# Patient Record
Sex: MALE | Race: WHITE | NOT HISPANIC OR LATINO | Employment: OTHER | URBAN - METROPOLITAN AREA
[De-identification: names, ages, dates, MRNs, and addresses within clinical notes are randomized per-mention and may not be internally consistent; named-entity substitution may affect disease eponyms.]

---

## 2017-01-17 ENCOUNTER — HISTORICAL (OUTPATIENT)
Dept: ADMINISTRATIVE | Facility: HOSPITAL | Age: 67
End: 2017-01-17

## 2017-07-03 ENCOUNTER — HISTORICAL (OUTPATIENT)
Dept: ADMINISTRATIVE | Facility: HOSPITAL | Age: 67
End: 2017-07-03

## 2018-02-02 ENCOUNTER — HISTORICAL (OUTPATIENT)
Dept: ADMINISTRATIVE | Facility: HOSPITAL | Age: 68
End: 2018-02-02

## 2018-02-02 LAB — TESTOST SERPL-MCNC: 339.1 NG/DL (ref 241–827)

## 2018-04-02 ENCOUNTER — HISTORICAL (OUTPATIENT)
Dept: ADMINISTRATIVE | Facility: HOSPITAL | Age: 68
End: 2018-04-02

## 2018-07-24 ENCOUNTER — HISTORICAL (OUTPATIENT)
Dept: MEDSURG UNIT | Facility: HOSPITAL | Age: 68
End: 2018-07-24

## 2018-07-24 LAB
ABS NEUT (OLG): 3.7 X10(3)/MCL (ref 2.1–9.2)
BASOPHILS # BLD AUTO: 0 X10(3)/MCL (ref 0–0.2)
BASOPHILS NFR BLD AUTO: 0 %
EOSINOPHIL # BLD AUTO: 0.4 X10(3)/MCL (ref 0–0.9)
EOSINOPHIL NFR BLD AUTO: 6 %
ERYTHROCYTE [DISTWIDTH] IN BLOOD BY AUTOMATED COUNT: 16.7 % (ref 11.5–17)
HCT VFR BLD AUTO: 27.1 % (ref 42–52)
HGB BLD-MCNC: 8 GM/DL (ref 14–18)
LYMPHOCYTES # BLD AUTO: 1.9 X10(3)/MCL (ref 0.6–4.6)
LYMPHOCYTES NFR BLD AUTO: 29 %
MCH RBC QN AUTO: 25 PG (ref 27–31)
MCHC RBC AUTO-ENTMCNC: 29.5 GM/DL (ref 33–36)
MCV RBC AUTO: 84.7 FL (ref 80–94)
MONOCYTES # BLD AUTO: 0.6 X10(3)/MCL (ref 0.1–1.3)
MONOCYTES NFR BLD AUTO: 9 %
NEUTROPHILS # BLD AUTO: 3.7 X10(3)/MCL (ref 2.1–9.2)
NEUTROPHILS NFR BLD AUTO: 56 %
PLATELET # BLD AUTO: 329 X10(3)/MCL (ref 130–400)
PMV BLD AUTO: 8.8 FL (ref 9.4–12.4)
PSA SERPL-MCNC: 6.39 NG/ML (ref 0–4)
RBC # BLD AUTO: 3.2 X10(6)/MCL (ref 4.7–6.1)
WBC # SPEC AUTO: 6.7 X10(3)/MCL (ref 4.5–11.5)

## 2018-07-25 LAB
ABS NEUT (OLG): 3.62 X10(3)/MCL (ref 2.1–9.2)
ALBUMIN SERPL-MCNC: 3 GM/DL (ref 3.4–5)
ALBUMIN/GLOB SERPL: 0.8 RATIO (ref 1.1–2)
ALP SERPL-CCNC: 53 UNIT/L (ref 50–136)
ALT SERPL-CCNC: 27 UNIT/L (ref 12–78)
AST SERPL-CCNC: 15 UNIT/L (ref 15–37)
BASOPHILS # BLD AUTO: 0 X10(3)/MCL (ref 0–0.2)
BASOPHILS NFR BLD AUTO: 0 %
BILIRUB SERPL-MCNC: 0.5 MG/DL (ref 0.2–1)
BILIRUBIN DIRECT+TOT PNL SERPL-MCNC: 0.1 MG/DL (ref 0–0.5)
BILIRUBIN DIRECT+TOT PNL SERPL-MCNC: 0.4 MG/DL (ref 0–0.8)
BUN SERPL-MCNC: 19 MG/DL (ref 7–18)
CALCIUM SERPL-MCNC: 8 MG/DL (ref 8.5–10.1)
CHLORIDE SERPL-SCNC: 109 MMOL/L (ref 98–107)
CO2 SERPL-SCNC: 25 MMOL/L (ref 21–32)
CREAT SERPL-MCNC: 1.49 MG/DL (ref 0.7–1.3)
EOSINOPHIL # BLD AUTO: 0.4 X10(3)/MCL (ref 0–0.9)
EOSINOPHIL NFR BLD AUTO: 6 %
ERYTHROCYTE [DISTWIDTH] IN BLOOD BY AUTOMATED COUNT: 16.6 % (ref 11.5–17)
GLOBULIN SER-MCNC: 3.6 GM/DL (ref 2.4–3.5)
GLUCOSE SERPL-MCNC: 107 MG/DL (ref 74–106)
HCT VFR BLD AUTO: 25.3 % (ref 42–52)
HGB BLD-MCNC: 7.6 GM/DL (ref 14–18)
LYMPHOCYTES # BLD AUTO: 1.8 X10(3)/MCL (ref 0.6–4.6)
LYMPHOCYTES NFR BLD AUTO: 28 %
MCH RBC QN AUTO: 25.8 PG (ref 27–31)
MCHC RBC AUTO-ENTMCNC: 30 GM/DL (ref 33–36)
MCV RBC AUTO: 85.8 FL (ref 80–94)
MONOCYTES # BLD AUTO: 0.6 X10(3)/MCL (ref 0.1–1.3)
MONOCYTES NFR BLD AUTO: 9 %
NEUTROPHILS # BLD AUTO: 3.62 X10(3)/MCL (ref 1.4–7.9)
NEUTROPHILS NFR BLD AUTO: 56 %
PLATELET # BLD AUTO: 317 X10(3)/MCL (ref 130–400)
PMV BLD AUTO: 9.3 FL (ref 9.4–12.4)
POTASSIUM SERPL-SCNC: 4 MMOL/L (ref 3.5–5.1)
PROT SERPL-MCNC: 6.6 GM/DL (ref 6.4–8.2)
RBC # BLD AUTO: 2.95 X10(6)/MCL (ref 4.7–6.1)
SODIUM SERPL-SCNC: 141 MMOL/L (ref 136–145)
WBC # SPEC AUTO: 6.4 X10(3)/MCL (ref 4.5–11.5)

## 2018-08-01 ENCOUNTER — HISTORICAL (OUTPATIENT)
Dept: ADMINISTRATIVE | Facility: HOSPITAL | Age: 68
End: 2018-08-01

## 2018-08-01 LAB
ABS NEUT (OLG): 2.9 X10(3)/MCL (ref 2.1–9.2)
ANISOCYTOSIS BLD QL SMEAR: 1
BASOPHILS # BLD AUTO: 0 X10(3)/MCL (ref 0–0.2)
BASOPHILS NFR BLD AUTO: 1 %
EOSINOPHIL # BLD AUTO: 0.4 X10(3)/MCL (ref 0–0.9)
EOSINOPHIL NFR BLD AUTO: 8 %
ERYTHROCYTE [DISTWIDTH] IN BLOOD BY AUTOMATED COUNT: 16.2 % (ref 11.5–17)
FOLATE SERPL-MCNC: 18.5 NG/ML (ref 3.1–17.5)
HCT VFR BLD AUTO: 29.3 % (ref 42–52)
HGB BLD-MCNC: 8.6 GM/DL (ref 14–18)
HYPOCHROMIA BLD QL SMEAR: 1
LYMPHOCYTES # BLD AUTO: 1.4 X10(3)/MCL (ref 0.6–4.6)
LYMPHOCYTES NFR BLD AUTO: 28 % (ref 13–40)
MCH RBC QN AUTO: 25.1 PG (ref 27–31)
MCHC RBC AUTO-ENTMCNC: 29.4 GM/DL (ref 33–36)
MCV RBC AUTO: 85.4 FL (ref 80–94)
MICROCYTES BLD QL SMEAR: 1
MONOCYTES # BLD AUTO: 0.4 X10(3)/MCL (ref 0.1–1.3)
MONOCYTES NFR BLD AUTO: 7 %
NEUTROPHILS # BLD AUTO: 2.9 X10(3)/MCL (ref 2.1–9.2)
NEUTROPHILS NFR BLD AUTO: 57 %
PLATELET # BLD AUTO: 347 X10(3)/MCL (ref 130–400)
PLATELET # BLD EST: NORMAL 10*3/UL
PMV BLD AUTO: 9.1 FL (ref 7.4–10.4)
PSA SERPL-MCNC: 5.21 NG/ML (ref 0–4)
RBC # BLD AUTO: 3.43 X10(6)/MCL (ref 4.7–6.1)
RBC MORPH BLD: NORMAL
T4 FREE SERPL-MCNC: 0.84 NG/DL (ref 0.76–1.46)
VIT B12 SERPL-MCNC: 1459 PG/ML (ref 193–986)
WBC # SPEC AUTO: 5.1 X10(3)/MCL (ref 4.5–11.5)

## 2018-12-04 ENCOUNTER — HISTORICAL (OUTPATIENT)
Dept: ADMINISTRATIVE | Facility: HOSPITAL | Age: 68
End: 2018-12-04

## 2018-12-04 LAB
ABS NEUT (OLG): 3.27 X10(3)/MCL (ref 2.1–9.2)
BASOPHILS # BLD AUTO: 0 X10(3)/MCL (ref 0–0.2)
BASOPHILS NFR BLD AUTO: 0 %
EOSINOPHIL # BLD AUTO: 0.2 X10(3)/MCL (ref 0–0.9)
EOSINOPHIL NFR BLD AUTO: 4 %
ERYTHROCYTE [DISTWIDTH] IN BLOOD BY AUTOMATED COUNT: 14.9 % (ref 11.5–17)
HCT VFR BLD AUTO: 41.5 % (ref 42–52)
HGB BLD-MCNC: 13.4 GM/DL (ref 14–18)
IRON SATN MFR SERPL: 26.8 % (ref 20–50)
IRON SERPL-MCNC: 95 MCG/DL (ref 50–175)
LYMPHOCYTES # BLD AUTO: 1.4 X10(3)/MCL (ref 0.6–4.6)
LYMPHOCYTES NFR BLD AUTO: 26 %
MCH RBC QN AUTO: 30.2 PG (ref 27–31)
MCHC RBC AUTO-ENTMCNC: 32.3 GM/DL (ref 33–36)
MCV RBC AUTO: 93.7 FL (ref 80–94)
MONOCYTES # BLD AUTO: 0.6 X10(3)/MCL (ref 0.1–1.3)
MONOCYTES NFR BLD AUTO: 10 %
NEUTROPHILS # BLD AUTO: 3.27 X10(3)/MCL (ref 2.1–9.2)
NEUTROPHILS NFR BLD AUTO: 59 %
PLATELET # BLD AUTO: 232 X10(3)/MCL (ref 130–400)
PMV BLD AUTO: 9.6 FL (ref 9.4–12.4)
RBC # BLD AUTO: 4.43 X10(6)/MCL (ref 4.7–6.1)
TIBC SERPL-MCNC: 354 MCG/DL (ref 250–450)
TRANSFERRIN SERPL-MCNC: 272 MG/DL (ref 200–360)
WBC # SPEC AUTO: 5.6 X10(3)/MCL (ref 4.5–11.5)

## 2019-11-19 ENCOUNTER — HISTORICAL (OUTPATIENT)
Dept: ADMINISTRATIVE | Facility: HOSPITAL | Age: 69
End: 2019-11-19

## 2019-11-19 LAB
ABS NEUT (OLG): 2.8 X10(3)/MCL (ref 2.1–9.2)
ALBUMIN SERPL-MCNC: 3.4 GM/DL (ref 3.4–5)
ALBUMIN/GLOB SERPL: 0.8 RATIO (ref 1.1–2)
ALP SERPL-CCNC: 50 UNIT/L (ref 50–136)
ALT SERPL-CCNC: 38 UNIT/L (ref 12–78)
AST SERPL-CCNC: 18 UNIT/L (ref 15–37)
BASOPHILS # BLD AUTO: 0 X10(3)/MCL (ref 0–0.2)
BASOPHILS NFR BLD AUTO: 0 %
BILIRUB SERPL-MCNC: 0.8 MG/DL (ref 0.2–1)
BILIRUBIN DIRECT+TOT PNL SERPL-MCNC: 0.2 MG/DL (ref 0–0.5)
BILIRUBIN DIRECT+TOT PNL SERPL-MCNC: 0.6 MG/DL (ref 0–0.8)
BUN SERPL-MCNC: 24 MG/DL (ref 7–18)
CALCIUM SERPL-MCNC: 9.2 MG/DL (ref 8.5–10.1)
CHLORIDE SERPL-SCNC: 105 MMOL/L (ref 98–107)
CHOLEST SERPL-MCNC: 135 MG/DL (ref 0–200)
CHOLEST/HDLC SERPL: 5 {RATIO} (ref 0–5)
CO2 SERPL-SCNC: 28 MMOL/L (ref 21–32)
CREAT SERPL-MCNC: 1.52 MG/DL (ref 0.7–1.3)
EOSINOPHIL # BLD AUTO: 0.2 X10(3)/MCL (ref 0–0.9)
EOSINOPHIL NFR BLD AUTO: 5 %
ERYTHROCYTE [DISTWIDTH] IN BLOOD BY AUTOMATED COUNT: 13.6 % (ref 11.5–17)
GLOBULIN SER-MCNC: 4.1 GM/DL (ref 2.4–3.5)
GLUCOSE SERPL-MCNC: 106 MG/DL (ref 74–106)
HCT VFR BLD AUTO: 39.5 % (ref 42–52)
HDLC SERPL-MCNC: 27 MG/DL (ref 35–60)
HGB BLD-MCNC: 12.7 GM/DL (ref 14–18)
IRON SATN MFR SERPL: 25.6 % (ref 20–50)
IRON SERPL-MCNC: 78 MCG/DL (ref 50–175)
LDLC SERPL CALC-MCNC: 70 MG/DL (ref 0–129)
LYMPHOCYTES # BLD AUTO: 1.6 X10(3)/MCL (ref 0.6–4.6)
LYMPHOCYTES NFR BLD AUTO: 31 %
MCH RBC QN AUTO: 32.3 PG (ref 27–31)
MCHC RBC AUTO-ENTMCNC: 32.2 GM/DL (ref 33–36)
MCV RBC AUTO: 100.5 FL (ref 80–94)
MONOCYTES # BLD AUTO: 0.4 X10(3)/MCL (ref 0.1–1.3)
MONOCYTES NFR BLD AUTO: 8 %
NEUTROPHILS # BLD AUTO: 2.8 X10(3)/MCL (ref 2.1–9.2)
NEUTROPHILS NFR BLD AUTO: 55 %
PLATELET # BLD AUTO: 221 X10(3)/MCL (ref 130–400)
PMV BLD AUTO: 9.1 FL (ref 9.4–12.4)
POTASSIUM SERPL-SCNC: 4.3 MMOL/L (ref 3.5–5.1)
PROT SERPL-MCNC: 7.5 GM/DL (ref 6.4–8.2)
PSA SERPL-MCNC: 6.77 NG/ML (ref 0–4)
RBC # BLD AUTO: 3.93 X10(6)/MCL (ref 4.7–6.1)
SODIUM SERPL-SCNC: 138 MMOL/L (ref 136–145)
T4 FREE SERPL-MCNC: 0.73 NG/DL (ref 0.76–1.46)
TESTOST SERPL-MCNC: 1201 NG/DL (ref 241–827)
TIBC SERPL-MCNC: 305 MCG/DL (ref 250–450)
TRANSFERRIN SERPL-MCNC: 231 MG/DL (ref 200–360)
TRIGL SERPL-MCNC: 190 MG/DL (ref 30–150)
TSH SERPL-ACNC: 1.44 MIU/L (ref 0.36–3.74)
VLDLC SERPL CALC-MCNC: 38 MG/DL
WBC # SPEC AUTO: 5.1 X10(3)/MCL (ref 4.5–11.5)

## 2020-03-31 ENCOUNTER — HISTORICAL (OUTPATIENT)
Dept: CARDIOLOGY | Facility: HOSPITAL | Age: 70
End: 2020-03-31

## 2020-06-17 ENCOUNTER — HOSPITAL ENCOUNTER (OUTPATIENT)
Dept: MEDSURG UNIT | Facility: HOSPITAL | Age: 70
End: 2020-06-18
Attending: INTERNAL MEDICINE | Admitting: INTERNAL MEDICINE

## 2020-06-18 LAB
ABS NEUT (OLG): 2.66 X10(3)/MCL (ref 2.1–9.2)
ALBUMIN SERPL-MCNC: 2.9 GM/DL (ref 3.4–5)
ALBUMIN/GLOB SERPL: 0.7 RATIO (ref 1.1–2)
ALP SERPL-CCNC: 56 UNIT/L (ref 40–150)
ALT SERPL-CCNC: 18 UNIT/L (ref 0–55)
AST SERPL-CCNC: 13 UNIT/L (ref 5–34)
BASOPHILS # BLD AUTO: 0 X10(3)/MCL (ref 0–0.2)
BASOPHILS NFR BLD AUTO: 0 %
BILIRUB SERPL-MCNC: 0.4 MG/DL
BILIRUBIN DIRECT+TOT PNL SERPL-MCNC: 0.1 MG/DL (ref 0–0.5)
BILIRUBIN DIRECT+TOT PNL SERPL-MCNC: 0.3 MG/DL (ref 0–0.8)
BUN SERPL-MCNC: 19 MG/DL (ref 8.4–25.7)
CALCIUM SERPL-MCNC: 8.3 MG/DL (ref 8.8–10)
CHLORIDE SERPL-SCNC: 109 MMOL/L (ref 98–107)
CO2 SERPL-SCNC: 24 MMOL/L (ref 23–31)
CREAT SERPL-MCNC: 1.27 MG/DL (ref 0.73–1.18)
EOSINOPHIL # BLD AUTO: 0.2 X10(3)/MCL (ref 0–0.9)
EOSINOPHIL NFR BLD AUTO: 5 %
ERYTHROCYTE [DISTWIDTH] IN BLOOD BY AUTOMATED COUNT: 13.8 % (ref 11.5–17)
GLOBULIN SER-MCNC: 4.4 GM/DL (ref 2.4–3.5)
GLUCOSE SERPL-MCNC: 109 MG/DL (ref 82–115)
HCT VFR BLD AUTO: 31.7 % (ref 42–52)
HGB BLD-MCNC: 10.2 GM/DL (ref 14–18)
LYMPHOCYTES # BLD AUTO: 1.3 X10(3)/MCL (ref 0.6–4.6)
LYMPHOCYTES NFR BLD AUTO: 29 %
MCH RBC QN AUTO: 33.7 PG (ref 27–31)
MCHC RBC AUTO-ENTMCNC: 32.2 GM/DL (ref 33–36)
MCV RBC AUTO: 104.6 FL (ref 80–94)
MONOCYTES # BLD AUTO: 0.3 X10(3)/MCL (ref 0.1–1.3)
MONOCYTES NFR BLD AUTO: 7 %
NEUTROPHILS # BLD AUTO: 2.66 X10(3)/MCL (ref 2.1–9.2)
NEUTROPHILS NFR BLD AUTO: 58 %
PLATELET # BLD AUTO: 204 X10(3)/MCL (ref 130–400)
PMV BLD AUTO: 8.9 FL (ref 9.4–12.4)
POTASSIUM SERPL-SCNC: 4.2 MMOL/L (ref 3.5–5.1)
PROT SERPL-MCNC: 7.3 GM/DL (ref 5.8–7.6)
RBC # BLD AUTO: 3.03 X10(6)/MCL (ref 4.7–6.1)
SODIUM SERPL-SCNC: 137 MMOL/L (ref 136–145)
WBC # SPEC AUTO: 4.6 X10(3)/MCL (ref 4.5–11.5)

## 2021-01-26 ENCOUNTER — HISTORICAL (OUTPATIENT)
Dept: ADMINISTRATIVE | Facility: HOSPITAL | Age: 71
End: 2021-01-26

## 2021-01-26 LAB
ALBUMIN SERPL-MCNC: 3.6 GM/DL (ref 3.4–4.8)
ALBUMIN/GLOB SERPL: 0.7 RATIO (ref 1.1–2)
ALP SERPL-CCNC: 53 UNIT/L (ref 40–150)
ALT SERPL-CCNC: 37 UNIT/L (ref 0–55)
APPEARANCE, UA: CLEAR
AST SERPL-CCNC: 20 UNIT/L (ref 5–34)
BACTERIA #/AREA URNS AUTO: ABNORMAL /HPF
BILIRUB SERPL-MCNC: 0.6 MG/DL
BILIRUB UR QL STRIP: NEGATIVE
BILIRUBIN DIRECT+TOT PNL SERPL-MCNC: 0.2 MG/DL (ref 0–0.5)
BILIRUBIN DIRECT+TOT PNL SERPL-MCNC: 0.4 MG/DL (ref 0–0.8)
BUN SERPL-MCNC: 19 MG/DL (ref 8.4–25.7)
CALCIUM SERPL-MCNC: 8.8 MG/DL (ref 8.8–10)
CHLORIDE SERPL-SCNC: 106 MMOL/L (ref 98–107)
CHOLEST SERPL-MCNC: 130 MG/DL
CHOLEST/HDLC SERPL: 5 {RATIO} (ref 0–5)
CO2 SERPL-SCNC: 23 MMOL/L (ref 23–31)
COLOR UR: YELLOW
CREAT SERPL-MCNC: 1.42 MG/DL (ref 0.73–1.18)
ERYTHROCYTE [DISTWIDTH] IN BLOOD BY AUTOMATED COUNT: 14.6 % (ref 11.5–17)
GLOBULIN SER-MCNC: 4.9 GM/DL (ref 2.4–3.5)
GLUCOSE (UA): NEGATIVE
GLUCOSE SERPL-MCNC: 87 MG/DL (ref 82–115)
HCT VFR BLD AUTO: 36 % (ref 42–52)
HDLC SERPL-MCNC: 25 MG/DL (ref 35–60)
HGB BLD-MCNC: 11.7 GM/DL (ref 14–18)
HGB UR QL STRIP: NEGATIVE
IRON SATN MFR SERPL: 27 % (ref 20–50)
IRON SERPL-MCNC: 69 UG/DL (ref 65–175)
KETONES UR QL STRIP: ABNORMAL
LDLC SERPL CALC-MCNC: 49 MG/DL (ref 50–140)
LEUKOCYTE ESTERASE UR QL STRIP: NEGATIVE
MCH RBC QN AUTO: 32.8 PG (ref 27–31)
MCHC RBC AUTO-ENTMCNC: 32.5 GM/DL (ref 33–36)
MCV RBC AUTO: 100.8 FL (ref 80–94)
NITRITE UR QL STRIP.AUTO: NEGATIVE
PH UR STRIP: 5 [PH] (ref 5–9)
PLATELET # BLD AUTO: 252 X10(3)/MCL (ref 130–400)
PMV BLD AUTO: 9.3 FL (ref 9.4–12.4)
POTASSIUM SERPL-SCNC: 4.1 MMOL/L (ref 3.5–5.1)
PROT SERPL-MCNC: 8.5 GM/DL (ref 5.8–7.6)
PROT UR QL STRIP: ABNORMAL
PSA SERPL-MCNC: 11.86 NG/ML
RBC # BLD AUTO: 3.57 X10(6)/MCL (ref 4.7–6.1)
RBC #/AREA URNS HPF: ABNORMAL /[HPF]
SODIUM SERPL-SCNC: 137 MMOL/L (ref 136–145)
SP GR UR STRIP: 1.02 (ref 1–1.03)
SQUAMOUS EPITHELIAL, UA: ABNORMAL
T4 FREE SERPL-MCNC: 0.85 NG/DL (ref 0.7–1.48)
TESTOST SERPL-MCNC: >1500 NG/DL (ref 220.91–715.81)
TIBC SERPL-MCNC: 189 UG/DL (ref 69–240)
TIBC SERPL-MCNC: 258 UG/DL (ref 250–450)
TRANSFERRIN SERPL-MCNC: 227 MG/DL (ref 163–344)
TRIGL SERPL-MCNC: 280 MG/DL (ref 34–140)
TSH SERPL-ACNC: 1.6 UIU/ML (ref 0.35–4.94)
UROBILINOGEN UR STRIP-ACNC: 0.2
VIT B12 SERPL-MCNC: 1775 PG/ML (ref 213–816)
VLDLC SERPL CALC-MCNC: 56 MG/DL
WBC # SPEC AUTO: 4.6 X10(3)/MCL (ref 4.5–11.5)
WBC #/AREA URNS AUTO: ABNORMAL /HPF (ref 0–3)

## 2021-03-10 ENCOUNTER — HISTORICAL (OUTPATIENT)
Dept: ADMINISTRATIVE | Facility: HOSPITAL | Age: 71
End: 2021-03-10

## 2021-03-10 LAB
BUN SERPL-MCNC: 18.9 MG/DL (ref 8.4–25.7)
CALCIUM SERPL-MCNC: 9.1 MG/DL (ref 8.8–10)
CHLORIDE SERPL-SCNC: 104 MMOL/L (ref 98–107)
CO2 SERPL-SCNC: 26 MMOL/L (ref 23–31)
CREAT SERPL-MCNC: 1.21 MG/DL (ref 0.73–1.18)
CREAT/UREA NIT SERPL: 16
GLUCOSE SERPL-MCNC: 105 MG/DL (ref 82–115)
POTASSIUM SERPL-SCNC: 4.5 MMOL/L (ref 3.5–5.1)
SODIUM SERPL-SCNC: 137 MMOL/L (ref 136–145)

## 2021-11-10 ENCOUNTER — HISTORICAL (OUTPATIENT)
Dept: ADMINISTRATIVE | Facility: HOSPITAL | Age: 71
End: 2021-11-10

## 2021-11-10 LAB
ALBUMIN SERPL-MCNC: 3.3 GM/DL (ref 3.4–4.8)
ALBUMIN/GLOB SERPL: 0.6 RATIO (ref 1.1–2)
ALP SERPL-CCNC: 60 UNIT/L (ref 40–150)
ALT SERPL-CCNC: 36 UNIT/L (ref 0–55)
ANTINUCLEAR ANTIBODY SCREEN (OHS): NEGATIVE
APPEARANCE, UA: CLEAR
AST SERPL-CCNC: 23 UNIT/L (ref 5–34)
BACTERIA #/AREA URNS AUTO: ABNORMAL /HPF
BILIRUB SERPL-MCNC: 0.6 MG/DL
BILIRUB UR QL STRIP: NEGATIVE
BILIRUBIN DIRECT+TOT PNL SERPL-MCNC: 0.3 MG/DL (ref 0–0.5)
BILIRUBIN DIRECT+TOT PNL SERPL-MCNC: 0.3 MG/DL (ref 0–0.8)
BUN SERPL-MCNC: 22.7 MG/DL (ref 8.4–25.7)
CALCIUM SERPL-MCNC: 9.1 MG/DL (ref 8.7–10.5)
CHLORIDE SERPL-SCNC: 105 MMOL/L (ref 98–107)
CHOLEST SERPL-MCNC: 125 MG/DL
CHOLEST/HDLC SERPL: 5 {RATIO} (ref 0–5)
CO2 SERPL-SCNC: 24 MMOL/L (ref 23–31)
COLOR UR: YELLOW
CREAT SERPL-MCNC: 1.5 MG/DL (ref 0.73–1.18)
DEPRECATED CALCIDIOL+CALCIFEROL SERPL-MC: 22.4 NG/ML (ref 30–80)
DSDNA ANTIBODY (OHS): NEGATIVE
ERYTHROCYTE [DISTWIDTH] IN BLOOD BY AUTOMATED COUNT: 14.7 % (ref 11.5–17)
ERYTHROCYTE [SEDIMENTATION RATE] IN BLOOD: 98 MM/HR (ref 0–15)
EST. AVERAGE GLUCOSE BLD GHB EST-MCNC: 116.9 MG/DL
GLOBULIN SER-MCNC: 5.2 GM/DL (ref 2.4–3.5)
GLUCOSE (UA): NEGATIVE
GLUCOSE SERPL-MCNC: 107 MG/DL (ref 82–115)
HBA1C MFR BLD: 5.7 %
HCT VFR BLD AUTO: 25.2 % (ref 42–52)
HDLC SERPL-MCNC: 25 MG/DL (ref 35–60)
HGB BLD-MCNC: 8.5 GM/DL (ref 14–18)
HGB UR QL STRIP: NEGATIVE
KETONES UR QL STRIP: NEGATIVE
LDLC SERPL CALC-MCNC: 67 MG/DL (ref 50–140)
LEUKOCYTE ESTERASE UR QL STRIP: NEGATIVE
MCH RBC QN AUTO: 32.6 PG (ref 27–31)
MCHC RBC AUTO-ENTMCNC: 33.7 GM/DL (ref 33–36)
MCV RBC AUTO: 96.6 FL (ref 80–94)
NITRITE UR QL STRIP.AUTO: NEGATIVE
PH UR STRIP: 5 [PH] (ref 5–9)
PLATELET # BLD AUTO: 238 X10(3)/MCL (ref 130–400)
PMV BLD AUTO: 8.9 FL (ref 9.4–12.4)
POTASSIUM SERPL-SCNC: 4.7 MMOL/L (ref 3.5–5.1)
PROT SERPL-MCNC: 8.5 GM/DL (ref 5.8–7.6)
PROT UR QL STRIP: ABNORMAL
PSA SERPL-MCNC: 4.09 NG/ML
RBC # BLD AUTO: 2.61 X10(6)/MCL (ref 4.7–6.1)
RBC #/AREA URNS HPF: ABNORMAL /[HPF]
RHEUMATOID FACT SERPL-ACNC: <13 IU/ML
SODIUM SERPL-SCNC: 136 MMOL/L (ref 136–145)
SP GR UR STRIP: 1.02 (ref 1–1.03)
SQUAMOUS EPITHELIAL, UA: ABNORMAL /HPF (ref 0–4)
T4 FREE SERPL-MCNC: 0.8 NG/DL (ref 0.7–1.48)
TRIGL SERPL-MCNC: 166 MG/DL (ref 34–140)
TSH SERPL-ACNC: 1.3 UIU/ML (ref 0.35–4.94)
URATE SERPL-MCNC: 8.4 MG/DL (ref 3.5–7.2)
UROBILINOGEN UR STRIP-ACNC: 0.2
VLDLC SERPL CALC-MCNC: 33 MG/DL
WBC # SPEC AUTO: 3.1 X10(3)/MCL (ref 4.5–11.5)
WBC #/AREA URNS AUTO: ABNORMAL /HPF (ref 0–3)

## 2021-11-15 ENCOUNTER — HISTORICAL (OUTPATIENT)
Dept: ADMINISTRATIVE | Facility: HOSPITAL | Age: 71
End: 2021-11-15

## 2021-11-15 LAB
ABS NEUT (OLG): 1.73 X10(3)/MCL (ref 2.1–9.2)
BASOPHILS # BLD AUTO: 0 X10(3)/MCL (ref 0–0.2)
BASOPHILS NFR BLD AUTO: 0 %
EOSINOPHIL # BLD AUTO: 0.2 X10(3)/MCL (ref 0–0.9)
EOSINOPHIL NFR BLD AUTO: 4 %
ERYTHROCYTE [DISTWIDTH] IN BLOOD BY AUTOMATED COUNT: 14.9 % (ref 11.5–17)
HCT VFR BLD AUTO: 26.7 % (ref 42–52)
HGB BLD-MCNC: 8.6 GM/DL (ref 14–18)
LYMPHOCYTES # BLD AUTO: 1.4 X10(3)/MCL (ref 0.6–4.6)
LYMPHOCYTES NFR BLD AUTO: 38 %
MCH RBC QN AUTO: 33.2 PG (ref 27–31)
MCHC RBC AUTO-ENTMCNC: 32.2 GM/DL (ref 33–36)
MCV RBC AUTO: 103.1 FL (ref 80–94)
MONOCYTES # BLD AUTO: 0.3 X10(3)/MCL (ref 0.1–1.3)
MONOCYTES NFR BLD AUTO: 7 %
NEUTROPHILS # BLD AUTO: 1.73 X10(3)/MCL (ref 2.1–9.2)
NEUTROPHILS NFR BLD AUTO: 49 %
PLATELET # BLD AUTO: 222 X10(3)/MCL (ref 130–400)
PMV BLD AUTO: 9.2 FL (ref 9.4–12.4)
RBC # BLD AUTO: 2.59 X10(6)/MCL (ref 4.7–6.1)
WBC # SPEC AUTO: 3.5 X10(3)/MCL (ref 4.5–11.5)

## 2021-11-16 LAB
CROSSMATCH INTERPRETATION: NORMAL
GROUP & RH: NORMAL
PRODUCT READY: NORMAL

## 2021-11-17 ENCOUNTER — HISTORICAL (OUTPATIENT)
Dept: INFUSION THERAPY | Facility: HOSPITAL | Age: 71
End: 2021-11-17

## 2021-11-19 ENCOUNTER — HISTORICAL (OUTPATIENT)
Dept: ADMINISTRATIVE | Facility: HOSPITAL | Age: 71
End: 2021-11-19

## 2021-11-19 LAB
ERYTHROCYTE [DISTWIDTH] IN BLOOD BY AUTOMATED COUNT: 14.8 % (ref 11.5–17)
HCT VFR BLD AUTO: 29.4 % (ref 42–52)
HGB BLD-MCNC: 9.8 GM/DL (ref 14–18)
MCH RBC QN AUTO: 33.1 PG (ref 27–31)
MCHC RBC AUTO-ENTMCNC: 33.3 GM/DL (ref 33–36)
MCV RBC AUTO: 99.3 FL (ref 80–94)
PLATELET # BLD AUTO: 208 X10(3)/MCL (ref 130–400)
PMV BLD AUTO: 9.4 FL (ref 9.4–12.4)
RBC # BLD AUTO: 2.96 X10(6)/MCL (ref 4.7–6.1)
WBC # SPEC AUTO: 3.2 X10(3)/MCL (ref 4.5–11.5)

## 2021-12-14 ENCOUNTER — HISTORICAL (OUTPATIENT)
Dept: ADMINISTRATIVE | Facility: HOSPITAL | Age: 71
End: 2021-12-14

## 2021-12-14 LAB
ABS NEUT (OLG): 1.89 X10(3)/MCL (ref 2.1–9.2)
BASOPHILS # BLD AUTO: 0 X10(3)/MCL (ref 0–0.2)
BASOPHILS NFR BLD AUTO: 0 %
EOSINOPHIL # BLD AUTO: 0.1 X10(3)/MCL (ref 0–0.9)
EOSINOPHIL NFR BLD AUTO: 3 %
ERYTHROCYTE [DISTWIDTH] IN BLOOD BY AUTOMATED COUNT: 15.3 % (ref 11.5–17)
HCT VFR BLD AUTO: 27.7 % (ref 42–52)
HGB BLD-MCNC: 8.9 GM/DL (ref 14–18)
IMM GRANULOCYTES # BLD AUTO: 0.03 10*3/UL
IMM GRANULOCYTES NFR BLD AUTO: 1 %
LYMPHOCYTES # BLD AUTO: 1.5 X10(3)/MCL (ref 0.6–4.6)
LYMPHOCYTES NFR BLD AUTO: 39 %
MCH RBC QN AUTO: 32.7 PG (ref 27–31)
MCHC RBC AUTO-ENTMCNC: 32.1 GM/DL (ref 33–36)
MCV RBC AUTO: 101.8 FL (ref 80–94)
MONOCYTES # BLD AUTO: 0.3 X10(3)/MCL (ref 0.1–1.3)
MONOCYTES NFR BLD AUTO: 7 %
NEUTROPHILS # BLD AUTO: 1.89 X10(3)/MCL (ref 2.1–9.2)
NEUTROPHILS NFR BLD AUTO: 50 %
PLATELET # BLD AUTO: 213 X10(3)/MCL (ref 130–400)
PMV BLD AUTO: 8.9 FL (ref 9.4–12.4)
RBC # BLD AUTO: 2.72 X10(6)/MCL (ref 4.7–6.1)
WBC # SPEC AUTO: 3.8 X10(3)/MCL (ref 4.5–11.5)

## 2021-12-29 ENCOUNTER — HISTORICAL (OUTPATIENT)
Dept: ADMINISTRATIVE | Facility: HOSPITAL | Age: 71
End: 2021-12-29

## 2021-12-29 LAB
ABS NEUT (OLG): 2.43 X10(3)/MCL (ref 2.1–9.2)
BASOPHILS # BLD AUTO: 0 X10(3)/MCL (ref 0–0.2)
BASOPHILS NFR BLD AUTO: 0 %
EOSINOPHIL # BLD AUTO: 0.1 X10(3)/MCL (ref 0–0.9)
EOSINOPHIL NFR BLD AUTO: 3 %
ERYTHROCYTE [DISTWIDTH] IN BLOOD BY AUTOMATED COUNT: 15.9 % (ref 11.5–17)
EST. AVERAGE GLUCOSE BLD GHB EST-MCNC: 91.1 MG/DL
HBA1C MFR BLD: 4.8 %
HCT VFR BLD AUTO: 28.5 % (ref 42–52)
HGB BLD-MCNC: 9.4 GM/DL (ref 14–18)
IRON SATN MFR SERPL: 41 % (ref 20–50)
IRON SERPL-MCNC: 104 UG/DL (ref 65–175)
LYMPHOCYTES # BLD AUTO: 1.2 X10(3)/MCL (ref 0.6–4.6)
LYMPHOCYTES NFR BLD AUTO: 30 %
MCH RBC QN AUTO: 33.3 PG (ref 27–31)
MCHC RBC AUTO-ENTMCNC: 33 GM/DL (ref 33–36)
MCV RBC AUTO: 101.1 FL (ref 80–94)
MONOCYTES # BLD AUTO: 0.2 X10(3)/MCL (ref 0.1–1.3)
MONOCYTES NFR BLD AUTO: 6 %
NEUTROPHILS # BLD AUTO: 2.43 X10(3)/MCL (ref 2.1–9.2)
NEUTROPHILS NFR BLD AUTO: 60 %
PLATELET # BLD AUTO: 249 X10(3)/MCL (ref 130–400)
PMV BLD AUTO: 8.9 FL (ref 9.4–12.4)
RBC # BLD AUTO: 2.82 X10(6)/MCL (ref 4.7–6.1)
T4 FREE SERPL-MCNC: 0.85 NG/DL (ref 0.7–1.48)
TIBC SERPL-MCNC: 147 UG/DL (ref 69–240)
TIBC SERPL-MCNC: 251 UG/DL (ref 250–450)
TRANSFERRIN SERPL-MCNC: 223 MG/DL (ref 163–344)
TSH SERPL-ACNC: 1.08 UIU/ML (ref 0.35–4.94)
WBC # SPEC AUTO: 4 X10(3)/MCL (ref 4.5–11.5)

## 2022-01-06 LAB
CROSSMATCH INTERPRETATION: NORMAL
GROUP & RH: NORMAL
PRODUCT READY: NORMAL

## 2022-01-07 ENCOUNTER — HISTORICAL (OUTPATIENT)
Dept: INFUSION THERAPY | Facility: HOSPITAL | Age: 72
End: 2022-01-07

## 2022-02-10 ENCOUNTER — HISTORICAL (OUTPATIENT)
Dept: ADMINISTRATIVE | Facility: HOSPITAL | Age: 72
End: 2022-02-10

## 2022-02-10 LAB
ERYTHROCYTE [DISTWIDTH] IN BLOOD BY AUTOMATED COUNT: 16.3 % (ref 11.5–17)
HCT VFR BLD AUTO: 32.4 % (ref 42–52)
HGB BLD-MCNC: 10.5 G/DL (ref 14–18)
MCH RBC QN AUTO: 32.5 PG (ref 27–31)
MCHC RBC AUTO-ENTMCNC: 32.4 G/DL (ref 33–36)
MCV RBC AUTO: 100.3 FL (ref 80–94)
PLATELET # BLD AUTO: 164 10*3/UL (ref 130–400)
PMV BLD AUTO: 9.1 FL (ref 9.4–12.4)
PSA SERPL-MCNC: 4.42 NG/ML
RBC # BLD AUTO: 3.23 10*6/UL (ref 4.7–6.1)
WBC # SPEC AUTO: 5.5 10*3/UL (ref 4.5–11.5)

## 2022-06-22 ENCOUNTER — LAB VISIT (OUTPATIENT)
Dept: LAB | Facility: HOSPITAL | Age: 72
End: 2022-06-22
Attending: INTERNAL MEDICINE
Payer: MEDICARE

## 2022-06-22 DIAGNOSIS — R53.83 FATIGUE, UNSPECIFIED TYPE: ICD-10-CM

## 2022-06-22 DIAGNOSIS — E78.5 HYPERLIPIDEMIA, UNSPECIFIED HYPERLIPIDEMIA TYPE: ICD-10-CM

## 2022-06-22 DIAGNOSIS — R94.8 NONSPECIFIC ABNORMAL RESULTS OF BASAL METABOLISM FUNCTION STUDY: Primary | ICD-10-CM

## 2022-06-22 DIAGNOSIS — I10 ESSENTIAL HYPERTENSION, MALIGNANT: ICD-10-CM

## 2022-06-22 DIAGNOSIS — D64.9 ANEMIA, UNSPECIFIED TYPE: ICD-10-CM

## 2022-06-22 LAB
ALBUMIN SERPL-MCNC: 3.3 GM/DL (ref 3.4–4.8)
ALBUMIN/GLOB SERPL: 0.6 RATIO (ref 1.1–2)
ALP SERPL-CCNC: 48 UNIT/L (ref 40–150)
ALT SERPL-CCNC: 27 UNIT/L (ref 0–55)
AST SERPL-CCNC: 17 UNIT/L (ref 5–34)
BASOPHILS # BLD AUTO: 0.01 X10(3)/MCL (ref 0–0.2)
BASOPHILS NFR BLD AUTO: 0.2 %
BILIRUBIN DIRECT+TOT PNL SERPL-MCNC: 0.5 MG/DL
BUN SERPL-MCNC: 27.7 MG/DL (ref 8.4–25.7)
CALCIUM SERPL-MCNC: 8.8 MG/DL (ref 8.8–10)
CHLORIDE SERPL-SCNC: 109 MMOL/L (ref 98–107)
CO2 SERPL-SCNC: 21 MMOL/L (ref 23–31)
CREAT SERPL-MCNC: 1.41 MG/DL (ref 0.73–1.18)
EOSINOPHIL # BLD AUTO: 0.01 X10(3)/MCL (ref 0–0.9)
EOSINOPHIL NFR BLD AUTO: 0.2 %
ERYTHROCYTE [DISTWIDTH] IN BLOOD BY AUTOMATED COUNT: 14 % (ref 11.5–17)
GLOBULIN SER-MCNC: 5.3 GM/DL (ref 2.4–3.5)
GLUCOSE SERPL-MCNC: 144 MG/DL (ref 82–115)
HCT VFR BLD AUTO: 29.2 % (ref 42–52)
HGB BLD-MCNC: 9.4 GM/DL (ref 14–18)
IMM GRANULOCYTES # BLD AUTO: 0.04 X10(3)/MCL (ref 0–0.02)
IMM GRANULOCYTES NFR BLD AUTO: 0.8 % (ref 0–0.43)
LYMPHOCYTES # BLD AUTO: 1.07 X10(3)/MCL (ref 0.6–4.6)
LYMPHOCYTES NFR BLD AUTO: 21.6 %
MCH RBC QN AUTO: 33.5 PG (ref 27–31)
MCHC RBC AUTO-ENTMCNC: 32.2 MG/DL (ref 33–36)
MCV RBC AUTO: 103.9 FL (ref 80–94)
MONOCYTES # BLD AUTO: 0.31 X10(3)/MCL (ref 0.1–1.3)
MONOCYTES NFR BLD AUTO: 6.3 %
NEUTROPHILS # BLD AUTO: 3.5 X10(3)/MCL (ref 2.1–9.2)
NEUTROPHILS NFR BLD AUTO: 70.9 %
NRBC BLD AUTO-RTO: 0 %
PLATELET # BLD AUTO: 231 X10(3)/MCL (ref 130–400)
PMV BLD AUTO: 8.9 FL (ref 9.4–12.4)
POTASSIUM SERPL-SCNC: 4.2 MMOL/L (ref 3.5–5.1)
PROT SERPL-MCNC: 8.6 GM/DL (ref 5.8–7.6)
PSA SERPL-MCNC: 4.75 NG/ML
RBC # BLD AUTO: 2.81 X10(6)/MCL (ref 4.7–6.1)
SODIUM SERPL-SCNC: 135 MMOL/L (ref 136–145)
T4 FREE SERPL-MCNC: 0.78 NG/DL (ref 0.7–1.48)
TSH SERPL-ACNC: 0.55 UIU/ML (ref 0.35–4.94)
WBC # SPEC AUTO: 5 X10(3)/MCL (ref 4.5–11.5)

## 2022-06-22 PROCEDURE — 84443 ASSAY THYROID STIM HORMONE: CPT

## 2022-06-22 PROCEDURE — 85025 COMPLETE CBC W/AUTO DIFF WBC: CPT

## 2022-06-22 PROCEDURE — 36415 COLL VENOUS BLD VENIPUNCTURE: CPT

## 2022-06-22 PROCEDURE — 84439 ASSAY OF FREE THYROXINE: CPT

## 2022-06-22 PROCEDURE — 84153 ASSAY OF PSA TOTAL: CPT

## 2022-06-22 PROCEDURE — 80053 COMPREHEN METABOLIC PANEL: CPT

## 2022-07-07 ENCOUNTER — LAB VISIT (OUTPATIENT)
Dept: LAB | Facility: HOSPITAL | Age: 72
End: 2022-07-07
Attending: INTERNAL MEDICINE
Payer: MEDICARE

## 2022-07-07 DIAGNOSIS — D64.9 SYMPTOMATIC ANEMIA: Primary | ICD-10-CM

## 2022-07-07 DIAGNOSIS — D64.9 ANEMIA, UNSPECIFIED TYPE: Primary | ICD-10-CM

## 2022-07-07 LAB
GROUP & RH: NORMAL
INDIRECT COOMBS GEL: NORMAL

## 2022-07-07 PROCEDURE — 86901 BLOOD TYPING SEROLOGIC RH(D): CPT | Performed by: INTERNAL MEDICINE

## 2022-07-07 PROCEDURE — 36415 COLL VENOUS BLD VENIPUNCTURE: CPT

## 2022-07-07 PROCEDURE — 86920 COMPATIBILITY TEST SPIN: CPT | Performed by: INTERNAL MEDICINE

## 2022-07-07 RX ORDER — HYDROCODONE BITARTRATE AND ACETAMINOPHEN 500; 5 MG/1; MG/1
TABLET ORAL ONCE
Status: CANCELLED | OUTPATIENT
Start: 2022-07-07 | End: 2022-07-07

## 2022-07-08 ENCOUNTER — INFUSION (OUTPATIENT)
Dept: INFUSION THERAPY | Facility: HOSPITAL | Age: 72
End: 2022-07-08
Attending: INTERNAL MEDICINE
Payer: MEDICARE

## 2022-07-08 VITALS
SYSTOLIC BLOOD PRESSURE: 121 MMHG | HEART RATE: 50 BPM | OXYGEN SATURATION: 99 % | RESPIRATION RATE: 18 BRPM | WEIGHT: 207.88 LBS | DIASTOLIC BLOOD PRESSURE: 68 MMHG | TEMPERATURE: 98 F

## 2022-07-08 DIAGNOSIS — D64.9 SYMPTOMATIC ANEMIA: ICD-10-CM

## 2022-07-08 LAB
ABO + RH BLD: NORMAL
ABO + RH BLD: NORMAL
ABORH RETYPE: NORMAL
BLD PROD TYP BPU: NORMAL
BLD PROD TYP BPU: NORMAL
BLOOD UNIT EXPIRATION DATE: NORMAL
BLOOD UNIT EXPIRATION DATE: NORMAL
BLOOD UNIT TYPE CODE: 6200
BLOOD UNIT TYPE CODE: 6200
CROSSMATCH INTERPRETATION: NORMAL
CROSSMATCH INTERPRETATION: NORMAL
DISPENSE STATUS: NORMAL
DISPENSE STATUS: NORMAL
UNIT NUMBER: NORMAL
UNIT NUMBER: NORMAL

## 2022-07-08 PROCEDURE — P9016 RBC LEUKOCYTES REDUCED: HCPCS | Performed by: INTERNAL MEDICINE

## 2022-07-08 PROCEDURE — 36415 COLL VENOUS BLD VENIPUNCTURE: CPT

## 2022-07-08 PROCEDURE — 36430 TRANSFUSION BLD/BLD COMPNT: CPT

## 2022-07-08 RX ORDER — HYDROCODONE BITARTRATE AND ACETAMINOPHEN 500; 5 MG/1; MG/1
TABLET ORAL ONCE
Status: DISCONTINUED | OUTPATIENT
Start: 2022-07-08 | End: 2022-07-08 | Stop reason: HOSPADM

## 2022-08-10 ENCOUNTER — OFFICE VISIT (OUTPATIENT)
Dept: HEMATOLOGY/ONCOLOGY | Facility: CLINIC | Age: 72
End: 2022-08-10
Payer: MEDICARE

## 2022-08-10 VITALS
OXYGEN SATURATION: 97 % | WEIGHT: 208 LBS | DIASTOLIC BLOOD PRESSURE: 68 MMHG | TEMPERATURE: 98 F | BODY MASS INDEX: 28.17 KG/M2 | SYSTOLIC BLOOD PRESSURE: 119 MMHG | HEIGHT: 72 IN | HEART RATE: 64 BPM

## 2022-08-10 DIAGNOSIS — R77.1 ELEVATED SERUM GLOBULIN LEVEL: ICD-10-CM

## 2022-08-10 DIAGNOSIS — N28.9 KIDNEY DISEASE: ICD-10-CM

## 2022-08-10 DIAGNOSIS — D64.9 ANEMIA, UNSPECIFIED TYPE: ICD-10-CM

## 2022-08-10 DIAGNOSIS — D64.9 SYMPTOMATIC ANEMIA: ICD-10-CM

## 2022-08-10 LAB
ALBUMIN SERPL-MCNC: 3.4 GM/DL (ref 3.4–4.8)
ALBUMIN/GLOB SERPL: 0.6 RATIO (ref 1.1–2)
ALP SERPL-CCNC: 61 UNIT/L (ref 40–150)
ALT SERPL-CCNC: 31 UNIT/L (ref 0–55)
AST SERPL-CCNC: 19 UNIT/L (ref 5–34)
BASOPHILS # BLD AUTO: 0.01 X10(3)/MCL (ref 0–0.2)
BASOPHILS NFR BLD AUTO: 0.2 %
BILIRUBIN DIRECT+TOT PNL SERPL-MCNC: 0.8 MG/DL
BUN SERPL-MCNC: 24 MG/DL (ref 8.4–25.7)
CALCIUM SERPL-MCNC: 9.2 MG/DL (ref 8.8–10)
CHLORIDE SERPL-SCNC: 107 MMOL/L (ref 98–107)
CO2 SERPL-SCNC: 23 MMOL/L (ref 23–31)
CREAT SERPL-MCNC: 1.44 MG/DL (ref 0.73–1.18)
EOSINOPHIL # BLD AUTO: 0.11 X10(3)/MCL (ref 0–0.9)
EOSINOPHIL NFR BLD AUTO: 2.6 %
ERYTHROCYTE [DISTWIDTH] IN BLOOD BY AUTOMATED COUNT: 14.5 % (ref 11.5–17)
FERRITIN SERPL-MCNC: 674.8 NG/ML (ref 21.81–274.66)
GFR SERPLBLD CREATININE-BSD FMLA CKD-EPI: 53 MLS/MIN/1.73/M2
GLOBULIN SER-MCNC: 5.8 GM/DL (ref 2.4–3.5)
GLUCOSE SERPL-MCNC: 103 MG/DL (ref 82–115)
HCT VFR BLD AUTO: 29.5 % (ref 42–52)
HGB BLD-MCNC: 9.8 GM/DL (ref 14–18)
IMM GRANULOCYTES # BLD AUTO: 0.03 X10(3)/MCL (ref 0–0.04)
IMM GRANULOCYTES NFR BLD AUTO: 0.7 %
IRON SATN MFR SERPL: 54 % (ref 20–50)
IRON SERPL-MCNC: 120 UG/DL (ref 65–175)
LYMPHOCYTES # BLD AUTO: 1.64 X10(3)/MCL (ref 0.6–4.6)
LYMPHOCYTES NFR BLD AUTO: 38.7 %
MCH RBC QN AUTO: 33.2 PG (ref 27–31)
MCHC RBC AUTO-ENTMCNC: 33.2 MG/DL (ref 33–36)
MCV RBC AUTO: 100 FL (ref 80–94)
MONOCYTES # BLD AUTO: 0.33 X10(3)/MCL (ref 0.1–1.3)
MONOCYTES NFR BLD AUTO: 7.8 %
NEUTROPHILS # BLD AUTO: 2.1 X10(3)/MCL (ref 2.1–9.2)
NEUTROPHILS NFR BLD AUTO: 50 %
PLATELET # BLD AUTO: 192 X10(3)/MCL (ref 130–400)
PMV BLD AUTO: 8.1 FL (ref 7.4–10.4)
POTASSIUM SERPL-SCNC: 4.2 MMOL/L (ref 3.5–5.1)
PROT SERPL-MCNC: 9.2 GM/DL (ref 5.8–7.6)
RBC # BLD AUTO: 2.95 X10(6)/MCL (ref 4.7–6.1)
RET# (OHS): 0.06 (ref 0.03–0.1)
RETICULOCYTE COUNT AUTOMATED (OLG): 1.93 % (ref 1.1–2.1)
SODIUM SERPL-SCNC: 136 MMOL/L (ref 136–145)
TIBC SERPL-MCNC: 102 UG/DL (ref 69–240)
TIBC SERPL-MCNC: 222 UG/DL (ref 250–450)
TRANSFERRIN SERPL-MCNC: 191 MG/DL (ref 163–344)
TSH SERPL-ACNC: 1.16 UIU/ML (ref 0.35–4.94)
WBC # SPEC AUTO: 4.2 X10(3)/MCL (ref 4.5–11.5)

## 2022-08-10 PROCEDURE — 85045 AUTOMATED RETICULOCYTE COUNT: CPT | Performed by: INTERNAL MEDICINE

## 2022-08-10 PROCEDURE — 84443 ASSAY THYROID STIM HORMONE: CPT | Performed by: INTERNAL MEDICINE

## 2022-08-10 PROCEDURE — 85025 COMPLETE CBC W/AUTO DIFF WBC: CPT | Performed by: INTERNAL MEDICINE

## 2022-08-10 PROCEDURE — 83883 ASSAY NEPHELOMETRY NOT SPEC: CPT | Performed by: INTERNAL MEDICINE

## 2022-08-10 PROCEDURE — 99215 OFFICE O/P EST HI 40 MIN: CPT | Mod: PBBFAC | Performed by: INTERNAL MEDICINE

## 2022-08-10 PROCEDURE — 82728 ASSAY OF FERRITIN: CPT | Performed by: INTERNAL MEDICINE

## 2022-08-10 PROCEDURE — 82784 ASSAY IGA/IGD/IGG/IGM EACH: CPT | Performed by: INTERNAL MEDICINE

## 2022-08-10 PROCEDURE — 99204 PR OFFICE/OUTPT VISIT, NEW, LEVL IV, 45-59 MIN: ICD-10-PCS | Mod: S$PBB,,, | Performed by: INTERNAL MEDICINE

## 2022-08-10 PROCEDURE — 84165 PROTEIN E-PHORESIS SERUM: CPT | Performed by: INTERNAL MEDICINE

## 2022-08-10 PROCEDURE — 99204 OFFICE O/P NEW MOD 45 MIN: CPT | Mod: S$PBB,,, | Performed by: INTERNAL MEDICINE

## 2022-08-10 PROCEDURE — 85060 BLOOD SMEAR INTERPRETATION: CPT | Performed by: INTERNAL MEDICINE

## 2022-08-10 PROCEDURE — 80053 COMPREHEN METABOLIC PANEL: CPT | Performed by: INTERNAL MEDICINE

## 2022-08-10 PROCEDURE — 83540 ASSAY OF IRON: CPT | Performed by: INTERNAL MEDICINE

## 2022-08-10 PROCEDURE — 36415 COLL VENOUS BLD VENIPUNCTURE: CPT | Performed by: INTERNAL MEDICINE

## 2022-08-10 PROCEDURE — 99999 PR PBB SHADOW E&M-EST. PATIENT-LVL V: CPT | Mod: PBBFAC,,, | Performed by: INTERNAL MEDICINE

## 2022-08-10 PROCEDURE — 86235 NUCLEAR ANTIGEN ANTIBODY: CPT | Performed by: INTERNAL MEDICINE

## 2022-08-10 PROCEDURE — 99999 PR PBB SHADOW E&M-EST. PATIENT-LVL V: ICD-10-PCS | Mod: PBBFAC,,, | Performed by: INTERNAL MEDICINE

## 2022-08-10 RX ORDER — DOXAZOSIN 4 MG/1
4 TABLET ORAL DAILY
Status: ON HOLD | COMMUNITY
Start: 2022-05-28 | End: 2023-07-03 | Stop reason: HOSPADM

## 2022-08-10 RX ORDER — ZOLPIDEM TARTRATE 10 MG/1
10 TABLET ORAL NIGHTLY PRN
COMMUNITY
Start: 2022-06-15

## 2022-08-10 RX ORDER — FERROUS FUMARATE 324(106)MG
1 TABLET ORAL 2 TIMES DAILY
COMMUNITY
Start: 2022-06-16

## 2022-08-10 RX ORDER — RANOLAZINE 500 MG/1
500 TABLET, EXTENDED RELEASE ORAL 2 TIMES DAILY
COMMUNITY
Start: 2022-05-09 | End: 2022-12-07

## 2022-08-10 RX ORDER — DUTASTERIDE 0.5 MG/1
0.5 CAPSULE, LIQUID FILLED ORAL DAILY
COMMUNITY
Start: 2022-05-02

## 2022-08-10 RX ORDER — ATORVASTATIN CALCIUM 40 MG/1
TABLET, FILM COATED ORAL
COMMUNITY
Start: 2022-05-05

## 2022-08-10 RX ORDER — FUROSEMIDE 20 MG/1
20 TABLET ORAL DAILY
COMMUNITY
Start: 2022-03-27 | End: 2022-10-11

## 2022-08-10 RX ORDER — CLOPIDOGREL BISULFATE 75 MG/1
75 TABLET ORAL DAILY
COMMUNITY
Start: 2022-06-04

## 2022-08-10 RX ORDER — ISOSORBIDE MONONITRATE 30 MG/1
TABLET, EXTENDED RELEASE ORAL
COMMUNITY
Start: 2022-07-06 | End: 2022-12-07

## 2022-08-10 RX ORDER — AMLODIPINE BESYLATE 10 MG/1
TABLET ORAL DAILY
Status: ON HOLD | COMMUNITY
Start: 2022-07-06 | End: 2023-07-03 | Stop reason: HOSPADM

## 2022-08-10 RX ORDER — METOPROLOL TARTRATE 25 MG/1
25 TABLET, FILM COATED ORAL 2 TIMES DAILY
Status: ON HOLD | COMMUNITY
Start: 2022-06-09 | End: 2023-07-03 | Stop reason: HOSPADM

## 2022-08-10 NOTE — PROGRESS NOTES
HEMATOLOGY/ONCOLOGY OFFICE CLINIC VISIT    Visit Information:    Initial Evaluation:   Referring Provider:   Other providers:  Code status:    Diagnosis:  Macrocytic anemia    Present treatment:    Treatment/Oncology history:    Plan of care:  Anemia workup    Imaging:    Pathology:      CLINICAL HISTORY:       Patient: Valentino Manning is a 71 y.o. male with multiple medical problems kindly referred for persistent anemia.  Reviewing electronic medical record patient with anemia since 02/10/2016.  From 2016 to January of 2017 anemia was mild.  Anemia slowly worsening back in 2018 hemoglobin was 8.0.  At that same time kidney function was worsening as well.     Most recent labs with HGB of 9.4, .9, platelets 231 K, WBC 5.0.  Chemistries with globulin of 5.3.  BUN/creatinine 27.7/1.41.    He denies any fever, chills, sweats.  No chest pain or short of breath.  He denies any bleeding.  He does have history of blood transfusion in the past.  No family history of leukemia, lymphoma or multiple myeloma.      Chief Complaint: NP (Referral from Dr. Kaiser Metcalf for chronic anemia)      Interval History:      HPI  Past Medical History:   Diagnosis Date    Anemia     Heart problem     Hyperlipidemia     Hypertension       Past Surgical History:   Procedure Laterality Date    CARDIAC SURGERY  2021    ENDOSCOPIC RELEASE OF BOTH CARPAL TUNNELS       Family History   Problem Relation Age of Onset    Hypertension Mother     Diabetes Mother     Anemia Mother     Heart disease Father      Social Connections: Not on file       Review of patient's allergies indicates:   Allergen Reactions    Penicillins       Current Outpatient Medications on File Prior to Visit   Medication Sig Dispense Refill    amLODIPine (NORVASC) 10 MG tablet       atorvastatin (LIPITOR) 40 MG tablet TAKE 1 TABLET ORALLY ONCE DAILY ON MON/TUE/THUR/FRI AND 1/2 ON WED. NONE ON SAT/SUN      clopidogreL (PLAVIX) 75 mg tablet Take 75 mg by mouth  once daily.      doxazosin (CARDURA) 4 MG tablet Take 4 mg by mouth once daily.      dutasteride (AVODART) 0.5 mg capsule Take 0.5 mg by mouth once daily.      FERRETTS 325 mg (106 mg iron) Tab Take 1 tablet by mouth 2 (two) times daily.      furosemide (LASIX) 20 MG tablet Take 20 mg by mouth once daily.      isosorbide mononitrate (IMDUR) 30 MG 24 hr tablet       metoprolol tartrate (LOPRESSOR) 25 MG tablet Take 12.5 mg by mouth 2 (two) times daily.      ranolazine (RANEXA) 500 MG Tb12 Take 500 mg by mouth 2 (two) times daily.      zolpidem (AMBIEN) 10 mg Tab Take 10 mg by mouth once daily.       No current facility-administered medications on file prior to visit.      Review of Systems   Constitutional: Positive for fatigue. Negative for activity change, appetite change, chills, diaphoresis, fever and unexpected weight change.   HENT: Negative for nasal congestion, mouth sores, nosebleeds, postnasal drip, sinus pressure/congestion, sore throat and trouble swallowing.    Eyes: Negative for visual disturbance.   Cardiovascular: Negative for chest pain and palpitations.   Gastrointestinal: Negative for abdominal distention, abdominal pain, blood in stool, change in bowel habit, constipation, diarrhea, nausea, vomiting and change in bowel habit.   Endocrine: Negative.    Genitourinary: Negative for bladder incontinence, decreased urine volume, difficulty urinating, dysuria, frequency, hematuria, scrotal swelling, testicular pain and urgency.   Musculoskeletal: Negative for arthralgias, back pain, gait problem, joint swelling, leg pain, myalgias and neck pain.   Integumentary:  Negative for rash.   Neurological: Negative for dizziness, tremors, seizures, syncope, speech difficulty, weakness, light-headedness, numbness, headaches and memory loss.   Hematological: Does not bruise/bleed easily.   Psychiatric/Behavioral: Negative for agitation, confusion, hallucinations, sleep disturbance and suicidal ideas. The  patient is not nervous/anxious.               Vitals:    08/10/22 1311   BP: 119/68   BP Location: Right arm   Patient Position: Sitting   Pulse: 64   Temp: 98 °F (36.7 °C)   SpO2: 97%   Weight: 94.3 kg (208 lb)   Height: 6' (1.829 m)      Physical Exam  Constitutional:       Appearance: He is obese.   HENT:      Head: Normocephalic and atraumatic.   Eyes:      Extraocular Movements: Extraocular movements intact.   Pulmonary:      Effort: Pulmonary effort is normal.   Musculoskeletal:      Cervical back: Neck supple.   Neurological:      Mental Status: He is alert and oriented to person, place, and time.   Psychiatric:         Mood and Affect: Mood normal.         Behavior: Behavior normal.         Judgment: Judgment normal.       ECOG SCORE           Laboratory:  CBC with Differential:  Lab Results   Component Value Date    WBC 4.2 (L) 08/10/2022    RBC 2.95 (L) 08/10/2022    HGB 9.8 (L) 08/10/2022    HCT 29.5 (L) 08/10/2022    .0 (H) 08/10/2022    MCH 33.2 (H) 08/10/2022    MCHC 33.2 08/10/2022    RDW 14.5 08/10/2022     08/10/2022    MPV 8.1 08/10/2022        CMP:  Sodium Level   Date Value Ref Range Status   08/10/2022 136 136 - 145 mmol/L Final     Potassium Level   Date Value Ref Range Status   08/10/2022 4.2 3.5 - 5.1 mmol/L Final     Carbon Dioxide   Date Value Ref Range Status   08/10/2022 23 23 - 31 mmol/L Final     Blood Urea Nitrogen   Date Value Ref Range Status   08/10/2022 24.0 8.4 - 25.7 mg/dL Final     Creatinine   Date Value Ref Range Status   08/10/2022 1.44 (H) 0.73 - 1.18 mg/dL Final     Calcium Level Total   Date Value Ref Range Status   08/10/2022 9.2 8.8 - 10.0 mg/dL Final     Albumin Level   Date Value Ref Range Status   08/10/2022 3.4 3.4 - 4.8 gm/dL Final     Bilirubin Total   Date Value Ref Range Status   08/10/2022 0.8 <=1.5 mg/dL Final     Alkaline Phosphatase   Date Value Ref Range Status   08/10/2022 61 40 - 150 unit/L Final     Aspartate Aminotransferase   Date Value  Ref Range Status   08/10/2022 19 5 - 34 unit/L Final     Alanine Aminotransferase   Date Value Ref Range Status   08/10/2022 31 0 - 55 unit/L Final     Estimated GFR-Non    Date Value Ref Range Status   06/22/2022 53 mls/min/1.73/m2 Final             Assessment:       1. Symptomatic anemia    2. Anemia, unspecified type    3. Elevated serum globulin level    4. Kidney disease              Plan:         I have discussed with the patient what anemia is, workup, causes and treatment depending of the cause. Discussed that anemia is a condition that develops when the blood lacks enough healthy red blood cells or hemoglobin. Hemoglobin is a main part of red blood cells and binds oxygen. There are different causes of anemia but the most common includes: Anemia caused by blood loss, decreased or faulty red blood cell production or anemia caused by destruction of red blood cells.  Anemia can be caused by  Gastrointestinal conditions such as ulcers, hemorrhoids, gastritis (inflammation of the stomach), use of nonsteroidal anti-inflammatory drugs (NSAIDs) such as aspirin or ibuprofen, which can cause ulcers and gastritis and cancer, ie, gastric, colorectal. Anemia can be cause by Iron-deficiency or other Vitamin deficiency, Bone marrow and stem cell problems, ie MDS, Multiple Myeloma, Lymphoma,etc.  It can be associated with other conditions, ie, Advanced kidney disease, Hypothyroidism, chronic diseases, such as cancer, infection, lupus, diabetes, and rheumatoid arthritis.    Discussed with the patient that Multiple myeloma is a type of bone marrow cancer that forms in a type of white blood cell called a plasma cell. Plasma cells help you fight infections by making antibodies that recognize and attack germs but when they transformed into cancer cells then they are not efficient and don't work well. This cells can affect not only the bone marrow but the bones in general causing distinctive bone lesions called  Lytic lesions. It also can affect the kidney. Discussed prognosis and treatment recommendations and indications.  Discussed Monoclonal gammopathy of undetermined significance (MGUS) and that is the most common of a spectrum of diseases called plasma cell dyscrasias. The term MGUS denotes the presence of a monoclonal immunoglobulin (Ig), or M-protein, in the serum or urine in persons without evidence of multiple myeloma (MM), Waldenström macroglobulinemia (WM), amyloidosis (AL) or other lymphoproliferative disorders. The risk of progression to MM or other lymphoproliferative disorder is present at a constant rate throughout the remainder of a patient's life. This risk has been quantified at 1% per year.  I also discussed with the patient that elevated globulin can be associated with autoimmune disorders or recent infections/inflammation.  This is a disorder of the immune system so anything that affect immune system can increase the globulin level.   Discussed bone marrow biopsy.  He does want to wait until all the blood work is back prior to have the bone marrow biopsy.    Anemia w/u  MM w/u  RTC 2 weeks to discuss results and +/- bone marrow biopsy  Encourage to call or message us for any questions or problems  The patient was given ample opportunity to ask questions, and to the best of my abilities, all questions answered to satisfaction; patient demonstrated understanding of what we discussed and agreeable to the plan.     I'd like to thank for referring and allowing me the opportunity to participate in the care of this patient and if any questions, please do not hesitate to call the office at [phone].       ELSA PAREKH MD

## 2022-08-11 LAB — HEMATOLOGIST REVIEW: NORMAL

## 2022-08-12 LAB
ALBUMIN % SPEP (OHS): 40.7 (ref 48.1–59.5)
ALBUMIN SERPL-MCNC: 3.4 GM/DL (ref 3.4–4.8)
ALBUMIN/GLOB SERPL: 0.6 RATIO (ref 1.1–2)
ALPHA 1 GLOB (OHS): 0.28 GM/DL (ref 0–0.4)
ALPHA 1 GLOB% (OHS): 3.05 (ref 2.3–4.9)
ALPHA 2 GLOB % (OHS): 10.6 (ref 6.9–13)
ALPHA 2 GLOB (OHS): 0.98 GM/DL (ref 0.4–1)
BETA GLOB (OHS): 0.98 GM/DL (ref 0.7–1.3)
BETA GLOB% (OHS): 10.66 (ref 13.8–19.7)
GAMMA GLOBULIN % (OHS): 34.99 (ref 10.1–21.9)
GAMMA GLOBULIN (OHS): 3.22 GM/DL (ref 0.4–1.8)
GLOBULIN SER-MCNC: 5.8 GM/DL (ref 2.4–3.5)
M SPIKE % (OHS): 24.37
M SPIKE (OHS): 2.24 GM/DL
PATH REV: NORMAL
PROT SERPL-MCNC: 9.2 GM/DL (ref 5.8–7.6)

## 2022-08-15 LAB
ALBUMIN SERPL ELPH-MCNC: 4.19 G/DL
ALPHA1 GLOB SERPL ELPH-MCNC: 0.29 G/DL
ALPHA2 GLOB SERPL ELPH-MCNC: 0.79 G/DL
ANTINUCLEAR ANTIBODY SCREEN (OHS): NEGATIVE
AR EER MONOCLONAL PROTEIN AND FLC, SERUM: ABNORMAL
B-GLOBULIN SERPL ELPH-MCNC: 1.7 G/DL
CENTROMERE PROTEIN ANTIBODY (OHS): NEGATIVE
DSDNA ANTIBODY (OHS): NEGATIVE
GAMMA GLOB SERPL ELPH-MCNC: 2.33 G/DL
IGA SERPL-MCNC: 10 MG/DL
IGG SERPL-MCNC: 4882 MG/DL
IGM SERPL-MCNC: 12 MG/DL
INTERPRETATION SERPL IFE-IMP: ABNORMAL
INTERPRETATION SERPL IFE-IMP: ABNORMAL
JO-1 ANTIBODY (OHS): NEGATIVE
KAPPA LC FREE SER-MCNC: 491.97 MG/L
KAPPA LC FREE/LAMBDA FREE SER NEPH: 103.36 {RATIO}
LAMBDA LC FREE SERPL-MCNC: 4.76 MG/L
PROT SERPL-MCNC: 9.3 G/DL
RNP70 ANTIBODY (OHS): NEGATIVE
SCLERODERMA (SCL-70S) ANTIBODY (OHS): NEGATIVE
SMITH DP IGG (OHS): NEGATIVE
SSA(RO) ANTIBODY (OHS): NEGATIVE
SSB(LA) ANTIBODY (OHS): NEGATIVE
U1RNP ANTIBODY (OHS): NEGATIVE

## 2022-08-24 ENCOUNTER — HOSPITAL ENCOUNTER (OUTPATIENT)
Dept: RADIOLOGY | Facility: HOSPITAL | Age: 72
Discharge: HOME | End: 2022-08-24
Attending: INTERNAL MEDICINE
Payer: MEDICARE

## 2022-08-24 DIAGNOSIS — D64.9 SYMPTOMATIC ANEMIA: ICD-10-CM

## 2022-08-24 DIAGNOSIS — D64.9 ANEMIA, UNSPECIFIED TYPE: ICD-10-CM

## 2022-08-24 PROCEDURE — 76700 US EXAM ABDOM COMPLETE: CPT | Mod: TC

## 2022-08-29 ENCOUNTER — OFFICE VISIT (OUTPATIENT)
Dept: HEMATOLOGY/ONCOLOGY | Facility: CLINIC | Age: 72
End: 2022-08-29
Payer: MEDICARE

## 2022-08-29 ENCOUNTER — TELEPHONE (OUTPATIENT)
Dept: HEMATOLOGY/ONCOLOGY | Facility: CLINIC | Age: 72
End: 2022-08-29

## 2022-08-29 VITALS
HEIGHT: 72 IN | WEIGHT: 209 LBS | HEART RATE: 68 BPM | TEMPERATURE: 98 F | SYSTOLIC BLOOD PRESSURE: 116 MMHG | DIASTOLIC BLOOD PRESSURE: 66 MMHG | BODY MASS INDEX: 28.31 KG/M2 | OXYGEN SATURATION: 99 %

## 2022-08-29 DIAGNOSIS — R77.1 ELEVATED SERUM GLOBULIN LEVEL: ICD-10-CM

## 2022-08-29 DIAGNOSIS — D64.9 ANEMIA, UNSPECIFIED TYPE: ICD-10-CM

## 2022-08-29 DIAGNOSIS — C90.00 MULTIPLE MYELOMA NOT HAVING ACHIEVED REMISSION: Primary | ICD-10-CM

## 2022-08-29 DIAGNOSIS — N28.9 KIDNEY DISEASE: ICD-10-CM

## 2022-08-29 DIAGNOSIS — D72.0 GENETIC ANOMALIES OF LEUKOCYTES: ICD-10-CM

## 2022-08-29 PROCEDURE — 99215 PR OFFICE/OUTPT VISIT, EST, LEVL V, 40-54 MIN: ICD-10-PCS | Mod: S$PBB,,, | Performed by: INTERNAL MEDICINE

## 2022-08-29 PROCEDURE — 99999 PR PBB SHADOW E&M-EST. PATIENT-LVL V: ICD-10-PCS | Mod: PBBFAC,,, | Performed by: INTERNAL MEDICINE

## 2022-08-29 PROCEDURE — 99215 OFFICE O/P EST HI 40 MIN: CPT | Mod: PBBFAC | Performed by: INTERNAL MEDICINE

## 2022-08-29 PROCEDURE — 99999 PR PBB SHADOW E&M-EST. PATIENT-LVL V: CPT | Mod: PBBFAC,,, | Performed by: INTERNAL MEDICINE

## 2022-08-29 PROCEDURE — 99215 OFFICE O/P EST HI 40 MIN: CPT | Mod: S$PBB,,, | Performed by: INTERNAL MEDICINE

## 2022-08-29 NOTE — PROGRESS NOTES
HEMATOLOGY/ONCOLOGY OFFICE CLINIC VISIT    Visit Information:    Initial Evaluation:   Referring Provider:   Other providers:  Code status:    Diagnosis:  Multiple Myeloma--IgG, Kappa  Macrocytic anemia    Present treatment: TBD    Treatment/Oncology history:    Plan of care:  Complete myeloma workup    Imaging:   US abdomen 8/24/2022: Spleen: 10.8 cm. Mild hepatomegaly with suspected mild hepatic steatosis. Cholelithiasis.    Pathology:      CLINICAL HISTORY:       Patient: Valentino Manning is a 71 y.o. male with multiple medical problems kindly referred for persistent anemia.  Reviewing electronic medical record patient with anemia since 02/10/2016.  From 2016 to January of 2017 anemia was mild.  Anemia slowly worsening back in 2018 hemoglobin was 8.0.  At that same time kidney function was worsening as well.     Most recent labs with HGB of 9.4, .9, platelets 231 K, WBC 5.0.  Chemistries with globulin of 5.3.  BUN/creatinine 27.7/1.41.    No family history of leukemia, lymphoma or multiple myeloma.        Chief Complaint: No Concerns today      Interval History:  Patient is here today to discuss results.  He is with his wife.  Blood were discussed in detail with the patient and his wife.  It reveal multiple myeloma.  See below for details of the results.    He denies any fever, chills, sweats.  No chest pain or short of breath at rest but reports dyspnea on exertion.  He has had cardiac workup already and everything is all right. He denies any bleeding.  He does have history of blood transfusion in the past.       Past Medical History:   Diagnosis Date    Anemia     Heart problem     Hyperlipidemia     Hypertension       Past Surgical History:   Procedure Laterality Date    CARDIAC SURGERY  2021    ENDOSCOPIC RELEASE OF BOTH CARPAL TUNNELS       Family History   Problem Relation Age of Onset    Hypertension Mother     Diabetes Mother     Anemia Mother     Heart disease Father      Social  Connections: Not on file       Review of patient's allergies indicates:   Allergen Reactions    Penicillins       Current Outpatient Medications on File Prior to Visit   Medication Sig Dispense Refill    amLODIPine (NORVASC) 10 MG tablet       atorvastatin (LIPITOR) 40 MG tablet TAKE 1 TABLET ORALLY ONCE DAILY ON MON/TUE/THUR/FRI AND 1/2 ON WED. NONE ON SAT/SUN      clopidogreL (PLAVIX) 75 mg tablet Take 75 mg by mouth once daily.      doxazosin (CARDURA) 4 MG tablet Take 4 mg by mouth once daily.      dutasteride (AVODART) 0.5 mg capsule Take 0.5 mg by mouth once daily.      FERRETTS 325 mg (106 mg iron) Tab Take 1 tablet by mouth 2 (two) times daily.      furosemide (LASIX) 20 MG tablet Take 20 mg by mouth once daily.      isosorbide mononitrate (IMDUR) 30 MG 24 hr tablet       metoprolol tartrate (LOPRESSOR) 25 MG tablet Take 12.5 mg by mouth 2 (two) times daily.      ranolazine (RANEXA) 500 MG Tb12 Take 500 mg by mouth 2 (two) times daily.      zolpidem (AMBIEN) 10 mg Tab Take 10 mg by mouth once daily.       No current facility-administered medications on file prior to visit.      Review of Systems   Constitutional:  Positive for fatigue. Negative for activity change, appetite change, chills, diaphoresis, fever and unexpected weight change.   HENT:  Negative for nasal congestion, mouth sores, nosebleeds, postnasal drip, sinus pressure/congestion, sore throat and trouble swallowing.    Eyes:  Negative for visual disturbance.   Respiratory:  Positive for shortness of breath.    Cardiovascular:  Negative for chest pain and palpitations.   Gastrointestinal:  Negative for abdominal distention, abdominal pain, blood in stool, change in bowel habit, constipation, diarrhea, nausea, vomiting and change in bowel habit.   Endocrine: Negative.    Genitourinary:  Negative for bladder incontinence, decreased urine volume, difficulty urinating, dysuria, frequency, hematuria, scrotal swelling, testicular pain and  urgency.   Musculoskeletal:  Negative for arthralgias, back pain, gait problem, joint swelling, leg pain, myalgias and neck pain.   Integumentary:  Negative for rash.   Neurological:  Negative for dizziness, tremors, seizures, syncope, speech difficulty, weakness, light-headedness, numbness, headaches and memory loss.   Hematological:  Does not bruise/bleed easily.   Psychiatric/Behavioral:  Negative for agitation, confusion, hallucinations, sleep disturbance and suicidal ideas. The patient is not nervous/anxious.             Vitals:    08/29/22 1116   BP: 116/66   BP Location: Left arm   Patient Position: Sitting   Pulse: 68   Temp: 98 °F (36.7 °C)   TempSrc: Oral   SpO2: 99%   Weight: 94.8 kg (209 lb)   Height: 6' (1.829 m)      Physical Exam  Constitutional:       Appearance: He is obese.   HENT:      Head: Normocephalic and atraumatic.   Eyes:      Extraocular Movements: Extraocular movements intact.   Pulmonary:      Effort: Pulmonary effort is normal.   Musculoskeletal:      Cervical back: Neck supple.   Neurological:      Mental Status: He is alert and oriented to person, place, and time.   Psychiatric:         Mood and Affect: Mood normal.         Behavior: Behavior normal.         Judgment: Judgment normal.     ECOG SCORE    0 - Fully active-able to carry on all pre-disease performance without restriction         Laboratory:  CBC with Differential:  Lab Results   Component Value Date    WBC 4.2 (L) 08/10/2022    RBC 2.95 (L) 08/10/2022    HGB 9.8 (L) 08/10/2022    HCT 29.5 (L) 08/10/2022    .0 (H) 08/10/2022    MCH 33.2 (H) 08/10/2022    MCHC 33.2 08/10/2022    RDW 14.5 08/10/2022     08/10/2022    MPV 8.1 08/10/2022         Latest Reference Range & Units 08/10/22 13:49   Kappa Qnt Free Light Chains 3.30 - 19.40 mg/L 491.97 (H)   Lambda Qnt Free Light Chains 5.71 - 26.30 mg/L 4.76 (L)   [place]/Lambda Free Light Chain Ratio 0.26 - 1.65  103.36 (H)   M SPIKE gm/dl 2.24      Latest Reference Range  & Units 08/10/22 13:49   Immunoglobulin A 68 - 408 mg/dL 10 (L)   Immunoglobulin G 768 - 1632 mg/dL 4882 (H)   Immunoglobulin M 35 - 263 mg/dL 12 (L)   (L): Data is abnormally low  (H): Data is abnormally high    M-spike in the beta/gamma region.  The monoclonal protein peak accounts for 3.39 g/dL of the total protein in the beta and gamma regions.  This quantitation may include complement components.  BETTY gel pattern shows an IgG type kappa monoclonal protein.    SPEP:  Total protein and globulin are both increased.  A/G ratio is low.     Serum protein electrophoresis shows a decreased albumin fraction, decreased beta globulin fraction, and increased gamma globulin fraction.   A monoclonal protein (M-spike) is present in the gamma region (2.24 g/dL).     BETTY:  Immunofixation shows an IgG monoclonal protein with kappa light chain specificity.       CMP:  Sodium Level   Date Value Ref Range Status   08/10/2022 136 136 - 145 mmol/L Final     Potassium Level   Date Value Ref Range Status   08/10/2022 4.2 3.5 - 5.1 mmol/L Final     Carbon Dioxide   Date Value Ref Range Status   08/10/2022 23 23 - 31 mmol/L Final     Blood Urea Nitrogen   Date Value Ref Range Status   08/10/2022 24.0 8.4 - 25.7 mg/dL Final     Creatinine   Date Value Ref Range Status   08/10/2022 1.44 (H) 0.73 - 1.18 mg/dL Final     Calcium Level Total   Date Value Ref Range Status   08/10/2022 9.2 8.8 - 10.0 mg/dL Final     Albumin Level   Date Value Ref Range Status   08/10/2022 3.4 3.4 - 4.8 gm/dL Final   08/10/2022 3.4 3.4 - 4.8 gm/dL Final     Bilirubin Total   Date Value Ref Range Status   08/10/2022 0.8 <=1.5 mg/dL Final     Alkaline Phosphatase   Date Value Ref Range Status   08/10/2022 61 40 - 150 unit/L Final     Aspartate Aminotransferase   Date Value Ref Range Status   08/10/2022 19 5 - 34 unit/L Final     Alanine Aminotransferase   Date Value Ref Range Status   08/10/2022 31 0 - 55 unit/L Final     Estimated GFR-Non    Date  Value Ref Range Status   06/22/2022 53 mls/min/1.73/m2 Final       Latest Reference Range & Units 08/10/22 13:49   MACEY Negative  Negative   ds DNA Ab Negative  Negative   Anti-SSA Interpretation Negative  Negative   Anti-SSB Interpretation Negative  Negative      Latest Reference Range & Units 11/10/21 10:27 08/10/22 13:49   Centromere Protein Antibody Negative   Negative   HARRIS-1 Antibody Negative   Negative   Scleroderma (Scl-70S) Antibody Negative   Negative   Rheumatoid Factor <<=30 IU/mL <13    RNP70 Antibody Negative   Negative   Hand DP IgG   Negative   U1RNP Antibody Negative   Negative          Assessment:       1. Multiple myeloma not having achieved remission    2. Anemia, unspecified type    3. Elevated serum globulin level    4. Kidney disease                Plan:       Discussed to complete the workup including bone marrow biopsy, 24 hours urine collection and bone imaging.  Discussed in detail multiple myeloma, diagnosis, prognosis and treatment recommendations.  I gave handouts of multiple myeloma.  All his questions were answered.    RTC 4 weeks to discuss results and definite recommendations  Bone marrow biopsy to eval myeloma  Whole body PET to eval for bone lesions  24 Hrs urine collection   Encourage to call or message us for any questions or problems  The patient was given ample opportunity to ask questions, and to the best of my abilities, all questions answered to satisfaction; patient demonstrated understanding of what we discussed and agreeable to the plan.         ELSA PAREKH MD      Total time spent in counseling and discussion about further management options including relevant lab work, treatment,  prognosis, medications and intended side effects was more than 60 minutes. More than 50% of the time was spent on counseling and coordination of care.  I spent a total of 60 minutes on the day of the visit.This includes face to face time and non-face to face time preparing to see the  patient (eg, review of tests), Obtaining and/or reviewing separately obtained history, Documenting clinical information in the electronic or other health record, Independently interpreting resultsand communicating results to the patient/family/caregiver, or Care coordination.

## 2022-09-01 ENCOUNTER — HOSPITAL ENCOUNTER (OUTPATIENT)
Facility: HOSPITAL | Age: 72
Discharge: HOME | End: 2022-09-01
Attending: PATHOLOGY | Admitting: PATHOLOGY
Payer: MEDICARE

## 2022-09-01 VITALS
DIASTOLIC BLOOD PRESSURE: 66 MMHG | WEIGHT: 200.81 LBS | HEIGHT: 72 IN | BODY MASS INDEX: 27.2 KG/M2 | OXYGEN SATURATION: 99 % | RESPIRATION RATE: 19 BRPM | HEART RATE: 75 BPM | SYSTOLIC BLOOD PRESSURE: 144 MMHG | TEMPERATURE: 98 F

## 2022-09-01 DIAGNOSIS — D64.9 ANEMIA: ICD-10-CM

## 2022-09-01 DIAGNOSIS — D46.Z OTHER MYELODYSPLASTIC SYNDROMES: ICD-10-CM

## 2022-09-01 DIAGNOSIS — D64.9 ANEMIA, UNSPECIFIED TYPE: Primary | ICD-10-CM

## 2022-09-01 PROCEDURE — 63600175 PHARM REV CODE 636 W HCPCS: Performed by: PATHOLOGY

## 2022-09-01 PROCEDURE — 38221 DX BONE MARROW BIOPSIES: CPT | Performed by: PATHOLOGY

## 2022-09-01 PROCEDURE — 38222 DX BONE MARROW BX & ASPIR: CPT | Performed by: PATHOLOGY

## 2022-09-01 PROCEDURE — 38220 DX BONE MARROW ASPIRATIONS: CPT | Performed by: PATHOLOGY

## 2022-09-01 RX ORDER — MIDAZOLAM HYDROCHLORIDE 5 MG/ML
10 INJECTION INTRAMUSCULAR; INTRAVENOUS DAILY PRN
Status: DISCONTINUED | OUTPATIENT
Start: 2022-09-01 | End: 2022-09-01 | Stop reason: HOSPADM

## 2022-09-01 RX ORDER — LIDOCAINE HYDROCHLORIDE 10 MG/ML
INJECTION, SOLUTION EPIDURAL; INFILTRATION; INTRACAUDAL; PERINEURAL
Status: DISCONTINUED
Start: 2022-09-01 | End: 2022-09-01 | Stop reason: WASHOUT

## 2022-09-01 RX ORDER — FLUMAZENIL 0.1 MG/ML
0.2 INJECTION INTRAVENOUS ONCE
Status: DISCONTINUED | OUTPATIENT
Start: 2022-09-01 | End: 2022-09-01 | Stop reason: HOSPADM

## 2022-09-01 RX ORDER — SODIUM CHLORIDE 9 MG/ML
INJECTION, SOLUTION INTRAVENOUS CONTINUOUS
Status: DISCONTINUED | OUTPATIENT
Start: 2022-09-01 | End: 2022-09-01 | Stop reason: HOSPADM

## 2022-09-01 RX ADMIN — MIDAZOLAM HYDROCHLORIDE 1 MG: 5 INJECTION, SOLUTION INTRAMUSCULAR; INTRAVENOUS at 11:09

## 2022-09-01 RX ADMIN — MIDAZOLAM HYDROCHLORIDE 2 MG: 5 INJECTION, SOLUTION INTRAMUSCULAR; INTRAVENOUS at 11:09

## 2022-09-01 RX ADMIN — MIDAZOLAM HYDROCHLORIDE 3 MG: 5 INJECTION, SOLUTION INTRAMUSCULAR; INTRAVENOUS at 11:09

## 2022-09-01 NOTE — PROCEDURES
Valentino Manning is a 71 y.o. male patient.    Temp: 97.5 °F (36.4 °C) (09/01/22 0817)  Pulse: 74 (09/01/22 1121)  Resp: 19 (09/01/22 0817)  BP: (!) 116/55 (09/01/22 1121)  SpO2: 97 % (09/01/22 1121)  Weight: 91.1 kg (200 lb 13.4 oz) (09/01/22 0837)  Height: 6' (182.9 cm) (09/01/22 0837)  Mallampati Scale: Class II  ASA Classification: Class 2    Bone marrow    Date/Time: 9/1/2022 11:24 AM  Performed by: Robbie Gardner MD  Authorized by: Robbie Gardner MD     Consent Done?: Yes (Verbal) and Yes (Written)   Immediately prior to procedure a time out was called to verify the correct patient, procedure, equipment, support staff and site/side marked as required. .      Assistants?: Yes    I was present for the entire procedure.    Position: left lateral  Anesthesia: local infiltration  Local anesthetic: lidocaine 1% without epinephrine  Aspiration?: Yes   Biopsy?: Yes    Specimen source: posterior iliac crest  Patient tolerated the procedure well with no immediate complications.  Post-operative instructions were provided for the patient.  Patient was discharged and will follow up if any complications occur.     9/1/2022

## 2022-09-20 LAB
DHEA SERPL-MCNC: NORMAL
ESTROGEN SERPL-MCNC: NORMAL PG/ML
INSULIN SERPL-ACNC: NORMAL U[IU]/ML
LAB AP BM ANCILLARY TESTING: NORMAL
LAB AP BM CBC: NORMAL
LAB AP BM FLOWCYTOMETRY RESULT: NORMAL
LAB AP BM PERIPHERAL SMEAR: NORMAL
LAB AP CLINICAL INFORMATION: NORMAL
LAB AP GROSS DESCRIPTION: NORMAL
T3RU NFR SERPL: NORMAL %

## 2022-09-28 ENCOUNTER — HOSPITAL ENCOUNTER (OUTPATIENT)
Dept: RADIOLOGY | Facility: HOSPITAL | Age: 72
Discharge: HOME | End: 2022-09-28
Attending: INTERNAL MEDICINE
Payer: MEDICARE

## 2022-09-28 DIAGNOSIS — R77.1 ELEVATED SERUM GLOBULIN LEVEL: ICD-10-CM

## 2022-09-28 DIAGNOSIS — D72.0 GENETIC ANOMALIES OF LEUKOCYTES: ICD-10-CM

## 2022-09-28 DIAGNOSIS — C90.00 MULTIPLE MYELOMA NOT HAVING ACHIEVED REMISSION: ICD-10-CM

## 2022-09-28 DIAGNOSIS — D64.9 ANEMIA, UNSPECIFIED TYPE: ICD-10-CM

## 2022-09-28 DIAGNOSIS — N28.9 KIDNEY DISEASE: ICD-10-CM

## 2022-09-28 PROCEDURE — 78816 PET IMAGE W/CT FULL BODY: CPT | Mod: TC,PI

## 2022-09-28 PROCEDURE — A9552 F18 FDG: HCPCS

## 2022-10-03 ENCOUNTER — OFFICE VISIT (OUTPATIENT)
Dept: HEMATOLOGY/ONCOLOGY | Facility: CLINIC | Age: 72
End: 2022-10-03
Payer: MEDICARE

## 2022-10-03 VITALS
TEMPERATURE: 98 F | WEIGHT: 208 LBS | HEART RATE: 64 BPM | DIASTOLIC BLOOD PRESSURE: 65 MMHG | BODY MASS INDEX: 28.21 KG/M2 | OXYGEN SATURATION: 98 % | SYSTOLIC BLOOD PRESSURE: 117 MMHG

## 2022-10-03 DIAGNOSIS — R77.1 ELEVATED SERUM GLOBULIN LEVEL: ICD-10-CM

## 2022-10-03 DIAGNOSIS — N28.9 KIDNEY DISEASE: ICD-10-CM

## 2022-10-03 DIAGNOSIS — C90.00 MULTIPLE MYELOMA NOT HAVING ACHIEVED REMISSION: Primary | ICD-10-CM

## 2022-10-03 DIAGNOSIS — D64.9 ANEMIA, UNSPECIFIED TYPE: ICD-10-CM

## 2022-10-03 PROCEDURE — 99999 PR PBB SHADOW E&M-EST. PATIENT-LVL III: ICD-10-PCS | Mod: PBBFAC,,, | Performed by: INTERNAL MEDICINE

## 2022-10-03 PROCEDURE — 99215 OFFICE O/P EST HI 40 MIN: CPT | Mod: S$PBB,,, | Performed by: INTERNAL MEDICINE

## 2022-10-03 PROCEDURE — 99215 PR OFFICE/OUTPT VISIT, EST, LEVL V, 40-54 MIN: ICD-10-PCS | Mod: S$PBB,,, | Performed by: INTERNAL MEDICINE

## 2022-10-03 PROCEDURE — 99213 OFFICE O/P EST LOW 20 MIN: CPT | Mod: PBBFAC | Performed by: INTERNAL MEDICINE

## 2022-10-03 PROCEDURE — 99999 PR PBB SHADOW E&M-EST. PATIENT-LVL III: CPT | Mod: PBBFAC,,, | Performed by: INTERNAL MEDICINE

## 2022-10-03 RX ORDER — HEPARIN 100 UNIT/ML
500 SYRINGE INTRAVENOUS
Status: CANCELLED | OUTPATIENT
Start: 2022-12-15

## 2022-10-03 RX ORDER — SODIUM CHLORIDE 0.9 % (FLUSH) 0.9 %
10 SYRINGE (ML) INJECTION
Status: CANCELLED | OUTPATIENT
Start: 2022-12-08

## 2022-10-03 RX ORDER — HEPARIN 100 UNIT/ML
500 SYRINGE INTRAVENOUS
Status: CANCELLED | OUTPATIENT
Start: 2022-12-08

## 2022-10-03 RX ORDER — BORTEZOMIB 3.5 MG/1
1.3 INJECTION, POWDER, LYOPHILIZED, FOR SOLUTION INTRAVENOUS; SUBCUTANEOUS
Status: CANCELLED | OUTPATIENT
Start: 2022-12-15

## 2022-10-03 RX ORDER — BORTEZOMIB 3.5 MG/1
1.3 INJECTION, POWDER, LYOPHILIZED, FOR SOLUTION INTRAVENOUS; SUBCUTANEOUS
Status: CANCELLED | OUTPATIENT
Start: 2022-12-22

## 2022-10-03 RX ORDER — SODIUM CHLORIDE 0.9 % (FLUSH) 0.9 %
10 SYRINGE (ML) INJECTION
Status: CANCELLED | OUTPATIENT
Start: 2022-12-15

## 2022-10-03 RX ORDER — HEPARIN 100 UNIT/ML
500 SYRINGE INTRAVENOUS
Status: CANCELLED | OUTPATIENT
Start: 2022-12-22

## 2022-10-03 RX ORDER — BORTEZOMIB 3.5 MG/1
2.8 INJECTION, POWDER, LYOPHILIZED, FOR SOLUTION INTRAVENOUS; SUBCUTANEOUS
Status: CANCELLED | OUTPATIENT
Start: 2022-12-08

## 2022-10-03 RX ORDER — SODIUM CHLORIDE 0.9 % (FLUSH) 0.9 %
10 SYRINGE (ML) INJECTION
Status: CANCELLED | OUTPATIENT
Start: 2022-12-22

## 2022-10-03 NOTE — PROGRESS NOTES
HEMATOLOGY/ONCOLOGY OFFICE CLINIC VISIT    Visit Information:    Initial Evaluation: 8/10/2022  Referring Provider:   Other providers:  Code status: Not addressed    Diagnosis:  Multiple Myeloma--IgG, Kappa  Macrocytic anemia    Present treatment:   VRD    Treatment/Oncology history:    Plan of care:  Complete myeloma workup    Imaging:   US abdomen 8/24/2022: Spleen: 10.8 cm. Mild hepatomegaly with suspected mild hepatic steatosis. Cholelithiasis.  PET CT 9/28/2022: 1. Right scapular small focal mild hypermetabolism without lytic or sclerotic abnormality is not convinced to be related to multiple myeloma. 2. No definite skeletal lytic abnormality with hypermetabolism to reflect multiple myeloma on this exam. 3. Bilateral small pleural effusions.   4. Gallstones. 5.  Noninflamed diverticulosis coli. 6.  Prostatomegaly.    Pathology:  BONE MARROW BIOPSY 9/1/2022: PLASMA CELL MYELOMA, KAPPA RESTRICTED. NORMOCELLULAR MARROW (30%) WITH 70% INVOLVEMENT BY PLASMA CELL MYELOMA. BACKGROUND TRILINEAGE HEMATOPOIESIS IS DECREASED.      CLINICAL HISTORY:       Patient: Valentino Manning is a 71 y.o. male with multiple medical problems kindly referred for persistent anemia.  Reviewing electronic medical record patient with anemia since 02/10/2016.  From 2016 to January of 2017 anemia was mild.  Anemia slowly worsening back in 2018 hemoglobin was 8.0.  At that same time kidney function was worsening as well.     Most recent labs with HGB of 9.4, .9, platelets 231 K, WBC 5.0.  Chemistries with globulin of 5.3.  BUN/creatinine 27.7/1.41.    No family history of leukemia, lymphoma or multiple myeloma.        Chief Complaint: No Concerns today      Interval History:  Patient presents to clinic today for follow up to discuss PET CT and bone marrow biopsy results, along with definitive treatment recommendations. He is with his wife. He reports no energy and fatigue. Continues with shortness of breath with exertion, despite negative  cardiac workup.  Overall, he is doing well. He denies any fever, chills, sweats.  No chest pain or shortness of breath at rest.  He denies any bleeding. PET CT and bone marrow biopsy results discussed with them.    Past Medical History:   Diagnosis Date    Anemia     Heart problem     Hyperlipidemia     Hypertension       Past Surgical History:   Procedure Laterality Date    BONE MARROW ASPIRATION N/A 9/1/2022    Procedure: ASPIRATION, BONE MARROW;  Surgeon: Robbie Gardner MD;  Location: [facility];  Service: General;  Laterality: N/A;    CARDIAC SURGERY  2021    ENDOSCOPIC RELEASE OF BOTH CARPAL TUNNELS       Family History   Problem Relation Age of Onset    Hypertension Mother     Diabetes Mother     Anemia Mother     Heart disease Father      Social Connections: Not on file       Review of patient's allergies indicates:   Allergen Reactions    Penicillins       Current Outpatient Medications on File Prior to Visit   Medication Sig Dispense Refill    amLODIPine (NORVASC) 10 MG tablet       atorvastatin (LIPITOR) 40 MG tablet TAKE 1 TABLET ORALLY ONCE DAILY ON MON/TUE/THUR/FRI AND 1/2 ON WED. NONE ON SAT/SUN      clopidogreL (PLAVIX) 75 mg tablet Take 75 mg by mouth once daily.      doxazosin (CARDURA) 4 MG tablet Take 4 mg by mouth once daily.      dutasteride (AVODART) 0.5 mg capsule Take 0.5 mg by mouth once daily.      FERRETTS 325 mg (106 mg iron) Tab Take 1 tablet by mouth 2 (two) times daily.      furosemide (LASIX) 20 MG tablet Take 20 mg by mouth once daily.      isosorbide mononitrate (IMDUR) 30 MG 24 hr tablet       metoprolol tartrate (LOPRESSOR) 25 MG tablet Take 25 mg by mouth 2 (two) times daily. Takes 1 tab q am      ranolazine (RANEXA) 500 MG Tb12 Take 500 mg by mouth 2 (two) times daily.      zolpidem (AMBIEN) 10 mg Tab Take 10 mg by mouth once daily.       No current facility-administered medications on file prior to visit.      Review of Systems   Constitutional:  Positive for fatigue. Negative  for activity change, appetite change, chills, diaphoresis, fever and unexpected weight change.   HENT:  Negative for nasal congestion, mouth sores, nosebleeds, postnasal drip, sinus pressure/congestion, sore throat and trouble swallowing.    Eyes:  Negative for visual disturbance.   Respiratory:  Positive for shortness of breath.    Cardiovascular:  Negative for chest pain and palpitations.   Gastrointestinal:  Negative for abdominal distention, abdominal pain, blood in stool, change in bowel habit, constipation, diarrhea, nausea, vomiting and change in bowel habit.   Endocrine: Negative.    Genitourinary:  Negative for bladder incontinence, decreased urine volume, difficulty urinating, dysuria, frequency, hematuria, scrotal swelling, testicular pain and urgency.   Musculoskeletal:  Negative for arthralgias, back pain, gait problem, joint swelling, leg pain, myalgias and neck pain.   Integumentary:  Negative for rash.   Neurological:  Negative for dizziness, tremors, seizures, syncope, speech difficulty, weakness, light-headedness, numbness, headaches and memory loss.   Hematological:  Does not bruise/bleed easily.   Psychiatric/Behavioral:  Negative for agitation, confusion, hallucinations, sleep disturbance and suicidal ideas. The patient is not nervous/anxious.             Vitals:    10/03/22 1107   BP: 117/65   BP Location: Left arm   Patient Position: Sitting   Pulse: 64   Temp: 97.5 °F (36.4 °C)   TempSrc: Oral   SpO2: 98%   Weight: 94.3 kg (208 lb)        Physical Exam  Constitutional:       Appearance: He is obese.   HENT:      Head: Normocephalic and atraumatic.   Eyes:      Extraocular Movements: Extraocular movements intact.   Pulmonary:      Effort: Pulmonary effort is normal.   Musculoskeletal:      Cervical back: Neck supple.   Neurological:      Mental Status: He is alert and oriented to person, place, and time.   Psychiatric:         Mood and Affect: Mood normal.         Behavior: Behavior normal.          Judgment: Judgment normal.     ECOG SCORE    0 - Fully active-able to carry on all pre-disease performance without restriction         Laboratory:  CBC with Differential:  Lab Results   Component Value Date    WBC 4.2 (L) 08/10/2022    RBC 2.95 (L) 08/10/2022    HGB 9.8 (L) 08/10/2022    HCT 29.5 (L) 08/10/2022    .0 (H) 08/10/2022    MCH 33.2 (H) 08/10/2022    MCHC 33.2 08/10/2022    RDW 14.5 08/10/2022     08/10/2022    MPV 8.1 08/10/2022         Latest Reference Range & Units 08/10/22 13:49   Kappa Qnt Free Light Chains 3.30 - 19.40 mg/L 491.97 (H)   Lambda Qnt Free Light Chains 5.71 - 26.30 mg/L 4.76 (L)   Wilsey/Lambda Free Light Chain Ratio 0.26 - 1.65  103.36 (H)   M SPIKE gm/dl 2.24      Latest Reference Range & Units 08/10/22 13:49   Immunoglobulin A 68 - 408 mg/dL 10 (L)   Immunoglobulin G 768 - 1632 mg/dL 4882 (H)   Immunoglobulin M 35 - 263 mg/dL 12 (L)   (L): Data is abnormally low  (H): Data is abnormally high    M-spike in the beta/gamma region.  The monoclonal protein peak accounts for 3.39 g/dL of the total protein in the beta and gamma regions.  This quantitation may include complement components.  BETTY gel pattern shows an IgG type kappa monoclonal protein.    SPEP:  Total protein and globulin are both increased.  A/G ratio is low.     Serum protein electrophoresis shows a decreased albumin fraction, decreased beta globulin fraction, and increased gamma globulin fraction.   A monoclonal protein (M-spike) is present in the gamma region (2.24 g/dL).     BETTY:  Immunofixation shows an IgG monoclonal protein with kappa light chain specificity.       CMP:  Sodium Level   Date Value Ref Range Status   08/10/2022 136 136 - 145 mmol/L Final     Potassium Level   Date Value Ref Range Status   08/10/2022 4.2 3.5 - 5.1 mmol/L Final     Carbon Dioxide   Date Value Ref Range Status   08/10/2022 23 23 - 31 mmol/L Final     Blood Urea Nitrogen   Date Value Ref Range Status   08/10/2022 24.0 8.4 -  25.7 mg/dL Final     Creatinine   Date Value Ref Range Status   08/10/2022 1.44 (H) 0.73 - 1.18 mg/dL Final     Calcium Level Total   Date Value Ref Range Status   08/10/2022 9.2 8.8 - 10.0 mg/dL Final     Albumin Level   Date Value Ref Range Status   08/10/2022 3.4 3.4 - 4.8 gm/dL Final   08/10/2022 3.4 3.4 - 4.8 gm/dL Final     Bilirubin Total   Date Value Ref Range Status   08/10/2022 0.8 <=1.5 mg/dL Final     Alkaline Phosphatase   Date Value Ref Range Status   08/10/2022 61 40 - 150 unit/L Final     Aspartate Aminotransferase   Date Value Ref Range Status   08/10/2022 19 5 - 34 unit/L Final     Alanine Aminotransferase   Date Value Ref Range Status   08/10/2022 31 0 - 55 unit/L Final     Estimated GFR-Non    Date Value Ref Range Status   06/22/2022 53 mls/min/1.73/m2 Final       Latest Reference Range & Units 08/10/22 13:49   MACEY Negative  Negative   ds DNA Ab Negative  Negative   Anti-SSA Interpretation Negative  Negative   Anti-SSB Interpretation Negative  Negative      Latest Reference Range & Units 11/10/21 10:27 08/10/22 13:49   Centromere Protein Antibody Negative   Negative   HARRIS-1 Antibody Negative   Negative   Scleroderma (Scl-70S) Antibody Negative   Negative   Rheumatoid Factor <<=30 IU/mL <13    RNP70 Antibody Negative   Negative   Hand DP IgG   Negative   U1RNP Antibody Negative   Negative          Assessment:       1. Multiple myeloma not having achieved remission    2. Elevated serum globulin level    3. Kidney disease    4. Anemia, unspecified type      Again I have a long discussion with the patient and his wife about of multiple myeloma.  Discussed with the patient that Multiple myeloma is a type of bone marrow cancer that forms in a type of white blood cell called a plasma cell. Plasma cells help you fight infections by making antibodies that recognize and attack germs but when they transformed into cancer cells then they are not efficient and don't work well. This cells can  affect not only the bone marrow but the bones in general causing distinctive bone lesions called Lytic lesions. It also can affect the kidney. Discussed prognosis and treatment recommendations and indications.  Discussed in detail blood work, PET-CT and bone marrow biopsy.  He does have advance myeloma, stage III, therefore treatment is indicated at this time.  Discuss treatment option, risks versus benefit of was at toxicities associated with it.  He is waiting to start treatment.        Plan:         Recommend starting Velcade/Revlimid/Dexamethamasone  RTC in 6 weeks with MM labs prior  Will refer to Bone marrow transplant clinic for evaluation to see if he is a candidate--I will present at the vertebral bone marrow transplant tumor board.  If he is a candidate for stem cell transplant, then he will refer for further evaluation.  Plan to start VRD  Will order patient education  Will reorder 24 Hrs urine collection   Encourage to call or message us for any questions or problems  The patient was given ample opportunity to ask questions, and to the best of my abilities, all questions answered to satisfaction; patient demonstrated understanding of what we discussed and agreeable to the plan.       ELSA PAREKH MD      Professional Services   I, Maddie Rangel LPN, acted solely as a scribe for and in the presence of Dr. Elsa Parekh, who performed these services.

## 2022-10-10 NOTE — PATIENT INSTRUCTIONS
not a substitute for the FDA Approved Medication Guide  Patient Treatment Information  Lenalidomide + Bortezomib + Dexamethasone  (RVD)  Your chemotherapy treatment is called RVD. It is commonly used to treat multiple myeloma, and may also be  used to treat other diseases. This treatment is made up of three drugs:   lenalidomide (le-na-LID-oh-mide) or RevlimidÂ® (REV-li-mid)   bortezomib (qqo-ZAF-rm-mib) or VelcadeÂ® (ANGELA-smooth)   dexamethasone (dex-a-METH-a-sone)  Bortezomib is a new type of drug called a proteasome inhibitor, lenalidomide is a type of drug called an  immunomodulator and dexamethasone is a corticosteroid. Using these three drugs together slows or stops the  growth of cancer (myeloma) cells and eventually causes them to shrink and die.  What Do I Need to Know Before Starting Treatment?  Be sure to tell your healthcare provider about any prescription or over-the-counter products you are taking,  including dietary supplements, vitamins, herbal medicines and homeopathic remedies.  Use an effective birth control method while you are taking this treatment. Lenalidomide may cause significant  harm to a fetus (see What Do I Need to Know Before Starting RVD?). You must be registered in the  [employer] to take lenalidomide. This handout is not a replacement for the requirements of the  [employer].  Avoid breastfeeding during treatment. Dexamethasone passes into breast milk and can affect your childs  growth. It is not known if bortezomib or lenalidomide pass into breast milk.  Some chemotherapy drugs can cause sterility. Talk with your healthcare provider about your options if you want  to have children in the future.  Do not get any immunizations or vaccinations while you are being treated without the approval of your  healthcare provider.  Chemotherapy can sometimes cause changes in the amount of electrolytes in your body, such as sodium,  potassium and magnesium. Your healthcare  provider will check your blood for these changes and will treat any  problems that are found.  What Do I Need to Know Before Starting RVD?  Lenalidomide may cause severe harm to a fetus, including life-threatening birth defects. Even one dose during  pregnancy can cause severe birth defects.  If you are a woman, you should not have sexual intercourse OR you should use at least two forms of effective  birth control for at least one month before beginning lenalidomide, during treatment and for one month after  stopping treatment. In addition, if you could become pregnant (you have not had a hysterectomy and have had a  period in the last 24 months) you should have a pregnancy test 10 to 14 days before starting lenalidomide, 24  hours before starting lenalidomide and regularly during treatment to make sure you are not pregnant. If you miss  a period, have abnormal menstrual bleeding or become pregnant while taking lenalidomide, stop taking the  medicine and contact your healthcare provider right away.  If you are a man, use a latex condom for any sexual contact during treatment and for four weeks after treatment  ends (even if you have had a vasectomy). If your partner misses a period, has abnormal menstrual bleeding or  becomes pregnant while you are taking lenalidomide, contact your healthcare provider right away.  [facility]  Phone Number: [phone]  Lenalidomide + Bortezomib + Dexamethasone (RVD) 2  Lenalidomide can cause severe bleeding and can increase your risk of infection (see What Are the Possible  Side Effects?). These side effects are more common when lenalidomide is used with a drug called  dexamethasone. Tell your healthcare provider if you have abdominal pain or fullness, sudden loss of vision, red  bumps or bruises on your skin, or a fever or other symptoms of infection. Also tell your healthcare provider if you  have an illness that puts you at high risk for infections, such as  other cancers, HIV or diabetes that is not well  controlled. Your healthcare provider will check your platelets and white blood cells during treatment and decide if  your dose needs to be changed or if your treatment needs to be stopped.  Tell your healthcare provider if you have kidney disease or decreased kidney function. Your risk of side effects  from lenalidomide is greater if you have poor kidney function. Your healthcare provider will check your kidneys  during treatment and decide if your dose needs to be changed or if your treatment needs to be stopped.  Lenalidomide can cause blood clots in the veins and, in rare cases, in the lungs. Blood clots are more common  when lenalidomide is used with dexamethasone. Tell your healthcare provider right away if you have chest pain,  shortness of breath, or swelling in the arm or leg. Your healthcare provider may give you a medicine to help  reduce the risk of blood clots.  You should not donate blood while you are taking lenalidomide and for four weeks after your treatment ends.  If you are a male, you should not donate sperm while taking lenalidomide and for four weeks after your treatment  ends.  Lenalidomide capsules contain lactose. Tell your healthcare provider if you are lactose intolerant. Your  healthcare provider may prescribe medicine to help prevent the symptoms of lactose intolerance.  Bortezomib can cause some of the nerve cells in your hands and feet to stop working properly. This condition is  called neuropathy (see What Are the Possible Side Effects?). Your risk of developing severe neuropathy is  higher if you already had this condition before starting treatment. Neuropathy usually improves slowly a few  months after treatment is done. Tell your healthcare provider if you have numbness, tingling, pain or a burning  sensation in your hands or feet. This side effect is usually less severe if you get bortezomib injected just under  the skin instead of  into a vein.  Bortezomib and dexamethasone can cause blood pressure changes. Tell your healthcare provider if you have a  history of fainting or if you have ever had high or low blood pressure. Also tell your healthcare provider if you are  taking medications to lower your blood pressure. Your risk of low blood pressure is higher if you are dehydrated.  Drink plenty of non-caffeinated beverages during your treatment, and tell your healthcare provider if you feel  lightheaded, dizzy or faint.  Bortezomib can cause heart problems. This side effect is more common in patients with a history of heart  disease, but in rare cases it can occur in patients with no history of heart disease. Tell your healthcare provider if  you have a history of heart disease, or if you have shortness of breath, a cough, swelling of the hands or feet, or  an irregular heartbeat during treatment.  In rare cases, bortezomib can cause lung problems, including lung failure. Tell your healthcare provider if you  have shortness of breath, a persistent cough or difficulty breathing.  In rare cases, bortezomib can cause a side effect called reversible posterior leukoencephalopathy syndrome  (RPLS). RPLS is a disorder of the nervous system, and can cause seizures, high blood pressure, headaches,  tiredness, confusion or changes in eyesight. Tell your healthcare provider if you have any of these symptoms. If  you develop RPLS, you will need to stop treatment.  This treatment can cause a side effect called tumor lysis syndrome (TLS). TLS occurs when too many cancer  cells die at once. This can damage your kidneys and other organs in your body. Your healthcare provider may  give you a medicine to help prevent this side effect. Be sure to drink plenty of water or other non-caffeinated  beverages during the first few weeks of your treatment to help protect your kidneys. Tell your healthcare provider  right away if you have decreased urination or are not  able to urinate while you are being treated.  Tell your healthcare provider if you have a history of liver disease or decreased liver function. Your risk of side  effects from bortezomib is greater if you have poor liver function. In rare cases, bortezomib can cause liver  problems, including liver failure. Lenalidomide can also cause temporary changes in your liver tests. This side  effect most often occurs in patients who are using many medicines during treatment. Your healthcare provider  will check your liver during treatment and decide if your dose needs to be changed or stopped.  Lenalidomide + Bortezomib + Dexamethasone (RVD) 3  In very rare cases, the drugs in this treatment can cause an allergic reaction. Tell your healthcare provider  immediately if you have itching, trouble breathing, dizziness, fainting, or swelling of the lips, throat or tongue.  Bortezomib commonly causes a fever the first day of treatment. This fever often goes away in 24 hours but can  sometimes be a sign of infection. Call your healthcare provider if you have a fever of 100.5Âº F or higher.  Bortezomib and dexamethasone may cause your blood sugar levels to rise, especially if you have diabetes. You  may have regular blood or urine tests during treatment to check your blood sugar. Tell your healthcare provider if  you are very thirsty, if you are urinating more than usual or if you have diabetes. Your healthcare provider will tell  you if you need to check your blood sugar more closely during treatment.  Dexamethasone can reduce the stomach's protective layer, making it more prone to irritation by stomach acid. In  rare cases, this can lead to bleeding ulcers or perforation (a hole in the stomach). Tell your healthcare provider if  you have indigestion or other stomach problems. Your healthcare provider may give you medicine to help relieve  these symptoms.  Dexamethasone may increase your risk of infection and make the signs of  infection, such as fever, difficult to  detect. Tell your healthcare provider immediately if you have any signs of infection (see What Are the Possible  Side Effects?). If you have ocular herpes simplex, using dexamethasone may increase your risk of corneal  perforation (a hole in your cornea). Tell your healthcare provider if you have this infection.  Tell your healthcare provider if you are being treated for a mental health condition. Using dexamethasone or  bortezomib can make mental health problems worse. Dexamethasone can cause mood swings, personality  changes, insomnia, feelings of euphoria and, in rare cases, severe depression. Bortezomib can cause you to  feel anxious, agitated or confused. Tell your healthcare provider immediately if you feel depressed or if you are  having thoughts of suicide.  In rare cases, the heart and lung problems, allergic reactions, blood clots, TLS, skin conditions and secondary  cancers caused by this treatment can be severe and life threatening.  Your treatment may interact with other substances, including:   Phenytoin (DilantinÂ®), fosphenytoin (CerebyxÂ®), phenobarbital (LuminalÂ®) or carbamazepine (TegretolÂ®)   Rifabutin (MycobutinÂ®), rifampin (RifadinÂ®) or isoniazid   Clarithromycin (BiaxinÂ®), erythromycin (E-mycinÂ®) or telithromycin (KetekÂ®)   Amiodarone (CordaroneÂ® or NexteroneÂ®)   Verapamil (Covera-HSÂ® or VerelanÂ®) or diltiazem (CardizemÂ® or TiazacÂ®)   Ketoconazole (NizoralÂ®), fluconazole (DiflucanÂ®), itraconazole (SporanoxÂ®), voriconazole (VfendÂ®) or  posaconazole (NoxafilÂ®)   Drugs used to treat HIV, such as amprenavir (AgeneraseÂ®), atazanavir (ReyatazÂ®), boceprevir (VictrelisÂ®),  darunavir (PrezistaÂ®), etravirine (IntelenceÂ®), indinavir (CrixivanÂ®), nelfinavir (ViraceptÂ®), ritonavir (NorvirÂ®),  saquinavir (InviraseÂ®), delavirdine (RescriptorÂ®), fosamprenavir (LexivaÂ®), efavirenz (SustivaÂ®) or  nevirapine (ViramuneÂ®)   Boceprevir  (VictrelisÂ®) or telaprevir (Incivek)   Aprepitant or fosaprepitant (EmendÂ®)   Fluvoxamine (LuvoxÂ®)   Non-steroidal anti-inflammatory drugs, such as aspirin, ibuprofen or naproxen   Warfarin (CoumadinÂ®)   Digoxin (LanoxinÂ®)   Furosemide (LasixÂ®) or other diuretics   [place]s wort   Grapefruit juice or starfruit   Drugs used to treat a condition called myasthenia gravis, such as neostigmine   Cholestyramine (QuestranÂ®)  Some medicines interfere with the effectiveness of birth control pills (oral contraceptives). If you are taking birth  control pills and one of the following drugs, you will need to use two OTHER effective methods of birth control  while taking lenalidomide:   Drugs used to treat HIV, such as atazanavir (ReyatazÂ®), indinavir (CrixivanÂ®), nelfinavir (ViraceptÂ®),  ritonavir (NorvirÂ®) or saquinavir (InviraseÂ®)   Griseofulvin (Grifulvin VÂ® or Mabel-PEGÂ®)  Lenalidomide + Bortezomib + Dexamethasone (RVD) 4   Modafinil (ProvigilÂ®)   Rifabutin (MycobutinÂ®) or rifampin (RifadinÂ®)   Penicillins   Phenytoin (DilantinÂ®), fosphenytoin (CerebyxÂ®), phenobarbital (LuminalÂ®) or carbamazepine (TegretolÂ®)   Moes wort   Other drugs that can increase the risk of blood clots, including epoetin francesca (Epogen or Procrit) and  medicines that contain estrogen, such as oral contraceptives or Premarin  Please note this list is a summary and does not contain all possible drug interactions. Contact your healthcare  provider if you are taking any medications that can interact with your treatment.  You should not take RVD if:   You are pregnant, at risk of becoming pregnant or are nursing.   You are allergic to bortezomib, lenalidomide, dexamethasone or any components of these drugs.   Had a severe rash after taking thalidomide (Thalomid).   You have an allergy to boron or mannitol.   You have a serious fungal infection.  How Is the Treatment Given?  Your healthcare provider will determine how often you  take RVD and your total number of treatments. You will  get bortezomib by injection into a vein (IV) or injection just under the skin (SC). The dose you receive will be  based on your weight and height.  You will take lenalidomide by mouth once a day. Try to take lenalidomide at around the same time every day.  You can take lenalidomide with or without food. If you miss a dose and remember the same day, take it as soon  as you remember. If you miss your dose for a whole day, skip it and continue with the dose for the next day. Do  not take two doses at the same time. It is important to take lenalidomide exactly as prescribed. Do not stop the  medicine or change the dose without talking with your healthcare provider. Swallow lenalidomide capsules whole  with water. Do not break, chew or open your capsules.  You will take dexamethasone by mouth. Your healthcare provider will tell you how often to take it. It is important  to take dexamethasone exactly as prescribed. To lessen the chance of stomach irritation, take dexamethasone  with food and dont drink alcohol. Take this medicine on a regular schedule, and at the same time each day. If  you miss a dose, take it as soon as you remember, then contact your healthcare provider for advice on when to  take the next dose.  Your healthcare provider may give you medicines to help prevent and control nausea and vomiting before you  receive your treatment. You may take these medicines either by mouth or by injection into a vein.  You may take a medicine to prevent blood clots during your treatment. Your healthcare provider will decide if you  are at an increased risk for blood clots and what type of medicine to prescribe.  Store dexamethasone and lenalidomide at room temperature away from children and pets. If you take too much  of either medicine, contact your healthcare provider, local poison control center or emergency room right away.  Do not share your medication with  "others. Sharing medication with anyone else could be harmful.  When Should I Call My Healthcare Provider?  Call your healthcare provider right away if you have any of the following symptoms:   Shaking chills or a fever of 100.5Âº F or higher   Unusual bleeding, easy bruising or pinpoint red spots on your skin   Vomiting that is severe or bloody, or vomiting that lasts several hours   Painful or frequent urination, blood in your urine, decreased urination or not being able to urinate   Diarrhea that causes an additional four bowel movements a day, diarrhea that lasts more than one day,  diarrhea at night, or diarrhea with fever, cramps or bloody stools   Constipation that lasts more than two to three days or constipation with abdominal pain   Irregular or rapid heartbeat, chest pain, chest tightness, shortness of breath or persistent cough   Severe fluid retention that causes sudden weight gain or swelling in the abdomen, hands or feet   Dizziness, feeling lightheaded, seizures, headaches, confusion or blurred vision  Lenalidomide + Bortezomib + Dexamethasone (RVD) 5   Inability to eat or weight loss   Pain in the arms or legs, sudden shortness of breath, or paralysis or drooping on one side of the body or face   Pain or blisters in your mouth, or on your stomach or genitals   Swelling around the eyes   Rash with itching, trouble breathing, or swelling of the lips, throat, or tongue   Thoughts of suicide  What Are the Possible Side Effects?  All drugs can cause side effects, but every person reacts differently to each drug. The following chart lists the  possible side effects that can occur with your treatment, how to recognize and minimize symptoms, and possible  treatments. Side effects listed as Common usually occur in more than 25 percent of patients; those listed as  Less Common usually occur in about 5 to 25 percent of patients; and those that are "Rare" usually occur in  fewer than 5 percent of patients. " How often side effects occur, or how severe they are may depend on whether  the bortezomib is injected into a vein (IV) or just under the skin (SC). Any differences are noted in the chart.  Side Effect How to Minimize Side Effect Possible Treatments  Neuropathy (Common, but more so if  given IV than if given SC. Symptoms  are generally mild to moderate but can  be severe.)   Numbness or tingling feeling in the  hands or feet   Muscle cramps   Loss of balance   Difficulty buttoning buttons or picking  up objects   Decreased awareness of heat or cold  in fingertips and toes   Difficulty hearing   Try to avoid the cold or extreme heat.   Wear mittens or gloves, and socks and  scarves.   If your fingers are numb, be careful with sharp  objects.   Beware of hot coffee mugs, pots and pans and  dishwater--you may not feel the heat until you  are burned.   If you feel unsteady, be careful on stairs and in  the shower.   Your healthcare provider may  decrease your dose or delay further  treatment.  Constipation (Common. Symptoms  are generally mild to moderate.)   No bowel movement for one to two  days   Small, hard, dry stools   Bloating, gas, cramps and pain   Drink plenty of fluids to help loosen your  bowels.   Drink warm or hot liquids if you do not have  mouth sores.   Your healthcare provider may suggest eating  foods that are high in fiber, such as bran,  vegetables, whole wheat breads and fruit.   Have prunes or prune juice, which act like  laxatives.   Try exercising to help loosen bowels.   Your healthcare provider may  recommend a stool softener.  Nausea/Vomiting (Common.  Symptoms are generally mild to  moderate.)   Feeling queasy or sick to your  stomach   Eat small, frequent meals and bland foods,  such as bananas, rice, applesauce and toast.   Eat food cold or at room temperature so the  smell of food will not bother you.   Avoid fried, spicy or fatty foods.   Eat and drink slowly.   Drink plenty of liquids  during the day, but to  avoid bloating, drink small amounts of liquid  during meals.   Your healthcare provider will give  you medicine to help reduce nausea  and vomiting.  Diarrhea (Common. Symptoms are  generally mild to moderate.)   Loose or watery stools several times a  day   Abdominal cramping, gas and bloating   Eat small, frequent meals and bland foods,  such as bananas, rice, applesauce and toast.   Avoid caffeine; alcohol; raw fruits and  vegetables; raw eggs; undercooked meats;  spicy, fatty and greasy foods; milk and dairy  products; foods that cause gas, such as beans  and other legumes; high fiber and high-fat  foods; foods left un-refrigerated for more than  two hours (one hour for egg dishes and cream  or mayonnaise-based foods); bulk laxatives;  and stool softeners.   Drink eight to ten glasses of clear  liquids every day.   Your healthcare provider may  prescribe medicine to help treat  diarrhea.  Lenalidomide + Bortezomib + Dexamethasone (RVD) 6  Side Effect How to Minimize Side Effect Possible Treatments  Fluid Retention (Common. Symptoms  are generally mild to moderate.)   Swelling around the eyes, lower legs,  ankles, feet or abdominal area   Slight weight gain   Check your weight regularly.   Try to avoid eating salty foods, as this can  cause fluid retention.   Your healthcare provider may give  you a diuretic (water pill) to reduce  the amount of fluid in your body.   Your healthcare provider may  decrease your dose or delay further  treatment.  Rash (Common. Symptoms are  generally mild to moderate but can be  severe.)   Usually mild and short-lived   Generally appears on the arms and  trunk (occasionally on the face)   May be itchy   May appear as a flat, discolored area  on the skin or as a small raised bump   Avoid prolonged exposure to heat.   Use creams or moisturizers regularly. Try  wearing cotton gloves on your hands.   Avoid using perfume, cologne or aftershave  since these  products can be irritating to the  skin.   Your healthcare provider may  prescribe creams (mild steroids,  antihistamines or antibiotics) to help  treat the rash.   The rash may improve on its own  without any treatment  Risk of Infection (Less Common)   Fever and chills   Painful urination   Sore throat   Cough, shortness of breath or difficulty  breathing   Nasal congestion or runny nose   Swelling or redness of the skin at the  site of a wound   Wash your hands often.   Brush and floss your teeth daily.   Clean cuts right away with warm water, soap  and antiseptic.   When your white blood cell count is low, stay  away from crowds and people with colds or  other illnesses.   Your healthcare provider may give  you an antibiotic to treat or prevent  infection or a medicine to increase  your white blood cell count.   Your healthcare provider may  decrease your dose or delay further  treatment.  Risk of Bleeding (Less Common)   Unusual bleeding or easy bruising   Black or tar-like stools   Blood in your urine   Pinpoint red spots on your skin   Bleeding gums or nosebleeds   Avoid aspirin and aspirin-like drugs, such as  ibuprofen.   Use caution with sharp objects, such as razors  and nail cutters.   Avoid activities that can cause cuts, bumps and  bruises.   Your healthcare provider may give  you medicine to increase your  platelet count.   Your healthcare provider may  decrease your dose or delay further  treatment.  Anemia (Rare)   Fatigue or weakness   Dizziness   Pale skin   Feeling out of breath   Feeling cold   Plan rest periods throughout the day.   Organize daily activities so that you conserve  your energy.   Try to eat a well balanced diet and drink plenty  of fluids.   Stand up slowly to avoid getting dizzy.   Your healthcare provider may give  you a medicine to increase your red  blood cell count.   Your healthcare provider may  decrease your dose or delay further  treatment.  The following side effect is  known to occur with this treatment but was not reported in the clinical trials.  Anorexia or Appetite Loss   Not having an appetite   Feeling too nauseated to eat   Metallic or medicinal taste   Change in taste causing dislike for  certain foods   Try eating six to eight small meals or snacks  each day instead of three larger meals.   Vary your diet and try new foods and recipes.   Take a walk before meals, when possible. This  may make you feel hungrier.   Eat with friends or family. When eating alone,  listen to the radio or watch TV.   Cook dinners ahead of time and  freeze them in small portions to  minimize cooking smells.   Let others help with meals, but ask  that they prepare food in small  portions that you can freeze. Dont  hesitate to tell others which foods  you dont like.   Add mild spices to change flavor.   Have meals delivered to you through  a program such as Meals on Wheels.  What Are the Other Possible Side Effects?  The chart below lists additional side effects found with the individual drugs in this treatment. It does not list all  possible side effects. How often side effects occur, or how severe they are may depend on whether the  bortezomib is injected into a vein (IV) or just under the skin (SC). Any differences are noted in the chart. For  more information, talk with your healthcare provider.  Lenalidomide + Bortezomib + Dexamethasone (RVD) 7  Common Side Effects Less Common Side Effects Rare Side Effects   Fatigue or weakness   Headache or dizziness (IV)   Increased appetite   Muscle pain   Pain in the arms, hands, feet or  legs   Muscle cramps   Chest, joint, back or bone pain   Abdominal pain (IV)   Symptoms of a common cold   Shingles (symptoms include pain, burning and itching,  followed by a red rash or blisters on one part or one  side of the body)   Shortness of breath or pneumonia   Blurred vision, eye infections, glaucoma or cataracts   Changes in taste   Changes in skin color    Dry mouth, dry skin or dehydration   Decreased hearing   Uncontrolled shaking (usually occurs with fever)   Increased sweating   Pain or redness at the injection site (SC)   Pain or redness at the injection site  (IV)   Hiccups   Ruptured colon   Pancreatitis   Stroke   Abdominal pain (SC)   Headache (SC)   Hallucinations   Decreased libido or erectile  dysfunction   Hoarseness  Notes  The information presented herein is prepared by [employer] and/or its affiliates (collectively, [employer]) and is furnished for informational purposes  only and for your personal use only. All rights herein are reserved by [employer] and you may not publish, copy or otherwise use or disseminate the contents hereof  without permission.  Disclaimer. The drug information is presented as a guide for reference purposes only and is not intended as a substitute for specific advice from a physician or  other health care professional. Each patients situation is unique and the information presented herein may not be applicable to your situation. In addition,  [employer] makes no warranty as to the accuracy or completeness of information contained herein. This document was prepared as of the date indicated below.  You should be aware that information regarding medical conditions and their treatment, including the information in this document, changes frequently, and  [employer] undertakes no responsibility to update the information contained in this document. This document is not a substitute for the expertise, skill, knowledge  and judgment of healthcare practitioners. Additional, important information about this product, including black box warnings and precautions, is available from  the  and other sources. THIS DRUG INFORMATION DOES NOT OFFER OR PROVIDE, AND IS NOT A REPLACEMENT FOR, MEDICAL EVALUATION,  ADVICE, DIAGNOSIS, OR TREATMENT. ADDITIONALLY, NO PART OF THIS DOCUMENT SHALL BE DEEMED TO CREATE A PHYSICIAN-PATIENT  RELATIONSHIP OR  SIMILAR RELATIONSHIP BETWEEN [employer] (OR ANY [employer] AFFILIATED PARTY) AND YOU. YOU SHOULD CONSULT WITH AN APPROPRIATE  PROFESSIONAL FOR SPECIFIC EVALUATION, ADVICE, DIAGNOSIS, AND TREATMENT (AS APPLICABLE) TAILORED TO YOUR SITUATION.[employer] PROVIDES  THIS DRUG INFORMATION ON AN AS IS BASIS. ALL WARRANTIES OF ANY KIND, EXPRESS OR IMPLIED, INCLUDING BUT NOT LIMITED TO THE IMPLIED  WARRANTIES OF MERCHANTABILITY AND FITNESS FOR A PARTICULAR PURPOSE ARE DISCLAIMED.  Limitation of Liability. Neither [employer] nor any of its employees, agents, contractors, directors or affiliates shall be liable for any improper or incorrect use of the  information described and contained herein, nor do any of them assume any responsibility for anyones use of the information. IN NO EVENT SHALL [employer]  BE LIABLE FOR ANY DIRECT, INDIRECT, INCIDENTAL, SPECIAL, EXEMPLARY, OR CONSEQUENTIAL DAMAGES (INCLUDING, BUT NOT LIMITED TO,  PROCUREMENT OR SUBSTITUTE GOODS OR SERVICES; LOSS OF USE, DATA OR PROFITS; OR BUSINESS INTERRUPTION) HOWEVER CAUSED AND ON ANY  THEORY OF LIABILITY, WHETHER IN CONTRACT, STRICT LIABILITY, OR TORT (INCLUDING NEGLIGENCE OR OTHERWISE) ARISING IN ANY WAY OUT OF THE  USE OF THIS INFORMATION, EVEN IF ADVISED OF THE POSSIBILITY OF SUCH DAMAGE.  June 2013  Â© 2013 Immediately. All rights reserved.  References:  1. Velcade [package insert]. [place], [place]: [employer]; 2012.  2. Revlimid [package insert]. [place], [place]: [employer]; 2011.  3. Dexamethasone [package insert]. [place], [place]; [employer] 2007.  4. Kwabena PG, Erin E, Sandi S, et al. Lenalidomide, bortezomib, and dexamethasone combination therapy in patients  with newly diagnosed multiple myeloma. Blood. 2010;116:679-686.

## 2022-10-11 ENCOUNTER — CLINICAL SUPPORT (OUTPATIENT)
Dept: HEMATOLOGY/ONCOLOGY | Facility: CLINIC | Age: 72
End: 2022-10-11
Payer: MEDICARE

## 2022-10-11 ENCOUNTER — SPECIALTY PHARMACY (OUTPATIENT)
Dept: PHARMACY | Facility: CLINIC | Age: 72
End: 2022-10-11
Payer: MEDICARE

## 2022-10-11 ENCOUNTER — OFFICE VISIT (OUTPATIENT)
Dept: HEMATOLOGY/ONCOLOGY | Facility: CLINIC | Age: 72
End: 2022-10-11
Payer: MEDICARE

## 2022-10-11 VITALS
HEART RATE: 69 BPM | DIASTOLIC BLOOD PRESSURE: 65 MMHG | BODY MASS INDEX: 28.2 KG/M2 | TEMPERATURE: 97 F | OXYGEN SATURATION: 100 % | WEIGHT: 207.88 LBS | SYSTOLIC BLOOD PRESSURE: 129 MMHG | RESPIRATION RATE: 18 BRPM

## 2022-10-11 DIAGNOSIS — N28.9 KIDNEY DISEASE: ICD-10-CM

## 2022-10-11 DIAGNOSIS — D64.9 ANEMIA, UNSPECIFIED TYPE: ICD-10-CM

## 2022-10-11 DIAGNOSIS — C90.00 MULTIPLE MYELOMA NOT HAVING ACHIEVED REMISSION: Primary | ICD-10-CM

## 2022-10-11 DIAGNOSIS — I10 HYPERTENSION, UNSPECIFIED TYPE: ICD-10-CM

## 2022-10-11 PROCEDURE — 99999 PR PBB SHADOW E&M-EST. PATIENT-LVL IV: CPT | Mod: PBBFAC,,, | Performed by: CLINICAL NURSE SPECIALIST

## 2022-10-11 PROCEDURE — 99999 PR PBB SHADOW E&M-EST. PATIENT-LVL IV: ICD-10-PCS | Mod: PBBFAC,,, | Performed by: CLINICAL NURSE SPECIALIST

## 2022-10-11 PROCEDURE — 99417 PROLNG OP E/M EACH 15 MIN: CPT | Mod: S$PBB,,, | Performed by: CLINICAL NURSE SPECIALIST

## 2022-10-11 PROCEDURE — 99215 OFFICE O/P EST HI 40 MIN: CPT | Mod: S$PBB,,, | Performed by: CLINICAL NURSE SPECIALIST

## 2022-10-11 PROCEDURE — 99417 PR PROLONGED SVC, OUTPT, W/WO DIRECT PT CONTACT,  EA ADDTL 15 MIN: ICD-10-PCS | Mod: S$PBB,,, | Performed by: CLINICAL NURSE SPECIALIST

## 2022-10-11 PROCEDURE — 99215 PR OFFICE/OUTPT VISIT, EST, LEVL V, 40-54 MIN: ICD-10-PCS | Mod: S$PBB,,, | Performed by: CLINICAL NURSE SPECIALIST

## 2022-10-11 PROCEDURE — 99214 OFFICE O/P EST MOD 30 MIN: CPT | Mod: PBBFAC | Performed by: CLINICAL NURSE SPECIALIST

## 2022-10-11 RX ORDER — ONDANSETRON 4 MG/1
4 TABLET, FILM COATED ORAL EVERY 8 HOURS PRN
Status: ON HOLD | COMMUNITY
End: 2023-07-03 | Stop reason: HOSPADM

## 2022-10-11 RX ORDER — IBUPROFEN 100 MG/5ML
1000 SUSPENSION, ORAL (FINAL DOSE FORM) ORAL DAILY
Status: ON HOLD | COMMUNITY
End: 2023-07-03 | Stop reason: HOSPADM

## 2022-10-11 RX ORDER — ACYCLOVIR 400 MG/1
400 TABLET ORAL 2 TIMES DAILY
COMMUNITY
End: 2023-01-09

## 2022-10-11 RX ORDER — SULFAMETHOXAZOLE AND TRIMETHOPRIM 800; 160 MG/1; MG/1
1 TABLET ORAL SEE ADMIN INSTRUCTIONS
Status: ON HOLD | COMMUNITY
End: 2023-07-03 | Stop reason: HOSPADM

## 2022-10-11 RX ORDER — PYRIDOXINE HCL (VITAMIN B6) 250 MG
250 TABLET ORAL DAILY
Status: ON HOLD | COMMUNITY
End: 2023-07-03 | Stop reason: HOSPADM

## 2022-10-11 RX ORDER — DEXAMETHASONE 4 MG/1
40 TABLET ORAL SEE ADMIN INSTRUCTIONS
Status: ON HOLD | COMMUNITY
End: 2023-02-18 | Stop reason: HOSPADM

## 2022-10-11 RX ORDER — LENALIDOMIDE 25 MG/1
25 CAPSULE ORAL SEE ADMIN INSTRUCTIONS
Qty: 14 CAPSULE | Refills: 3 | Status: SHIPPED | OUTPATIENT
Start: 2022-10-11 | End: 2022-12-19

## 2022-10-11 RX ORDER — VITAMIN B COMPLEX
1 CAPSULE ORAL DAILY
Status: ON HOLD | COMMUNITY
End: 2023-07-03 | Stop reason: HOSPADM

## 2022-10-11 NOTE — PROGRESS NOTES
Subjective:       Patient ID: Valentino Manning is a 71 y.o. male.      Chief Complaint: Chemotherapy Education       Diagnosis:  Multiple Myeloma--IgG, Kappa  Macrocytic anemia     Present treatment:   VRD     Treatment/Oncology history:     Plan of care:  Complete myeloma workup     Imaging:   US abdomen 8/24/2022: Spleen: 10.8 cm. Mild hepatomegaly with suspected mild hepatic steatosis. Cholelithiasis.  PET CT 9/28/2022: 1. Right scapular small focal mild hypermetabolism without lytic or sclerotic abnormality is not convinced to be related to multiple myeloma. 2. No definite skeletal lytic abnormality with hypermetabolism to reflect multiple myeloma on this exam. 3. Bilateral small pleural effusions.   4. Gallstones. 5.  Noninflamed diverticulosis coli. 6.  Prostatomegaly.     Pathology:  BONE MARROW BIOPSY 9/1/2022: PLASMA CELL MYELOMA, KAPPA RESTRICTED. NORMOCELLULAR MARROW (30%) WITH 70% INVOLVEMENT BY PLASMA CELL MYELOMA. BACKGROUND TRILINEAGE HEMATOPOIESIS IS DECREASED.    Interval History:  Patient presents to clinic 10/3/2022 with Dr. Harris for follow up to discuss PET CT and bone marrow biopsy results, along with definitive treatment recommendations. He is with his wife. He reports no energy and fatigue. Continues with shortness of breath with exertion, despite negative cardiac workup.  Overall, he is doing well. He denies any fever, chills, sweats.  No chest pain or shortness of breath at rest.  He denies any bleeding. PET CT and bone marrow biopsy results discussed with them.      CLINICAL HISTORY:       Patient: Valentino Manning is a 71 y.o. male with multiple medical problems kindly referred for persistent anemia.  Reviewing electronic medical record patient with anemia since 02/10/2016.  From 2016 to January of 2017 anemia was mild.  Anemia slowly worsening back in 2018 hemoglobin was 8.0.  At that same time kidney function was worsening as well.      Most recent labs with HGB of 9.4, .9,  platelets 231 K, WBC 5.0.  Chemistries with globulin of 5.3.  BUN/creatinine 27.7/1.41.     No family history of leukemia, lymphoma or multiple myeloma.      Past Medical History:   Diagnosis Date    Anemia     Heart problem     Hyperlipidemia     Hypertension       Review of patient's allergies indicates:   Allergen Reactions    Penicillins         Current Outpatient Medications:     acyclovir (ZOVIRAX) 400 MG tablet, Take 400 mg by mouth 2 (two) times daily., Disp: , Rfl:     amLODIPine (NORVASC) 10 MG tablet, , Disp: , Rfl:     ascorbic acid, vitamin C, (VITAMIN C) 1000 MG tablet, Take 1,000 mg by mouth once daily., Disp: , Rfl:     atorvastatin (LIPITOR) 40 MG tablet, TAKE 1 TABLET ORALLY ONCE DAILY ON MON/TUE/THUR/FRI AND 1/2 ON WED. NONE ON SAT/SUN, Disp: , Rfl:     b complex vitamins capsule, Take 1 capsule by mouth once daily., Disp: , Rfl:     clopidogreL (PLAVIX) 75 mg tablet, Take 75 mg by mouth once daily., Disp: , Rfl:     dexAMETHasone (DECADRON) 4 MG Tab, Take 40 mg by mouth As instructed. 40mg (4mg tabs 10) PO Daily once weekly, Disp: , Rfl:     doxazosin (CARDURA) 4 MG tablet, Take 4 mg by mouth once daily., Disp: , Rfl:     dutasteride (AVODART) 0.5 mg capsule, Take 0.5 mg by mouth once daily., Disp: , Rfl:     FERRETTS 325 mg (106 mg iron) Tab, Take 1 tablet by mouth 2 (two) times daily., Disp: , Rfl:     glutamine 10 gram PwPk, Take 10 g by mouth 2 (two) times a day., Disp: , Rfl:     isosorbide mononitrate (IMDUR) 30 MG 24 hr tablet, , Disp: , Rfl:     lenalidomide 25 mg Cap, Take 1 capsule (25 mg total) by mouth As instructed 1 TAB BY MOUTH DAILY X 14 DAYS EVERY 21 DAYS (2 WEEKS ON, 1 WEEK OFF)., Disp: 14 capsule, Rfl: 3    metoprolol tartrate (LOPRESSOR) 25 MG tablet, Take 25 mg by mouth 2 (two) times daily. Takes 1 tab q am, Disp: , Rfl:     ondansetron (ZOFRAN) 4 MG tablet, Take 4 mg by mouth every 8 (eight) hours as needed for Nausea., Disp: , Rfl:     pyridoxine, vitamin B6, (B-6) 250 MG  Tab, Take 250 mg by mouth once daily., Disp: , Rfl:     ranolazine (RANEXA) 500 MG Tb12, Take 500 mg by mouth 2 (two) times daily., Disp: , Rfl:     sulfamethoxazole-trimethoprim 800-160mg (BACTRIM DS) 800-160 mg Tab, Take 1 tablet by mouth As instructed. Tab 1 PO Daily M-W-F, Disp: , Rfl:     zolpidem (AMBIEN) 10 mg Tab, Take 10 mg by mouth once daily., Disp: , Rfl:     furosemide (LASIX) 20 MG tablet, Take 20 mg by mouth once daily., Disp: , Rfl:   Review of Systems   Constitutional:  Positive for fatigue. Negative for appetite change and unexpected weight change.   HENT: Negative.  Negative for mouth sores.    Eyes: Negative.  Negative for visual disturbance.   Respiratory: Negative.  Negative for cough and shortness of breath.    Cardiovascular: Negative.  Negative for chest pain.   Gastrointestinal: Negative.  Negative for abdominal pain and diarrhea.   Endocrine: Negative.    Genitourinary: Negative.  Negative for frequency.   Musculoskeletal: Negative.  Negative for back pain.   Integumentary:  Negative for rash. Negative.   Allergic/Immunologic: Negative.    Neurological: Negative.  Negative for headaches.   Hematological: Negative.  Negative for adenopathy.   Psychiatric/Behavioral: Negative.  The patient is not nervous/anxious.    All other systems reviewed and are negative.           ECOG SCORE    0 - Fully active-able to carry on all pre-disease performance without restriction       No visits with results within 1 Week(s) from this visit.   Latest known visit with results is:   Orders Only on 09/01/2022   Component Date Value    Case Report 09/01/2022                      Value:Bone Marrow                                       Case: [id number]                                 Authorizing Provider:  Angélica Harris MD     Collected:           09/01/2022 11:28 AM          Ordering Location:     [facility]  Received:            09/01/2022 01:43 PM                                 - [facility]  Hematology                                                                                  Oncology                                                                     Pathologist:           Markell Farmer MD                                                         Specimens:   1) - Hip, Right, right posterior iliac crest clot                                                   2) - Hip, Right, right posterior iliac crest core                                          Final Diagnosis 09/01/2022                      Value:This result contains rich text formatting which cannot be displayed here.    Comments 09/01/2022                      Value:This result contains rich text formatting which cannot be displayed here.    Microscopic Description 09/01/2022                      Value:This result contains rich text formatting which cannot be displayed here.    Clinical Information 09/01/2022                      Value:This result contains rich text formatting which cannot be displayed here.    Gross Description 09/01/2022                      Value:This result contains rich text formatting which cannot be displayed here.    CBC Result 09/01/2022                      Value:This result contains rich text formatting which cannot be displayed here.    Peripheral Smear Result 09/01/2022                      Value:This result contains rich text formatting which cannot be displayed here.    Flow Cytometry Result 09/01/2022                      Value:This result contains rich text formatting which cannot be displayed here.    Additional Testing 09/01/2022                      Value:This result contains rich text formatting which cannot be displayed here.            Chemotherapy Patient Education  Education  Chemo Medications: RVD (Lenalidomide/Bortezomib/Dexamethasone)  Intoduction to Unit Hours, Services, Routines, After Hours Telephone Number: Verbal Instructions given to patient/family, Written Instructions given to  patient/family, Patient/Family verbalizes understanding  Consent Form Signed: Verbal Instructions given to patient/family, Written Instructions given to patient/family  Chemo Teaching Packet: Verbal Instructions given to patient/family, Written Instructions given to patient/family, Patient/Family verbalizes understanding  Community Resources: Verbal Instructions given to patient/family, Patient/Family verbalizes understanding  Birth Control:  (wife and only partner is 65 years old)  Dental Care During Chemo: Verbal Instructions given to patient/family, Written Instructions given to patient/family, Patient/Family verbalizes understanding  Bowel Prophylaxis/Protocol: Verbal Instructions given to patient/family, Written Instructions given to patient/family, Patient/Family verbalizes understanding    Instructions in expected side effects  Nausea/Vomiting : Verbal Instructions given to patient/family, Written Instructions given to patient/family, Patient/Family verbalizes understanding  Myelosuppression: Verbal Instructions given to patient/family, Written Instructions given to patient/family, Patient/Family verbalizes understanding  Fatigue: Verbal Instructions given to patient/family, Written Instructions given to patient/family, Patient/Family verbalizes understanding  Stomatitis: Verbal Instructions given to patient/family, Written Instructions given to patient/family, Patient/Family verbalizes understanding  Diarrhea: Verbal Instructions given to patient/family, Written Instructions given to patient/family, Patient/Family verbalizes understanding  Gastritis: Verbal Instructions given to patient/family, Written Instructions given to patient/family, Patient/Family verbalizes understanding  Instructed to report increase of 2-3 loose stools/day over pretreatment: Verbal Instructions given to patient/family, Written Instructions given to patient/family, Patient/Family verbalizes understanding    S/S Infection   Report  Temperature of 100.4 or >: Verbal Instructions given to patient/family, Written Instructions given to patient/family, Patient/Family verbalizes understanding    Skin Care  Photosensitivity: Verbal Instructions given to patient/family, Written Instructions given to patient/family, Patient/Family verbalizes understanding  Use of Sunscreen: Verbal Instructions given to patient/family, Patient/Family verbalizes understanding  Rash, Hyperkeratosis: Verbal Instructions given to patient/family, Written Instructions given to patient/family, Patient/Family verbalizes understanding    Nutritional Screening   Nutritional Screening for Dietary Consult:  (Seen by Dietician)    Basic Nutritional Instructions  Vitamin Support: Verbal Instructions given to patient/family, Written Instructions given to patient/family, Patient/Family verbalizes understanding    Hydration Instructions  Renal Toxicity: Verbal Instructions given to patient/family, Written Instructions given to patient/family, Patient/Family verbalizes understanding  Drink 2 Liters of fluids daily: Verbal Instructions given to patient/family, Written Instructions given to patient/family, Patient/Family verbalizes understanding    Thrombocytopenia Precautions  Bruising: Verbal Instructions given to patient/family, Written Instructions given to patient/family, Patient/Family verbalizes understanding  Bleeding: Verbal Instructions given to patient/family, Written Instructions given to patient/family, Patient/Family verbalizes understanding    Assessment:       Problem List Items Addressed This Visit          Cardiac/Vascular    Hypertension       Renal/    Kidney disease       Oncology    Multiple myeloma - Primary    Anemia          Plan:       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 Patient requesting to postpone start of treatment until 11/1/2022. Dr. Harris agreed and  notified to adjust schedule accordingly. He is very  apprehensive about starting treatment. Asked many questions and voiced concern about his wife. Discussion included diagnosis, treatment (including bone marrow transplant), potential side effects and ability to work.    Patient instructed to receive Shingrix, flu and pneumonia vaccine prior to starting tx.    Prescriptions per Dr. Harris called in to [facility] in [place]: Dexamethasone 40mg (4mg tabs 10) PO Daily once weekly. Dispense 40 refill 4; Bactrim DS tab 1 PO Daily M-W-F. Dispense 12 refill 4; Acyclovir 400mg tab 1 PO BID. Dispense 60 refill 4; Zofran 4mg tab 1 PO every 8 hours PRN nausea. Dispense 30 refill 4    OTC: L-Glutamine powder 10Grams in liquid PO BID; Vitamin B Complex tab 1 PO Daily; Vitamin B6 250mg tab 1 PO Daily.    RTC 10/13/2022 at 1100 Week #1 of Velcade. 10/20/2022 at 1000 lab and  1100 Week #2 of Velcade 10/27/2022 at 1000 lab and  1100 Week #3 of Velcade . 11/10/2022 at 1530 lab and 11/14/2022 at 1040 TD with Dr. Harris.      Treatment Plan Information   OP VRD - WEEKLY BORTEZOMIB LENALIDOMIDE DEXAMETHASONE Q3W   Angélica Harris MD   Upcoming Treatment Dates - OP VRD - WEEKLY BORTEZOMIB LENALIDOMIDE DEXAMETHASONE Q3W    10/12/2022       Antiemetics       Physician communication order       Take Home Chemotherapy       dexAMETHasone (DECADRON) 4 MG Tab       lenalidomide 25 mg Cap  10/13/2022       Chemotherapy       bortezomib (VELCADE) injection 1.3 mg/m2  10/20/2022       Chemotherapy       bortezomib (VELCADE) injection 1.3 mg/m2  10/27/2022       Chemotherapy       bortezomib (VELCADE) injection 1.3 mg/m2         I spent 30 minutes reviewing notes, lab, scan and biopsy result in preparation for session.    Patient had many questions and needed extensive emotional support to try to discern a treatment decision. I spent  60 minutes face-to-face with patient.

## 2022-10-11 NOTE — PROGRESS NOTES
Oncology Nutrition Evaluation      Valentino Manning   1950    Referring Provider:   Angélica Harris MD    Reason for Visit:  New Treatment Education    Nutrition Recommendations:  1. Regular diet as tolerated    Nutrition Assessment    This is a 71 y.o.male with a medical diagnosis of MM. No c/o n/v/c/d. Appetite and po intake are good. Wt has been stable.    Nutrition Factors Affecting Intake  none identified at this time    PMHx:   Past Medical History:   Diagnosis Date    Anemia     Heart problem     Hyperlipidemia     Hypertension        Allergies: Penicillins    Current Medications:    Current Outpatient Medications:     acyclovir (ZOVIRAX) 400 MG tablet, Take 400 mg by mouth 2 (two) times daily., Disp: , Rfl:     amLODIPine (NORVASC) 10 MG tablet, , Disp: , Rfl:     ascorbic acid, vitamin C, (VITAMIN C) 1000 MG tablet, Take 1,000 mg by mouth once daily., Disp: , Rfl:     atorvastatin (LIPITOR) 40 MG tablet, TAKE 1 TABLET ORALLY ONCE DAILY ON MON/TUE/THUR/FRI AND 1/2 ON WED. NONE ON SAT/SUN, Disp: , Rfl:     b complex vitamins capsule, Take 1 capsule by mouth once daily., Disp: , Rfl:     clopidogreL (PLAVIX) 75 mg tablet, Take 75 mg by mouth once daily., Disp: , Rfl:     dexAMETHasone (DECADRON) 4 MG Tab, Take 40 mg by mouth As instructed. 40mg (4mg tabs 10) PO Daily once weekly, Disp: , Rfl:     doxazosin (CARDURA) 4 MG tablet, Take 4 mg by mouth once daily., Disp: , Rfl:     dutasteride (AVODART) 0.5 mg capsule, Take 0.5 mg by mouth once daily., Disp: , Rfl:     FERRETTS 325 mg (106 mg iron) Tab, Take 1 tablet by mouth 2 (two) times daily., Disp: , Rfl:     glutamine 10 gram PwPk, Take 10 g by mouth 2 (two) times a day., Disp: , Rfl:     isosorbide mononitrate (IMDUR) 30 MG 24 hr tablet, , Disp: , Rfl:     lenalidomide 25 mg Cap, Take 1 capsule (25 mg total) by mouth As instructed 1 TAB BY MOUTH DAILY X 14 DAYS EVERY 21 DAYS (2 WEEKS ON, 1 WEEK OFF)., Disp: 14 capsule, Rfl: 3    metoprolol tartrate  (LOPRESSOR) 25 MG tablet, Take 25 mg by mouth 2 (two) times daily. Takes 1 tab q am, Disp: , Rfl:     ondansetron (ZOFRAN) 4 MG tablet, Take 4 mg by mouth every 8 (eight) hours as needed for Nausea., Disp: , Rfl:     pyridoxine, vitamin B6, (B-6) 250 MG Tab, Take 250 mg by mouth once daily., Disp: , Rfl:     ranolazine (RANEXA) 500 MG Tb12, Take 500 mg by mouth 2 (two) times daily., Disp: , Rfl:     sulfamethoxazole-trimethoprim 800-160mg (BACTRIM DS) 800-160 mg Tab, Take 1 tablet by mouth As instructed. Tab 1 PO Daily M-W-F, Disp: , Rfl:     zolpidem (AMBIEN) 10 mg Tab, Take 10 mg by mouth once daily., Disp: , Rfl:     Labs: no new    Anthropometrics    Height:   Ht Readings from Last 1 Encounters:   09/01/22 6' (1.829 m)      Weight:   Wt Readings from Last 5 Encounters:   10/11/22 94.3 kg (207 lb 14.4 oz)   10/03/22 94.3 kg (208 lb)   09/01/22 91.1 kg (200 lb 13.4 oz)   08/29/22 94.8 kg (209 lb)   08/10/22 94.3 kg (208 lb)      Usual Body Weight: 94.3 kg (208 lb)  % Weight Change: 0    BMI: 28.07 (underweight)    Ideal Weight: 80.9 kg (178 lb)  % Ideal Weight: 116%    Nutrition Diagnosis    PES: Food and nutrition related knowledge deficit related to MM as evidenced by lack of prior exposure to onc nutrition. (resolved)    Nutrition Risk  low    Nutrition Intervention    Interventions(treatment strategy):  Education Provided: antimicrobial/neutropenic  Teaching Method: explanation and printed materials  Comprehension: verbalizes understanding  Barriers to Learning: none evident  Expected Compliance: good  Comments: All questions were answered and dietitian's contact information was provided.        Nutrition Monitoring and Evaluation    Ongoing monitoring not warranted at this time. Please consult RD prn.        Kiah Kline, MS, RD, , LDN

## 2022-10-12 NOTE — TELEPHONE ENCOUNTER
Open I-Vent for medication:    Revlimid is dosed at 25 mg once daily on days 1-14 of 21 day cycle. Patient hasn't had his creatinine checked since 8/10/22, but it was 1.44 mg/dL at the time, therefore his CrCl = 58 mL/min.     Package insert for Revlimid states for CrCl of 30-60 mL/min: Initial dose is 10 mg once daily using same cycle schedule.     Messaged provider if they would like to adjust the dose for altered kidney function or get another creatinine before starting. Plan for start date of 11/1    Will keep I-Vent open until labs are obtained

## 2022-10-19 ENCOUNTER — APPOINTMENT (OUTPATIENT)
Dept: LAB | Facility: HOSPITAL | Age: 72
End: 2022-10-19
Attending: INTERNAL MEDICINE
Payer: MEDICARE

## 2022-10-19 PROCEDURE — 84166 PROTEIN E-PHORESIS/URINE/CSF: CPT

## 2022-10-19 PROCEDURE — 84156 ASSAY OF PROTEIN URINE: CPT

## 2022-10-21 NOTE — TELEPHONE ENCOUNTER
Brand Revlimid estimated copay: $1338.85  Generic lenalidomide estimated copay: $2519.67    Patient would like to proceed with exploring financial assistance options. Provided HH size and income

## 2022-10-21 NOTE — TELEPHONE ENCOUNTER
Patient approved for [employer] [facility]    For documentation purposes:    BIN: [id number]  PCN: [id number]  ID: [id number]  Group: [id number]    Approved from 10/21/2022 - 09/20/2023  Seth Amount : $12,000

## 2022-10-25 ENCOUNTER — LAB VISIT (OUTPATIENT)
Dept: LAB | Facility: HOSPITAL | Age: 72
End: 2022-10-25
Attending: INTERNAL MEDICINE
Payer: MEDICARE

## 2022-10-25 DIAGNOSIS — C90.00 MULTIPLE MYELOMA NOT HAVING ACHIEVED REMISSION: ICD-10-CM

## 2022-10-25 LAB
ALBUMIN SERPL-MCNC: 3.3 GM/DL (ref 3.4–4.8)
ALBUMIN/GLOB SERPL: 0.6 RATIO (ref 1.1–2)
ALP SERPL-CCNC: 56 UNIT/L (ref 40–150)
ALT SERPL-CCNC: 54 UNIT/L (ref 0–55)
AST SERPL-CCNC: 32 UNIT/L (ref 5–34)
BASOPHILS # BLD AUTO: 0.01 X10(3)/MCL (ref 0–0.2)
BASOPHILS NFR BLD AUTO: 0.3 %
BILIRUBIN DIRECT+TOT PNL SERPL-MCNC: 0.5 MG/DL
BUN SERPL-MCNC: 15.7 MG/DL (ref 8.4–25.7)
CALCIUM SERPL-MCNC: 9.1 MG/DL (ref 8.8–10)
CHLORIDE SERPL-SCNC: 109 MMOL/L (ref 98–107)
CO2 SERPL-SCNC: 24 MMOL/L (ref 23–31)
CREAT SERPL-MCNC: 1.23 MG/DL (ref 0.73–1.18)
EOSINOPHIL # BLD AUTO: 0.09 X10(3)/MCL (ref 0–0.9)
EOSINOPHIL NFR BLD AUTO: 2.5 %
ERYTHROCYTE [DISTWIDTH] IN BLOOD BY AUTOMATED COUNT: 14.6 % (ref 11.5–17)
GFR SERPLBLD CREATININE-BSD FMLA CKD-EPI: >60 MLS/MIN/1.73/M2
GLOBULIN SER-MCNC: 5.7 GM/DL (ref 2.4–3.5)
GLUCOSE SERPL-MCNC: 132 MG/DL (ref 82–115)
HCT VFR BLD AUTO: 31.2 % (ref 42–52)
HGB BLD-MCNC: 9.9 GM/DL (ref 14–18)
IMM GRANULOCYTES # BLD AUTO: 0.03 X10(3)/MCL (ref 0–0.04)
IMM GRANULOCYTES NFR BLD AUTO: 0.8 %
LYMPHOCYTES # BLD AUTO: 1.14 X10(3)/MCL (ref 0.6–4.6)
LYMPHOCYTES NFR BLD AUTO: 31.3 %
MAYO GENERIC ORDERABLE RESULT: ABNORMAL
MCH RBC QN AUTO: 34.7 PG (ref 27–31)
MCHC RBC AUTO-ENTMCNC: 31.7 MG/DL (ref 33–36)
MCV RBC AUTO: 109.5 FL (ref 80–94)
MONOCYTES # BLD AUTO: 0.22 X10(3)/MCL (ref 0.1–1.3)
MONOCYTES NFR BLD AUTO: 6 %
NEUTROPHILS # BLD AUTO: 2.2 X10(3)/MCL (ref 2.1–9.2)
NEUTROPHILS NFR BLD AUTO: 59.1 %
PLATELET # BLD AUTO: 214 X10(3)/MCL (ref 130–400)
PMV BLD AUTO: 8.3 FL (ref 7.4–10.4)
POTASSIUM SERPL-SCNC: 4 MMOL/L (ref 3.5–5.1)
PROT SERPL-MCNC: 9 GM/DL (ref 5.8–7.6)
RBC # BLD AUTO: 2.85 X10(6)/MCL (ref 4.7–6.1)
SODIUM SERPL-SCNC: 136 MMOL/L (ref 136–145)
WBC # SPEC AUTO: 3.6 X10(3)/MCL (ref 4.5–11.5)

## 2022-10-25 PROCEDURE — 80053 COMPREHEN METABOLIC PANEL: CPT

## 2022-10-25 PROCEDURE — 36415 COLL VENOUS BLD VENIPUNCTURE: CPT

## 2022-10-25 PROCEDURE — 85025 COMPLETE CBC W/AUTO DIFF WBC: CPT

## 2022-10-26 NOTE — TELEPHONE ENCOUNTER
From labs drawn on 10/25/22:  Est CrCl = 68 mL/min    Revlimid 25 mg daily x 14 days on, 7 days off appropriate

## 2022-10-26 NOTE — TELEPHONE ENCOUNTER
Dose appropriate based off recent labs. Will send to [facility] ([facility]) to be filled and will provide them with the [employer] info. Patient is aware, will also email him this information as requested     [facility] phone # [phone]    Closing out [facility] encounter

## 2022-10-31 ENCOUNTER — TELEPHONE (OUTPATIENT)
Dept: HEMATOLOGY/ONCOLOGY | Facility: CLINIC | Age: 72
End: 2022-10-31
Payer: MEDICARE

## 2022-10-31 NOTE — TELEPHONE ENCOUNTER
LIBIA Gray spoke with patient about SCT. Patient would prefer to go to [facility] on 11/07 prior to deciding to go to [facility]. LIBIA rGay will call patient the following week per patient's request      ______________________________    ----- Message from Stuart Richmond sent at 10/28/2022 11:11 AM CDT -----  Regarding: RE: SCT Benefits Check  Des stark,    I have verified medical and transplant benefit for patient at [facility].    Than gardenia Richmond  ----- Message -----  From: Rama Ovalles RN  Sent: 10/28/2022   9:31 AM CDT  To: Kamille Irvin, Stuart Richmond  Subject: SCT Benefits Check                               Hello,    Please complete benefit check for patient. Medical benefit check and transplant benefit check for [facility] only.    [employer]    Thank you,  Rama

## 2022-11-07 ENCOUNTER — TELEPHONE (OUTPATIENT)
Dept: INFUSION THERAPY | Facility: HOSPITAL | Age: 72
End: 2022-11-07
Payer: MEDICARE

## 2022-11-07 ENCOUNTER — TELEPHONE (OUTPATIENT)
Dept: HEMATOLOGY/ONCOLOGY | Facility: CLINIC | Age: 72
End: 2022-11-07
Payer: MEDICARE

## 2022-11-07 NOTE — TELEPHONE ENCOUNTER
[facility] have not been able to get in touch with Mr. Manning. I let them know that he was wanting to be seen by [facility] before starting therapy. Patient has Tulsawell's phone number if/when he is ready to begin therapy

## 2022-11-29 ENCOUNTER — LAB VISIT (OUTPATIENT)
Dept: LAB | Facility: HOSPITAL | Age: 72
End: 2022-11-29
Attending: INTERNAL MEDICINE
Payer: MEDICARE

## 2022-11-29 DIAGNOSIS — Z12.5 SCREENING FOR PROSTATE CANCER: ICD-10-CM

## 2022-11-29 DIAGNOSIS — M81.0 OSTEOPOROSIS, UNSPECIFIED OSTEOPOROSIS TYPE, UNSPECIFIED PATHOLOGICAL FRACTURE PRESENCE: ICD-10-CM

## 2022-11-29 DIAGNOSIS — R94.8 NONSPECIFIC ABNORMAL RESULTS OF BASAL METABOLISM FUNCTION STUDY: Primary | ICD-10-CM

## 2022-11-29 DIAGNOSIS — E11.9 DM (DIABETES MELLITUS): ICD-10-CM

## 2022-11-29 DIAGNOSIS — Z00.00 WELL ADULT EXAM: ICD-10-CM

## 2022-11-29 DIAGNOSIS — E55.9 VITAMIN D DEFICIENCY: ICD-10-CM

## 2022-11-29 DIAGNOSIS — N40.0 BENIGN PROSTATIC HYPERPLASIA, UNSPECIFIED WHETHER LOWER URINARY TRACT SYMPTOMS PRESENT: ICD-10-CM

## 2022-11-29 LAB
ALBUMIN SERPL-MCNC: 3.3 GM/DL (ref 3.4–4.8)
ALBUMIN/GLOB SERPL: 0.6 RATIO (ref 1.1–2)
ALP SERPL-CCNC: 51 UNIT/L (ref 40–150)
ALT SERPL-CCNC: 40 UNIT/L (ref 0–55)
APPEARANCE UR: CLEAR
AST SERPL-CCNC: 23 UNIT/L (ref 5–34)
BACTERIA #/AREA URNS AUTO: NORMAL /HPF
BILIRUB UR QL STRIP.AUTO: NEGATIVE MG/DL
BILIRUBIN DIRECT+TOT PNL SERPL-MCNC: 0.7 MG/DL
BUN SERPL-MCNC: 20.2 MG/DL (ref 8.4–25.7)
CALCIUM SERPL-MCNC: 9 MG/DL (ref 8.8–10)
CHLORIDE SERPL-SCNC: 106 MMOL/L (ref 98–107)
CHOLEST SERPL-MCNC: 112 MG/DL
CHOLEST/HDLC SERPL: 4 {RATIO} (ref 0–5)
CO2 SERPL-SCNC: 23 MMOL/L (ref 23–31)
COLOR UR AUTO: YELLOW
CREAT SERPL-MCNC: 1.22 MG/DL (ref 0.73–1.18)
DEPRECATED CALCIDIOL+CALCIFEROL SERPL-MC: 18 NG/ML (ref 30–80)
ERYTHROCYTE [DISTWIDTH] IN BLOOD BY AUTOMATED COUNT: 14 % (ref 11.5–17)
ERYTHROCYTE [SEDIMENTATION RATE] IN BLOOD: 31 MM/HR (ref 0–15)
EST. AVERAGE GLUCOSE BLD GHB EST-MCNC: 105.4 MG/DL
GFR SERPLBLD CREATININE-BSD FMLA CKD-EPI: >60 MLS/MIN/1.73/M2
GLOBULIN SER-MCNC: 5.5 GM/DL (ref 2.4–3.5)
GLUCOSE SERPL-MCNC: 98 MG/DL (ref 82–115)
GLUCOSE UR QL STRIP.AUTO: NEGATIVE MG/DL
HBA1C MFR BLD: 5.3 %
HCT VFR BLD AUTO: 33.8 % (ref 42–52)
HDLC SERPL-MCNC: 25 MG/DL (ref 35–60)
HGB BLD-MCNC: 10.8 GM/DL (ref 14–18)
KETONES UR QL STRIP.AUTO: NEGATIVE MG/DL
LDLC SERPL CALC-MCNC: 56 MG/DL (ref 50–140)
LEUKOCYTE ESTERASE UR QL STRIP.AUTO: NEGATIVE UNIT/L
MACROCYTES BLD QL SMEAR: ABNORMAL
MCH RBC QN AUTO: 34.4 PG (ref 27–31)
MCHC RBC AUTO-ENTMCNC: 32 MG/DL (ref 33–36)
MCV RBC AUTO: 107.6 FL (ref 80–94)
NITRITE UR QL STRIP.AUTO: NEGATIVE
NRBC BLD AUTO-RTO: 0 %
PH UR STRIP.AUTO: 5.5 [PH]
PLATELET # BLD AUTO: 240 X10(3)/MCL (ref 130–400)
PLATELET # BLD EST: NORMAL 10*3/UL
PMV BLD AUTO: 9.2 FL (ref 7.4–10.4)
POTASSIUM SERPL-SCNC: 4.6 MMOL/L (ref 3.5–5.1)
PROT SERPL-MCNC: 8.8 GM/DL (ref 5.8–7.6)
PROT UR QL STRIP.AUTO: ABNORMAL MG/DL
PSA SERPL-MCNC: 7.27 NG/ML
RBC # BLD AUTO: 3.14 X10(6)/MCL (ref 4.7–6.1)
RBC #/AREA URNS AUTO: <5 /HPF
RBC MORPH BLD: ABNORMAL
RBC UR QL AUTO: NEGATIVE UNIT/L
RHEUMATOID FACT SERPL-ACNC: <13 IU/ML
SODIUM SERPL-SCNC: 136 MMOL/L (ref 136–145)
SP GR UR STRIP.AUTO: 1.02 (ref 1–1.03)
SQUAMOUS #/AREA URNS AUTO: <5 /HPF
T4 FREE SERPL-MCNC: 0.84 NG/DL (ref 0.7–1.48)
TRIGL SERPL-MCNC: 155 MG/DL (ref 34–140)
TSH SERPL-ACNC: 1.11 UIU/ML (ref 0.35–4.94)
URATE SERPL-MCNC: 7.3 MG/DL (ref 3.5–7.2)
UROBILINOGEN UR STRIP-ACNC: 0.2 MG/DL
VLDLC SERPL CALC-MCNC: 31 MG/DL
WBC # SPEC AUTO: 3.5 X10(3)/MCL (ref 4.5–11.5)
WBC #/AREA URNS AUTO: <5 /HPF

## 2022-11-29 PROCEDURE — 84439 ASSAY OF FREE THYROXINE: CPT

## 2022-11-29 PROCEDURE — 84443 ASSAY THYROID STIM HORMONE: CPT

## 2022-11-29 PROCEDURE — 82306 VITAMIN D 25 HYDROXY: CPT

## 2022-11-29 PROCEDURE — 86431 RHEUMATOID FACTOR QUANT: CPT

## 2022-11-29 PROCEDURE — 86039 ANTINUCLEAR ANTIBODIES (ANA): CPT

## 2022-11-29 PROCEDURE — 80061 LIPID PANEL: CPT

## 2022-11-29 PROCEDURE — 80053 COMPREHEN METABOLIC PANEL: CPT

## 2022-11-29 PROCEDURE — 85651 RBC SED RATE NONAUTOMATED: CPT

## 2022-11-29 PROCEDURE — 81001 URINALYSIS AUTO W/SCOPE: CPT

## 2022-11-29 PROCEDURE — 84153 ASSAY OF PSA TOTAL: CPT

## 2022-11-29 PROCEDURE — 83036 HEMOGLOBIN GLYCOSYLATED A1C: CPT

## 2022-11-29 PROCEDURE — 36415 COLL VENOUS BLD VENIPUNCTURE: CPT

## 2022-11-29 PROCEDURE — 84550 ASSAY OF BLOOD/URIC ACID: CPT

## 2022-11-29 PROCEDURE — 85027 COMPLETE CBC AUTOMATED: CPT

## 2022-12-01 ENCOUNTER — OFFICE VISIT (OUTPATIENT)
Dept: HEMATOLOGY/ONCOLOGY | Facility: CLINIC | Age: 72
End: 2022-12-01
Payer: MEDICARE

## 2022-12-01 ENCOUNTER — SPECIALTY PHARMACY (OUTPATIENT)
Dept: PHARMACY | Facility: CLINIC | Age: 72
End: 2022-12-01
Payer: MEDICARE

## 2022-12-01 DIAGNOSIS — C90.00 MULTIPLE MYELOMA NOT HAVING ACHIEVED REMISSION: Primary | ICD-10-CM

## 2022-12-01 PROCEDURE — 99999 PR PBB SHADOW E&M-EST. PATIENT-LVL III: CPT | Mod: PBBFAC,,, | Performed by: NURSE PRACTITIONER

## 2022-12-01 PROCEDURE — 99213 OFFICE O/P EST LOW 20 MIN: CPT | Mod: PBBFAC | Performed by: NURSE PRACTITIONER

## 2022-12-01 PROCEDURE — 99215 OFFICE O/P EST HI 40 MIN: CPT | Mod: S$PBB,,, | Performed by: NURSE PRACTITIONER

## 2022-12-01 PROCEDURE — 99215 PR OFFICE/OUTPT VISIT, EST, LEVL V, 40-54 MIN: ICD-10-PCS | Mod: S$PBB,,, | Performed by: NURSE PRACTITIONER

## 2022-12-01 PROCEDURE — 99999 PR PBB SHADOW E&M-EST. PATIENT-LVL III: ICD-10-PCS | Mod: PBBFAC,,, | Performed by: NURSE PRACTITIONER

## 2022-12-01 RX ORDER — FUROSEMIDE 20 MG/1
20 TABLET ORAL
COMMUNITY
Start: 2022-10-16 | End: 2022-12-07

## 2022-12-01 RX ORDER — RIFAXIMIN 550 MG/1
TABLET ORAL
COMMUNITY
Start: 2022-11-21 | End: 2022-12-07

## 2022-12-01 RX ORDER — LISINOPRIL 40 MG/1
40 TABLET ORAL DAILY
Status: ON HOLD | COMMUNITY
Start: 2022-09-24 | End: 2023-07-03 | Stop reason: HOSPADM

## 2022-12-01 RX ORDER — PANTOPRAZOLE SODIUM 40 MG/1
TABLET, DELAYED RELEASE ORAL
COMMUNITY
Start: 2022-11-19 | End: 2023-01-19

## 2022-12-01 RX ORDER — LINACLOTIDE 145 UG/1
145 CAPSULE, GELATIN COATED ORAL
COMMUNITY
Start: 2022-10-31 | End: 2023-09-21

## 2022-12-01 NOTE — TELEPHONE ENCOUNTER
Patient ready to start therapy. Obtained new [employer] auth # and called into [facility]

## 2022-12-01 NOTE — PROGRESS NOTES
HEMATOLOGY/ONCOLOGY OFFICE CLINIC VISIT    Visit Information:    Initial Evaluation: 8/10/2022  Referring Provider:   Other providers:  Code status: Not addressed    Diagnosis:  Multiple Myeloma--IgG, Kappa  Macrocytic anemia    Present treatment:   VRD    Treatment/Oncology history:    Plan of care:  Complete myeloma workup    Imaging:   US abdomen 8/24/2022: Spleen: 10.8 cm. Mild hepatomegaly with suspected mild hepatic steatosis. Cholelithiasis.  PET CT 9/28/2022: 1. Right scapular small focal mild hypermetabolism without lytic or sclerotic abnormality is not convinced to be related to multiple myeloma. 2. No definite skeletal lytic abnormality with hypermetabolism to reflect multiple myeloma on this exam. 3. Bilateral small pleural effusions.   4. Gallstones. 5.  Noninflamed diverticulosis coli. 6.  Prostatomegaly.    Pathology:  BONE MARROW BIOPSY 9/1/2022: PLASMA CELL MYELOMA, KAPPA RESTRICTED. NORMOCELLULAR MARROW (30%) WITH 70% INVOLVEMENT BY PLASMA CELL MYELOMA. BACKGROUND TRILINEAGE HEMATOPOIESIS IS DECREASED.      CLINICAL HISTORY:       Patient: Valentino Manning is a 72 y.o. male with multiple medical problems kindly referred for persistent anemia.  Reviewing electronic medical record patient with anemia since 02/10/2016.  From 2016 to January of 2017 anemia was mild.  Anemia slowly worsening back in 2018 hemoglobin was 8.0.  At that same time kidney function was worsening as well.     Most recent labs with HGB of 9.4, .9, platelets 231 K, WBC 5.0.  Chemistries with globulin of 5.3.  BUN/creatinine 27.7/1.41.    No family history of leukemia, lymphoma or multiple myeloma.      Chief Complaint: No chief complaint on file.      Interval History:  12/1/22  Patient presents to clinic today for f/u. In the interim, he went to [facility] for second opinion.They recommended that he start RVD then repeat bone marrow after 4 cycles.  Continues with shortness of breath with exertion.  He denies any fever, chills,  sweats.  No chest pain or shortness of breath at rest.  He denies any bleeding.     Past Medical History:   Diagnosis Date    Anemia     Heart problem     Hyperlipidemia     Hypertension       Past Surgical History:   Procedure Laterality Date    BONE MARROW ASPIRATION N/A 9/1/2022    Procedure: ASPIRATION, BONE MARROW;  Surgeon: Robbie Gardner MD;  Location: [facility];  Service: General;  Laterality: N/A;    CARDIAC SURGERY  2021    ENDOSCOPIC RELEASE OF BOTH CARPAL TUNNELS       Family History   Problem Relation Age of Onset    Hypertension Mother     Diabetes Mother     Anemia Mother     Heart disease Father      Social Connections: Not on file       Review of patient's allergies indicates:   Allergen Reactions    Penicillins       Current Outpatient Medications on File Prior to Visit   Medication Sig Dispense Refill    amLODIPine (NORVASC) 10 MG tablet       ascorbic acid, vitamin C, (VITAMIN C) 1000 MG tablet Take 1,000 mg by mouth once daily.      atorvastatin (LIPITOR) 40 MG tablet TAKE 1 TABLET ORALLY ONCE DAILY ON MON/TUE/THUR/FRI AND 1/2 ON WED. NONE ON SAT/SUN      b complex vitamins capsule Take 1 capsule by mouth once daily.      clopidogreL (PLAVIX) 75 mg tablet Take 75 mg by mouth once daily.      doxazosin (CARDURA) 4 MG tablet Take 4 mg by mouth once daily.      dutasteride (AVODART) 0.5 mg capsule Take 0.5 mg by mouth once daily.      FERRETTS 325 mg (106 mg iron) Tab Take 1 tablet by mouth 2 (two) times daily.      furosemide (LASIX) 20 MG tablet Take 20 mg by mouth.      glutamine 10 gram PwPk Take 10 g by mouth 2 (two) times a day.      isosorbide mononitrate (IMDUR) 30 MG 24 hr tablet       LINZESS 145 mcg Cap capsule Take 145 mcg by mouth.      lisinopriL (PRINIVIL,ZESTRIL) 40 MG tablet Take 40 mg by mouth.      metoprolol tartrate (LOPRESSOR) 25 MG tablet Take 25 mg by mouth 2 (two) times daily. Takes 1 tab q am      ondansetron (ZOFRAN) 4 MG tablet Take 4 mg by mouth every 8 (eight) hours as  needed for Nausea.      pantoprazole (PROTONIX) 40 MG tablet TAKE 1 TABLET BY MOUTH EVERY MORNING 30 MIN BEFORE BREAKFAST      pyridoxine, vitamin B6, (B-6) 250 MG Tab Take 250 mg by mouth once daily.      ranolazine (RANEXA) 500 MG Tb12 Take 500 mg by mouth 2 (two) times daily.      XIFAXAN 550 mg Tab       zolpidem (AMBIEN) 10 mg Tab Take 10 mg by mouth once daily.      acyclovir (ZOVIRAX) 400 MG tablet Take 400 mg by mouth 2 (two) times daily.      dexAMETHasone (DECADRON) 4 MG Tab Take 40 mg by mouth As instructed. 40mg (4mg tabs 10) PO Daily once weekly      lenalidomide 25 mg Cap Take 1 capsule (25 mg total) by mouth As instructed 1 TAB BY MOUTH DAILY X 14 DAYS EVERY 21 DAYS (2 WEEKS ON, 1 WEEK OFF). (Patient not taking: Reported on 12/1/2022.) 14 capsule 3    sulfamethoxazole-trimethoprim 800-160mg (BACTRIM DS) 800-160 mg Tab Take 1 tablet by mouth As instructed. Tab 1 PO Daily M-W-F       No current facility-administered medications on file prior to visit.      Review of Systems   Constitutional:  Positive for fatigue. Negative for activity change, appetite change, chills, diaphoresis, fever and unexpected weight change.   HENT:  Negative for nasal congestion, mouth sores, nosebleeds, postnasal drip, sinus pressure/congestion, sore throat and trouble swallowing.    Eyes:  Negative for visual disturbance.   Respiratory:  Positive for shortness of breath.    Cardiovascular:  Negative for chest pain and palpitations.   Gastrointestinal:  Negative for abdominal distention, abdominal pain, blood in stool, change in bowel habit, constipation, diarrhea, nausea, vomiting and change in bowel habit.   Endocrine: Negative.    Genitourinary:  Negative for bladder incontinence, decreased urine volume, difficulty urinating, dysuria, frequency, hematuria, scrotal swelling, testicular pain and urgency.   Musculoskeletal:  Negative for arthralgias, back pain, gait problem, joint swelling, leg pain, myalgias and neck pain.    Integumentary:  Negative for rash.   Neurological:  Negative for dizziness, tremors, seizures, syncope, speech difficulty, weakness, light-headedness, numbness, headaches and memory loss.   Hematological:  Does not bruise/bleed easily.   Psychiatric/Behavioral:  Negative for agitation, confusion, hallucinations, sleep disturbance and suicidal ideas. The patient is not nervous/anxious.             There were no vitals filed for this visit.       Physical Exam  Constitutional:       Appearance: He is obese.   HENT:      Head: Normocephalic and atraumatic.   Eyes:      Extraocular Movements: Extraocular movements intact.   Pulmonary:      Effort: Pulmonary effort is normal.   Musculoskeletal:      Cervical back: Neck supple.   Neurological:      Mental Status: He is alert and oriented to person, place, and time.   Psychiatric:         Mood and Affect: Mood normal.         Behavior: Behavior normal.         Judgment: Judgment normal.     ECOG SCORE             Laboratory:  CBC with Differential:  Lab Results   Component Value Date    WBC 3.5 (L) 11/29/2022    RBC 3.14 (L) 11/29/2022    HGB 10.8 (L) 11/29/2022    HCT 33.8 (L) 11/29/2022    .6 (H) 11/29/2022    MCH 34.4 (H) 11/29/2022    MCHC 32.0 (L) 11/29/2022    RDW 14.0 11/29/2022     11/29/2022    MPV 9.2 11/29/2022         Latest Reference Range & Units 08/10/22 13:49   Kappa Qnt Free Light Chains 3.30 - 19.40 mg/L 491.97 (H)   Lambda Qnt Free Light Chains 5.71 - 26.30 mg/L 4.76 (L)   [place]/Lambda Free Light Chain Ratio 0.26 - 1.65  103.36 (H)   M SPIKE gm/dl 2.24      Latest Reference Range & Units 08/10/22 13:49   Immunoglobulin A 68 - 408 mg/dL 10 (L)   Immunoglobulin G 768 - 1632 mg/dL 4882 (H)   Immunoglobulin M 35 - 263 mg/dL 12 (L)   (L): Data is abnormally low  (H): Data is abnormally high    M-spike in the beta/gamma region.  The monoclonal protein peak accounts for 3.39 g/dL of the total protein in the beta and gamma regions.  This  quantitation may include complement components.  BETTY gel pattern shows an IgG type kappa monoclonal protein.    SPEP:  Total protein and globulin are both increased.  A/G ratio is low.     Serum protein electrophoresis shows a decreased albumin fraction, decreased beta globulin fraction, and increased gamma globulin fraction.   A monoclonal protein (M-spike) is present in the gamma region (2.24 g/dL).     BETTY:  Immunofixation shows an IgG monoclonal protein with kappa light chain specificity.       CMP:  Sodium Level   Date Value Ref Range Status   11/29/2022 136 136 - 145 mmol/L Final     Potassium Level   Date Value Ref Range Status   11/29/2022 4.6 3.5 - 5.1 mmol/L Final     Carbon Dioxide   Date Value Ref Range Status   11/29/2022 23 23 - 31 mmol/L Final     Blood Urea Nitrogen   Date Value Ref Range Status   11/29/2022 20.2 8.4 - 25.7 mg/dL Final     Creatinine   Date Value Ref Range Status   11/29/2022 1.22 (H) 0.73 - 1.18 mg/dL Final     Calcium Level Total   Date Value Ref Range Status   11/29/2022 9.0 8.8 - 10.0 mg/dL Final     Albumin Level   Date Value Ref Range Status   11/29/2022 3.3 (L) 3.4 - 4.8 gm/dL Final     Bilirubin Total   Date Value Ref Range Status   11/29/2022 0.7 <=1.5 mg/dL Final     Alkaline Phosphatase   Date Value Ref Range Status   11/29/2022 51 40 - 150 unit/L Final     Aspartate Aminotransferase   Date Value Ref Range Status   11/29/2022 23 5 - 34 unit/L Final     Alanine Aminotransferase   Date Value Ref Range Status   11/29/2022 40 0 - 55 unit/L Final     Estimated GFR-Non    Date Value Ref Range Status   06/22/2022 53 mls/min/1.73/m2 Final       Latest Reference Range & Units 08/10/22 13:49   MACEY Negative  Negative   ds DNA Ab Negative  Negative   Anti-SSA Interpretation Negative  Negative   Anti-SSB Interpretation Negative  Negative      Latest Reference Range & Units 11/10/21 10:27 08/10/22 13:49   Centromere Protein Antibody Negative   Negative   HARRIS-1 Antibody  Negative   Negative   Scleroderma (Scl-70S) Antibody Negative   Negative   Rheumatoid Factor <<=30 IU/mL <13    RNP70 Antibody Negative   Negative   Hand DP IgG   Negative   U1RNP Antibody Negative   Negative          Assessment:       1. Multiple myeloma not having achieved remission        Again I have a long discussion with the patient and his wife about of multiple myeloma.  Discussed with the patient that Multiple myeloma is a type of bone marrow cancer that forms in a type of white blood cell called a plasma cell. Plasma cells help you fight infections by making antibodies that recognize and attack germs but when they transformed into cancer cells then they are not efficient and don't work well. This cells can affect not only the bone marrow but the bones in general causing distinctive bone lesions called Lytic lesions. It also can affect the kidney. Discussed prognosis and treatment recommendations and indications.  Discussed in detail blood work, PET-CT and bone marrow biopsy.  He does have advance myeloma, stage III, therefore treatment is indicated at this time.  Discuss treatment option, risks versus benefit of was at toxicities associated with it.  He is waiting to start treatment.        Plan:       Patient was seen at [facility]. He will start treatment here with Velcade/Revlimid/Dexamethamasone. He said that he is a candidate for transplant and they will eval bone marrow again after 4 cycles.   He needs patient education.   RTC in 1 week with repeat labs with plans to start the following day.     Encourage to call or message us for any questions or problems  The patient was given ample opportunity to ask questions, and to the best of my abilities, all questions answered to satisfaction; patient demonstrated understanding of what we discussed and agreeable to the plan.       HERMINIO Alanis

## 2022-12-05 ENCOUNTER — TELEPHONE (OUTPATIENT)
Dept: HEMATOLOGY/ONCOLOGY | Facility: CLINIC | Age: 72
End: 2022-12-05
Payer: MEDICARE

## 2022-12-06 LAB
ANTINUCLEAR ANTIBODY SCREEN (OHS): NEGATIVE
DSDNA ANTIBODY (OHS): NEGATIVE

## 2022-12-06 NOTE — PATIENT INSTRUCTIONS
Instructions for Revlimid/Velcade/Dexamethasone Regimen  Take Revlimid with or without food around the same time every day. Do not take at the same time as any other medications. Space medicine one hour before or after Revlimid. Do not crush, cut or chew Revlimid. Swallow whole with water.  If at any time you stop taking Revlimid or you complete treatment and you have pills left over, return to the [facility] for proper disposal. You can not throw in the garbage, flush down the toilet or down the sink. It is considered a hazardous material and must be disposed of properly.  Day 1 of treatment: Dexamethasone (Decadron) 4mg 10 tablets in AM with breakfast  Take Revlimid 1 capsule with glass of water in AM with or without food. Not with other drugs.     Come for Velcade injection  Day 2 -7of treatment: Take Revlimid 1 capsule with glass of water in AM with or without food. Not with other drugs.  Day 8 of treatment:  Dexamethasone (Decadron) 10 tablets in AM with breakfast  Take Revlimid 1 capsule with glass of water in AM with or without food. Not with other drugs.  Come for Velcade injection  Day 9-14 of treatment: Take Revlimid 1 capsule with glass of water in AM with or without food. Not with other drugs.  Day 15 of treatment:  Dexamethasone (Decadron) 10 tablets in AM with breakfast  Come for Velcade injection  Day 16-21 of treatment: NO REVLIMID  Continue Every day throughout treatment:  Aspirin 81mg daily   Bactrim DS tab 1 Daily Xgzpry-Xqgjraorb-Vtcodw  Acyclovir 400mg tab 1 twice a day  Injection site pain: Tylenol tabs 2 one hour before injection and every 4 hours as needed for pain.  Redness, swelling at injection site: Do Not rub or apply heat or cold to the area. It can affect absorption of the Velcade.  Itching at injection site: May take Benadryl by mouth or gently apply Benadryl cream to injection site or hydrocortisone cream 1%.  Nausea: Ondansetron (Zofran) 4mg every 8 hours as needed for  nausea  Nerve Health (Numbness and tingling prevention):  L Glutamine powder 10 Grams twice a day  Vitamin B 6 (Pyridoxine) at least 250mg daily  Vitamin B Complex 1 daily  Diarrhea (loose bowels): Imodium AD as needed  Constipation: Stool softener (Dulcolax, Senokot S, Colace, Surfak, Miralax) may take generic of any.  Indigestion/Heartburn: Over the counter Pepcid, Prilosec, Rolaids, Tums, Mylanta   Mouth rinse 3 to 4 times a day. Swish and spit every day throughout treatment  1 cup warm water  ½ tsp salt & ½ tsp baking soda  And/or use Biotene toothpaste and/or mouthwash or any alcohol free mouthwash    Drink 4 to 6 pints of liquids a day (non caffeine, non alcohol counts as a liquid) a day throughout treatment.  You can have caffeine.  What safety precautions must I take while on chemotherapy?  Chemotherapy medicines are very strong. They will be in all of your bodily fluids, including your urine, stool, saliva, sweat, tears, vaginal secretions, and semen. You must carefully follow some safety precautions to prevent harm to others while you are using these medicines. Here are some recommended precautions:     Make sure that people who help care for you wear disposable gloves if they are going to come into contact with any of your bodily fluids for 48 hours. Women who are pregnant or breastfeeding should not handle any of your bodily fluids.     Wash any clothes, towels, and linens that may have your bodily fluids on them twice in a washing machine using very hot water.     Dispose of adult diapers, tampons, and sanitary napkins by first sealing them in a plastic bag.     Use a condom when having sex for at least 2 weeks after receiving your chemotherapy.     Do not share beverages or food.     If you are issued a hazardous waste container, make sure you understand the directions for using it.     Wash your hands thoroughly with warm water and soap after using the bathroom. Dry your hands with disposable paper  towels.    When using the toilet for 48 hours after chemotherapy:     Close the lid of the toilet prior to flushing. This helps to avoid splashing.     Both men and women should sit to use the toilet. This helps avoid splashing.      Flush it twice after each use, including after vomiting.          FDA Approved Medication Guide  Patient Treatment Information  Lenalidomide + Bortezomib + Dexamethasone  (RVD)  Your chemotherapy treatment is called RVD. It is commonly used to treat multiple myeloma, and may also be  used to treat other diseases. This treatment is made up of three drugs:   lenalidomide (le-na-LID-oh-mide) or RevlimidÂ® (REV-li-mid)   bortezomib (lex-ITQ-tu-mib) or VelcadeÂ® (ANGELA-smooth)   dexamethasone (dex-a-METH-a-sone)  Bortezomib is a new type of drug called a proteasome inhibitor, lenalidomide is a type of drug called an  immunomodulator and dexamethasone is a corticosteroid. Using these three drugs together slows or stops the  growth of cancer (myeloma) cells and eventually causes them to shrink and die.  What Do I Need to Know Before Starting Treatment?  Be sure to tell your healthcare provider about any prescription or over-the-counter products you are taking,  including dietary supplements, vitamins, herbal medicines and homeopathic remedies.  Use an effective birth control method while you are taking this treatment. Lenalidomide may cause significant  harm to a fetus (see What Do I Need to Know Before Starting RVD?). You must be registered in the  [employer] to take lenalidomide. This handout is not a replacement for the requirements of the  [employer] [facility].  Avoid breastfeeding during treatment. Dexamethasone passes into breast milk and can affect your childs  growth. It is not known if bortezomib or lenalidomide pass into breast milk.  Some chemotherapy drugs can cause sterility. Talk with your healthcare provider about your options if you want  to have children in the future.  Do  not get any immunizations or vaccinations while you are being treated without the approval of your  healthcare provider.  Chemotherapy can sometimes cause changes in the amount of electrolytes in your body, such as sodium,  potassium and magnesium. Your healthcare provider will check your blood for these changes and will treat any  problems that are found.  What Do I Need to Know Before Starting RVD?  Lenalidomide may cause severe harm to a fetus, including life-threatening birth defects. Even one dose during  pregnancy can cause severe birth defects.  If you are a woman, you should not have sexual intercourse OR you should use at least two forms of effective  birth control for at least one month before beginning lenalidomide, during treatment and for one month after  stopping treatment. In addition, if you could become pregnant (you have not had a hysterectomy and have had a  period in the last 24 months) you should have a pregnancy test 10 to 14 days before starting lenalidomide, 24  hours before starting lenalidomide and regularly during treatment to make sure you are not pregnant. If you miss  a period, have abnormal menstrual bleeding or become pregnant while taking lenalidomide, stop taking the  medicine and contact your healthcare provider right away.  If you are a man, use a latex condom for any sexual contact during treatment and for four weeks after treatment  ends (even if you have had a vasectomy). If your partner misses a period, has abnormal menstrual bleeding or  becomes pregnant while you are taking lenalidomide, contact your healthcare provider right away.  [facility]  Phone Number: [phone]  Lenalidomide + Bortezomib + Dexamethasone (RVD) 2  Lenalidomide can cause severe bleeding and can increase your risk of infection (see What Are the Possible  Side Effects?). These side effects are more common when lenalidomide is used with a drug called  dexamethasone. Tell your healthcare  provider if you have abdominal pain or fullness, sudden loss of vision, red  bumps or bruises on your skin, or a fever or other symptoms of infection. Also tell your healthcare provider if you  have an illness that puts you at high risk for infections, such as other cancers, HIV or diabetes that is not well  controlled. Your healthcare provider will check your platelets and white blood cells during treatment and decide if  your dose needs to be changed or if your treatment needs to be stopped.  Tell your healthcare provider if you have kidney disease or decreased kidney function. Your risk of side effects  from lenalidomide is greater if you have poor kidney function. Your healthcare provider will check your kidneys  during treatment and decide if your dose needs to be changed or if your treatment needs to be stopped.  Lenalidomide can cause blood clots in the veins and, in rare cases, in the lungs. Blood clots are more common  when lenalidomide is used with dexamethasone. Tell your healthcare provider right away if you have chest pain,  shortness of breath, or swelling in the arm or leg. Your healthcare provider may give you a medicine to help  reduce the risk of blood clots.  You should not donate blood while you are taking lenalidomide and for four weeks after your treatment ends.  If you are a male, you should not donate sperm while taking lenalidomide and for four weeks after your treatment  ends.  Lenalidomide capsules contain lactose. Tell your healthcare provider if you are lactose intolerant. Your  healthcare provider may prescribe medicine to help prevent the symptoms of lactose intolerance.  Bortezomib can cause some of the nerve cells in your hands and feet to stop working properly. This condition is  called neuropathy (see What Are the Possible Side Effects?). Your risk of developing severe neuropathy is  higher if you already had this condition before starting treatment. Neuropathy usually improves  slowly a few  months after treatment is done. Tell your healthcare provider if you have numbness, tingling, pain or a burning  sensation in your hands or feet. This side effect is usually less severe if you get bortezomib injected just under  the skin instead of into a vein.  Bortezomib and dexamethasone can cause blood pressure changes. Tell your healthcare provider if you have a  history of fainting or if you have ever had high or low blood pressure. Also tell your healthcare provider if you are  taking medications to lower your blood pressure. Your risk of low blood pressure is higher if you are dehydrated.  Drink plenty of non-caffeinated beverages during your treatment, and tell your healthcare provider if you feel  lightheaded, dizzy or faint.  Bortezomib can cause heart problems. This side effect is more common in patients with a history of heart  disease, but in rare cases it can occur in patients with no history of heart disease. Tell your healthcare provider if  you have a history of heart disease, or if you have shortness of breath, a cough, swelling of the hands or feet, or  an irregular heartbeat during treatment.  In rare cases, bortezomib can cause lung problems, including lung failure. Tell your healthcare provider if you  have shortness of breath, a persistent cough or difficulty breathing.  In rare cases, bortezomib can cause a side effect called reversible posterior leukoencephalopathy syndrome  (RPLS). RPLS is a disorder of the nervous system, and can cause seizures, high blood pressure, headaches,  tiredness, confusion or changes in eyesight. Tell your healthcare provider if you have any of these symptoms. If  you develop RPLS, you will need to stop treatment.  This treatment can cause a side effect called tumor lysis syndrome (TLS). TLS occurs when too many cancer  cells die at once. This can damage your kidneys and other organs in your body. Your healthcare provider may  give you a medicine to  help prevent this side effect. Be sure to drink plenty of water or other non-caffeinated  beverages during the first few weeks of your treatment to help protect your kidneys. Tell your healthcare provider  right away if you have decreased urination or are not able to urinate while you are being treated.  Tell your healthcare provider if you have a history of liver disease or decreased liver function. Your risk of side  effects from bortezomib is greater if you have poor liver function. In rare cases, bortezomib can cause liver  problems, including liver failure. Lenalidomide can also cause temporary changes in your liver tests. This side  effect most often occurs in patients who are using many medicines during treatment. Your healthcare provider  will check your liver during treatment and decide if your dose needs to be changed or stopped.  Lenalidomide + Bortezomib + Dexamethasone (RVD) 3  In very rare cases, the drugs in this treatment can cause an allergic reaction. Tell your healthcare provider  immediately if you have itching, trouble breathing, dizziness, fainting, or swelling of the lips, throat or tongue.  Bortezomib commonly causes a fever the first day of treatment. This fever often goes away in 24 hours but can  sometimes be a sign of infection. Call your healthcare provider if you have a fever of 100.5Âº F or higher.  Bortezomib and dexamethasone may cause your blood sugar levels to rise, especially if you have diabetes. You  may have regular blood or urine tests during treatment to check your blood sugar. Tell your healthcare provider if  you are very thirsty, if you are urinating more than usual or if you have diabetes. Your healthcare provider will tell  you if you need to check your blood sugar more closely during treatment.  Dexamethasone can reduce the stomach's protective layer, making it more prone to irritation by stomach acid. In  rare cases, this can lead to bleeding ulcers or perforation (a  hole in the stomach). Tell your healthcare provider if  you have indigestion or other stomach problems. Your healthcare provider may give you medicine to help relieve  these symptoms.  Dexamethasone may increase your risk of infection and make the signs of infection, such as fever, difficult to  detect. Tell your healthcare provider immediately if you have any signs of infection (see What Are the Possible  Side Effects?). If you have ocular herpes simplex, using dexamethasone may increase your risk of corneal  perforation (a hole in your cornea). Tell your healthcare provider if you have this infection.  Tell your healthcare provider if you are being treated for a mental health condition. Using dexamethasone or  bortezomib can make mental health problems worse. Dexamethasone can cause mood swings, personality  changes, insomnia, feelings of euphoria and, in rare cases, severe depression. Bortezomib can cause you to  feel anxious, agitated or confused. Tell your healthcare provider immediately if you feel depressed or if you are  having thoughts of suicide.  In rare cases, the heart and lung problems, allergic reactions, blood clots, TLS, skin conditions and secondary  cancers caused by this treatment can be severe and life threatening.  Your treatment may interact with other substances, including:   Phenytoin (DilantinÂ®), fosphenytoin (CerebyxÂ®), phenobarbital (LuminalÂ®) or carbamazepine (TegretolÂ®)   Rifabutin (MycobutinÂ®), rifampin (RifadinÂ®) or isoniazid   Clarithromycin (BiaxinÂ®), erythromycin (E-mycinÂ®) or telithromycin (KetekÂ®)   Amiodarone (CordaroneÂ® or NexteroneÂ®)   Verapamil (Covera-HSÂ® or VerelanÂ®) or diltiazem (CardizemÂ® or TiazacÂ®)   Ketoconazole (NizoralÂ®), fluconazole (DiflucanÂ®), itraconazole (SporanoxÂ®), voriconazole (VfendÂ®) or  posaconazole (NoxafilÂ®)   Drugs used to treat HIV, such as amprenavir (AgeneraseÂ®), atazanavir (ReyatazÂ®), boceprevir (VictrelisÂ®),  darunavir  (PrezistaÂ®), etravirine (IntelenceÂ®), indinavir (CrixivanÂ®), nelfinavir (ViraceptÂ®), ritonavir (NorvirÂ®),  saquinavir (InviraseÂ®), delavirdine (RescriptorÂ®), fosamprenavir (LexivaÂ®), efavirenz (SustivaÂ®) or  nevirapine (ViramuneÂ®)   Boceprevir (VictrelisÂ®) or telaprevir (Incivek)   Aprepitant or fosaprepitant (EmendÂ®)   Fluvoxamine (LuvoxÂ®)   Non-steroidal anti-inflammatory drugs, such as aspirin, ibuprofen or naproxen   Warfarin (CoumadinÂ®)   Digoxin (LanoxinÂ®)   Furosemide (LasixÂ®) or other diuretics   Moes wort   Grapefruit juice or starfruit   Drugs used to treat a condition called myasthenia gravis, such as neostigmine   Cholestyramine (QuestranÂ®)  Some medicines interfere with the effectiveness of birth control pills (oral contraceptives). If you are taking birth  control pills and one of the following drugs, you will need to use two OTHER effective methods of birth control  while taking lenalidomide:   Drugs used to treat HIV, such as atazanavir (ReyatazÂ®), indinavir (CrixivanÂ®), nelfinavir (ViraceptÂ®),  ritonavir (NorvirÂ®) or saquinavir (InviraseÂ®)   Griseofulvin (Grifulvin VÂ® or Mabel-PEGÂ®)  Lenalidomide + Bortezomib + Dexamethasone (RVD) 4   Modafinil (ProvigilÂ®)   Rifabutin (MycobutinÂ®) or rifampin (RifadinÂ®)   Penicillins   Phenytoin (DilantinÂ®), fosphenytoin (CerebyxÂ®), phenobarbital (LuminalÂ®) or carbamazepine (TegretolÂ®)   Moes wort   Other drugs that can increase the risk of blood clots, including epoetin francesca (Epogen or Procrit) and  medicines that contain estrogen, such as oral contraceptives or Premarin  Please note this list is a summary and does not contain all possible drug interactions. Contact your healthcare  provider if you are taking any medications that can interact with your treatment.  You should not take RVD if:   You are pregnant, at risk of becoming pregnant or are nursing.   You are allergic to bortezomib, lenalidomide, dexamethasone or  any components of these drugs.   Had a severe rash after taking thalidomide (Thalomid).   You have an allergy to boron or mannitol.   You have a serious fungal infection.  How Is the Treatment Given?  Your healthcare provider will determine how often you take RVD and your total number of treatments. You will  get bortezomib by injection into a vein (IV) or injection just under the skin (SC). The dose you receive will be  based on your weight and height.  You will take lenalidomide by mouth once a day. Try to take lenalidomide at around the same time every day.  You can take lenalidomide with or without food. If you miss a dose and remember the same day, take it as soon  as you remember. If you miss your dose for a whole day, skip it and continue with the dose for the next day. Do  not take two doses at the same time. It is important to take lenalidomide exactly as prescribed. Do not stop the  medicine or change the dose without talking with your healthcare provider. Swallow lenalidomide capsules whole  with water. Do not break, chew or open your capsules.  You will take dexamethasone by mouth. Your healthcare provider will tell you how often to take it. It is important  to take dexamethasone exactly as prescribed. To lessen the chance of stomach irritation, take dexamethasone  with food and dont drink alcohol. Take this medicine on a regular schedule, and at the same time each day. If  you miss a dose, take it as soon as you remember, then contact your healthcare provider for advice on when to  take the next dose.  Your healthcare provider may give you medicines to help prevent and control nausea and vomiting before you  receive your treatment. You may take these medicines either by mouth or by injection into a vein.  You may take a medicine to prevent blood clots during your treatment. Your healthcare provider will decide if you  are at an increased risk for blood clots and what type of medicine to  prescribe.  Store dexamethasone and lenalidomide at room temperature away from children and pets. If you take too much  of either medicine, contact your healthcare provider, local poison control center or emergency room right away.  Do not share your medication with others. Sharing medication with anyone else could be harmful.  When Should I Call My Healthcare Provider?  Call your healthcare provider right away if you have any of the following symptoms:   Shaking chills or a fever of 100.5Âº F or higher   Unusual bleeding, easy bruising or pinpoint red spots on your skin   Vomiting that is severe or bloody, or vomiting that lasts several hours   Painful or frequent urination, blood in your urine, decreased urination or not being able to urinate   Diarrhea that causes an additional four bowel movements a day, diarrhea that lasts more than one day,  diarrhea at night, or diarrhea with fever, cramps or bloody stools   Constipation that lasts more than two to three days or constipation with abdominal pain   Irregular or rapid heartbeat, chest pain, chest tightness, shortness of breath or persistent cough   Severe fluid retention that causes sudden weight gain or swelling in the abdomen, hands or feet   Dizziness, feeling lightheaded, seizures, headaches, confusion or blurred vision  Lenalidomide + Bortezomib + Dexamethasone (RVD) 5   Inability to eat or weight loss   Pain in the arms or legs, sudden shortness of breath, or paralysis or drooping on one side of the body or face   Pain or blisters in your mouth, or on your stomach or genitals   Swelling around the eyes   Rash with itching, trouble breathing, or swelling of the lips, throat, or tongue   Thoughts of suicide  What Are the Possible Side Effects?  All drugs can cause side effects, but every person reacts differently to each drug. The following chart lists the  possible side effects that can occur with your treatment, how to recognize and minimize symptoms, and  "possible  treatments. Side effects listed as Common usually occur in more than 25 percent of patients; those listed as  Less Common usually occur in about 5 to 25 percent of patients; and those that are "Rare" usually occur in  fewer than 5 percent of patients. How often side effects occur, or how severe they are may depend on whether  the bortezomib is injected into a vein (IV) or just under the skin (SC). Any differences are noted in the chart.  Side Effect How to Minimize Side Effect Possible Treatments  Neuropathy (Common, but more so if  given IV than if given SC. Symptoms  are generally mild to moderate but can  be severe.)   Numbness or tingling feeling in the  hands or feet   Muscle cramps   Loss of balance   Difficulty buttoning buttons or picking  up objects   Decreased awareness of heat or cold  in fingertips and toes   Difficulty hearing   Try to avoid the cold or extreme heat.   Wear mittens or gloves, and socks and  scarves.   If your fingers are numb, be careful with sharp  objects.   Beware of hot coffee mugs, pots and pans and  dishwater--you may not feel the heat until you  are burned.   If you feel unsteady, be careful on stairs and in  the shower.   Your healthcare provider may  decrease your dose or delay further  treatment.  Constipation (Common. Symptoms  are generally mild to moderate.)   No bowel movement for one to two  days   Small, hard, dry stools   Bloating, gas, cramps and pain   Drink plenty of fluids to help loosen your  bowels.   Drink warm or hot liquids if you do not have  mouth sores.   Your healthcare provider may suggest eating  foods that are high in fiber, such as bran,  vegetables, whole wheat breads and fruit.   Have prunes or prune juice, which act like  laxatives.   Try exercising to help loosen bowels.   Your healthcare provider may  recommend a stool softener.  Nausea/Vomiting (Common.  Symptoms are generally mild to  moderate.)   Feeling queasy or sick to " your  stomach   Eat small, frequent meals and bland foods,  such as bananas, rice, applesauce and toast.   Eat food cold or at room temperature so the  smell of food will not bother you.   Avoid fried, spicy or fatty foods.   Eat and drink slowly.   Drink plenty of liquids during the day, but to  avoid bloating, drink small amounts of liquid  during meals.   Your healthcare provider will give  you medicine to help reduce nausea  and vomiting.  Diarrhea (Common. Symptoms are  generally mild to moderate.)   Loose or watery stools several times a  day   Abdominal cramping, gas and bloating   Eat small, frequent meals and bland foods,  such as bananas, rice, applesauce and toast.   Avoid caffeine; alcohol; raw fruits and  vegetables; raw eggs; undercooked meats;  spicy, fatty and greasy foods; milk and dairy  products; foods that cause gas, such as beans  and other legumes; high fiber and high-fat  foods; foods left un-refrigerated for more than  two hours (one hour for egg dishes and cream  or mayonnaise-based foods); bulk laxatives;  and stool softeners.   Drink eight to ten glasses of clear  liquids every day.   Your healthcare provider may  prescribe medicine to help treat  diarrhea.  Lenalidomide + Bortezomib + Dexamethasone (RVD) 6  Side Effect How to Minimize Side Effect Possible Treatments  Fluid Retention (Common. Symptoms  are generally mild to moderate.)   Swelling around the eyes, lower legs,  ankles, feet or abdominal area   Slight weight gain   Check your weight regularly.   Try to avoid eating salty foods, as this can  cause fluid retention.   Your healthcare provider may give  you a diuretic (water pill) to reduce  the amount of fluid in your body.   Your healthcare provider may  decrease your dose or delay further  treatment.  Rash (Common. Symptoms are  generally mild to moderate but can be  severe.)   Usually mild and short-lived   Generally appears on the arms and  trunk (occasionally on the face)    May be itchy   May appear as a flat, discolored area  on the skin or as a small raised bump   Avoid prolonged exposure to heat.   Use creams or moisturizers regularly. Try  wearing cotton gloves on your hands.   Avoid using perfume, cologne or aftershave  since these products can be irritating to the  skin.   Your healthcare provider may  prescribe creams (mild steroids,  antihistamines or antibiotics) to help  treat the rash.   The rash may improve on its own  without any treatment  Risk of Infection (Less Common)   Fever and chills   Painful urination   Sore throat   Cough, shortness of breath or difficulty  breathing   Nasal congestion or runny nose   Swelling or redness of the skin at the  site of a wound   Wash your hands often.   Brush and floss your teeth daily.   Clean cuts right away with warm water, soap  and antiseptic.   When your white blood cell count is low, stay  away from crowds and people with colds or  other illnesses.   Your healthcare provider may give  you an antibiotic to treat or prevent  infection or a medicine to increase  your white blood cell count.   Your healthcare provider may  decrease your dose or delay further  treatment.  Risk of Bleeding (Less Common)   Unusual bleeding or easy bruising   Black or tar-like stools   Blood in your urine   Pinpoint red spots on your skin   Bleeding gums or nosebleeds   Avoid aspirin and aspirin-like drugs, such as  ibuprofen.   Use caution with sharp objects, such as razors  and nail cutters.   Avoid activities that can cause cuts, bumps and  bruises.   Your healthcare provider may give  you medicine to increase your  platelet count.   Your healthcare provider may  decrease your dose or delay further  treatment.  Anemia (Rare)   Fatigue or weakness   Dizziness   Pale skin   Feeling out of breath   Feeling cold   Plan rest periods throughout the day.   Organize daily activities so that you conserve  your energy.   Try to eat a well balanced diet and  drink plenty  of fluids.   Stand up slowly to avoid getting dizzy.   Your healthcare provider may give  you a medicine to increase your red  blood cell count.   Your healthcare provider may  decrease your dose or delay further  treatment.  The following side effect is known to occur with this treatment but was not reported in the clinical trials.  Anorexia or Appetite Loss   Not having an appetite   Feeling too nauseated to eat   Metallic or medicinal taste   Change in taste causing dislike for  certain foods   Try eating six to eight small meals or snacks  each day instead of three larger meals.   Vary your diet and try new foods and recipes.   Take a walk before meals, when possible. This  may make you feel hungrier.   Eat with friends or family. When eating alone,  listen to the radio or watch TV.   Cook dinners ahead of time and  freeze them in small portions to  minimize cooking smells.   Let others help with meals, but ask  that they prepare food in small  portions that you can freeze. Dont  hesitate to tell others which foods  you dont like.   Add mild spices to change flavor.   Have meals delivered to you through  a program such as Meals on Wheels.  What Are the Other Possible Side Effects?  The chart below lists additional side effects found with the individual drugs in this treatment. It does not list all  possible side effects. How often side effects occur, or how severe they are may depend on whether the  bortezomib is injected into a vein (IV) or just under the skin (SC). Any differences are noted in the chart. For  more information, talk with your healthcare provider.  Lenalidomide + Bortezomib + Dexamethasone (RVD) 7  Common Side Effects Less Common Side Effects Rare Side Effects   Fatigue or weakness   Headache or dizziness (IV)   Increased appetite   Muscle pain   Pain in the arms, hands, feet or  legs   Muscle cramps   Chest, joint, back or bone pain   Abdominal pain (IV)   Symptoms of a common  cold   Shingles (symptoms include pain, burning and itching,  followed by a red rash or blisters on one part or one  side of the body)   Shortness of breath or pneumonia   Blurred vision, eye infections, glaucoma or cataracts   Changes in taste   Changes in skin color   Dry mouth, dry skin or dehydration   Decreased hearing   Uncontrolled shaking (usually occurs with fever)   Increased sweating   Pain or redness at the injection site (SC)   Pain or redness at the injection site  (IV)   Hiccups   Ruptured colon   Pancreatitis   Stroke   Abdominal pain (SC)   Headache (SC)   Hallucinations   Decreased libido or erectile  dysfunction   Hoarseness  Notes  The information presented herein is prepared by [employer] and/or its affiliates (collectively, [employer]) and is furnished for informational purposes  only and for your personal use only. All rights herein are reserved by [employer] and you may not publish, copy or otherwise use or disseminate the contents hereof  without permission.  Disclaimer. The drug information is presented as a guide for reference purposes only and is not intended as a substitute for specific advice from a physician or  other health care professional. Each patients situation is unique and the information presented herein may not be applicable to your situation. In addition,  [employer] makes no warranty as to the accuracy or completeness of information contained herein. This document was prepared as of the date indicated below.  You should be aware that information regarding medical conditions and their treatment, including the information in this document, changes frequently, and  [employer] undertakes no responsibility to update the information contained in this document. This document is not a substitute for the expertise, skill, knowledge  and judgment of healthcare practitioners. Additional, important information about this product, including black box warnings and precautions, is  available from  the  and other sources. THIS DRUG INFORMATION DOES NOT OFFER OR PROVIDE, AND IS NOT A REPLACEMENT FOR, MEDICAL EVALUATION,  ADVICE, DIAGNOSIS, OR TREATMENT. ADDITIONALLY, NO PART OF THIS DOCUMENT SHALL BE DEEMED TO CREATE A PHYSICIAN-PATIENT RELATIONSHIP OR  SIMILAR RELATIONSHIP BETWEEN [employer] (OR ANY [employer] AFFILIATED PARTY) AND YOU. YOU SHOULD CONSULT WITH AN APPROPRIATE  PROFESSIONAL FOR SPECIFIC EVALUATION, ADVICE, DIAGNOSIS, AND TREATMENT (AS APPLICABLE) TAILORED TO YOUR SITUATION.[employer] PROVIDES  THIS DRUG INFORMATION ON AN AS IS BASIS. ALL WARRANTIES OF ANY KIND, EXPRESS OR IMPLIED, INCLUDING BUT NOT LIMITED TO THE IMPLIED  WARRANTIES OF MERCHANTABILITY AND FITNESS FOR A PARTICULAR PURPOSE ARE DISCLAIMED.  Limitation of Liability. Neither [employer] nor any of its employees, agents, contractors, directors or affiliates shall be liable for any improper or incorrect use of the  information described and contained herein, nor do any of them assume any responsibility for anyones use of the information. IN NO EVENT SHALL [employer]  BE LIABLE FOR ANY DIRECT, INDIRECT, INCIDENTAL, SPECIAL, EXEMPLARY, OR CONSEQUENTIAL DAMAGES (INCLUDING, BUT NOT LIMITED TO,  PROCUREMENT OR SUBSTITUTE GOODS OR SERVICES; LOSS OF USE, DATA OR PROFITS; OR BUSINESS INTERRUPTION) HOWEVER CAUSED AND ON ANY  THEORY OF LIABILITY, WHETHER IN CONTRACT, STRICT LIABILITY, OR TORT (INCLUDING NEGLIGENCE OR OTHERWISE) ARISING IN ANY WAY OUT OF THE  USE OF THIS INFORMATION, EVEN IF ADVISED OF THE POSSIBILITY OF SUCH DAMAGE.  June 2013  Â© 2013 [employer] All rights reserved.  References:  1. Velcade [package insert]. [place], [place]: [employer]; 2012.  2. Revlimid [package insert]. [place], [place]: [employer]; 2011.  3. Dexamethasone [package insert]. [place], [place]; [employer] 2007.  4. Kwabena HUANG, Erin E, Sandi S, et al. Lenalidomide, bortezomib, and dexamethasone  combination therapy in patients  with newly diagnosed multiple myeloma. Blood. 2010;[id number].

## 2022-12-06 NOTE — PROGRESS NOTES
Subjective:       Patient ID: Valentino Manning is a 72 y.o. male.      Chief Complaint: Chemotherapy Education      Diagnosis:  Multiple Myeloma--IgG, Kappa  Macrocytic anemia     Present treatment:   VRD     Treatment/Oncology history:     Plan of care:  Complete myeloma workup     Imaging:   US abdomen 8/24/2022: Spleen: 10.8 cm. Mild hepatomegaly with suspected mild hepatic steatosis. Cholelithiasis.  PET CT 9/28/2022: 1. Right scapular small focal mild hypermetabolism without lytic or sclerotic abnormality is not convinced to be related to multiple myeloma. 2. No definite skeletal lytic abnormality with hypermetabolism to reflect multiple myeloma on this exam. 3. Bilateral small pleural effusions.   4. Gallstones. 5.  Noninflamed diverticulosis coli. 6.  Prostatomegaly.     Pathology:  BONE MARROW BIOPSY 9/1/2022: PLASMA CELL MYELOMA, KAPPA RESTRICTED. NORMOCELLULAR MARROW (30%) WITH 70% INVOLVEMENT BY PLASMA CELL MYELOMA. BACKGROUND TRILINEAGE HEMATOPOIESIS IS DECREASED.        CLINICAL HISTORY:       Patient: Valentino Manning is a 72 y.o. male with multiple medical problems kindly referred for persistent anemia.  Reviewing electronic medical record patient with anemia since 02/10/2016.  From 2016 to January of 2017 anemia was mild.  Anemia slowly worsening back in 2018 hemoglobin was 8.0.  At that same time kidney function was worsening as well.      Most recent labs with HGB of 9.4, .9, platelets 231 K, WBC 5.0.  Chemistries with globulin of 5.3.  BUN/creatinine 27.7/1.41.     No family history of leukemia, lymphoma or multiple myeloma.         Interval History:  12/1/22  Patient presents to clinic today for f/u with HEATH Trujillo NP. In the interim, he went to [facility] for second opinion.They recommended that he start RVD then repeat bone marrow after 4 cycles.  Continues with shortness of breath with exertion.  He denies any fever, chills, sweats.  No chest pain or shortness of breath at rest.  He denies any  bleeding.     Past Medical History:   Diagnosis Date    Anemia     Heart problem     Hyperlipidemia     Hypertension       Review of patient's allergies indicates:   Allergen Reactions    Penicillins         Current Outpatient Medications:     acyclovir (ZOVIRAX) 400 MG tablet, Take 400 mg by mouth 2 (two) times daily., Disp: , Rfl:     amLODIPine (NORVASC) 10 MG tablet, , Disp: , Rfl:     ascorbic acid, vitamin C, (VITAMIN C) 1000 MG tablet, Take 1,000 mg by mouth once daily., Disp: , Rfl:     atorvastatin (LIPITOR) 40 MG tablet, TAKE 1 TABLET ORALLY ONCE DAILY ON MON/TUE/THUR/FRI AND 1/2 ON WED. NONE ON SAT/SUN, Disp: , Rfl:     b complex vitamins capsule, Take 1 capsule by mouth once daily., Disp: , Rfl:     clopidogreL (PLAVIX) 75 mg tablet, Take 75 mg by mouth once daily., Disp: , Rfl:     doxazosin (CARDURA) 4 MG tablet, Take 4 mg by mouth once daily., Disp: , Rfl:     dutasteride (AVODART) 0.5 mg capsule, Take 0.5 mg by mouth once daily., Disp: , Rfl:     FERRETTS 325 mg (106 mg iron) Tab, Take 1 tablet by mouth 2 (two) times daily., Disp: , Rfl:     glutamine 10 gram PwPk, Take 10 g by mouth 2 (two) times a day., Disp: , Rfl:     isosorbide mononitrate (IMDUR) 30 MG 24 hr tablet, , Disp: , Rfl:     lisinopriL (PRINIVIL,ZESTRIL) 40 MG tablet, Take 40 mg by mouth., Disp: , Rfl:     metoprolol tartrate (LOPRESSOR) 25 MG tablet, Take 25 mg by mouth 2 (two) times daily. Takes 1 tab q am, Disp: , Rfl:     ondansetron (ZOFRAN) 4 MG tablet, Take 4 mg by mouth every 8 (eight) hours as needed for Nausea., Disp: , Rfl:     pyridoxine, vitamin B6, (B-6) 250 MG Tab, Take 250 mg by mouth once daily., Disp: , Rfl:     ranolazine (RANEXA) 500 MG Tb12, Take 500 mg by mouth 2 (two) times daily., Disp: , Rfl:     sulfamethoxazole-trimethoprim 800-160mg (BACTRIM DS) 800-160 mg Tab, Take 1 tablet by mouth As instructed. Tab 1 PO Daily M-W-F, Disp: , Rfl:     zolpidem (AMBIEN) 10 mg Tab, Take 10 mg by mouth once daily., Disp: ,  Rfl:     dexAMETHasone (DECADRON) 4 MG Tab, Take 40 mg by mouth As instructed. 40mg (4mg tabs 10) PO Daily once weekly, Disp: , Rfl:     furosemide (LASIX) 20 MG tablet, Take 20 mg by mouth., Disp: , Rfl:     lenalidomide 25 mg Cap, Take 1 capsule (25 mg total) by mouth As instructed 1 TAB BY MOUTH DAILY X 14 DAYS EVERY 21 DAYS (2 WEEKS ON, 1 WEEK OFF). (Patient not taking: Reported on 12/1/2022.), Disp: 14 capsule, Rfl: 3    LINZESS 145 mcg Cap capsule, Take 145 mcg by mouth., Disp: , Rfl:     pantoprazole (PROTONIX) 40 MG tablet, TAKE 1 TABLET BY MOUTH EVERY MORNING 30 MIN BEFORE BREAKFAST, Disp: , Rfl:     XIFAXAN 550 mg Tab, , Disp: , Rfl:   Review of Systems   Constitutional: Negative.  Negative for appetite change and unexpected weight change.   HENT: Negative.  Negative for mouth sores.    Eyes: Negative.  Negative for visual disturbance.   Respiratory:  Positive for shortness of breath. Negative for cough.         SNIDER   Cardiovascular: Negative.  Negative for chest pain.   Gastrointestinal: Negative.  Negative for abdominal pain and diarrhea.   Endocrine: Negative.    Genitourinary: Negative.  Negative for frequency.   Musculoskeletal: Negative.  Negative for back pain.   Integumentary:  Negative for rash.   Allergic/Immunologic: Negative.    Neurological: Negative.  Negative for headaches.   Hematological: Negative.  Negative for adenopathy.   Psychiatric/Behavioral:  The patient is nervous/anxious.    All other systems reviewed and are negative.           ECOG SCORE    0 - Fully active-able to carry on all pre-disease performance without restriction       No visits with results within 1 Week(s) from this visit.   Latest known visit with results is:   Lab Visit on 11/29/2022   Component Date Value    Sodium Level 11/29/2022 136     Potassium Level 11/29/2022 4.6     Chloride 11/29/2022 106     Carbon Dioxide 11/29/2022 23     Glucose Level 11/29/2022 98     Blood Urea Nitrogen 11/29/2022 20.2     Creatinine  11/29/2022 1.22 (H)     Calcium Level Total 11/29/2022 9.0     Protein Total 11/29/2022 8.8 (H)     Albumin Level 11/29/2022 3.3 (L)     Globulin 11/29/2022 5.5 (H)     Albumin/Globulin Ratio 11/29/2022 0.6 (L)     Bilirubin Total 11/29/2022 0.7     Alkaline Phosphatase 11/29/2022 51     Alanine Aminotransferase 11/29/2022 40     Aspartate Aminotransfera* 11/29/2022 23     eGFR 11/29/2022 >60     WBC 11/29/2022 3.5 (L)     RBC 11/29/2022 3.14 (L)     Hgb 11/29/2022 10.8 (L)     Hct 11/29/2022 33.8 (L)     Platelet 11/29/2022 240     MCV 11/29/2022 107.6 (H)     MCH 11/29/2022 34.4 (H)     MCHC 11/29/2022 32.0 (L)     RDW 11/29/2022 14.0     MPV 11/29/2022 9.2     NRBC% 11/29/2022 0.0     Thyroxine Free 11/29/2022 0.84     Thyroid Stimulating Horm* 11/29/2022 1.1057     Cholesterol Total 11/29/2022 112     HDL Cholesterol 11/29/2022 25 (L)     Triglyceride 11/29/2022 155 (H)     Cholesterol/HDL Ratio 11/29/2022 4     Very Low Density Lipopro* 11/29/2022 31     LDL Cholesterol 11/29/2022 56.00     Prostate Specific Antigen 11/29/2022 7.27 (H)     Hemoglobin A1c 11/29/2022 5.3     Estimated Average Glucose 11/29/2022 105.4     Vit D 25 OH 11/29/2022 18.0 (L)     MACEY Screen 11/29/2022 Negative     DSDNA Antibody 11/29/2022 Negative     Sed Rate 11/29/2022 31 (H)     Uric Acid 11/29/2022 7.3 (H)     Color, UA 11/29/2022 Yellow     Appearance, UA 11/29/2022 Clear     Specific Gravity, UA 11/29/2022 1.020     pH, UA 11/29/2022 5.5     Protein, UA 11/29/2022 1+ (A)     Glucose, UA 11/29/2022 Negative     Ketones, UA 11/29/2022 Negative     Blood, UA 11/29/2022 Negative     Bilirubin, UA 11/29/2022 Negative     Urobilinogen, UA 11/29/2022 0.2     Nitrites, UA 11/29/2022 Negative     Leukocyte Esterase, UA 11/29/2022 Negative     RBC Morph 11/29/2022 Abnormal (A)     Macrocyte 11/29/2022 1+ (A)     Platelet Est 11/29/2022 Normal     RBC, UA 11/29/2022 <5     WBC, UA 11/29/2022 <5     Squamous Epithelial Cell* 11/29/2022 <5      Bacteria, UA 11/29/2022 None Seen     Rheumatoid Factor Quanti* 11/29/2022 <13             Chemotherapy Patient Education  Education  Chemo Medications: RVD (Lenalidomide/Bortezomib/Dexamethasone)  Intoduction to Unit Hours, Services, Routines, After Hours Telephone Number: Verbal Instructions given to patient/family, Written Instructions given to patient/family, Patient/Family verbalizes understanding  Consent Form Signed: Verbal Instructions given to patient/family, Written Instructions given to patient/family, Patient/Family verbalizes understanding (Consent signed on 10/11/2022)  Chemo Teaching Packet: Verbal Instructions given to patient/family, Written Instructions given to patient/family, Patient/Family verbalizes understanding  Dental Care During Chemo: Verbal Instructions given to patient/family, Written Instructions given to patient/family, Patient/Family verbalizes understanding  Bowel Prophylaxis/Protocol: Written Instructions given to patient/family, Patient/Family verbalizes understanding    Instructions in expected side effects  Nausea/Vomiting : Verbal Instructions given to patient/family, Written Instructions given to patient/family, Patient/Family verbalizes understanding  Myelosuppression: Verbal Instructions given to patient/family, Written Instructions given to patient/family, Patient/Family verbalizes understanding  Fatigue: Verbal Instructions given to patient/family, Written Instructions given to patient/family, Patient/Family verbalizes understanding  Stomatitis: Verbal Instructions given to patient/family, Written Instructions given to patient/family, Patient/Family verbalizes understanding  Diarrhea: Verbal Instructions given to patient/family, Written Instructions given to patient/family, Patient/Family verbalizes understanding  Gastritis: Verbal Instructions given to patient/family, Written Instructions given to patient/family, Patient/Family verbalizes understanding  Instructed to  report increase of 2-3 loose stools/day over pretreatment: Verbal Instructions given to patient/family, Written Instructions given to patient/family, Patient/Family verbalizes understanding    S/S Infection   Report Temperature of 100.4 or >: Verbal Instructions given to patient/family, Written Instructions given to patient/family, Patient/Family verbalizes understanding    Skin Care  Rash, Hyperkeratosis: Verbal Instructions given to patient/family, Written Instructions given to patient/family, Patient/Family verbalizes understanding    Nutritional Screening   Nutritional Screening for Dietary Consult:  (Previously seen by Dietician)    Basic Nutritional Instructions  Vitamin Support: Verbal Instructions given to patient/family, Written Instructions given to patient/family, Patient/Family verbalizes understanding    Hydration Instructions  Renal Toxicity: Verbal Instructions given to patient/family, Written Instructions given to patient/family, Patient/Family verbalizes understanding  Drink 2 Liters of fluids daily: Verbal Instructions given to patient/family, Written Instructions given to patient/family, Patient/Family verbalizes understanding         Assessment:       Problem List Items Addressed This Visit          Cardiac/Vascular    Hypertension       Oncology    Multiple myeloma - Primary    Anemia          Plan:         Wife, Lexis, present    I had previously met with patient alone on 10/11/2022. Since that time, he went for a second opinion at [facility]. [facility] agreed with Dr. Harris's treatment plan and will receive 4 cycles of RVD and then return to [facility] to determine if he is bone marrow transplant ready. Patient is ready to start treatment. But patient and his wife had many questions. We discuss disease, bone marrow transplant, treatment regimen and schedule (especially around his other medications), his anxiety about treatment and wishes to keep his diagnosis confidential from certain family and  friends.    RTC 12/8/2022 at 0900 TD with HEATH Trujillo NP. RTC 12/9/2022 at 0900 cycle #1 of RVD.      Treatment Plan Information   OP VRD - WEEKLY BORTEZOMIB LENALIDOMIDE DEXAMETHASONE Q3W   Agnélica Harris MD   Upcoming Treatment Dates - OP VRD - WEEKLY BORTEZOMIB LENALIDOMIDE DEXAMETHASONE Q3W    12/8/2022       Chemotherapy       bortezomib (VELCADE) injection 2.8 mg  12/15/2022       Chemotherapy       bortezomib (VELCADE) injection 1.3 mg/m2  12/22/2022       Chemotherapy       bortezomib (VELCADE) injection 1.3 mg/m2  12/29/2022       Chemotherapy       bortezomib (VELCADE) injection      I spent 20minutes in preparation for education session reviewing clinic note, [facility] records, labs, scans, pathology and treatment plan.             I spent 70 minutes face-to-face with patient

## 2022-12-07 ENCOUNTER — CLINICAL SUPPORT (OUTPATIENT)
Dept: HEMATOLOGY/ONCOLOGY | Facility: CLINIC | Age: 72
End: 2022-12-07
Payer: MEDICARE

## 2022-12-07 ENCOUNTER — OFFICE VISIT (OUTPATIENT)
Dept: HEMATOLOGY/ONCOLOGY | Facility: CLINIC | Age: 72
End: 2022-12-07
Payer: MEDICARE

## 2022-12-07 VITALS
WEIGHT: 206 LBS | RESPIRATION RATE: 18 BRPM | BODY MASS INDEX: 27.94 KG/M2 | HEART RATE: 54 BPM | TEMPERATURE: 98 F | SYSTOLIC BLOOD PRESSURE: 134 MMHG | DIASTOLIC BLOOD PRESSURE: 69 MMHG | OXYGEN SATURATION: 98 %

## 2022-12-07 DIAGNOSIS — D64.9 ANEMIA, UNSPECIFIED TYPE: ICD-10-CM

## 2022-12-07 DIAGNOSIS — I10 HYPERTENSION, UNSPECIFIED TYPE: ICD-10-CM

## 2022-12-07 DIAGNOSIS — C90.00 MULTIPLE MYELOMA NOT HAVING ACHIEVED REMISSION: Primary | ICD-10-CM

## 2022-12-07 PROCEDURE — 99999 PR PBB SHADOW E&M-EST. PATIENT-LVL IV: CPT | Mod: PBBFAC,,, | Performed by: CLINICAL NURSE SPECIALIST

## 2022-12-07 PROCEDURE — 99417 PR PROLONGED SVC, OUTPT, W/WO DIRECT PT CONTACT,  EA ADDTL 15 MIN: ICD-10-PCS | Mod: S$PBB,,, | Performed by: CLINICAL NURSE SPECIALIST

## 2022-12-07 PROCEDURE — 99215 PR OFFICE/OUTPT VISIT, EST, LEVL V, 40-54 MIN: ICD-10-PCS | Mod: S$PBB,,, | Performed by: CLINICAL NURSE SPECIALIST

## 2022-12-07 PROCEDURE — 99417 PROLNG OP E/M EACH 15 MIN: CPT | Mod: S$PBB,,, | Performed by: CLINICAL NURSE SPECIALIST

## 2022-12-07 PROCEDURE — 99999 PR PBB SHADOW E&M-EST. PATIENT-LVL IV: ICD-10-PCS | Mod: PBBFAC,,, | Performed by: CLINICAL NURSE SPECIALIST

## 2022-12-07 PROCEDURE — 99214 OFFICE O/P EST MOD 30 MIN: CPT | Mod: PBBFAC | Performed by: CLINICAL NURSE SPECIALIST

## 2022-12-07 PROCEDURE — 99215 OFFICE O/P EST HI 40 MIN: CPT | Mod: S$PBB,,, | Performed by: CLINICAL NURSE SPECIALIST

## 2022-12-07 NOTE — NURSING
Julia and I met with Valentino and his wife for patient education. We discussed his treatment and our role at [facility]. A lot of discussion took place regarding telling his sister that he is diagnosed. We offered support and encouraged them to reach out if any needs arise. He declined referrals to [place] [facility].

## 2022-12-08 ENCOUNTER — OFFICE VISIT (OUTPATIENT)
Dept: HEMATOLOGY/ONCOLOGY | Facility: CLINIC | Age: 72
End: 2022-12-08
Payer: MEDICARE

## 2022-12-08 VITALS
HEART RATE: 66 BPM | SYSTOLIC BLOOD PRESSURE: 137 MMHG | TEMPERATURE: 98 F | RESPIRATION RATE: 18 BRPM | OXYGEN SATURATION: 98 % | BODY MASS INDEX: 27.87 KG/M2 | WEIGHT: 205.81 LBS | DIASTOLIC BLOOD PRESSURE: 64 MMHG | HEIGHT: 72 IN

## 2022-12-08 DIAGNOSIS — C90.00 MULTIPLE MYELOMA NOT HAVING ACHIEVED REMISSION: Primary | ICD-10-CM

## 2022-12-08 PROCEDURE — 99214 PR OFFICE/OUTPT VISIT, EST, LEVL IV, 30-39 MIN: ICD-10-PCS | Mod: S$PBB,,, | Performed by: NURSE PRACTITIONER

## 2022-12-08 PROCEDURE — 99999 PR PBB SHADOW E&M-EST. PATIENT-LVL V: CPT | Mod: PBBFAC,,, | Performed by: NURSE PRACTITIONER

## 2022-12-08 PROCEDURE — 99214 OFFICE O/P EST MOD 30 MIN: CPT | Mod: S$PBB,,, | Performed by: NURSE PRACTITIONER

## 2022-12-08 PROCEDURE — 99215 OFFICE O/P EST HI 40 MIN: CPT | Mod: PBBFAC | Performed by: NURSE PRACTITIONER

## 2022-12-08 PROCEDURE — 99999 PR PBB SHADOW E&M-EST. PATIENT-LVL V: ICD-10-PCS | Mod: PBBFAC,,, | Performed by: NURSE PRACTITIONER

## 2022-12-08 NOTE — PROGRESS NOTES
HEMATOLOGY/ONCOLOGY OFFICE CLINIC VISIT    Visit Information:    Initial Evaluation: 8/10/2022  Referring Provider:   Other providers:  Code status: Not addressed    Diagnosis:  Multiple Myeloma--IgG, Kappa  Macrocytic anemia    Present treatment:   VRD. To start on 12/9/22    Treatment/Oncology history:    Plan of care:  Complete myeloma workup    Imaging:   US abdomen 8/24/2022: Spleen: 10.8 cm. Mild hepatomegaly with suspected mild hepatic steatosis. Cholelithiasis.  PET CT 9/28/2022: 1. Right scapular small focal mild hypermetabolism without lytic or sclerotic abnormality is not convinced to be related to multiple myeloma. 2. No definite skeletal lytic abnormality with hypermetabolism to reflect multiple myeloma on this exam. 3. Bilateral small pleural effusions.   4. Gallstones. 5.  Noninflamed diverticulosis coli. 6.  Prostatomegaly.    Pathology:  BONE MARROW BIOPSY 9/1/2022: PLASMA CELL MYELOMA, KAPPA RESTRICTED. NORMOCELLULAR MARROW (30%) WITH 70% INVOLVEMENT BY PLASMA CELL MYELOMA. BACKGROUND TRILINEAGE HEMATOPOIESIS IS DECREASED.      CLINICAL HISTORY:       Patient: Valentino Manning is a 72 y.o. male with multiple medical problems kindly referred for persistent anemia.  Reviewing electronic medical record patient with anemia since 02/10/2016.  From 2016 to January of 2017 anemia was mild.  Anemia slowly worsening back in 2018 hemoglobin was 8.0.  At that same time kidney function was worsening as well.     Most recent labs with HGB of 9.4, .9, platelets 231 K, WBC 5.0.  Chemistries with globulin of 5.3.  BUN/creatinine 27.7/1.41.    No family history of leukemia, lymphoma or multiple myeloma.      Chief Complaint: no compliants today      Interval History:  12/1/22  Patient presents to clinic today for f/u. In the interim, has education for RVD. Revlimid was delivered and he will start cycle 1 tomorrow. [facility] recommended that he start RVD then repeat bone marrow after 4 cycles.  Continues with  shortness of breath with exertion.  He denies any fever, chills, sweats.  No chest pain or shortness of breath at rest.  He denies any bleeding.     Past Medical History:   Diagnosis Date    Anemia     Heart problem     Hyperlipidemia     Hypertension       Past Surgical History:   Procedure Laterality Date    BONE MARROW ASPIRATION N/A 9/1/2022    Procedure: ASPIRATION, BONE MARROW;  Surgeon: Robbie Gardner MD;  Location: [facility];  Service: General;  Laterality: N/A;    CARDIAC SURGERY  2021    ENDOSCOPIC RELEASE OF BOTH CARPAL TUNNELS       Family History   Problem Relation Age of Onset    Hypertension Mother     Diabetes Mother     Anemia Mother     Heart disease Father      Social Connections: Not on file       Review of patient's allergies indicates:   Allergen Reactions    Penicillins       Current Outpatient Medications on File Prior to Visit   Medication Sig Dispense Refill    acyclovir (ZOVIRAX) 400 MG tablet Take 400 mg by mouth 2 (two) times daily.      amLODIPine (NORVASC) 10 MG tablet       ascorbic acid, vitamin C, (VITAMIN C) 1000 MG tablet Take 1,000 mg by mouth once daily.      atorvastatin (LIPITOR) 40 MG tablet TAKE 1 TABLET ORALLY ONCE DAILY ON MON/TUE/THUR/FRI AND 1/2 ON WED. NONE ON SAT/SUN      b complex vitamins capsule Take 1 capsule by mouth once daily.      clopidogreL (PLAVIX) 75 mg tablet Take 75 mg by mouth once daily.      doxazosin (CARDURA) 4 MG tablet Take 4 mg by mouth once daily.      dutasteride (AVODART) 0.5 mg capsule Take 0.5 mg by mouth once daily.      FERRETTS 325 mg (106 mg iron) Tab Take 1 tablet by mouth 2 (two) times daily.      glutamine 10 gram PwPk Take 10 g by mouth 2 (two) times a day.      LINZESS 145 mcg Cap capsule Take 145 mcg by mouth.      lisinopriL (PRINIVIL,ZESTRIL) 40 MG tablet Take 40 mg by mouth.      metoprolol tartrate (LOPRESSOR) 25 MG tablet Take 25 mg by mouth 2 (two) times daily. Takes 1 tab q am      ondansetron (ZOFRAN) 4 MG tablet Take 4 mg by  mouth every 8 (eight) hours as needed for Nausea.      pantoprazole (PROTONIX) 40 MG tablet TAKE 1 TABLET BY MOUTH EVERY MORNING 30 MIN BEFORE BREAKFAST      pyridoxine, vitamin B6, (B-6) 250 MG Tab Take 250 mg by mouth once daily.      sulfamethoxazole-trimethoprim 800-160mg (BACTRIM DS) 800-160 mg Tab Take 1 tablet by mouth As instructed. Tab 1 PO Daily M-W-F      zolpidem (AMBIEN) 10 mg Tab Take 10 mg by mouth once daily.      dexAMETHasone (DECADRON) 4 MG Tab Take 40 mg by mouth As instructed. 40mg (4mg tabs 10) PO Daily once weekly      lenalidomide 25 mg Cap Take 1 capsule (25 mg total) by mouth As instructed 1 TAB BY MOUTH DAILY X 14 DAYS EVERY 21 DAYS (2 WEEKS ON, 1 WEEK OFF). (Patient not taking: Reported on 12/8/2022.) 14 capsule 3     No current facility-administered medications on file prior to visit.      Review of Systems   Constitutional:  Positive for fatigue. Negative for activity change, appetite change, chills, diaphoresis, fever and unexpected weight change.   HENT:  Negative for nasal congestion, mouth sores, nosebleeds, postnasal drip, sinus pressure/congestion, sore throat and trouble swallowing.    Eyes:  Negative for visual disturbance.   Respiratory:  Positive for shortness of breath.    Cardiovascular:  Negative for chest pain and palpitations.   Gastrointestinal:  Negative for abdominal distention, abdominal pain, blood in stool, change in bowel habit, constipation, diarrhea, nausea, vomiting and change in bowel habit.   Endocrine: Negative.    Genitourinary:  Negative for bladder incontinence, decreased urine volume, difficulty urinating, dysuria, frequency, hematuria, scrotal swelling, testicular pain and urgency.   Musculoskeletal:  Negative for arthralgias, back pain, gait problem, joint swelling, leg pain, myalgias and neck pain.   Integumentary:  Negative for rash.   Neurological:  Negative for dizziness, tremors, seizures, syncope, speech difficulty, weakness, light-headedness,  numbness, headaches and memory loss.   Hematological:  Does not bruise/bleed easily.   Psychiatric/Behavioral:  Negative for agitation, confusion, hallucinations, sleep disturbance and suicidal ideas. The patient is not nervous/anxious.             Vitals:    12/08/22 0852   BP: 137/64   BP Location: Left arm   Patient Position: Sitting   BP Method: Large (Automatic)   Pulse: 66   Resp: 18   Temp: 97.9 °F (36.6 °C)   TempSrc: Oral   SpO2: 98%   Weight: 93.4 kg (205 lb 12.8 oz)   Height: 6' (1.829 m)          Physical Exam  Constitutional:       Appearance: He is obese.   HENT:      Head: Normocephalic and atraumatic.   Eyes:      Extraocular Movements: Extraocular movements intact.   Pulmonary:      Effort: Pulmonary effort is normal.   Musculoskeletal:      Cervical back: Neck supple.   Neurological:      Mental Status: He is alert and oriented to person, place, and time.   Psychiatric:         Mood and Affect: Mood normal.         Behavior: Behavior normal.         Judgment: Judgment normal.     ECOG SCORE             Laboratory:  CBC with Differential:  Lab Results   Component Value Date    WBC 3.6 (L) 12/08/2022    RBC 3.25 (L) 12/08/2022    HGB 11.1 (L) 12/08/2022    HCT 34.3 (L) 12/08/2022    .5 (H) 12/08/2022    MCH 34.2 (H) 12/08/2022    MCHC 32.4 (L) 12/08/2022    RDW 13.3 12/08/2022     12/08/2022    MPV 8.4 12/08/2022         Latest Reference Range & Units 08/10/22 13:49   Kappa Qnt Free Light Chains 3.30 - 19.40 mg/L 491.97 (H)   Lambda Qnt Free Light Chains 5.71 - 26.30 mg/L 4.76 (L)   [place]/Lambda Free Light Chain Ratio 0.26 - 1.65  103.36 (H)   M SPIKE gm/dl 2.24      Latest Reference Range & Units 08/10/22 13:49   Immunoglobulin A 68 - 408 mg/dL 10 (L)   Immunoglobulin G 768 - 1632 mg/dL 4882 (H)   Immunoglobulin M 35 - 263 mg/dL 12 (L)   (L): Data is abnormally low  (H): Data is abnormally high    M-spike in the beta/gamma region.  The monoclonal protein peak accounts for 3.39 g/dL of  the total protein in the beta and gamma regions.  This quantitation may include complement components.  BETTY gel pattern shows an IgG type kappa monoclonal protein.    SPEP:  Total protein and globulin are both increased.  A/G ratio is low.     Serum protein electrophoresis shows a decreased albumin fraction, decreased beta globulin fraction, and increased gamma globulin fraction.   A monoclonal protein (M-spike) is present in the gamma region (2.24 g/dL).     BETTY:  Immunofixation shows an IgG monoclonal protein with kappa light chain specificity.       CMP:  Sodium Level   Date Value Ref Range Status   12/08/2022 138 136 - 145 mmol/L Final     Potassium Level   Date Value Ref Range Status   12/08/2022 4.8 3.5 - 5.1 mmol/L Final     Carbon Dioxide   Date Value Ref Range Status   12/08/2022 23 23 - 31 mmol/L Final     Blood Urea Nitrogen   Date Value Ref Range Status   12/08/2022 23.1 8.4 - 25.7 mg/dL Final     Creatinine   Date Value Ref Range Status   12/08/2022 1.19 (H) 0.73 - 1.18 mg/dL Final     Calcium Level Total   Date Value Ref Range Status   12/08/2022 9.3 8.8 - 10.0 mg/dL Final     Albumin Level   Date Value Ref Range Status   12/08/2022 3.4 3.4 - 4.8 gm/dL Final     Bilirubin Total   Date Value Ref Range Status   12/08/2022 0.5 <=1.5 mg/dL Final     Alkaline Phosphatase   Date Value Ref Range Status   12/08/2022 52 40 - 150 unit/L Final     Aspartate Aminotransferase   Date Value Ref Range Status   12/08/2022 20 5 - 34 unit/L Final     Alanine Aminotransferase   Date Value Ref Range Status   12/08/2022 37 0 - 55 unit/L Final     Estimated GFR-Non    Date Value Ref Range Status   06/22/2022 53 mls/min/1.73/m2 Final       Latest Reference Range & Units 08/10/22 13:49   MACEY Negative  Negative   ds DNA Ab Negative  Negative   Anti-SSA Interpretation Negative  Negative   Anti-SSB Interpretation Negative  Negative      Latest Reference Range & Units 11/10/21 10:27 08/10/22 13:49   Centromere  Protein Antibody Negative   Negative   HARRIS-1 Antibody Negative   Negative   Scleroderma (Scl-70S) Antibody Negative   Negative   Rheumatoid Factor <<=30 IU/mL <13    RNP70 Antibody Negative   Negative   Hand DP IgG   Negative   U1RNP Antibody Negative   Negative          Assessment:       1. Multiple myeloma not having achieved remission      Again I have a long discussion with the patient and his wife about of multiple myeloma.  Discussed with the patient that Multiple myeloma is a type of bone marrow cancer that forms in a type of white blood cell called a plasma cell. Plasma cells help you fight infections by making antibodies that recognize and attack germs but when they transformed into cancer cells then they are not efficient and don't work well. This cells can affect not only the bone marrow but the bones in general causing distinctive bone lesions called Lytic lesions. It also can affect the kidney. Discussed prognosis and treatment recommendations and indications.  Discussed in detail blood work, PET-CT and bone marrow biopsy.  He does have advance myeloma, stage III, therefore treatment is indicated at this time.  Discuss treatment option, risks versus benefit of was at toxicities associated with it.  He is waiting to start treatment.        Plan:       Patient was seen at [facility]. He will start treatment here with Velcade/Revlimid/Dexamethamasone. He said that he is a candidate for transplant and they will eval bone marrow again after 4 cycles.   Day 1 of Velcade/Revlimid/Dexamethamasone to start tomorrow. Velcade is once weekly. Revilimd is 14/21 days and Dex is once weekly.   RTC in 3 weeks with labs the day before cycle 2 (due on 12/30/22)  Encourage to call or message us for any questions or problems    The patient was given ample opportunity to ask questions, and to the best of my abilities, all questions answered to satisfaction; patient demonstrated understanding of what we discussed and agreeable to  the plan.       HERMINIO Alanis

## 2022-12-09 ENCOUNTER — INFUSION (OUTPATIENT)
Dept: INFUSION THERAPY | Facility: HOSPITAL | Age: 72
End: 2022-12-09
Attending: INTERNAL MEDICINE
Payer: MEDICARE

## 2022-12-09 VITALS
SYSTOLIC BLOOD PRESSURE: 162 MMHG | OXYGEN SATURATION: 98 % | DIASTOLIC BLOOD PRESSURE: 82 MMHG | HEART RATE: 66 BPM | TEMPERATURE: 97 F | RESPIRATION RATE: 20 BRPM

## 2022-12-09 DIAGNOSIS — C90.00 MULTIPLE MYELOMA NOT HAVING ACHIEVED REMISSION: Primary | ICD-10-CM

## 2022-12-09 PROCEDURE — 63600175 PHARM REV CODE 636 W HCPCS: Mod: JG | Performed by: INTERNAL MEDICINE

## 2022-12-09 PROCEDURE — 96401 CHEMO ANTI-NEOPL SQ/IM: CPT

## 2022-12-09 RX ORDER — HEPARIN 100 UNIT/ML
500 SYRINGE INTRAVENOUS
Status: DISCONTINUED | OUTPATIENT
Start: 2022-12-09 | End: 2022-12-09 | Stop reason: HOSPADM

## 2022-12-09 RX ORDER — SODIUM CHLORIDE 0.9 % (FLUSH) 0.9 %
10 SYRINGE (ML) INJECTION
Status: DISCONTINUED | OUTPATIENT
Start: 2022-12-09 | End: 2022-12-09 | Stop reason: HOSPADM

## 2022-12-09 RX ORDER — BORTEZOMIB 3.5 MG/1
1.3 INJECTION, POWDER, LYOPHILIZED, FOR SOLUTION INTRAVENOUS; SUBCUTANEOUS
Status: COMPLETED | OUTPATIENT
Start: 2022-12-09 | End: 2022-12-09

## 2022-12-09 RX ADMIN — BORTEZOMIB 2.8 MG: 3.5 INJECTION, POWDER, LYOPHILIZED, FOR SOLUTION INTRAVENOUS; SUBCUTANEOUS at 09:12

## 2022-12-16 ENCOUNTER — LAB VISIT (OUTPATIENT)
Dept: LAB | Facility: HOSPITAL | Age: 72
End: 2022-12-16
Attending: NURSE PRACTITIONER
Payer: MEDICARE

## 2022-12-16 ENCOUNTER — INFUSION (OUTPATIENT)
Dept: INFUSION THERAPY | Facility: HOSPITAL | Age: 72
End: 2022-12-16
Attending: INTERNAL MEDICINE
Payer: MEDICARE

## 2022-12-16 VITALS
RESPIRATION RATE: 18 BRPM | TEMPERATURE: 98 F | SYSTOLIC BLOOD PRESSURE: 126 MMHG | HEART RATE: 55 BPM | DIASTOLIC BLOOD PRESSURE: 65 MMHG

## 2022-12-16 DIAGNOSIS — C90.00 MULTIPLE MYELOMA NOT HAVING ACHIEVED REMISSION: ICD-10-CM

## 2022-12-16 DIAGNOSIS — C90.00 MULTIPLE MYELOMA NOT HAVING ACHIEVED REMISSION: Primary | ICD-10-CM

## 2022-12-16 LAB
ALBUMIN SERPL-MCNC: 3.2 G/DL (ref 3.4–4.8)
ALBUMIN/GLOB SERPL: 0.6 RATIO (ref 1.1–2)
ALP SERPL-CCNC: 64 UNIT/L (ref 40–150)
ALT SERPL-CCNC: 36 UNIT/L (ref 0–55)
AST SERPL-CCNC: 21 UNIT/L (ref 5–34)
BASOPHILS # BLD AUTO: 0.01 X10(3)/MCL (ref 0–0.2)
BASOPHILS NFR BLD AUTO: 0.2 %
BILIRUBIN DIRECT+TOT PNL SERPL-MCNC: 0.6 MG/DL
BUN SERPL-MCNC: 27.7 MG/DL (ref 8.4–25.7)
CALCIUM SERPL-MCNC: 9.2 MG/DL (ref 8.8–10)
CHLORIDE SERPL-SCNC: 107 MMOL/L (ref 98–107)
CO2 SERPL-SCNC: 24 MMOL/L (ref 23–31)
CREAT SERPL-MCNC: 1.44 MG/DL (ref 0.73–1.18)
EOSINOPHIL # BLD AUTO: 0.14 X10(3)/MCL (ref 0–0.9)
EOSINOPHIL NFR BLD AUTO: 3.3 %
ERYTHROCYTE [DISTWIDTH] IN BLOOD BY AUTOMATED COUNT: 13.6 % (ref 11.6–14.4)
GFR SERPLBLD CREATININE-BSD FMLA CKD-EPI: 52 MLS/MIN/1.73/M2
GLOBULIN SER-MCNC: 5.2 GM/DL (ref 2.4–3.5)
GLUCOSE SERPL-MCNC: 135 MG/DL (ref 82–115)
HCT VFR BLD AUTO: 33.2 % (ref 42–52)
HGB BLD-MCNC: 10.6 GM/DL (ref 14–18)
IMM GRANULOCYTES # BLD AUTO: 0.03 X10(3)/MCL (ref 0–0.04)
IMM GRANULOCYTES NFR BLD AUTO: 0.7 %
LYMPHOCYTES # BLD AUTO: 1.6 X10(3)/MCL (ref 0.6–4.6)
LYMPHOCYTES NFR BLD AUTO: 37.5 %
MCH RBC QN AUTO: 33.8 PG
MCHC RBC AUTO-ENTMCNC: 31.9 MG/DL (ref 33–36)
MCV RBC AUTO: 105.7 FL (ref 80–94)
MONOCYTES # BLD AUTO: 0.08 X10(3)/MCL (ref 0.1–1.3)
MONOCYTES NFR BLD AUTO: 1.9 %
NEUTROPHILS # BLD AUTO: 2.41 X10(3)/MCL (ref 2.1–9.2)
NEUTROPHILS NFR BLD AUTO: 56.4 %
PLATELET # BLD AUTO: 173 X10(3)/MCL (ref 140–371)
PMV BLD AUTO: 9.7 FL (ref 9.4–12.4)
POTASSIUM SERPL-SCNC: 4.1 MMOL/L (ref 3.5–5.1)
PROT SERPL-MCNC: 8.4 GM/DL (ref 5.8–7.6)
RBC # BLD AUTO: 3.14 X10(6)/MCL (ref 4.7–6.1)
SODIUM SERPL-SCNC: 135 MMOL/L (ref 136–145)
WBC # SPEC AUTO: 4.3 X10(3)/MCL (ref 4.5–11.5)

## 2022-12-16 PROCEDURE — 36415 COLL VENOUS BLD VENIPUNCTURE: CPT

## 2022-12-16 PROCEDURE — 63600175 PHARM REV CODE 636 W HCPCS: Mod: JG | Performed by: INTERNAL MEDICINE

## 2022-12-16 PROCEDURE — 85025 COMPLETE CBC W/AUTO DIFF WBC: CPT

## 2022-12-16 PROCEDURE — 96401 CHEMO ANTI-NEOPL SQ/IM: CPT

## 2022-12-16 PROCEDURE — 80053 COMPREHEN METABOLIC PANEL: CPT

## 2022-12-16 RX ORDER — HEPARIN 100 UNIT/ML
500 SYRINGE INTRAVENOUS
Status: DISCONTINUED | OUTPATIENT
Start: 2022-12-16 | End: 2022-12-16 | Stop reason: HOSPADM

## 2022-12-16 RX ORDER — BORTEZOMIB 3.5 MG/1
1.3 INJECTION, POWDER, LYOPHILIZED, FOR SOLUTION INTRAVENOUS; SUBCUTANEOUS
Status: COMPLETED | OUTPATIENT
Start: 2022-12-16 | End: 2022-12-16

## 2022-12-16 RX ORDER — SODIUM CHLORIDE 0.9 % (FLUSH) 0.9 %
10 SYRINGE (ML) INJECTION
Status: DISCONTINUED | OUTPATIENT
Start: 2022-12-16 | End: 2022-12-16 | Stop reason: HOSPADM

## 2022-12-16 RX ADMIN — BORTEZOMIB 2.8 MG: 3.5 INJECTION, POWDER, LYOPHILIZED, FOR SOLUTION INTRAVENOUS; SUBCUTANEOUS at 09:12

## 2022-12-16 NOTE — NURSING
Red rash area noted to left abd from injection last week.  Denies itching and pain.  Dr Harris notified.  Instructed to monitor, if gets worse to call.

## 2022-12-22 ENCOUNTER — LAB VISIT (OUTPATIENT)
Dept: LAB | Facility: HOSPITAL | Age: 72
End: 2022-12-22
Attending: INTERNAL MEDICINE
Payer: MEDICARE

## 2022-12-22 DIAGNOSIS — C90.00 MULTIPLE MYELOMA NOT HAVING ACHIEVED REMISSION: ICD-10-CM

## 2022-12-22 LAB
ALBUMIN SERPL-MCNC: 3 G/DL (ref 3.4–4.8)
ALBUMIN/GLOB SERPL: 0.7 RATIO (ref 1.1–2)
ALP SERPL-CCNC: 66 UNIT/L (ref 40–150)
ALT SERPL-CCNC: 39 UNIT/L (ref 0–55)
AST SERPL-CCNC: 15 UNIT/L (ref 5–34)
BASOPHILS # BLD AUTO: 0.01 X10(3)/MCL (ref 0–0.2)
BASOPHILS NFR BLD AUTO: 0.2 %
BILIRUBIN DIRECT+TOT PNL SERPL-MCNC: 0.7 MG/DL
BUN SERPL-MCNC: 19.7 MG/DL (ref 8.4–25.7)
CALCIUM SERPL-MCNC: 8.6 MG/DL (ref 8.8–10)
CHLORIDE SERPL-SCNC: 110 MMOL/L (ref 98–107)
CO2 SERPL-SCNC: 22 MMOL/L (ref 23–31)
CREAT SERPL-MCNC: 1.12 MG/DL (ref 0.73–1.18)
EOSINOPHIL # BLD AUTO: 0.23 X10(3)/MCL (ref 0–0.9)
EOSINOPHIL NFR BLD AUTO: 4.5 %
ERYTHROCYTE [DISTWIDTH] IN BLOOD BY AUTOMATED COUNT: 13.5 % (ref 11.6–14.4)
GFR SERPLBLD CREATININE-BSD FMLA CKD-EPI: >60 MLS/MIN/1.73/M2
GLOBULIN SER-MCNC: 4.1 GM/DL (ref 2.4–3.5)
GLUCOSE SERPL-MCNC: 127 MG/DL (ref 82–115)
HCT VFR BLD AUTO: 29.8 % (ref 42–52)
HGB BLD-MCNC: 9.7 GM/DL (ref 14–18)
IMM GRANULOCYTES # BLD AUTO: 0.02 X10(3)/MCL (ref 0–0.04)
IMM GRANULOCYTES NFR BLD AUTO: 0.4 %
LYMPHOCYTES # BLD AUTO: 1.04 X10(3)/MCL (ref 0.6–4.6)
LYMPHOCYTES NFR BLD AUTO: 20.3 %
MCH RBC QN AUTO: 34.2 PG
MCHC RBC AUTO-ENTMCNC: 32.6 MG/DL (ref 33–36)
MCV RBC AUTO: 104.9 FL (ref 80–94)
MONOCYTES # BLD AUTO: 0.27 X10(3)/MCL (ref 0.1–1.3)
MONOCYTES NFR BLD AUTO: 5.3 %
NEUTROPHILS # BLD AUTO: 3.55 X10(3)/MCL (ref 2.1–9.2)
NEUTROPHILS NFR BLD AUTO: 69.3 %
PLATELET # BLD AUTO: 148 X10(3)/MCL (ref 140–371)
PMV BLD AUTO: 10.4 FL (ref 9.4–12.4)
POTASSIUM SERPL-SCNC: 3.8 MMOL/L (ref 3.5–5.1)
PROT SERPL-MCNC: 7.1 GM/DL (ref 5.8–7.6)
RBC # BLD AUTO: 2.84 X10(6)/MCL (ref 4.7–6.1)
RHEUMATOID FACTOR IGA (OLG): NEGATIVE
SODIUM SERPL-SCNC: 138 MMOL/L (ref 136–145)
WBC # SPEC AUTO: 5.1 X10(3)/MCL (ref 4.5–11.5)

## 2022-12-22 PROCEDURE — 80053 COMPREHEN METABOLIC PANEL: CPT

## 2022-12-22 PROCEDURE — 85025 COMPLETE CBC W/AUTO DIFF WBC: CPT

## 2022-12-22 PROCEDURE — 36415 COLL VENOUS BLD VENIPUNCTURE: CPT

## 2022-12-23 LAB — RHEUMATOID FACTOR IGM (OLG): NEGATIVE

## 2022-12-29 ENCOUNTER — OFFICE VISIT (OUTPATIENT)
Dept: HEMATOLOGY/ONCOLOGY | Facility: CLINIC | Age: 72
End: 2022-12-29
Payer: MEDICARE

## 2022-12-29 VITALS
WEIGHT: 205 LBS | HEART RATE: 98 BPM | SYSTOLIC BLOOD PRESSURE: 128 MMHG | DIASTOLIC BLOOD PRESSURE: 77 MMHG | HEIGHT: 72 IN | TEMPERATURE: 98 F | BODY MASS INDEX: 27.77 KG/M2 | OXYGEN SATURATION: 98 %

## 2022-12-29 DIAGNOSIS — C90.00 MULTIPLE MYELOMA NOT HAVING ACHIEVED REMISSION: Primary | ICD-10-CM

## 2022-12-29 PROCEDURE — 99215 OFFICE O/P EST HI 40 MIN: CPT | Mod: S$PBB,,, | Performed by: NURSE PRACTITIONER

## 2022-12-29 PROCEDURE — 99214 OFFICE O/P EST MOD 30 MIN: CPT | Mod: PBBFAC | Performed by: NURSE PRACTITIONER

## 2022-12-29 PROCEDURE — 99999 PR PBB SHADOW E&M-EST. PATIENT-LVL IV: CPT | Mod: PBBFAC,,, | Performed by: NURSE PRACTITIONER

## 2022-12-29 PROCEDURE — 99215 PR OFFICE/OUTPT VISIT, EST, LEVL V, 40-54 MIN: ICD-10-PCS | Mod: S$PBB,,, | Performed by: NURSE PRACTITIONER

## 2022-12-29 PROCEDURE — 99999 PR PBB SHADOW E&M-EST. PATIENT-LVL IV: ICD-10-PCS | Mod: PBBFAC,,, | Performed by: NURSE PRACTITIONER

## 2022-12-29 RX ORDER — HEPARIN 100 UNIT/ML
500 SYRINGE INTRAVENOUS
Status: CANCELLED | OUTPATIENT
Start: 2022-12-29

## 2022-12-29 RX ORDER — SODIUM CHLORIDE 0.9 % (FLUSH) 0.9 %
10 SYRINGE (ML) INJECTION
Status: CANCELLED | OUTPATIENT
Start: 2022-12-29

## 2022-12-29 RX ORDER — BORTEZOMIB 3.5 MG/1
1.3 INJECTION, POWDER, LYOPHILIZED, FOR SOLUTION INTRAVENOUS; SUBCUTANEOUS
Status: CANCELLED | OUTPATIENT
Start: 2022-12-29

## 2022-12-29 NOTE — PROGRESS NOTES
HEMATOLOGY/ONCOLOGY OFFICE CLINIC VISIT    Visit Information:    Initial Evaluation: 8/10/2022  Referring Provider:   Other providers:  Code status: Not addressed    Diagnosis:  Multiple Myeloma--IgG, Kappa  Macrocytic anemia    Present treatment:   VRD-Started on 12/9/22    Treatment/Oncology history:    Plan of care:  Complete myeloma workup    Imaging:   US abdomen 8/24/2022: Spleen: 10.8 cm. Mild hepatomegaly with suspected mild hepatic steatosis. Cholelithiasis.  PET CT 9/28/2022: 1. Right scapular small focal mild hypermetabolism without lytic or sclerotic abnormality is not convinced to be related to multiple myeloma. 2. No definite skeletal lytic abnormality with hypermetabolism to reflect multiple myeloma on this exam. 3. Bilateral small pleural effusions.   4. Gallstones. 5.  Noninflamed diverticulosis coli. 6.  Prostatomegaly.    Pathology:  BONE MARROW BIOPSY 9/1/2022: PLASMA CELL MYELOMA, KAPPA RESTRICTED. NORMOCELLULAR MARROW (30%) WITH 70% INVOLVEMENT BY PLASMA CELL MYELOMA. BACKGROUND TRILINEAGE HEMATOPOIESIS IS DECREASED.      CLINICAL HISTORY:       Patient: Valentino Manning is a 72 y.o. male with multiple medical problems kindly referred for persistent anemia.  Reviewing electronic medical record patient with anemia since 02/10/2016.  From 2016 to January of 2017 anemia was mild.  Anemia slowly worsening back in 2018 hemoglobin was 8.0.  At that same time kidney function was worsening as well.     Most recent labs with HGB of 9.4, .9, platelets 231 K, WBC 5.0.  Chemistries with globulin of 5.3.  BUN/creatinine 27.7/1.41.    No family history of leukemia, lymphoma or multiple myeloma.      Chief Complaint: Fatigue and sob after his 1st cycel        Interval History:  12/29/22  Patient presents to clinic today for f/u. He started Velacade/Revlimid/Dex on 12/9/22. [facility] recommended that he start RVD then repeat bone marrow after 4 cycles.  He did not get day 15 of Velcade bc he thought that was  "his "off" week. It was his off week from Revlimid but Velcade is continued weekly. He denies any fever, chills, sweats.  No chest pain or shortness of breath at rest.  He denies any bleeding. He is experiencing "muscle fatigue" and a sensation of shaking inside (his words)         Past Medical History:   Diagnosis Date    Anemia     Heart problem     Hyperlipidemia     Hypertension       Past Surgical History:   Procedure Laterality Date    BONE MARROW ASPIRATION N/A 9/1/2022    Procedure: ASPIRATION, BONE MARROW;  Surgeon: Robbie Gardner MD;  Location: [facility];  Service: General;  Laterality: N/A;    CARDIAC SURGERY  2021    ENDOSCOPIC RELEASE OF BOTH CARPAL TUNNELS       Family History   Problem Relation Age of Onset    Hypertension Mother     Diabetes Mother     Anemia Mother     Heart disease Father      Social Connections: Not on file       Review of patient's allergies indicates:   Allergen Reactions    Penicillins       Current Outpatient Medications on File Prior to Visit   Medication Sig Dispense Refill    acyclovir (ZOVIRAX) 400 MG tablet Take 400 mg by mouth 2 (two) times daily.      amLODIPine (NORVASC) 10 MG tablet       ascorbic acid, vitamin C, (VITAMIN C) 1000 MG tablet Take 1,000 mg by mouth once daily.      atorvastatin (LIPITOR) 40 MG tablet TAKE 1 TABLET ORALLY ONCE DAILY ON MON/TUE/THUR/FRI AND 1/2 ON WED. NONE ON SAT/SUN      b complex vitamins capsule Take 1 capsule by mouth once daily.      clopidogreL (PLAVIX) 75 mg tablet Take 75 mg by mouth once daily.      dexAMETHasone (DECADRON) 4 MG Tab Take 40 mg by mouth As instructed. 40mg (4mg tabs 10) PO Daily once weekly      doxazosin (CARDURA) 4 MG tablet Take 4 mg by mouth once daily.      dutasteride (AVODART) 0.5 mg capsule Take 0.5 mg by mouth once daily.      FERRETTS 325 mg (106 mg iron) Tab Take 1 tablet by mouth 2 (two) times daily.      glutamine 10 gram PwPk Take 10 g by mouth 2 (two) times a day.      lenalidomide 25 mg Cap TAKE 1 " CAPSULE BY MOUTH EVERY DAY AS DIRECTED FOR 14 DAYS EVERY 21 DAYS ( 2 WEEKS ON, 1 WEEK OFF) 14 capsule 0    LINZESS 145 mcg Cap capsule Take 145 mcg by mouth.      lisinopriL (PRINIVIL,ZESTRIL) 40 MG tablet Take 40 mg by mouth.      metoprolol tartrate (LOPRESSOR) 25 MG tablet Take 25 mg by mouth 2 (two) times daily. Takes 1 tab q am      ondansetron (ZOFRAN) 4 MG tablet Take 4 mg by mouth every 8 (eight) hours as needed for Nausea.      pantoprazole (PROTONIX) 40 MG tablet TAKE 1 TABLET BY MOUTH EVERY MORNING 30 MIN BEFORE BREAKFAST      pyridoxine, vitamin B6, (B-6) 250 MG Tab Take 250 mg by mouth once daily.      sulfamethoxazole-trimethoprim 800-160mg (BACTRIM DS) 800-160 mg Tab Take 1 tablet by mouth As instructed. Tab 1 PO Daily M-W-F      zolpidem (AMBIEN) 10 mg Tab Take 10 mg by mouth once daily.       No current facility-administered medications on file prior to visit.      Review of Systems   Constitutional:  Positive for fatigue. Negative for activity change, appetite change, chills, diaphoresis, fever and unexpected weight change.   HENT:  Negative for nasal congestion, mouth sores, nosebleeds, postnasal drip, sinus pressure/congestion, sore throat and trouble swallowing.    Eyes:  Negative for visual disturbance.   Respiratory:  Negative for shortness of breath.    Cardiovascular:  Negative for chest pain and palpitations.   Gastrointestinal:  Negative for abdominal distention, abdominal pain, blood in stool, change in bowel habit, constipation, diarrhea, nausea, vomiting and change in bowel habit.   Endocrine: Negative.    Genitourinary:  Negative for bladder incontinence, decreased urine volume, difficulty urinating, dysuria, frequency, hematuria, scrotal swelling, testicular pain and urgency.   Musculoskeletal:  Negative for arthralgias, back pain, gait problem, joint swelling, leg pain, myalgias and neck pain.   Integumentary:  Negative for rash.   Neurological:  Negative for dizziness, tremors,  seizures, syncope, speech difficulty, weakness, light-headedness, numbness, headaches and memory loss.   Hematological:  Does not bruise/bleed easily.   Psychiatric/Behavioral:  Negative for agitation, confusion, hallucinations, sleep disturbance and suicidal ideas. The patient is not nervous/anxious.             Vitals:    12/29/22 0846   BP: 128/77   BP Location: Left arm   Patient Position: Sitting   Pulse: 98   Temp: 98.1 °F (36.7 °C)   TempSrc: Oral   SpO2: 98%   Weight: 93 kg (205 lb)   Height: 6' (1.829 m)          Physical Exam  Constitutional:       Appearance: He is obese.   HENT:      Head: Normocephalic and atraumatic.   Eyes:      Extraocular Movements: Extraocular movements intact.   Pulmonary:      Effort: Pulmonary effort is normal.   Musculoskeletal:      Cervical back: Neck supple.   Neurological:      Mental Status: He is alert and oriented to person, place, and time.   Psychiatric:         Mood and Affect: Mood normal.         Behavior: Behavior normal.         Judgment: Judgment normal.     ECOG SCORE             Laboratory:  CBC with Differential:  Lab Results   Component Value Date    WBC 2.8 (L) 12/29/2022    RBC 2.78 (L) 12/29/2022    HGB 9.3 (L) 12/29/2022    HCT 28.5 (L) 12/29/2022    .5 (H) 12/29/2022    MCH 33.5 12/29/2022    MCHC 32.6 (L) 12/29/2022    RDW 13.6 12/29/2022     12/29/2022    MPV 8.1 (L) 12/29/2022         Latest Reference Range & Units 08/10/22 13:49   Kappa Qnt Free Light Chains 3.30 - 19.40 mg/L 491.97 (H)   Lambda Qnt Free Light Chains 5.71 - 26.30 mg/L 4.76 (L)   [place]/Lambda Free Light Chain Ratio 0.26 - 1.65  103.36 (H)   M SPIKE gm/dl 2.24      Latest Reference Range & Units 08/10/22 13:49   Immunoglobulin A 68 - 408 mg/dL 10 (L)   Immunoglobulin G 768 - 1632 mg/dL 4882 (H)   Immunoglobulin M 35 - 263 mg/dL 12 (L)   (L): Data is abnormally low  (H): Data is abnormally high    M-spike in the beta/gamma region.  The monoclonal protein peak accounts for  3.39 g/dL of the total protein in the beta and gamma regions.  This quantitation may include complement components.  BETTY gel pattern shows an IgG type kappa monoclonal protein.    SPEP:  Total protein and globulin are both increased.  A/G ratio is low.     Serum protein electrophoresis shows a decreased albumin fraction, decreased beta globulin fraction, and increased gamma globulin fraction.   A monoclonal protein (M-spike) is present in the gamma region (2.24 g/dL).     BETTY:  Immunofixation shows an IgG monoclonal protein with kappa light chain specificity.       CMP:  Sodium Level   Date Value Ref Range Status   12/29/2022 138 136 - 145 mmol/L Final     Potassium Level   Date Value Ref Range Status   12/29/2022 4.3 3.5 - 5.1 mmol/L Final     Carbon Dioxide   Date Value Ref Range Status   12/29/2022 24 23 - 31 mmol/L Final     Blood Urea Nitrogen   Date Value Ref Range Status   12/29/2022 20.5 8.4 - 25.7 mg/dL Final     Creatinine   Date Value Ref Range Status   12/29/2022 1.28 (H) 0.73 - 1.18 mg/dL Final     Calcium Level Total   Date Value Ref Range Status   12/29/2022 9.0 8.8 - 10.0 mg/dL Final     Albumin Level   Date Value Ref Range Status   12/29/2022 3.0 (L) 3.4 - 4.8 g/dL Final     Bilirubin Total   Date Value Ref Range Status   12/29/2022 0.6 <=1.5 mg/dL Final     Alkaline Phosphatase   Date Value Ref Range Status   12/29/2022 73 40 - 150 unit/L Final     Aspartate Aminotransferase   Date Value Ref Range Status   12/29/2022 25 5 - 34 unit/L Final     Alanine Aminotransferase   Date Value Ref Range Status   12/29/2022 49 0 - 55 unit/L Final     Estimated GFR-Non    Date Value Ref Range Status   06/22/2022 53 mls/min/1.73/m2 Final       Latest Reference Range & Units 08/10/22 13:49   MACEY Negative  Negative   ds DNA Ab Negative  Negative   Anti-SSA Interpretation Negative  Negative   Anti-SSB Interpretation Negative  Negative      Latest Reference Range & Units 11/10/21 10:27 08/10/22 13:49    Centromere Protein Antibody Negative   Negative   HARRIS-1 Antibody Negative   Negative   Scleroderma (Scl-70S) Antibody Negative   Negative   Rheumatoid Factor <<=30 IU/mL <13    RNP70 Antibody Negative   Negative   Hand DP IgG   Negative   U1RNP Antibody Negative   Negative          Assessment:       1. Multiple myeloma not having achieved remission      Again I have a long discussion with the patient and his wife about of multiple myeloma.  Discussed with the patient that Multiple myeloma is a type of bone marrow cancer that forms in a type of white blood cell called a plasma cell. Plasma cells help you fight infections by making antibodies that recognize and attack germs but when they transformed into cancer cells then they are not efficient and don't work well. This cells can affect not only the bone marrow but the bones in general causing distinctive bone lesions called Lytic lesions. It also can affect the kidney. Discussed prognosis and treatment recommendations and indications.  Discussed in detail blood work, PET-CT and bone marrow biopsy.  He does have advance myeloma, stage III, therefore treatment is indicated at this time.  Discuss treatment option, risks versus benefit of was at toxicities associated with it.  He is waiting to start treatment.        Plan:       Patient was seen at [facility]. He will start treatment here with Velcade/Revlimid/Dexamethamasone. He said that he is a candidate for transplant and they will eval bone marrow again after 4 cycles.   Cycle 2, Day 1 of Velcade/Revlimid/Dexamethamasone due tomorrow. Velcade is once weekly. Revlimid is 14/21 days and Dex is once weekly.   RTC in 3 weeks with labs the day before cycle 3 . Encourage to call or message us for any questions or problems    The patient was given ample opportunity to ask questions, and to the best of my abilities, all questions answered to satisfaction; patient demonstrated understanding of what we discussed and agreeable to  the plan.       HERMINIO Alanis

## 2022-12-30 ENCOUNTER — INFUSION (OUTPATIENT)
Dept: INFUSION THERAPY | Facility: HOSPITAL | Age: 72
End: 2022-12-30
Attending: INTERNAL MEDICINE
Payer: MEDICARE

## 2022-12-30 VITALS
OXYGEN SATURATION: 99 % | DIASTOLIC BLOOD PRESSURE: 67 MMHG | RESPIRATION RATE: 18 BRPM | SYSTOLIC BLOOD PRESSURE: 108 MMHG | TEMPERATURE: 98 F | HEART RATE: 70 BPM

## 2022-12-30 DIAGNOSIS — C90.00 MULTIPLE MYELOMA NOT HAVING ACHIEVED REMISSION: Primary | ICD-10-CM

## 2022-12-30 PROCEDURE — 63600175 PHARM REV CODE 636 W HCPCS: Mod: JG | Performed by: NURSE PRACTITIONER

## 2022-12-30 PROCEDURE — 96401 CHEMO ANTI-NEOPL SQ/IM: CPT

## 2022-12-30 RX ORDER — BORTEZOMIB 3.5 MG/1
1.3 INJECTION, POWDER, LYOPHILIZED, FOR SOLUTION INTRAVENOUS; SUBCUTANEOUS
Status: COMPLETED | OUTPATIENT
Start: 2022-12-30 | End: 2022-12-30

## 2022-12-30 RX ORDER — SODIUM CHLORIDE 0.9 % (FLUSH) 0.9 %
10 SYRINGE (ML) INJECTION
Status: DISCONTINUED | OUTPATIENT
Start: 2022-12-30 | End: 2022-12-30 | Stop reason: HOSPADM

## 2022-12-30 RX ORDER — HEPARIN 100 UNIT/ML
500 SYRINGE INTRAVENOUS
Status: DISCONTINUED | OUTPATIENT
Start: 2022-12-30 | End: 2022-12-30 | Stop reason: HOSPADM

## 2022-12-30 RX ADMIN — BORTEZOMIB 2.8 MG: 3.5 INJECTION, POWDER, LYOPHILIZED, FOR SOLUTION INTRAVENOUS; SUBCUTANEOUS at 09:12

## 2023-01-03 ENCOUNTER — OFFICE VISIT (OUTPATIENT)
Dept: HEMATOLOGY/ONCOLOGY | Facility: CLINIC | Age: 73
End: 2023-01-03
Payer: MEDICARE

## 2023-01-03 DIAGNOSIS — Z76.82 STEM CELL TRANSPLANT CANDIDATE: ICD-10-CM

## 2023-01-03 DIAGNOSIS — I25.10 CORONARY ARTERY DISEASE, UNSPECIFIED VESSEL OR LESION TYPE, UNSPECIFIED WHETHER ANGINA PRESENT, UNSPECIFIED WHETHER NATIVE OR TRANSPLANTED HEART: Primary | ICD-10-CM

## 2023-01-03 DIAGNOSIS — C90.00 MULTIPLE MYELOMA NOT HAVING ACHIEVED REMISSION: ICD-10-CM

## 2023-01-03 PROCEDURE — 99215 OFFICE O/P EST HI 40 MIN: CPT | Mod: 95,,, | Performed by: INTERNAL MEDICINE

## 2023-01-03 PROCEDURE — 99215 PR OFFICE/OUTPT VISIT, EST, LEVL V, 40-54 MIN: ICD-10-PCS | Mod: 95,,, | Performed by: INTERNAL MEDICINE

## 2023-01-03 NOTE — PROGRESS NOTES
The patient location is: home  The chief complaint leading to consultation is: MM/SCT eval     Visit type: audiovisual    Face to Face time with patient: 30   76 minutes of total time spent on the encounter, which includes face to face time and non-face to face time preparing to see the patient (eg, review of tests), Obtaining and/or reviewing separately obtained history, Documenting clinical information in the electronic or other health record, Independently interpreting results (not separately reported) and communicating results to the patient/family/caregiver, or Care coordination (not separately reported).         Each patient to whom he or she provides medical services by telemedicine is:  (1) informed of the relationship between the physician and patient and the respective role of any other health care provider with respect to management of the patient; and (2) notified that he or she may decline to receive medical services by telemedicine and may withdraw from such care at any time.    Notes:     SECTION OF HEMATOLOGY AND BONE MARROW TRANSPLANT  New Patient Visit   01/08/2023  Referred by:  Dr. Angélica Harris  Referred for: MM/SCT eval     CHIEF COMPLAINT: No chief complaint on file.      HISTORY OF PRESENT ILLNESS:   72 y.o.male; pmh as below; non oncologic history notable for CAD sp multiple stents followed regularly by cardiology locally; asymptomatic; oncologic history notable for MM diagnosed referral for anemia evaluation;  was initially evaluated by Dr. Harris at [facility]; he met diagnostic criteria for active myeloma; 3.4 grams of IgG kappa MM at diagnosis;  marrow at diagnosis with 70% clonal plasma cells; fish/CG normal cw with standard risk disease; whole body pet and 24 hr urine studies unremarkable. Other than fatigue was largely asymptomatic.  Runs haunted house/trail during halloween. No family history of malignancy.   Non smoker. Non drinker.  and wife present during our interview  today.  Regular PCP fu prior to diagnosis.  Active with ADL's.  PS1.  KPFS  80.    Of note sought 2nd opinion at [facility] who recommended induction with VRd and consideration for SCT pending adequate response and pre transplant evaluation.   He started VRd and has completed 1 cycle with excellent biochemical response thus far. Notes worsening of fatigue with therapy but is otherwise active.    Denies fever, chills, nightsweats, bleeding, brusing, lymphadenopathy, signs/symptoms of splenomegaly, focal pain, signs/symptoms of amyloidosis/poems.   PAST MEDICAL HISTORY:   Past Medical History:   Diagnosis Date    Anemia     Heart problem     Hyperlipidemia     Hypertension        PAST SURGICAL HISTORY:   Past Surgical History:   Procedure Laterality Date    BONE MARROW ASPIRATION N/A 9/1/2022    Procedure: ASPIRATION, BONE MARROW;  Surgeon: Robbie Gardner MD;  Location: [facility];  Service: General;  Laterality: N/A;    CARDIAC SURGERY  2021    ENDOSCOPIC RELEASE OF BOTH CARPAL TUNNELS         PAST SOCIAL HISTORY:   reports that he has quit smoking. His smoking use included cigarettes. He has quit using smokeless tobacco. He reports that he does not currently use alcohol. He reports that he does not use drugs.    FAMILY HISTORY:  Family History   Problem Relation Age of Onset    Hypertension Mother     Diabetes Mother     Anemia Mother     Heart disease Father        CURRENT MEDICATIONS:   Current Outpatient Medications   Medication Sig    acyclovir (ZOVIRAX) 400 MG tablet Take 400 mg by mouth 2 (two) times daily.    amLODIPine (NORVASC) 10 MG tablet     ascorbic acid, vitamin C, (VITAMIN C) 1000 MG tablet Take 1,000 mg by mouth once daily.    atorvastatin (LIPITOR) 40 MG tablet TAKE 1 TABLET ORALLY ONCE DAILY ON MON/TUE/THUR/FRI AND 1/2 ON WED. NONE ON SAT/SUN    b complex vitamins capsule Take 1 capsule by mouth once daily.    clopidogreL (PLAVIX) 75 mg tablet Take 75 mg by mouth once daily.    dexAMETHasone (DECADRON) 4 MG  Tab Take 40 mg by mouth As instructed. 40mg (4mg tabs 10) PO Daily once weekly    doxazosin (CARDURA) 4 MG tablet Take 4 mg by mouth once daily.    dutasteride (AVODART) 0.5 mg capsule Take 0.5 mg by mouth once daily.    FERRETTS 325 mg (106 mg iron) Tab Take 1 tablet by mouth 2 (two) times daily.    glutamine 10 gram PwPk Take 10 g by mouth 2 (two) times a day.    lenalidomide 25 mg Cap TAKE 1 CAPSULE BY MOUTH EVERY DAY AS DIRECTED FOR 14 DAYS EVERY 21 DAYS ( 2 WEEKS ON, 1 WEEK OFF)    LINZESS 145 mcg Cap capsule Take 145 mcg by mouth.    lisinopriL (PRINIVIL,ZESTRIL) 40 MG tablet Take 40 mg by mouth.    metoprolol tartrate (LOPRESSOR) 25 MG tablet Take 25 mg by mouth 2 (two) times daily. Takes 1 tab q am    ondansetron (ZOFRAN) 4 MG tablet Take 4 mg by mouth every 8 (eight) hours as needed for Nausea.    pantoprazole (PROTONIX) 40 MG tablet TAKE 1 TABLET BY MOUTH EVERY MORNING 30 MIN BEFORE BREAKFAST    pyridoxine, vitamin B6, (B-6) 250 MG Tab Take 250 mg by mouth once daily.    sulfamethoxazole-trimethoprim 800-160mg (BACTRIM DS) 800-160 mg Tab Take 1 tablet by mouth As instructed. Tab 1 PO Daily M-W-F    zolpidem (AMBIEN) 10 mg Tab Take 10 mg by mouth once daily.     No current facility-administered medications for this visit.     ALLERGIES:   Review of patient's allergies indicates:   Allergen Reactions    Penicillins              REVIEW OF SYSTEMS:   See HPI     PHYSICAL EXAM:   physical exam deferred due to telemed   Appears well and below stated age on camera     ECOG Performance Status: (foot note - ECOG PS provided by Eastern Cooperative Oncology Group) 1 - Symptomatic but completely ambulatory    Karnofsky Performance Score:  80%- Normal Activity with Effort: Some Symptoms of Disease  DATA:   Lab Results   Component Value Date    WBC 3.9 (L) 01/06/2023    HGB 10.1 (L) 01/06/2023    HCT 31.5 (L) 01/06/2023    .3 (H) 01/06/2023     01/06/2023       No results found for: GRAN  CMP  Sodium Level    Date Value Ref Range Status   01/06/2023 140 136 - 145 mmol/L Final     Potassium Level   Date Value Ref Range Status   01/06/2023 4.3 3.5 - 5.1 mmol/L Final     Carbon Dioxide   Date Value Ref Range Status   01/06/2023 25 23 - 31 mmol/L Final     Blood Urea Nitrogen   Date Value Ref Range Status   01/06/2023 18.0 8.4 - 25.7 mg/dL Final     Creatinine   Date Value Ref Range Status   01/06/2023 1.14 0.73 - 1.18 mg/dL Final     Calcium Level Total   Date Value Ref Range Status   01/06/2023 9.2 8.8 - 10.0 mg/dL Final     Albumin Level   Date Value Ref Range Status   01/06/2023 3.2 (L) 3.4 - 4.8 g/dL Final     Bilirubin Total   Date Value Ref Range Status   01/06/2023 0.7 <=1.5 mg/dL Final     Alkaline Phosphatase   Date Value Ref Range Status   01/06/2023 73 40 - 150 unit/L Final     Aspartate Aminotransferase   Date Value Ref Range Status   01/06/2023 18 5 - 34 unit/L Final     Alanine Aminotransferase   Date Value Ref Range Status   01/06/2023 40 0 - 55 unit/L Final     eGFR   Date Value Ref Range Status   01/06/2023 69 mls/min/1.73/m2 Final     IgM Level   Date Value Ref Range Status   12/29/2022 30.0 22.0 - 240.0 mg/dL Final      Latest Reference Range & Units 08/10/22 13:49   Kappa Qnt Free Light Chains 3.30 - 19.40 mg/L 491.97 (H)   Lambda Qnt Free Light Chains 5.71 - 26.30 mg/L 4.76 (L)   [place]/Lambda Free Light Chain Ratio 0.26 - 1.65  103.36 (H)   (H): Data is abnormally high  (L): Data is abnormally low    Pathology Review  Pathologist Interpretation  Collected: 12/29/22 0856   Result status: Final   Resulting lab: [facility]   Value: SPEP:  A monoclonal protein (M-spike) is present in the gamma region (1.08 g/dL).       BETTY:  Immunofixation shows an IgG monoclonal protein with kappa light chain specificity.     Markell Farmer M.D.           Final Diagnosis   1. BONE MARROW, RIGHT POSTERIOR ILIAC CREST, ASPIRATE, CLOT, DECALCIFIED CORE BIOPSY:     PLASMA CELL MYELOMA, KAPPA  RESTRICTED.     NORMOCELLULAR MARROW (30%) WITH 70% INVOLVEMENT BY PLASMA CELL MYELOMA.     BACKGROUND TRILINEAGE HEMATOPOIESIS IS DECREASED.     Whole body pet scan - sept 2022  Impression:     1. Right scapular small focal mild hypermetabolism without lytic or sclerotic abnormality is not convinced to be related to multiple myeloma.     2. No definite skeletal lytic abnormality with hypermetabolism to reflect multiple myeloma on this exam.     3. Bilateral small pleural effusions.     4.  Gallstones.     5.  Noninflamed diverticulosis coli.    ASSESSMENT AND PLAN:   Encounter Diagnoses   Name Primary?    Coronary artery disease, unspecified vessel or lesion type, unspecified whether angina present, unspecified whether native or transplanted heart Yes    Multiple myeloma not having achieved remission     Stem cell transplant candidate      1)multiple myeloma  -IgG kappa MM ; diagnosed sept 2022 after referral to heme onc  for anemia   -meets treatment criteria based on anemia and 70% clonal plasma cells in marrow; no hyperCa, renal dysfunction, or bone disease on WB PET  -has initiated Vrd therapy and completed 1 cycle with excellent biochemical response and good tolerance  -with regard to his induction therapy recommend addition of subcutaneous daratumumab (weekly x 8, EOW x 8 doses, then q 4 weeks) per Alejandro data to enhance response  -supportive meds:  asa, levaquin, acyc, ca+vit d; no bisphosphonate indicated due to lack of bone disease  -long discussion with pt re: diagnosis natural history of MM; discussed average OS of 8-10 years in modern therapy era particularly in case of his standard risk disease   -discussed rationale of consolidative autoSCT and maintenance therapy  following approximately 4 cycle of induction pending response and adequate pre transplant evaluation   -discussed approximately 5-6 year average PFS following SCT; discussed approximately 15-20% of pts receiving SCT can have PFS >10 years     -discussion rationale, alternatives, risks of central line placement,  Mobilization/colleection, melphalan autoSCT; discussed estimated 1% risk of transplant related mortality  -discussed need for appt and medication compliance following transplant  -discussed need for full time caretaker through day +30  -discussed need to stay within 60 miles of transplant center through day +30  -he appear at our meeting today to be a reasonable SCT candidate  -alteratively we discussed continuation of Vita-Vrd therapy until max response then transitioning to rev maintenance as alternative to consolidative transplant  -pt expressed some hesitation regarding side effects and would like to think further re: transplant; additionally he would need to transplant immediately following halloween as this is when he makes bulk of his annual income  -we also discussed the role of delayed transplant in standard risk disease as a reasonable approach but also discussed at 73 yo age we could be potentially lose our window for transplanting in CR2 if CR1 is prolonged  -we will send pt transplant literature to review; we will meet with pt virtually in approximately 8 weeks to readdress transplant and check in on progress; he will continue therapy locally; pt only needs labs and provider appt prior to day 1 of each cycle       2)CV  -will maintain all current CV medications  -if decide to proceed with SCT will need cardiooncology clearance in light of his CAD/stent history    Follow Up:   BMT Chart Routing      Follow up with physician . Sharmaine CABRAL appt with me in approximately 8 weeks; no labs needed prior   Follow up with MARISOL    Provider visit type    Infusion scheduling note    Injection scheduling note    Labs    Imaging    Pharmacy appointment    Other referrals          Juan Brito MD  Hematology/Oncology/Bone Marrow Transplant

## 2023-01-06 ENCOUNTER — INFUSION (OUTPATIENT)
Dept: INFUSION THERAPY | Facility: HOSPITAL | Age: 73
End: 2023-01-06
Attending: INTERNAL MEDICINE
Payer: MEDICARE

## 2023-01-06 ENCOUNTER — LAB VISIT (OUTPATIENT)
Dept: LAB | Facility: HOSPITAL | Age: 73
End: 2023-01-06
Attending: INTERNAL MEDICINE
Payer: MEDICARE

## 2023-01-06 VITALS
DIASTOLIC BLOOD PRESSURE: 73 MMHG | SYSTOLIC BLOOD PRESSURE: 127 MMHG | TEMPERATURE: 98 F | RESPIRATION RATE: 18 BRPM | OXYGEN SATURATION: 100 % | HEART RATE: 59 BPM

## 2023-01-06 DIAGNOSIS — C90.00 MULTIPLE MYELOMA NOT HAVING ACHIEVED REMISSION: Primary | ICD-10-CM

## 2023-01-06 DIAGNOSIS — C90.00 MULTIPLE MYELOMA NOT HAVING ACHIEVED REMISSION: ICD-10-CM

## 2023-01-06 LAB
ALBUMIN SERPL-MCNC: 3.2 G/DL (ref 3.4–4.8)
ALBUMIN/GLOB SERPL: 0.8 RATIO (ref 1.1–2)
ALP SERPL-CCNC: 73 UNIT/L (ref 40–150)
ALT SERPL-CCNC: 40 UNIT/L (ref 0–55)
AST SERPL-CCNC: 18 UNIT/L (ref 5–34)
BASOPHILS # BLD AUTO: 0.01 X10(3)/MCL (ref 0–0.2)
BASOPHILS NFR BLD AUTO: 0.3 %
BILIRUBIN DIRECT+TOT PNL SERPL-MCNC: 0.7 MG/DL
BUN SERPL-MCNC: 18 MG/DL (ref 8.4–25.7)
CALCIUM SERPL-MCNC: 9.2 MG/DL (ref 8.8–10)
CHLORIDE SERPL-SCNC: 111 MMOL/L (ref 98–107)
CO2 SERPL-SCNC: 25 MMOL/L (ref 23–31)
CREAT SERPL-MCNC: 1.14 MG/DL (ref 0.73–1.18)
EOSINOPHIL # BLD AUTO: 0.21 X10(3)/MCL (ref 0–0.9)
EOSINOPHIL NFR BLD AUTO: 5.4 %
ERYTHROCYTE [DISTWIDTH] IN BLOOD BY AUTOMATED COUNT: 14.2 % (ref 11.6–14.4)
GFR SERPLBLD CREATININE-BSD FMLA CKD-EPI: 69 MLS/MIN/1.73/M2
GLOBULIN SER-MCNC: 3.9 GM/DL (ref 2.4–3.5)
GLUCOSE SERPL-MCNC: 134 MG/DL (ref 82–115)
HCT VFR BLD AUTO: 31.5 % (ref 42–52)
HGB BLD-MCNC: 10.1 GM/DL (ref 14–18)
IMM GRANULOCYTES # BLD AUTO: 0.07 X10(3)/MCL (ref 0–0.04)
IMM GRANULOCYTES NFR BLD AUTO: 1.8 %
LYMPHOCYTES # BLD AUTO: 1.19 X10(3)/MCL (ref 0.6–4.6)
LYMPHOCYTES NFR BLD AUTO: 30.6 %
MCH RBC QN AUTO: 33.4 PG
MCHC RBC AUTO-ENTMCNC: 32.1 MG/DL (ref 33–36)
MCV RBC AUTO: 104.3 FL (ref 80–94)
MONOCYTES # BLD AUTO: 0.1 X10(3)/MCL (ref 0.1–1.3)
MONOCYTES NFR BLD AUTO: 2.6 %
NEUTROPHILS # BLD AUTO: 2.31 X10(3)/MCL (ref 2.1–9.2)
NEUTROPHILS NFR BLD AUTO: 59.3 %
PLATELET # BLD AUTO: 180 X10(3)/MCL (ref 140–371)
PMV BLD AUTO: 9.9 FL (ref 9.4–12.4)
POTASSIUM SERPL-SCNC: 4.3 MMOL/L (ref 3.5–5.1)
PROT SERPL-MCNC: 7.1 GM/DL (ref 5.8–7.6)
RBC # BLD AUTO: 3.02 X10(6)/MCL (ref 4.7–6.1)
SODIUM SERPL-SCNC: 140 MMOL/L (ref 136–145)
WBC # SPEC AUTO: 3.9 X10(3)/MCL (ref 4.5–11.5)

## 2023-01-06 PROCEDURE — 85025 COMPLETE CBC W/AUTO DIFF WBC: CPT

## 2023-01-06 PROCEDURE — 96401 CHEMO ANTI-NEOPL SQ/IM: CPT

## 2023-01-06 PROCEDURE — 63600175 PHARM REV CODE 636 W HCPCS: Mod: JG | Performed by: NURSE PRACTITIONER

## 2023-01-06 PROCEDURE — 36415 COLL VENOUS BLD VENIPUNCTURE: CPT

## 2023-01-06 PROCEDURE — 80053 COMPREHEN METABOLIC PANEL: CPT

## 2023-01-06 RX ORDER — BORTEZOMIB 3.5 MG/1
1.3 INJECTION, POWDER, LYOPHILIZED, FOR SOLUTION INTRAVENOUS; SUBCUTANEOUS
Status: CANCELLED | OUTPATIENT
Start: 2023-01-06

## 2023-01-06 RX ORDER — BORTEZOMIB 3.5 MG/1
1.3 INJECTION, POWDER, LYOPHILIZED, FOR SOLUTION INTRAVENOUS; SUBCUTANEOUS
Status: COMPLETED | OUTPATIENT
Start: 2023-01-06 | End: 2023-01-06

## 2023-01-06 RX ORDER — HEPARIN 100 UNIT/ML
500 SYRINGE INTRAVENOUS
Status: DISCONTINUED | OUTPATIENT
Start: 2023-01-06 | End: 2023-01-06 | Stop reason: HOSPADM

## 2023-01-06 RX ORDER — HEPARIN 100 UNIT/ML
500 SYRINGE INTRAVENOUS
Status: CANCELLED | OUTPATIENT
Start: 2023-01-06

## 2023-01-06 RX ORDER — SODIUM CHLORIDE 0.9 % (FLUSH) 0.9 %
10 SYRINGE (ML) INJECTION
Status: CANCELLED | OUTPATIENT
Start: 2023-01-06

## 2023-01-06 RX ORDER — SODIUM CHLORIDE 0.9 % (FLUSH) 0.9 %
10 SYRINGE (ML) INJECTION
Status: DISCONTINUED | OUTPATIENT
Start: 2023-01-06 | End: 2023-01-06 | Stop reason: HOSPADM

## 2023-01-06 RX ADMIN — BORTEZOMIB 2.8 MG: 3.5 INJECTION, POWDER, LYOPHILIZED, FOR SOLUTION INTRAVENOUS; SUBCUTANEOUS at 10:01

## 2023-01-11 RX ORDER — SODIUM CHLORIDE 0.9 % (FLUSH) 0.9 %
10 SYRINGE (ML) INJECTION
Status: CANCELLED | OUTPATIENT
Start: 2023-01-13

## 2023-01-11 RX ORDER — BORTEZOMIB 3.5 MG/1
1.3 INJECTION, POWDER, LYOPHILIZED, FOR SOLUTION INTRAVENOUS; SUBCUTANEOUS
Status: CANCELLED | OUTPATIENT
Start: 2023-01-13

## 2023-01-11 RX ORDER — HEPARIN 100 UNIT/ML
500 SYRINGE INTRAVENOUS
Status: CANCELLED | OUTPATIENT
Start: 2023-01-13

## 2023-01-13 ENCOUNTER — LAB VISIT (OUTPATIENT)
Dept: LAB | Facility: HOSPITAL | Age: 73
End: 2023-01-13
Attending: NURSE PRACTITIONER
Payer: MEDICARE

## 2023-01-13 ENCOUNTER — INFUSION (OUTPATIENT)
Dept: INFUSION THERAPY | Facility: HOSPITAL | Age: 73
End: 2023-01-13
Attending: INTERNAL MEDICINE
Payer: MEDICARE

## 2023-01-13 VITALS
DIASTOLIC BLOOD PRESSURE: 63 MMHG | SYSTOLIC BLOOD PRESSURE: 110 MMHG | RESPIRATION RATE: 16 BRPM | HEART RATE: 99 BPM | TEMPERATURE: 98 F | OXYGEN SATURATION: 99 %

## 2023-01-13 DIAGNOSIS — C90.00 MULTIPLE MYELOMA NOT HAVING ACHIEVED REMISSION: Primary | ICD-10-CM

## 2023-01-13 DIAGNOSIS — C90.00 MULTIPLE MYELOMA NOT HAVING ACHIEVED REMISSION: ICD-10-CM

## 2023-01-13 PROCEDURE — 96401 CHEMO ANTI-NEOPL SQ/IM: CPT

## 2023-01-13 PROCEDURE — 63600175 PHARM REV CODE 636 W HCPCS: Mod: JG | Performed by: NURSE PRACTITIONER

## 2023-01-13 RX ORDER — SODIUM CHLORIDE 0.9 % (FLUSH) 0.9 %
10 SYRINGE (ML) INJECTION
Status: DISCONTINUED | OUTPATIENT
Start: 2023-01-13 | End: 2023-01-13 | Stop reason: HOSPADM

## 2023-01-13 RX ORDER — BORTEZOMIB 3.5 MG/1
1.3 INJECTION, POWDER, LYOPHILIZED, FOR SOLUTION INTRAVENOUS; SUBCUTANEOUS
Status: COMPLETED | OUTPATIENT
Start: 2023-01-13 | End: 2023-01-13

## 2023-01-13 RX ORDER — HEPARIN 100 UNIT/ML
500 SYRINGE INTRAVENOUS
Status: DISCONTINUED | OUTPATIENT
Start: 2023-01-13 | End: 2023-01-13 | Stop reason: HOSPADM

## 2023-01-13 RX ADMIN — BORTEZOMIB 2.8 MG: 3.5 INJECTION, POWDER, LYOPHILIZED, FOR SOLUTION INTRAVENOUS; SUBCUTANEOUS at 09:01

## 2023-01-19 ENCOUNTER — OFFICE VISIT (OUTPATIENT)
Dept: HEMATOLOGY/ONCOLOGY | Facility: CLINIC | Age: 73
End: 2023-01-19
Payer: MEDICARE

## 2023-01-19 VITALS
SYSTOLIC BLOOD PRESSURE: 155 MMHG | HEART RATE: 66 BPM | BODY MASS INDEX: 27.26 KG/M2 | OXYGEN SATURATION: 97 % | WEIGHT: 201 LBS | DIASTOLIC BLOOD PRESSURE: 83 MMHG | TEMPERATURE: 100 F

## 2023-01-19 DIAGNOSIS — C90.00 MULTIPLE MYELOMA NOT HAVING ACHIEVED REMISSION: Primary | ICD-10-CM

## 2023-01-19 PROCEDURE — 99214 OFFICE O/P EST MOD 30 MIN: CPT | Mod: PBBFAC | Performed by: NURSE PRACTITIONER

## 2023-01-19 PROCEDURE — 99215 PR OFFICE/OUTPT VISIT, EST, LEVL V, 40-54 MIN: ICD-10-PCS | Mod: S$PBB,,, | Performed by: NURSE PRACTITIONER

## 2023-01-19 PROCEDURE — 99999 PR PBB SHADOW E&M-EST. PATIENT-LVL IV: ICD-10-PCS | Mod: PBBFAC,,, | Performed by: NURSE PRACTITIONER

## 2023-01-19 PROCEDURE — 99215 OFFICE O/P EST HI 40 MIN: CPT | Mod: S$PBB,,, | Performed by: NURSE PRACTITIONER

## 2023-01-19 PROCEDURE — 99999 PR PBB SHADOW E&M-EST. PATIENT-LVL IV: CPT | Mod: PBBFAC,,, | Performed by: NURSE PRACTITIONER

## 2023-01-19 RX ORDER — BORTEZOMIB 3.5 MG/1
1.3 INJECTION, POWDER, LYOPHILIZED, FOR SOLUTION INTRAVENOUS; SUBCUTANEOUS
Status: CANCELLED | OUTPATIENT
Start: 2023-01-20

## 2023-01-19 RX ORDER — SODIUM CHLORIDE 0.9 % (FLUSH) 0.9 %
10 SYRINGE (ML) INJECTION
Status: CANCELLED | OUTPATIENT
Start: 2023-01-20

## 2023-01-19 RX ORDER — SODIUM CHLORIDE 0.9 % (FLUSH) 0.9 %
10 SYRINGE (ML) INJECTION
Status: CANCELLED | OUTPATIENT
Start: 2023-01-27

## 2023-01-19 RX ORDER — HEPARIN 100 UNIT/ML
500 SYRINGE INTRAVENOUS
Status: CANCELLED | OUTPATIENT
Start: 2023-01-20

## 2023-01-19 RX ORDER — SODIUM CHLORIDE 0.9 % (FLUSH) 0.9 %
10 SYRINGE (ML) INJECTION
Status: CANCELLED | OUTPATIENT
Start: 2023-02-03

## 2023-01-19 RX ORDER — BORTEZOMIB 3.5 MG/1
1.3 INJECTION, POWDER, LYOPHILIZED, FOR SOLUTION INTRAVENOUS; SUBCUTANEOUS
Status: CANCELLED | OUTPATIENT
Start: 2023-01-27

## 2023-01-19 RX ORDER — PANTOPRAZOLE SODIUM 40 MG/1
40 TABLET, DELAYED RELEASE ORAL DAILY
Qty: 30 TABLET | Refills: 1 | Status: ON HOLD | OUTPATIENT
Start: 2023-01-19 | End: 2023-07-03 | Stop reason: HOSPADM

## 2023-01-19 RX ORDER — BORTEZOMIB 3.5 MG/1
1.3 INJECTION, POWDER, LYOPHILIZED, FOR SOLUTION INTRAVENOUS; SUBCUTANEOUS
Status: CANCELLED | OUTPATIENT
Start: 2023-02-03

## 2023-01-19 RX ORDER — HEPARIN 100 UNIT/ML
500 SYRINGE INTRAVENOUS
Status: CANCELLED | OUTPATIENT
Start: 2023-01-27

## 2023-01-19 RX ORDER — HEPARIN 100 UNIT/ML
500 SYRINGE INTRAVENOUS
Status: CANCELLED | OUTPATIENT
Start: 2023-02-03

## 2023-01-19 NOTE — PROGRESS NOTES
HEMATOLOGY/ONCOLOGY OFFICE CLINIC VISIT    Visit Information:    Initial Evaluation: 8/10/2022  Referring Provider:   Other providers:  Code status: Not addressed    Diagnosis:  Multiple Myeloma--IgG, Kappa  Macrocytic anemia    Present treatment:   VRD-Started on 12/9/22    Treatment/Oncology history:    Plan of care:  Complete myeloma workup    Imaging:   US abdomen 8/24/2022: Spleen: 10.8 cm. Mild hepatomegaly with suspected mild hepatic steatosis. Cholelithiasis.  PET CT 9/28/2022: 1. Right scapular small focal mild hypermetabolism without lytic or sclerotic abnormality is not convinced to be related to multiple myeloma. 2. No definite skeletal lytic abnormality with hypermetabolism to reflect multiple myeloma on this exam. 3. Bilateral small pleural effusions.   4. Gallstones. 5.  Noninflamed diverticulosis coli. 6.  Prostatomegaly.    Pathology:  BONE MARROW BIOPSY 9/1/2022: PLASMA CELL MYELOMA, KAPPA RESTRICTED. NORMOCELLULAR MARROW (30%) WITH 70% INVOLVEMENT BY PLASMA CELL MYELOMA. BACKGROUND TRILINEAGE HEMATOPOIESIS IS DECREASED.      CLINICAL HISTORY:       Patient: Valentino Manning is a 72 y.o. male with multiple medical problems kindly referred for persistent anemia.  Reviewing electronic medical record patient with anemia since 02/10/2016.  From 2016 to January of 2017 anemia was mild.  Anemia slowly worsening back in 2018 hemoglobin was 8.0.  At that same time kidney function was worsening as well.     Most recent labs with HGB of 9.4, .9, platelets 231 K, WBC 5.0.  Chemistries with globulin of 5.3.  BUN/creatinine 27.7/1.41.    No family history of leukemia, lymphoma or multiple myeloma.      Chief Complaint: Severe abdominal pain, weakness,  and tremors        Interval History:  1/19/23  Patient presents to clinic today for f/u prior to cycle 3.  He started Velacade/Revlimid/Dex on 12/9/22. MDA recommended that he start RVD then repeat bone marrow after 4 cycles. Per Dr. Brito at [facility]  "consultation on 1/3/23, "I was going to recommend addition of subq daratumumab to his VRD (weekly x 8 dose, then q 2 week x 8 doses, then q 4 weeks) per kodak study"  Patient is complaining of GI upset that mostly occurs after he eats and on the days that he takes Revlimid. He described his abdominal pain as a burning sensation.   He denies any fever, chills, sweats.  No chest pain or shortness of breath at rest.  He denies any bleeding. He is experiencing "muscle fatigue" and a sensation of shaking inside (his words). He is also experiencing decreased energy levels.            Past Medical History:   Diagnosis Date    Anemia     Heart problem     Hyperlipidemia     Hypertension       Past Surgical History:   Procedure Laterality Date    BONE MARROW ASPIRATION N/A 9/1/2022    Procedure: ASPIRATION, BONE MARROW;  Surgeon: Robbie Gardner MD;  Location: [facility];  Service: General;  Laterality: N/A;    CARDIAC SURGERY  2021    ENDOSCOPIC RELEASE OF BOTH CARPAL TUNNELS       Family History   Problem Relation Age of Onset    Hypertension Mother     Diabetes Mother     Anemia Mother     Heart disease Father      Social Connections: Not on file       Review of patient's allergies indicates:   Allergen Reactions    Penicillins       Current Outpatient Medications on File Prior to Visit   Medication Sig Dispense Refill    acyclovir (ZOVIRAX) 400 MG tablet TAKE 1 TABLET BY MOUTH TWICE DAILY 180 tablet 1    amLODIPine (NORVASC) 10 MG tablet       ascorbic acid, vitamin C, (VITAMIN C) 1000 MG tablet Take 1,000 mg by mouth once daily.      atorvastatin (LIPITOR) 40 MG tablet TAKE 1 TABLET ORALLY ONCE DAILY ON MON/TUE/THUR/FRI AND 1/2 ON WED. NONE ON SAT/SUN      b complex vitamins capsule Take 1 capsule by mouth once daily.      clopidogreL (PLAVIX) 75 mg tablet Take 75 mg by mouth once daily.      dexAMETHasone (DECADRON) 4 MG Tab Take 40 mg by mouth As instructed. 40mg (4mg tabs 10) PO Daily once weekly      doxazosin " (CARDURA) 4 MG tablet Take 4 mg by mouth once daily.      dutasteride (AVODART) 0.5 mg capsule Take 0.5 mg by mouth once daily.      FERRETTS 325 mg (106 mg iron) Tab Take 1 tablet by mouth 2 (two) times daily.      glutamine 10 gram PwPk Take 10 g by mouth 2 (two) times a day.      lenalidomide 25 mg Cap TAKE 1 CAPSULE BY MOUTH EVERY DAY AS DIRECTED FOR 14 DAYS EVERY 21 DAYS ( 2 WEEKS ON, 1 WEEK OFF) 14 capsule 0    LINZESS 145 mcg Cap capsule Take 145 mcg by mouth.      lisinopriL (PRINIVIL,ZESTRIL) 40 MG tablet Take 40 mg by mouth.      metoprolol tartrate (LOPRESSOR) 25 MG tablet Take 25 mg by mouth 2 (two) times daily. Takes 1 tab q am      ondansetron (ZOFRAN) 4 MG tablet Take 4 mg by mouth every 8 (eight) hours as needed for Nausea.      pantoprazole (PROTONIX) 40 MG tablet TAKE 1 TABLET BY MOUTH EVERY MORNING 30 MIN BEFORE BREAKFAST      pyridoxine, vitamin B6, (B-6) 250 MG Tab Take 250 mg by mouth once daily.      sulfamethoxazole-trimethoprim 800-160mg (BACTRIM DS) 800-160 mg Tab Take 1 tablet by mouth As instructed. Tab 1 PO Daily M-W-F      zolpidem (AMBIEN) 10 mg Tab Take 10 mg by mouth once daily.       No current facility-administered medications on file prior to visit.      Review of Systems   Constitutional:  Positive for fatigue. Negative for activity change, appetite change, chills, diaphoresis, fever and unexpected weight change.   HENT:  Negative for nasal congestion, mouth sores, nosebleeds, postnasal drip, sinus pressure/congestion, sore throat and trouble swallowing.    Eyes:  Negative for visual disturbance.   Respiratory:  Negative for shortness of breath.    Cardiovascular:  Negative for chest pain and palpitations.   Gastrointestinal:  Negative for abdominal distention, abdominal pain, blood in stool, change in bowel habit, constipation, diarrhea, nausea, vomiting and change in bowel habit.   Endocrine: Negative.    Genitourinary:  Negative for bladder incontinence, decreased urine  volume, difficulty urinating, dysuria, frequency, hematuria, scrotal swelling, testicular pain and urgency.   Musculoskeletal:  Negative for arthralgias, back pain, gait problem, joint swelling, leg pain, myalgias and neck pain.   Integumentary:  Negative for rash.   Neurological:  Negative for dizziness, tremors, seizures, syncope, speech difficulty, weakness, light-headedness, numbness, headaches and memory loss.   Hematological:  Does not bruise/bleed easily.   Psychiatric/Behavioral:  Negative for agitation, confusion, hallucinations, sleep disturbance and suicidal ideas. The patient is not nervous/anxious.             Vitals:    01/19/23 1337   BP: (!) 155/83   BP Location: Left arm   Patient Position: Sitting   Pulse: 66   Temp: 99.5 °F (37.5 °C)   TempSrc: Oral   SpO2: 97%   Weight: 91.2 kg (201 lb)            Physical Exam  Constitutional:       Appearance: He is obese.   HENT:      Head: Normocephalic and atraumatic.   Eyes:      Extraocular Movements: Extraocular movements intact.   Pulmonary:      Effort: Pulmonary effort is normal.   Musculoskeletal:      Cervical back: Neck supple.   Neurological:      Mental Status: He is alert and oriented to person, place, and time.   Psychiatric:         Mood and Affect: Mood normal.         Behavior: Behavior normal.         Judgment: Judgment normal.     ECOG SCORE             Laboratory:  CBC with Differential:  Lab Results   Component Value Date    WBC 3.2 (L) 01/19/2023    RBC 3.12 (L) 01/19/2023    HGB 10.4 (L) 01/19/2023    HCT 32.2 (L) 01/19/2023    .2 (H) 01/19/2023    MCH 33.3 01/19/2023    MCHC 32.3 (L) 01/19/2023    RDW 14.6 01/19/2023     (L) 01/19/2023    MPV 8.6 01/19/2023         Latest Reference Range & Units 08/10/22 13:49   Kappa Qnt Free Light Chains 3.30 - 19.40 mg/L 491.97 (H)   Lambda Qnt Free Light Chains 5.71 - 26.30 mg/L 4.76 (L)   [place]inte/Lambda Free Light Chain Ratio 0.26 - 1.65  103.36 (H)   M SPIKE gm/dl 2.24      Latest  Reference Range & Units 08/10/22 13:49   Immunoglobulin A 68 - 408 mg/dL 10 (L)   Immunoglobulin G 768 - 1632 mg/dL 4882 (H)   Immunoglobulin M 35 - 263 mg/dL 12 (L)   (L): Data is abnormally low  (H): Data is abnormally high    M-spike in the beta/gamma region.  The monoclonal protein peak accounts for 3.39 g/dL of the total protein in the beta and gamma regions.  This quantitation may include complement components.  BETTY gel pattern shows an IgG type kappa monoclonal protein.    SPEP:  Total protein and globulin are both increased.  A/G ratio is low.     Serum protein electrophoresis shows a decreased albumin fraction, decreased beta globulin fraction, and increased gamma globulin fraction.   A monoclonal protein (M-spike) is present in the gamma region (2.24 g/dL).     BETTY:  Immunofixation shows an IgG monoclonal protein with kappa light chain specificity.       CMP:  Sodium Level   Date Value Ref Range Status   01/13/2023 139 136 - 145 mmol/L Final     Potassium Level   Date Value Ref Range Status   01/13/2023 3.7 3.5 - 5.1 mmol/L Final     Carbon Dioxide   Date Value Ref Range Status   01/13/2023 22 (L) 23 - 31 mmol/L Final     Blood Urea Nitrogen   Date Value Ref Range Status   01/13/2023 17.9 8.4 - 25.7 mg/dL Final     Creatinine   Date Value Ref Range Status   01/13/2023 1.06 0.73 - 1.18 mg/dL Final     Calcium Level Total   Date Value Ref Range Status   01/13/2023 9.0 8.8 - 10.0 mg/dL Final     Albumin Level   Date Value Ref Range Status   01/13/2023 3.3 (L) 3.4 - 4.8 g/dL Final     Bilirubin Total   Date Value Ref Range Status   01/13/2023 0.9 <=1.5 mg/dL Final     Alkaline Phosphatase   Date Value Ref Range Status   01/13/2023 75 40 - 150 unit/L Final     Aspartate Aminotransferase   Date Value Ref Range Status   01/13/2023 21 5 - 34 unit/L Final     Alanine Aminotransferase   Date Value Ref Range Status   01/13/2023 48 0 - 55 unit/L Final     Estimated GFR-Non    Date Value Ref Range  Status   06/22/2022 53 mls/min/1.73/m2 Final       Latest Reference Range & Units 08/10/22 13:49   MACEY Negative  Negative   ds DNA Ab Negative  Negative   Anti-SSA Interpretation Negative  Negative   Anti-SSB Interpretation Negative  Negative      Latest Reference Range & Units 11/10/21 10:27 08/10/22 13:49   Centromere Protein Antibody Negative   Negative   HARRIS-1 Antibody Negative   Negative   Scleroderma (Scl-70S) Antibody Negative   Negative   Rheumatoid Factor <<=30 IU/mL <13    RNP70 Antibody Negative   Negative   Hand DP IgG   Negative   U1RNP Antibody Negative   Negative          Assessment:       1. Multiple myeloma not having achieved remission        Again I have a long discussion with the patient and his wife about of multiple myeloma.  Discussed with the patient that Multiple myeloma is a type of bone marrow cancer that forms in a type of white blood cell called a plasma cell. Plasma cells help you fight infections by making antibodies that recognize and attack germs but when they transformed into cancer cells then they are not efficient and don't work well. This cells can affect not only the bone marrow but the bones in general causing distinctive bone lesions called Lytic lesions. It also can affect the kidney. Discussed prognosis and treatment recommendations and indications.  Discussed in detail blood work, PET-CT and bone marrow biopsy.  He does have advance myeloma, stage III, therefore treatment is indicated at this time.  Discuss treatment option, risks versus benefit of was at toxicities associated with it.  He is waiting to start treatment.        Plan:       Patient was seen at [facility]. He will start treatment here with Velcade/Revlimid/Dexamethamasone. He said that he is a candidate for transplant and they will eval bone marrow again after 4 cycles.   He then saw Dr. Brito who recommended adding Darzalex (weekly x 8 dose, then q 2 week x 8 doses, then q 4 weeks per kodak study). Will add  to start next week- need PA.   Cycle 3, Day 1 of Velcade/Revlimid/Dexamethamasone due tomorrow. Velcade is once weekly. Revlimid is 14/21 days and Dex is once weekly.   Recommended that he see GI for his GI upset and start Protonix as previously recommended by his GI doctor in 11/2022.   RTC in 3 weeks with labs the day before cycle 4. Encourage to call or message us for any questions or problems    The patient was given ample opportunity to ask questions, and to the best of my abilities, all questions answered to satisfaction; patient demonstrated understanding of what we discussed and agreeable to the plan.       HERMINIO Alanis

## 2023-01-20 ENCOUNTER — INFUSION (OUTPATIENT)
Dept: INFUSION THERAPY | Facility: HOSPITAL | Age: 73
End: 2023-01-20
Attending: INTERNAL MEDICINE
Payer: MEDICARE

## 2023-01-20 VITALS
RESPIRATION RATE: 18 BRPM | DIASTOLIC BLOOD PRESSURE: 83 MMHG | SYSTOLIC BLOOD PRESSURE: 135 MMHG | OXYGEN SATURATION: 99 % | TEMPERATURE: 99 F | HEART RATE: 77 BPM

## 2023-01-20 DIAGNOSIS — C90.00 MULTIPLE MYELOMA NOT HAVING ACHIEVED REMISSION: Primary | ICD-10-CM

## 2023-01-20 PROCEDURE — 63600175 PHARM REV CODE 636 W HCPCS: Mod: TB,JG | Performed by: NURSE PRACTITIONER

## 2023-01-20 PROCEDURE — 96401 CHEMO ANTI-NEOPL SQ/IM: CPT

## 2023-01-20 RX ORDER — SODIUM CHLORIDE 0.9 % (FLUSH) 0.9 %
10 SYRINGE (ML) INJECTION
Status: DISCONTINUED | OUTPATIENT
Start: 2023-01-20 | End: 2023-01-20 | Stop reason: HOSPADM

## 2023-01-20 RX ORDER — HEPARIN 100 UNIT/ML
500 SYRINGE INTRAVENOUS
Status: DISCONTINUED | OUTPATIENT
Start: 2023-01-20 | End: 2023-01-20 | Stop reason: HOSPADM

## 2023-01-20 RX ORDER — BORTEZOMIB 3.5 MG/1
1.3 INJECTION, POWDER, LYOPHILIZED, FOR SOLUTION INTRAVENOUS; SUBCUTANEOUS
Status: COMPLETED | OUTPATIENT
Start: 2023-01-20 | End: 2023-01-20

## 2023-01-20 RX ADMIN — BORTEZOMIB 2.8 MG: 3.5 INJECTION, POWDER, LYOPHILIZED, FOR SOLUTION INTRAVENOUS; SUBCUTANEOUS at 10:01

## 2023-01-24 RX ORDER — FAMOTIDINE 20 MG/1
20 TABLET, FILM COATED ORAL
Status: CANCELLED
Start: 2023-01-27

## 2023-01-24 RX ORDER — DEXAMETHASONE 4 MG/1
20 TABLET ORAL
Status: CANCELLED
Start: 2023-01-27

## 2023-01-24 RX ORDER — MONTELUKAST SODIUM 10 MG/1
10 TABLET ORAL DAILY
Status: CANCELLED
Start: 2023-01-27

## 2023-01-27 ENCOUNTER — INFUSION (OUTPATIENT)
Dept: INFUSION THERAPY | Facility: HOSPITAL | Age: 73
End: 2023-01-27
Attending: INTERNAL MEDICINE
Payer: MEDICARE

## 2023-01-27 ENCOUNTER — LAB VISIT (OUTPATIENT)
Dept: LAB | Facility: HOSPITAL | Age: 73
End: 2023-01-27
Attending: INTERNAL MEDICINE
Payer: MEDICARE

## 2023-01-27 VITALS
OXYGEN SATURATION: 97 % | DIASTOLIC BLOOD PRESSURE: 75 MMHG | TEMPERATURE: 98 F | RESPIRATION RATE: 18 BRPM | SYSTOLIC BLOOD PRESSURE: 154 MMHG | HEART RATE: 74 BPM

## 2023-01-27 DIAGNOSIS — C90.00 MULTIPLE MYELOMA NOT HAVING ACHIEVED REMISSION: ICD-10-CM

## 2023-01-27 DIAGNOSIS — C90.00 MULTIPLE MYELOMA NOT HAVING ACHIEVED REMISSION: Primary | ICD-10-CM

## 2023-01-27 LAB
ALBUMIN SERPL-MCNC: 3.2 G/DL (ref 3.4–4.8)
ALBUMIN/GLOB SERPL: 1 RATIO (ref 1.1–2)
ALP SERPL-CCNC: 78 UNIT/L (ref 40–150)
ALT SERPL-CCNC: 35 UNIT/L (ref 0–55)
AST SERPL-CCNC: 16 UNIT/L (ref 5–34)
BASOPHILS # BLD AUTO: 0.01 X10(3)/MCL (ref 0–0.2)
BASOPHILS NFR BLD AUTO: 0.2 %
BILIRUBIN DIRECT+TOT PNL SERPL-MCNC: 1.3 MG/DL
BUN SERPL-MCNC: 14.9 MG/DL (ref 8.4–25.7)
CALCIUM SERPL-MCNC: 9.2 MG/DL (ref 8.8–10)
CHLORIDE SERPL-SCNC: 107 MMOL/L (ref 98–107)
CO2 SERPL-SCNC: 24 MMOL/L (ref 23–31)
CREAT SERPL-MCNC: 1.21 MG/DL (ref 0.73–1.18)
EOSINOPHIL # BLD AUTO: 0.38 X10(3)/MCL (ref 0–0.9)
EOSINOPHIL NFR BLD AUTO: 6.4 %
ERYTHROCYTE [DISTWIDTH] IN BLOOD BY AUTOMATED COUNT: 14.8 % (ref 11.5–17)
GFR SERPLBLD CREATININE-BSD FMLA CKD-EPI: >60 MLS/MIN/1.73/M2
GLOBULIN SER-MCNC: 3.1 GM/DL (ref 2.4–3.5)
GLUCOSE SERPL-MCNC: 99 MG/DL (ref 82–115)
HCT VFR BLD AUTO: 30.3 % (ref 42–52)
HGB BLD-MCNC: 9.8 GM/DL (ref 14–18)
IGA SERPL-MCNC: 28 MG/DL (ref 101–645)
IGG SERPL-MCNC: 1384 MG/DL (ref 540–1822)
IGM SERPL-MCNC: 33 MG/DL (ref 22–240)
IMM GRANULOCYTES # BLD AUTO: 0.05 X10(3)/MCL (ref 0–0.04)
IMM GRANULOCYTES NFR BLD AUTO: 0.8 %
LYMPHOCYTES # BLD AUTO: 0.79 X10(3)/MCL (ref 0.6–4.6)
LYMPHOCYTES NFR BLD AUTO: 13.2 %
MCH RBC QN AUTO: 32.9 PG
MCHC RBC AUTO-ENTMCNC: 32.3 MG/DL (ref 33–36)
MCV RBC AUTO: 101.7 FL (ref 80–94)
MONOCYTES # BLD AUTO: 0.28 X10(3)/MCL (ref 0.1–1.3)
MONOCYTES NFR BLD AUTO: 4.7 %
NEUTROPHILS # BLD AUTO: 4.47 X10(3)/MCL (ref 2.1–9.2)
NEUTROPHILS NFR BLD AUTO: 74.7 %
PLATELET # BLD AUTO: 195 X10(3)/MCL (ref 130–400)
PMV BLD AUTO: 9.7 FL (ref 7.4–10.4)
POTASSIUM SERPL-SCNC: 4.2 MMOL/L (ref 3.5–5.1)
PROT SERPL-MCNC: 6.3 GM/DL (ref 5.8–7.6)
RBC # BLD AUTO: 2.98 X10(6)/MCL (ref 4.7–6.1)
SODIUM SERPL-SCNC: 138 MMOL/L (ref 136–145)
WBC # SPEC AUTO: 6 X10(3)/MCL (ref 4.5–11.5)

## 2023-01-27 PROCEDURE — 86334 IMMUNOFIX E-PHORESIS SERUM: CPT

## 2023-01-27 PROCEDURE — 25000003 PHARM REV CODE 250: Performed by: NURSE PRACTITIONER

## 2023-01-27 PROCEDURE — 80053 COMPREHEN METABOLIC PANEL: CPT

## 2023-01-27 PROCEDURE — 85025 COMPLETE CBC W/AUTO DIFF WBC: CPT

## 2023-01-27 PROCEDURE — 84165 PROTEIN E-PHORESIS SERUM: CPT

## 2023-01-27 PROCEDURE — 63600175 PHARM REV CODE 636 W HCPCS: Mod: TB | Performed by: NURSE PRACTITIONER

## 2023-01-27 PROCEDURE — 83521 IG LIGHT CHAINS FREE EACH: CPT | Mod: 59

## 2023-01-27 PROCEDURE — 96401 CHEMO ANTI-NEOPL SQ/IM: CPT

## 2023-01-27 PROCEDURE — 36415 COLL VENOUS BLD VENIPUNCTURE: CPT

## 2023-01-27 PROCEDURE — 82784 ASSAY IGA/IGD/IGG/IGM EACH: CPT

## 2023-01-27 RX ORDER — MONTELUKAST SODIUM 10 MG/1
10 TABLET ORAL DAILY
Status: DISCONTINUED | OUTPATIENT
Start: 2023-01-27 | End: 2023-01-27 | Stop reason: HOSPADM

## 2023-01-27 RX ORDER — FAMOTIDINE 20 MG/1
20 TABLET, FILM COATED ORAL
Status: COMPLETED | OUTPATIENT
Start: 2023-01-27 | End: 2023-01-27

## 2023-01-27 RX ORDER — SODIUM CHLORIDE 0.9 % (FLUSH) 0.9 %
10 SYRINGE (ML) INJECTION
Status: DISCONTINUED | OUTPATIENT
Start: 2023-01-27 | End: 2023-01-27 | Stop reason: HOSPADM

## 2023-01-27 RX ORDER — DEXAMETHASONE 4 MG/1
20 TABLET ORAL
Status: DISCONTINUED | OUTPATIENT
Start: 2023-01-27 | End: 2023-01-27 | Stop reason: HOSPADM

## 2023-01-27 RX ORDER — BORTEZOMIB 3.5 MG/1
1.3 INJECTION, POWDER, LYOPHILIZED, FOR SOLUTION INTRAVENOUS; SUBCUTANEOUS
Status: COMPLETED | OUTPATIENT
Start: 2023-01-27 | End: 2023-01-27

## 2023-01-27 RX ORDER — HEPARIN 100 UNIT/ML
500 SYRINGE INTRAVENOUS
Status: DISCONTINUED | OUTPATIENT
Start: 2023-01-27 | End: 2023-01-27 | Stop reason: HOSPADM

## 2023-01-27 RX ADMIN — FAMOTIDINE 20 MG: 20 TABLET, FILM COATED ORAL at 10:01

## 2023-01-27 RX ADMIN — MONTELUKAST SODIUM 10 MG: 10 TABLET, COATED ORAL at 10:01

## 2023-01-27 RX ADMIN — DARATUMUMAB AND HYALURONIDASE-FIHJ (HUMAN RECOMBINANT) 1800 MG: 1800; 30000 INJECTION SUBCUTANEOUS at 10:01

## 2023-01-27 RX ADMIN — BORTEZOMIB 2.8 MG: 3.5 INJECTION, POWDER, LYOPHILIZED, FOR SOLUTION INTRAVENOUS; SUBCUTANEOUS at 10:01

## 2023-01-27 NOTE — PLAN OF CARE
Patient received Velcade/ Darzalex. Tolerated well. Monitor two hours post Darzalex injection. No adverse reaction noted. Discharge in stable condition.

## 2023-01-30 LAB
ALBUMIN % SPEP (OHS): 49.16
ALBUMIN SERPL-MCNC: 3.1 G/DL (ref 3.4–4.8)
ALBUMIN/GLOB SERPL: 1 RATIO (ref 1.1–2)
ALPHA 1 GLOB (OHS): 0.31 GM/DL
ALPHA 1 GLOB% (OHS): 4.97
ALPHA 2 GLOB % (OHS): 13.97
ALPHA 2 GLOB (OHS): 0.88 GM/DL
BETA GLOB (OHS): 0.86 GM/DL
BETA GLOB% (OHS): 13.69
GAMMA GLOBULIN % (OHS): 18.21
GAMMA GLOBULIN (OHS): 1.15 GM/DL
GLOBULIN SER-MCNC: 3.2 GM/DL (ref 2.4–3.5)
KAPPA LC FREE SER-MCNC: 6.07 MG/DL (ref 0.33–1.94)
KAPPA LC FREE/LAMBDA FREE SER: 5.32 {RATIO} (ref 0.26–1.65)
LAMBDA LC FREE SERPL-MCNC: 1.14 MG/DL (ref 0.57–2.63)
M SPIKE % (OHS): 7.7
M SPIKE (OHS): 0.48 GM/DL
PATH REV: NORMAL
PROT SERPL-MCNC: 6.3 GM/DL (ref 5.8–7.6)

## 2023-02-02 ENCOUNTER — OFFICE VISIT (OUTPATIENT)
Dept: URGENT CARE | Facility: CLINIC | Age: 73
End: 2023-02-02
Payer: MEDICARE

## 2023-02-02 VITALS
DIASTOLIC BLOOD PRESSURE: 72 MMHG | SYSTOLIC BLOOD PRESSURE: 134 MMHG | TEMPERATURE: 98 F | WEIGHT: 195 LBS | RESPIRATION RATE: 20 BRPM | BODY MASS INDEX: 26.41 KG/M2 | OXYGEN SATURATION: 98 % | HEART RATE: 90 BPM | HEIGHT: 72 IN

## 2023-02-02 DIAGNOSIS — R05.9 COUGH, UNSPECIFIED TYPE: ICD-10-CM

## 2023-02-02 DIAGNOSIS — R09.81 CONGESTION OF NASAL SINUS: ICD-10-CM

## 2023-02-02 DIAGNOSIS — J20.9 ACUTE BRONCHITIS, UNSPECIFIED ORGANISM: Primary | ICD-10-CM

## 2023-02-02 LAB
CTP QC/QA: YES
CTP QC/QA: YES
POC MOLECULAR INFLUENZA A AGN: NEGATIVE
POC MOLECULAR INFLUENZA B AGN: NEGATIVE
SARS-COV-2 RDRP RESP QL NAA+PROBE: NEGATIVE

## 2023-02-02 PROCEDURE — 87502 INFLUENZA DNA AMP PROBE: CPT | Mod: QW,,, | Performed by: FAMILY MEDICINE

## 2023-02-02 PROCEDURE — 87502 POCT INFLUENZA A/B MOLECULAR: ICD-10-PCS | Mod: QW,,, | Performed by: FAMILY MEDICINE

## 2023-02-02 PROCEDURE — 87635 SARS-COV-2 COVID-19 AMP PRB: CPT | Mod: QW,CR,, | Performed by: FAMILY MEDICINE

## 2023-02-02 PROCEDURE — 87635: ICD-10-PCS | Mod: QW,CR,, | Performed by: FAMILY MEDICINE

## 2023-02-02 PROCEDURE — 99203 OFFICE O/P NEW LOW 30 MIN: CPT | Mod: ,,, | Performed by: FAMILY MEDICINE

## 2023-02-02 PROCEDURE — 99203 PR OFFICE/OUTPT VISIT, NEW, LEVL III, 30-44 MIN: ICD-10-PCS | Mod: ,,, | Performed by: FAMILY MEDICINE

## 2023-02-02 RX ORDER — UREA 10 %
1 LOTION (ML) TOPICAL 2 TIMES DAILY
Status: ON HOLD | COMMUNITY
Start: 2022-12-05 | End: 2023-07-03 | Stop reason: HOSPADM

## 2023-02-02 RX ORDER — PROMETHAZINE HYDROCHLORIDE 25 MG/1
TABLET ORAL
Status: ON HOLD | COMMUNITY
Start: 2022-12-05 | End: 2023-07-03 | Stop reason: HOSPADM

## 2023-02-02 RX ORDER — HYDROCODONE BITARTRATE AND ACETAMINOPHEN 10; 325 MG/1; MG/1
1 TABLET ORAL
COMMUNITY
Start: 2022-12-05 | End: 2023-05-19 | Stop reason: SDUPTHER

## 2023-02-02 RX ORDER — ALBUTEROL SULFATE 90 UG/1
2 AEROSOL, METERED RESPIRATORY (INHALATION) EVERY 6 HOURS PRN
Qty: 8 G | Refills: 0 | Status: SHIPPED | OUTPATIENT
Start: 2023-02-02 | End: 2023-08-24

## 2023-02-02 RX ORDER — LISINOPRIL 40 MG/1
1 TABLET ORAL DAILY
Status: ON HOLD | COMMUNITY
Start: 2022-09-24 | End: 2023-02-18 | Stop reason: HOSPADM

## 2023-02-02 RX ORDER — FUROSEMIDE 20 MG/1
TABLET ORAL
Status: ON HOLD | COMMUNITY
Start: 2023-01-13 | End: 2023-07-03 | Stop reason: HOSPADM

## 2023-02-02 RX ORDER — TESTOSTERONE CYPIONATE 200 MG/ML
INJECTION, SOLUTION INTRAMUSCULAR
Status: ON HOLD | COMMUNITY
Start: 2022-10-04 | End: 2023-07-03 | Stop reason: HOSPADM

## 2023-02-02 RX ORDER — LACTULOSE 10 G/15ML
SOLUTION ORAL; RECTAL
Status: ON HOLD | COMMUNITY
Start: 2022-12-05 | End: 2023-07-03 | Stop reason: HOSPADM

## 2023-02-02 RX ORDER — AMLODIPINE BESYLATE 10 MG/1
1 TABLET ORAL DAILY
Status: ON HOLD | COMMUNITY
Start: 2022-10-08 | End: 2023-02-18 | Stop reason: HOSPADM

## 2023-02-02 NOTE — PATIENT INSTRUCTIONS
Use albuterol inhaler or nebulizer two puffs every 4-6 hours as needed for wheeze.   Flonase and saline nasal spray twice a day, antihistamine at bedtime.  Force fluids.  Continue Levaquin.  Cough may linger a few weeks but should not have fever, chest pain, or shortness of breath.

## 2023-02-02 NOTE — PROGRESS NOTES
Subjective:       Patient ID: Valentino Manning is a 72 y.o. male.    Vitals:  height is 6' (1.829 m) and weight is 88.5 kg (195 lb). His temperature is 98 °F (36.7 °C). His blood pressure is 134/72 and his pulse is 90. His respiration is 20 and oxygen saturation is 98%.     Chief Complaint: Cough (Coughing up phlegm started  01/26 chest tightness, denies pain  went to primary 01/31 has not getting better. )    7 days of cough and chest congestion.  No fever.  Currently on chemo for MM.  Started Levaquin, on dose 3 today.       Constitution: Negative for fever.   HENT:  Positive for congestion, postnasal drip, sinus pressure and sore throat.    Cardiovascular:  Negative for chest pain and palpitations.   Respiratory:  Positive for chest tightness and cough. Negative for shortness of breath.    Gastrointestinal:  Negative for nausea and vomiting.   Neurological:  Negative for dizziness.     Objective:      Physical Exam   Constitutional: He is oriented to person, place, and time. He appears well-developed. No distress.   HENT:   Head: Normocephalic.   Ears:   Right Ear: Tympanic membrane and external ear normal.   Left Ear: Tympanic membrane and external ear normal.   Nose: Rhinorrhea present.   Mouth/Throat: Uvula is midline and mucous membranes are normal. No uvula swelling. Cobblestoning present. No oropharyngeal exudate or posterior oropharyngeal edema. Tonsils are 0 on the right. Tonsils are 0 on the left. No tonsillar exudate.   Eyes: Pupils are equal, round, and reactive to light. Right eye exhibits no discharge. Left eye exhibits no discharge.   Neck: Neck supple. No tracheal deviation present.   Cardiovascular: Normal rate, regular rhythm and normal heart sounds.   No murmur heard.  Pulmonary/Chest: Effort normal. No stridor. No respiratory distress. He has wheezes (throughout, more prominent LLL).   Lymphadenopathy:     He has no cervical adenopathy.   Neurological: no focal deficit. He is alert and oriented  to person, place, and time.   Skin: Skin is warm and dry.   Psychiatric: Thought content normal.   Nursing note and vitals reviewed.      Assessment:       1. Acute bronchitis, unspecified organism    2. Congestion of nasal sinus    3. Cough, unspecified type            Plan:         Acute bronchitis, unspecified organism  -     albuterol (PROAIR HFA) 90 mcg/actuation inhaler; Inhale 2 puffs into the lungs every 6 (six) hours as needed for Wheezing. Rescue  Dispense: 8 g; Refill: 0    Congestion of nasal sinus  -     POCT COVID-19 Rapid Screening  -     POCT Influenza A/B MOLECULAR    Cough, unspecified type  -     XR CHEST PA AND LATERAL; Future; Expected date: 02/02/2023            COVID and Flu negative.    Xray of the chest done today, per my review no focal pneumonia.  Final also reported as non concerning.

## 2023-02-03 ENCOUNTER — INFUSION (OUTPATIENT)
Dept: INFUSION THERAPY | Facility: HOSPITAL | Age: 73
End: 2023-02-03
Attending: INTERNAL MEDICINE
Payer: MEDICARE

## 2023-02-03 DIAGNOSIS — C90.00 MULTIPLE MYELOMA NOT HAVING ACHIEVED REMISSION: ICD-10-CM

## 2023-02-03 DIAGNOSIS — C90.00 MULTIPLE MYELOMA, REMISSION STATUS UNSPECIFIED: Primary | ICD-10-CM

## 2023-02-03 LAB
ALBUMIN SERPL-MCNC: 3 G/DL (ref 3.4–4.8)
ALBUMIN/GLOB SERPL: 1 RATIO (ref 1.1–2)
ALP SERPL-CCNC: 83 UNIT/L (ref 40–150)
ALT SERPL-CCNC: 75 UNIT/L (ref 0–55)
AST SERPL-CCNC: 31 UNIT/L (ref 5–34)
BASOPHILS # BLD AUTO: 0.01 X10(3)/MCL (ref 0–0.2)
BASOPHILS NFR BLD AUTO: 0.3 %
BILIRUBIN DIRECT+TOT PNL SERPL-MCNC: 0.7 MG/DL
BUN SERPL-MCNC: 14.6 MG/DL (ref 8.4–25.7)
CALCIUM SERPL-MCNC: 8.8 MG/DL (ref 8.8–10)
CHLORIDE SERPL-SCNC: 110 MMOL/L (ref 98–107)
CO2 SERPL-SCNC: 23 MMOL/L (ref 23–31)
CREAT SERPL-MCNC: 1.21 MG/DL (ref 0.73–1.18)
EOSINOPHIL # BLD AUTO: 0.28 X10(3)/MCL (ref 0–0.9)
EOSINOPHIL NFR BLD AUTO: 8.4 %
ERYTHROCYTE [DISTWIDTH] IN BLOOD BY AUTOMATED COUNT: 14.7 % (ref 11.5–17)
GFR SERPLBLD CREATININE-BSD FMLA CKD-EPI: >60 MLS/MIN/1.73/M2
GLOBULIN SER-MCNC: 3 GM/DL (ref 2.4–3.5)
GLUCOSE SERPL-MCNC: 103 MG/DL (ref 82–115)
HCT VFR BLD AUTO: 30.8 % (ref 42–52)
HGB BLD-MCNC: 9.7 GM/DL (ref 14–18)
IMM GRANULOCYTES # BLD AUTO: 0.01 X10(3)/MCL (ref 0–0.04)
IMM GRANULOCYTES NFR BLD AUTO: 0.3 %
LYMPHOCYTES # BLD AUTO: 0.69 X10(3)/MCL (ref 0.6–4.6)
LYMPHOCYTES NFR BLD AUTO: 20.8 %
MCH RBC QN AUTO: 31.8 PG
MCHC RBC AUTO-ENTMCNC: 31.5 MG/DL (ref 33–36)
MCV RBC AUTO: 101 FL (ref 80–94)
MONOCYTES # BLD AUTO: 0.24 X10(3)/MCL (ref 0.1–1.3)
MONOCYTES NFR BLD AUTO: 7.2 %
NEUTROPHILS # BLD AUTO: 2.09 X10(3)/MCL (ref 2.1–9.2)
NEUTROPHILS NFR BLD AUTO: 63 %
PLATELET # BLD AUTO: 211 X10(3)/MCL (ref 130–400)
PMV BLD AUTO: 9.7 FL (ref 7.4–10.4)
POTASSIUM SERPL-SCNC: 4.1 MMOL/L (ref 3.5–5.1)
PROT SERPL-MCNC: 6 GM/DL (ref 5.8–7.6)
RBC # BLD AUTO: 3.05 X10(6)/MCL (ref 4.7–6.1)
SODIUM SERPL-SCNC: 142 MMOL/L (ref 136–145)
WBC # SPEC AUTO: 3.3 X10(3)/MCL (ref 4.5–11.5)

## 2023-02-03 PROCEDURE — 80053 COMPREHEN METABOLIC PANEL: CPT

## 2023-02-03 PROCEDURE — 63600175 PHARM REV CODE 636 W HCPCS: Performed by: INTERNAL MEDICINE

## 2023-02-03 PROCEDURE — 25000003 PHARM REV CODE 250: Performed by: INTERNAL MEDICINE

## 2023-02-03 PROCEDURE — 96401 CHEMO ANTI-NEOPL SQ/IM: CPT

## 2023-02-03 PROCEDURE — 85025 COMPLETE CBC W/AUTO DIFF WBC: CPT

## 2023-02-03 PROCEDURE — 36415 COLL VENOUS BLD VENIPUNCTURE: CPT

## 2023-02-03 PROCEDURE — 63600175 PHARM REV CODE 636 W HCPCS: Mod: TB | Performed by: NURSE PRACTITIONER

## 2023-02-03 RX ORDER — HEPARIN 100 UNIT/ML
500 SYRINGE INTRAVENOUS
Status: DISCONTINUED | OUTPATIENT
Start: 2023-02-03 | End: 2023-02-03 | Stop reason: HOSPADM

## 2023-02-03 RX ORDER — FAMOTIDINE 20 MG/1
20 TABLET, FILM COATED ORAL
Status: CANCELLED
Start: 2023-02-03

## 2023-02-03 RX ORDER — FAMOTIDINE 20 MG/1
20 TABLET, FILM COATED ORAL
Status: COMPLETED | OUTPATIENT
Start: 2023-02-03 | End: 2023-02-03

## 2023-02-03 RX ORDER — SODIUM CHLORIDE 0.9 % (FLUSH) 0.9 %
10 SYRINGE (ML) INJECTION
Status: DISCONTINUED | OUTPATIENT
Start: 2023-02-03 | End: 2023-02-03 | Stop reason: HOSPADM

## 2023-02-03 RX ORDER — MONTELUKAST SODIUM 10 MG/1
10 TABLET ORAL DAILY
Status: CANCELLED
Start: 2023-02-03

## 2023-02-03 RX ORDER — DEXAMETHASONE 4 MG/1
20 TABLET ORAL
Status: CANCELLED
Start: 2023-02-03

## 2023-02-03 RX ORDER — DEXAMETHASONE 4 MG/1
20 TABLET ORAL
Status: COMPLETED | OUTPATIENT
Start: 2023-02-03 | End: 2023-02-03

## 2023-02-03 RX ORDER — BORTEZOMIB 3.5 MG/1
2.8 INJECTION, POWDER, LYOPHILIZED, FOR SOLUTION INTRAVENOUS; SUBCUTANEOUS
Status: COMPLETED | OUTPATIENT
Start: 2023-02-03 | End: 2023-02-03

## 2023-02-03 RX ORDER — MONTELUKAST SODIUM 10 MG/1
10 TABLET ORAL ONCE
Status: COMPLETED | OUTPATIENT
Start: 2023-02-03 | End: 2023-02-03

## 2023-02-03 RX ADMIN — DEXAMETHASONE 20 MG: 4 TABLET ORAL at 10:02

## 2023-02-03 RX ADMIN — MONTELUKAST SODIUM 10 MG: 10 TABLET, COATED ORAL at 10:02

## 2023-02-03 RX ADMIN — DARATUMUMAB AND HYALURONIDASE-FIHJ (HUMAN RECOMBINANT) 1800 MG: 1800; 30000 INJECTION SUBCUTANEOUS at 10:02

## 2023-02-03 RX ADMIN — FAMOTIDINE 20 MG: 20 TABLET, FILM COATED ORAL at 10:02

## 2023-02-03 RX ADMIN — BORTEZOMIB 2.8 MG: 3.5 INJECTION, POWDER, LYOPHILIZED, FOR SOLUTION INTRAVENOUS; SUBCUTANEOUS at 10:02

## 2023-02-08 DIAGNOSIS — C90.00 MULTIPLE MYELOMA NOT HAVING ACHIEVED REMISSION: Primary | ICD-10-CM

## 2023-02-09 ENCOUNTER — OFFICE VISIT (OUTPATIENT)
Dept: HEMATOLOGY/ONCOLOGY | Facility: CLINIC | Age: 73
End: 2023-02-09
Payer: MEDICARE

## 2023-02-09 VITALS
HEIGHT: 72 IN | SYSTOLIC BLOOD PRESSURE: 124 MMHG | OXYGEN SATURATION: 97 % | DIASTOLIC BLOOD PRESSURE: 77 MMHG | TEMPERATURE: 98 F | HEART RATE: 74 BPM | BODY MASS INDEX: 25.6 KG/M2 | WEIGHT: 189 LBS

## 2023-02-09 DIAGNOSIS — D64.9 ANEMIA, UNSPECIFIED TYPE: ICD-10-CM

## 2023-02-09 DIAGNOSIS — C90.00 MULTIPLE MYELOMA NOT HAVING ACHIEVED REMISSION: Primary | ICD-10-CM

## 2023-02-09 DIAGNOSIS — C90.00 MULTIPLE MYELOMA NOT HAVING ACHIEVED REMISSION: ICD-10-CM

## 2023-02-09 PROCEDURE — 99215 PR OFFICE/OUTPT VISIT, EST, LEVL V, 40-54 MIN: ICD-10-PCS | Mod: S$PBB,,, | Performed by: NURSE PRACTITIONER

## 2023-02-09 PROCEDURE — 99999 PR PBB SHADOW E&M-EST. PATIENT-LVL V: ICD-10-PCS | Mod: PBBFAC,,, | Performed by: NURSE PRACTITIONER

## 2023-02-09 PROCEDURE — 99215 OFFICE O/P EST HI 40 MIN: CPT | Mod: S$PBB,,, | Performed by: NURSE PRACTITIONER

## 2023-02-09 PROCEDURE — 99215 OFFICE O/P EST HI 40 MIN: CPT | Mod: PBBFAC | Performed by: NURSE PRACTITIONER

## 2023-02-09 PROCEDURE — 99999 PR PBB SHADOW E&M-EST. PATIENT-LVL V: CPT | Mod: PBBFAC,,, | Performed by: NURSE PRACTITIONER

## 2023-02-09 RX ORDER — SODIUM CHLORIDE 0.9 % (FLUSH) 0.9 %
10 SYRINGE (ML) INJECTION
Status: CANCELLED | OUTPATIENT
Start: 2023-02-10

## 2023-02-09 RX ORDER — LENALIDOMIDE 25 MG/1
CAPSULE ORAL
Qty: 14 CAPSULE | Refills: 0 | Status: SHIPPED | OUTPATIENT
Start: 2023-02-09 | End: 2023-02-22 | Stop reason: SDUPTHER

## 2023-02-09 RX ORDER — BORTEZOMIB 3.5 MG/1
1.3 INJECTION, POWDER, LYOPHILIZED, FOR SOLUTION INTRAVENOUS; SUBCUTANEOUS
Status: CANCELLED | OUTPATIENT
Start: 2023-02-10

## 2023-02-09 RX ORDER — DEXAMETHASONE 4 MG/1
20 TABLET ORAL
Status: CANCELLED
Start: 2023-02-10

## 2023-02-09 RX ORDER — HEPARIN 100 UNIT/ML
500 SYRINGE INTRAVENOUS
Status: CANCELLED | OUTPATIENT
Start: 2023-02-10

## 2023-02-09 RX ORDER — MONTELUKAST SODIUM 10 MG/1
10 TABLET ORAL
Status: CANCELLED
Start: 2023-02-10 | End: 2023-02-10

## 2023-02-09 RX ORDER — FAMOTIDINE 20 MG/1
20 TABLET, FILM COATED ORAL
Status: CANCELLED
Start: 2023-02-10

## 2023-02-09 NOTE — TELEPHONE ENCOUNTER
Patient requesting refill of revlimid; needs by tomorrow. He has a 2:00 appointment this afternoon.

## 2023-02-09 NOTE — PROGRESS NOTES
HEMATOLOGY/ONCOLOGY OFFICE CLINIC VISIT    Visit Information:    Initial Evaluation: 8/10/2022  Referring Provider:   Other providers:  Code status: Not addressed    Diagnosis:  Multiple Myeloma--IgG, Kappa  Macrocytic anemia    Present treatment:   VRD-Started on 12/9/22  Daratumumab added on 1/27/23    Treatment/Oncology history:    Plan of care:  Complete myeloma workup    Imaging:   US abdomen 8/24/2022: Spleen: 10.8 cm. Mild hepatomegaly with suspected mild hepatic steatosis. Cholelithiasis.  PET CT 9/28/2022: 1. Right scapular small focal mild hypermetabolism without lytic or sclerotic abnormality is not convinced to be related to multiple myeloma. 2. No definite skeletal lytic abnormality with hypermetabolism to reflect multiple myeloma on this exam. 3. Bilateral small pleural effusions.   4. Gallstones. 5.  Noninflamed diverticulosis coli. 6.  Prostatomegaly.    Pathology:  BONE MARROW BIOPSY 9/1/2022: PLASMA CELL MYELOMA, KAPPA RESTRICTED. NORMOCELLULAR MARROW (30%) WITH 70% INVOLVEMENT BY PLASMA CELL MYELOMA. BACKGROUND TRILINEAGE HEMATOPOIESIS IS DECREASED.      CLINICAL HISTORY:       Patient: Valentino Manning is a 72 y.o. male with multiple medical problems kindly referred for persistent anemia.  Reviewing electronic medical record patient with anemia since 02/10/2016.  From 2016 to January of 2017 anemia was mild.  Anemia slowly worsening back in 2018 hemoglobin was 8.0.  At that same time kidney function was worsening as well.     Most recent labs with HGB of 9.4, .9, platelets 231 K, WBC 5.0.  Chemistries with globulin of 5.3.  BUN/creatinine 27.7/1.41.    No family history of leukemia, lymphoma or multiple myeloma.      Chief Complaint: No Concerns today        Interval History:  1/19/23  Patient presents to clinic today for f/u prior to cycle 4.  He started Velacade/Revlimid/Dex on 12/9/22. MDA recommended that he start RVD then repeat bone marrow after 4 cycles. Per Dr. Brito at [facility]  "consultation on 1/3/23, "I was going to recommend addition of subq daratumumab to his VRD (weekly x 8 dose, then q 2 week x 8 doses, then q 4 weeks) per kodak study"    Patient has had more side effects since the addition of darzalex. More fatigue, shortness of breath, respiratory symptoms. He was treated by PCP for bronchitis.   He denies any bleeding. MM labs drawn on 1/27/23 reduction in kappa free light chains , IgG and  Mspike.        Past Medical History:   Diagnosis Date    Anemia     GERD (gastroesophageal reflux disease)     Heart problem     Hyperlipidemia     Hypertension     Multiple myeloma       Past Surgical History:   Procedure Laterality Date    BONE MARROW ASPIRATION N/A 9/1/2022    Procedure: ASPIRATION, BONE MARROW;  Surgeon: Robbie Gardner MD;  Location: [facility];  Service: General;  Laterality: N/A;    CARDIAC SURGERY  2021    ENDOSCOPIC RELEASE OF BOTH CARPAL TUNNELS       Family History   Problem Relation Age of Onset    Hypertension Mother     Diabetes Mother     Anemia Mother     Heart disease Mother     Heart disease Father      Social Connections: Not on file       Review of patient's allergies indicates:   Allergen Reactions    Penicillins       Current Outpatient Medications on File Prior to Visit   Medication Sig Dispense Refill    acyclovir (ZOVIRAX) 400 MG tablet TAKE 1 TABLET BY MOUTH TWICE DAILY 180 tablet 1    albuterol (PROAIR HFA) 90 mcg/actuation inhaler Inhale 2 puffs into the lungs every 6 (six) hours as needed for Wheezing. Rescue 8 g 0    amLODIPine (NORVASC) 10 MG tablet       amLODIPine (NORVASC) 10 MG tablet Take 1 tablet by mouth once daily.      ascorbic acid, vitamin C, (VITAMIN C) 1000 MG tablet Take 1,000 mg by mouth once daily.      atorvastatin (LIPITOR) 40 MG tablet TAKE 1 TABLET ORALLY ONCE DAILY ON MON/TUE/THUR/FRI AND 1/2 ON WED. NONE ON SAT/SUN      b complex vitamins capsule Take 1 capsule by mouth once daily.      clopidogreL (PLAVIX) 75 mg tablet Take 75 " mg by mouth once daily.      dexAMETHasone (DECADRON) 4 MG Tab Take 40 mg by mouth As instructed. 40mg (4mg tabs 10) PO Daily once weekly      doxazosin (CARDURA) 4 MG tablet Take 4 mg by mouth once daily.      dutasteride (AVODART) 0.5 mg capsule Take 0.5 mg by mouth once daily.      FERRETTS 325 mg (106 mg iron) Tab Take 1 tablet by mouth 2 (two) times daily.      furosemide (LASIX) 20 MG tablet       glutamine 10 gram PwPk Take 10 g by mouth 2 (two) times a day.      HYDROcodone-acetaminophen (NORCO)  mg per tablet Take 1 tablet by mouth as needed.      lactulose (CHRONULAC) 10 gram/15 mL solution GIVE 30ML BY MOUTH TWICE A DAY X7 DAY(S)      LINZESS 145 mcg Cap capsule Take 145 mcg by mouth.      lisinopriL (PRINIVIL,ZESTRIL) 40 MG tablet Take 40 mg by mouth.      lisinopriL (PRINIVIL,ZESTRIL) 40 MG tablet Take 1 tablet by mouth once daily.      metoprolol tartrate (LOPRESSOR) 25 MG tablet Take 25 mg by mouth 2 (two) times daily. Takes 1 tab q am      ondansetron (ZOFRAN) 4 MG tablet Take 4 mg by mouth every 8 (eight) hours as needed for Nausea.      pantoprazole (PROTONIX) 40 MG tablet Take 1 tablet (40 mg total) by mouth once daily. for 30 doses 30 tablet 1    promethazine (PHENERGAN) 25 MG tablet TAKE 1 TABLET BY MOUTH EVERY 4 HOURS AS NEEDED FOR MOTION SICKNESS      pyridoxine, vitamin B6, (B-6) 250 MG Tab Take 250 mg by mouth once daily.      SLOW RELEASE IRON 140 mg (45 mg iron) TbSR Take 1 tablet by mouth 2 (two) times daily.      sulfamethoxazole-trimethoprim 800-160mg (BACTRIM DS) 800-160 mg Tab Take 1 tablet by mouth As instructed. Tab 1 PO Daily M-W-F      testosterone cypionate (DEPOTESTOTERONE CYPIONATE) 200 mg/mL injection INJECT 1ML INTO MUSCLE ONCE A WEEK      zolpidem (AMBIEN) 10 mg Tab Take 10 mg by mouth once daily.      [DISCONTINUED] lenalidomide 25 mg Cap TAKE 1 CAPSULE BY MOUTH EVERY DAY AS DIRECTED FOR 14 DAYS EVERY 21 DAYS ( 2 WEEKS ON, 1 WEEK OFF) 14 capsule 0     No current  facility-administered medications on file prior to visit.      Review of Systems   Constitutional:  Positive for fatigue. Negative for activity change, appetite change, chills, diaphoresis, fever and unexpected weight change.   HENT:  Negative for nasal congestion, mouth sores, nosebleeds, postnasal drip, sinus pressure/congestion, sore throat and trouble swallowing.    Eyes:  Negative for visual disturbance.   Respiratory:  Positive for cough, shortness of breath and wheezing.    Cardiovascular:  Negative for chest pain and palpitations.   Gastrointestinal:  Negative for abdominal distention, abdominal pain, blood in stool, change in bowel habit, constipation, diarrhea, nausea, vomiting and change in bowel habit.   Endocrine: Negative.    Genitourinary:  Negative for bladder incontinence, decreased urine volume, difficulty urinating, dysuria, frequency, hematuria, scrotal swelling, testicular pain and urgency.   Musculoskeletal:  Negative for arthralgias, back pain, gait problem, joint swelling, leg pain, myalgias and neck pain.   Integumentary:  Negative for rash.   Neurological:  Negative for dizziness, tremors, seizures, syncope, speech difficulty, weakness, light-headedness, numbness, headaches and memory loss.   Hematological:  Does not bruise/bleed easily.   Psychiatric/Behavioral:  Negative for agitation, confusion, hallucinations, sleep disturbance and suicidal ideas. The patient is not nervous/anxious.             Vitals:    02/09/23 1336   BP: 124/77   BP Location: Left arm   Patient Position: Sitting   Pulse: 74   Temp: 97.7 °F (36.5 °C)   TempSrc: Oral   SpO2: 97%   Weight: 85.7 kg (189 lb)   Height: 6' (1.829 m)            Physical Exam  Constitutional:       Appearance: He is ill-appearing.   HENT:      Head: Normocephalic and atraumatic.   Eyes:      Extraocular Movements: Extraocular movements intact.   Pulmonary:      Effort: Pulmonary effort is normal.      Breath sounds: Wheezing present.    Abdominal:      General: Bowel sounds are normal.      Palpations: Abdomen is soft.   Musculoskeletal:      Cervical back: Neck supple.   Neurological:      Mental Status: He is alert and oriented to person, place, and time.      Sensory: Sensory deficit present.      Motor: Weakness present.   Psychiatric:         Mood and Affect: Mood normal.         Behavior: Behavior normal.         Judgment: Judgment normal.     ECOG SCORE             Laboratory:  CBC with Differential:  Lab Results   Component Value Date    WBC 4.6 02/09/2023    RBC 3.22 (L) 02/09/2023    HGB 10.3 (L) 02/09/2023    HCT 32.6 (L) 02/09/2023    .2 (H) 02/09/2023    MCH 32.0 02/09/2023    MCHC 31.6 (L) 02/09/2023    RDW 15.2 02/09/2023     02/09/2023    MPV 8.9 02/09/2023         Latest Reference Range & Units 08/10/22 13:49   Kappa Qnt Free Light Chains 3.30 - 19.40 mg/L 491.97 (H)   Lambda Qnt Free Light Chains 5.71 - 26.30 mg/L 4.76 (L)   [place]/Lambda Free Light Chain Ratio 0.26 - 1.65  103.36 (H)   M SPIKE gm/dl 2.24      Latest Reference Range & Units 08/10/22 13:49   Immunoglobulin A 68 - 408 mg/dL 10 (L)   Immunoglobulin G 768 - 1632 mg/dL 4882 (H)   Immunoglobulin M 35 - 263 mg/dL 12 (L)   (L): Data is abnormally low  (H): Data is abnormally high    M-spike in the beta/gamma region.  The monoclonal protein peak accounts for 3.39 g/dL of the total protein in the beta and gamma regions.  This quantitation may include complement components.  BETTY gel pattern shows an IgG type kappa monoclonal protein.    SPEP:  Total protein and globulin are both increased.  A/G ratio is low.     Serum protein electrophoresis shows a decreased albumin fraction, decreased beta globulin fraction, and increased gamma globulin fraction.   A monoclonal protein (M-spike) is present in the gamma region (2.24 g/dL).     BETTY:  Immunofixation shows an IgG monoclonal protein with kappa light chain specificity.       CMP:  Sodium Level   Date Value Ref  Range Status   02/03/2023 142 136 - 145 mmol/L Final     Potassium Level   Date Value Ref Range Status   02/03/2023 4.1 3.5 - 5.1 mmol/L Final     Carbon Dioxide   Date Value Ref Range Status   02/03/2023 23 23 - 31 mmol/L Final     Blood Urea Nitrogen   Date Value Ref Range Status   02/03/2023 14.6 8.4 - 25.7 mg/dL Final     Creatinine   Date Value Ref Range Status   02/03/2023 1.21 (H) 0.73 - 1.18 mg/dL Final     Calcium Level Total   Date Value Ref Range Status   02/03/2023 8.8 8.8 - 10.0 mg/dL Final     Albumin Level   Date Value Ref Range Status   02/03/2023 3.0 (L) 3.4 - 4.8 g/dL Final     Bilirubin Total   Date Value Ref Range Status   02/03/2023 0.7 <=1.5 mg/dL Final     Alkaline Phosphatase   Date Value Ref Range Status   02/03/2023 83 40 - 150 unit/L Final     Aspartate Aminotransferase   Date Value Ref Range Status   02/03/2023 31 5 - 34 unit/L Final     Alanine Aminotransferase   Date Value Ref Range Status   02/03/2023 75 (H) 0 - 55 unit/L Final     Estimated GFR-Non    Date Value Ref Range Status   06/22/2022 53 mls/min/1.73/m2 Final       Latest Reference Range & Units 08/10/22 13:49   MACEY Negative  Negative   ds DNA Ab Negative  Negative   Anti-SSA Interpretation Negative  Negative   Anti-SSB Interpretation Negative  Negative      Latest Reference Range & Units 11/10/21 10:27 08/10/22 13:49   Centromere Protein Antibody Negative   Negative   HARRIS-1 Antibody Negative   Negative   Scleroderma (Scl-70S) Antibody Negative   Negative   Rheumatoid Factor <<=30 IU/mL <13    RNP70 Antibody Negative   Negative   Hand DP IgG   Negative   U1RNP Antibody Negative   Negative          Assessment:       1. Multiple myeloma not having achieved remission    2. Anemia, unspecified type          Again I have a long discussion with the patient and his wife about of multiple myeloma.  Discussed with the patient that Multiple myeloma is a type of bone marrow cancer that forms in a type of white blood cell  called a plasma cell. Plasma cells help you fight infections by making antibodies that recognize and attack germs but when they transformed into cancer cells then they are not efficient and don't work well. This cells can affect not only the bone marrow but the bones in general causing distinctive bone lesions called Lytic lesions. It also can affect the kidney. Discussed prognosis and treatment recommendations and indications.  Discussed in detail blood work, PET-CT and bone marrow biopsy.  He does have advance myeloma, stage III, therefore treatment is indicated at this time.  Discuss treatment option, risks versus benefit of was at toxicities associated with it.  He is waiting to start treatment.        Plan:       Patient was seen at [facility]. He will start treatment here with Velcade/Revlimid/Dexamethamasone. He said that he is a candidate for transplant and they will eval bone marrow again after 4 cycles.   He then saw Dr. Brito who recommended adding Darzalex (weekly x 8 dose, then q 2 week x 8 doses, then q 4 weeks per kodak study). This was added on 1/27/23.   Cycle 4, Day 1 of Velcade/Revlimid/Dexamethamasone/Darzalex due tomorrow. Velcade is once weekly. Revlimid is 14/21 days and Dex is once weekly.   He has f/u via telemedicine with Dr. Brito on 2/27/23  RTC in 3 weeks with labs the day before cycle 5. Encourage to call or message us for any questions or problems. MM labs to be drawn on 2/24/23.     The patient was given ample opportunity to ask questions, and to the best of my abilities, all questions answered to satisfaction; patient demonstrated understanding of what we discussed and agreeable to the plan.       HERMINIO Alanis

## 2023-02-10 ENCOUNTER — INFUSION (OUTPATIENT)
Dept: INFUSION THERAPY | Facility: HOSPITAL | Age: 73
End: 2023-02-10
Attending: INTERNAL MEDICINE
Payer: MEDICARE

## 2023-02-10 DIAGNOSIS — C90.00 MULTIPLE MYELOMA NOT HAVING ACHIEVED REMISSION: Primary | ICD-10-CM

## 2023-02-10 PROCEDURE — 96401 CHEMO ANTI-NEOPL SQ/IM: CPT

## 2023-02-10 PROCEDURE — 25000003 PHARM REV CODE 250: Performed by: NURSE PRACTITIONER

## 2023-02-10 PROCEDURE — 63600175 PHARM REV CODE 636 W HCPCS: Mod: TB | Performed by: NURSE PRACTITIONER

## 2023-02-10 RX ORDER — DEXAMETHASONE 4 MG/1
20 TABLET ORAL
Status: COMPLETED | OUTPATIENT
Start: 2023-02-10 | End: 2023-02-10

## 2023-02-10 RX ORDER — BORTEZOMIB 3.5 MG/1
2.8 INJECTION, POWDER, LYOPHILIZED, FOR SOLUTION INTRAVENOUS; SUBCUTANEOUS
Status: COMPLETED | OUTPATIENT
Start: 2023-02-10 | End: 2023-02-10

## 2023-02-10 RX ORDER — HEPARIN 100 UNIT/ML
500 SYRINGE INTRAVENOUS
Status: DISCONTINUED | OUTPATIENT
Start: 2023-02-10 | End: 2023-02-10 | Stop reason: HOSPADM

## 2023-02-10 RX ORDER — FAMOTIDINE 20 MG/1
20 TABLET, FILM COATED ORAL
Status: COMPLETED | OUTPATIENT
Start: 2023-02-10 | End: 2023-02-10

## 2023-02-10 RX ORDER — SODIUM CHLORIDE 0.9 % (FLUSH) 0.9 %
10 SYRINGE (ML) INJECTION
Status: DISCONTINUED | OUTPATIENT
Start: 2023-02-10 | End: 2023-02-10 | Stop reason: HOSPADM

## 2023-02-10 RX ORDER — MONTELUKAST SODIUM 10 MG/1
10 TABLET ORAL
Status: COMPLETED | OUTPATIENT
Start: 2023-02-10 | End: 2023-02-10

## 2023-02-10 RX ADMIN — BORTEZOMIB 2.8 MG: 3.5 INJECTION, POWDER, LYOPHILIZED, FOR SOLUTION INTRAVENOUS; SUBCUTANEOUS at 10:02

## 2023-02-10 RX ADMIN — MONTELUKAST SODIUM 10 MG: 10 TABLET, COATED ORAL at 10:02

## 2023-02-10 RX ADMIN — DARATUMUMAB AND HYALURONIDASE-FIHJ (HUMAN RECOMBINANT) 1800 MG: 1800; 30000 INJECTION SUBCUTANEOUS at 10:02

## 2023-02-10 RX ADMIN — DEXAMETHASONE 20 MG: 4 TABLET ORAL at 10:02

## 2023-02-10 RX ADMIN — FAMOTIDINE 20 MG: 20 TABLET, FILM COATED ORAL at 10:02

## 2023-02-14 ENCOUNTER — HOSPITAL ENCOUNTER (INPATIENT)
Facility: HOSPITAL | Age: 73
LOS: 3 days | Discharge: HOME HEALTH | DRG: 177 | End: 2023-02-18
Attending: EMERGENCY MEDICINE | Admitting: INTERNAL MEDICINE
Payer: MEDICARE

## 2023-02-14 DIAGNOSIS — R53.1 GENERALIZED WEAKNESS: ICD-10-CM

## 2023-02-14 DIAGNOSIS — U07.1 COVID-19: Primary | ICD-10-CM

## 2023-02-14 DIAGNOSIS — E86.0 DEHYDRATION: ICD-10-CM

## 2023-02-14 DIAGNOSIS — R06.02 SOB (SHORTNESS OF BREATH): ICD-10-CM

## 2023-02-14 DIAGNOSIS — I26.99 PULMONARY EMBOLUS: ICD-10-CM

## 2023-02-14 DIAGNOSIS — I82.409 DVT (DEEP VENOUS THROMBOSIS): ICD-10-CM

## 2023-02-14 DIAGNOSIS — I95.9 HYPOTENSION, UNSPECIFIED HYPOTENSION TYPE: ICD-10-CM

## 2023-02-14 DIAGNOSIS — R07.9 CHEST PAIN: ICD-10-CM

## 2023-02-14 LAB
ALBUMIN SERPL-MCNC: 2.7 G/DL (ref 3.4–4.8)
ALBUMIN/GLOB SERPL: 0.7 RATIO (ref 1.1–2)
ALP SERPL-CCNC: 73 UNIT/L (ref 40–150)
ALT SERPL-CCNC: 49 UNIT/L (ref 0–55)
APTT PPP: 22.8 SECONDS (ref 23.2–33.7)
AST SERPL-CCNC: 22 UNIT/L (ref 5–34)
BASOPHILS # BLD AUTO: 0.01 X10(3)/MCL (ref 0–0.2)
BASOPHILS NFR BLD AUTO: 0.2 %
BILIRUBIN DIRECT+TOT PNL SERPL-MCNC: 0.7 MG/DL
BNP BLD-MCNC: 41.4 PG/ML
BUN SERPL-MCNC: 15.5 MG/DL (ref 8.4–25.7)
CALCIUM SERPL-MCNC: 9.3 MG/DL (ref 8.8–10)
CHLORIDE SERPL-SCNC: 107 MMOL/L (ref 98–107)
CK SERPL-CCNC: 81 U/L (ref 30–200)
CO2 SERPL-SCNC: 18 MMOL/L (ref 23–31)
CREAT SERPL-MCNC: 1.13 MG/DL (ref 0.73–1.18)
D DIMER PPP IA.FEU-MCNC: 11.24 UG/ML FEU (ref 0–0.5)
EOSINOPHIL # BLD AUTO: 0.1 X10(3)/MCL (ref 0–0.9)
EOSINOPHIL NFR BLD AUTO: 2.5 %
ERYTHROCYTE [DISTWIDTH] IN BLOOD BY AUTOMATED COUNT: 15.7 % (ref 11.5–17)
ERYTHROCYTE [SEDIMENTATION RATE] IN BLOOD: 62 MM/HR (ref 0–15)
FERRITIN SERPL-MCNC: 1954.98 NG/ML (ref 21.81–274.66)
FLUAV AG UPPER RESP QL IA.RAPID: NOT DETECTED
FLUBV AG UPPER RESP QL IA.RAPID: NOT DETECTED
GFR SERPLBLD CREATININE-BSD FMLA CKD-EPI: >60 MLS/MIN/1.73/M2
GLOBULIN SER-MCNC: 3.7 GM/DL (ref 2.4–3.5)
GLUCOSE SERPL-MCNC: 115 MG/DL (ref 82–115)
HCT VFR BLD AUTO: 33.4 % (ref 42–52)
HGB BLD-MCNC: 10.9 GM/DL (ref 14–18)
IMM GRANULOCYTES # BLD AUTO: 0.05 X10(3)/MCL (ref 0–0.04)
IMM GRANULOCYTES NFR BLD AUTO: 1.2 %
INR BLD: 1.2 (ref 0–1.3)
LACTATE SERPL-SCNC: 1.9 MMOL/L (ref 0.5–2.2)
LDH SERPL-CCNC: 252 U/L (ref 125–220)
LYMPHOCYTES # BLD AUTO: 0.45 X10(3)/MCL (ref 0.6–4.6)
LYMPHOCYTES NFR BLD AUTO: 11.1 %
MCH RBC QN AUTO: 31.8 PG
MCHC RBC AUTO-ENTMCNC: 32.6 MG/DL (ref 33–36)
MCV RBC AUTO: 97.4 FL (ref 80–94)
MONOCYTES # BLD AUTO: 0.1 X10(3)/MCL (ref 0.1–1.3)
MONOCYTES NFR BLD AUTO: 2.5 %
NEUTROPHILS # BLD AUTO: 3.36 X10(3)/MCL (ref 2.1–9.2)
NEUTROPHILS NFR BLD AUTO: 82.5 %
NRBC BLD AUTO-RTO: 0 %
PLATELET # BLD AUTO: 247 X10(3)/MCL (ref 130–400)
PMV BLD AUTO: 9.5 FL (ref 7.4–10.4)
POTASSIUM SERPL-SCNC: 3.7 MMOL/L (ref 3.5–5.1)
PROT SERPL-MCNC: 6.4 GM/DL (ref 5.8–7.6)
PROTHROMBIN TIME: 15 SECONDS (ref 12.5–14.5)
RBC # BLD AUTO: 3.43 X10(6)/MCL (ref 4.7–6.1)
SARS-COV-2 RNA RESP QL NAA+PROBE: DETECTED
SODIUM SERPL-SCNC: 137 MMOL/L (ref 136–145)
TROPONIN I SERPL-MCNC: <0.01 NG/ML (ref 0–0.04)
WBC # SPEC AUTO: 4.1 X10(3)/MCL (ref 4.5–11.5)

## 2023-02-14 PROCEDURE — 25000003 PHARM REV CODE 250: Performed by: PHYSICIAN ASSISTANT

## 2023-02-14 PROCEDURE — 82550 ASSAY OF CK (CPK): CPT | Performed by: PHYSICIAN ASSISTANT

## 2023-02-14 PROCEDURE — 80053 COMPREHEN METABOLIC PANEL: CPT | Performed by: NURSE PRACTITIONER

## 2023-02-14 PROCEDURE — 25000003 PHARM REV CODE 250: Performed by: INTERNAL MEDICINE

## 2023-02-14 PROCEDURE — 99285 EMERGENCY DEPT VISIT HI MDM: CPT | Mod: 25

## 2023-02-14 PROCEDURE — 93010 EKG 12-LEAD: ICD-10-PCS | Mod: ,,, | Performed by: INTERNAL MEDICINE

## 2023-02-14 PROCEDURE — 83880 ASSAY OF NATRIURETIC PEPTIDE: CPT | Performed by: NURSE PRACTITIONER

## 2023-02-14 PROCEDURE — 83605 ASSAY OF LACTIC ACID: CPT | Performed by: NURSE PRACTITIONER

## 2023-02-14 PROCEDURE — 93010 ELECTROCARDIOGRAM REPORT: CPT | Mod: ,,, | Performed by: INTERNAL MEDICINE

## 2023-02-14 PROCEDURE — 85025 COMPLETE CBC W/AUTO DIFF WBC: CPT | Performed by: NURSE PRACTITIONER

## 2023-02-14 PROCEDURE — 63600175 PHARM REV CODE 636 W HCPCS: Mod: TB | Performed by: PHYSICIAN ASSISTANT

## 2023-02-14 PROCEDURE — 93005 ELECTROCARDIOGRAM TRACING: CPT

## 2023-02-14 PROCEDURE — 25000242 PHARM REV CODE 250 ALT 637 W/ HCPCS: Performed by: PHYSICIAN ASSISTANT

## 2023-02-14 PROCEDURE — 87040 BLOOD CULTURE FOR BACTERIA: CPT | Performed by: NURSE PRACTITIONER

## 2023-02-14 PROCEDURE — 83615 LACTATE (LD) (LDH) ENZYME: CPT | Performed by: PHYSICIAN ASSISTANT

## 2023-02-14 PROCEDURE — G0378 HOSPITAL OBSERVATION PER HR: HCPCS

## 2023-02-14 PROCEDURE — 85730 THROMBOPLASTIN TIME PARTIAL: CPT | Performed by: PHYSICIAN ASSISTANT

## 2023-02-14 PROCEDURE — 63600175 PHARM REV CODE 636 W HCPCS: Performed by: EMERGENCY MEDICINE

## 2023-02-14 PROCEDURE — 0240U COVID/FLU A&B PCR: CPT | Performed by: NURSE PRACTITIONER

## 2023-02-14 PROCEDURE — 84484 ASSAY OF TROPONIN QUANT: CPT | Performed by: NURSE PRACTITIONER

## 2023-02-14 PROCEDURE — 96372 THER/PROPH/DIAG INJ SC/IM: CPT | Performed by: PHYSICIAN ASSISTANT

## 2023-02-14 PROCEDURE — 82728 ASSAY OF FERRITIN: CPT | Performed by: PHYSICIAN ASSISTANT

## 2023-02-14 PROCEDURE — 96360 HYDRATION IV INFUSION INIT: CPT

## 2023-02-14 PROCEDURE — 85651 RBC SED RATE NONAUTOMATED: CPT | Performed by: PHYSICIAN ASSISTANT

## 2023-02-14 PROCEDURE — 85610 PROTHROMBIN TIME: CPT | Performed by: PHYSICIAN ASSISTANT

## 2023-02-14 PROCEDURE — 85379 FIBRIN DEGRADATION QUANT: CPT | Performed by: PHYSICIAN ASSISTANT

## 2023-02-14 PROCEDURE — 25500020 PHARM REV CODE 255: Performed by: INTERNAL MEDICINE

## 2023-02-14 RX ORDER — ZOLPIDEM TARTRATE 5 MG/1
10 TABLET ORAL DAILY
Status: DISCONTINUED | OUTPATIENT
Start: 2023-02-15 | End: 2023-02-14

## 2023-02-14 RX ORDER — SODIUM CHLORIDE 9 MG/ML
INJECTION, SOLUTION INTRAVENOUS CONTINUOUS
Status: DISCONTINUED | OUTPATIENT
Start: 2023-02-14 | End: 2023-02-14

## 2023-02-14 RX ORDER — LEVOFLOXACIN 250 MG/1
750 TABLET ORAL
Status: DISCONTINUED | OUTPATIENT
Start: 2023-02-14 | End: 2023-02-14

## 2023-02-14 RX ORDER — IBUPROFEN 200 MG
16 TABLET ORAL
Status: DISCONTINUED | OUTPATIENT
Start: 2023-02-14 | End: 2023-02-18 | Stop reason: HOSPADM

## 2023-02-14 RX ORDER — ZINC SULFATE 50(220)MG
220 CAPSULE ORAL DAILY
Status: DISCONTINUED | OUTPATIENT
Start: 2023-02-15 | End: 2023-02-18 | Stop reason: HOSPADM

## 2023-02-14 RX ORDER — ASCORBIC ACID 500 MG
500 TABLET ORAL 2 TIMES DAILY
Status: DISCONTINUED | OUTPATIENT
Start: 2023-02-14 | End: 2023-02-18 | Stop reason: HOSPADM

## 2023-02-14 RX ORDER — LEVOFLOXACIN 5 MG/ML
750 INJECTION, SOLUTION INTRAVENOUS
Status: DISCONTINUED | OUTPATIENT
Start: 2023-02-14 | End: 2023-02-15

## 2023-02-14 RX ORDER — ZOLPIDEM TARTRATE 5 MG/1
10 TABLET ORAL NIGHTLY PRN
Status: DISCONTINUED | OUTPATIENT
Start: 2023-02-14 | End: 2023-02-18 | Stop reason: HOSPADM

## 2023-02-14 RX ORDER — IBUPROFEN 200 MG
24 TABLET ORAL
Status: DISCONTINUED | OUTPATIENT
Start: 2023-02-14 | End: 2023-02-18 | Stop reason: HOSPADM

## 2023-02-14 RX ORDER — DEXAMETHASONE SODIUM PHOSPHATE 4 MG/ML
6 INJECTION, SOLUTION INTRA-ARTICULAR; INTRALESIONAL; INTRAMUSCULAR; INTRAVENOUS; SOFT TISSUE
Status: COMPLETED | OUTPATIENT
Start: 2023-02-14 | End: 2023-02-14

## 2023-02-14 RX ORDER — ACETAMINOPHEN 325 MG/1
650 TABLET ORAL NIGHTLY PRN
Status: DISCONTINUED | OUTPATIENT
Start: 2023-02-14 | End: 2023-02-18 | Stop reason: HOSPADM

## 2023-02-14 RX ORDER — SODIUM CHLORIDE, SODIUM LACTATE, POTASSIUM CHLORIDE, CALCIUM CHLORIDE 600; 310; 30; 20 MG/100ML; MG/100ML; MG/100ML; MG/100ML
INJECTION, SOLUTION INTRAVENOUS CONTINUOUS
Status: DISCONTINUED | OUTPATIENT
Start: 2023-02-14 | End: 2023-02-17

## 2023-02-14 RX ORDER — SODIUM CHLORIDE 0.9 % (FLUSH) 0.9 %
10 SYRINGE (ML) INJECTION
Status: DISCONTINUED | OUTPATIENT
Start: 2023-02-14 | End: 2023-02-18 | Stop reason: HOSPADM

## 2023-02-14 RX ORDER — BENZONATATE 100 MG/1
100 CAPSULE ORAL 3 TIMES DAILY PRN
Status: DISCONTINUED | OUTPATIENT
Start: 2023-02-14 | End: 2023-02-18 | Stop reason: HOSPADM

## 2023-02-14 RX ORDER — PANTOPRAZOLE SODIUM 40 MG/1
40 TABLET, DELAYED RELEASE ORAL DAILY
Status: DISCONTINUED | OUTPATIENT
Start: 2023-02-15 | End: 2023-02-18 | Stop reason: HOSPADM

## 2023-02-14 RX ORDER — ZOLPIDEM TARTRATE 5 MG/1
10 TABLET ORAL NIGHTLY
Status: DISCONTINUED | OUTPATIENT
Start: 2023-02-14 | End: 2023-02-18 | Stop reason: HOSPADM

## 2023-02-14 RX ORDER — ATORVASTATIN CALCIUM 10 MG/1
10 TABLET, FILM COATED ORAL NIGHTLY
Status: DISCONTINUED | OUTPATIENT
Start: 2023-02-14 | End: 2023-02-18 | Stop reason: HOSPADM

## 2023-02-14 RX ORDER — GLUCAGON 1 MG
1 KIT INJECTION
Status: DISCONTINUED | OUTPATIENT
Start: 2023-02-14 | End: 2023-02-18 | Stop reason: HOSPADM

## 2023-02-14 RX ORDER — ENOXAPARIN SODIUM 100 MG/ML
1 INJECTION SUBCUTANEOUS
Status: DISCONTINUED | OUTPATIENT
Start: 2023-02-15 | End: 2023-02-18 | Stop reason: HOSPADM

## 2023-02-14 RX ORDER — DEXAMETHASONE SODIUM PHOSPHATE 4 MG/ML
6 INJECTION, SOLUTION INTRA-ARTICULAR; INTRALESIONAL; INTRAMUSCULAR; INTRAVENOUS; SOFT TISSUE EVERY 24 HOURS
Status: DISCONTINUED | OUTPATIENT
Start: 2023-02-15 | End: 2023-02-17

## 2023-02-14 RX ORDER — ENOXAPARIN SODIUM 100 MG/ML
40 INJECTION SUBCUTANEOUS EVERY 24 HOURS
Status: DISCONTINUED | OUTPATIENT
Start: 2023-02-14 | End: 2023-02-14

## 2023-02-14 RX ORDER — ENOXAPARIN SODIUM 100 MG/ML
1 INJECTION SUBCUTANEOUS 2 TIMES DAILY
Status: DISCONTINUED | OUTPATIENT
Start: 2023-02-14 | End: 2023-02-14

## 2023-02-14 RX ORDER — CLOPIDOGREL BISULFATE 75 MG/1
75 TABLET ORAL DAILY
Status: DISCONTINUED | OUTPATIENT
Start: 2023-02-15 | End: 2023-02-18 | Stop reason: HOSPADM

## 2023-02-14 RX ORDER — ALBUTEROL SULFATE 90 UG/1
2 AEROSOL, METERED RESPIRATORY (INHALATION) EVERY 6 HOURS
Status: DISCONTINUED | OUTPATIENT
Start: 2023-02-14 | End: 2023-02-18 | Stop reason: HOSPADM

## 2023-02-14 RX ORDER — ERGOCALCIFEROL 1.25 MG/1
50000 CAPSULE ORAL
Status: DISCONTINUED | OUTPATIENT
Start: 2023-02-15 | End: 2023-02-18 | Stop reason: HOSPADM

## 2023-02-14 RX ORDER — ENOXAPARIN SODIUM 100 MG/ML
40 INJECTION SUBCUTANEOUS ONCE
Status: DISCONTINUED | OUTPATIENT
Start: 2023-02-15 | End: 2023-02-15

## 2023-02-14 RX ADMIN — ATORVASTATIN CALCIUM 10 MG: 10 TABLET, FILM COATED ORAL at 10:02

## 2023-02-14 RX ADMIN — SODIUM CHLORIDE, POTASSIUM CHLORIDE, SODIUM LACTATE AND CALCIUM CHLORIDE: 600; 310; 30; 20 INJECTION, SOLUTION INTRAVENOUS at 05:02

## 2023-02-14 RX ADMIN — ENOXAPARIN SODIUM 40 MG: 40 INJECTION SUBCUTANEOUS at 06:02

## 2023-02-14 RX ADMIN — LEVOFLOXACIN 750 MG: 750 INJECTION, SOLUTION INTRAVENOUS at 06:02

## 2023-02-14 RX ADMIN — IOPAMIDOL 100 ML: 755 INJECTION, SOLUTION INTRAVENOUS at 10:02

## 2023-02-14 RX ADMIN — ACETAMINOPHEN 650 MG: 325 TABLET ORAL at 09:02

## 2023-02-14 RX ADMIN — ZOLPIDEM TARTRATE 10 MG: 5 TABLET ORAL at 09:02

## 2023-02-14 RX ADMIN — Medication 500 MG: at 09:02

## 2023-02-14 RX ADMIN — SODIUM CHLORIDE, POTASSIUM CHLORIDE, SODIUM LACTATE AND CALCIUM CHLORIDE 1000 ML: 600; 310; 30; 20 INJECTION, SOLUTION INTRAVENOUS at 03:02

## 2023-02-14 RX ADMIN — ALBUTEROL SULFATE 2 PUFF: 90 AEROSOL, METERED RESPIRATORY (INHALATION) at 07:02

## 2023-02-14 RX ADMIN — BENZONATATE 100 MG: 100 CAPSULE ORAL at 09:02

## 2023-02-14 RX ADMIN — REMDESIVIR 200 MG: 100 INJECTION, POWDER, LYOPHILIZED, FOR SOLUTION INTRAVENOUS at 07:02

## 2023-02-14 RX ADMIN — DEXAMETHASONE SODIUM PHOSPHATE 6 MG: 4 INJECTION, SOLUTION INTRA-ARTICULAR; INTRALESIONAL; INTRAMUSCULAR; INTRAVENOUS; SOFT TISSUE at 06:02

## 2023-02-14 RX ADMIN — SODIUM CHLORIDE, POTASSIUM CHLORIDE, SODIUM LACTATE AND CALCIUM CHLORIDE 1000 ML: 600; 310; 30; 20 INJECTION, SOLUTION INTRAVENOUS at 06:02

## 2023-02-14 NOTE — Clinical Note
Diagnosis: SOB (shortness of breath) [872428]   Future Attending Provider: SANA PATEL [[id number]]   Admitting Provider:: SANA PATEL [[id number]]

## 2023-02-14 NOTE — H&P
[facility] Medicine History & Physical Examination       Patient Name: Valentino Manning  MRN: [id number]  Patient Class: OP- Observation   Admission Date: 2/14/2023   Admitting Physician: Veronica Jennings MD   Length of Stay: 0  Attending Physician: Veronica Jennings MD   Primary Care Provider: Kaiser Metcalf MD  Face-to-Face encounter date: 02/14/2023  Code Status: Full Code  Chief Complaint: Cough (Reports saw Dr Metcalf today for prolonged cough with dyspnea. On chemo for multiple myeloma. Denies fever. Reports 90/52 BP at MD office.) and direct admit (After triage, handwritten direct admit orders presented from Dr Metcalf. Spoke with Noe at [facility], patient cannot be direct admitted with handwritten orders and 28+ boarders in ED. Also spoke to Dr Metcalf who states he does not have Epic or access to it and agrees we will perform ER work-up for patient as he cannot admit pt. Dr Metcalf also states he will call transfer center for further clarification. )        Patient information was obtained from patient, patient's family, past medical records and ER records.     HISTORY OF PRESENT ILLNESS:   Valentino Manning is a 72 y.o. White male with a past medical history of hypertension, hyperlipidemia, coronary artery disease status post stents on Plavix and multiple myeloma undergoing treatment. The patient presented to [facility] on 2/14/2023 with a primary complaint of a cough with white sputum, shortness a breath and generalized weakness.  Patient reports he was diagnosed with bronchitis approximately 4 weeks ago.  He was seen at a walk-in clinic 2 weeks ago and tested negative for COVID/flu. He was seen by his PCP this morning for symptoms and was found to be hypotensive and sent to the ED for further evaluation. He denies complaints of fever and chest pain.  His oncologist is Dr. Harris, cardiologist is Dr. Staples.    Upon presentation to the ED, heart rate 96, blood  pressure 104/62 which decreased to 89/54, respiratory rate 22 and SpO2 97% on room air.  Labs with WBC of 4.1, H&H 10.9/33.4, MCV 97, CO2 18, BNP 41, troponin less than 0.01.  Influenza A and B negative.  SARS-CoV-2 PCR positive.  EKG normal sinus rhythm and nonspecific T-wave abnormalities.  Chest x-ray without acute cardiopulmonary process.  Patient is admitted to hospital medicine services and further medical management.    PAST MEDICAL HISTORY:   Hypertension  Hyperlipidemia  Coronary artery disease status post stents on Plavix  Anemia  GERD   Multiple myeloma    PAST SURGICAL HISTORY:   Bone marrow aspiration   Cardiac stents   Bilateral carpal tunnel   Colonoscopy    ALLERGIES:   Penicillins    FAMILY HISTORY:   Father:  CKD, cardiovascular disease  Mother:  Cardiovascular disease    SOCIAL HISTORY:   Former smoker.    Denies illicit drugs and alcohol use.    HOME MEDICATIONS:     Prior to Admission medications    Medication Sig Start Date End Date Taking? Authorizing Provider   acyclovir (ZOVIRAX) 400 MG tablet TAKE 1 TABLET BY MOUTH TWICE DAILY 1/9/23   CINDY Early   albuterol (PROAIR HFA) 90 mcg/actuation inhaler Inhale 2 puffs into the lungs every 6 (six) hours as needed for Wheezing. Rescue 2/2/23 2/16/23  Rayne Crane MD   amLODIPine (NORVASC) 10 MG tablet  7/6/22   Historical Provider   amLODIPine (NORVASC) 10 MG tablet Take 1 tablet by mouth once daily. 10/8/22   Historical Provider   ascorbic acid, vitamin C, (VITAMIN C) 1000 MG tablet Take 1,000 mg by mouth once daily.    Historical Provider   atorvastatin (LIPITOR) 40 MG tablet TAKE 1 TABLET ORALLY ONCE DAILY ON MON/TUE/THUR/FRI AND 1/2 ON WED. NONE ON SAT/SUN 5/5/22   Historical Provider   b complex vitamins capsule Take 1 capsule by mouth once daily.    Historical Provider   clopidogreL (PLAVIX) 75 mg tablet Take 75 mg by mouth once daily. 6/4/22   Historical Provider   dexAMETHasone (DECADRON) 4 MG Tab Take 40 mg by mouth As instructed.  40mg (4mg tabs 10) PO Daily once weekly    Historical Provider   doxazosin (CARDURA) 4 MG tablet Take 4 mg by mouth once daily. 5/28/22   Historical Provider   dutasteride (AVODART) 0.5 mg capsule Take 0.5 mg by mouth once daily. 5/2/22   Historical Provider   FERRETTS 325 mg (106 mg iron) Tab Take 1 tablet by mouth 2 (two) times daily. 6/16/22   Historical Provider   furosemide (LASIX) 20 MG tablet  1/13/23   Historical Provider   glutamine 10 gram PwPk Take 10 g by mouth 2 (two) times a day.    Historical Provider   HYDROcodone-acetaminophen (NORCO)  mg per tablet Take 1 tablet by mouth as needed. 12/5/22   Historical Provider   lactulose (CHRONULAC) 10 gram/15 mL solution GIVE 30ML BY MOUTH TWICE A DAY X7 DAY(S) 12/5/22   Historical Provider   lenalidomide 25 mg Cap TAKE 1 CAPSULE BY MOUTH EVERY DAY  FOR 14 DAYS FOLLOWED BY 7 DAY REST PERIOD ( 2 WEEKS ON, 1 WEEK OFF). 2/9/23   CINDY Early   LINZESS 145 mcg Cap capsule Take 145 mcg by mouth. 10/31/22   Historical Provider   lisinopriL (PRINIVIL,ZESTRIL) 40 MG tablet Take 40 mg by mouth. 9/24/22   Historical Provider   lisinopriL (PRINIVIL,ZESTRIL) 40 MG tablet Take 1 tablet by mouth once daily. 9/24/22   Historical Provider   metoprolol tartrate (LOPRESSOR) 25 MG tablet Take 25 mg by mouth 2 (two) times daily. Takes 1 tab q am 6/9/22   Historical Provider   ondansetron (ZOFRAN) 4 MG tablet Take 4 mg by mouth every 8 (eight) hours as needed for Nausea.    Historical Provider   pantoprazole (PROTONIX) 40 MG tablet Take 1 tablet (40 mg total) by mouth once daily. for 30 doses 1/19/23 2/18/23  CINDY Early   promethazine (PHENERGAN) 25 MG tablet TAKE 1 TABLET BY MOUTH EVERY 4 HOURS AS NEEDED FOR MOTION SICKNESS 12/5/22   Historical Provider   pyridoxine, vitamin B6, (B-6) 250 MG Tab Take 250 mg by mouth once daily.    Historical Provider   SLOW RELEASE IRON 140 mg (45 mg iron) TbSR Take 1 tablet by mouth 2 (two) times daily. 12/5/22   Historical  Provider   sulfamethoxazole-trimethoprim 800-160mg (BACTRIM DS) 800-160 mg Tab Take 1 tablet by mouth As instructed. Tab 1 PO Daily M-W-F    Historical Provider   testosterone cypionate (DEPOTESTOTERONE CYPIONATE) 200 mg/mL injection INJECT 1ML INTO MUSCLE ONCE A WEEK 10/4/22   Historical Provider   zolpidem (AMBIEN) 10 mg Tab Take 10 mg by mouth once daily. 6/15/22   Historical Provider       REVIEW OF SYSTEMS:   Except as documented, all other systems reviewed and negative     PHYSICAL EXAM:     VITAL SIGNS: 24 HRS MIN & MAX LAST   No data recorded     BP  Min: 89/54  Max: 104/62 (!) 89/54     Pulse  Min: 89  Max: 96  89   Resp  Min: 22  Max: 22 (!) 22     SpO2  Min: 96 %  Max: 97 % 96 %       General appearance: Well-developed, well-nourished male in no apparent distress.  Family at bedside.  HEENT: Atraumatic head. Moist mucous membranes of oral cavity.  Lungs: Clear to auscultation bilaterally.  On room air.  Heart: Regular rate and rhythm.   Abdomen: Soft, non-distended.   Extremities: No cyanosis, clubbing. No deformities.  Skin: No Rash. Warm and dry.  Neuro: Awake, alert and oriented. Motor and sensory exams grossly intact.  Psych/mental status: Appropriate mood and affect. Cooperative. Responds appropriately to questions.       LABS AND IMAGING:     Recent Labs   Lab 02/09/23  1335 02/14/23  1222   WBC 4.6 4.1*   RBC 3.22* 3.43*   HGB 10.3* 10.9*   HCT 32.6* 33.4*   .2* 97.4*   MCH 32.0 31.8   MCHC 31.6* 32.6*   RDW 15.2 15.7    247   MPV 8.9 9.5       Recent Labs   Lab 02/09/23  1335 02/14/23  1222    137   K 4.5 3.7   CO2 20* 18*   BUN 25.7 15.5   CREATININE 1.21* 1.13   CALCIUM 9.3 9.3   ALBUMIN 3.0* 2.7*   ALKPHOS 87 73   ALT 49 49   AST 24 22   BILITOT 0.6 0.7       Microbiology Results (last 7 days)       Procedure Component Value Units Date/Time    Blood Culture [[id number]] Collected: 02/14/23 1222    Order Status: Resulted Specimen: Blood Updated: 02/14/23 1309    Blood Culture  [[id number]] Collected: 02/14/23 1222    Order Status: Resulted Specimen: Blood Updated: 02/14/23 1247    Blood Culture [[id number]]     Order Status: Canceled Specimen: Blood     Blood Culture [[id number]]     Order Status: Canceled Specimen: Blood              X-Ray Chest PA And Lateral  Narrative: EXAMINATION:  XR CHEST PA AND LATERAL    CLINICAL HISTORY:  chest pain;    COMPARISON:  2 February 2023    FINDINGS:  PA and lateral views of the chest were obtained. Heart and mediastinum within normal limits. The lungs are clear. No pneumothorax or significant effusion.  Impression: No acute cardiopulmonary abnormality.    Electronically signed by: Cody Duong  Date:    02/14/2023  Time:    12:43        ASSESSMENT & PLAN:   Assessment:  COVID-19 infection  Hypotension   Macrocytic anemia  Essential hypertension  Hyperlipidemia   Coronary artery disease status post stents on Plavix   Multiple myeloma undergoing treatment    Plan:  - No indication for supplemental oxygen at this time but if becomes hypoxic supplemental oxygen as needed for to keep oxygen saturation >90%  - IV Remdesivir 100 mg daily  - Decadron 6mg daily  - Zinc sulfate 220mg PO daily x 10 days  - Ergocalciferol 50,000 unit PO weekly  - Tylenol, Tessalon pearls and albuterol as needed  - Incentive spirometery  - COVID inflammatory markers ordered including LDH, Ferritin, D-dimer, CRP, ESR  - Levaquin 750 mg daily  - Blood cultures ordered.  Follow results  - Resume appropriate home medications  - Labs in AM      VTE Prophylaxis: will be placed on Lovenox for DVT prophylaxis and will be advised to be as mobile as possible and sit in a chair as tolerated      __________________________________________________________________________  INPATIENT LIST OF MEDICATIONS     Current Facility-Administered Medications:     0.9%  NaCl infusion, , Intravenous, Continuous, Toshia Calzada PA-C    albuterol inhaler 2 puff, 2 puff, Inhalation, Q6H **AND** MDI Q6H, , ,  Q6H, Toshia Calzada PA-C    ascorbic acid (vitamin C) tablet 500 mg, 500 mg, Oral, BID, Toshia Calzada PA-C    [START ON 2/15/2023] dexAMETHasone (DECADRON) 6 mg in sodium chloride 0.9% 50 mL IVPB, 6 mg, Intravenous, Daily, Toshia Calzada PA-C    dexAMETHasone injection 6 mg, 6 mg, Intravenous, ED 1 Time, Betty Syed MD    dextrose 50% injection 12.5 g, 12.5 g, Intravenous, PRN, Toshia Calzada PA-C    dextrose 50% injection 25 g, 25 g, Intravenous, PRN, Toshia Calzada PA-C    enoxaparin injection 40 mg, 40 mg, Subcutaneous, Daily, Toshia Calzada PA-C    glucagon (human recombinant) injection 1 mg, 1 mg, Intramuscular, PRN, Toshia Calzada PA-C    glucose chewable tablet 16 g, 16 g, Oral, PRN, Toshia Calzada PA-C    glucose chewable tablet 24 g, 24 g, Oral, PRN, Toshia Calzada PA-C    lactated ringers infusion, , Intravenous, Continuous, Betty Syed MD    [START ON 2/15/2023] levoFLOXacin tablet 500 mg, 500 mg, Oral, Daily, Toshia Calzada PA-C    [START ON 2/15/2023] multivitamin tablet, 1 tablet, Oral, Daily, Toshia Calzada PA-C    [START ON 2/15/2023] remdesivir 100 mg in sodium chloride 0.9% 250 mL infusion, 100 mg, Intravenous, Daily, Toshia Calzada PA-C    remdesivir 200 mg in sodium chloride 0.9% 250 mL infusion, 200 mg, Intravenous, Q24H, Toshia Calzada PA-C    sodium chloride 0.9% flush 10 mL, 10 mL, Intravenous, PRN, Toshia Calzada PA-C    zolpidem tablet 10 mg, 10 mg, Oral, Nightly PRN, Toshia Calzada PA-C    Current Outpatient Medications:     acyclovir (ZOVIRAX) 400 MG tablet, TAKE 1 TABLET BY MOUTH TWICE DAILY, Disp: 180 tablet, Rfl: 1    albuterol (PROAIR HFA) 90 mcg/actuation inhaler, Inhale 2 puffs into the lungs every 6 (six) hours as needed for Wheezing. Rescue, Disp: 8 g, Rfl: 0    amLODIPine (NORVASC) 10 MG tablet, , Disp: , Rfl:     amLODIPine (NORVASC) 10 MG tablet, Take 1 tablet by mouth once daily., Disp: , Rfl:     ascorbic acid,  vitamin C, (VITAMIN C) 1000 MG tablet, Take 1,000 mg by mouth once daily., Disp: , Rfl:     atorvastatin (LIPITOR) 40 MG tablet, TAKE 1 TABLET ORALLY ONCE DAILY ON MON/TUE/THUR/FRI AND 1/2 ON WED. NONE ON SAT/SUN, Disp: , Rfl:     b complex vitamins capsule, Take 1 capsule by mouth once daily., Disp: , Rfl:     clopidogreL (PLAVIX) 75 mg tablet, Take 75 mg by mouth once daily., Disp: , Rfl:     dexAMETHasone (DECADRON) 4 MG Tab, Take 40 mg by mouth As instructed. 40mg (4mg tabs 10) PO Daily once weekly, Disp: , Rfl:     doxazosin (CARDURA) 4 MG tablet, Take 4 mg by mouth once daily., Disp: , Rfl:     dutasteride (AVODART) 0.5 mg capsule, Take 0.5 mg by mouth once daily., Disp: , Rfl:     FERRETTS 325 mg (106 mg iron) Tab, Take 1 tablet by mouth 2 (two) times daily., Disp: , Rfl:     furosemide (LASIX) 20 MG tablet, , Disp: , Rfl:     glutamine 10 gram PwPk, Take 10 g by mouth 2 (two) times a day., Disp: , Rfl:     HYDROcodone-acetaminophen (NORCO)  mg per tablet, Take 1 tablet by mouth as needed., Disp: , Rfl:     lactulose (CHRONULAC) 10 gram/15 mL solution, GIVE 30ML BY MOUTH TWICE A DAY X7 DAY(S), Disp: , Rfl:     lenalidomide 25 mg Cap, TAKE 1 CAPSULE BY MOUTH EVERY DAY  FOR 14 DAYS FOLLOWED BY 7 DAY REST PERIOD ( 2 WEEKS ON, 1 WEEK OFF)., Disp: 14 capsule, Rfl: 0    LINZESS 145 mcg Cap capsule, Take 145 mcg by mouth., Disp: , Rfl:     lisinopriL (PRINIVIL,ZESTRIL) 40 MG tablet, Take 40 mg by mouth., Disp: , Rfl:     lisinopriL (PRINIVIL,ZESTRIL) 40 MG tablet, Take 1 tablet by mouth once daily., Disp: , Rfl:     metoprolol tartrate (LOPRESSOR) 25 MG tablet, Take 25 mg by mouth 2 (two) times daily. Takes 1 tab q am, Disp: , Rfl:     ondansetron (ZOFRAN) 4 MG tablet, Take 4 mg by mouth every 8 (eight) hours as needed for Nausea., Disp: , Rfl:     pantoprazole (PROTONIX) 40 MG tablet, Take 1 tablet (40 mg total) by mouth once daily. for 30 doses, Disp: 30 tablet, Rfl: 1    promethazine (PHENERGAN) 25 MG  tablet, TAKE 1 TABLET BY MOUTH EVERY 4 HOURS AS NEEDED FOR MOTION SICKNESS, Disp: , Rfl:     pyridoxine, vitamin B6, (B-6) 250 MG Tab, Take 250 mg by mouth once daily., Disp: , Rfl:     SLOW RELEASE IRON 140 mg (45 mg iron) TbSR, Take 1 tablet by mouth 2 (two) times daily., Disp: , Rfl:     sulfamethoxazole-trimethoprim 800-160mg (BACTRIM DS) 800-160 mg Tab, Take 1 tablet by mouth As instructed. Tab 1 PO Daily M-W-F, Disp: , Rfl:     testosterone cypionate (DEPOTESTOTERONE CYPIONATE) 200 mg/mL injection, INJECT 1ML INTO MUSCLE ONCE A WEEK, Disp: , Rfl:     zolpidem (AMBIEN) 10 mg Tab, Take 10 mg by mouth once daily., Disp: , Rfl:       Scheduled Meds:   albuterol  2 puff Inhalation Q6H    ascorbic acid (vitamin C)  500 mg Oral BID    [START ON 2/15/2023] dexamethasone (DECADRON) IVPB  6 mg Intravenous Daily    dexAMETHasone  6 mg Intravenous ED 1 Time    enoxaparin  40 mg Subcutaneous Daily    [START ON 2/15/2023] levoFLOXacin  500 mg Oral Daily    [START ON 2/15/2023] multivitamin  1 tablet Oral Daily    [START ON 2/15/2023] remdesivir infusion  100 mg Intravenous Daily    remdesivir loading dose infusion  200 mg Intravenous Q24H     Continuous Infusions:   sodium chloride 0.9%      lactated ringers       PRN Meds:.dextrose 50%, dextrose 50%, glucagon (human recombinant), glucose, glucose, sodium chloride 0.9%, zolpidem      Discharge Planning and Disposition: Anticipated discharge to be determined.    Toshia GORDON PA, have reviewed and discussed the case with Dr. Veronica Jennings MD    Please see the following addendum for further assessment and plan from there attending MD.    Toshia Calzada PA-C  02/14/2023    ________________________________________________________________________________    MD Addendum:  I, Dr. Veronica Jennings MD assumed care of this patient today at ---am/pm  For the patient encounter, I performed the substantive portion of the visit, I reviewed the NP/PA documentation, treatment  plan, and medical decision making.  I had face to face time with this patient       72-year-old white female past medical history of hypertension, hyperlipidemia, coronary artery disease status post stents, multiple myeloma undergoing chemo presented to ER with complaints of cough with expectoration and shortness of breath that has been going on the past 4 weeks.  Patient reports he has been having the symptoms of bronchitis for the past 4 weeks he was tested for COVID and flu at urgent care that was negative almost couple of weeks back.  His symptoms did not improve so he decided to go to his PCP.  At his PCP's clinic he was found to be hypotensive so was instructed to come into the ER.  Denies any fever or chills but does complain of shortness of breath denies any chest pain.  Lab work done here was negative except that patient was positive for COVID-19.  Patient was found to be hypotensive was given bolus of IV fluids and was started on LR and has been admitted to hospitalist service for further management and care     General alert awake oriented  Chest decreased breath sounds   Abdomen soft nontender     Patient is positive for COVID and considering immunocompromised state I will start the patient on remdesivir  Will also start the patient on dexamethasone IV, will order COVID inflammatory markers   Patient was found to be hypotensive will get blood cultures x2, get UA for now will start on Levaquin but if cultures remain negative then we can discontinue  Will resume home medications  Hold off on blood pressure medications at this time as patient is hypotensive   Continue with IV fluids and if pressure is better then we can discontinue      Prophylaxis: Lovenox    Reviewed D-dimer and it is more than 11 I will order stat CT of the chest with contrast to rule out PE        All diagnosis and differential diagnosis have been reviewed; assessment and plan has been documented; I have personally reviewed the labs and  test results that are presently available; I have reviewed the patients medication list; I have reviewed the consulting providers response and recommendations. I have reviewed or attempted to review medical records based upon their availability.    All of the patient and family questions have been addressed and answered. Patient's is agreeable to the above stated plan. I will continue to monitor closely and make adjustments to medical management as needed.      Veronica Jennings MD  02/14/2023

## 2023-02-14 NOTE — ED PROVIDER NOTES
Encounter Date: 2/14/2023    SCRIBE #1 NOTE: I, Emma Donahue, am scribing for, and in the presence of,  Betty Syed MD. I have scribed the following portions of the note - Other sections scribed: HPI, ROS, PE.     History     Chief Complaint   Patient presents with    Cough     Reports saw Dr Metcalf today for prolonged cough with dyspnea. On chemo for multiple myeloma. Denies fever. Reports 90/52 BP at MD office.    direct admit     After triage, handwritten direct admit orders presented from Dr Metcalf. Spoke with [facility], patient cannot be direct admitted with handwritten orders and 28+ boarders in ED. Also spoke to Dr Metcalf who states he does not have Epic or access to it and agrees we will perform ER work-up for patient as he cannot admit pt. Dr Metcalf also states he will call transfer center for further clarification.      72 year old male w/ hx of multiple myeloma (on chemo), HTN, and HLD presents to ED for a cough. Pt states he's had this cough for a few weeks and it briefly improved 4 days ago before worsening again with associated decreased urine output, decreased food intact, fatigue, and shortness of breath. Pt states he went to a [facility]IC 2 weeks ago, flu/covid negative then, and he was given antibiotics and decongestants. Today he saw her PCP where his BP there was 90/50 and they attempted to direct admit.      The history is provided by the patient. No  was used.   Cough  This is a recurrent problem. The current episode started several days ago. The problem occurs constantly. The problem has been unchanged. The cough is Productive of sputum. There has been no fever. Associated symptoms include shortness of breath. Pertinent negatives include no chest pain, no rhinorrhea, no sore throat and no wheezing. He has tried cough syrup for the symptoms. The treatment provided no relief. He is not a smoker. His past medical history is significant for bronchitis.    Review of patient's allergies indicates:   Allergen Reactions    Penicillins      Past Medical History:   Diagnosis Date    Anemia     GERD (gastroesophageal reflux disease)     Heart problem     Hyperlipidemia     Hypertension     Multiple myeloma      Past Surgical History:   Procedure Laterality Date    BONE MARROW ASPIRATION N/A 9/1/2022    Procedure: ASPIRATION, BONE MARROW;  Surgeon: Robbie Gardner MD;  Location: [facility];  Service: General;  Laterality: N/A;    CARDIAC SURGERY  2021    ENDOSCOPIC RELEASE OF BOTH CARPAL TUNNELS       Family History   Problem Relation Age of Onset    Hypertension Mother     Diabetes Mother     Anemia Mother     Heart disease Mother     Heart disease Father      Social History     Tobacco Use    Smoking status: Former     Types: Cigarettes    Smokeless tobacco: Former   Substance Use Topics    Alcohol use: Not Currently    Drug use: Never     Review of Systems   Constitutional:  Positive for appetite change and fatigue. Negative for activity change, diaphoresis and fever.   HENT:  Negative for congestion, postnasal drip, rhinorrhea, sinus pain, sneezing and sore throat.    Respiratory:  Positive for cough and shortness of breath. Negative for chest tightness and wheezing.    Cardiovascular:  Negative for chest pain, palpitations and leg swelling.   Gastrointestinal:  Negative for abdominal distention, abdominal pain, blood in stool, diarrhea, nausea and vomiting.   Genitourinary:  Positive for decreased urine volume. Negative for difficulty urinating and dysuria.   Musculoskeletal: Negative.    Skin:  Positive for wound. Negative for color change and pallor.   Neurological:  Negative for dizziness, speech difficulty, weakness, light-headedness and numbness.   All other systems reviewed and are negative.    Physical Exam     Initial Vitals [02/14/23 1149]   BP Pulse Resp Temp SpO2   104/62 96 (!) 22 -- 97 %      MAP       --         Physical Exam    Nursing note and vitals  reviewed.  Constitutional: He appears well-developed and well-nourished. He is not diaphoretic. He appears ill. No distress.   Pt is ill-appearing, fatigued, pale.   HENT:   Head: Normocephalic and atraumatic.   Nose: Nose normal.   Mouth/Throat: Oropharynx is clear and moist.   Eyes: Conjunctivae and EOM are normal. Pupils are equal, round, and reactive to light.   Neck: Trachea normal. Neck supple.   Normal range of motion.  Cardiovascular:  Normal rate, regular rhythm, normal heart sounds and intact distal pulses.     Exam reveals no gallop and no friction rub.       No murmur heard.  Pulmonary/Chest: No respiratory distress. He has wheezes. He has no rhonchi. He has no rales. He exhibits no tenderness.   Constant dry cough.  Mild basilar expiratory wheezing.    Abdominal: Abdomen is soft. Bowel sounds are normal. He exhibits no distension and no mass. There is no abdominal tenderness. There is no rebound.   Musculoskeletal:         General: No tenderness or edema. Normal range of motion.      Cervical back: Normal range of motion and neck supple.      Lumbar back: Normal. No tenderness. Normal range of motion.     Neurological: He is alert and oriented to person, place, and time. He has normal strength. No cranial nerve deficit or sensory deficit.   Skin: Skin is warm and dry. Capillary refill takes less than 2 seconds. No rash and no abscess noted. No erythema. There is pallor.   Psychiatric: He has a normal mood and affect. His behavior is normal. Judgment and thought content normal.       ED Course   Procedures  Labs Reviewed   COMPREHENSIVE METABOLIC PANEL - Abnormal; Notable for the following components:       Result Value    Carbon Dioxide 18 (*)     Albumin Level 2.7 (*)     Globulin 3.7 (*)     Albumin/Globulin Ratio 0.7 (*)     All other components within normal limits   CBC WITH DIFFERENTIAL - Abnormal; Notable for the following components:    WBC 4.1 (*)     RBC 3.43 (*)     Hgb 10.9 (*)     Hct 33.4  (*)     MCV 97.4 (*)     MCHC 32.6 (*)     Lymph # 0.45 (*)     IG# 0.05 (*)     All other components within normal limits   COVID/FLU A&B PCR - Abnormal; Notable for the following components:    SARS-CoV-2 PCR Detected (*)     All other components within normal limits    Narrative:     The Xpert Xpress SARS-CoV-2/FLU/RSV plus is a rapid, multiplexed real-time PCR test intended for the simultaneous qualitative detection and differentiation of SARS-CoV-2, Influenza A, Influenza B, and respiratory syncytial virus (RSV) viral RNA in either nasopharyngeal swab or nasal swab specimens.         LACTATE DEHYDROGENASE - Abnormal; Notable for the following components:    Lactate Dehydrogenase 252 (*)     All other components within normal limits   B-TYPE NATRIURETIC PEPTIDE - Normal   TROPONIN I - Normal   LACTIC ACID, PLASMA - Normal   CK - Normal   BLOOD CULTURE OLG   BLOOD CULTURE OLG   CBC W/ AUTO DIFFERENTIAL    Narrative:     The following orders were created for panel order CBC Auto Differential.  Procedure                               Abnormality         Status                     ---------                               -----------         ------                     CBC with Differential[[id number]]        Abnormal            Final result                 Please view results for these tests on the individual orders.   PROTIME-INR   APTT   SEDIMENTATION RATE   FERRITIN   D DIMER, QUANTITATIVE          Imaging Results              X-Ray Chest PA And Lateral (Final result)  Result time 02/14/23 12:43:26      Final result by Cody Duong MD (02/14/23 12:43:26)                   Impression:      No acute cardiopulmonary abnormality.      Electronically signed by: Cody Duong  Date:    02/14/2023  Time:    12:43               Narrative:    EXAMINATION:  XR CHEST PA AND LATERAL    CLINICAL HISTORY:  chest pain;    COMPARISON:  2 February 2023    FINDINGS:  PA and lateral views of the chest were obtained. Heart and  mediastinum within normal limits. The lungs are clear. No pneumothorax or significant effusion.                                       Medications   dexAMETHasone injection 6 mg (has no administration in time range)   lactated ringers infusion (has no administration in time range)   sodium chloride 0.9% flush 10 mL (has no administration in time range)   remdesivir 200 mg in sodium chloride 0.9% 250 mL infusion (has no administration in time range)   remdesivir 100 mg in sodium chloride 0.9% 100 mL infusion (has no administration in time range)   glucose chewable tablet 16 g (has no administration in time range)   glucose chewable tablet 24 g (has no administration in time range)   dextrose 10% bolus 125 mL 125 mL (has no administration in time range)   dextrose 10% bolus 250 mL 250 mL (has no administration in time range)   glucagon (human recombinant) injection 1 mg (has no administration in time range)   albuterol inhaler 2 puff (has no administration in time range)   ascorbic acid (vitamin C) tablet 500 mg (has no administration in time range)   multivitamin tablet (has no administration in time range)   0.9%  NaCl infusion (has no administration in time range)   enoxaparin injection 40 mg (has no administration in time range)   zolpidem tablet 10 mg (has no administration in time range)   ergocalciferol capsule 50,000 Units (has no administration in time range)   zinc sulfate capsule 220 mg (has no administration in time range)   benzonatate capsule 100 mg (has no administration in time range)   dexAMETHasone injection 6 mg (has no administration in time range)   acetaminophen tablet 650 mg (has no administration in time range)   levoFLOXacin 750 mg/150 mL IVPB 750 mg (has no administration in time range)   lactated ringers bolus 1,000 mL (has no administration in time range)   lactated ringers bolus 1,000 mL (1,000 mLs Intravenous New Bag 2/14/23 1530)     Medical Decision Making:   History:   Old Medical  "Records: I decided to obtain old medical records.  Old Records Summarized: records from clinic visits.       <> Summary of Records: Sent for admit, on treatment for mm  Initial Assessment:   See hpi  Differential Diagnosis:   See mdm  Independently Interpreted Test(s):   I have ordered and independently interpreted X-rays - see prior notes.  I have ordered and independently interpreted EKG Reading(s) - see prior notes  Clinical Tests:   Lab Tests: Ordered and Reviewed  The following lab test(s) were unremarkable: CBC, CMP, Urinalysis, Lactate and Troponin       <> Summary of Lab: Leukopenia, +covid, mild anemia, slightly low bicarb consistent with covid infection and acidosis 2/2 dehydration  Radiological Study: Ordered and Reviewed  Medical Tests: Ordered and Reviewed  Sepsis Perfusion Assessment: "I attest a sepsis perfusion exam was performed within 6 hours of sepsis, severe sepsis, or septic shock presentation, following fluid resuscitation."    Sepsis Perfusion Assessment Complete: 2/14/2023 6:01 PM    ED Management:  He is not febrile or tachycardic but had some low blood pressures.  CBC, CMP, lactic acid ordered and reviewed however workup is most consistent with viral infection.  Given an IV fluids that will total 30cc/kg. Lactic acid is wnl.  Mild leukopenia, anemia both of which have been present previously, slight metabolic acidosis also noted.  Given decadron for covid, deferred remdesivir to admitting team, opted to defer any antibiotics to the admitting team as well given the fact that he has a viral infection. Hemodynamically stable at time of admission  Other:   I have discussed this case with another health care provider.  Medical Decision Making  Differential diagnosis include, but are not limited to: anemia, sepsis, pneumonia, covid, flu, acs, hyper or hypoantremia, hyper or hypokalemia    Problems Addressed:  Chest pain: acute illness or injury  COVID-19: acute illness or injury that poses a " threat to life or bodily functions  Dehydration: acute illness or injury that poses a threat to life or bodily functions  Generalized weakness: acute illness or injury that poses a threat to life or bodily functions  Hypotension, unspecified hypotension type: acute illness or injury that poses a threat to life or bodily functions  SOB (shortness of breath): acute illness or injury that poses a threat to life or bodily functions    Amount and/or Complexity of Data Reviewed  Independent Historian: spouse  External Data Reviewed: notes.  Labs: ordered. Decision-making details documented in ED Course.  Radiology: ordered. Decision-making details documented in ED Course.  ECG/medicine tests: ordered and independent interpretation performed. Decision-making details documented in ED Course.  Discussion of management or test interpretation with external provider(s): admit    Risk  Prescription drug management.  Decision regarding hospitalization.             Scribe Attestation:   Scribe #1: I performed the above scribed service and the documentation accurately describes the services I performed. I attest to the accuracy of the note.  Comments: Attending:   Physician Attestation Statement for Scribe #1: IBetty MD, personally performed the services described in this documentation. All medical record entries made by the scribe were at my direction and in my presence.  I have reviewed the chart and agree that the record reflects my personal performance and is accurate and complete.        Attending Attestation:           Physician Attestation for Scribe:  Physician Attestation Statement for Scribe #1: Betty GORDON MD, reviewed documentation, as scribed by Emma Donahue in my presence, and it is both accurate and complete.           ED Course as of 02/14/23 1806   Tue Feb 14, 2023   1639 BP has improved  EKG performed at 11:54 a.m. rate of 94 normal sinus rhythm LVH and nonspecific ST abnormality [BS]    1753 At this time, given his + covid with previous negative, I will hold off on any antibiotics and defer to admitting team, I have ordered decadron and will also defer remdesivir to admitting team since he is not hypoxic [BS]      ED Course User Index  [BS] Betty Syed MD                 Clinical Impression:   Final diagnoses:  [U07.1] COVID-19 (Primary)  [R53.1] Generalized weakness  [I95.9] Hypotension, unspecified hypotension type  [E86.0] Dehydration        ED Disposition Condition    Observation Stable                Betty Syed MD  02/14/23 4079

## 2023-02-15 ENCOUNTER — TELEPHONE (OUTPATIENT)
Dept: HEMATOLOGY/ONCOLOGY | Facility: CLINIC | Age: 73
End: 2023-02-15
Payer: MEDICARE

## 2023-02-15 LAB
BASOPHILS # BLD AUTO: 0 X10(3)/MCL (ref 0–0.2)
BASOPHILS NFR BLD AUTO: 0 %
EOSINOPHIL # BLD AUTO: 0.01 X10(3)/MCL (ref 0–0.9)
EOSINOPHIL NFR BLD AUTO: 0.3 %
ERYTHROCYTE [DISTWIDTH] IN BLOOD BY AUTOMATED COUNT: 15.7 % (ref 11.5–17)
HCT VFR BLD AUTO: 26.8 % (ref 42–52)
HGB BLD-MCNC: 9.1 GM/DL (ref 14–18)
IMM GRANULOCYTES # BLD AUTO: 0.05 X10(3)/MCL (ref 0–0.04)
IMM GRANULOCYTES NFR BLD AUTO: 1.4 %
LYMPHOCYTES # BLD AUTO: 0.3 X10(3)/MCL (ref 0.6–4.6)
LYMPHOCYTES NFR BLD AUTO: 8.6 %
MCH RBC QN AUTO: 32.5 PG
MCHC RBC AUTO-ENTMCNC: 34 MG/DL (ref 33–36)
MCV RBC AUTO: 95.7 FL (ref 80–94)
MONOCYTES # BLD AUTO: 0.07 X10(3)/MCL (ref 0.1–1.3)
MONOCYTES NFR BLD AUTO: 2 %
NEUTROPHILS # BLD AUTO: 3.05 X10(3)/MCL (ref 2.1–9.2)
NEUTROPHILS NFR BLD AUTO: 87.7 %
NRBC BLD AUTO-RTO: 0 %
PLATELET # BLD AUTO: 225 X10(3)/MCL (ref 130–400)
PMV BLD AUTO: 9.7 FL (ref 7.4–10.4)
RBC # BLD AUTO: 2.8 X10(6)/MCL (ref 4.7–6.1)
WBC # SPEC AUTO: 3.5 X10(3)/MCL (ref 4.5–11.5)

## 2023-02-15 PROCEDURE — 25000003 PHARM REV CODE 250: Performed by: INTERNAL MEDICINE

## 2023-02-15 PROCEDURE — 25000003 PHARM REV CODE 250: Performed by: PHYSICIAN ASSISTANT

## 2023-02-15 PROCEDURE — 21400001 HC TELEMETRY ROOM

## 2023-02-15 PROCEDURE — 25000003 PHARM REV CODE 250: Performed by: NURSE PRACTITIONER

## 2023-02-15 PROCEDURE — 27000207 HC ISOLATION

## 2023-02-15 PROCEDURE — 63600175 PHARM REV CODE 636 W HCPCS: Performed by: NURSE PRACTITIONER

## 2023-02-15 PROCEDURE — 11000001 HC ACUTE MED/SURG PRIVATE ROOM

## 2023-02-15 PROCEDURE — 63600175 PHARM REV CODE 636 W HCPCS: Mod: TB | Performed by: PHYSICIAN ASSISTANT

## 2023-02-15 PROCEDURE — 25000242 PHARM REV CODE 250 ALT 637 W/ HCPCS: Performed by: PHYSICIAN ASSISTANT

## 2023-02-15 PROCEDURE — 63600175 PHARM REV CODE 636 W HCPCS: Performed by: INTERNAL MEDICINE

## 2023-02-15 PROCEDURE — 85025 COMPLETE CBC W/AUTO DIFF WBC: CPT | Performed by: PHYSICIAN ASSISTANT

## 2023-02-15 RX ORDER — TALC
6 POWDER (GRAM) TOPICAL NIGHTLY PRN
Status: DISCONTINUED | OUTPATIENT
Start: 2023-02-15 | End: 2023-02-18 | Stop reason: HOSPADM

## 2023-02-15 RX ADMIN — ALBUTEROL SULFATE 2 PUFF: 90 AEROSOL, METERED RESPIRATORY (INHALATION) at 07:02

## 2023-02-15 RX ADMIN — ERGOCALCIFEROL 50000 UNITS: 1.25 CAPSULE ORAL at 08:02

## 2023-02-15 RX ADMIN — Medication 500 MG: at 08:02

## 2023-02-15 RX ADMIN — Medication 6 MG: at 10:02

## 2023-02-15 RX ADMIN — ATORVASTATIN CALCIUM 10 MG: 10 TABLET, FILM COATED ORAL at 10:02

## 2023-02-15 RX ADMIN — ALBUTEROL SULFATE 2 PUFF: 90 AEROSOL, METERED RESPIRATORY (INHALATION) at 01:02

## 2023-02-15 RX ADMIN — ENOXAPARIN SODIUM 80 MG: 100 INJECTION SUBCUTANEOUS at 10:02

## 2023-02-15 RX ADMIN — ENOXAPARIN SODIUM 80 MG: 100 INJECTION SUBCUTANEOUS at 12:02

## 2023-02-15 RX ADMIN — Medication 6 MG: at 02:02

## 2023-02-15 RX ADMIN — BENZONATATE 100 MG: 100 CAPSULE ORAL at 10:02

## 2023-02-15 RX ADMIN — Medication 500 MG: at 10:02

## 2023-02-15 RX ADMIN — DEXAMETHASONE SODIUM PHOSPHATE 6 MG: 4 INJECTION, SOLUTION INTRA-ARTICULAR; INTRALESIONAL; INTRAMUSCULAR; INTRAVENOUS; SOFT TISSUE at 04:02

## 2023-02-15 RX ADMIN — CLOPIDOGREL BISULFATE 75 MG: 75 TABLET ORAL at 08:02

## 2023-02-15 RX ADMIN — ZOLPIDEM TARTRATE 10 MG: 5 TABLET ORAL at 10:02

## 2023-02-15 RX ADMIN — REMDESIVIR 100 MG: 100 INJECTION, POWDER, LYOPHILIZED, FOR SOLUTION INTRAVENOUS at 10:02

## 2023-02-15 RX ADMIN — THERA TABS 1 TABLET: TAB at 08:02

## 2023-02-15 RX ADMIN — PANTOPRAZOLE SODIUM 40 MG: 40 TABLET, DELAYED RELEASE ORAL at 08:02

## 2023-02-15 RX ADMIN — ZINC SULFATE 220 MG (50 MG) CAPSULE 220 MG: CAPSULE at 08:02

## 2023-02-15 NOTE — CARE UPDATE
Received radiology report from CTA performed that showed Bilateral PE.  Patient started on Lovenox 1 mg/kg BID.

## 2023-02-15 NOTE — TELEPHONE ENCOUNTER
Currently inpatient @ [facility] with COVID and bronchitis. I have cancelled his injections this Friday. Patient would like someone to contact him to let him know when he will be able to reschedule and does he continue revlimid while in the hospital?

## 2023-02-15 NOTE — PROGRESS NOTES
[facility] Medicine Progress Note        Chief Complaint: Inpatient Follow-up for     HPI: 72-year-old white female past medical history of hypertension, hyperlipidemia, coronary artery disease status post stents, multiple myeloma undergoing chemo presented to ER with complaints of cough with expectoration and shortness of breath that has been going on the past 4 weeks.  Patient reports he has been having the symptoms of bronchitis for the past 4 weeks he was tested for COVID and flu at urgent care that was negative almost couple of weeks back.  His symptoms did not improve so he decided to go to his PCP.  At his PCP's clinic he was found to be hypotensive so was instructed to come into the ER.  Denies any fever or chills but does complain of shortness of breath denies any chest pain.  Lab work done here was negative except that patient was positive for COVID-19.  Patient was found to be hypotensive was given bolus of IV fluids and was started on LR and has been admitted to hospitalist service for further management and care     Interval Hx:   Patient seen and examined this morning had CT angio done last night that was positive for bilateral PE    Objective/physical exam:  General: In no acute distress, afebrile  Chest: Clear to auscultation bilaterally  Heart: RRR, +S1, S2, no appreciable murmur  Abdomen: Soft, nontender, BS +  MSK: Warm, no lower extremity edema, no clubbing or cyanosis  Neurologic: Alert and oriented x4,   VITAL SIGNS: 24 HRS MIN & MAX LAST   Temp  Min: 98.4 °F (36.9 °C)  Max: 98.4 °F (36.9 °C) 98.4 °F (36.9 °C)   BP  Min: 89/54  Max: 136/79 120/68   Pulse  Min: 61  Max: 104  64   Resp  Min: 12  Max: 27 18   SpO2  Min: 93 %  Max: 96 % 96 %       Recent Labs   Lab 02/09/23  1335 02/14/23  1222 02/15/23  0405   WBC 4.6 4.1* 3.5*   RBC 3.22* 3.43* 2.80*   HGB 10.3* 10.9* 9.1*   HCT 32.6* 33.4* 26.8*   .2* 97.4* 95.7*   MCH 32.0 31.8 32.5   MCHC 31.6*  32.6* 34.0   RDW 15.2 15.7 15.7    247 225   MPV 8.9 9.5 9.7       Recent Labs   Lab 02/09/23  1335 02/14/23  1222    137   K 4.5 3.7   CO2 20* 18*   BUN 25.7 15.5   CREATININE 1.21* 1.13   CALCIUM 9.3 9.3   ALBUMIN 3.0* 2.7*   ALKPHOS 87 73   ALT 49 49   AST 24 22   BILITOT 0.6 0.7          Microbiology Results (last 7 days)       Procedure Component Value Units Date/Time    Blood Culture [[id number]] Collected: 02/14/23 1222    Order Status: Resulted Specimen: Blood Updated: 02/14/23 1309    Blood Culture [[id number]] Collected: 02/14/23 1222    Order Status: Resulted Specimen: Blood Updated: 02/14/23 1247    Blood Culture [[id number]]     Order Status: Canceled Specimen: Blood     Blood Culture [[id number]]     Order Status: Canceled Specimen: Blood              See below for Radiology    Scheduled Med:   albuterol  2 puff Inhalation Q6H    ascorbic acid (vitamin C)  500 mg Oral BID    atorvastatin  10 mg Oral QHS    clopidogreL  75 mg Oral Daily    dexAMETHasone  6 mg Intravenous Daily    enoxaparin  1 mg/kg Subcutaneous Q12H    ergocalciferol  50,000 Units Oral Q7 Days    levoFLOXacin  750 mg Intravenous Q24H    multivitamin  1 tablet Oral Daily    pantoprazole  40 mg Oral Daily    remdesivir infusion  100 mg Intravenous Daily    zinc sulfate  220 mg Oral Daily    zolpidem  10 mg Oral QHS        Continuous Infusions:   lactated ringers 75 mL/hr at 02/14/23 2222        PRN Meds:  acetaminophen, benzonatate, dextrose 10%, dextrose 10%, glucagon (human recombinant), glucose, glucose, melatonin, sodium chloride 0.9%, zolpidem       Assessment/Plan:  COVID-19 pneumonitis   Hypotension   B/l PE  Macrocytic anemia  H/o Essential hypertension but now with hypotension  Hyperlipidemia   Coronary artery disease status post stents on Plavix   Multiple myeloma undergoing treatment    Patient has been started on remdesivir, dexamethasone albuterol, zinc oxide   Since D-dimer was significantly elevated so we had  ordered CT angio of the chest that was positive for bilateral PE patient has been started on full dose of Lovenox  Will order venous ultrasound too  Blood pressure now better will await blood cultures if negative for 24 hours I will discontinue IV Levaquin  Still holding off on home blood pressure medications since blood pressure is borderline  Please resume if blood pressure starts to go up  Repeat blood work in a.m.   Isolation precautions as per COVID-19 protocol    Prophylaxis:  Full dose of Lovenox    VTE prophylaxis:     Patient condition:  Stable/Fair/Guarded/ Serious/ Critical    Anticipated discharge and Disposition:         All diagnosis and differential diagnosis have been reviewed; assessment and plan has been documented; I have personally reviewed the labs and test results that are presently available; I have reviewed the patients medication list; I have reviewed the consulting providers response and recommendations. I have reviewed or attempted to review medical records based upon their availability    All of the patient's questions have been  addressed and answered. Patient's is agreeable to the above stated plan. I will continue to monitor closely and make adjustments to medical management as needed.  _____________________________________________________________________    Nutrition Status:    Radiology:  CTA Chest Non-Coronary (PE Studies)  Narrative: EXAMINATION:  CTA CHEST NON CORONARY (PE STUDIES)    CLINICAL HISTORY:  Pulmonary embolism (PE) suspected, positive D-dimer;    TECHNIQUE:  Sequential axial images performed of the chest using pulmonary embolism protocol following intravenous contrast bolus. Sagittal and contrast reformations performed.    Dose product length of 281 mGycm. Automated exposure control was utilized to minimize radiation dose.    COMPARISON:  Chest radiograph February 14, 2023    FINDINGS:  Optimal contrast bolus timing with adequately opacified pulmonary artery system  shows filling defect within the right lower lung lobe pulmonary arterial branches from image 212 to image 234 series 5 and there are also left upper lung pulmonary arterial branch intraluminal hypodense thromboembolism on image 69 series 5.  There is no right ventricular strain with RV LV ratio of 0.85.  Thoracic aorta is without aneurysmal dilatation or dissection.  There are coronary arteries calcified plaques.  Cardiac chambers are mildly enlarged in size.    Bilateral lungs are remarkable for scattered ground-glass patchy infiltrates with peripheral predominance.  COVID-19 infection is in the differential.  Bilateral small amount of pleural effusion and lower lung zones atelectasis.  There is no pericardial effusion.  There is a right hilar 1.4 cm enlarged lymph node.    Bilateral glands thickening may represent hyperplasia.  Hepatic 1.4 cm hypodensity probably is a cyst.  No acute or aggressive skeletal abnormality.  Impression: 1.  Bilateral pulmonary embolism.    2.  Bilateral lungs patchy ground-glass infiltrates.  COVID-19 infectious is in the differential.    3.  Bilateral small pleural effusions.    Findings were notified to patient's nurse Rebekah February 14, 2023 at 2312 hours.    Electronically signed by: Miguel A Castellanos  Date:    02/14/2023  Time:    23:13  X-Ray Chest PA And Lateral  Narrative: EXAMINATION:  XR CHEST PA AND LATERAL    CLINICAL HISTORY:  chest pain;    COMPARISON:  2 February 2023    FINDINGS:  PA and lateral views of the chest were obtained. Heart and mediastinum within normal limits. The lungs are clear. No pneumothorax or significant effusion.  Impression: No acute cardiopulmonary abnormality.    Electronically signed by: Cody Duong  Date:    02/14/2023  Time:    12:43      Veronica Jennings MD   02/15/2023

## 2023-02-16 ENCOUNTER — TELEPHONE (OUTPATIENT)
Dept: HEMATOLOGY/ONCOLOGY | Facility: CLINIC | Age: 73
End: 2023-02-16
Payer: MEDICARE

## 2023-02-16 LAB
ANION GAP SERPL CALC-SCNC: 10 MEQ/L
AV INDEX (PROSTH): 0.72
AV MEAN GRADIENT: 9 MMHG
AV PEAK GRADIENT: 17 MMHG
AV VALVE AREA: 2.25 CM2
AV VELOCITY RATIO: 0.73
BASOPHILS # BLD AUTO: 0 X10(3)/MCL (ref 0–0.2)
BASOPHILS NFR BLD AUTO: 0 %
BSA FOR ECHO PROCEDURE: 2.06 M2
BUN SERPL-MCNC: 16.1 MG/DL (ref 8.4–25.7)
CALCIUM SERPL-MCNC: 8.9 MG/DL (ref 8.8–10)
CHLORIDE SERPL-SCNC: 108 MMOL/L (ref 98–107)
CO2 SERPL-SCNC: 21 MMOL/L (ref 23–31)
CREAT SERPL-MCNC: 0.9 MG/DL (ref 0.73–1.18)
CREAT/UREA NIT SERPL: 18
CV ECHO LV RWT: 0.91 CM
D DIMER PPP IA.FEU-MCNC: 3.36 UG/ML FEU (ref 0–0.5)
DOP CALC AO PEAK VEL: 2.04 M/S
DOP CALC AO VTI: 37.6 CM
DOP CALC LVOT AREA: 3.1 CM2
DOP CALC LVOT DIAMETER: 2 CM
DOP CALC LVOT PEAK VEL: 1.48 M/S
DOP CALC LVOT STROKE VOLUME: 84.78 CM3
DOP CALC MV VTI: 36.9 CM
DOP CALCLVOT PEAK VEL VTI: 27 CM
E WAVE DECELERATION TIME: 183 MSEC
E/A RATIO: 0.91
E/E' RATIO: 9.38 M/S
ECHO LV POSTERIOR WALL: 1.93 CM (ref 0.6–1.1)
EJECTION FRACTION: 76 %
EOSINOPHIL # BLD AUTO: 0.04 X10(3)/MCL (ref 0–0.9)
EOSINOPHIL NFR BLD AUTO: 0.8 %
ERYTHROCYTE [DISTWIDTH] IN BLOOD BY AUTOMATED COUNT: 15.8 % (ref 11.5–17)
FERRITIN SERPL-MCNC: 2224.3 NG/ML (ref 21.81–274.66)
FRACTIONAL SHORTENING: 46 % (ref 28–44)
GFR SERPLBLD CREATININE-BSD FMLA CKD-EPI: >60 MLS/MIN/1.73/M2
GLUCOSE SERPL-MCNC: 83 MG/DL (ref 82–115)
HCT VFR BLD AUTO: 29.3 % (ref 42–52)
HGB BLD-MCNC: 9.8 GM/DL (ref 14–18)
IMM GRANULOCYTES # BLD AUTO: 0.08 X10(3)/MCL (ref 0–0.04)
IMM GRANULOCYTES NFR BLD AUTO: 1.6 %
INTERVENTRICULAR SEPTUM: 2.1 CM (ref 0.6–1.1)
LDH SERPL-CCNC: 203 U/L (ref 125–220)
LEFT ATRIUM SIZE: 4.1 CM
LEFT ATRIUM VOLUME INDEX MOD: 26.2 ML/M2
LEFT ATRIUM VOLUME MOD: 54 CM3
LEFT INTERNAL DIMENSION IN SYSTOLE: 2.26 CM (ref 2.1–4)
LEFT VENTRICLE DIASTOLIC VOLUME INDEX: 38.59 ML/M2
LEFT VENTRICLE DIASTOLIC VOLUME: 79.5 ML
LEFT VENTRICLE MASS INDEX: 197 G/M2
LEFT VENTRICLE SYSTOLIC VOLUME INDEX: 8.4 ML/M2
LEFT VENTRICLE SYSTOLIC VOLUME: 17.3 ML
LEFT VENTRICULAR INTERNAL DIMENSION IN DIASTOLE: 4.22 CM (ref 3.5–6)
LEFT VENTRICULAR MASS: 405.25 G
LV LATERAL E/E' RATIO: 9.38 M/S
LV SEPTAL E/E' RATIO: 9.38 M/S
LVOT MG: 4 MMHG
LVOT MV: 0.9 CM/S
LYMPHOCYTES # BLD AUTO: 0.54 X10(3)/MCL (ref 0.6–4.6)
LYMPHOCYTES NFR BLD AUTO: 11.1 %
MCH RBC QN AUTO: 32.1 PG
MCHC RBC AUTO-ENTMCNC: 33.4 MG/DL (ref 33–36)
MCV RBC AUTO: 96.1 FL (ref 80–94)
MONOCYTES # BLD AUTO: 0.32 X10(3)/MCL (ref 0.1–1.3)
MONOCYTES NFR BLD AUTO: 6.6 %
MV MEAN GRADIENT: 2 MMHG
MV PEAK A VEL: 0.82 M/S
MV PEAK E VEL: 0.75 M/S
MV PEAK GRADIENT: 5 MMHG
MV STENOSIS PRESSURE HALF TIME: 81 MS
MV VALVE AREA BY CONTINUITY EQUATION: 2.3 CM2
MV VALVE AREA P 1/2 METHOD: 2.72 CM2
NEUTROPHILS # BLD AUTO: 3.9 X10(3)/MCL (ref 2.1–9.2)
NEUTROPHILS NFR BLD AUTO: 79.9 %
NRBC BLD AUTO-RTO: 0 %
PISA TR MAX VEL: 2.08 M/S
PLATELET # BLD AUTO: 264 X10(3)/MCL (ref 130–400)
PMV BLD AUTO: 10.5 FL (ref 7.4–10.4)
POTASSIUM SERPL-SCNC: 3.7 MMOL/L (ref 3.5–5.1)
PV PEAK VELOCITY: 1.37 CM/S
RA PRESSURE: 3 MMHG
RA WIDTH: 3.63 CM
RBC # BLD AUTO: 3.05 X10(6)/MCL (ref 4.7–6.1)
RIGHT VENTRICULAR END-DIASTOLIC DIMENSION: 2.87 CM
SODIUM SERPL-SCNC: 139 MMOL/L (ref 136–145)
TDI LATERAL: 0.08 M/S
TDI SEPTAL: 0.08 M/S
TDI: 0.08 M/S
TR MAX PG: 17 MMHG
TRICUSPID ANNULAR PLANE SYSTOLIC EXCURSION: 2.79 CM
TV REST PULMONARY ARTERY PRESSURE: 20 MMHG
WBC # SPEC AUTO: 4.9 X10(3)/MCL (ref 4.5–11.5)

## 2023-02-16 PROCEDURE — 94799 UNLISTED PULMONARY SVC/PX: CPT

## 2023-02-16 PROCEDURE — 63600175 PHARM REV CODE 636 W HCPCS: Performed by: INTERNAL MEDICINE

## 2023-02-16 PROCEDURE — 63600175 PHARM REV CODE 636 W HCPCS: Performed by: NURSE PRACTITIONER

## 2023-02-16 PROCEDURE — 82728 ASSAY OF FERRITIN: CPT | Performed by: PHYSICIAN ASSISTANT

## 2023-02-16 PROCEDURE — 85379 FIBRIN DEGRADATION QUANT: CPT | Performed by: PHYSICIAN ASSISTANT

## 2023-02-16 PROCEDURE — 85025 COMPLETE CBC W/AUTO DIFF WBC: CPT | Performed by: STUDENT IN AN ORGANIZED HEALTH CARE EDUCATION/TRAINING PROGRAM

## 2023-02-16 PROCEDURE — 80048 BASIC METABOLIC PNL TOTAL CA: CPT | Performed by: STUDENT IN AN ORGANIZED HEALTH CARE EDUCATION/TRAINING PROGRAM

## 2023-02-16 PROCEDURE — 83615 LACTATE (LD) (LDH) ENZYME: CPT | Performed by: PHYSICIAN ASSISTANT

## 2023-02-16 PROCEDURE — 63600175 PHARM REV CODE 636 W HCPCS: Mod: TB | Performed by: PHYSICIAN ASSISTANT

## 2023-02-16 PROCEDURE — 25000003 PHARM REV CODE 250: Performed by: NURSE PRACTITIONER

## 2023-02-16 PROCEDURE — 21400001 HC TELEMETRY ROOM

## 2023-02-16 PROCEDURE — 27000207 HC ISOLATION

## 2023-02-16 PROCEDURE — 99900035 HC TECH TIME PER 15 MIN (STAT)

## 2023-02-16 PROCEDURE — 25000242 PHARM REV CODE 250 ALT 637 W/ HCPCS: Performed by: PHYSICIAN ASSISTANT

## 2023-02-16 PROCEDURE — 25000003 PHARM REV CODE 250: Performed by: PHYSICIAN ASSISTANT

## 2023-02-16 PROCEDURE — 25000003 PHARM REV CODE 250: Performed by: INTERNAL MEDICINE

## 2023-02-16 RX ADMIN — DEXAMETHASONE SODIUM PHOSPHATE 6 MG: 4 INJECTION, SOLUTION INTRA-ARTICULAR; INTRALESIONAL; INTRAMUSCULAR; INTRAVENOUS; SOFT TISSUE at 02:02

## 2023-02-16 RX ADMIN — ZOLPIDEM TARTRATE 10 MG: 5 TABLET ORAL at 08:02

## 2023-02-16 RX ADMIN — ENOXAPARIN SODIUM 80 MG: 100 INJECTION SUBCUTANEOUS at 11:02

## 2023-02-16 RX ADMIN — ZINC SULFATE 220 MG (50 MG) CAPSULE 220 MG: CAPSULE at 08:02

## 2023-02-16 RX ADMIN — Medication 500 MG: at 08:02

## 2023-02-16 RX ADMIN — ATORVASTATIN CALCIUM 10 MG: 10 TABLET, FILM COATED ORAL at 08:02

## 2023-02-16 RX ADMIN — ALBUTEROL SULFATE 2 PUFF: 90 AEROSOL, METERED RESPIRATORY (INHALATION) at 08:02

## 2023-02-16 RX ADMIN — REMDESIVIR 100 MG: 100 INJECTION, POWDER, LYOPHILIZED, FOR SOLUTION INTRAVENOUS at 05:02

## 2023-02-16 RX ADMIN — THERA TABS 1 TABLET: TAB at 08:02

## 2023-02-16 RX ADMIN — ALBUTEROL SULFATE 2 PUFF: 90 AEROSOL, METERED RESPIRATORY (INHALATION) at 06:02

## 2023-02-16 RX ADMIN — CLOPIDOGREL BISULFATE 75 MG: 75 TABLET ORAL at 08:02

## 2023-02-16 RX ADMIN — PANTOPRAZOLE SODIUM 40 MG: 40 TABLET, DELAYED RELEASE ORAL at 08:02

## 2023-02-16 RX ADMIN — Medication 6 MG: at 10:02

## 2023-02-16 NOTE — PLAN OF CARE
02/16/23 1602   Discharge Assessment   Assessment Type Discharge Planning Assessment   Confirmed/corrected address, phone number and insurance Yes   Confirmed Demographics Correct on Facesheet   Source of Information family  (pt's wife, Lexis)   Communicated CALVIN with patient/caregiver Date not available/Unable to determine   Reason For Admission Covid pos., terry PE   People in Home spouse  (Pt lives with his wifeLexis in a single story home with 3-4 steps to enter and rails on both sides)   Do you expect to return to your current living situation? Yes   Do you have help at home or someone to help you manage your care at home? Yes   Who are your caregiver(s) and their phone number(s)? pt's wife, Lexis---[phone]   Prior to hospitilization cognitive status: Alert/Oriented   Current cognitive status: Unable to Assess  (pt is covid pos)   Walking or Climbing Stairs ambulation difficulty, requires equipment   Mobility Management cane   Home Layout Able to live on 1st floor   Equipment Currently Used at Home cane, straight   Patient currently being followed by outpatient case management? No   Do you currently have service(s) that help you manage your care at home? No   Who is going to help you get home at discharge? wife, Lexis   How do you get to doctors appointments? car, drives self   Are you on dialysis? No   Discharge Plan A Home with family   Discharge Plan B Home with family   DME Needed Upon Discharge  other (see comments)  (TBD)   Discharge Plan discussed with: Spouse/sig other   Name(s) and Number(s) Lexis--[phone]   Discharge Barriers Identified None   Housing Stability   In the last 12 months, was there a time when you were not able to pay the mortgage or rent on time? N   Transportation Needs   In the past 12 months, has lack of transportation kept you from medical appointments or from getting medications? no   Food Insecurity   Within the past 12 months, you worried that your food would run  out before you got the money to buy more. Never true   OTHER   Name(s) of People in Home wife, Lexis     Pt's PCP is Dr Kaiser Metcalf. Pt's  is his wife, Lexis ([phone]). He has never had HH services. He uses [facility] pharmacy in [place]. Follow for CA needs

## 2023-02-16 NOTE — TELEPHONE ENCOUNTER
Patient advised to hold revlimid. All treatment appointments cancelled. Patient aware that he needs to see provider in office before resuming any tx. He voiced understanding.

## 2023-02-16 NOTE — PROGRESS NOTES
[facility] Hospital Medicine Progress Note        Chief Complaint: Inpatient Follow-up for B/L PE    HPI:   72-year-old lady with PMH of hypertension, hyperlipidemia, coronary artery disease status post stents, multiple myeloma undergoing chemo presented to ER with complaints of cough with expectoration and shortness of breath that has been going on the past 4 weeks.  Patient reports he has been having the symptoms of bronchitis for the past 4 weeks he was tested for COVID and flu at urgent care that was negative almost couple of weeks back.  His symptoms did not improve so he decided to go to his PCP.  At his PCP's clinic he was found to be hypotensive so was instructed to come into the ER.  Denies any fever or chills but does complain of shortness of breath denies any chest pain.  Lab work done here was negative except that patient was positive for COVID-19.  Patient was found to be hypotensive was given bolus of IV fluids and was started on LR and has been admitted to hospitalist service for further management and care. Found to have B/L PE on CTA Chest. Started on FD Lovenox.    Interval Hx:   Today, patient was seen at bedside.  He reported persistent shortness of breath particularly on exertion. Ordering Echocardiogram, US DVT B/L. Will consult [facility] in case of RV strain. Consulting PT.    Objective/physical exam:  General: alert male lying comfortably in bed, in no acute distress  HENT: oral and oropharyngeal mucosa moist, pink, with no erythema or exudates, no ear pain or discharge  Neck: normal neck movement, no lymph nodes or swellings, no JVD or Carotid bruit  Respiratory: clear breathing sounds bilaterally, no crackles, rales, ronchi or wheezes  Cardiovascular: clear S1 and S2, no murmurs, rubs or gallops  Peripheral Vascular: no lesions, ulcers or erosions, normal peripheral pulses and no pedal edema  Gastrointestinal: soft, non-tender, non-distended abdomen, no guarding,  rigidity or rebound tenderness, normal bowel sounds  Integumentary: normal skin color, no rashes or lesions  Neuro: AAO x 3; motor strength 5/5 in B/L UEs & LEs; sensation intact to gross and fine touch B/L; CN II-XII grossly intact     VITAL SIGNS: 24 HRS MIN & MAX LAST   Temp  Min: 97.3 °F (36.3 °C)  Max: 98.2 °F (36.8 °C) 97.7 °F (36.5 °C)   BP  Min: 125/72  Max: 165/70 (!) 165/70   Pulse  Min: 66  Max: 86  67   Resp  Min: 18  Max: 18 18   SpO2  Min: 92 %  Max: 97 % (!) 94 %       Recent Labs   Lab 02/14/23  1222 02/15/23  0405 02/16/23  1300   WBC 4.1* 3.5* 4.9   RBC 3.43* 2.80* 3.05*   HGB 10.9* 9.1* 9.8*   HCT 33.4* 26.8* 29.3*   MCV 97.4* 95.7* 96.1*   MCH 31.8 32.5 32.1   MCHC 32.6* 34.0 33.4   RDW 15.7 15.7 15.8    225 264   MPV 9.5 9.7 10.5*       Recent Labs   Lab 02/14/23  1222 02/16/23  1300    139   K 3.7 3.7   CO2 18* 21*   BUN 15.5 16.1   CREATININE 1.13 0.90   CALCIUM 9.3 8.9   ALBUMIN 2.7*  --    ALKPHOS 73  --    ALT 49  --    AST 22  --    BILITOT 0.7  --        Scheduled Med:   albuterol  2 puff Inhalation Q6H    ascorbic acid (vitamin C)  500 mg Oral BID    atorvastatin  10 mg Oral QHS    clopidogreL  75 mg Oral Daily    dexAMETHasone  6 mg Intravenous Daily    enoxaparin  1 mg/kg Subcutaneous Q12H    ergocalciferol  50,000 Units Oral Q7 Days    multivitamin  1 tablet Oral Daily    pantoprazole  40 mg Oral Daily    remdesivir infusion  100 mg Intravenous Daily    zinc sulfate  220 mg Oral Daily    zolpidem  10 mg Oral QHS     Continuous Infusions:   lactated ringers 75 mL/hr at 02/14/23 2222     PRN Meds:  acetaminophen, benzonatate, dextrose 10%, dextrose 10%, glucagon (human recombinant), glucose, glucose, melatonin, sodium chloride 0.9%, zolpidem     Assessment/Plan:  COVID-19 Pneumonitis   SOB, SNIDER 2/2 B/L PE  Macrocytic Anemia  Hypertension  Hyperlipidemia   CAD s/p Stents on Plavix   Multiple Myeloma undergoing Treatment    - Patient continued on Remdesivir, Dexamethasone,  Albuterol  - Continued FD Lovenox for B/L PE; will order Echocardiogram & B/L US DVT  - Patient continued on IV Levaquin; once BCX negative x 48 hrs, will DC  - Still holding off on home blood pressure medications due to hypotension on admission   - Isolation precautions as per COVID-19 protocol  - Patient continued on IV LR 75/hr  - Continuing patient on Atorvastatin 10 mg, Plavix 75 mg, Protonix 40 mg, Zolpidem 10 mg  - Continue monitoring symptoms    Prophylaxis:  Full Dose Lovenox    Patient condition:  Stable    Anticipated discharge and Disposition:     Pending symptomatic improvement    All diagnosis and differential diagnosis have been reviewed; assessment and plan has been documented; I have personally reviewed the labs and test results that are presently available; I have reviewed the patients medication list; I have reviewed the consulting providers response and recommendations. I have reviewed or attempted to review medical records based upon their availability    All of the patient's questions have been  addressed and answered. Patient's is agreeable to the above stated plan. I will continue to monitor closely and make adjustments to medical management as needed.  _____________________________________________________________________    Radiology:  Echo  · The left ventricle is normal in size with moderate concentric   hypertrophy and hyperdynamic systolic function.  · The estimated ejection fraction is 65-70%.  · Grade I left ventricular diastolic dysfunction.  · Normal right ventricular size with normal right ventricular systolic   function. No evidence of RV strain pattern.  · Increased velocity across aortic valve and LVOT which is probably   related to hyperdynamic LV function. Cine images suggests no   hemodynamically significant aortic stenosis.  · Normal central venous pressure (3 mmHg).  · The estimated PA systolic pressure is 20 mmHg.         Clayton Jaime MD   02/16/2023

## 2023-02-17 PROCEDURE — 63600175 PHARM REV CODE 636 W HCPCS: Mod: TB | Performed by: PHYSICIAN ASSISTANT

## 2023-02-17 PROCEDURE — 97162 PT EVAL MOD COMPLEX 30 MIN: CPT

## 2023-02-17 PROCEDURE — 25000003 PHARM REV CODE 250: Performed by: PHYSICIAN ASSISTANT

## 2023-02-17 PROCEDURE — 27000207 HC ISOLATION

## 2023-02-17 PROCEDURE — 25000003 PHARM REV CODE 250: Performed by: NURSE PRACTITIONER

## 2023-02-17 PROCEDURE — 25000242 PHARM REV CODE 250 ALT 637 W/ HCPCS: Performed by: STUDENT IN AN ORGANIZED HEALTH CARE EDUCATION/TRAINING PROGRAM

## 2023-02-17 PROCEDURE — 63600175 PHARM REV CODE 636 W HCPCS: Performed by: NURSE PRACTITIONER

## 2023-02-17 PROCEDURE — 25000242 PHARM REV CODE 250 ALT 637 W/ HCPCS: Performed by: PHYSICIAN ASSISTANT

## 2023-02-17 PROCEDURE — 21400001 HC TELEMETRY ROOM

## 2023-02-17 PROCEDURE — 63600175 PHARM REV CODE 636 W HCPCS: Performed by: STUDENT IN AN ORGANIZED HEALTH CARE EDUCATION/TRAINING PROGRAM

## 2023-02-17 PROCEDURE — 25000003 PHARM REV CODE 250: Performed by: INTERNAL MEDICINE

## 2023-02-17 RX ADMIN — CLOPIDOGREL BISULFATE 75 MG: 75 TABLET ORAL at 08:02

## 2023-02-17 RX ADMIN — ALBUTEROL SULFATE 2 PUFF: 90 AEROSOL, METERED RESPIRATORY (INHALATION) at 06:02

## 2023-02-17 RX ADMIN — THERA TABS 1 TABLET: TAB at 08:02

## 2023-02-17 RX ADMIN — ALBUTEROL SULFATE 2 PUFF: 90 AEROSOL, METERED RESPIRATORY (INHALATION) at 04:02

## 2023-02-17 RX ADMIN — Medication 6 MG: at 09:02

## 2023-02-17 RX ADMIN — ATORVASTATIN CALCIUM 10 MG: 10 TABLET, FILM COATED ORAL at 09:02

## 2023-02-17 RX ADMIN — DEXAMETHASONE 6 MG: 2 TABLET ORAL at 04:02

## 2023-02-17 RX ADMIN — ALBUTEROL SULFATE 2 PUFF: 90 AEROSOL, METERED RESPIRATORY (INHALATION) at 11:02

## 2023-02-17 RX ADMIN — REMDESIVIR 100 MG: 100 INJECTION, POWDER, LYOPHILIZED, FOR SOLUTION INTRAVENOUS at 04:02

## 2023-02-17 RX ADMIN — Medication 500 MG: at 08:02

## 2023-02-17 RX ADMIN — ZINC SULFATE 220 MG (50 MG) CAPSULE 220 MG: CAPSULE at 08:02

## 2023-02-17 RX ADMIN — PANTOPRAZOLE SODIUM 40 MG: 40 TABLET, DELAYED RELEASE ORAL at 08:02

## 2023-02-17 RX ADMIN — ZOLPIDEM TARTRATE 10 MG: 5 TABLET ORAL at 09:02

## 2023-02-17 RX ADMIN — Medication 500 MG: at 09:02

## 2023-02-17 RX ADMIN — ENOXAPARIN SODIUM 80 MG: 100 INJECTION SUBCUTANEOUS at 11:02

## 2023-02-17 NOTE — PLAN OF CARE
Problem: Physical Therapy  Goal: Physical Therapy Goal  Description: Goals to be met by: 3/17/2023     Patient will increase functional independence with mobility by performin. Sit to stand transfer with [place]  2. Gait  x 150 feet with [place] using No Assistive Device.   3. Ascend/descend 5 stair with bilateral Handrails [place] using No Assistive Device.     Outcome: Ongoing, Progressing

## 2023-02-17 NOTE — PT/OT/SLP EVAL
Physical Therapy Evaluation    Patient Name:  Valentino Manning   MRN:  [id number]    Recommendations:     Discharge Recommendations: home, home with home health   Discharge Equipment Recommendations: other (see comments) (pending progress)   Barriers to discharge:  impaired endurance and functional mobiltiy    Assessment:     Valentino Manning is a 72 y.o. male admitted with a medical diagnosis of COVID-19, B pulmonary embolisms on full dose Lovenox, multiple myeloma.  He presents with the following impairments/functional limitations: weakness, impaired endurance, impaired functional mobility, gait instability. Patient tolerated PT evaluation well. Patient able to perform independent bed mobility. Patient required CGA and single point cane for sit<>stand transfer. Patient ambulated in room x35ft with single point quad cane and CGA, shortened step length and limited foot clearance noted. Patient's spO2 was 97% prior to mobilization and remained stable at 95% during ambulation. Patient with good functional BLE strength but decreased endurance. Patient is appropriate for skilled acute PT to increase patient's independence and endurance with functional mobility. PT recommending home vs home with home health pending progress.     Rehab Prognosis: Good; patient would benefit from acute skilled PT services to address these deficits and reach maximum level of function.    Recent Surgery: * No surgery found *      Plan:     During this hospitalization, patient to be seen 5 x/week (5-6x/week) to address the identified rehab impairments via gait training, therapeutic activities, therapeutic exercises, neuromuscular re-education and progress toward the following goals:    Plan of Care Expires:  03/17/23    Subjective     Chief Complaint: shortness of breath with mobility  Patient/Family Comments/goals: return to PLOF  Pain/Comfort:  Pain Rating 1: 0/10    Patients cultural, spiritual, Mormonism conflicts given the current situation:  no    Living Environment:  Patient lives in single story home with wife with 4-5 steps to enter home.  Prior to admission, patients level of function was independent.  Equipment used at home: cane, straight.  DME owned (not currently used): single point cane.  Upon discharge, patient will have assistance from wife.    Objective:     Communicated with RN prior to session.  Patient found HOB elevated with peripheral IV, pulse ox (continuous), telemetry  upon PT entry to room.    General Precautions: Standard,contact/droplet (COVID+)  Orthopedic Precautions:N/A   Braces: N/A  Respiratory Status: Room air    Exams:  Sensation: -       Intact  RLE ROM: WNL  RLE Strength: WNL  LLE ROM: WNL  LLE Strength: WNL    Functional Mobility:  Bed Mobility:  Supine to Sit: independence  Sit to Supine: independence  Transfers:  Sit to Stand:  contact guard assistance with straight cane  Gait: x35ft with single point cane with CGA, spO2 >/= 95% during ambulation      AM-PAC 6 CLICK MOBILITY  Total Score:20       Treatment & Education:  Patient educated to ambulate to and from restroom and to and from door within room several times a day if feeling well to increase endurance in between therapy sessions prior to returning home.     Patient left HOB elevated with all lines intact and call button in reach.    GOALS:   Multidisciplinary Problems       Physical Therapy Goals          Problem: Physical Therapy    Goal Priority Disciplines Outcome Goal Variances Interventions   Physical Therapy Goal    High PT, PT/OT Ongoing, Progressing     Description: Goals to be met by: 3/17/2023     Patient will increase functional independence with mobility by performin. Sit to stand transfer with [place]  2. Gait  x 150 feet with [place] using No Assistive Device.   3. Ascend/descend 5 stair with bilateral Handrails [place] using No Assistive Device.                          History:     Past Medical History:   Diagnosis Date     Anemia     GERD (gastroesophageal reflux disease)     Heart problem     Hyperlipidemia     Hypertension     Multiple myeloma        Past Surgical History:   Procedure Laterality Date    BONE MARROW ASPIRATION N/A 9/1/2022    Procedure: ASPIRATION, BONE MARROW;  Surgeon: Robbie Gardner MD;  Location: [facility];  Service: General;  Laterality: N/A;    CARDIAC SURGERY  2021    ENDOSCOPIC RELEASE OF BOTH CARPAL TUNNELS         Time Tracking:     PT Received On: 02/17/23  PT Start Time: 0944     PT Stop Time: 1004  PT Total Time (min): 20 min     Billable Minutes: Evaluation moderate complexity    Note signed by supervising PT: Yakelin Montejo, PT.     02/17/2023

## 2023-02-17 NOTE — NURSING
Nurses Note -- 4 Eyes      2/17/2023   10:58 AM      Skin assessed during: Daily Assessment      [x] No Pressure Injuries Present    []Prevention Measures Documented      [] Yes- Altered Skin Integrity Present or Discovered   [] LDA Added if Not in Epic (Describe Wound)   [] New Altered Skin Integrity was Present on Admit and Documented in LDA   [] Wound Image Taken    Wound Care Consulted? No    Attending Nurse:  Valentino Liu LPN     Second RN/Staff Member:  Marlene Sequeira RN

## 2023-02-17 NOTE — PROGRESS NOTES
Inpatient Nutrition Evaluation    Admit Date: 2023   Total duration of encounter: 3 days    Nutrition Recommendation/Prescription     Continue oral diet as tolerated; encouraged intake.    Nutrition Assessment     Chart Review    Reason Seen: malnutrition screening tool (MST)    Malnutrition Screening Tool Results   Have you recently lost weight without trying?: Unsure  Have you been eating poorly because of a decreased appetite?: Yes   MST Score: 3     Diagnosis:  COVID-19 pneumonitis   SOB, SNIDER 2/2 B/L PE  Macrocytic anemia  Hypertension  Hyperlipidemia   CAD s/p stents on Plavix   Multiple myeloma undergoing treatment    Relevant Medical History: hypertension, hyperlipidemia, coronary artery disease status post stents, multiple myeloma undergoing chemo    Nutrition-Related Medications: Lr, vitamin C, dexamethasone, ergocalciferol, multivitamin, pantoprazole, zinc sulfate    Nutrition-Related Labs:  23 Cl 108    Diet Order: Diet heart healthy  Oral Supplement Order: none  Appetite/Oral Intake: good/% of meals  Factors Affecting Nutritional Intake: none identified  Food/Sikh/Cultural Preferences: none reported  Food Allergies: none reported       Wound(s):  none noted    Comments    23 Patient reports a fair to good appetite that is normal for him, reports 5% weight loss in 1 month, offered Boost but not interested at this time.    Anthropometrics    Height: 6' (182.9 cm) Height Method: Stated  Last Weight: 83.9 kg (184 lb 15.5 oz) (23 1101) Weight Method: Bed Scale  BMI (Calculated): 25.1  BMI Classification: overweight (BMI 25-29.9)        Ideal Body Weight (IBW), Male: 178 lb     % Ideal Body Weight, Male (lb): 103.92 %                 Usual Body Weight (UBW), k.5 kg  % Usual Body Weight: 95     Usual Weight Provided By: patient    Wt Readings from Last 3 Encounters:   23 83.9 kg (184 lb 15.5 oz)   23 85.7 kg (189 lb)   23 88.5 kg (195 lb)     Weight  Change(s) Since Admission: none noted  Wt Readings from Last 1 Encounters:   02/16/23 1101 83.9 kg (184 lb 15.5 oz)   02/16/23 0806 83.9 kg (185 lb)   02/14/23 1149 83.9 kg (185 lb)   Admit Weight: 83.9 kg (185 lb) (02/14/23 1149)    Patient Education Not applicable.    Monitoring & Evaluation     Dietitian will monitor food and beverage intake and weight.  Nutrition Risk/Follow-Up: low (follow-up in 5-7 days)  Patients assigned 'low nutrition risk' status do not qualify for a full nutritional assessment but will be monitored and re-evaluated in a 5-7 day time period. Please consult if re-evaluation needed sooner.

## 2023-02-18 VITALS
OXYGEN SATURATION: 97 % | WEIGHT: 184.94 LBS | RESPIRATION RATE: 20 BRPM | BODY MASS INDEX: 25.05 KG/M2 | HEART RATE: 70 BPM | TEMPERATURE: 98 F | DIASTOLIC BLOOD PRESSURE: 80 MMHG | HEIGHT: 72 IN | SYSTOLIC BLOOD PRESSURE: 146 MMHG

## 2023-02-18 DIAGNOSIS — U07.1 COVID-19 VIRUS DETECTED: ICD-10-CM

## 2023-02-18 PROBLEM — R06.02 SOB (SHORTNESS OF BREATH): Status: ACTIVE | Noted: 2023-02-18

## 2023-02-18 LAB
ANION GAP SERPL CALC-SCNC: 10 MEQ/L
BUN SERPL-MCNC: 13.8 MG/DL (ref 8.4–25.7)
CALCIUM SERPL-MCNC: 8.8 MG/DL (ref 8.8–10)
CHLORIDE SERPL-SCNC: 107 MMOL/L (ref 98–107)
CO2 SERPL-SCNC: 21 MMOL/L (ref 23–31)
CREAT SERPL-MCNC: 0.94 MG/DL (ref 0.73–1.18)
CREAT/UREA NIT SERPL: 15
GFR SERPLBLD CREATININE-BSD FMLA CKD-EPI: >60 MLS/MIN/1.73/M2
GLUCOSE SERPL-MCNC: 103 MG/DL (ref 82–115)
POTASSIUM SERPL-SCNC: 3.7 MMOL/L (ref 3.5–5.1)
SODIUM SERPL-SCNC: 138 MMOL/L (ref 136–145)

## 2023-02-18 PROCEDURE — 25000242 PHARM REV CODE 250 ALT 637 W/ HCPCS: Performed by: STUDENT IN AN ORGANIZED HEALTH CARE EDUCATION/TRAINING PROGRAM

## 2023-02-18 PROCEDURE — 80048 BASIC METABOLIC PNL TOTAL CA: CPT | Performed by: STUDENT IN AN ORGANIZED HEALTH CARE EDUCATION/TRAINING PROGRAM

## 2023-02-18 PROCEDURE — 25000003 PHARM REV CODE 250: Performed by: INTERNAL MEDICINE

## 2023-02-18 PROCEDURE — 25000003 PHARM REV CODE 250: Performed by: PHYSICIAN ASSISTANT

## 2023-02-18 PROCEDURE — 63600175 PHARM REV CODE 636 W HCPCS: Performed by: NURSE PRACTITIONER

## 2023-02-18 PROCEDURE — 63600175 PHARM REV CODE 636 W HCPCS: Performed by: STUDENT IN AN ORGANIZED HEALTH CARE EDUCATION/TRAINING PROGRAM

## 2023-02-18 RX ORDER — ERGOCALCIFEROL 1.25 MG/1
50000 CAPSULE ORAL
Qty: 7 CAPSULE | Refills: 0 | Status: SHIPPED | OUTPATIENT
Start: 2023-02-22 | End: 2023-08-24

## 2023-02-18 RX ORDER — DEXAMETHASONE 6 MG/1
6 TABLET ORAL DAILY
Qty: 6 TABLET | Refills: 0 | Status: SHIPPED | OUTPATIENT
Start: 2023-02-18 | End: 2023-02-24

## 2023-02-18 RX ORDER — BENZONATATE 100 MG/1
100 CAPSULE ORAL 3 TIMES DAILY PRN
Qty: 30 CAPSULE | Refills: 1 | Status: SHIPPED | OUTPATIENT
Start: 2023-02-18 | End: 2023-02-28

## 2023-02-18 RX ADMIN — ENOXAPARIN SODIUM 80 MG: 100 INJECTION SUBCUTANEOUS at 12:02

## 2023-02-18 RX ADMIN — ALBUTEROL SULFATE 2 PUFF: 90 AEROSOL, METERED RESPIRATORY (INHALATION) at 08:02

## 2023-02-18 RX ADMIN — CLOPIDOGREL BISULFATE 75 MG: 75 TABLET ORAL at 08:02

## 2023-02-18 RX ADMIN — DEXAMETHASONE 6 MG: 2 TABLET ORAL at 08:02

## 2023-02-18 RX ADMIN — ALBUTEROL SULFATE 2 PUFF: 90 AEROSOL, METERED RESPIRATORY (INHALATION) at 12:02

## 2023-02-18 RX ADMIN — THERA TABS 1 TABLET: TAB at 08:02

## 2023-02-18 RX ADMIN — PANTOPRAZOLE SODIUM 40 MG: 40 TABLET, DELAYED RELEASE ORAL at 08:02

## 2023-02-18 RX ADMIN — Medication 500 MG: at 08:02

## 2023-02-18 RX ADMIN — ZINC SULFATE 220 MG (50 MG) CAPSULE 220 MG: CAPSULE at 08:02

## 2023-02-18 NOTE — PROGRESS NOTES
[facility] Hospital Medicine Progress Note        Chief Complaint: Inpatient Follow-up for B/L PE    HPI:   72-year-old lady with PMH of hypertension, hyperlipidemia, coronary artery disease status post stents, multiple myeloma undergoing chemo presented to ER with complaints of cough with expectoration and shortness of breath that has been going on the past 4 weeks.  Patient reports he has been having the symptoms of bronchitis for the past 4 weeks he was tested for COVID and flu at urgent care that was negative almost couple of weeks back.  His symptoms did not improve so he decided to go to his PCP.  At his PCP's clinic he was found to be hypotensive so was instructed to come into the ER.  Denies any fever or chills but does complain of shortness of breath denies any chest pain.  Lab work done here was negative except that patient was positive for COVID-19.  Patient was found to be hypotensive was given bolus of IV fluids and was started on LR and has been admitted to hospitalist service for further management and care. Found to have B/L PE on CTA Chest. Started on FD Lovenox.    Interval Hx:   Today, patient stated he was feeling better and had no new complaints. He stated that he was getting short of breath on exertion whenever he would walk to the bathroom but also reported that this was not new and that he had been bedridden for the last few weeks at home. Reports being dyspneic on exertion even at home when he walks to the bathroom likely secondary to deconditioning. Echocardiogram showed EF 65-70%, moderate LV concentric hypertrophy, hyperdynamic systolic function, grade 1 LV DD, no evidence of RV strain, PASP 20 mmHg.  CV U/S DVT B/L was negative.  Will plan for DC tomorrow.    Objective/physical exam:  General: alert male lying comfortably in bed, in no acute distress  HENT: oral and oropharyngeal mucosa moist, pink, with no erythema or exudates, no ear pain or  discharge  Neck: normal neck movement, no lymph nodes or swellings, no JVD or Carotid bruit  Respiratory: clear breathing sounds bilaterally, no crackles, rales, ronchi or wheezes  Cardiovascular: clear S1 and S2, no murmurs, rubs or gallops  Peripheral Vascular: no lesions, ulcers or erosions, normal peripheral pulses and no pedal edema  Gastrointestinal: soft, non-tender, non-distended abdomen, no guarding, rigidity or rebound tenderness, normal bowel sounds  Integumentary: normal skin color, no rashes or lesions  Neuro: AAO x 3; motor strength 5/5 in B/L UEs & LEs; sensation intact to gross and fine touch B/L; CN II-XII grossly intact     VITAL SIGNS: 24 HRS MIN & MAX LAST   Temp  Min: 97.6 °F (36.4 °C)  Max: 98.1 °F (36.7 °C) 98.1 °F (36.7 °C)   BP  Min: 131/75  Max: 145/76 138/74   Pulse  Min: 64  Max: 84  66   Resp  Min: 16  Max: 20 18   SpO2  Min: 94 %  Max: 99 % 99 %       Recent Labs   Lab 02/14/23  1222 02/15/23  0405 02/16/23  1300   WBC 4.1* 3.5* 4.9   RBC 3.43* 2.80* 3.05*   HGB 10.9* 9.1* 9.8*   HCT 33.4* 26.8* 29.3*   MCV 97.4* 95.7* 96.1*   MCH 31.8 32.5 32.1   MCHC 32.6* 34.0 33.4   RDW 15.7 15.7 15.8    225 264   MPV 9.5 9.7 10.5*       Recent Labs   Lab 02/14/23  1222 02/16/23  1300    139   K 3.7 3.7   CO2 18* 21*   BUN 15.5 16.1   CREATININE 1.13 0.90   CALCIUM 9.3 8.9   ALBUMIN 2.7*  --    ALKPHOS 73  --    ALT 49  --    AST 22  --    BILITOT 0.7  --        Scheduled Med:   albuterol  2 puff Inhalation Q6H    ascorbic acid (vitamin C)  500 mg Oral BID    atorvastatin  10 mg Oral QHS    clopidogreL  75 mg Oral Daily    dexAMETHasone  6 mg Oral Daily    enoxaparin  1 mg/kg Subcutaneous Q12H    ergocalciferol  50,000 Units Oral Q7 Days    multivitamin  1 tablet Oral Daily    pantoprazole  40 mg Oral Daily    remdesivir infusion  100 mg Intravenous Daily    zinc sulfate  220 mg Oral Daily    zolpidem  10 mg Oral QHS     Continuous Infusions:   lactated ringers 75 mL/hr at 02/14/23 6599      PRN Meds:  acetaminophen, benzonatate, dextrose 10%, dextrose 10%, glucagon (human recombinant), glucose, glucose, melatonin, sodium chloride 0.9%, zolpidem     Assessment/Plan:  COVID-19 Pneumonitis   SOB, SNIDER 2/2 B/L PE  Macrocytic Anemia  Hypertension  Hyperlipidemia   CAD s/p Stents on Plavix   Multiple Myeloma undergoing Treatment    - Patient continued on Remdesivir 100 mg daily, Dexamethasone 6 mg daily, Albuterol q.6 hours  - Continued FD Lovenox for B/L PE; will transition to Eliquis on DC  - Echocardiogram showed EF 65-70%, moderate concentric LV hypertrophy and hyperdynamic systolic function, grade 1 LV DD, PASP 20 mmHg with no RV strain  - B/L US DVT negative  - Patient continued on IV Levaquin; once BCX negative x 48 hrs, will DC  - Still holding off on home blood pressure medications due to hypotension on admission   - Isolation precautions as per COVID-19 protocol  - Patient on IV LR 75/hr; will DC today  - Continuing patient on Atorvastatin 10 mg, Plavix 75 mg, Protonix 40 mg, Zolpidem 10 mg  - Continue monitoring symptoms  - Plan for DC tomorrow    Prophylaxis:  Full Dose Lovenox    Patient condition:  Stable    Anticipated discharge and Disposition:     Pending symptomatic improvement    All diagnosis and differential diagnosis have been reviewed; assessment and plan has been documented; I have personally reviewed the labs and test results that are presently available; I have reviewed the patients medication list; I have reviewed the consulting providers response and recommendations. I have reviewed or attempted to review medical records based upon their availability    All of the patient's questions have been  addressed and answered. Patient's is agreeable to the above stated plan. I will continue to monitor closely and make adjustments to medical management as needed.  _____________________________________________________________________    Radiology:  CV Ultrasound doppler venous legs  bilat  There was no evidence of deep or superficial vein thrombosis in the   bilateral lower extremities.      Clayton Jaime MD   02/17/2023

## 2023-02-19 DIAGNOSIS — C90.00 MULTIPLE MYELOMA NOT HAVING ACHIEVED REMISSION: ICD-10-CM

## 2023-02-19 LAB
BACTERIA BLD CULT: NORMAL
BACTERIA BLD CULT: NORMAL

## 2023-02-19 NOTE — DISCHARGE SUMMARY
[facility] Medicine Discharge Summary    Admit Date: 2/14/2023  Discharge Date and Time: 2/18/20237:14 PM  Admitting Physician: BELIA Team  Discharging Physician: Clayton Jaime MD.  Primary Care Physician: Kaiser Metcalf MD  Consults: None    Discharge Diagnoses:  COVID-19 Pneumonitis   SOB, SNIDER 2/2 B/L PE  Macrocytic Anemia  Hypertension  Hyperlipidemia   CAD s/p Stents on Plavix   Multiple Myeloma undergoing Treatment    Hospital Course:   72-year-old male with PMH of hypertension, hyperlipidemia, coronary artery disease status post stents, multiple myeloma undergoing chemo presented to ER with complaints of cough with expectoration and shortness of breath that has been going on the past 4 weeks.  Patient reports he has been having the symptoms of bronchitis for the past 4 weeks he was tested for COVID and flu at urgent care that was negative almost couple of weeks back.  His symptoms did not improve so he decided to go to his PCP.  At his PCP's clinic he was found to be hypotensive so was instructed to come into the ER.  Denies any fever or chills but does complain of shortness of breath denies any chest pain.  Lab work done here was negative except that patient was positive for COVID-19.  Patient was found to be hypotensive was given bolus of IV fluids and was started on LR and has been admitted to hospitalist service for further management and care. Found to have B/L PE on CTA Chest. Started on FD Lovenox. Echocardiogram showed EF 65-70%, moderate LV concentric hypertrophy, hyperdynamic systolic function, grade 1 LV DD, no evidence of RV strain, PASP 20 mmHg.  CV U/S DVT B/L was negative. He stated that he was getting short of breath on exertion whenever he would walk to the bathroom but also reported that this was not new and that he had been bedridden for the last few weeks at home. Reports being dyspneic on exertion even at home when he walks to the bathroom likely  secondary to deconditioning. Transitioned to Eliquis 10 mg b.i.d. for 7 days followed by Eliquis 5 mg b.i.d..    Pt was seen and examined on the day of discharge.  He stated that he was doing well and had no new complaints.  Stated that he was ready to go home.  Will set up home health for PT and will plan for discharge today on Eliquis 10 mg b.i.d. for 7 days followed by Eliquis 5 mg b.i.d..  Patient to also complete 6 more days of p.o. Decadron 6 mg daily    Vitals:  VITAL SIGNS: 24 HRS MIN & MAX LAST   Temp  Min: 97.6 °F (36.4 °C)  Max: 97.7 °F (36.5 °C) 97.6 °F (36.4 °C)   BP  Min: 128/69  Max: 146/80 (!) 146/80     Pulse  Min: 64  Max: 87  70   Resp  Min: 16  Max: 20 20   SpO2  Min: 94 %  Max: 97 % 97 %       Physical Exam:  General: alert male lying comfortably in bed, in no acute distress  HENT: oral and oropharyngeal mucosa moist, pink, with no erythema or exudates, no ear pain or discharge  Neck: normal neck movement, no lymph nodes or swellings, no JVD or Carotid bruit  Respiratory: clear breathing sounds bilaterally, no crackles, rales, ronchi or wheezes  Cardiovascular: clear S1 and S2, no murmurs, rubs or gallops  Peripheral Vascular: no lesions, ulcers or erosions, normal peripheral pulses and no pedal edema  Gastrointestinal: soft, non-tender, non-distended abdomen, no guarding, rigidity or rebound tenderness, normal bowel sounds  Integumentary: normal skin color, no rashes or lesions  Neuro: AAO x 3; motor strength 5/5 in B/L UEs & LEs; sensation intact to gross and fine touch B/L; CN II-XII grossly intact    Procedures Performed: No admission procedures for hospital encounter.     Significant Diagnostic Studies: See Full reports for all details    Recent Labs   Lab 02/14/23  1222 02/15/23  0405 02/16/23  1300   WBC 4.1* 3.5* 4.9   RBC 3.43* 2.80* 3.05*   HGB 10.9* 9.1* 9.8*   HCT 33.4* 26.8* 29.3*   MCV 97.4* 95.7* 96.1*   MCH 31.8 32.5 32.1   MCHC 32.6* 34.0 33.4   RDW 15.7 15.7 15.8    225  264   MPV 9.5 9.7 10.5*       Recent Labs   Lab 02/14/23  1222 02/16/23  1300 02/18/23  0950    139 138   K 3.7 3.7 3.7   CO2 18* 21* 21*   BUN 15.5 16.1 13.8   CREATININE 1.13 0.90 0.94   CALCIUM 9.3 8.9 8.8   ALBUMIN 2.7*  --   --    ALKPHOS 73  --   --    ALT 49  --   --    AST 22  --   --    BILITOT 0.7  --   --         Microbiology Results (last 7 days)       Procedure Component Value Units Date/Time    Blood Culture [[id number]]     Order Status: Canceled Specimen: Blood     Blood Culture [[id number]]     Order Status: Canceled Specimen: Blood              CV Ultrasound doppler venous legs bilat  There was no evidence of deep or superficial vein thrombosis in the   bilateral lower extremities.         Medication List        START taking these medications      * apixaban 5 mg Tab  Commonly known as: ELIQUIS  Take 2 tablets (10 mg total) by mouth 2 (two) times daily. for 7 days     * apixaban 5 mg Tab  Commonly known as: ELIQUIS  Take 1 tablet (5 mg total) by mouth 2 (two) times daily.     benzonatate 100 MG capsule  Commonly known as: TESSALON  Take 1 capsule (100 mg total) by mouth 3 (three) times daily as needed for Cough.     ergocalciferol 50,000 unit Cap  Commonly known as: ERGOCALCIFEROL  Take 1 capsule (50,000 Units total) by mouth every 7 days.  Start taking on: February 22, 2023           * This list has 2 medication(s) that are the same as other medications prescribed for you. Read the directions carefully, and ask your doctor or other care provider to review them with you.                CHANGE how you take these medications      amLODIPine 10 MG tablet  Commonly known as: NORVASC  What changed: Another medication with the same name was removed. Continue taking this medication, and follow the directions you see here.     dexAMETHasone 6 MG tablet  Commonly known as: DECADRON  Take 1 tablet (6 mg total) by mouth once daily. for 6 days  What changed:   medication strength  how much to take  when to  take this  reasons to take this  additional instructions     lisinopriL 40 MG tablet  Commonly known as: PRINIVIL,ZESTRIL  What changed: Another medication with the same name was removed. Continue taking this medication, and follow the directions you see here.            CONTINUE taking these medications      acyclovir 400 MG tablet  Commonly known as: ZOVIRAX  TAKE 1 TABLET BY MOUTH TWICE DAILY     albuterol 90 mcg/actuation inhaler  Commonly known as: PROAIR HFA  Inhale 2 puffs into the lungs every 6 (six) hours as needed for Wheezing. Rescue     ascorbic acid (vitamin C) 1000 MG tablet  Commonly known as: VITAMIN C     atorvastatin 40 MG tablet  Commonly known as: LIPITOR     b complex vitamins capsule     clopidogreL 75 mg tablet  Commonly known as: PLAVIX     doxazosin 4 MG tablet  Commonly known as: CARDURA     dutasteride 0.5 mg capsule  Commonly known as: AVODART     FERRETTS 325 mg (106 mg iron) Tab  Generic drug: ferrous fumarate     furosemide 20 MG tablet  Commonly known as: LASIX     glutamine 10 gram Pwpk     HYDROcodone-acetaminophen  mg per tablet  Commonly known as: NORCO     lactulose 10 gram/15 mL solution  Commonly known as: CHRONULAC     lenalidomide 25 mg Cap  TAKE 1 CAPSULE BY MOUTH EVERY DAY  FOR 14 DAYS FOLLOWED BY 7 DAY REST PERIOD ( 2 WEEKS ON, 1 WEEK OFF).     LINZESS 145 mcg Cap capsule  Generic drug: linaCLOtide     metoprolol tartrate 25 MG tablet  Commonly known as: LOPRESSOR     ondansetron 4 MG tablet  Commonly known as: ZOFRAN     pantoprazole 40 MG tablet  Commonly known as: PROTONIX  Take 1 tablet (40 mg total) by mouth once daily. for 30 doses     promethazine 25 MG tablet  Commonly known as: PHENERGAN     pyridoxine (vitamin B6) 250 MG Tab  Commonly known as: B-6     SLOW RELEASE IRON 140 mg (45 mg iron) Tbsr  Generic drug: ferrous sulfate     sulfamethoxazole-trimethoprim 800-160mg 800-160 mg Tab  Commonly known as: BACTRIM DS     testosterone cypionate 200 mg/mL  injection  Commonly known as: DEPOTESTOTERONE CYPIONATE     zolpidem 10 mg Tab  Commonly known as: AMBIEN               Where to Get Your Medications        You can get these medications from any pharmacy    Bring a paper prescription for each of these medications  apixaban 5 mg Tab  apixaban 5 mg Tab  benzonatate 100 MG capsule  dexAMETHasone 6 MG tablet  ergocalciferol 50,000 unit Cap          Explained in detail to the patient about the discharge plan, medications, and follow-up visits. Pt understands and agrees with the treatment plan  Discharge Disposition: Home-Health Care [facility]   Discharged Condition: stable  Diet-    Medications Per DC med rec  Activities as tolerated    For further questions contact hospitalist office    Discharge time 33 minutes    For worsening symptoms, chest pain, shortness of breath, increased abdominal pain, high grade fever, stroke or stroke like symptoms, immediately go to the nearest Emergency Room or call 911 as soon as possible.      Clayton Berrios M.D on 2/18/2023. at 7:14 PM.

## 2023-02-20 ENCOUNTER — PATIENT MESSAGE (OUTPATIENT)
Dept: ADMINISTRATIVE | Facility: OTHER | Age: 73
End: 2023-02-20
Payer: MEDICARE

## 2023-02-20 ENCOUNTER — PATIENT OUTREACH (OUTPATIENT)
Dept: ADMINISTRATIVE | Facility: CLINIC | Age: 73
End: 2023-02-20
Payer: MEDICARE

## 2023-02-20 ENCOUNTER — TELEPHONE (OUTPATIENT)
Dept: ADMINISTRATIVE | Facility: CLINIC | Age: 73
End: 2023-02-20
Payer: MEDICARE

## 2023-02-20 RX ORDER — LENALIDOMIDE 25 MG/1
CAPSULE ORAL
Qty: 14 CAPSULE | Refills: 0 | OUTPATIENT
Start: 2023-02-20

## 2023-02-20 NOTE — TELEPHONE ENCOUNTER
Discussed with NP, she states resuming treatment this week is too soon, he was diagnosed with Covid and pneumonia. Schedule him next Monday.  MOON Sanchez-NP/Jena-JACOBY    Called pt and informed him of instructions per NP, he stated understanding. Appts r/s to 2/28/23 per pt request.

## 2023-02-20 NOTE — TELEPHONE ENCOUNTER
Patient requesting to move up TD to this week sometime so that he can resume tx. Treatment was on hold due to recent hospitalization.

## 2023-02-22 RX ORDER — LENALIDOMIDE 25 MG/1
CAPSULE ORAL
Qty: 14 CAPSULE | Refills: 0 | Status: SHIPPED | OUTPATIENT
Start: 2023-02-22 | End: 2023-03-10

## 2023-02-27 ENCOUNTER — OFFICE VISIT (OUTPATIENT)
Dept: HEMATOLOGY/ONCOLOGY | Facility: CLINIC | Age: 73
End: 2023-02-27
Payer: MEDICARE

## 2023-02-27 DIAGNOSIS — Z76.82 STEM CELL TRANSPLANT CANDIDATE: ICD-10-CM

## 2023-02-27 DIAGNOSIS — C90.00 MULTIPLE MYELOMA NOT HAVING ACHIEVED REMISSION: ICD-10-CM

## 2023-02-27 DIAGNOSIS — I26.99 PULMONARY EMBOLISM, UNSPECIFIED CHRONICITY, UNSPECIFIED PULMONARY EMBOLISM TYPE, UNSPECIFIED WHETHER ACUTE COR PULMONALE PRESENT: Primary | ICD-10-CM

## 2023-02-27 PROCEDURE — 99215 OFFICE O/P EST HI 40 MIN: CPT | Mod: 95,,, | Performed by: INTERNAL MEDICINE

## 2023-02-27 PROCEDURE — 99215 PR OFFICE/OUTPT VISIT, EST, LEVL V, 40-54 MIN: ICD-10-PCS | Mod: 95,,, | Performed by: INTERNAL MEDICINE

## 2023-02-27 NOTE — PROGRESS NOTES
The patient location is: home  The chief complaint leading to consultation is: MM/SCT eval     Visit type: audiovisual    Face to Face time with patient: 20   45 minutes of total time spent on the encounter, which includes face to face time and non-face to face time preparing to see the patient (eg, review of tests), Obtaining and/or reviewing separately obtained history, Documenting clinical information in the electronic or other health record, Independently interpreting results (not separately reported) and communicating results to the patient/family/caregiver, or Care coordination (not separately reported).         Each patient to whom he or she provides medical services by telemedicine is:  (1) informed of the relationship between the physician and patient and the respective role of any other health care provider with respect to management of the patient; and (2) notified that he or she may decline to receive medical services by telemedicine and may withdraw from such care at any time.    Notes:     SECTION OF HEMATOLOGY AND BONE MARROW TRANSPLANT  Return  Patient Visit   03/06/2023  Referred by:  No ref. provider found  Referred for: MM/SCT eval     CHIEF COMPLAINT: No chief complaint on file.      HISTORY OF PRESENT ILLNESS:   72 y.o.male; pmh as below; non oncologic history notable for CAD sp multiple stents followed regularly by cardiology locally; asymptomatic; oncologic history notable for MM diagnosed referral for anemia evaluation;  was initially evaluated by Dr. Harris at [facility]; he met diagnostic criteria for active myeloma; 3.4 grams of IgG kappa MM at diagnosis;  marrow at diagnosis with 70% clonal plasma cells; fish/CG normal cw with standard risk disease; whole body pet and 24 hr urine studies unremarkable. Other than fatigue was largely asymptomatic.  Runs haunted house/trail during halloween. No family history of malignancy.   Non smoker. Non drinker.  and wife present during our interview  today.  Regular PCP fu prior to diagnosis.  Active with ADL's.  PS1.  KPFS  80.    Of note sought 2nd opinion at [facility] who recommended induction with VRd and consideration for SCT pending adequate response and pre transplant evaluation.      He initiated VRd therapy locally for a cycle then gabriella was addded for gabriella VRD following our last appt. He remains on therapy generally tolerating well.  Of note since our last appt contracted covid and had subsequent mulitfocal PE diagnosed for which he is on eliquis with no bleedig; respiratory symptoms resolved.  PAST MEDICAL HISTORY:   Past Medical History:   Diagnosis Date    Anemia     GERD (gastroesophageal reflux disease)     Heart problem     Hyperlipidemia     Hypertension     Multiple myeloma        PAST SURGICAL HISTORY:   Past Surgical History:   Procedure Laterality Date    BONE MARROW ASPIRATION N/A 9/1/2022    Procedure: ASPIRATION, BONE MARROW;  Surgeon: Robbie Gardner MD;  Location: [facility];  Service: General;  Laterality: N/A;    CARDIAC SURGERY  2021    ENDOSCOPIC RELEASE OF BOTH CARPAL TUNNELS         PAST SOCIAL HISTORY:   reports that he has quit smoking. His smoking use included cigarettes. He has quit using smokeless tobacco. He reports that he does not currently use alcohol. He reports that he does not use drugs.    FAMILY HISTORY:  Family History   Problem Relation Age of Onset    Hypertension Mother     Diabetes Mother     Anemia Mother     Heart disease Mother     Heart disease Father        CURRENT MEDICATIONS:   Current Outpatient Medications   Medication Sig    acyclovir (ZOVIRAX) 400 MG tablet TAKE 1 TABLET BY MOUTH TWICE DAILY    albuterol (PROAIR HFA) 90 mcg/actuation inhaler Inhale 2 puffs into the lungs every 6 (six) hours as needed for Wheezing. Rescue    amLODIPine (NORVASC) 10 MG tablet     apixaban (ELIQUIS) 5 mg Tab Take 1 tablet (5 mg total) by mouth 2 (two) times daily.    ascorbic acid, vitamin C, (VITAMIN C) 1000 MG tablet Take 1,000  mg by mouth once daily.    atorvastatin (LIPITOR) 40 MG tablet TAKE 1 TABLET ORALLY ONCE DAILY ON MON/TUE/THUR/FRI AND 1/2 ON WED. NONE ON SAT/SUN    b complex vitamins capsule Take 1 capsule by mouth once daily.    clopidogreL (PLAVIX) 75 mg tablet Take 75 mg by mouth once daily.    doxazosin (CARDURA) 4 MG tablet Take 4 mg by mouth once daily.    dutasteride (AVODART) 0.5 mg capsule Take 0.5 mg by mouth once daily.    ergocalciferol (ERGOCALCIFEROL) 50,000 unit Cap Take 1 capsule (50,000 Units total) by mouth every 7 days.    FERRETTS 325 mg (106 mg iron) Tab Take 1 tablet by mouth 2 (two) times daily.    furosemide (LASIX) 20 MG tablet     glutamine 10 gram PwPk Take 10 g by mouth 2 (two) times a day.    HYDROcodone-acetaminophen (NORCO)  mg per tablet Take 1 tablet by mouth as needed.    lactulose (CHRONULAC) 10 gram/15 mL solution GIVE 30ML BY MOUTH TWICE A DAY X7 DAY(S)    lenalidomide 25 mg Cap TAKE 1 CAPSULE BY MOUTH EVERY DAY  FOR 14 DAYS FOLLOWED BY 7 DAY REST PERIOD ( 2 WEEKS ON, 1 WEEK OFF).    LINZESS 145 mcg Cap capsule Take 145 mcg by mouth.    lisinopriL (PRINIVIL,ZESTRIL) 40 MG tablet Take 40 mg by mouth.    metoprolol tartrate (LOPRESSOR) 25 MG tablet Take 25 mg by mouth 2 (two) times daily. Takes 1 tab q am    ondansetron (ZOFRAN) 4 MG tablet Take 4 mg by mouth every 8 (eight) hours as needed for Nausea.    pantoprazole (PROTONIX) 40 MG tablet Take 1 tablet (40 mg total) by mouth once daily. for 30 doses    promethazine (PHENERGAN) 25 MG tablet TAKE 1 TABLET BY MOUTH EVERY 4 HOURS AS NEEDED FOR MOTION SICKNESS    pyridoxine, vitamin B6, (B-6) 250 MG Tab Take 250 mg by mouth once daily.    SLOW RELEASE IRON 140 mg (45 mg iron) TbSR Take 1 tablet by mouth 2 (two) times daily.    sulfamethoxazole-trimethoprim 800-160mg (BACTRIM DS) 800-160 mg Tab Take 1 tablet by mouth As instructed. Tab 1 PO Daily M-W-F    testosterone cypionate (DEPOTESTOTERONE CYPIONATE) 200 mg/mL injection INJECT 1ML INTO  MUSCLE ONCE A WEEK    zolpidem (AMBIEN) 10 mg Tab Take 10 mg by mouth once daily.     No current facility-administered medications for this visit.     ALLERGIES:   Review of patient's allergies indicates:   Allergen Reactions    Penicillins              REVIEW OF SYSTEMS:   See HPI     PHYSICAL EXAM:   physical exam deferred due to telemed   Appears well and below stated age on camera     ECOG Performance Status: (foot note - ECOG PS provided by Eastern Cooperative Oncology Group) 1 - Symptomatic but completely ambulatory    Karnofsky Performance Score:  80%- Normal Activity with Effort: Some Symptoms of Disease  DATA:   Lab Results   Component Value Date    WBC 4.6 02/28/2023    HGB 11.4 (L) 02/28/2023    HCT 36.1 (L) 02/28/2023    .6 (H) 02/28/2023     02/28/2023       No results found for: GRAN  CMP  Sodium Level   Date Value Ref Range Status   02/28/2023 139 136 - 145 mmol/L Final     Potassium Level   Date Value Ref Range Status   02/28/2023 3.7 3.5 - 5.1 mmol/L Final     Carbon Dioxide   Date Value Ref Range Status   02/28/2023 24 23 - 31 mmol/L Final     Blood Urea Nitrogen   Date Value Ref Range Status   02/28/2023 19.9 8.4 - 25.7 mg/dL Final     Creatinine   Date Value Ref Range Status   02/28/2023 1.06 0.73 - 1.18 mg/dL Final     Calcium Level Total   Date Value Ref Range Status   02/28/2023 8.6 (L) 8.8 - 10.0 mg/dL Final     Albumin Level   Date Value Ref Range Status   02/28/2023 2.9 (L) 3.4 - 4.8 g/dL Final   02/28/2023 2.8 (L) 3.4 - 4.8 g/dL Final     Bilirubin Total   Date Value Ref Range Status   02/28/2023 1.4 <=1.5 mg/dL Final     Alkaline Phosphatase   Date Value Ref Range Status   02/28/2023 63 40 - 150 unit/L Final     Aspartate Aminotransferase   Date Value Ref Range Status   02/28/2023 17 5 - 34 unit/L Final     Alanine Aminotransferase   Date Value Ref Range Status   02/28/2023 42 0 - 55 unit/L Final     eGFR   Date Value Ref Range Status   02/28/2023 >60 mls/min/1.73/m2 Final      IgM Level   Date Value Ref Range Status   01/27/2023 33.0 22.0 - 240.0 mg/dL Final      Latest Reference Range & Units 08/10/22 13:49 12/29/22 08:56 01/27/23 10:00 02/28/23 08:57   [place] Qnt Free Light Chains 3.30 - 19.40 mg/L 491.97 (H)      [place] Free Light Chain 0.3300 - 1.94 mg/dL  14.7 (H) 6.07 (H)    Lambda Qnt Free Light Chains 5.71 - 26.30 mg/L 4.76 (L)      Lambda Free Light Chain 0.5700 - 2.63 mg/dL  0.9200 1.14    [place]/Lambda Free Light Chain Ratio 0.26 - 1.65  103.36 (H)      M SPIKE gm/dl 2.24 1.08 0.48 0.37   M Michael %  24.37 15.27 7.70 6.94   (H): Data is abnormally high  (L): Data is abnormally low        Collected: 02/28/23 0857   Result status: Final   Resulting lab: [facility]   Value: SPEP: Serum protein electrophoresis shows a distinct monoclonal peak (0.37 g/dL) in the gamma zone, consistent with a monoclonal gammopathy.    (2.24 g/dL) at diagnosis.      Final Diagnosis   1. BONE MARROW, RIGHT POSTERIOR ILIAC CREST, ASPIRATE, CLOT, DECALCIFIED CORE BIOPSY:     PLASMA CELL MYELOMA, KAPPA RESTRICTED.     NORMOCELLULAR MARROW (30%) WITH 70% INVOLVEMENT BY PLASMA CELL MYELOMA.     BACKGROUND TRILINEAGE HEMATOPOIESIS IS DECREASED.     Whole body pet scan - sept 2022  Impression:     1. Right scapular small focal mild hypermetabolism without lytic or sclerotic abnormality is not convinced to be related to multiple myeloma.     2. No definite skeletal lytic abnormality with hypermetabolism to reflect multiple myeloma on this exam.     3. Bilateral small pleural effusions.     4.  Gallstones.     5.  Noninflamed diverticulosis coli.    ASSESSMENT AND PLAN:   Encounter Diagnoses   Name Primary?    Pulmonary embolism, unspecified chronicity, unspecified pulmonary embolism type, unspecified whether acute cor pulmonale present Yes    Multiple myeloma not having achieved remission     Stem cell transplant candidate        1)multiple myeloma  -IgG kappa MM ; diagnosed sept  2022 after referral to heme onc  for anemia; fish/CG normal cw with standard risk disease  -meets treatment criteria based on anemia and 70% clonal plasma cells in marrow; no hyperCa, renal dysfunction, or bone disease on WB PET  -has initiated Vrd therapy and completed 1 cycle with excellent biochemical response and good tolerance; at our jan 2023 appt I recommended addition of gabriella to vRD which was done  -he is tolerating and responding well having achieved progressively deepening  robust RI/near VGPR thus far on feb 2023 biochemical studies  ; currently early cycle 4  -he continues to want to consider transplant but not til late nov/dec 2023  -I would recommend we continue quadruplet therapy until optimal response (likely at least 6 cycles) and then transition to maintenance therapy  -pt should continue q cycle labs, appts locally  -I will continue fu with pt closely re: timing of transition to maintenance and timing/desire to proceed  of transplant    -continue supportive meds:  asa, levaquin, acyc, ca+vit d; no bisphosphonate indicated due to lack of bone disease      2)CV  -will maintain all current CV medications  -if decide to proceed with SCT will need cardiooncology clearance in light of his CAD/stent history    3)recent PE  -recommend stays on eliquis indefinitely while on imid or with active MM  -he is on asa, plavix, (per cardiologist for CAD); I recommended he call his cardiologist asap to notify him of eliquis and possible stop plavix and or aspirin    Follow Up:  virtual MD appt with me in 8 weeks       Juan Brito MD  Hematology/Oncology/Bone Marrow Transplant

## 2023-02-28 ENCOUNTER — TELEPHONE (OUTPATIENT)
Dept: HEMATOLOGY/ONCOLOGY | Facility: CLINIC | Age: 73
End: 2023-02-28
Payer: MEDICARE

## 2023-02-28 ENCOUNTER — OFFICE VISIT (OUTPATIENT)
Dept: HEMATOLOGY/ONCOLOGY | Facility: CLINIC | Age: 73
End: 2023-02-28
Payer: MEDICARE

## 2023-02-28 VITALS
TEMPERATURE: 98 F | WEIGHT: 192 LBS | BODY MASS INDEX: 26.01 KG/M2 | SYSTOLIC BLOOD PRESSURE: 133 MMHG | DIASTOLIC BLOOD PRESSURE: 72 MMHG | HEIGHT: 72 IN | OXYGEN SATURATION: 98 % | HEART RATE: 82 BPM

## 2023-02-28 DIAGNOSIS — C90.00 MULTIPLE MYELOMA NOT HAVING ACHIEVED REMISSION: Primary | ICD-10-CM

## 2023-02-28 PROCEDURE — 99214 OFFICE O/P EST MOD 30 MIN: CPT | Mod: S$PBB,,, | Performed by: NURSE PRACTITIONER

## 2023-02-28 PROCEDURE — 99213 OFFICE O/P EST LOW 20 MIN: CPT | Mod: PBBFAC | Performed by: NURSE PRACTITIONER

## 2023-02-28 PROCEDURE — 99999 PR PBB SHADOW E&M-EST. PATIENT-LVL III: CPT | Mod: PBBFAC,,, | Performed by: NURSE PRACTITIONER

## 2023-02-28 PROCEDURE — 99999 PR PBB SHADOW E&M-EST. PATIENT-LVL III: ICD-10-PCS | Mod: PBBFAC,,, | Performed by: NURSE PRACTITIONER

## 2023-02-28 PROCEDURE — 99214 PR OFFICE/OUTPT VISIT, EST, LEVL IV, 30-39 MIN: ICD-10-PCS | Mod: S$PBB,,, | Performed by: NURSE PRACTITIONER

## 2023-02-28 NOTE — PROGRESS NOTES
HEMATOLOGY/ONCOLOGY OFFICE CLINIC VISIT    Visit Information:    Initial Evaluation: 8/10/2022  Referring Provider:   Other providers:  Code status: Not addressed    Diagnosis:  Multiple Myeloma--IgG, Kappa  Macrocytic anemia    Present treatment:   VRD-Started on 12/9/22  Daratumumab added on 1/27/23    Treatment/Oncology history:    Plan of care:  Complete myeloma workup    Imaging:   US abdomen 8/24/2022: Spleen: 10.8 cm. Mild hepatomegaly with suspected mild hepatic steatosis. Cholelithiasis.  PET CT 9/28/2022: 1. Right scapular small focal mild hypermetabolism without lytic or sclerotic abnormality is not convinced to be related to multiple myeloma. 2. No definite skeletal lytic abnormality with hypermetabolism to reflect multiple myeloma on this exam. 3. Bilateral small pleural effusions.   4. Gallstones. 5.  Noninflamed diverticulosis coli. 6.  Prostatomegaly.    Pathology:  BONE MARROW BIOPSY 9/1/2022: PLASMA CELL MYELOMA, KAPPA RESTRICTED. NORMOCELLULAR MARROW (30%) WITH 70% INVOLVEMENT BY PLASMA CELL MYELOMA. BACKGROUND TRILINEAGE HEMATOPOIESIS IS DECREASED.      CLINICAL HISTORY:       Patient: Valentino Manning is a 72 y.o. male with multiple medical problems kindly referred for persistent anemia.  Reviewing electronic medical record patient with anemia since 02/10/2016.  From 2016 to January of 2017 anemia was mild.  Anemia slowly worsening back in 2018 hemoglobin was 8.0.  At that same time kidney function was worsening as well.     Most recent labs with HGB of 9.4, .9, platelets 231 K, WBC 5.0.  Chemistries with globulin of 5.3.  BUN/creatinine 27.7/1.41.    No family history of leukemia, lymphoma or multiple myeloma.      Chief Complaint: No Concerns today        Interval History:  2/28/23  Patient presents to clinic today for f/u. He was diagnosed with COVID on 2/14/23 when he was sent to the ED from his PCPs office for hypotension. CT of his chest showed the development of bilateral pulmonary  "embolisms. He was started on Eliquis. He was already on Plavix ands ASA prescribed by cardiology.     He complains of fatigue. State "no energy" . He lost weight but has been gaining some back. He denies any bleeding.      Past Medical History:   Diagnosis Date    Anemia     GERD (gastroesophageal reflux disease)     Heart problem     Hyperlipidemia     Hypertension     Multiple myeloma       Past Surgical History:   Procedure Laterality Date    BONE MARROW ASPIRATION N/A 9/1/2022    Procedure: ASPIRATION, BONE MARROW;  Surgeon: Robbie Gardner MD;  Location: [facility];  Service: General;  Laterality: N/A;    CARDIAC SURGERY  2021    ENDOSCOPIC RELEASE OF BOTH CARPAL TUNNELS       Family History   Problem Relation Age of Onset    Hypertension Mother     Diabetes Mother     Anemia Mother     Heart disease Mother     Heart disease Father      Social Connections: Not on file       Review of patient's allergies indicates:   Allergen Reactions    Penicillins       Current Outpatient Medications on File Prior to Visit   Medication Sig Dispense Refill    acyclovir (ZOVIRAX) 400 MG tablet TAKE 1 TABLET BY MOUTH TWICE DAILY 180 tablet 1    amLODIPine (NORVASC) 10 MG tablet       apixaban (ELIQUIS) 5 mg Tab Take 1 tablet (5 mg total) by mouth 2 (two) times daily. 60 tablet 6    ascorbic acid, vitamin C, (VITAMIN C) 1000 MG tablet Take 1,000 mg by mouth once daily.      atorvastatin (LIPITOR) 40 MG tablet TAKE 1 TABLET ORALLY ONCE DAILY ON MON/TUE/THUR/FRI AND 1/2 ON WED. NONE ON SAT/SUN      b complex vitamins capsule Take 1 capsule by mouth once daily.      benzonatate (TESSALON) 100 MG capsule Take 1 capsule (100 mg total) by mouth 3 (three) times daily as needed for Cough. 30 capsule 1    clopidogreL (PLAVIX) 75 mg tablet Take 75 mg by mouth once daily.      doxazosin (CARDURA) 4 MG tablet Take 4 mg by mouth once daily.      dutasteride (AVODART) 0.5 mg capsule Take 0.5 mg by mouth once daily.      ergocalciferol " (ERGOCALCIFEROL) 50,000 unit Cap Take 1 capsule (50,000 Units total) by mouth every 7 days. 7 capsule 0    FERRETTS 325 mg (106 mg iron) Tab Take 1 tablet by mouth 2 (two) times daily.      furosemide (LASIX) 20 MG tablet       glutamine 10 gram PwPk Take 10 g by mouth 2 (two) times a day.      HYDROcodone-acetaminophen (NORCO)  mg per tablet Take 1 tablet by mouth as needed.      lactulose (CHRONULAC) 10 gram/15 mL solution GIVE 30ML BY MOUTH TWICE A DAY X7 DAY(S)      lenalidomide 25 mg Cap TAKE 1 CAPSULE BY MOUTH EVERY DAY  FOR 14 DAYS FOLLOWED BY 7 DAY REST PERIOD ( 2 WEEKS ON, 1 WEEK OFF). 14 capsule 0    LINZESS 145 mcg Cap capsule Take 145 mcg by mouth.      lisinopriL (PRINIVIL,ZESTRIL) 40 MG tablet Take 40 mg by mouth.      metoprolol tartrate (LOPRESSOR) 25 MG tablet Take 25 mg by mouth 2 (two) times daily. Takes 1 tab q am      ondansetron (ZOFRAN) 4 MG tablet Take 4 mg by mouth every 8 (eight) hours as needed for Nausea.      promethazine (PHENERGAN) 25 MG tablet TAKE 1 TABLET BY MOUTH EVERY 4 HOURS AS NEEDED FOR MOTION SICKNESS      pyridoxine, vitamin B6, (B-6) 250 MG Tab Take 250 mg by mouth once daily.      SLOW RELEASE IRON 140 mg (45 mg iron) TbSR Take 1 tablet by mouth 2 (two) times daily.      sulfamethoxazole-trimethoprim 800-160mg (BACTRIM DS) 800-160 mg Tab Take 1 tablet by mouth As instructed. Tab 1 PO Daily M-W-F      testosterone cypionate (DEPOTESTOTERONE CYPIONATE) 200 mg/mL injection INJECT 1ML INTO MUSCLE ONCE A WEEK      zolpidem (AMBIEN) 10 mg Tab Take 10 mg by mouth once daily.      albuterol (PROAIR HFA) 90 mcg/actuation inhaler Inhale 2 puffs into the lungs every 6 (six) hours as needed for Wheezing. Rescue 8 g 0    pantoprazole (PROTONIX) 40 MG tablet Take 1 tablet (40 mg total) by mouth once daily. for 30 doses 30 tablet 1     No current facility-administered medications on file prior to visit.      Review of Systems   Constitutional:  Positive for fatigue. Negative for  activity change, appetite change, chills, diaphoresis, fever and unexpected weight change.   HENT:  Negative for nasal congestion, mouth sores, nosebleeds, postnasal drip, sinus pressure/congestion, sore throat and trouble swallowing.    Eyes:  Negative for visual disturbance.   Respiratory:  Positive for shortness of breath. Negative for cough and wheezing.    Cardiovascular:  Negative for chest pain and palpitations.   Gastrointestinal:  Negative for abdominal distention, abdominal pain, blood in stool, change in bowel habit, constipation, diarrhea, nausea, vomiting and change in bowel habit.   Endocrine: Negative.    Genitourinary:  Negative for bladder incontinence, decreased urine volume, difficulty urinating, dysuria, frequency, hematuria, scrotal swelling, testicular pain and urgency.   Musculoskeletal:  Negative for arthralgias, back pain, gait problem, joint swelling, leg pain, myalgias and neck pain.   Integumentary:  Negative for rash.   Neurological:  Negative for dizziness, tremors, seizures, syncope, speech difficulty, weakness, light-headedness, numbness, headaches and memory loss.   Hematological:  Does not bruise/bleed easily.   Psychiatric/Behavioral:  Negative for agitation, confusion, hallucinations, sleep disturbance and suicidal ideas. The patient is not nervous/anxious.             Vitals:    02/28/23 0858   BP: 133/72   BP Location: Left arm   Patient Position: Sitting   Pulse: 82   Temp: 97.9 °F (36.6 °C)   TempSrc: Oral   SpO2: 98%   Weight: 87.1 kg (192 lb)   Height: 6' (1.829 m)          Physical Exam  Constitutional:       Appearance: He is ill-appearing.   HENT:      Head: Normocephalic and atraumatic.   Eyes:      Extraocular Movements: Extraocular movements intact.   Pulmonary:      Effort: Pulmonary effort is normal.      Breath sounds: Normal breath sounds. No wheezing.   Abdominal:      General: Bowel sounds are normal.      Palpations: Abdomen is soft.   Musculoskeletal:       Cervical back: Neck supple.   Neurological:      Mental Status: He is alert and oriented to person, place, and time.      Sensory: Sensory deficit present.      Motor: Weakness present.   Psychiatric:         Mood and Affect: Mood normal.         Behavior: Behavior normal.         Judgment: Judgment normal.     ECOG SCORE    1 - Restricted in strenuous activity-ambulatory and able to carry out work of a light nature         Laboratory:  CBC with Differential:  Lab Results   Component Value Date    WBC 4.6 02/28/2023    RBC 3.45 (L) 02/28/2023    HGB 11.4 (L) 02/28/2023    HCT 36.1 (L) 02/28/2023    .6 (H) 02/28/2023    MCH 33.0 02/28/2023    MCHC 31.6 (L) 02/28/2023    RDW 18.8 (H) 02/28/2023     02/28/2023    MPV 8.8 02/28/2023         Latest Reference Range & Units 08/10/22 13:49   Kappa Qnt Free Light Chains 3.30 - 19.40 mg/L 491.97 (H)   Lambda Qnt Free Light Chains 5.71 - 26.30 mg/L 4.76 (L)   [place]/Lambda Free Light Chain Ratio 0.26 - 1.65  103.36 (H)   M SPIKE gm/dl 2.24      Latest Reference Range & Units 08/10/22 13:49   Immunoglobulin A 68 - 408 mg/dL 10 (L)   Immunoglobulin G 768 - 1632 mg/dL 4882 (H)   Immunoglobulin M 35 - 263 mg/dL 12 (L)   (L): Data is abnormally low  (H): Data is abnormally high    M-spike in the beta/gamma region.  The monoclonal protein peak accounts for 3.39 g/dL of the total protein in the beta and gamma regions.  This quantitation may include complement components.  BETTY gel pattern shows an IgG type kappa monoclonal protein.    SPEP:  Total protein and globulin are both increased.  A/G ratio is low.     Serum protein electrophoresis shows a decreased albumin fraction, decreased beta globulin fraction, and increased gamma globulin fraction.   A monoclonal protein (M-spike) is present in the gamma region (2.24 g/dL).     BETTY:  Immunofixation shows an IgG monoclonal protein with kappa light chain specificity.       CMP:  Sodium Level   Date Value Ref Range Status    02/28/2023 139 136 - 145 mmol/L Final     Potassium Level   Date Value Ref Range Status   02/28/2023 3.7 3.5 - 5.1 mmol/L Final     Carbon Dioxide   Date Value Ref Range Status   02/28/2023 24 23 - 31 mmol/L Final     Blood Urea Nitrogen   Date Value Ref Range Status   02/28/2023 19.9 8.4 - 25.7 mg/dL Final     Creatinine   Date Value Ref Range Status   02/28/2023 1.06 0.73 - 1.18 mg/dL Final     Calcium Level Total   Date Value Ref Range Status   02/28/2023 8.6 (L) 8.8 - 10.0 mg/dL Final     Albumin Level   Date Value Ref Range Status   02/28/2023 2.9 (L) 3.4 - 4.8 g/dL Final     Bilirubin Total   Date Value Ref Range Status   02/28/2023 1.4 <=1.5 mg/dL Final     Alkaline Phosphatase   Date Value Ref Range Status   02/28/2023 63 40 - 150 unit/L Final     Aspartate Aminotransferase   Date Value Ref Range Status   02/28/2023 17 5 - 34 unit/L Final     Alanine Aminotransferase   Date Value Ref Range Status   02/28/2023 42 0 - 55 unit/L Final     Estimated GFR-Non    Date Value Ref Range Status   06/22/2022 53 mls/min/1.73/m2 Final       Latest Reference Range & Units 08/10/22 13:49   MACEY Negative  Negative   ds DNA Ab Negative  Negative   Anti-SSA Interpretation Negative  Negative   Anti-SSB Interpretation Negative  Negative      Latest Reference Range & Units 11/10/21 10:27 08/10/22 13:49   Centromere Protein Antibody Negative   Negative   HARRIS-1 Antibody Negative   Negative   Scleroderma (Scl-70S) Antibody Negative   Negative   Rheumatoid Factor <<=30 IU/mL <13    RNP70 Antibody Negative   Negative   Hand DP IgG   Negative   U1RNP Antibody Negative   Negative          Assessment:       1. Multiple myeloma not having achieved remission            Again I have a long discussion with the patient and his wife about of multiple myeloma.  Discussed with the patient that Multiple myeloma is a type of bone marrow cancer that forms in a type of white blood cell called a plasma cell. Plasma cells help you  fight infections by making antibodies that recognize and attack germs but when they transformed into cancer cells then they are not efficient and don't work well. This cells can affect not only the bone marrow but the bones in general causing distinctive bone lesions called Lytic lesions. It also can affect the kidney. Discussed prognosis and treatment recommendations and indications.  Discussed in detail blood work, PET-CT and bone marrow biopsy.  He does have advance myeloma, stage III, therefore treatment was indicated.       Plan:       Patient was seen at [facility]. He started treatment here with Velcade/Revlimid/Dexamethamasone. He said that he is a candidate for transplant and they will eval bone marrow again after 6 cycles.   Darzalex (weekly x 8 dose, then q 2 week x 8 doses, then q 4 weeks per kodak study) was added on 1/27/23.   Cycle 4, Day 1 of Velcade/Revlimid/Dexamethamasone/Darzalex was given on 2/10/23. He was diagnosed with Covid and PEs on 2/14/23.   He had f/u via telemedicine with Dr. Brito on 2/27/23- recommended to repeat BMB x after 6 cycles.   Will continue to delay treatment until next week to allow more time to recover from COVID infection.   RTC in 3/9/23  for lab/TD with plans to resume on 3/10/23.     The patient was given ample opportunity to ask questions, and to the best of my abilities, all questions answered to satisfaction; patient demonstrated understanding of what we discussed and agreeable to the plan.       HERMINIO Alanis

## 2023-03-09 ENCOUNTER — OFFICE VISIT (OUTPATIENT)
Dept: HEMATOLOGY/ONCOLOGY | Facility: CLINIC | Age: 73
End: 2023-03-09
Payer: MEDICARE

## 2023-03-09 VITALS
HEART RATE: 102 BPM | DIASTOLIC BLOOD PRESSURE: 70 MMHG | SYSTOLIC BLOOD PRESSURE: 128 MMHG | WEIGHT: 196 LBS | TEMPERATURE: 98 F | OXYGEN SATURATION: 97 % | HEIGHT: 72 IN | BODY MASS INDEX: 26.55 KG/M2

## 2023-03-09 DIAGNOSIS — D64.9 ANEMIA, UNSPECIFIED TYPE: ICD-10-CM

## 2023-03-09 DIAGNOSIS — C90.00 MULTIPLE MYELOMA NOT HAVING ACHIEVED REMISSION: Primary | ICD-10-CM

## 2023-03-09 PROCEDURE — 99999 PR PBB SHADOW E&M-EST. PATIENT-LVL III: CPT | Mod: PBBFAC,,, | Performed by: NURSE PRACTITIONER

## 2023-03-09 PROCEDURE — 99999 PR PBB SHADOW E&M-EST. PATIENT-LVL III: ICD-10-PCS | Mod: PBBFAC,,, | Performed by: NURSE PRACTITIONER

## 2023-03-09 PROCEDURE — 99213 OFFICE O/P EST LOW 20 MIN: CPT | Mod: PBBFAC | Performed by: NURSE PRACTITIONER

## 2023-03-09 PROCEDURE — 99215 PR OFFICE/OUTPT VISIT, EST, LEVL V, 40-54 MIN: ICD-10-PCS | Mod: S$PBB,,, | Performed by: NURSE PRACTITIONER

## 2023-03-09 PROCEDURE — 99215 OFFICE O/P EST HI 40 MIN: CPT | Mod: S$PBB,,, | Performed by: NURSE PRACTITIONER

## 2023-03-09 RX ORDER — HEPARIN 100 UNIT/ML
500 SYRINGE INTRAVENOUS
Status: CANCELLED | OUTPATIENT
Start: 2023-03-10

## 2023-03-09 RX ORDER — FAMOTIDINE 20 MG/1
20 TABLET, FILM COATED ORAL
Status: CANCELLED
Start: 2023-03-10

## 2023-03-09 RX ORDER — BORTEZOMIB 3.5 MG/1
1.3 INJECTION, POWDER, LYOPHILIZED, FOR SOLUTION INTRAVENOUS; SUBCUTANEOUS
Status: CANCELLED | OUTPATIENT
Start: 2023-03-10

## 2023-03-09 RX ORDER — MONTELUKAST SODIUM 10 MG/1
10 TABLET ORAL
Status: CANCELLED
Start: 2023-03-10 | End: 2023-03-10

## 2023-03-09 RX ORDER — DEXAMETHASONE 4 MG/1
20 TABLET ORAL
Status: CANCELLED
Start: 2023-03-10

## 2023-03-09 RX ORDER — SODIUM CHLORIDE 0.9 % (FLUSH) 0.9 %
10 SYRINGE (ML) INJECTION
Status: CANCELLED | OUTPATIENT
Start: 2023-03-10

## 2023-03-09 NOTE — PROGRESS NOTES
HEMATOLOGY/ONCOLOGY OFFICE CLINIC VISIT    Visit Information:    Initial Evaluation: 8/10/2022  Referring Provider:   Other providers:  Code status: Not addressed    Diagnosis:  Multiple Myeloma--IgG, Kappa  Macrocytic anemia    Present treatment:   VRD-Started on 12/9/22  Daratumumab added on 1/27/23    Treatment/Oncology history:    Plan of care:  Complete myeloma workup    Imaging:   US abdomen 8/24/2022: Spleen: 10.8 cm. Mild hepatomegaly with suspected mild hepatic steatosis. Cholelithiasis.  PET CT 9/28/2022: 1. Right scapular small focal mild hypermetabolism without lytic or sclerotic abnormality is not convinced to be related to multiple myeloma. 2. No definite skeletal lytic abnormality with hypermetabolism to reflect multiple myeloma on this exam. 3. Bilateral small pleural effusions.   4. Gallstones. 5.  Noninflamed diverticulosis coli. 6.  Prostatomegaly.    Pathology:  BONE MARROW BIOPSY 9/1/2022: PLASMA CELL MYELOMA, KAPPA RESTRICTED. NORMOCELLULAR MARROW (30%) WITH 70% INVOLVEMENT BY PLASMA CELL MYELOMA. BACKGROUND TRILINEAGE HEMATOPOIESIS IS DECREASED.      CLINICAL HISTORY:       Patient: Valentino Manning is a 72 y.o. male with multiple medical problems kindly referred for persistent anemia.  Reviewing electronic medical record patient with anemia since 02/10/2016.  From 2016 to January of 2017 anemia was mild.  Anemia slowly worsening back in 2018 hemoglobin was 8.0.  At that same time kidney function was worsening as well.     Most recent labs with HGB of 9.4, .9, platelets 231 K, WBC 5.0.  Chemistries with globulin of 5.3.  BUN/creatinine 27.7/1.41.    No family history of leukemia, lymphoma or multiple myeloma.      Chief Complaint: Shortness of Breath        Interval History:  2/28/23  Patient presents to clinic today for f/u. He was diagnosed with COVID on 2/14/23 when he was sent to the ED from his PCPs office for hypotension. CT of his chest showed the development of bilateral pulmonary  embolisms. He was started on Eliquis. He was already on Plavix ands ASA prescribed by cardiology.       3/9/23: Patient presents today for lab/TD to resume treatment after being held for COVID infection. He reports fatigue and shortness of breath on exertion. His Hgb is 11. His recent MM labs show decreasing Mspike. Denies bleeding.     Past Medical History:   Diagnosis Date    Anemia     GERD (gastroesophageal reflux disease)     Heart problem     Hyperlipidemia     Hypertension     Multiple myeloma       Past Surgical History:   Procedure Laterality Date    BONE MARROW ASPIRATION N/A 9/1/2022    Procedure: ASPIRATION, BONE MARROW;  Surgeon: Robbie Gardner MD;  Location: [facility];  Service: General;  Laterality: N/A;    CARDIAC SURGERY  2021    ENDOSCOPIC RELEASE OF BOTH CARPAL TUNNELS       Family History   Problem Relation Age of Onset    Hypertension Mother     Diabetes Mother     Anemia Mother     Heart disease Mother     Heart disease Father      Social Connections: Not on file       Review of patient's allergies indicates:   Allergen Reactions    Penicillins       Current Outpatient Medications on File Prior to Visit   Medication Sig Dispense Refill    acyclovir (ZOVIRAX) 400 MG tablet TAKE 1 TABLET BY MOUTH TWICE DAILY 180 tablet 1    amLODIPine (NORVASC) 10 MG tablet       apixaban (ELIQUIS) 5 mg Tab Take 1 tablet (5 mg total) by mouth 2 (two) times daily. 60 tablet 6    ascorbic acid, vitamin C, (VITAMIN C) 1000 MG tablet Take 1,000 mg by mouth once daily.      atorvastatin (LIPITOR) 40 MG tablet TAKE 1 TABLET ORALLY ONCE DAILY ON MON/TUE/THUR/FRI AND 1/2 ON WED. NONE ON SAT/SUN      b complex vitamins capsule Take 1 capsule by mouth once daily.      clopidogreL (PLAVIX) 75 mg tablet Take 75 mg by mouth once daily.      doxazosin (CARDURA) 4 MG tablet Take 4 mg by mouth once daily.      dutasteride (AVODART) 0.5 mg capsule Take 0.5 mg by mouth once daily.      ergocalciferol (ERGOCALCIFEROL) 50,000 unit  Cap Take 1 capsule (50,000 Units total) by mouth every 7 days. 7 capsule 0    FERRETTS 325 mg (106 mg iron) Tab Take 1 tablet by mouth 2 (two) times daily.      furosemide (LASIX) 20 MG tablet       glutamine 10 gram PwPk Take 10 g by mouth 2 (two) times a day.      HYDROcodone-acetaminophen (NORCO)  mg per tablet Take 1 tablet by mouth as needed.      lactulose (CHRONULAC) 10 gram/15 mL solution GIVE 30ML BY MOUTH TWICE A DAY X7 DAY(S)      lenalidomide 25 mg Cap TAKE 1 CAPSULE BY MOUTH EVERY DAY  FOR 14 DAYS FOLLOWED BY 7 DAY REST PERIOD ( 2 WEEKS ON, 1 WEEK OFF). 14 capsule 0    LINZESS 145 mcg Cap capsule Take 145 mcg by mouth.      lisinopriL (PRINIVIL,ZESTRIL) 40 MG tablet Take 40 mg by mouth.      metoprolol tartrate (LOPRESSOR) 25 MG tablet Take 25 mg by mouth 2 (two) times daily. Takes 1 tab q am      ondansetron (ZOFRAN) 4 MG tablet Take 4 mg by mouth every 8 (eight) hours as needed for Nausea.      promethazine (PHENERGAN) 25 MG tablet TAKE 1 TABLET BY MOUTH EVERY 4 HOURS AS NEEDED FOR MOTION SICKNESS      pyridoxine, vitamin B6, (B-6) 250 MG Tab Take 250 mg by mouth once daily.      SLOW RELEASE IRON 140 mg (45 mg iron) TbSR Take 1 tablet by mouth 2 (two) times daily.      sulfamethoxazole-trimethoprim 800-160mg (BACTRIM DS) 800-160 mg Tab Take 1 tablet by mouth As instructed. Tab 1 PO Daily M-W-F      testosterone cypionate (DEPOTESTOTERONE CYPIONATE) 200 mg/mL injection INJECT 1ML INTO MUSCLE ONCE A WEEK      zolpidem (AMBIEN) 10 mg Tab Take 10 mg by mouth once daily.      albuterol (PROAIR HFA) 90 mcg/actuation inhaler Inhale 2 puffs into the lungs every 6 (six) hours as needed for Wheezing. Rescue 8 g 0    pantoprazole (PROTONIX) 40 MG tablet Take 1 tablet (40 mg total) by mouth once daily. for 30 doses 30 tablet 1     No current facility-administered medications on file prior to visit.      Review of Systems   Constitutional:  Positive for fatigue. Negative for activity change, appetite  change, chills, diaphoresis, fever and unexpected weight change.   HENT:  Negative for nasal congestion, mouth sores, nosebleeds, postnasal drip, sinus pressure/congestion, sore throat and trouble swallowing.    Eyes:  Negative for visual disturbance.   Respiratory:  Positive for shortness of breath. Negative for cough and wheezing.    Cardiovascular:  Negative for chest pain and palpitations.   Gastrointestinal:  Negative for abdominal distention, abdominal pain, blood in stool, change in bowel habit, constipation, diarrhea, nausea, vomiting and change in bowel habit.   Endocrine: Negative.    Genitourinary:  Negative for bladder incontinence, decreased urine volume, difficulty urinating, dysuria, frequency, hematuria, scrotal swelling, testicular pain and urgency.   Musculoskeletal:  Negative for arthralgias, back pain, gait problem, joint swelling, leg pain, myalgias and neck pain.   Integumentary:  Negative for rash.   Neurological:  Negative for dizziness, tremors, seizures, syncope, speech difficulty, weakness, light-headedness, numbness, headaches and memory loss.   Hematological:  Does not bruise/bleed easily.   Psychiatric/Behavioral:  Negative for agitation, confusion, hallucinations, sleep disturbance and suicidal ideas. The patient is not nervous/anxious.             Vitals:    03/09/23 1425   BP: 128/70   BP Location: Left arm   Patient Position: Sitting   Pulse: 102   Temp: 98 °F (36.7 °C)   TempSrc: Oral   SpO2: 97%   Weight: 88.9 kg (196 lb)   Height: 6' (1.829 m)          Physical Exam  Constitutional:       Appearance: He is ill-appearing.   HENT:      Head: Normocephalic and atraumatic.   Eyes:      Extraocular Movements: Extraocular movements intact.   Pulmonary:      Effort: Pulmonary effort is normal.      Breath sounds: Normal breath sounds. No wheezing.   Abdominal:      General: Bowel sounds are normal.      Palpations: Abdomen is soft.   Musculoskeletal:      Cervical back: Neck supple.    Neurological:      Mental Status: He is alert and oriented to person, place, and time.      Sensory: Sensory deficit present.      Motor: Weakness present.   Psychiatric:         Mood and Affect: Mood normal.         Behavior: Behavior normal.         Judgment: Judgment normal.     ECOG SCORE    1 - Restricted in strenuous activity-ambulatory and able to carry out work of a light nature         Laboratory:  CBC with Differential:  Lab Results   Component Value Date    WBC 5.4 03/09/2023    RBC 3.31 (L) 03/09/2023    HGB 11.0 (L) 03/09/2023    HCT 34.6 (L) 03/09/2023    .5 (H) 03/09/2023    MCH 33.2 03/09/2023    MCHC 31.8 (L) 03/09/2023    RDW 18.9 (H) 03/09/2023     (L) 03/09/2023    MPV 9.4 03/09/2023         Latest Reference Range & Units 08/10/22 13:49   Kappa Qnt Free Light Chains 3.30 - 19.40 mg/L 491.97 (H)   Lambda Qnt Free Light Chains 5.71 - 26.30 mg/L 4.76 (L)   Carrier/Lambda Free Light Chain Ratio 0.26 - 1.65  103.36 (H)   M SPIKE gm/dl 2.24      Latest Reference Range & Units 08/10/22 13:49   Immunoglobulin A 68 - 408 mg/dL 10 (L)   Immunoglobulin G 768 - 1632 mg/dL 4882 (H)   Immunoglobulin M 35 - 263 mg/dL 12 (L)   (L): Data is abnormally low  (H): Data is abnormally high    M-spike in the beta/gamma region.  The monoclonal protein peak accounts for 3.39 g/dL of the total protein in the beta and gamma regions.  This quantitation may include complement components.  BETTY gel pattern shows an IgG type kappa monoclonal protein.    SPEP:  Total protein and globulin are both increased.  A/G ratio is low.     Serum protein electrophoresis shows a decreased albumin fraction, decreased beta globulin fraction, and increased gamma globulin fraction.   A monoclonal protein (M-spike) is present in the gamma region (2.24 g/dL).     BETTY:  Immunofixation shows an IgG monoclonal protein with kappa light chain specificity.       CMP:  Sodium Level   Date Value Ref Range Status   02/28/2023 139 136 - 145  mmol/L Final     Potassium Level   Date Value Ref Range Status   02/28/2023 3.7 3.5 - 5.1 mmol/L Final     Carbon Dioxide   Date Value Ref Range Status   02/28/2023 24 23 - 31 mmol/L Final     Blood Urea Nitrogen   Date Value Ref Range Status   02/28/2023 19.9 8.4 - 25.7 mg/dL Final     Creatinine   Date Value Ref Range Status   02/28/2023 1.06 0.73 - 1.18 mg/dL Final     Calcium Level Total   Date Value Ref Range Status   02/28/2023 8.6 (L) 8.8 - 10.0 mg/dL Final     Albumin Level   Date Value Ref Range Status   02/28/2023 2.9 (L) 3.4 - 4.8 g/dL Final   02/28/2023 2.8 (L) 3.4 - 4.8 g/dL Final     Bilirubin Total   Date Value Ref Range Status   02/28/2023 1.4 <=1.5 mg/dL Final     Alkaline Phosphatase   Date Value Ref Range Status   02/28/2023 63 40 - 150 unit/L Final     Aspartate Aminotransferase   Date Value Ref Range Status   02/28/2023 17 5 - 34 unit/L Final     Alanine Aminotransferase   Date Value Ref Range Status   02/28/2023 42 0 - 55 unit/L Final     Estimated GFR-Non    Date Value Ref Range Status   06/22/2022 53 mls/min/1.73/m2 Final       Latest Reference Range & Units 08/10/22 13:49   MACEY Negative  Negative   ds DNA Ab Negative  Negative   Anti-SSA Interpretation Negative  Negative   Anti-SSB Interpretation Negative  Negative      Latest Reference Range & Units 11/10/21 10:27 08/10/22 13:49   Centromere Protein Antibody Negative   Negative   HARRIS-1 Antibody Negative   Negative   Scleroderma (Scl-70S) Antibody Negative   Negative   Rheumatoid Factor <<=30 IU/mL <13    RNP70 Antibody Negative   Negative   Hand DP IgG   Negative   U1RNP Antibody Negative   Negative          Assessment:       1. Multiple myeloma not having achieved remission    2. Anemia, unspecified type            Again I have a long discussion with the patient and his wife about of multiple myeloma.  Discussed with the patient that Multiple myeloma is a type of bone marrow cancer that forms in a type of white blood cell  called a plasma cell. Plasma cells help you fight infections by making antibodies that recognize and attack germs but when they transformed into cancer cells then they are not efficient and don't work well. This cells can affect not only the bone marrow but the bones in general causing distinctive bone lesions called Lytic lesions. It also can affect the kidney. Discussed prognosis and treatment recommendations and indications.  Discussed in detail blood work, PET-CT and bone marrow biopsy.  He does have advance myeloma, stage III, therefore treatment was indicated.       Plan:       Patient was seen at [facility]. He started treatment here with Velcade/Revlimid/Dexamethamasone. He said that he is a candidate for transplant and they will eval bone marrow again after 6 cycles.   Darzalex (weekly x 8 dose, then q 2 week x 8 doses, then q 4 weeks per kodak study) was added on 1/27/23.   Cycle 4, Day 1 of Velcade/Revlimid/Dexamethamasone/Darzalex was given on 2/10/23. He was diagnosed with Covid and PEs on 2/14/23.   He had f/u via telemedicine with Dr. Brito on 2/27/23- recommended to repeat BMB x after 6 cycles.   Will proceed with day 8 of cycle 4 tomorrow. He will start Revlimid cycle tomorrow as it has been on hold since 2/14/23.   RTC in 3 weeks for  TD. MM labs to be drawn a few days before.      The patient was given ample opportunity to ask questions, and to the best of my abilities, all questions answered to satisfaction; patient demonstrated understanding of what we discussed and agreeable to the plan.       HERMINIO Alanis

## 2023-03-10 ENCOUNTER — INFUSION (OUTPATIENT)
Dept: INFUSION THERAPY | Facility: HOSPITAL | Age: 73
End: 2023-03-10
Attending: INTERNAL MEDICINE
Payer: MEDICARE

## 2023-03-10 DIAGNOSIS — C90.00 MULTIPLE MYELOMA NOT HAVING ACHIEVED REMISSION: Primary | ICD-10-CM

## 2023-03-10 PROCEDURE — 96402 CHEMO HORMON ANTINEOPL SQ/IM: CPT

## 2023-03-10 PROCEDURE — 25000003 PHARM REV CODE 250: Performed by: NURSE PRACTITIONER

## 2023-03-10 PROCEDURE — 63600175 PHARM REV CODE 636 W HCPCS: Mod: JZ,TB,JG | Performed by: NURSE PRACTITIONER

## 2023-03-10 RX ORDER — MONTELUKAST SODIUM 10 MG/1
10 TABLET ORAL
Status: COMPLETED | OUTPATIENT
Start: 2023-03-10 | End: 2023-03-10

## 2023-03-10 RX ORDER — FAMOTIDINE 20 MG/1
20 TABLET, FILM COATED ORAL
Status: COMPLETED | OUTPATIENT
Start: 2023-03-10 | End: 2023-03-10

## 2023-03-10 RX ORDER — SODIUM CHLORIDE 0.9 % (FLUSH) 0.9 %
10 SYRINGE (ML) INJECTION
Status: DISCONTINUED | OUTPATIENT
Start: 2023-03-10 | End: 2023-03-10 | Stop reason: HOSPADM

## 2023-03-10 RX ORDER — HEPARIN 100 UNIT/ML
500 SYRINGE INTRAVENOUS
Status: DISCONTINUED | OUTPATIENT
Start: 2023-03-10 | End: 2023-03-10 | Stop reason: HOSPADM

## 2023-03-10 RX ORDER — DEXAMETHASONE 4 MG/1
20 TABLET ORAL
Status: COMPLETED | OUTPATIENT
Start: 2023-03-10 | End: 2023-03-10

## 2023-03-10 RX ORDER — BORTEZOMIB 3.5 MG/1
1.3 INJECTION, POWDER, LYOPHILIZED, FOR SOLUTION INTRAVENOUS; SUBCUTANEOUS
Status: COMPLETED | OUTPATIENT
Start: 2023-03-10 | End: 2023-03-10

## 2023-03-10 RX ADMIN — DEXAMETHASONE 20 MG: 4 TABLET ORAL at 11:03

## 2023-03-10 RX ADMIN — MONTELUKAST SODIUM 10 MG: 10 TABLET, COATED ORAL at 11:03

## 2023-03-10 RX ADMIN — DARATUMUMAB AND HYALURONIDASE-FIHJ (HUMAN RECOMBINANT) 1800 MG: 1800; 30000 INJECTION SUBCUTANEOUS at 11:03

## 2023-03-10 RX ADMIN — FAMOTIDINE 20 MG: 20 TABLET, FILM COATED ORAL at 11:03

## 2023-03-10 RX ADMIN — BORTEZOMIB 2.8 MG: 3.5 INJECTION, POWDER, LYOPHILIZED, FOR SOLUTION INTRAVENOUS; SUBCUTANEOUS at 12:03

## 2023-03-13 RX ORDER — MONTELUKAST SODIUM 10 MG/1
10 TABLET ORAL
Status: CANCELLED
Start: 2023-03-17 | End: 2023-03-17

## 2023-03-13 RX ORDER — HEPARIN 100 UNIT/ML
500 SYRINGE INTRAVENOUS
Status: CANCELLED | OUTPATIENT
Start: 2023-03-17

## 2023-03-13 RX ORDER — FAMOTIDINE 20 MG/1
20 TABLET, FILM COATED ORAL
Status: CANCELLED
Start: 2023-03-17

## 2023-03-13 RX ORDER — SODIUM CHLORIDE 0.9 % (FLUSH) 0.9 %
10 SYRINGE (ML) INJECTION
Status: CANCELLED | OUTPATIENT
Start: 2023-03-17

## 2023-03-13 RX ORDER — BORTEZOMIB 3.5 MG/1
1.3 INJECTION, POWDER, LYOPHILIZED, FOR SOLUTION INTRAVENOUS; SUBCUTANEOUS
Status: CANCELLED | OUTPATIENT
Start: 2023-03-17

## 2023-03-13 RX ORDER — DEXAMETHASONE 4 MG/1
20 TABLET ORAL
Status: CANCELLED
Start: 2023-03-17

## 2023-03-17 ENCOUNTER — INFUSION (OUTPATIENT)
Dept: INFUSION THERAPY | Facility: HOSPITAL | Age: 73
End: 2023-03-17
Attending: INTERNAL MEDICINE
Payer: MEDICARE

## 2023-03-17 VITALS
DIASTOLIC BLOOD PRESSURE: 73 MMHG | TEMPERATURE: 99 F | BODY MASS INDEX: 26.51 KG/M2 | WEIGHT: 195.5 LBS | RESPIRATION RATE: 16 BRPM | OXYGEN SATURATION: 98 % | SYSTOLIC BLOOD PRESSURE: 111 MMHG | HEART RATE: 94 BPM

## 2023-03-17 DIAGNOSIS — C90.00 MULTIPLE MYELOMA NOT HAVING ACHIEVED REMISSION: Primary | ICD-10-CM

## 2023-03-17 PROCEDURE — 63600175 PHARM REV CODE 636 W HCPCS: Performed by: INTERNAL MEDICINE

## 2023-03-17 PROCEDURE — 25000003 PHARM REV CODE 250: Performed by: INTERNAL MEDICINE

## 2023-03-17 PROCEDURE — 96401 CHEMO ANTI-NEOPL SQ/IM: CPT

## 2023-03-17 RX ORDER — MONTELUKAST SODIUM 10 MG/1
10 TABLET ORAL
Status: COMPLETED | OUTPATIENT
Start: 2023-03-17 | End: 2023-03-17

## 2023-03-17 RX ORDER — SODIUM CHLORIDE 0.9 % (FLUSH) 0.9 %
10 SYRINGE (ML) INJECTION
Status: DISCONTINUED | OUTPATIENT
Start: 2023-03-17 | End: 2023-03-17 | Stop reason: HOSPADM

## 2023-03-17 RX ORDER — FAMOTIDINE 20 MG/1
20 TABLET, FILM COATED ORAL
Status: COMPLETED | OUTPATIENT
Start: 2023-03-17 | End: 2023-03-17

## 2023-03-17 RX ORDER — BORTEZOMIB 3.5 MG/1
1.3 INJECTION, POWDER, LYOPHILIZED, FOR SOLUTION INTRAVENOUS; SUBCUTANEOUS
Status: COMPLETED | OUTPATIENT
Start: 2023-03-17 | End: 2023-03-17

## 2023-03-17 RX ORDER — HEPARIN 100 UNIT/ML
500 SYRINGE INTRAVENOUS
Status: DISCONTINUED | OUTPATIENT
Start: 2023-03-17 | End: 2023-03-17 | Stop reason: HOSPADM

## 2023-03-17 RX ORDER — DEXAMETHASONE 4 MG/1
20 TABLET ORAL
Status: COMPLETED | OUTPATIENT
Start: 2023-03-17 | End: 2023-03-17

## 2023-03-17 RX ADMIN — MONTELUKAST SODIUM 10 MG: 10 TABLET, FILM COATED ORAL at 10:03

## 2023-03-17 RX ADMIN — DARATUMUMAB AND HYALURONIDASE-FIHJ (HUMAN RECOMBINANT) 1800 MG: 1800; 30000 INJECTION SUBCUTANEOUS at 10:03

## 2023-03-17 RX ADMIN — DEXAMETHASONE 20 MG: 4 TABLET ORAL at 10:03

## 2023-03-17 RX ADMIN — FAMOTIDINE 20 MG: 20 TABLET, FILM COATED ORAL at 10:03

## 2023-03-17 RX ADMIN — BORTEZOMIB 2.75 MG: 3.5 INJECTION, POWDER, LYOPHILIZED, FOR SOLUTION INTRAVENOUS; SUBCUTANEOUS at 10:03

## 2023-03-20 DIAGNOSIS — C90.00 MULTIPLE MYELOMA NOT HAVING ACHIEVED REMISSION: Primary | ICD-10-CM

## 2023-03-20 RX ORDER — BORTEZOMIB 3.5 MG/1
1.3 INJECTION, POWDER, LYOPHILIZED, FOR SOLUTION INTRAVENOUS; SUBCUTANEOUS
Status: CANCELLED | OUTPATIENT
Start: 2023-03-31

## 2023-03-20 RX ORDER — MONTELUKAST SODIUM 10 MG/1
10 TABLET ORAL
Status: CANCELLED
Start: 2023-03-31 | End: 2023-03-24

## 2023-03-20 RX ORDER — BORTEZOMIB 3.5 MG/1
1.3 INJECTION, POWDER, LYOPHILIZED, FOR SOLUTION INTRAVENOUS; SUBCUTANEOUS
Status: CANCELLED | OUTPATIENT
Start: 2023-04-14

## 2023-03-20 RX ORDER — DEXAMETHASONE 4 MG/1
20 TABLET ORAL
Status: CANCELLED
Start: 2023-03-31

## 2023-03-20 RX ORDER — SODIUM CHLORIDE 0.9 % (FLUSH) 0.9 %
10 SYRINGE (ML) INJECTION
Status: CANCELLED | OUTPATIENT
Start: 2023-03-31

## 2023-03-20 RX ORDER — HEPARIN 100 UNIT/ML
500 SYRINGE INTRAVENOUS
Status: CANCELLED | OUTPATIENT
Start: 2023-04-07

## 2023-03-20 RX ORDER — HEPARIN 100 UNIT/ML
500 SYRINGE INTRAVENOUS
Status: CANCELLED | OUTPATIENT
Start: 2023-03-31

## 2023-03-20 RX ORDER — FAMOTIDINE 20 MG/1
20 TABLET, FILM COATED ORAL
Status: CANCELLED
Start: 2023-03-31

## 2023-03-20 RX ORDER — BORTEZOMIB 3.5 MG/1
1.3 INJECTION, POWDER, LYOPHILIZED, FOR SOLUTION INTRAVENOUS; SUBCUTANEOUS
Status: CANCELLED | OUTPATIENT
Start: 2023-04-07

## 2023-03-20 RX ORDER — FAMOTIDINE 20 MG/1
20 TABLET, FILM COATED ORAL
Status: CANCELLED
Start: 2023-04-07

## 2023-03-20 RX ORDER — DEXAMETHASONE 4 MG/1
20 TABLET ORAL
Status: CANCELLED
Start: 2023-04-07

## 2023-03-20 RX ORDER — MONTELUKAST SODIUM 10 MG/1
10 TABLET ORAL
Status: CANCELLED
Start: 2023-04-14 | End: 2023-04-07

## 2023-03-20 RX ORDER — MONTELUKAST SODIUM 10 MG/1
10 TABLET ORAL
Status: CANCELLED
Start: 2023-04-07 | End: 2023-03-31

## 2023-03-20 RX ORDER — SODIUM CHLORIDE 0.9 % (FLUSH) 0.9 %
10 SYRINGE (ML) INJECTION
Status: CANCELLED | OUTPATIENT
Start: 2023-04-07

## 2023-03-20 RX ORDER — FAMOTIDINE 20 MG/1
20 TABLET, FILM COATED ORAL
Status: CANCELLED
Start: 2023-04-14

## 2023-03-20 RX ORDER — SODIUM CHLORIDE 0.9 % (FLUSH) 0.9 %
10 SYRINGE (ML) INJECTION
Status: CANCELLED | OUTPATIENT
Start: 2023-04-14

## 2023-03-20 RX ORDER — HEPARIN 100 UNIT/ML
500 SYRINGE INTRAVENOUS
Status: CANCELLED | OUTPATIENT
Start: 2023-04-14

## 2023-03-20 RX ORDER — DEXAMETHASONE 4 MG/1
20 TABLET ORAL
Status: CANCELLED
Start: 2023-04-14

## 2023-03-23 ENCOUNTER — TELEPHONE (OUTPATIENT)
Dept: INFUSION THERAPY | Facility: HOSPITAL | Age: 73
End: 2023-03-23
Payer: MEDICARE

## 2023-03-24 ENCOUNTER — TELEPHONE (OUTPATIENT)
Dept: INFUSION THERAPY | Facility: HOSPITAL | Age: 73
End: 2023-03-24
Payer: MEDICARE

## 2023-03-24 ENCOUNTER — INFUSION (OUTPATIENT)
Dept: INFUSION THERAPY | Facility: HOSPITAL | Age: 73
End: 2023-03-24
Attending: INTERNAL MEDICINE
Payer: MEDICARE

## 2023-03-24 ENCOUNTER — LAB VISIT (OUTPATIENT)
Dept: LAB | Facility: HOSPITAL | Age: 73
End: 2023-03-24
Attending: INTERNAL MEDICINE
Payer: MEDICARE

## 2023-03-24 VITALS
TEMPERATURE: 98 F | DIASTOLIC BLOOD PRESSURE: 54 MMHG | HEART RATE: 102 BPM | BODY MASS INDEX: 26.48 KG/M2 | WEIGHT: 195.5 LBS | RESPIRATION RATE: 18 BRPM | HEIGHT: 72 IN | SYSTOLIC BLOOD PRESSURE: 110 MMHG

## 2023-03-24 DIAGNOSIS — C90.00 MULTIPLE MYELOMA NOT HAVING ACHIEVED REMISSION: ICD-10-CM

## 2023-03-24 LAB
ALBUMIN SERPL-MCNC: 2.8 G/DL (ref 3.4–4.8)
ALBUMIN/GLOB SERPL: 0.9 RATIO (ref 1.1–2)
ALP SERPL-CCNC: 64 UNIT/L (ref 40–150)
ALT SERPL-CCNC: 56 UNIT/L (ref 0–55)
AST SERPL-CCNC: 21 UNIT/L (ref 5–34)
BASOPHILS # BLD AUTO: 0 X10(3)/MCL (ref 0–0.2)
BASOPHILS NFR BLD AUTO: 0 %
BILIRUBIN DIRECT+TOT PNL SERPL-MCNC: 1 MG/DL
BUN SERPL-MCNC: 16.8 MG/DL (ref 8.4–25.7)
CALCIUM SERPL-MCNC: 8.9 MG/DL (ref 8.8–10)
CHLORIDE SERPL-SCNC: 107 MMOL/L (ref 98–107)
CO2 SERPL-SCNC: 25 MMOL/L (ref 23–31)
CREAT SERPL-MCNC: 1.17 MG/DL (ref 0.73–1.18)
EOSINOPHIL # BLD AUTO: 0.04 X10(3)/MCL (ref 0–0.9)
EOSINOPHIL NFR BLD AUTO: 2.3 %
ERYTHROCYTE [DISTWIDTH] IN BLOOD BY AUTOMATED COUNT: 18 % (ref 11.5–17)
GFR SERPLBLD CREATININE-BSD FMLA CKD-EPI: >60 MLS/MIN/1.73/M2
GLOBULIN SER-MCNC: 3.1 GM/DL (ref 2.4–3.5)
GLUCOSE SERPL-MCNC: 144 MG/DL (ref 82–115)
HCT VFR BLD AUTO: 30.7 % (ref 42–52)
HGB BLD-MCNC: 9.8 G/DL (ref 14–18)
IMM GRANULOCYTES # BLD AUTO: 0.02 X10(3)/MCL (ref 0–0.04)
IMM GRANULOCYTES NFR BLD AUTO: 1.1 %
LYMPHOCYTES # BLD AUTO: 0.62 X10(3)/MCL (ref 0.6–4.6)
LYMPHOCYTES NFR BLD AUTO: 35.2 %
MCH RBC QN AUTO: 32.6 PG (ref 27–31)
MCHC RBC AUTO-ENTMCNC: 31.9 G/DL (ref 33–36)
MCV RBC AUTO: 102 FL (ref 80–94)
MONOCYTES # BLD AUTO: 0.11 X10(3)/MCL (ref 0.1–1.3)
MONOCYTES NFR BLD AUTO: 6.3 %
NEUTROPHILS # BLD AUTO: 0.97 X10(3)/MCL (ref 2.1–9.2)
NEUTROPHILS NFR BLD AUTO: 55.1 %
PLATELET # BLD AUTO: 152 X10(3)/MCL (ref 130–400)
PMV BLD AUTO: 10.5 FL (ref 7.4–10.4)
POTASSIUM SERPL-SCNC: 4.3 MMOL/L (ref 3.5–5.1)
PROT SERPL-MCNC: 5.9 GM/DL (ref 5.8–7.6)
RBC # BLD AUTO: 3.01 X10(6)/MCL (ref 4.7–6.1)
SODIUM SERPL-SCNC: 139 MMOL/L (ref 136–145)
WBC # SPEC AUTO: 1.8 X10(3)/MCL (ref 4.5–11.5)

## 2023-03-24 PROCEDURE — 36415 COLL VENOUS BLD VENIPUNCTURE: CPT

## 2023-03-24 PROCEDURE — 80053 COMPREHEN METABOLIC PANEL: CPT

## 2023-03-24 PROCEDURE — 85025 COMPLETE CBC W/AUTO DIFF WBC: CPT

## 2023-03-24 PROCEDURE — 99211 OFF/OP EST MAY X REQ PHY/QHP: CPT

## 2023-03-24 NOTE — PLAN OF CARE
Weekly darzalex/velcade held per GIORGI Trujillo NP for neutropenia. Will RTC in 1 week to start C5D1. Neutropenic precautions given. Verbalized understanding. Discharged home.

## 2023-03-29 ENCOUNTER — LAB VISIT (OUTPATIENT)
Dept: LAB | Facility: HOSPITAL | Age: 73
End: 2023-03-29
Attending: INTERNAL MEDICINE
Payer: MEDICARE

## 2023-03-29 DIAGNOSIS — C90.00 MULTIPLE MYELOMA NOT HAVING ACHIEVED REMISSION: ICD-10-CM

## 2023-03-29 DIAGNOSIS — D64.9 ANEMIA, UNSPECIFIED TYPE: ICD-10-CM

## 2023-03-29 LAB
ALBUMIN SERPL-MCNC: 2.8 G/DL (ref 3.4–4.8)
ALBUMIN/GLOB SERPL: 0.9 RATIO (ref 1.1–2)
ALP SERPL-CCNC: 73 UNIT/L (ref 40–150)
ALT SERPL-CCNC: 63 UNIT/L (ref 0–55)
AST SERPL-CCNC: 16 UNIT/L (ref 5–34)
BASOPHILS # BLD AUTO: 0.02 X10(3)/MCL (ref 0–0.2)
BASOPHILS NFR BLD AUTO: 0.7 %
BILIRUBIN DIRECT+TOT PNL SERPL-MCNC: 0.6 MG/DL
BUN SERPL-MCNC: 13.9 MG/DL (ref 8.4–25.7)
CALCIUM SERPL-MCNC: 9.1 MG/DL (ref 8.8–10)
CHLORIDE SERPL-SCNC: 106 MMOL/L (ref 98–107)
CO2 SERPL-SCNC: 25 MMOL/L (ref 23–31)
CREAT SERPL-MCNC: 1.04 MG/DL (ref 0.73–1.18)
EOSINOPHIL # BLD AUTO: 0.03 X10(3)/MCL (ref 0–0.9)
EOSINOPHIL NFR BLD AUTO: 1 %
ERYTHROCYTE [DISTWIDTH] IN BLOOD BY AUTOMATED COUNT: 18 % (ref 11.5–17)
GFR SERPLBLD CREATININE-BSD FMLA CKD-EPI: >60 MLS/MIN/1.73/M2
GLOBULIN SER-MCNC: 3 GM/DL (ref 2.4–3.5)
GLUCOSE SERPL-MCNC: 116 MG/DL (ref 82–115)
HCT VFR BLD AUTO: 28.8 % (ref 42–52)
HGB BLD-MCNC: 9.4 G/DL (ref 14–18)
IGA SERPL-MCNC: 62 MG/DL (ref 101–645)
IGG SERPL-MCNC: 975 MG/DL (ref 540–1822)
IGM SERPL-MCNC: 28 MG/DL (ref 22–240)
IMM GRANULOCYTES # BLD AUTO: 0.01 X10(3)/MCL (ref 0–0.04)
IMM GRANULOCYTES NFR BLD AUTO: 0.3 %
LYMPHOCYTES # BLD AUTO: 1.02 X10(3)/MCL (ref 0.6–4.6)
LYMPHOCYTES NFR BLD AUTO: 33.8 %
MCH RBC QN AUTO: 33.2 PG (ref 27–31)
MCHC RBC AUTO-ENTMCNC: 32.6 G/DL (ref 33–36)
MCV RBC AUTO: 101.8 FL (ref 80–94)
MONOCYTES # BLD AUTO: 0.29 X10(3)/MCL (ref 0.1–1.3)
MONOCYTES NFR BLD AUTO: 9.6 %
NEUTROPHILS # BLD AUTO: 1.65 X10(3)/MCL (ref 2.1–9.2)
NEUTROPHILS NFR BLD AUTO: 54.6 %
PLATELET # BLD AUTO: 352 X10(3)/MCL (ref 130–400)
PMV BLD AUTO: 8.5 FL (ref 7.4–10.4)
POTASSIUM SERPL-SCNC: 4.2 MMOL/L (ref 3.5–5.1)
PROT SERPL-MCNC: 5.8 GM/DL (ref 5.8–7.6)
RBC # BLD AUTO: 2.83 X10(6)/MCL (ref 4.7–6.1)
SODIUM SERPL-SCNC: 138 MMOL/L (ref 136–145)
WBC # SPEC AUTO: 3 X10(3)/MCL (ref 4.5–11.5)

## 2023-03-29 PROCEDURE — 36415 COLL VENOUS BLD VENIPUNCTURE: CPT

## 2023-03-29 PROCEDURE — 80053 COMPREHEN METABOLIC PANEL: CPT

## 2023-03-29 PROCEDURE — 82784 ASSAY IGA/IGD/IGG/IGM EACH: CPT

## 2023-03-29 PROCEDURE — 85025 COMPLETE CBC W/AUTO DIFF WBC: CPT

## 2023-03-29 PROCEDURE — 86334 IMMUNOFIX E-PHORESIS SERUM: CPT

## 2023-03-29 PROCEDURE — 83521 IG LIGHT CHAINS FREE EACH: CPT | Mod: 90

## 2023-03-29 PROCEDURE — 84165 PROTEIN E-PHORESIS SERUM: CPT

## 2023-03-30 ENCOUNTER — OFFICE VISIT (OUTPATIENT)
Dept: HEMATOLOGY/ONCOLOGY | Facility: CLINIC | Age: 73
End: 2023-03-30
Payer: MEDICARE

## 2023-03-30 VITALS
HEIGHT: 72 IN | TEMPERATURE: 97 F | DIASTOLIC BLOOD PRESSURE: 74 MMHG | RESPIRATION RATE: 18 BRPM | BODY MASS INDEX: 26.57 KG/M2 | WEIGHT: 196.19 LBS | OXYGEN SATURATION: 98 % | HEART RATE: 97 BPM | SYSTOLIC BLOOD PRESSURE: 118 MMHG

## 2023-03-30 DIAGNOSIS — R77.1 ELEVATED SERUM GLOBULIN LEVEL: ICD-10-CM

## 2023-03-30 DIAGNOSIS — Z79.899 PATIENT ON ANTINEOPLASTIC CHEMOTHERAPY REGIMEN: ICD-10-CM

## 2023-03-30 DIAGNOSIS — C90.00 MULTIPLE MYELOMA NOT HAVING ACHIEVED REMISSION: Primary | ICD-10-CM

## 2023-03-30 DIAGNOSIS — G62.9 NEUROPATHY: ICD-10-CM

## 2023-03-30 PROBLEM — Z79.69 PATIENT ON ANTINEOPLASTIC CHEMOTHERAPY REGIMEN: Status: ACTIVE | Noted: 2023-03-30

## 2023-03-30 LAB
ALBUMIN % SPEP (OHS): 42.78
ALBUMIN SERPL-MCNC: 2.5 G/DL (ref 3.4–4.8)
ALBUMIN/GLOB SERPL: 0.8 RATIO (ref 1.1–2)
ALPHA 1 GLOB (OHS): 0.36 GM/DL
ALPHA 1 GLOB% (OHS): 6.2
ALPHA 2 GLOB % (OHS): 19.4
ALPHA 2 GLOB (OHS): 1.13 GM/DL
BETA GLOB (OHS): 0.93 GM/DL
BETA GLOB% (OHS): 15.99
GAMMA GLOBULIN % (OHS): 15.63
GAMMA GLOBULIN (OHS): 0.91 GM/DL
GLOBULIN SER-MCNC: 3.3 GM/DL (ref 2.4–3.5)
KAPPA LC FREE SER-MCNC: 4.7 MG/DL (ref 0.33–1.94)
KAPPA LC FREE/LAMBDA FREE SER: 3.31 {RATIO} (ref 0.26–1.65)
LAMBDA LC FREE SERPL-MCNC: 1.42 MG/DL (ref 0.57–2.63)
M SPIKE % (OHS): ABNORMAL
M SPIKE (OHS): ABNORMAL
PATH REV: NORMAL
PROT SERPL-MCNC: 5.8 GM/DL (ref 5.8–7.6)

## 2023-03-30 PROCEDURE — 99999 PR PBB SHADOW E&M-EST. PATIENT-LVL III: ICD-10-PCS | Mod: PBBFAC,,, | Performed by: INTERNAL MEDICINE

## 2023-03-30 PROCEDURE — 99213 OFFICE O/P EST LOW 20 MIN: CPT | Mod: PBBFAC | Performed by: INTERNAL MEDICINE

## 2023-03-30 PROCEDURE — 99999 PR PBB SHADOW E&M-EST. PATIENT-LVL III: CPT | Mod: PBBFAC,,, | Performed by: INTERNAL MEDICINE

## 2023-03-30 PROCEDURE — 99215 PR OFFICE/OUTPT VISIT, EST, LEVL V, 40-54 MIN: ICD-10-PCS | Mod: S$PBB,,, | Performed by: INTERNAL MEDICINE

## 2023-03-30 PROCEDURE — 99215 OFFICE O/P EST HI 40 MIN: CPT | Mod: S$PBB,,, | Performed by: INTERNAL MEDICINE

## 2023-03-30 RX ORDER — GABAPENTIN 300 MG/1
300 CAPSULE ORAL 3 TIMES DAILY
Qty: 90 CAPSULE | Refills: 3 | Status: SHIPPED | OUTPATIENT
Start: 2023-03-30 | End: 2023-05-12 | Stop reason: SDUPTHER

## 2023-03-30 NOTE — PROGRESS NOTES
HEMATOLOGY/ONCOLOGY OFFICE CLINIC VISIT    Visit Information:    Initial Evaluation: 8/10/2022  Referring Provider:   Other providers:  Code status: Not addressed    Diagnosis:  Multiple Myeloma--IgG, Kappa  Macrocytic anemia    Present treatment:   VRD-Started on 12/9/22  Daratumumab added on 1/27/23    Treatment/Oncology history:    Plan of care:  Complete myeloma workup    Imaging:   US abdomen 8/24/2022: Spleen: 10.8 cm. Mild hepatomegaly with suspected mild hepatic steatosis. Cholelithiasis.  PET CT 9/28/2022: 1. Right scapular small focal mild hypermetabolism without lytic or sclerotic abnormality is not convinced to be related to multiple myeloma. 2. No definite skeletal lytic abnormality with hypermetabolism to reflect multiple myeloma on this exam. 3. Bilateral small pleural effusions.   4. Gallstones. 5.  Noninflamed diverticulosis coli. 6.  Prostatomegaly.    Pathology:  BONE MARROW BIOPSY 9/1/2022: PLASMA CELL MYELOMA, KAPPA RESTRICTED. NORMOCELLULAR MARROW (30%) WITH 70% INVOLVEMENT BY PLASMA CELL MYELOMA. BACKGROUND TRILINEAGE HEMATOPOIESIS IS DECREASED.      CLINICAL HISTORY:       Patient: Valentino Manning is a 72 y.o. male with multiple medical problems kindly referred for persistent anemia.  Reviewing electronic medical record patient with anemia since 02/10/2016.  From 2016 to January of 2017 anemia was mild.  Anemia slowly worsening back in 2018 hemoglobin was 8.0.  At that same time kidney function was worsening as well.     Most recent labs with HGB of 9.4, .9, platelets 231 K, WBC 5.0.  Chemistries with globulin of 5.3.  BUN/creatinine 27.7/1.41.    No family history of leukemia, lymphoma or multiple myeloma.      Chief Complaint: MM        Interval History:  2/28/23: Patient presents to clinic today for f/u. He was diagnosed with COVID on 2/14/23 when he was sent to the ED from his PCPs office for hypotension. CT of his chest showed the development of bilateral pulmonary embolisms. He  was started on Eliquis. He was already on Plavix ands ASA prescribed by cardiology.     3/30/23: Patient presents today for follow up of his MM, he is with his wife. He is due to begin cycle #5 tomorrow. He continues with fatigue and shortness of breath on exertion, no worsening. He has incentive spirometry and uses sometimes. He c/o numbness to legs for about 3-4 months. Uses a cane for stability.  Overall, he is doing well. Denies fever, chills or sweats. No chest pain. No bleeding.   He continues to follow with Dr. Brito in [facility], next visit on 4/26/23.   MM work up drawn today, still pending. Hgb today, 9.4.      Past Medical History:   Diagnosis Date    Anemia     GERD (gastroesophageal reflux disease)     Heart problem     Hyperlipidemia     Hypertension     Multiple myeloma       Past Surgical History:   Procedure Laterality Date    BONE MARROW ASPIRATION N/A 9/1/2022    Procedure: ASPIRATION, BONE MARROW;  Surgeon: Robbie Gardner MD;  Location: [facility];  Service: General;  Laterality: N/A;    CARDIAC SURGERY  2021    ENDOSCOPIC RELEASE OF BOTH CARPAL TUNNELS       Family History   Problem Relation Age of Onset    Hypertension Mother     Diabetes Mother     Anemia Mother     Heart disease Mother     Heart disease Father      Social Connections: Not on file       Review of patient's allergies indicates:   Allergen Reactions    Penicillins       Current Outpatient Medications on File Prior to Visit   Medication Sig Dispense Refill    acyclovir (ZOVIRAX) 400 MG tablet TAKE 1 TABLET BY MOUTH TWICE DAILY 180 tablet 1    albuterol (PROAIR HFA) 90 mcg/actuation inhaler Inhale 2 puffs into the lungs every 6 (six) hours as needed for Wheezing. Rescue 8 g 0    amLODIPine (NORVASC) 10 MG tablet       apixaban (ELIQUIS) 5 mg Tab Take 1 tablet (5 mg total) by mouth 2 (two) times daily. 60 tablet 6    ascorbic acid, vitamin C, (VITAMIN C) 1000 MG tablet Take 1,000 mg by mouth once daily.      atorvastatin (LIPITOR) 40  MG tablet TAKE 1 TABLET ORALLY ONCE DAILY ON MON/TUE/THUR/FRI AND 1/2 ON WED. NONE ON SAT/SUN      b complex vitamins capsule Take 1 capsule by mouth once daily.      clopidogreL (PLAVIX) 75 mg tablet Take 75 mg by mouth once daily.      doxazosin (CARDURA) 4 MG tablet Take 4 mg by mouth once daily.      dutasteride (AVODART) 0.5 mg capsule Take 0.5 mg by mouth once daily.      ergocalciferol (ERGOCALCIFEROL) 50,000 unit Cap Take 1 capsule (50,000 Units total) by mouth every 7 days. 7 capsule 0    FERRETTS 325 mg (106 mg iron) Tab Take 1 tablet by mouth 2 (two) times daily.      furosemide (LASIX) 20 MG tablet       glutamine 10 gram PwPk Take 10 g by mouth 2 (two) times a day.      HYDROcodone-acetaminophen (NORCO)  mg per tablet Take 1 tablet by mouth as needed.      lactulose (CHRONULAC) 10 gram/15 mL solution GIVE 30ML BY MOUTH TWICE A DAY X7 DAY(S)      LINZESS 145 mcg Cap capsule Take 145 mcg by mouth.      lisinopriL (PRINIVIL,ZESTRIL) 40 MG tablet Take 40 mg by mouth.      metoprolol tartrate (LOPRESSOR) 25 MG tablet Take 25 mg by mouth 2 (two) times daily. Takes 1 tab q am      ondansetron (ZOFRAN) 4 MG tablet Take 4 mg by mouth every 8 (eight) hours as needed for Nausea.      pantoprazole (PROTONIX) 40 MG tablet Take 1 tablet (40 mg total) by mouth once daily. for 30 doses 30 tablet 1    promethazine (PHENERGAN) 25 MG tablet TAKE 1 TABLET BY MOUTH EVERY 4 HOURS AS NEEDED FOR MOTION SICKNESS      pyridoxine, vitamin B6, (B-6) 250 MG Tab Take 250 mg by mouth once daily.      SLOW RELEASE IRON 140 mg (45 mg iron) TbSR Take 1 tablet by mouth 2 (two) times daily.      sulfamethoxazole-trimethoprim 800-160mg (BACTRIM DS) 800-160 mg Tab Take 1 tablet by mouth As instructed. Tab 1 PO Daily M-W-F      testosterone cypionate (DEPOTESTOTERONE CYPIONATE) 200 mg/mL injection INJECT 1ML INTO MUSCLE ONCE A WEEK      zolpidem (AMBIEN) 10 mg Tab Take 10 mg by mouth once daily.      [DISCONTINUED] lenalidomide 25 mg  Cap TAKE 1 CAPSULE BY MOUTH EVERY DAY FOR 14 DAYS FOLLOWED BY 7 DAY REST PERIOD (2 WEEKS ON, 1 WEEK OFF). 14 capsule 0     No current facility-administered medications on file prior to visit.      Review of Systems   Constitutional:  Positive for fatigue. Negative for activity change, appetite change, chills, diaphoresis, fever and unexpected weight change.   HENT:  Negative for nasal congestion, mouth sores, nosebleeds, postnasal drip, sinus pressure/congestion, sore throat and trouble swallowing.    Eyes:  Negative for visual disturbance.   Respiratory:  Positive for shortness of breath. Negative for cough and wheezing.    Cardiovascular:  Negative for chest pain and palpitations.   Gastrointestinal:  Negative for abdominal distention, abdominal pain, blood in stool, change in bowel habit, constipation, diarrhea, nausea, vomiting and change in bowel habit.   Endocrine: Negative.    Genitourinary:  Negative for bladder incontinence, decreased urine volume, difficulty urinating, dysuria, frequency, hematuria, scrotal swelling, testicular pain and urgency.   Musculoskeletal:  Negative for arthralgias, back pain, gait problem, joint swelling, leg pain, myalgias and neck pain.   Integumentary:  Negative for rash.   Neurological:  Negative for dizziness, tremors, seizures, syncope, speech difficulty, weakness, light-headedness, numbness, headaches and memory loss.   Hematological:  Does not bruise/bleed easily.   Psychiatric/Behavioral:  Negative for agitation, confusion, hallucinations, sleep disturbance and suicidal ideas. The patient is not nervous/anxious.             Vitals:    03/30/23 1123   BP: 118/74   Pulse: 97   Resp: 18   Temp: 97.3 °F (36.3 °C)   SpO2: 98%   Weight: 89 kg (196 lb 3.2 oz)   Height: 6' (1.829 m)     Wt Readings from Last 6 Encounters:   03/30/23 89 kg (196 lb 3.2 oz)   03/24/23 88.7 kg (195 lb 8 oz)   03/17/23 88.7 kg (195 lb 8 oz)   03/09/23 88.9 kg (196 lb)   02/28/23 87.1 kg (192 lb)    02/16/23 83.9 kg (184 lb 15.5 oz)     Body mass index is 26.61 kg/m².  Body surface area is 2.13 meters squared.       Physical Exam  Constitutional:       Appearance: He is not ill-appearing.   HENT:      Head: Normocephalic and atraumatic.   Eyes:      Extraocular Movements: Extraocular movements intact.   Pulmonary:      Effort: Pulmonary effort is normal.      Breath sounds: Normal breath sounds. No wheezing.   Abdominal:      General: Bowel sounds are normal.      Palpations: Abdomen is soft.   Musculoskeletal:      Cervical back: Neck supple.   Neurological:      Mental Status: He is alert and oriented to person, place, and time.      Sensory: No sensory deficit.      Comments: Peripheral neuropathy-mild   Psychiatric:         Mood and Affect: Mood normal.         Behavior: Behavior normal.         Judgment: Judgment normal.     ECOG SCORE    0 - Fully active-able to carry on all pre-disease performance without restriction         Laboratory:  CBC with Differential:  Lab Results   Component Value Date    WBC 3.0 (L) 03/29/2023    RBC 2.83 (L) 03/29/2023    HGB 9.4 (L) 03/29/2023    HCT 28.8 (L) 03/29/2023    .8 (H) 03/29/2023    MCH 33.2 (H) 03/29/2023    MCHC 32.6 (L) 03/29/2023    RDW 18.0 (H) 03/29/2023     03/29/2023    MPV 8.5 03/29/2023         Latest Reference Range & Units 08/10/22 13:49   Kappa Qnt Free Light Chains 3.30 - 19.40 mg/L 491.97 (H)   Lambda Qnt Free Light Chains 5.71 - 26.30 mg/L 4.76 (L)   [place]/Lambda Free Light Chain Ratio 0.26 - 1.65  103.36 (H)   M SPIKE gm/dl 2.24      Latest Reference Range & Units 08/10/22 13:49   Immunoglobulin A 68 - 408 mg/dL 10 (L)   Immunoglobulin G 768 - 1632 mg/dL 4882 (H)   Immunoglobulin M 35 - 263 mg/dL 12 (L)   (L): Data is abnormally low  (H): Data is abnormally high    M-spike in the beta/gamma region.  The monoclonal protein peak accounts for 3.39 g/dL of the total protein in the beta and gamma regions.  This quantitation may include  complement components.  BETTY gel pattern shows an IgG type kappa monoclonal protein.    SPEP:  Total protein and globulin are both increased.  A/G ratio is low.     Serum protein electrophoresis shows a decreased albumin fraction, decreased beta globulin fraction, and increased gamma globulin fraction.   A monoclonal protein (M-spike) is present in the gamma region (2.24 g/dL).     BETTY:  Immunofixation shows an IgG monoclonal protein with kappa light chain specificity.       CMP:  Sodium Level   Date Value Ref Range Status   03/29/2023 138 136 - 145 mmol/L Final     Potassium Level   Date Value Ref Range Status   03/29/2023 4.2 3.5 - 5.1 mmol/L Final     Carbon Dioxide   Date Value Ref Range Status   03/29/2023 25 23 - 31 mmol/L Final     Blood Urea Nitrogen   Date Value Ref Range Status   03/29/2023 13.9 8.4 - 25.7 mg/dL Final     Creatinine   Date Value Ref Range Status   03/29/2023 1.04 0.73 - 1.18 mg/dL Final     Calcium Level Total   Date Value Ref Range Status   03/29/2023 9.1 8.8 - 10.0 mg/dL Final     Albumin Level   Date Value Ref Range Status   03/29/2023 2.8 (L) 3.4 - 4.8 g/dL Final   03/29/2023 2.5 (L) 3.4 - 4.8 g/dL Final     Bilirubin Total   Date Value Ref Range Status   03/29/2023 0.6 <=1.5 mg/dL Final     Alkaline Phosphatase   Date Value Ref Range Status   03/29/2023 73 40 - 150 unit/L Final     Aspartate Aminotransferase   Date Value Ref Range Status   03/29/2023 16 5 - 34 unit/L Final     Alanine Aminotransferase   Date Value Ref Range Status   03/29/2023 63 (H) 0 - 55 unit/L Final     Estimated GFR-Non    Date Value Ref Range Status   06/22/2022 53 mls/min/1.73/m2 Final       Latest Reference Range & Units 08/10/22 13:49   MACEY Negative  Negative   ds DNA Ab Negative  Negative   Anti-SSA Interpretation Negative  Negative   Anti-SSB Interpretation Negative  Negative      Latest Reference Range & Units 11/10/21 10:27 08/10/22 13:49   Centromere Protein Antibody Negative   Negative    HARRIS-1 Antibody Negative   Negative   Scleroderma (Scl-70S) Antibody Negative   Negative   Rheumatoid Factor <<=30 IU/mL <13    RNP70 Antibody Negative   Negative   Hand DP IgG   Negative   U1RNP Antibody Negative   Negative          Assessment:       Again I have a long discussion with the patient and his wife about of multiple myeloma.  Discussed with the patient that Multiple myeloma is a type of bone marrow cancer that forms in a type of white blood cell called a plasma cell. Plasma cells help you fight infections by making antibodies that recognize and attack germs but when they transformed into cancer cells then they are not efficient and don't work well. This cells can affect not only the bone marrow but the bones in general causing distinctive bone lesions called Lytic lesions. It also can affect the kidney. Discussed prognosis and treatment recommendations and indications.  Discussed in detail blood work, PET-CT and bone marrow biopsy.  He does have advance myeloma, stage III, therefore treatment was indicated.       Plan:      Patient was seen at [facility]. He started treatment here with Velcade/Revlimid/Dexamethamasone. He said that he is a candidate for transplant and they will eval bone marrow again after 6 cycles. He had f/u via telemedicine with Dr. Brito on 2/27/23- recommended to repeat BMB x after 6 cycles.   Darzalex (weekly x 8 dose, then q 2 week x 8 doses, then q 4 weeks per kodak study) was added on 1/27/23. Cycle 4, Day 1 of Velcade/Revlimid/Dexamethamasone/Darzalex was given on 2/10/23. He was diagnosed with Covid and PEs on 2/14/23.     Continue weekly Darzalex/Velcade, due tomorrow  Continue Revlimid/Dex  Encouraged to use incentive spirometry often to exercise lungs and help with shortness of breath secondary to previous Covid infection  Will send prescription for Gabapentin 100 mg to pharmacy via e-scribe - patient instructed to start with 1 at bedtime for 2-3 nights, if no relief may  increase to 1 in the morning and 1 at bedtime for 2-3 days, if no relief, may increase to TID  Keep follow up with Dr. Brito on 4/26/23  RTC in 3 weeks, MM labs + urine prior    Encouraged to call with questions or problems  The patient was given ample opportunity to ask questions and they were all answered to satisfaction; patient demonstrated understanding of what we discussed and is agreeable to the plan.    ELSA PAREKH MD      Professional Services   I, Maddie Rangel LPN, acted solely as a scribe for and in the presence of Dr. Elsa Parekh, who performed these services.

## 2023-03-31 ENCOUNTER — INFUSION (OUTPATIENT)
Dept: INFUSION THERAPY | Facility: HOSPITAL | Age: 73
End: 2023-03-31
Attending: INTERNAL MEDICINE
Payer: MEDICARE

## 2023-03-31 VITALS
DIASTOLIC BLOOD PRESSURE: 69 MMHG | SYSTOLIC BLOOD PRESSURE: 112 MMHG | OXYGEN SATURATION: 98 % | TEMPERATURE: 98 F | HEART RATE: 100 BPM | RESPIRATION RATE: 20 BRPM

## 2023-03-31 DIAGNOSIS — C90.00 MULTIPLE MYELOMA NOT HAVING ACHIEVED REMISSION: Primary | ICD-10-CM

## 2023-03-31 PROCEDURE — 63600175 PHARM REV CODE 636 W HCPCS: Performed by: INTERNAL MEDICINE

## 2023-03-31 PROCEDURE — 25000003 PHARM REV CODE 250: Performed by: INTERNAL MEDICINE

## 2023-03-31 PROCEDURE — 96401 CHEMO ANTI-NEOPL SQ/IM: CPT

## 2023-03-31 RX ORDER — HEPARIN 100 UNIT/ML
500 SYRINGE INTRAVENOUS
Status: DISCONTINUED | OUTPATIENT
Start: 2023-03-31 | End: 2023-03-31 | Stop reason: HOSPADM

## 2023-03-31 RX ORDER — MONTELUKAST SODIUM 10 MG/1
10 TABLET ORAL
Status: COMPLETED | OUTPATIENT
Start: 2023-03-31 | End: 2023-03-31

## 2023-03-31 RX ORDER — FAMOTIDINE 20 MG/1
20 TABLET, FILM COATED ORAL
Status: COMPLETED | OUTPATIENT
Start: 2023-03-31 | End: 2023-03-31

## 2023-03-31 RX ORDER — BORTEZOMIB 3.5 MG/1
1.3 INJECTION, POWDER, LYOPHILIZED, FOR SOLUTION INTRAVENOUS; SUBCUTANEOUS
Status: COMPLETED | OUTPATIENT
Start: 2023-03-31 | End: 2023-03-31

## 2023-03-31 RX ORDER — SODIUM CHLORIDE 0.9 % (FLUSH) 0.9 %
10 SYRINGE (ML) INJECTION
Status: DISCONTINUED | OUTPATIENT
Start: 2023-03-31 | End: 2023-03-31 | Stop reason: HOSPADM

## 2023-03-31 RX ORDER — DEXAMETHASONE 4 MG/1
20 TABLET ORAL
Status: COMPLETED | OUTPATIENT
Start: 2023-03-31 | End: 2023-03-31

## 2023-03-31 RX ADMIN — DEXAMETHASONE 20 MG: 4 TABLET ORAL at 11:03

## 2023-03-31 RX ADMIN — MONTELUKAST SODIUM 10 MG: 10 TABLET, COATED ORAL at 11:03

## 2023-03-31 RX ADMIN — DARATUMUMAB AND HYALURONIDASE-FIHJ (HUMAN RECOMBINANT) 1800 MG: 1800; 30000 INJECTION SUBCUTANEOUS at 11:03

## 2023-03-31 RX ADMIN — BORTEZOMIB 2.75 MG: 3.5 INJECTION, POWDER, LYOPHILIZED, FOR SOLUTION INTRAVENOUS; SUBCUTANEOUS at 11:03

## 2023-03-31 RX ADMIN — FAMOTIDINE 20 MG: 20 TABLET, FILM COATED ORAL at 11:03

## 2023-03-31 NOTE — PLAN OF CARE
C5D1 Velcade/Darzalex given. Tolerated well. Next appts confirmed with pt. Dc'd home in stable condition.

## 2023-04-04 ENCOUNTER — APPOINTMENT (OUTPATIENT)
Dept: LAB | Facility: HOSPITAL | Age: 73
End: 2023-04-04
Attending: INTERNAL MEDICINE
Payer: MEDICARE

## 2023-04-06 ENCOUNTER — LAB VISIT (OUTPATIENT)
Dept: LAB | Facility: HOSPITAL | Age: 73
End: 2023-04-06
Attending: INTERNAL MEDICINE
Payer: MEDICARE

## 2023-04-06 ENCOUNTER — INFUSION (OUTPATIENT)
Dept: INFUSION THERAPY | Facility: HOSPITAL | Age: 73
End: 2023-04-06
Attending: INTERNAL MEDICINE
Payer: MEDICARE

## 2023-04-06 VITALS
HEIGHT: 72 IN | HEART RATE: 96 BPM | WEIGHT: 193.81 LBS | RESPIRATION RATE: 18 BRPM | SYSTOLIC BLOOD PRESSURE: 108 MMHG | BODY MASS INDEX: 26.25 KG/M2 | OXYGEN SATURATION: 98 % | TEMPERATURE: 98 F | DIASTOLIC BLOOD PRESSURE: 69 MMHG

## 2023-04-06 DIAGNOSIS — R77.1 ELEVATED SERUM GLOBULIN LEVEL: ICD-10-CM

## 2023-04-06 DIAGNOSIS — C90.00 MULTIPLE MYELOMA NOT HAVING ACHIEVED REMISSION: Primary | ICD-10-CM

## 2023-04-06 DIAGNOSIS — C90.00 MULTIPLE MYELOMA NOT HAVING ACHIEVED REMISSION: ICD-10-CM

## 2023-04-06 LAB
ALBUMIN SERPL-MCNC: 3.1 G/DL (ref 3.4–4.8)
ALBUMIN/GLOB SERPL: 1.1 RATIO (ref 1.1–2)
ALP SERPL-CCNC: 72 UNIT/L (ref 40–150)
ALT SERPL-CCNC: 52 UNIT/L (ref 0–55)
AST SERPL-CCNC: 18 UNIT/L (ref 5–34)
BASOPHILS # BLD AUTO: 0.01 X10(3)/MCL (ref 0–0.2)
BASOPHILS NFR BLD AUTO: 0.2 %
BILIRUBIN DIRECT+TOT PNL SERPL-MCNC: 0.7 MG/DL
BUN SERPL-MCNC: 14.2 MG/DL (ref 8.4–25.7)
CALCIUM SERPL-MCNC: 9.6 MG/DL (ref 8.8–10)
CHLORIDE SERPL-SCNC: 107 MMOL/L (ref 98–107)
CO2 SERPL-SCNC: 25 MMOL/L (ref 23–31)
CREAT SERPL-MCNC: 1.12 MG/DL (ref 0.73–1.18)
EOSINOPHIL # BLD AUTO: 0.09 X10(3)/MCL (ref 0–0.9)
EOSINOPHIL NFR BLD AUTO: 1.9 %
ERYTHROCYTE [DISTWIDTH] IN BLOOD BY AUTOMATED COUNT: 18.1 % (ref 11.5–17)
GFR SERPLBLD CREATININE-BSD FMLA CKD-EPI: >60 MLS/MIN/1.73/M2
GLOBULIN SER-MCNC: 2.9 GM/DL (ref 2.4–3.5)
GLUCOSE SERPL-MCNC: 145 MG/DL (ref 82–115)
HCT VFR BLD AUTO: 32 % (ref 42–52)
HGB BLD-MCNC: 10 G/DL (ref 14–18)
IMM GRANULOCYTES # BLD AUTO: 0.08 X10(3)/MCL (ref 0–0.04)
IMM GRANULOCYTES NFR BLD AUTO: 1.6 %
LYMPHOCYTES # BLD AUTO: 1.53 X10(3)/MCL (ref 0.6–4.6)
LYMPHOCYTES NFR BLD AUTO: 31.5 %
MCH RBC QN AUTO: 32.6 PG (ref 27–31)
MCHC RBC AUTO-ENTMCNC: 31.3 G/DL (ref 33–36)
MCV RBC AUTO: 104.2 FL (ref 80–94)
MONOCYTES # BLD AUTO: 0.12 X10(3)/MCL (ref 0.1–1.3)
MONOCYTES NFR BLD AUTO: 2.5 %
NEUTROPHILS # BLD AUTO: 3.02 X10(3)/MCL (ref 2.1–9.2)
NEUTROPHILS NFR BLD AUTO: 62.3 %
PLATELET # BLD AUTO: 364 X10(3)/MCL (ref 130–400)
PMV BLD AUTO: 9.1 FL (ref 7.4–10.4)
POTASSIUM SERPL-SCNC: 4.7 MMOL/L (ref 3.5–5.1)
PROT SERPL-MCNC: 6 GM/DL (ref 5.8–7.6)
RBC # BLD AUTO: 3.07 X10(6)/MCL (ref 4.7–6.1)
SODIUM SERPL-SCNC: 141 MMOL/L (ref 136–145)
WBC # SPEC AUTO: 4.9 X10(3)/MCL (ref 4.5–11.5)

## 2023-04-06 PROCEDURE — 36415 COLL VENOUS BLD VENIPUNCTURE: CPT

## 2023-04-06 PROCEDURE — 85025 COMPLETE CBC W/AUTO DIFF WBC: CPT

## 2023-04-06 PROCEDURE — 80053 COMPREHEN METABOLIC PANEL: CPT

## 2023-04-06 PROCEDURE — 96401 CHEMO ANTI-NEOPL SQ/IM: CPT

## 2023-04-06 PROCEDURE — 63600175 PHARM REV CODE 636 W HCPCS: Performed by: INTERNAL MEDICINE

## 2023-04-06 PROCEDURE — 25000003 PHARM REV CODE 250: Performed by: INTERNAL MEDICINE

## 2023-04-06 RX ORDER — HEPARIN 100 UNIT/ML
500 SYRINGE INTRAVENOUS
Status: DISCONTINUED | OUTPATIENT
Start: 2023-04-06 | End: 2023-04-06 | Stop reason: HOSPADM

## 2023-04-06 RX ORDER — MONTELUKAST SODIUM 10 MG/1
10 TABLET ORAL
Status: COMPLETED | OUTPATIENT
Start: 2023-04-06 | End: 2023-04-06

## 2023-04-06 RX ORDER — SODIUM CHLORIDE 0.9 % (FLUSH) 0.9 %
10 SYRINGE (ML) INJECTION
Status: DISCONTINUED | OUTPATIENT
Start: 2023-04-06 | End: 2023-04-06 | Stop reason: HOSPADM

## 2023-04-06 RX ORDER — DEXAMETHASONE 4 MG/1
20 TABLET ORAL
Status: COMPLETED | OUTPATIENT
Start: 2023-04-06 | End: 2023-04-06

## 2023-04-06 RX ORDER — BORTEZOMIB 3.5 MG/1
1.3 INJECTION, POWDER, LYOPHILIZED, FOR SOLUTION INTRAVENOUS; SUBCUTANEOUS
Status: COMPLETED | OUTPATIENT
Start: 2023-04-06 | End: 2023-04-06

## 2023-04-06 RX ORDER — FAMOTIDINE 20 MG/1
20 TABLET, FILM COATED ORAL
Status: COMPLETED | OUTPATIENT
Start: 2023-04-06 | End: 2023-04-06

## 2023-04-06 RX ADMIN — FAMOTIDINE 20 MG: 20 TABLET, FILM COATED ORAL at 11:04

## 2023-04-06 RX ADMIN — BORTEZOMIB 2.75 MG: 3.5 INJECTION, POWDER, LYOPHILIZED, FOR SOLUTION INTRAVENOUS; SUBCUTANEOUS at 11:04

## 2023-04-06 RX ADMIN — DARATUMUMAB AND HYALURONIDASE-FIHJ (HUMAN RECOMBINANT) 1800 MG: 1800; 30000 INJECTION SUBCUTANEOUS at 11:04

## 2023-04-06 RX ADMIN — MONTELUKAST SODIUM 10 MG: 10 TABLET, COATED ORAL at 11:04

## 2023-04-06 RX ADMIN — DEXAMETHASONE 20 MG: 4 TABLET ORAL at 11:04

## 2023-04-06 NOTE — PLAN OF CARE
C5D8 Velcade/Darzalex given. Tolerated well. Next appt confirmed with pt. Dc'd home in stable condition.

## 2023-04-07 LAB
ALBUMIN 24H UR ELPH-MRATE: 25.5 MG/24 H
ALBUMIN/GLOB 24H UR ELPH: 0.33 {RATIO}
ALPHA1 GLOB 24H UR ELPH-MRATE: 6.1 MG/24 H
ALPHA2 GLOB 24H UR ELPH-MRATE: 12.2 MG/24 H
B-GLOBULIN 24H UR ELPH-MRATE: 10.2 MG/24 H
COLLECT DURATION TIME UR: 24 H
GAMMA GLOB 24H UR ELPH-MRATE: 49 MG/24 H
M PROTEIN 24H UR ELPH-MRATE: 32 MG/24 H
PROT 24H UR-MRATE: 102 MG/24 H
PROT PATTERN 24H UR ELPH-IMP: ABNORMAL
SPECIMEN VOL ?TM UR: 1700 ML

## 2023-04-14 ENCOUNTER — INFUSION (OUTPATIENT)
Dept: INFUSION THERAPY | Facility: HOSPITAL | Age: 73
End: 2023-04-14
Attending: INTERNAL MEDICINE
Payer: MEDICARE

## 2023-04-14 ENCOUNTER — LAB VISIT (OUTPATIENT)
Dept: LAB | Facility: HOSPITAL | Age: 73
End: 2023-04-14
Attending: INTERNAL MEDICINE
Payer: MEDICARE

## 2023-04-14 VITALS
TEMPERATURE: 98 F | RESPIRATION RATE: 18 BRPM | SYSTOLIC BLOOD PRESSURE: 146 MMHG | DIASTOLIC BLOOD PRESSURE: 69 MMHG | HEART RATE: 90 BPM

## 2023-04-14 DIAGNOSIS — C90.00 MULTIPLE MYELOMA NOT HAVING ACHIEVED REMISSION: Primary | ICD-10-CM

## 2023-04-14 DIAGNOSIS — R77.1 ELEVATED SERUM GLOBULIN LEVEL: ICD-10-CM

## 2023-04-14 DIAGNOSIS — C90.00 MULTIPLE MYELOMA NOT HAVING ACHIEVED REMISSION: ICD-10-CM

## 2023-04-14 LAB
ALBUMIN SERPL-MCNC: 3.2 G/DL (ref 3.4–4.8)
ALBUMIN/GLOB SERPL: 1.2 RATIO (ref 1.1–2)
ALP SERPL-CCNC: 71 UNIT/L (ref 40–150)
ALT SERPL-CCNC: 43 UNIT/L (ref 0–55)
AST SERPL-CCNC: 21 UNIT/L (ref 5–34)
BASOPHILS # BLD AUTO: 0.02 X10(3)/MCL (ref 0–0.2)
BASOPHILS NFR BLD AUTO: 0.4 %
BILIRUBIN DIRECT+TOT PNL SERPL-MCNC: 0.6 MG/DL
BUN SERPL-MCNC: 12.7 MG/DL (ref 8.4–25.7)
CALCIUM SERPL-MCNC: 9.2 MG/DL (ref 8.8–10)
CHLORIDE SERPL-SCNC: 108 MMOL/L (ref 98–107)
CO2 SERPL-SCNC: 21 MMOL/L (ref 23–31)
CREAT SERPL-MCNC: 1.08 MG/DL (ref 0.73–1.18)
EOSINOPHIL # BLD AUTO: 0.25 X10(3)/MCL (ref 0–0.9)
EOSINOPHIL NFR BLD AUTO: 4.5 %
ERYTHROCYTE [DISTWIDTH] IN BLOOD BY AUTOMATED COUNT: 18 % (ref 11.5–17)
GFR SERPLBLD CREATININE-BSD FMLA CKD-EPI: >60 MLS/MIN/1.73/M2
GLOBULIN SER-MCNC: 2.7 GM/DL (ref 2.4–3.5)
GLUCOSE SERPL-MCNC: 92 MG/DL (ref 82–115)
HCT VFR BLD AUTO: 31.9 % (ref 42–52)
HGB BLD-MCNC: 10.1 G/DL (ref 14–18)
IGA SERPL-MCNC: 55 MG/DL (ref 101–645)
IGG SERPL-MCNC: 857 MG/DL (ref 540–1822)
IGM SERPL-MCNC: 42 MG/DL (ref 22–240)
IMM GRANULOCYTES # BLD AUTO: 0.04 X10(3)/MCL (ref 0–0.04)
IMM GRANULOCYTES NFR BLD AUTO: 0.7 %
LYMPHOCYTES # BLD AUTO: 1.33 X10(3)/MCL (ref 0.6–4.6)
LYMPHOCYTES NFR BLD AUTO: 23.8 %
MCH RBC QN AUTO: 32.5 PG (ref 27–31)
MCHC RBC AUTO-ENTMCNC: 31.7 G/DL (ref 33–36)
MCV RBC AUTO: 102.6 FL (ref 80–94)
MONOCYTES # BLD AUTO: 0.68 X10(3)/MCL (ref 0.1–1.3)
MONOCYTES NFR BLD AUTO: 12.2 %
NEUTROPHILS # BLD AUTO: 3.27 X10(3)/MCL (ref 2.1–9.2)
NEUTROPHILS NFR BLD AUTO: 58.4 %
PLATELET # BLD AUTO: 248 X10(3)/MCL (ref 130–400)
PMV BLD AUTO: 10.5 FL (ref 7.4–10.4)
POTASSIUM SERPL-SCNC: 3.6 MMOL/L (ref 3.5–5.1)
PROT SERPL-MCNC: 5.9 GM/DL (ref 5.8–7.6)
RBC # BLD AUTO: 3.11 X10(6)/MCL (ref 4.7–6.1)
SODIUM SERPL-SCNC: 140 MMOL/L (ref 136–145)
WBC # SPEC AUTO: 5.6 X10(3)/MCL (ref 4.5–11.5)

## 2023-04-14 PROCEDURE — 36415 COLL VENOUS BLD VENIPUNCTURE: CPT

## 2023-04-14 PROCEDURE — 83521 IG LIGHT CHAINS FREE EACH: CPT | Mod: 90

## 2023-04-14 PROCEDURE — 84165 PROTEIN E-PHORESIS SERUM: CPT

## 2023-04-14 PROCEDURE — 96401 CHEMO ANTI-NEOPL SQ/IM: CPT

## 2023-04-14 PROCEDURE — 80053 COMPREHEN METABOLIC PANEL: CPT

## 2023-04-14 PROCEDURE — 82784 ASSAY IGA/IGD/IGG/IGM EACH: CPT

## 2023-04-14 PROCEDURE — 25000003 PHARM REV CODE 250: Performed by: INTERNAL MEDICINE

## 2023-04-14 PROCEDURE — 63600175 PHARM REV CODE 636 W HCPCS: Mod: JZ,JG | Performed by: INTERNAL MEDICINE

## 2023-04-14 PROCEDURE — 86334 IMMUNOFIX E-PHORESIS SERUM: CPT

## 2023-04-14 PROCEDURE — 85025 COMPLETE CBC W/AUTO DIFF WBC: CPT

## 2023-04-14 RX ORDER — MONTELUKAST SODIUM 10 MG/1
10 TABLET ORAL
Status: COMPLETED | OUTPATIENT
Start: 2023-04-14 | End: 2023-04-14

## 2023-04-14 RX ORDER — BORTEZOMIB 3.5 MG/1
1.3 INJECTION, POWDER, LYOPHILIZED, FOR SOLUTION INTRAVENOUS; SUBCUTANEOUS
Status: COMPLETED | OUTPATIENT
Start: 2023-04-14 | End: 2023-04-14

## 2023-04-14 RX ORDER — FAMOTIDINE 20 MG/1
20 TABLET, FILM COATED ORAL
Status: COMPLETED | OUTPATIENT
Start: 2023-04-14 | End: 2023-04-14

## 2023-04-14 RX ORDER — HEPARIN 100 UNIT/ML
500 SYRINGE INTRAVENOUS
Status: DISCONTINUED | OUTPATIENT
Start: 2023-04-14 | End: 2023-04-14 | Stop reason: HOSPADM

## 2023-04-14 RX ORDER — DEXAMETHASONE 4 MG/1
20 TABLET ORAL
Status: COMPLETED | OUTPATIENT
Start: 2023-04-14 | End: 2023-04-14

## 2023-04-14 RX ORDER — SODIUM CHLORIDE 0.9 % (FLUSH) 0.9 %
10 SYRINGE (ML) INJECTION
Status: DISCONTINUED | OUTPATIENT
Start: 2023-04-14 | End: 2023-04-14 | Stop reason: HOSPADM

## 2023-04-14 RX ADMIN — FAMOTIDINE 20 MG: 20 TABLET, FILM COATED ORAL at 11:04

## 2023-04-14 RX ADMIN — BORTEZOMIB 2.75 MG: 3.5 INJECTION, POWDER, LYOPHILIZED, FOR SOLUTION INTRAVENOUS; SUBCUTANEOUS at 12:04

## 2023-04-14 RX ADMIN — MONTELUKAST SODIUM 10 MG: 10 TABLET, FILM COATED ORAL at 11:04

## 2023-04-14 RX ADMIN — DEXAMETHASONE 20 MG: 4 TABLET ORAL at 11:04

## 2023-04-14 RX ADMIN — DARATUMUMAB AND HYALURONIDASE-FIHJ (HUMAN RECOMBINANT) 1800 MG: 1800; 30000 INJECTION SUBCUTANEOUS at 11:04

## 2023-04-17 LAB
ALBUMIN % SPEP (OHS): 50.05
ALBUMIN SERPL-MCNC: 2.9 G/DL (ref 3.4–4.8)
ALBUMIN/GLOB SERPL: 1 RATIO (ref 1.1–2)
ALPHA 1 GLOB (OHS): 0.26 GM/DL
ALPHA 1 GLOB% (OHS): 4.48
ALPHA 2 GLOB % (OHS): 16.18
ALPHA 2 GLOB (OHS): 0.94 GM/DL
BETA GLOB (OHS): 0.85 GM/DL
BETA GLOB% (OHS): 14.72
GAMMA GLOBULIN % (OHS): 14.57
GAMMA GLOBULIN (OHS): 0.85 GM/DL
GLOBULIN SER-MCNC: 2.9 GM/DL (ref 2.4–3.5)
KAPPA LC FREE SER-MCNC: 2.86 MG/DL (ref 0.33–1.94)
KAPPA LC FREE/LAMBDA FREE SER: 3.81 {RATIO} (ref 0.26–1.65)
LAMBDA LC FREE SERPL-MCNC: 0.75 MG/DL (ref 0.57–2.63)
M SPIKE % (OHS): ABNORMAL
M SPIKE (OHS): ABNORMAL
PATH REV: NORMAL
PROT SERPL-MCNC: 5.8 GM/DL (ref 5.8–7.6)

## 2023-04-20 ENCOUNTER — OFFICE VISIT (OUTPATIENT)
Dept: HEMATOLOGY/ONCOLOGY | Facility: CLINIC | Age: 73
End: 2023-04-20
Payer: MEDICARE

## 2023-04-20 VITALS
OXYGEN SATURATION: 99 % | HEART RATE: 88 BPM | DIASTOLIC BLOOD PRESSURE: 70 MMHG | BODY MASS INDEX: 27.11 KG/M2 | TEMPERATURE: 98 F | SYSTOLIC BLOOD PRESSURE: 110 MMHG | WEIGHT: 200.13 LBS | HEIGHT: 72 IN

## 2023-04-20 DIAGNOSIS — R53.83 FATIGUE, UNSPECIFIED TYPE: ICD-10-CM

## 2023-04-20 DIAGNOSIS — G62.9 PERIPHERAL POLYNEUROPATHY: ICD-10-CM

## 2023-04-20 DIAGNOSIS — Z51.81 THERAPEUTIC DRUG MONITORING: ICD-10-CM

## 2023-04-20 DIAGNOSIS — C90.00 MULTIPLE MYELOMA NOT HAVING ACHIEVED REMISSION: Primary | ICD-10-CM

## 2023-04-20 DIAGNOSIS — Z79.899 PATIENT ON ANTINEOPLASTIC CHEMOTHERAPY REGIMEN: ICD-10-CM

## 2023-04-20 DIAGNOSIS — D63.0 ANEMIA IN NEOPLASTIC DISEASE: ICD-10-CM

## 2023-04-20 PROCEDURE — 99999 PR PBB SHADOW E&M-EST. PATIENT-LVL V: ICD-10-PCS | Mod: PBBFAC,,, | Performed by: INTERNAL MEDICINE

## 2023-04-20 PROCEDURE — 99215 OFFICE O/P EST HI 40 MIN: CPT | Mod: S$PBB,,, | Performed by: INTERNAL MEDICINE

## 2023-04-20 PROCEDURE — 99215 OFFICE O/P EST HI 40 MIN: CPT | Mod: PBBFAC | Performed by: INTERNAL MEDICINE

## 2023-04-20 PROCEDURE — 99215 PR OFFICE/OUTPT VISIT, EST, LEVL V, 40-54 MIN: ICD-10-PCS | Mod: S$PBB,,, | Performed by: INTERNAL MEDICINE

## 2023-04-20 PROCEDURE — 99999 PR PBB SHADOW E&M-EST. PATIENT-LVL V: CPT | Mod: PBBFAC,,, | Performed by: INTERNAL MEDICINE

## 2023-04-20 RX ORDER — HYDROCORTISONE 20 MG/1
TABLET ORAL
Status: ON HOLD | COMMUNITY
Start: 2023-04-10 | End: 2023-07-03 | Stop reason: HOSPADM

## 2023-04-20 NOTE — PROGRESS NOTES
HEMATOLOGY/ONCOLOGY OFFICE CLINIC VISIT    Visit Information:    Initial Evaluation: 8/10/2022  Referring Provider:   Other providers:  Code status: Not addressed    Diagnosis:  Multiple Myeloma--IgG, Kappa  Macrocytic anemia    Present treatment:   VRD-Started on 12/9/22  Daratumumab added on 1/27/23    Treatment/Oncology history:    Plan of care:  systemic treatment    Imaging:   US abdomen 8/24/2022: Spleen: 10.8 cm. Mild hepatomegaly with suspected mild hepatic steatosis. Cholelithiasis.  PET CT 9/28/2022: 1. Right scapular small focal mild hypermetabolism without lytic or sclerotic abnormality is not convinced to be related to multiple myeloma. 2. No definite skeletal lytic abnormality with hypermetabolism to reflect multiple myeloma on this exam. 3. Bilateral small pleural effusions.   4. Gallstones. 5.  Noninflamed diverticulosis coli. 6.  Prostatomegaly.    Pathology:  BONE MARROW BIOPSY 9/1/2022: PLASMA CELL MYELOMA, KAPPA RESTRICTED. NORMOCELLULAR MARROW (30%) WITH 70% INVOLVEMENT BY PLASMA CELL MYELOMA. BACKGROUND TRILINEAGE HEMATOPOIESIS IS DECREASED.      CLINICAL HISTORY:       Patient: Valentino Manning is a 72 y.o. male with multiple medical problems kindly referred for persistent anemia.  Reviewing electronic medical record patient with anemia since 02/10/2016.  From 2016 to January of 2017 anemia was mild.  Anemia slowly worsening back in 2018 hemoglobin was 8.0.  At that same time kidney function was worsening as well.     Most recent labs with HGB of 9.4, .9, platelets 231 K, WBC 5.0.  Chemistries with globulin of 5.3.  BUN/creatinine 27.7/1.41.    No family history of leukemia, lymphoma or multiple myeloma.      Chief Complaint: weakness and fatigue      Interval History:    Patient presents today for follow up of his MM, he is with his wife. He is due to begin cycle #6 tomorrow. He is taking Revlimid 25 mg 14 days on and 1 week off. He continues with fatigue and  shortness of breath on  exertion, no worsening. He has incentive spirometry and uses sometimes. He also continues with numbness to legs. He was prescribed Gabapentin but has not taken at all. He reports shaking that comes and goes since starting treatment, specifically steroids. Uses a cane for stability.  Markers continue to improve.  Denies fever, chills or sweats. No chest pain. No bleeding.    He continues to follow with Dr. Brito in [facility], next visit on 4/26/23.   MM work up drawn on 4/14/23, discussed with them in detail.  Hgb today, 10.0.      Past Medical History:   Diagnosis Date    Anemia     GERD (gastroesophageal reflux disease)     Heart problem     Hyperlipidemia     Hypertension     Multiple myeloma       Past Surgical History:   Procedure Laterality Date    BONE MARROW ASPIRATION N/A 9/1/2022    Procedure: ASPIRATION, BONE MARROW;  Surgeon: Robbie Gardner MD;  Location: [facility];  Service: General;  Laterality: N/A;    CARDIAC SURGERY  2021    ENDOSCOPIC RELEASE OF BOTH CARPAL TUNNELS       Family History   Problem Relation Age of Onset    Hypertension Mother     Diabetes Mother     Anemia Mother     Heart disease Mother     Heart disease Father      Social Connections: Not on file       Review of patient's allergies indicates:   Allergen Reactions    Penicillins       Current Outpatient Medications on File Prior to Visit   Medication Sig Dispense Refill    acyclovir (ZOVIRAX) 400 MG tablet TAKE 1 TABLET BY MOUTH TWICE DAILY 180 tablet 1    amLODIPine (NORVASC) 10 MG tablet       apixaban (ELIQUIS) 5 mg Tab Take 1 tablet (5 mg total) by mouth 2 (two) times daily. 60 tablet 6    ascorbic acid, vitamin C, (VITAMIN C) 1000 MG tablet Take 1,000 mg by mouth once daily.      atorvastatin (LIPITOR) 40 MG tablet TAKE 1 TABLET ORALLY ONCE DAILY ON MON/TUE/THUR/FRI AND 1/2 ON WED. NONE ON SAT/SUN      b complex vitamins capsule Take 1 capsule by mouth once daily.      clopidogreL (PLAVIX) 75 mg tablet Take 75 mg by mouth once  daily.      doxazosin (CARDURA) 4 MG tablet Take 4 mg by mouth once daily.      dutasteride (AVODART) 0.5 mg capsule Take 0.5 mg by mouth once daily.      ergocalciferol (ERGOCALCIFEROL) 50,000 unit Cap Take 1 capsule (50,000 Units total) by mouth every 7 days. 7 capsule 0    FERRETTS 325 mg (106 mg iron) Tab Take 1 tablet by mouth 2 (two) times daily.      furosemide (LASIX) 20 MG tablet       gabapentin (NEURONTIN) 300 MG capsule Take 1 capsule (300 mg total) by mouth 3 (three) times daily. 90 capsule 3    glutamine 10 gram PwPk Take 10 g by mouth 2 (two) times a day.      HYDROcodone-acetaminophen (NORCO)  mg per tablet Take 1 tablet by mouth as needed.      hydrocortisone (CORTEF) 20 MG Tab TAKE ONE TABLET BY MOUTH TWICE DAILY X 14 DAYS      lactulose (CHRONULAC) 10 gram/15 mL solution GIVE 30ML BY MOUTH TWICE A DAY X7 DAY(S)      lenalidomide 25 mg Cap TAKE 1 CAPSULE BY MOUTH EVERY DAY FOR 14 DAYS FOLLOWED BY 7 DAY REST PERIOD (2 WEEKS ON,1 WEEK OFF). 14 each 0    LINZESS 145 mcg Cap capsule Take 145 mcg by mouth.      lisinopriL (PRINIVIL,ZESTRIL) 40 MG tablet Take 40 mg by mouth.      metoprolol tartrate (LOPRESSOR) 25 MG tablet Take 25 mg by mouth 2 (two) times daily. Takes 1 tab q am      ondansetron (ZOFRAN) 4 MG tablet Take 4 mg by mouth every 8 (eight) hours as needed for Nausea.      promethazine (PHENERGAN) 25 MG tablet TAKE 1 TABLET BY MOUTH EVERY 4 HOURS AS NEEDED FOR MOTION SICKNESS      pyridoxine, vitamin B6, (B-6) 250 MG Tab Take 250 mg by mouth once daily.      SLOW RELEASE IRON 140 mg (45 mg iron) TbSR Take 1 tablet by mouth 2 (two) times daily.      sulfamethoxazole-trimethoprim 800-160mg (BACTRIM DS) 800-160 mg Tab Take 1 tablet by mouth As instructed. Tab 1 PO Daily M-W-F      testosterone cypionate (DEPOTESTOTERONE CYPIONATE) 200 mg/mL injection INJECT 1ML INTO MUSCLE ONCE A WEEK      zolpidem (AMBIEN) 10 mg Tab Take 10 mg by mouth once daily.      albuterol (PROAIR HFA) 90  mcg/actuation inhaler Inhale 2 puffs into the lungs every 6 (six) hours as needed for Wheezing. Rescue 8 g 0    pantoprazole (PROTONIX) 40 MG tablet Take 1 tablet (40 mg total) by mouth once daily. for 30 doses 30 tablet 1     No current facility-administered medications on file prior to visit.      Review of Systems   Constitutional:  Positive for fatigue. Negative for activity change, appetite change, chills, diaphoresis, fever and unexpected weight change.   HENT:  Negative for nasal congestion, mouth sores, nosebleeds, postnasal drip, sinus pressure/congestion, sore throat and trouble swallowing.    Eyes:  Negative for visual disturbance.   Respiratory:  Positive for shortness of breath (on excertion). Negative for cough and wheezing.    Cardiovascular:  Negative for chest pain and palpitations.   Gastrointestinal:  Negative for abdominal distention, abdominal pain, blood in stool, change in bowel habit, constipation, diarrhea, nausea, vomiting and change in bowel habit.   Endocrine: Negative.    Genitourinary:  Negative for bladder incontinence, decreased urine volume, difficulty urinating, dysuria, frequency, hematuria, scrotal swelling, testicular pain and urgency.   Musculoskeletal:  Negative for arthralgias, back pain, gait problem, joint swelling, leg pain, myalgias and neck pain.   Integumentary:  Negative for rash.   Neurological:  Positive for tremors and weakness. Negative for dizziness, seizures, syncope, speech difficulty, light-headedness, numbness, headaches and memory loss.        Neuropathy   Hematological:  Does not bruise/bleed easily.   Psychiatric/Behavioral:  Negative for agitation, confusion, hallucinations, sleep disturbance and suicidal ideas. The patient is not nervous/anxious.             Vitals:    04/20/23 1053   BP: 110/70   BP Location: Left arm   Patient Position: Sitting   Pulse: 88   Temp: 98 °F (36.7 °C)   TempSrc: Oral   SpO2: 99%   Weight: 90.8 kg (200 lb 1.6 oz)   Height: 6'  (1.829 m)     Wt Readings from Last 6 Encounters:   04/20/23 90.8 kg (200 lb 1.6 oz)   04/06/23 87.9 kg (193 lb 12.8 oz)   03/30/23 89 kg (196 lb 3.2 oz)   03/24/23 88.7 kg (195 lb 8 oz)   03/17/23 88.7 kg (195 lb 8 oz)   03/09/23 88.9 kg (196 lb)     Body mass index is 27.14 kg/m².  Body surface area is 2.15 meters squared.       Physical Exam  Constitutional:       Appearance: He is not ill-appearing.   HENT:      Head: Normocephalic and atraumatic.   Eyes:      Extraocular Movements: Extraocular movements intact.   Pulmonary:      Effort: Pulmonary effort is normal.      Breath sounds: Normal breath sounds. No wheezing.   Abdominal:      General: Bowel sounds are normal.      Palpations: Abdomen is soft.   Musculoskeletal:      Cervical back: Neck supple.   Neurological:      Mental Status: He is alert and oriented to person, place, and time.      Sensory: No sensory deficit.      Comments: Peripheral neuropathy-mild   Psychiatric:         Mood and Affect: Mood normal.         Behavior: Behavior normal.         Judgment: Judgment normal.     ECOG SCORE    1 - Restricted in strenuous activity-ambulatory and able to carry out work of a light nature         Laboratory:  CBC with Differential:  Lab Results   Component Value Date    WBC 6.1 04/20/2023    RBC 3.03 (L) 04/20/2023    HGB 10.0 (L) 04/20/2023    HCT 31.4 (L) 04/20/2023    .6 (H) 04/20/2023    MCH 33.0 (H) 04/20/2023    MCHC 31.8 (L) 04/20/2023    RDW 18.5 (H) 04/20/2023     04/20/2023    MPV 9.2 04/20/2023         Latest Reference Range & Units 08/10/22 13:49   Kappa Qnt Free Light Chains 3.30 - 19.40 mg/L 491.97 (H)   Lambda Qnt Free Light Chains 5.71 - 26.30 mg/L 4.76 (L)   [place]/Lambda Free Light Chain Ratio 0.26 - 1.65  103.36 (H)   M SPIKE gm/dl 2.24      Latest Reference Range & Units 08/10/22 13:49   Immunoglobulin A 68 - 408 mg/dL 10 (L)   Immunoglobulin G 768 - 1632 mg/dL 4882 (H)   Immunoglobulin M 35 - 263 mg/dL 12 (L)   (L): Data  is abnormally low  (H): Data is abnormally high    M-spike in the beta/gamma region.  The monoclonal protein peak accounts for 3.39 g/dL of the total protein in the beta and gamma regions.  This quantitation may include complement components.  BETTY gel pattern shows an IgG type kappa monoclonal protein.    SPEP:  Total protein and globulin are both increased.  A/G ratio is low.     Serum protein electrophoresis shows a decreased albumin fraction, decreased beta globulin fraction, and increased gamma globulin fraction.   A monoclonal protein (M-spike) is present in the gamma region (2.24 g/dL).     BETTY:  Immunofixation shows an IgG monoclonal protein with kappa light chain specificity.       CMP:  Sodium Level   Date Value Ref Range Status   04/20/2023 138 136 - 145 mmol/L Final     Potassium Level   Date Value Ref Range Status   04/20/2023 4.1 3.5 - 5.1 mmol/L Final     Carbon Dioxide   Date Value Ref Range Status   04/20/2023 21 (L) 23 - 31 mmol/L Final     Blood Urea Nitrogen   Date Value Ref Range Status   04/20/2023 14.4 8.4 - 25.7 mg/dL Final     Creatinine   Date Value Ref Range Status   04/20/2023 1.06 0.73 - 1.18 mg/dL Final     Calcium Level Total   Date Value Ref Range Status   04/20/2023 9.1 8.8 - 10.0 mg/dL Final     Albumin Level   Date Value Ref Range Status   04/20/2023 3.4 3.4 - 4.8 g/dL Final     Bilirubin Total   Date Value Ref Range Status   04/20/2023 1.3 <=1.5 mg/dL Final     Alkaline Phosphatase   Date Value Ref Range Status   04/20/2023 80 40 - 150 unit/L Final     Aspartate Aminotransferase   Date Value Ref Range Status   04/20/2023 13 5 - 34 unit/L Final     Alanine Aminotransferase   Date Value Ref Range Status   04/20/2023 38 0 - 55 unit/L Final     Estimated GFR-Non    Date Value Ref Range Status   06/22/2022 53 mls/min/1.73/m2 Final       Latest Reference Range & Units 08/10/22 13:49   MACEY Negative  Negative   ds DNA Ab Negative  Negative   Anti-SSA Interpretation Negative   Negative   Anti-SSB Interpretation Negative  Negative      Latest Reference Range & Units 11/10/21 10:27 08/10/22 13:49   Centromere Protein Antibody Negative   Negative   HARRIS-1 Antibody Negative   Negative   Scleroderma (Scl-70S) Antibody Negative   Negative   Rheumatoid Factor <<=30 IU/mL <13    RNP70 Antibody Negative   Negative   Hand DP IgG   Negative   U1RNP Antibody Negative   Negative          Assessment:       1. Multiple myeloma not having achieved remission    2. Fatigue, unspecified type    3. Patient on antineoplastic chemotherapy regimen    4. Peripheral polyneuropathy    5. Anemia in neoplastic disease    6. Therapeutic drug monitoring        Again, today, 4/20/2023, long discussion with the patient and his wife about of multiple myeloma.  Discussed with the patient that Multiple myeloma is a type of bone marrow cancer that forms in a type of white blood cell called a plasma cell. Plasma cells help you fight infections by making antibodies that recognize and attack germs but when they transformed into cancer cells then they are not efficient and don't work well. This cells can affect not only the bone marrow but the bones in general causing distinctive bone lesions called Lytic lesions. It also can affect the kidney. Discussed prognosis and treatment recommendations and indications.  Discussed in detail blood work, PET-CT and bone marrow biopsy.  He does have advance myeloma, stage III, therefore treatment was indicated.   Educated the patient on the risks versus benefits as well as toxicities associated with treatment.  Verbally consented the patient to the treatment plan and the patient was educated on the planned duration of the treatment and schedule of the treatment administration.  All questions were answered.  All his questions were answered.      Plan:      Patient was seen at [facility] in [place]. He started treatment here with Velcade/Revlimid/Dexamethamasone. He said that he is a  "candidate for transplant and they will eval bone marrow again after 6 cycles. He had f/u via telemedicine with Dr. Brito on 2/27/23- recommended to repeat BMB x after 6 cycles.   Darzalex (weekly x 8 dose, then q 2 week x 8 doses, then q 4 weeks per kodak study) was added on 1/27/23. Cycle 4, Day 1 of Velcade/Revlimid/Dexamethamasone/Darzalex was given on 2/10/23. He was diagnosed with Covid and PEs on 2/14/23.     Continue weekly Darzalex/Velcade, due tomorrow  Will change Darzalex/Velcade to every 2 weeks after tomorrow - next due on 5/5/2023  Continue Revlimid/Dex  Encouraged to use incentive spirometry often to exercise lungs and help with shortness of breath secondary to previous Covid infection  Will send prescription for Gabapentin 100 mg to pharmacy via e-scribe - patient instructed to start with 1 at bedtime for 2-3 nights, if no relief may increase to 1 in the morning and 1 at bedtime for 2-3 days, if no relief, may increase to TID - patient instructed to take medication as prescribed, discussed directions again. He picked up prescription, but did not start taking.  Keep follow up with Dr. Brito on 4/26/23 - I will discuss case personally with Dr. Brito for recommendations and will discuss with him next visit  RTC in 4 weeks with MD for TD, MM labs + urine prior    Encouraged to call with questions or problems  The patient was given ample opportunity to ask questions and they were all answered to satisfaction; patient demonstrated understanding of what we discussed and is agreeable to the plan.    ELSA PAREKH MD      Professional Services   I, Maddie Rangel LPN, acted solely as a scribe for and in the presence of Dr. Elsa Parekh, who performed these services.       Addendum:   Dr Brito Rec:  "this pt was admitted to local hospital this week with NSTEMI and known CAD; seeing cardiologist at [facility] [place] this week; I was going to recommend no futher ImIDs given active symptomatic " "CAD and thrombosis risk.  hes had great response to treatment and does not want SCT despite understanding benefits. I was going to recommend he complete 6 total cycles of induction, now Vita/Peng/Dex recommend velcade   be dosed now every 2 weeks at decreased dose of 1mg/m2 given PN; once completed the 6 cycles ; would transition at least 1 year velcade monotherapy  maintenance q 2 weeks at 1mg/m2; ill fu up with in a few months.  full details to follow in my note."                   "

## 2023-04-21 ENCOUNTER — INFUSION (OUTPATIENT)
Dept: INFUSION THERAPY | Facility: HOSPITAL | Age: 73
End: 2023-04-21
Attending: INTERNAL MEDICINE
Payer: MEDICARE

## 2023-04-21 VITALS
SYSTOLIC BLOOD PRESSURE: 123 MMHG | DIASTOLIC BLOOD PRESSURE: 74 MMHG | RESPIRATION RATE: 18 BRPM | BODY MASS INDEX: 27.08 KG/M2 | OXYGEN SATURATION: 98 % | HEART RATE: 79 BPM | WEIGHT: 199.94 LBS | HEIGHT: 72 IN | TEMPERATURE: 98 F

## 2023-04-21 DIAGNOSIS — C90.00 MULTIPLE MYELOMA NOT HAVING ACHIEVED REMISSION: Primary | ICD-10-CM

## 2023-04-21 PROCEDURE — 25000003 PHARM REV CODE 250: Performed by: INTERNAL MEDICINE

## 2023-04-21 PROCEDURE — 63600175 PHARM REV CODE 636 W HCPCS: Performed by: INTERNAL MEDICINE

## 2023-04-21 PROCEDURE — 96401 CHEMO ANTI-NEOPL SQ/IM: CPT

## 2023-04-21 RX ORDER — BORTEZOMIB 3.5 MG/1
1.3 INJECTION, POWDER, LYOPHILIZED, FOR SOLUTION INTRAVENOUS; SUBCUTANEOUS
Status: CANCELLED | OUTPATIENT
Start: 2023-05-05

## 2023-04-21 RX ORDER — SODIUM CHLORIDE 0.9 % (FLUSH) 0.9 %
10 SYRINGE (ML) INJECTION
Status: CANCELLED | OUTPATIENT
Start: 2023-04-28

## 2023-04-21 RX ORDER — HEPARIN 100 UNIT/ML
500 SYRINGE INTRAVENOUS
Status: CANCELLED | OUTPATIENT
Start: 2023-04-21

## 2023-04-21 RX ORDER — MONTELUKAST SODIUM 10 MG/1
10 TABLET ORAL
Status: COMPLETED | OUTPATIENT
Start: 2023-04-21 | End: 2023-04-21

## 2023-04-21 RX ORDER — SODIUM CHLORIDE 0.9 % (FLUSH) 0.9 %
10 SYRINGE (ML) INJECTION
Status: CANCELLED | OUTPATIENT
Start: 2023-04-21

## 2023-04-21 RX ORDER — HEPARIN 100 UNIT/ML
500 SYRINGE INTRAVENOUS
Status: CANCELLED | OUTPATIENT
Start: 2023-05-19

## 2023-04-21 RX ORDER — SODIUM CHLORIDE 0.9 % (FLUSH) 0.9 %
10 SYRINGE (ML) INJECTION
Status: CANCELLED | OUTPATIENT
Start: 2023-05-19

## 2023-04-21 RX ORDER — BORTEZOMIB 3.5 MG/1
1.3 INJECTION, POWDER, LYOPHILIZED, FOR SOLUTION INTRAVENOUS; SUBCUTANEOUS
Status: CANCELLED | OUTPATIENT
Start: 2023-04-21

## 2023-04-21 RX ORDER — FAMOTIDINE 20 MG/1
20 TABLET, FILM COATED ORAL
Status: CANCELLED
Start: 2023-04-21

## 2023-04-21 RX ORDER — HEPARIN 100 UNIT/ML
500 SYRINGE INTRAVENOUS
Status: DISCONTINUED | OUTPATIENT
Start: 2023-04-21 | End: 2023-04-21 | Stop reason: HOSPADM

## 2023-04-21 RX ORDER — DEXAMETHASONE 4 MG/1
20 TABLET ORAL
Status: CANCELLED
Start: 2023-05-19

## 2023-04-21 RX ORDER — BORTEZOMIB 3.5 MG/1
1.3 INJECTION, POWDER, LYOPHILIZED, FOR SOLUTION INTRAVENOUS; SUBCUTANEOUS
Status: CANCELLED | OUTPATIENT
Start: 2023-04-28

## 2023-04-21 RX ORDER — FAMOTIDINE 20 MG/1
20 TABLET, FILM COATED ORAL
Status: CANCELLED
Start: 2023-05-19

## 2023-04-21 RX ORDER — MONTELUKAST SODIUM 10 MG/1
10 TABLET ORAL
Status: CANCELLED
Start: 2023-04-21 | End: 2023-04-21

## 2023-04-21 RX ORDER — MONTELUKAST SODIUM 10 MG/1
10 TABLET ORAL
Status: CANCELLED
Start: 2023-05-19 | End: 2023-05-05

## 2023-04-21 RX ORDER — BORTEZOMIB 3.5 MG/1
1.3 INJECTION, POWDER, LYOPHILIZED, FOR SOLUTION INTRAVENOUS; SUBCUTANEOUS
Status: COMPLETED | OUTPATIENT
Start: 2023-04-21 | End: 2023-04-21

## 2023-04-21 RX ORDER — SODIUM CHLORIDE 0.9 % (FLUSH) 0.9 %
10 SYRINGE (ML) INJECTION
Status: DISCONTINUED | OUTPATIENT
Start: 2023-04-21 | End: 2023-04-21 | Stop reason: HOSPADM

## 2023-04-21 RX ORDER — DEXAMETHASONE 4 MG/1
20 TABLET ORAL
Status: CANCELLED
Start: 2023-04-21

## 2023-04-21 RX ORDER — HEPARIN 100 UNIT/ML
500 SYRINGE INTRAVENOUS
Status: CANCELLED | OUTPATIENT
Start: 2023-04-28

## 2023-04-21 RX ORDER — DEXAMETHASONE 4 MG/1
20 TABLET ORAL
Status: COMPLETED | OUTPATIENT
Start: 2023-04-21 | End: 2023-04-21

## 2023-04-21 RX ORDER — FAMOTIDINE 20 MG/1
20 TABLET, FILM COATED ORAL
Status: COMPLETED | OUTPATIENT
Start: 2023-04-21 | End: 2023-04-21

## 2023-04-21 RX ADMIN — BORTEZOMIB 2.75 MG: 3.5 INJECTION, POWDER, LYOPHILIZED, FOR SOLUTION INTRAVENOUS; SUBCUTANEOUS at 11:04

## 2023-04-21 RX ADMIN — DEXAMETHASONE 20 MG: 4 TABLET ORAL at 11:04

## 2023-04-21 RX ADMIN — FAMOTIDINE 20 MG: 20 TABLET, FILM COATED ORAL at 11:04

## 2023-04-21 RX ADMIN — DARATUMUMAB AND HYALURONIDASE-FIHJ (HUMAN RECOMBINANT) 1800 MG: 1800; 30000 INJECTION SUBCUTANEOUS at 12:04

## 2023-04-21 RX ADMIN — MONTELUKAST SODIUM 10 MG: 10 TABLET, FILM COATED ORAL at 11:04

## 2023-04-24 ENCOUNTER — TELEPHONE (OUTPATIENT)
Dept: HEMATOLOGY/ONCOLOGY | Facility: CLINIC | Age: 73
End: 2023-04-24
Payer: MEDICARE

## 2023-04-24 NOTE — TELEPHONE ENCOUNTER
Currently inpatient at the [facility]; suffered MI over the weekend. He will call us with updates.

## 2023-04-26 ENCOUNTER — OFFICE VISIT (OUTPATIENT)
Dept: HEMATOLOGY/ONCOLOGY | Facility: CLINIC | Age: 73
End: 2023-04-26
Payer: MEDICARE

## 2023-04-26 DIAGNOSIS — I21.4 NSTEMI (NON-ST ELEVATED MYOCARDIAL INFARCTION): ICD-10-CM

## 2023-04-26 DIAGNOSIS — G62.9 PERIPHERAL POLYNEUROPATHY: ICD-10-CM

## 2023-04-26 DIAGNOSIS — Z76.82 STEM CELL TRANSPLANT CANDIDATE: ICD-10-CM

## 2023-04-26 DIAGNOSIS — Z79.01 CURRENT USE OF LONG TERM ANTICOAGULATION: ICD-10-CM

## 2023-04-26 DIAGNOSIS — Z86.711 HISTORY OF PULMONARY EMBOLUS (PE): ICD-10-CM

## 2023-04-26 DIAGNOSIS — C90.00 MULTIPLE MYELOMA, REMISSION STATUS UNSPECIFIED: Primary | ICD-10-CM

## 2023-04-26 PROCEDURE — 99215 OFFICE O/P EST HI 40 MIN: CPT | Mod: 95,,, | Performed by: INTERNAL MEDICINE

## 2023-04-26 PROCEDURE — 99215 PR OFFICE/OUTPT VISIT, EST, LEVL V, 40-54 MIN: ICD-10-PCS | Mod: 95,,, | Performed by: INTERNAL MEDICINE

## 2023-04-26 NOTE — PROGRESS NOTES
The patient location is: home  The chief complaint leading to consultation is: MM/SCT eval     Visit type: audiovisual    Face to Face time with patient: 20   45 minutes of total time spent on the encounter, which includes face to face time and non-face to face time preparing to see the patient (eg, review of tests), Obtaining and/or reviewing separately obtained history, Documenting clinical information in the electronic or other health record, Independently interpreting results (not separately reported) and communicating results to the patient/family/caregiver, or Care coordination (not separately reported).         Each patient to whom he or she provides medical services by telemedicine is:  (1) informed of the relationship between the physician and patient and the respective role of any other health care provider with respect to management of the patient; and (2) notified that he or she may decline to receive medical services by telemedicine and may withdraw from such care at any time.    Notes:     SECTION OF HEMATOLOGY AND BONE MARROW TRANSPLANT  Return  Patient Visit   05/02/2023  Referred by:  No ref. provider found  Referred for: MM/SCT eval     CHIEF COMPLAINT: No chief complaint on file.      HISTORY OF PRESENT ILLNESS:   72 y.o.male; pmh as below; non oncologic history notable for CAD sp multiple stents followed regularly by cardiology locally; asymptomatic; oncologic history notable for MM diagnosed referral for anemia evaluation;  was initially evaluated by Dr. Harris at [facility]; he met diagnostic criteria for active myeloma; 3.4 grams of IgG kappa MM at diagnosis;  marrow at diagnosis with 70% clonal plasma cells; fish/CG normal cw with standard risk disease; whole body pet and 24 hr urine studies unremarkable. Other than fatigue was largely asymptomatic.  Runs haunted house/trail during halloween. No family history of malignancy.   Non smoker. Non drinker.  and wife present during our interview  today.  Regular PCP fu prior to diagnosis.  Active with ADL's.  PS1.  KPFS  80.    Of note sought 2nd opinion at [facility] who recommended induction with VRd and consideration for SCT pending adequate response and pre transplant evaluation.      He initiated VRd therapy locally for a cycle then gabriella was addded for gabriella VRD .  Currently cycle 6 of therapy with at least VGRP thus far.   Of note since last appt admitted to local hospital with NSTEMI, no intervention done and recommended he fu with his established cardiologist at [facility]. Has appt this week with him.    He is also developing G1-2 PN in feet.   Chest pain resolved.  Pt stating he does not want to proceed with SCT.     PAST MEDICAL HISTORY:   Past Medical History:   Diagnosis Date    Anemia     GERD (gastroesophageal reflux disease)     Heart problem     Hyperlipidemia     Hypertension     Multiple myeloma        PAST SURGICAL HISTORY:   Past Surgical History:   Procedure Laterality Date    BONE MARROW ASPIRATION N/A 9/1/2022    Procedure: ASPIRATION, BONE MARROW;  Surgeon: Robbie Gardner MD;  Location: [facility];  Service: General;  Laterality: N/A;    CARDIAC SURGERY  2021    ENDOSCOPIC RELEASE OF BOTH CARPAL TUNNELS         PAST SOCIAL HISTORY:   reports that he has quit smoking. His smoking use included cigarettes. He has quit using smokeless tobacco. He reports that he does not currently use alcohol. He reports that he does not use drugs.    FAMILY HISTORY:  Family History   Problem Relation Age of Onset    Hypertension Mother     Diabetes Mother     Anemia Mother     Heart disease Mother     Heart disease Father        CURRENT MEDICATIONS:   Current Outpatient Medications   Medication Sig    acyclovir (ZOVIRAX) 400 MG tablet TAKE 1 TABLET BY MOUTH TWICE DAILY    albuterol (PROAIR HFA) 90 mcg/actuation inhaler Inhale 2 puffs into the lungs every 6 (six) hours as needed for Wheezing. Rescue    amLODIPine (NORVASC) 10 MG tablet     apixaban (ELIQUIS) 5 mg Tab  Take 1 tablet (5 mg total) by mouth 2 (two) times daily.    ascorbic acid, vitamin C, (VITAMIN C) 1000 MG tablet Take 1,000 mg by mouth once daily.    atorvastatin (LIPITOR) 40 MG tablet TAKE 1 TABLET ORALLY ONCE DAILY ON MON/TUE/THUR/FRI AND 1/2 ON WED. NONE ON SAT/SUN    b complex vitamins capsule Take 1 capsule by mouth once daily.    clopidogreL (PLAVIX) 75 mg tablet Take 75 mg by mouth once daily.    doxazosin (CARDURA) 4 MG tablet Take 4 mg by mouth once daily.    dutasteride (AVODART) 0.5 mg capsule Take 0.5 mg by mouth once daily.    ergocalciferol (ERGOCALCIFEROL) 50,000 unit Cap Take 1 capsule (50,000 Units total) by mouth every 7 days.    FERRETTS 325 mg (106 mg iron) Tab Take 1 tablet by mouth 2 (two) times daily.    furosemide (LASIX) 20 MG tablet     gabapentin (NEURONTIN) 300 MG capsule Take 1 capsule (300 mg total) by mouth 3 (three) times daily.    glutamine 10 gram PwPk Take 10 g by mouth 2 (two) times a day.    HYDROcodone-acetaminophen (NORCO)  mg per tablet Take 1 tablet by mouth as needed.    hydrocortisone (CORTEF) 20 MG Tab TAKE ONE TABLET BY MOUTH TWICE DAILY X 14 DAYS    lactulose (CHRONULAC) 10 gram/15 mL solution GIVE 30ML BY MOUTH TWICE A DAY X7 DAY(S)    lenalidomide 25 mg Cap TAKE 1 CAPSULE BY MOUTH EVERY DAY FOR 14 DAYS FOLLOWED BY 7 DAY REST PERIOD (2 WEEKS ON,1 WEEK OFF).    LINZESS 145 mcg Cap capsule Take 145 mcg by mouth.    lisinopriL (PRINIVIL,ZESTRIL) 40 MG tablet Take 40 mg by mouth.    metoprolol tartrate (LOPRESSOR) 25 MG tablet Take 25 mg by mouth 2 (two) times daily. Takes 1 tab q am    ondansetron (ZOFRAN) 4 MG tablet Take 4 mg by mouth every 8 (eight) hours as needed for Nausea.    pantoprazole (PROTONIX) 40 MG tablet Take 1 tablet (40 mg total) by mouth once daily. for 30 doses    promethazine (PHENERGAN) 25 MG tablet TAKE 1 TABLET BY MOUTH EVERY 4 HOURS AS NEEDED FOR MOTION SICKNESS    pyridoxine, vitamin B6, (B-6) 250 MG Tab Take 250 mg by mouth once daily.     SLOW RELEASE IRON 140 mg (45 mg iron) TbSR Take 1 tablet by mouth 2 (two) times daily.    sulfamethoxazole-trimethoprim 800-160mg (BACTRIM DS) 800-160 mg Tab Take 1 tablet by mouth As instructed. Tab 1 PO Daily M-W-F    testosterone cypionate (DEPOTESTOTERONE CYPIONATE) 200 mg/mL injection INJECT 1ML INTO MUSCLE ONCE A WEEK    zolpidem (AMBIEN) 10 mg Tab Take 10 mg by mouth once daily.     No current facility-administered medications for this visit.     ALLERGIES:   Review of patient's allergies indicates:   Allergen Reactions    Penicillins              REVIEW OF SYSTEMS:   See HPI     PHYSICAL EXAM:   physical exam deferred due to telemed   Appears well and below stated age on camera     ECOG Performance Status: (foot note - ECOG PS provided by Eastern Co[employer]cology Group) 1 - Symptomatic but completely ambulatory    Karnofsky Performance Score:  80%- Normal Activity with Effort: Some Symptoms of Disease  DATA:   Lab Results   Component Value Date    WBC 6.1 04/20/2023    HGB 10.0 (L) 04/20/2023    HCT 31.4 (L) 04/20/2023    .6 (H) 04/20/2023     04/20/2023       No results found for: GRAN  CMP  Sodium Level   Date Value Ref Range Status   04/20/2023 138 136 - 145 mmol/L Final     Potassium Level   Date Value Ref Range Status   04/20/2023 4.1 3.5 - 5.1 mmol/L Final     Carbon Dioxide   Date Value Ref Range Status   04/20/2023 21 (L) 23 - 31 mmol/L Final     Blood Urea Nitrogen   Date Value Ref Range Status   04/20/2023 14.4 8.4 - 25.7 mg/dL Final     Creatinine   Date Value Ref Range Status   04/20/2023 1.06 0.73 - 1.18 mg/dL Final     Calcium Level Total   Date Value Ref Range Status   04/20/2023 9.1 8.8 - 10.0 mg/dL Final     Albumin Level   Date Value Ref Range Status   04/20/2023 3.4 3.4 - 4.8 g/dL Final     Bilirubin Total   Date Value Ref Range Status   04/20/2023 1.3 <=1.5 mg/dL Final     Alkaline Phosphatase   Date Value Ref Range Status   04/20/2023 80 40 - 150 unit/L Final      Aspartate Aminotransferase   Date Value Ref Range Status   04/20/2023 13 5 - 34 unit/L Final     Alanine Aminotransferase   Date Value Ref Range Status   04/20/2023 38 0 - 55 unit/L Final     eGFR   Date Value Ref Range Status   04/20/2023 >60 mls/min/1.73/m2 Final     IgM Level   Date Value Ref Range Status   04/14/2023 42.0 22.0 - 240.0 mg/dL Final         Latest Reference Range & Units 08/10/22 13:49 12/29/22 08:56 01/27/23 10:00 03/29/23 10:47 04/14/23 11:18   [place] Qnt Free Light Chains 3.30 - 19.40 mg/L 491.97 (H)       Kappa Free Light Chain 0.3300 - 1.94 mg/dL  14.7 (H) 6.07 (H) 4.70 (H) 2.86 (H)   Lambda Qnt Free Light Chains 5.71 - 26.30 mg/L 4.76 (L)       Lambda Free Light Chain 0.5700 - 2.63 mg/dL  0.9200 1.14 1.42 0.7500     ASSESSMENT AND PLAN:   Encounter Diagnoses   Name Primary?    Multiple myeloma, remission status unspecified Yes    NSTEMI (non-ST elevated myocardial infarction)     Stem cell transplant candidate     Peripheral polyneuropathy     History of pulmonary embolus (PE)     Current use of long term anticoagulation          1)Multiple myeloma  -IgG kappa MM ; diagnosed sept 2022 after referral to heme onc  for anemia; fish/CG normal cw with standard risk disease  -meets treatment criteria based on anemia and 70% clonal plasma cells in marrow; no hyperCa, renal dysfunction, or bone disease on WB PET  -has initiated Vrd therapy and completed 1 cycle with excellent biochemical response and good tolerance; at our jan 2023 appt I recommended addition of vita to vRD which was done  -he has completed 5 cycles of Vita-VRd therapy achieving VGRP thus far  -pt today explicitly declined SCT today given logistical requirements and concern over TRM, despite understanding relative safety of procedure and PFS benefits discussed at prior appts ; did discuss I still expect pt to have PFS at least > 2-3 years even with out transplant   -I have recommended no further revlimid in light of known CAD and  recent NSTEMI   -recommend transition therapy to daratumumab (Complete 8 total doses of EOW dosing) then transition gabriella subq to q 4 weeks dosing and velcade 1mg/m2 q 2 weeks maintenance for minimum of 2 years  -close attention to PN if worsens recommend decrease velcade to 0.7 mg/m2  -ppx acyc, antiplt agents per cardiologist at [facility]       2)CV  -will maintain all current CV medications  -per cardiology      3)recent PE  -recommend stays on eliquis indefinitely while on imid or with active MM  -he is on asa, plavix, (per cardiologist for CAD); I recommended he call his cardiologist asap to notify him of eliquis and possible stop plavix and or aspirin    Follow Up:  -with Dr. Longo as above  -virtual MD appt with me in 3 months      Juan Brito MD  Hematology/Oncology/Bone Marrow Transplant

## 2023-05-04 DIAGNOSIS — C90.00 MULTIPLE MYELOMA NOT HAVING ACHIEVED REMISSION: ICD-10-CM

## 2023-05-04 RX ORDER — LENALIDOMIDE 25 MG/1
CAPSULE ORAL
Qty: 14 EACH | Refills: 0 | Status: SHIPPED | OUTPATIENT
Start: 2023-05-04 | End: 2023-05-18

## 2023-05-05 ENCOUNTER — LAB VISIT (OUTPATIENT)
Dept: LAB | Facility: HOSPITAL | Age: 73
End: 2023-05-05
Attending: INTERNAL MEDICINE
Payer: MEDICARE

## 2023-05-05 DIAGNOSIS — C90.00 MULTIPLE MYELOMA NOT HAVING ACHIEVED REMISSION: ICD-10-CM

## 2023-05-05 DIAGNOSIS — Z79.899 PATIENT ON ANTINEOPLASTIC CHEMOTHERAPY REGIMEN: ICD-10-CM

## 2023-05-05 DIAGNOSIS — R53.83 FATIGUE, UNSPECIFIED TYPE: ICD-10-CM

## 2023-05-05 LAB
ALBUMIN SERPL-MCNC: 3.5 G/DL (ref 3.4–4.8)
ALBUMIN/GLOB SERPL: 1.5 RATIO (ref 1.1–2)
ALP SERPL-CCNC: 82 UNIT/L (ref 40–150)
ALT SERPL-CCNC: 19 UNIT/L (ref 0–55)
AST SERPL-CCNC: 11 UNIT/L (ref 5–34)
BASOPHILS # BLD AUTO: 0.02 X10(3)/MCL
BASOPHILS NFR BLD AUTO: 0.5 %
BILIRUBIN DIRECT+TOT PNL SERPL-MCNC: 0.7 MG/DL
BUN SERPL-MCNC: 13.7 MG/DL (ref 8.4–25.7)
CALCIUM SERPL-MCNC: 9.4 MG/DL (ref 8.8–10)
CHLORIDE SERPL-SCNC: 110 MMOL/L (ref 98–107)
CO2 SERPL-SCNC: 24 MMOL/L (ref 23–31)
CREAT SERPL-MCNC: 0.98 MG/DL (ref 0.73–1.18)
EOSINOPHIL # BLD AUTO: 0.2 X10(3)/MCL (ref 0–0.9)
EOSINOPHIL NFR BLD AUTO: 4.5 %
ERYTHROCYTE [DISTWIDTH] IN BLOOD BY AUTOMATED COUNT: 17.7 % (ref 11.5–17)
GFR SERPLBLD CREATININE-BSD FMLA CKD-EPI: >60 MLS/MIN/1.73/M2
GLOBULIN SER-MCNC: 2.4 GM/DL (ref 2.4–3.5)
GLUCOSE SERPL-MCNC: 115 MG/DL (ref 82–115)
HCT VFR BLD AUTO: 30.7 % (ref 42–52)
HGB BLD-MCNC: 9.7 G/DL (ref 14–18)
IMM GRANULOCYTES # BLD AUTO: 0.02 X10(3)/MCL (ref 0–0.04)
IMM GRANULOCYTES NFR BLD AUTO: 0.5 %
LYMPHOCYTES # BLD AUTO: 1.47 X10(3)/MCL (ref 0.6–4.6)
LYMPHOCYTES NFR BLD AUTO: 33.1 %
MCH RBC QN AUTO: 33.1 PG (ref 27–31)
MCHC RBC AUTO-ENTMCNC: 31.6 G/DL (ref 33–36)
MCV RBC AUTO: 104.8 FL (ref 80–94)
MONOCYTES # BLD AUTO: 0.43 X10(3)/MCL (ref 0.1–1.3)
MONOCYTES NFR BLD AUTO: 9.7 %
NEUTROPHILS # BLD AUTO: 2.3 X10(3)/MCL (ref 2.1–9.2)
NEUTROPHILS NFR BLD AUTO: 51.7 %
PLATELET # BLD AUTO: 177 X10(3)/MCL (ref 130–400)
PMV BLD AUTO: 8.5 FL (ref 7.4–10.4)
POTASSIUM SERPL-SCNC: 3.8 MMOL/L (ref 3.5–5.1)
PROT SERPL-MCNC: 5.9 GM/DL (ref 5.8–7.6)
RBC # BLD AUTO: 2.93 X10(6)/MCL (ref 4.7–6.1)
SODIUM SERPL-SCNC: 140 MMOL/L (ref 136–145)
WBC # SPEC AUTO: 4.44 X10(3)/MCL (ref 4.5–11.5)

## 2023-05-05 PROCEDURE — 85025 COMPLETE CBC W/AUTO DIFF WBC: CPT

## 2023-05-05 PROCEDURE — 36415 COLL VENOUS BLD VENIPUNCTURE: CPT

## 2023-05-05 PROCEDURE — 80053 COMPREHEN METABOLIC PANEL: CPT

## 2023-05-09 ENCOUNTER — LAB VISIT (OUTPATIENT)
Dept: LAB | Facility: HOSPITAL | Age: 73
End: 2023-05-09
Attending: INTERNAL MEDICINE
Payer: MEDICARE

## 2023-05-09 DIAGNOSIS — Z79.899 PATIENT ON ANTINEOPLASTIC CHEMOTHERAPY REGIMEN: ICD-10-CM

## 2023-05-09 DIAGNOSIS — R53.83 FATIGUE, UNSPECIFIED TYPE: ICD-10-CM

## 2023-05-09 DIAGNOSIS — C90.00 MULTIPLE MYELOMA NOT HAVING ACHIEVED REMISSION: ICD-10-CM

## 2023-05-09 LAB
ALBUMIN SERPL-MCNC: 3.4 G/DL (ref 3.4–4.8)
ALBUMIN/GLOB SERPL: 1.4 RATIO (ref 1.1–2)
ALP SERPL-CCNC: 85 UNIT/L (ref 40–150)
ALT SERPL-CCNC: 17 UNIT/L (ref 0–55)
AST SERPL-CCNC: 12 UNIT/L (ref 5–34)
BASOPHILS # BLD AUTO: 0.02 X10(3)/MCL
BASOPHILS NFR BLD AUTO: 0.7 %
BILIRUBIN DIRECT+TOT PNL SERPL-MCNC: 0.7 MG/DL
BUN SERPL-MCNC: 13.5 MG/DL (ref 8.4–25.7)
CALCIUM SERPL-MCNC: 9.3 MG/DL (ref 8.8–10)
CHLORIDE SERPL-SCNC: 110 MMOL/L (ref 98–107)
CO2 SERPL-SCNC: 22 MMOL/L (ref 23–31)
CREAT SERPL-MCNC: 0.93 MG/DL (ref 0.73–1.18)
EOSINOPHIL # BLD AUTO: 0.17 X10(3)/MCL (ref 0–0.9)
EOSINOPHIL NFR BLD AUTO: 6.1 %
ERYTHROCYTE [DISTWIDTH] IN BLOOD BY AUTOMATED COUNT: 17.3 % (ref 11.5–17)
GFR SERPLBLD CREATININE-BSD FMLA CKD-EPI: >60 MLS/MIN/1.73/M2
GLOBULIN SER-MCNC: 2.4 GM/DL (ref 2.4–3.5)
GLUCOSE SERPL-MCNC: 121 MG/DL (ref 82–115)
HCT VFR BLD AUTO: 29.9 % (ref 42–52)
HGB BLD-MCNC: 9.5 G/DL (ref 14–18)
IGA SERPL-MCNC: 36 MG/DL (ref 101–645)
IGG SERPL-MCNC: 700 MG/DL (ref 540–1822)
IGM SERPL-MCNC: 37 MG/DL (ref 22–240)
IMM GRANULOCYTES # BLD AUTO: 0 X10(3)/MCL (ref 0–0.04)
IMM GRANULOCYTES NFR BLD AUTO: 0 %
LYMPHOCYTES # BLD AUTO: 0.87 X10(3)/MCL (ref 0.6–4.6)
LYMPHOCYTES NFR BLD AUTO: 31.1 %
MCH RBC QN AUTO: 32.9 PG (ref 27–31)
MCHC RBC AUTO-ENTMCNC: 31.8 G/DL (ref 33–36)
MCV RBC AUTO: 103.5 FL (ref 80–94)
MONOCYTES # BLD AUTO: 0.22 X10(3)/MCL (ref 0.1–1.3)
MONOCYTES NFR BLD AUTO: 7.9 %
NEUTROPHILS # BLD AUTO: 1.52 X10(3)/MCL (ref 2.1–9.2)
NEUTROPHILS NFR BLD AUTO: 54.2 %
PLATELET # BLD AUTO: 145 X10(3)/MCL (ref 130–400)
PMV BLD AUTO: 8.5 FL (ref 7.4–10.4)
POTASSIUM SERPL-SCNC: 4.1 MMOL/L (ref 3.5–5.1)
PROT SERPL-MCNC: 5.8 GM/DL (ref 5.8–7.6)
RBC # BLD AUTO: 2.89 X10(6)/MCL (ref 4.7–6.1)
SODIUM SERPL-SCNC: 140 MMOL/L (ref 136–145)
WBC # SPEC AUTO: 2.8 X10(3)/MCL (ref 4.5–11.5)

## 2023-05-09 PROCEDURE — 36415 COLL VENOUS BLD VENIPUNCTURE: CPT

## 2023-05-09 PROCEDURE — 82784 ASSAY IGA/IGD/IGG/IGM EACH: CPT

## 2023-05-09 PROCEDURE — 83521 IG LIGHT CHAINS FREE EACH: CPT | Mod: 90

## 2023-05-09 PROCEDURE — 80053 COMPREHEN METABOLIC PANEL: CPT

## 2023-05-09 PROCEDURE — 86334 IMMUNOFIX E-PHORESIS SERUM: CPT

## 2023-05-09 PROCEDURE — 84165 PROTEIN E-PHORESIS SERUM: CPT

## 2023-05-09 PROCEDURE — 85025 COMPLETE CBC W/AUTO DIFF WBC: CPT

## 2023-05-10 LAB
ALBUMIN % SPEP (OHS): 51.12
ALBUMIN SERPL-MCNC: 3 G/DL (ref 3.4–4.8)
ALBUMIN/GLOB SERPL: 1.1 RATIO (ref 1.1–2)
ALPHA 1 GLOB (OHS): 0.29 GM/DL
ALPHA 1 GLOB% (OHS): 4.95
ALPHA 2 GLOB % (OHS): 14.95
ALPHA 2 GLOB (OHS): 0.87 GM/DL
BETA GLOB (OHS): 0.95 GM/DL
BETA GLOB% (OHS): 16.37
GAMMA GLOBULIN % (OHS): 12.63
GAMMA GLOBULIN (OHS): 0.73 GM/DL
GLOBULIN SER-MCNC: 2.8 GM/DL (ref 2.4–3.5)
KAPPA LC FREE SER-MCNC: 2.13 MG/DL (ref 0.33–1.94)
KAPPA LC FREE/LAMBDA FREE SER: 3.94 {RATIO} (ref 0.26–1.65)
LAMBDA LC FREE SERPL-MCNC: 0.54 MG/DL (ref 0.57–2.63)
M SPIKE % (OHS): ABNORMAL
M SPIKE (OHS): ABNORMAL
PATH REV: NORMAL
PROT SERPL-MCNC: 5.8 GM/DL (ref 5.8–7.6)

## 2023-05-12 ENCOUNTER — OFFICE VISIT (OUTPATIENT)
Dept: HEMATOLOGY/ONCOLOGY | Facility: CLINIC | Age: 73
End: 2023-05-12
Payer: MEDICARE

## 2023-05-12 ENCOUNTER — LAB VISIT (OUTPATIENT)
Dept: LAB | Facility: HOSPITAL | Age: 73
End: 2023-05-12
Attending: INTERNAL MEDICINE
Payer: MEDICARE

## 2023-05-12 VITALS
HEART RATE: 76 BPM | OXYGEN SATURATION: 98 % | RESPIRATION RATE: 18 BRPM | BODY MASS INDEX: 26.79 KG/M2 | SYSTOLIC BLOOD PRESSURE: 126 MMHG | HEIGHT: 72 IN | TEMPERATURE: 98 F | DIASTOLIC BLOOD PRESSURE: 83 MMHG | WEIGHT: 197.81 LBS

## 2023-05-12 DIAGNOSIS — G62.9 NEUROPATHY: ICD-10-CM

## 2023-05-12 DIAGNOSIS — R53.83 FATIGUE, UNSPECIFIED TYPE: ICD-10-CM

## 2023-05-12 DIAGNOSIS — C90.00 MULTIPLE MYELOMA NOT HAVING ACHIEVED REMISSION: ICD-10-CM

## 2023-05-12 DIAGNOSIS — Z79.899 PATIENT ON ANTINEOPLASTIC CHEMOTHERAPY REGIMEN: ICD-10-CM

## 2023-05-12 LAB
ALBUMIN SERPL-MCNC: 3.7 G/DL (ref 3.4–4.8)
ALBUMIN/GLOB SERPL: 1.4 RATIO (ref 1.1–2)
ALP SERPL-CCNC: 95 UNIT/L (ref 40–150)
ALT SERPL-CCNC: 17 UNIT/L (ref 0–55)
AST SERPL-CCNC: 14 UNIT/L (ref 5–34)
BASOPHILS # BLD AUTO: 0.01 X10(3)/MCL
BASOPHILS NFR BLD AUTO: 0.3 %
BILIRUBIN DIRECT+TOT PNL SERPL-MCNC: 1 MG/DL
BUN SERPL-MCNC: 12.8 MG/DL (ref 8.4–25.7)
CALCIUM SERPL-MCNC: 9.8 MG/DL (ref 8.8–10)
CHLORIDE SERPL-SCNC: 107 MMOL/L (ref 98–107)
CO2 SERPL-SCNC: 22 MMOL/L (ref 23–31)
CREAT SERPL-MCNC: 1.02 MG/DL (ref 0.73–1.18)
EOSINOPHIL # BLD AUTO: 0.13 X10(3)/MCL (ref 0–0.9)
EOSINOPHIL NFR BLD AUTO: 3.4 %
ERYTHROCYTE [DISTWIDTH] IN BLOOD BY AUTOMATED COUNT: 16.6 % (ref 11.5–17)
GFR SERPLBLD CREATININE-BSD FMLA CKD-EPI: >60 MLS/MIN/1.73/M2
GLOBULIN SER-MCNC: 2.6 GM/DL (ref 2.4–3.5)
GLUCOSE SERPL-MCNC: 130 MG/DL (ref 82–115)
HCT VFR BLD AUTO: 32.2 % (ref 42–52)
HGB BLD-MCNC: 10.4 G/DL (ref 14–18)
IMM GRANULOCYTES # BLD AUTO: 0.01 X10(3)/MCL (ref 0–0.04)
IMM GRANULOCYTES NFR BLD AUTO: 0.3 %
LYMPHOCYTES # BLD AUTO: 1.17 X10(3)/MCL (ref 0.6–4.6)
LYMPHOCYTES NFR BLD AUTO: 31 %
MCH RBC QN AUTO: 33.2 PG (ref 27–31)
MCHC RBC AUTO-ENTMCNC: 32.3 G/DL (ref 33–36)
MCV RBC AUTO: 102.9 FL (ref 80–94)
MONOCYTES # BLD AUTO: 0.19 X10(3)/MCL (ref 0.1–1.3)
MONOCYTES NFR BLD AUTO: 5 %
NEUTROPHILS # BLD AUTO: 2.27 X10(3)/MCL (ref 2.1–9.2)
NEUTROPHILS NFR BLD AUTO: 60 %
PLATELET # BLD AUTO: 145 X10(3)/MCL (ref 130–400)
PMV BLD AUTO: 8.6 FL (ref 7.4–10.4)
POTASSIUM SERPL-SCNC: 4.1 MMOL/L (ref 3.5–5.1)
PROT SERPL-MCNC: 6.3 GM/DL (ref 5.8–7.6)
RBC # BLD AUTO: 3.13 X10(6)/MCL (ref 4.7–6.1)
SODIUM SERPL-SCNC: 141 MMOL/L (ref 136–145)
WBC # SPEC AUTO: 3.78 X10(3)/MCL (ref 4.5–11.5)

## 2023-05-12 PROCEDURE — 99999 PR PBB SHADOW E&M-EST. PATIENT-LVL III: CPT | Mod: PBBFAC,,, | Performed by: INTERNAL MEDICINE

## 2023-05-12 PROCEDURE — 36415 COLL VENOUS BLD VENIPUNCTURE: CPT

## 2023-05-12 PROCEDURE — 80053 COMPREHEN METABOLIC PANEL: CPT

## 2023-05-12 PROCEDURE — 85025 COMPLETE CBC W/AUTO DIFF WBC: CPT

## 2023-05-12 PROCEDURE — 99213 OFFICE O/P EST LOW 20 MIN: CPT | Mod: PBBFAC | Performed by: INTERNAL MEDICINE

## 2023-05-12 PROCEDURE — 99999 PR PBB SHADOW E&M-EST. PATIENT-LVL III: ICD-10-PCS | Mod: PBBFAC,,, | Performed by: INTERNAL MEDICINE

## 2023-05-12 PROCEDURE — 84166 PROTEIN E-PHORESIS/URINE/CSF: CPT | Mod: 90

## 2023-05-12 PROCEDURE — 99215 PR OFFICE/OUTPT VISIT, EST, LEVL V, 40-54 MIN: ICD-10-PCS | Mod: S$PBB,,, | Performed by: INTERNAL MEDICINE

## 2023-05-12 PROCEDURE — 99215 OFFICE O/P EST HI 40 MIN: CPT | Mod: S$PBB,,, | Performed by: INTERNAL MEDICINE

## 2023-05-12 RX ORDER — BORTEZOMIB 3.5 MG/1
0.7 INJECTION, POWDER, LYOPHILIZED, FOR SOLUTION INTRAVENOUS; SUBCUTANEOUS
Status: CANCELLED | OUTPATIENT
Start: 2023-05-19

## 2023-05-12 RX ORDER — GABAPENTIN 300 MG/1
300 CAPSULE ORAL 3 TIMES DAILY
Qty: 90 CAPSULE | Refills: 3 | Status: ON HOLD | OUTPATIENT
Start: 2023-05-12 | End: 2023-07-03 | Stop reason: HOSPADM

## 2023-05-12 NOTE — PROGRESS NOTES
HEMATOLOGY/ONCOLOGY OFFICE CLINIC VISIT    Visit Information:    Initial Evaluation: 8/10/2022  Referring Provider:   Other providers:  Code status: Not addressed    Diagnosis:  Multiple Myeloma--IgG, Kappa  Macrocytic anemia    Present treatment:   VRD-Started on 12/9/22  Daratumumab added on 1/27/23    Treatment/Oncology history:    Plan of care:  systemic treatment    Imaging:   US abdomen 8/24/2022: Spleen: 10.8 cm. Mild hepatomegaly with suspected mild hepatic steatosis. Cholelithiasis.  PET CT 9/28/2022: 1. Right scapular small focal mild hypermetabolism without lytic or sclerotic abnormality is not convinced to be related to multiple myeloma. 2. No definite skeletal lytic abnormality with hypermetabolism to reflect multiple myeloma on this exam. 3. Bilateral small pleural effusions.   4. Gallstones. 5.  Noninflamed diverticulosis coli. 6.  Prostatomegaly.    Pathology:  BONE MARROW BIOPSY 9/1/2022: PLASMA CELL MYELOMA, KAPPA RESTRICTED. NORMOCELLULAR MARROW (30%) WITH 70% INVOLVEMENT BY PLASMA CELL MYELOMA. BACKGROUND TRILINEAGE HEMATOPOIESIS IS DECREASED.      CLINICAL HISTORY:       Patient: Valentino Manning is a 72 y.o. male with multiple medical problems kindly referred for persistent anemia.  Reviewing electronic medical record patient with anemia since 02/10/2016.  From 2016 to January of 2017 anemia was mild.  Anemia slowly worsening back in 2018 hemoglobin was 8.0.  At that same time kidney function was worsening as well.     Most recent labs with HGB of 9.4, .9, platelets 231 K, WBC 5.0.  Chemistries with globulin of 5.3.  BUN/creatinine 27.7/1.41.    No family history of leukemia, lymphoma or multiple myeloma.      Chief Complaint: weakness and fatigue      Interval History:    Patient presents today for follow up of his MM, he is with his wife. He is due to begin cycle #6 tomorrow. He was taking Revlimid 25 mg 14 days on and 1 week off. Recently admitted to the [facility] for NSTEMI. He  "continues with fatigue and  shortness of breath on exertion, no worsening. He has incentive spirometry and uses sometimes. He also continues with numbness to legs. He was prescribed Gabapentin but has not taken at all. He reports shaking that comes and goes since starting treatment, specifically steroids. Uses a cane for stability.  Markers continue to improve.  Denies fever, chills or sweats. No chest pain. No bleeding.    Addendum:   Dr Brito Rec:  "this pt was admitted to local hospital this week with NSTEMI and known CAD; seeing cardiologist at [facility] this week; I was going to recommend no futher ImIDs given active symptomatic CAD and thrombosis risk.  hes had great response to treatment and does not want SCT despite understanding benefits. I was going to recommend he complete 6 total cycles of induction, now Vita/Peng/Dex recommend velcade be dosed now every 2 weeks at decreased dose of 1mg/m2 given PN; once completed the 6 cycles ; would transition at least 1 year velcade monotherapy  maintenance q 2 weeks at 1mg/m2; ill fu up with in a few months.  full details to follow in my note."    -I have recommended no further revlimid in light of known CAD and recent NSTEMI   -recommend transition therapy to daratumumab (Complete 8 total doses of EOW dosing) then transition vita subq to q 4 weeks dosing and velcade 1mg/m2 q 2 weeks maintenance for minimum of 2 years  -close attention to PN if worsens recommend decrease velcade to 0.7 mg/m2  -ppx acyc, antiplt agents per cardiologist at [facility]       Past Medical History:   Diagnosis Date    Anemia     GERD (gastroesophageal reflux disease)     Heart problem     Hyperlipidemia     Hypertension     Multiple myeloma       Past Surgical History:   Procedure Laterality Date    BONE MARROW ASPIRATION N/A 9/1/2022    Procedure: ASPIRATION, BONE MARROW;  Surgeon: Robbie Gardner MD;  Location: [facility];  Service: General;  Laterality: N/A;    CARDIAC SURGERY  2021    ENDOSCOPIC " RELEASE OF BOTH CARPAL TUNNELS       Family History   Problem Relation Age of Onset    Hypertension Mother     Diabetes Mother     Anemia Mother     Heart disease Mother     Heart disease Father      Social Connections: Not on file       Review of patient's allergies indicates:   Allergen Reactions    Penicillins       Current Outpatient Medications on File Prior to Visit   Medication Sig Dispense Refill    acyclovir (ZOVIRAX) 400 MG tablet TAKE 1 TABLET BY MOUTH TWICE DAILY 180 tablet 1    amLODIPine (NORVASC) 10 MG tablet       apixaban (ELIQUIS) 5 mg Tab Take 1 tablet (5 mg total) by mouth 2 (two) times daily. 60 tablet 6    ascorbic acid, vitamin C, (VITAMIN C) 1000 MG tablet Take 1,000 mg by mouth once daily.      atorvastatin (LIPITOR) 40 MG tablet TAKE 1 TABLET ORALLY ONCE DAILY ON MON/TUE/THUR/FRI AND 1/2 ON WED. NONE ON SAT/SUN      b complex vitamins capsule Take 1 capsule by mouth once daily.      clopidogreL (PLAVIX) 75 mg tablet Take 75 mg by mouth once daily.      doxazosin (CARDURA) 4 MG tablet Take 4 mg by mouth once daily.      dutasteride (AVODART) 0.5 mg capsule Take 0.5 mg by mouth once daily.      ergocalciferol (ERGOCALCIFEROL) 50,000 unit Cap Take 1 capsule (50,000 Units total) by mouth every 7 days. 7 capsule 0    FERRETTS 325 mg (106 mg iron) Tab Take 1 tablet by mouth 2 (two) times daily.      furosemide (LASIX) 20 MG tablet       glutamine 10 gram PwPk Take 10 g by mouth 2 (two) times a day.      hydrocortisone (CORTEF) 20 MG Tab TAKE ONE TABLET BY MOUTH TWICE DAILY X 14 DAYS      lactulose (CHRONULAC) 10 gram/15 mL solution GIVE 30ML BY MOUTH TWICE A DAY X7 DAY(S)      LINZESS 145 mcg Cap capsule Take 145 mcg by mouth.      lisinopriL (PRINIVIL,ZESTRIL) 40 MG tablet Take 40 mg by mouth.      metoprolol tartrate (LOPRESSOR) 25 MG tablet Take 25 mg by mouth 2 (two) times daily. Takes 1 tab q am      ondansetron (ZOFRAN) 4 MG tablet Take 4 mg by mouth every 8 (eight) hours as needed for  Nausea.      promethazine (PHENERGAN) 25 MG tablet TAKE 1 TABLET BY MOUTH EVERY 4 HOURS AS NEEDED FOR MOTION SICKNESS      pyridoxine, vitamin B6, (B-6) 250 MG Tab Take 250 mg by mouth once daily.      SLOW RELEASE IRON 140 mg (45 mg iron) TbSR Take 1 tablet by mouth 2 (two) times daily.      sulfamethoxazole-trimethoprim 800-160mg (BACTRIM DS) 800-160 mg Tab Take 1 tablet by mouth As instructed. Tab 1 PO Daily M-W-F      testosterone cypionate (DEPOTESTOTERONE CYPIONATE) 200 mg/mL injection INJECT 1ML INTO MUSCLE ONCE A WEEK      zolpidem (AMBIEN) 10 mg Tab Take 10 mg by mouth once daily.      albuterol (PROAIR HFA) 90 mcg/actuation inhaler Inhale 2 puffs into the lungs every 6 (six) hours as needed for Wheezing. Rescue 8 g 0    pantoprazole (PROTONIX) 40 MG tablet Take 1 tablet (40 mg total) by mouth once daily. for 30 doses 30 tablet 1     No current facility-administered medications on file prior to visit.      Review of Systems   Constitutional:  Positive for fatigue. Negative for activity change, appetite change, chills, diaphoresis, fever and unexpected weight change.   HENT:  Negative for nasal congestion, mouth sores, nosebleeds, postnasal drip, sinus pressure/congestion, sore throat and trouble swallowing.    Eyes:  Negative for visual disturbance.   Respiratory:  Positive for shortness of breath (on excertion). Negative for cough and wheezing.    Cardiovascular:  Negative for chest pain and palpitations.   Gastrointestinal:  Negative for abdominal distention, abdominal pain, blood in stool, change in bowel habit, constipation, diarrhea, nausea, vomiting and change in bowel habit.   Endocrine: Negative.    Genitourinary:  Negative for bladder incontinence, decreased urine volume, difficulty urinating, dysuria, frequency, hematuria, scrotal swelling, testicular pain and urgency.   Musculoskeletal:  Negative for arthralgias, back pain, gait problem, joint swelling, leg pain, myalgias and neck pain.    Integumentary:  Negative for rash.   Neurological:  Positive for tremors and weakness. Negative for dizziness, seizures, syncope, speech difficulty, light-headedness, numbness, headaches and memory loss.        Neuropathy   Hematological:  Does not bruise/bleed easily.   Psychiatric/Behavioral:  Negative for agitation, confusion, hallucinations, sleep disturbance and suicidal ideas. The patient is not nervous/anxious.             Vitals:    05/12/23 1132   BP: 126/83   Pulse: 76   Resp: 18   Temp: 97.6 °F (36.4 °C)   SpO2: 98%   Weight: 89.7 kg (197 lb 12.8 oz)   Height: 6' (1.829 m)       Wt Readings from Last 6 Encounters:   05/19/23 90.4 kg (199 lb 3.2 oz)   05/12/23 89.7 kg (197 lb 12.8 oz)   04/21/23 90.7 kg (199 lb 15.3 oz)   04/20/23 90.8 kg (200 lb 1.6 oz)   04/06/23 87.9 kg (193 lb 12.8 oz)   03/30/23 89 kg (196 lb 3.2 oz)     Body mass index is 26.83 kg/m².  Body surface area is 2.13 meters squared.       Physical Exam  Constitutional:       Appearance: He is not ill-appearing.   HENT:      Head: Normocephalic and atraumatic.   Eyes:      Extraocular Movements: Extraocular movements intact.   Pulmonary:      Effort: Pulmonary effort is normal.      Breath sounds: Normal breath sounds. No wheezing.   Abdominal:      General: Bowel sounds are normal.      Palpations: Abdomen is soft.   Musculoskeletal:      Cervical back: Neck supple.   Neurological:      Mental Status: He is alert and oriented to person, place, and time.      Sensory: Sensory deficit present.      Comments: Peripheral neuropathy-mild to moderate   Psychiatric:         Mood and Affect: Mood normal.         Behavior: Behavior normal.         Judgment: Judgment normal.     ECOG SCORE             Laboratory:  CBC with Differential:  Lab Results   Component Value Date    WBC 3.71 (L) 05/19/2023    RBC 3.14 (L) 05/19/2023    HGB 10.3 (L) 05/19/2023    HCT 32.8 (L) 05/19/2023    .5 (H) 05/19/2023    MCH 32.8 (H) 05/19/2023    MCHC 31.4  (L) 05/19/2023    RDW 16.1 05/19/2023     05/19/2023    MPV 8.4 05/19/2023         Latest Reference Range & Units 08/10/22 13:49   Kappa Qnt Free Light Chains 3.30 - 19.40 mg/L 491.97 (H)   Lambda Qnt Free Light Chains 5.71 - 26.30 mg/L 4.76 (L)   [place]/Lambda Free Light Chain Ratio 0.26 - 1.65  103.36 (H)   M SPIKE gm/dl 2.24      Latest Reference Range & Units 08/10/22 13:49   Immunoglobulin A 68 - 408 mg/dL 10 (L)   Immunoglobulin G 768 - 1632 mg/dL 4882 (H)   Immunoglobulin M 35 - 263 mg/dL 12 (L)   (L): Data is abnormally low  (H): Data is abnormally high    M-spike in the beta/gamma region.  The monoclonal protein peak accounts for 3.39 g/dL of the total protein in the beta and gamma regions.  This quantitation may include complement components.  BETTY gel pattern shows an IgG type kappa monoclonal protein.    SPEP:  Total protein and globulin are both increased.  A/G ratio is low.     Serum protein electrophoresis shows a decreased albumin fraction, decreased beta globulin fraction, and increased gamma globulin fraction.   A monoclonal protein (M-spike) is present in the gamma region (2.24 g/dL).     BETTY:  Immunofixation shows an IgG monoclonal protein with kappa light chain specificity.       CMP:  Sodium Level   Date Value Ref Range Status   05/19/2023 141 136 - 145 mmol/L Final     Potassium Level   Date Value Ref Range Status   05/19/2023 4.4 3.5 - 5.1 mmol/L Final     Carbon Dioxide   Date Value Ref Range Status   05/19/2023 22 (L) 23 - 31 mmol/L Final     Blood Urea Nitrogen   Date Value Ref Range Status   05/19/2023 15.9 8.4 - 25.7 mg/dL Final     Creatinine   Date Value Ref Range Status   05/19/2023 1.11 0.73 - 1.18 mg/dL Final     Calcium Level Total   Date Value Ref Range Status   05/19/2023 9.8 8.8 - 10.0 mg/dL Final     Albumin Level   Date Value Ref Range Status   05/19/2023 3.8 3.4 - 4.8 g/dL Final     Bilirubin Total   Date Value Ref Range Status   05/19/2023 0.7 <=1.5 mg/dL Final      Alkaline Phosphatase   Date Value Ref Range Status   05/19/2023 93 40 - 150 unit/L Final     Aspartate Aminotransferase   Date Value Ref Range Status   05/19/2023 13 5 - 34 unit/L Final     Alanine Aminotransferase   Date Value Ref Range Status   05/19/2023 15 0 - 55 unit/L Final     Estimated GFR-Non    Date Value Ref Range Status   06/22/2022 53 mls/min/1.73/m2 Final       Latest Reference Range & Units 08/10/22 13:49   MACEY Negative  Negative   ds DNA Ab Negative  Negative   Anti-SSA Interpretation Negative  Negative   Anti-SSB Interpretation Negative  Negative      Latest Reference Range & Units 11/10/21 10:27 08/10/22 13:49   Centromere Protein Antibody Negative   Negative   HARIRS-1 Antibody Negative   Negative   Scleroderma (Scl-70S) Antibody Negative   Negative   Rheumatoid Factor <<=30 IU/mL <13    RNP70 Antibody Negative   Negative   Hand DP IgG   Negative   U1RNP Antibody Negative   Negative          Assessment:       1. Multiple myeloma not having achieved remission    2. Neuropathy          Again, today, 4/20/2023, long discussion with the patient and his wife about of multiple myeloma.  Discussed with the patient that Multiple myeloma is a type of bone marrow cancer that forms in a type of white blood cell called a plasma cell. Plasma cells help you fight infections by making antibodies that recognize and attack germs but when they transformed into cancer cells then they are not efficient and don't work well. This cells can affect not only the bone marrow but the bones in general causing distinctive bone lesions called Lytic lesions. It also can affect the kidney. Discussed prognosis and treatment recommendations and indications.  Discussed in detail blood work, PET-CT and bone marrow biopsy.  He does have advance myeloma, stage III, therefore treatment was indicated.   Educated the patient on the risks versus benefits as well as toxicities associated with treatment.  Verbally consented  the patient to the treatment plan and the patient was educated on the planned duration of the treatment and schedule of the treatment administration.  All questions were answered.  All his questions were answered.      Plan:      Patient was seen at [facility] in [place]. He started treatment here with Velcade/Revlimid/Dexamethamasone. He said that he is a candidate for transplant and they will eval bone marrow again after 6 cycles. He had f/u via telemedicine with Dr. Brito on 2/27/23- recommended to repeat BMB x after 6 cycles.   Darzalex (weekly x 8 dose, then q 2 week x 8 doses, then q 4 weeks per kodak study) was added on 1/27/23. Cycle 4, Day 1 of Velcade/Revlimid/Dexamethamasone/Darzalex was given on 2/10/23. He was diagnosed with Covid and PEs on 2/14/23.     Continue Darzalex due tomorrow--patient wants to hold  Will change Darzalex/Velcade to every 2 weeks   D/C Revlimid  Will cont Velcade q 2 weeks 1 mg/m2 and if neuropathy worsen the 0.7 mg/m2  Cont Gabapentin for neuropathy  RTC in 1 week with MD for TD, MM labs + urine prior and possible TD--he wants to wait    Encouraged to call with questions or problems  The patient was given ample opportunity to ask questions and they were all answered to satisfaction; patient demonstrated understanding of what we discussed and is agreeable to the plan.    ELSA PAREKH MD

## 2023-05-15 LAB
KAPPA LC FREE SER-MCNC: 2.36 MG/DL (ref 0.33–1.94)
KAPPA LC FREE/LAMBDA FREE SER: 5.9 {RATIO} (ref 0.26–1.65)
LAMBDA LC FREE SERPL-MCNC: 0.4 MG/DL (ref 0.57–2.63)

## 2023-05-18 LAB
ALBUMIN 24H UR ELPH-MRATE: 33 MG/24 H
ALBUMIN/GLOB 24H UR ELPH: 0.33 {RATIO}
ALPHA1 GLOB 24H UR ELPH-MRATE: 10.6 MG/24 H
ALPHA2 GLOB 24H UR ELPH-MRATE: 17.2 MG/24 H
B-GLOBULIN 24H UR ELPH-MRATE: 13.2 MG/24 H
COLLECT DURATION TIME UR: 24 H
GAMMA GLOB 24H UR ELPH-MRATE: 58.1 MG/24 H
M PROTEIN 24H UR ELPH-MRATE: 33 MG/24 H
PROT 24H UR-MRATE: 132 MG/24 H
PROT PATTERN 24H UR ELPH-IMP: ABNORMAL
SPECIMEN VOL ?TM UR: 2200 ML

## 2023-05-19 ENCOUNTER — INFUSION (OUTPATIENT)
Dept: INFUSION THERAPY | Facility: HOSPITAL | Age: 73
End: 2023-05-19
Attending: INTERNAL MEDICINE
Payer: MEDICARE

## 2023-05-19 ENCOUNTER — OFFICE VISIT (OUTPATIENT)
Dept: HEMATOLOGY/ONCOLOGY | Facility: CLINIC | Age: 73
End: 2023-05-19
Payer: MEDICARE

## 2023-05-19 VITALS
SYSTOLIC BLOOD PRESSURE: 108 MMHG | OXYGEN SATURATION: 98 % | BODY MASS INDEX: 26.98 KG/M2 | TEMPERATURE: 98 F | WEIGHT: 199.19 LBS | DIASTOLIC BLOOD PRESSURE: 66 MMHG | HEIGHT: 72 IN | HEART RATE: 61 BPM

## 2023-05-19 DIAGNOSIS — C90.00 MULTIPLE MYELOMA NOT HAVING ACHIEVED REMISSION: Primary | ICD-10-CM

## 2023-05-19 DIAGNOSIS — Z51.81 THERAPEUTIC DRUG MONITORING: ICD-10-CM

## 2023-05-19 DIAGNOSIS — D63.0 ANEMIA IN NEOPLASTIC DISEASE: ICD-10-CM

## 2023-05-19 PROCEDURE — 99999 PR PBB SHADOW E&M-EST. PATIENT-LVL III: CPT | Mod: PBBFAC,,, | Performed by: INTERNAL MEDICINE

## 2023-05-19 PROCEDURE — 99213 OFFICE O/P EST LOW 20 MIN: CPT | Mod: PBBFAC | Performed by: INTERNAL MEDICINE

## 2023-05-19 PROCEDURE — 96401 CHEMO ANTI-NEOPL SQ/IM: CPT

## 2023-05-19 PROCEDURE — 63600175 PHARM REV CODE 636 W HCPCS: Mod: JZ,TB | Performed by: INTERNAL MEDICINE

## 2023-05-19 PROCEDURE — 99215 PR OFFICE/OUTPT VISIT, EST, LEVL V, 40-54 MIN: ICD-10-PCS | Mod: S$PBB,,, | Performed by: INTERNAL MEDICINE

## 2023-05-19 PROCEDURE — 99215 OFFICE O/P EST HI 40 MIN: CPT | Mod: S$PBB,,, | Performed by: INTERNAL MEDICINE

## 2023-05-19 PROCEDURE — 25000003 PHARM REV CODE 250: Performed by: INTERNAL MEDICINE

## 2023-05-19 PROCEDURE — 99999 PR PBB SHADOW E&M-EST. PATIENT-LVL III: ICD-10-PCS | Mod: PBBFAC,,, | Performed by: INTERNAL MEDICINE

## 2023-05-19 RX ORDER — SODIUM CHLORIDE 0.9 % (FLUSH) 0.9 %
10 SYRINGE (ML) INJECTION
Status: DISCONTINUED | OUTPATIENT
Start: 2023-05-19 | End: 2023-05-19 | Stop reason: HOSPADM

## 2023-05-19 RX ORDER — DEXAMETHASONE 4 MG/1
20 TABLET ORAL
Status: COMPLETED | OUTPATIENT
Start: 2023-05-19 | End: 2023-05-19

## 2023-05-19 RX ORDER — FAMOTIDINE 20 MG/1
20 TABLET, FILM COATED ORAL
Status: COMPLETED | OUTPATIENT
Start: 2023-05-19 | End: 2023-05-19

## 2023-05-19 RX ORDER — HEPARIN 100 UNIT/ML
500 SYRINGE INTRAVENOUS
Status: DISCONTINUED | OUTPATIENT
Start: 2023-05-19 | End: 2023-05-19 | Stop reason: HOSPADM

## 2023-05-19 RX ORDER — MONTELUKAST SODIUM 10 MG/1
10 TABLET ORAL
Status: COMPLETED | OUTPATIENT
Start: 2023-05-19 | End: 2023-05-19

## 2023-05-19 RX ORDER — HYDROCODONE BITARTRATE AND ACETAMINOPHEN 10; 325 MG/1; MG/1
1 TABLET ORAL EVERY 4 HOURS PRN
Qty: 75 TABLET | Refills: 0 | Status: ON HOLD | OUTPATIENT
Start: 2023-05-19 | End: 2023-07-03 | Stop reason: HOSPADM

## 2023-05-19 RX ADMIN — DEXAMETHASONE 20 MG: 4 TABLET ORAL at 12:05

## 2023-05-19 RX ADMIN — DARATUMUMAB AND HYALURONIDASE-FIHJ (HUMAN RECOMBINANT) 1800 MG: 1800; 30000 INJECTION SUBCUTANEOUS at 12:05

## 2023-05-19 RX ADMIN — FAMOTIDINE 20 MG: 20 TABLET, FILM COATED ORAL at 12:05

## 2023-05-19 RX ADMIN — MONTELUKAST SODIUM 10 MG: 10 TABLET, COATED ORAL at 12:05

## 2023-05-19 NOTE — PROGRESS NOTES
HEMATOLOGY/ONCOLOGY OFFICE CLINIC VISIT    Visit Information:    Initial Evaluation: 8/10/2022  Referring Provider:   Other providers:  Code status: Not addressed    Diagnosis:  Multiple Myeloma--IgG, Kappa  Macrocytic anemia    Present treatment:   VRD-Started on 12/9/22  Daratumumab added on 1/27/23    Treatment/Oncology history:    Plan of care:  systemic treatment    Imaging:   US abdomen 8/24/2022: Spleen: 10.8 cm. Mild hepatomegaly with suspected mild hepatic steatosis. Cholelithiasis.  PET CT 9/28/2022: 1. Right scapular small focal mild hypermetabolism without lytic or sclerotic abnormality is not convinced to be related to multiple myeloma. 2. No definite skeletal lytic abnormality with hypermetabolism to reflect multiple myeloma on this exam. 3. Bilateral small pleural effusions.   4. Gallstones. 5.  Noninflamed diverticulosis coli. 6.  Prostatomegaly.    Pathology:  BONE MARROW BIOPSY 9/1/2022: PLASMA CELL MYELOMA, KAPPA RESTRICTED. NORMOCELLULAR MARROW (30%) WITH 70% INVOLVEMENT BY PLASMA CELL MYELOMA. BACKGROUND TRILINEAGE HEMATOPOIESIS IS DECREASED.      CLINICAL HISTORY:       Patient: Valentino Manning is a 72 y.o. male with multiple medical problems kindly referred for persistent anemia.  Reviewing electronic medical record patient with anemia since 02/10/2016.  From 2016 to January of 2017 anemia was mild.  Anemia slowly worsening back in 2018 hemoglobin was 8.0.  At that same time kidney function was worsening as well.     Most recent labs with HGB of 9.4, .9, platelets 231 K, WBC 5.0.  Chemistries with globulin of 5.3.  BUN/creatinine 27.7/1.41.    No family history of leukemia, lymphoma or multiple myeloma.      Chief Complaint: weakness and fatigue      Interval History:    Patient presents today for follow up of his MM and resumption of treatment (Velcade/Darzalex),  he is with his wife. Last visit Revlimid was discontinued due to CAD and recent NSTEMI.  He continues with fatigue and   shortness of breath on exertion, no worsening, he feels this is getting better.  He reports worsening in neuropathy especially in legs, feet, hands and fingers. He has trouble buttoning shirt. Also reports pain to right shoulder. He is taking Gabapentin 300 mg TID, but does not seem to be helping much. Today he is using a walker for assistance with ambulation.  Otherwise, he is doing fair.  Denies fever, chills or sweats. No chest pain. No bleeding.  He continues to follow with Dr. Brito in [facility]. He is scheduled for 5/30/23 at a neuropathy clinic he happen to find while on the Internet.          Past Medical History:   Diagnosis Date    Anemia     GERD (gastroesophageal reflux disease)     Heart problem     Hyperlipidemia     Hypertension     Multiple myeloma       Past Surgical History:   Procedure Laterality Date    BONE MARROW ASPIRATION N/A 9/1/2022    Procedure: ASPIRATION, BONE MARROW;  Surgeon: Robbie Gardner MD;  Location: [facility];  Service: General;  Laterality: N/A;    CARDIAC SURGERY  2021    ENDOSCOPIC RELEASE OF BOTH CARPAL TUNNELS       Family History   Problem Relation Age of Onset    Hypertension Mother     Diabetes Mother     Anemia Mother     Heart disease Mother     Heart disease Father      Social Connections: Not on file       Review of patient's allergies indicates:   Allergen Reactions    Penicillins       Current Outpatient Medications on File Prior to Visit   Medication Sig Dispense Refill    acyclovir (ZOVIRAX) 400 MG tablet TAKE 1 TABLET BY MOUTH TWICE DAILY 180 tablet 1    amLODIPine (NORVASC) 10 MG tablet       apixaban (ELIQUIS) 5 mg Tab Take 1 tablet (5 mg total) by mouth 2 (two) times daily. 60 tablet 6    ascorbic acid, vitamin C, (VITAMIN C) 1000 MG tablet Take 1,000 mg by mouth once daily.      atorvastatin (LIPITOR) 40 MG tablet TAKE 1 TABLET ORALLY ONCE DAILY ON MON/TUE/THUR/FRI AND 1/2 ON WED. NONE ON SAT/SUN      b complex vitamins capsule Take 1 capsule by mouth once  daily.      clopidogreL (PLAVIX) 75 mg tablet Take 75 mg by mouth once daily.      doxazosin (CARDURA) 4 MG tablet Take 4 mg by mouth once daily.      dutasteride (AVODART) 0.5 mg capsule Take 0.5 mg by mouth once daily.      ergocalciferol (ERGOCALCIFEROL) 50,000 unit Cap Take 1 capsule (50,000 Units total) by mouth every 7 days. 7 capsule 0    FERRETTS 325 mg (106 mg iron) Tab Take 1 tablet by mouth 2 (two) times daily.      furosemide (LASIX) 20 MG tablet       gabapentin (NEURONTIN) 300 MG capsule Take 1 capsule (300 mg total) by mouth 3 (three) times daily. 90 capsule 3    glutamine 10 gram PwPk Take 10 g by mouth 2 (two) times a day.      HYDROcodone-acetaminophen (NORCO)  mg per tablet Take 1 tablet by mouth as needed.      hydrocortisone (CORTEF) 20 MG Tab TAKE ONE TABLET BY MOUTH TWICE DAILY X 14 DAYS      lactulose (CHRONULAC) 10 gram/15 mL solution GIVE 30ML BY MOUTH TWICE A DAY X7 DAY(S)      lenalidomide 25 mg Cap TAKE 1 CAPSULE BY MOUTH EVERY DAY FOR 14 DAYS FOLLOWED BY 7 DAY REST PERIOD (2 WEEKS ON, 1 WEEK OFF). 14 each 0    LINZESS 145 mcg Cap capsule Take 145 mcg by mouth.      lisinopriL (PRINIVIL,ZESTRIL) 40 MG tablet Take 40 mg by mouth.      metoprolol tartrate (LOPRESSOR) 25 MG tablet Take 25 mg by mouth 2 (two) times daily. Takes 1 tab q am      ondansetron (ZOFRAN) 4 MG tablet Take 4 mg by mouth every 8 (eight) hours as needed for Nausea.      promethazine (PHENERGAN) 25 MG tablet TAKE 1 TABLET BY MOUTH EVERY 4 HOURS AS NEEDED FOR MOTION SICKNESS      pyridoxine, vitamin B6, (B-6) 250 MG Tab Take 250 mg by mouth once daily.      SLOW RELEASE IRON 140 mg (45 mg iron) TbSR Take 1 tablet by mouth 2 (two) times daily.      sulfamethoxazole-trimethoprim 800-160mg (BACTRIM DS) 800-160 mg Tab Take 1 tablet by mouth As instructed. Tab 1 PO Daily M-W-F      testosterone cypionate (DEPOTESTOTERONE CYPIONATE) 200 mg/mL injection INJECT 1ML INTO MUSCLE ONCE A WEEK      zolpidem (AMBIEN) 10 mg Tab  Take 10 mg by mouth once daily.      albuterol (PROAIR HFA) 90 mcg/actuation inhaler Inhale 2 puffs into the lungs every 6 (six) hours as needed for Wheezing. Rescue 8 g 0    pantoprazole (PROTONIX) 40 MG tablet Take 1 tablet (40 mg total) by mouth once daily. for 30 doses 30 tablet 1     Current Facility-Administered Medications on File Prior to Visit   Medication Dose Route Frequency Provider Last Rate Last Admin    [DISCONTINUED] GENERIC EXTERNAL MEDICATION     Generic External Data Provider        [DISCONTINUED] GENERIC EXTERNAL MEDICATION     Generic External Data Provider        [DISCONTINUED] GENERIC EXTERNAL MEDICATION     Generic External Data Provider        [DISCONTINUED] GENERIC EXTERNAL MEDICATION     Generic External Data Provider          Review of Systems   Constitutional:  Positive for fatigue. Negative for activity change, appetite change, chills, diaphoresis, fever and unexpected weight change.   HENT:  Negative for nasal congestion, mouth sores, nosebleeds, postnasal drip, sinus pressure/congestion, sore throat and trouble swallowing.    Eyes:  Negative for visual disturbance.   Respiratory:  Positive for shortness of breath (on excertion). Negative for cough and wheezing.    Cardiovascular:  Negative for chest pain and palpitations.   Gastrointestinal:  Negative for abdominal distention, abdominal pain, blood in stool, change in bowel habit, constipation, diarrhea, nausea, vomiting and change in bowel habit.   Endocrine: Negative.    Genitourinary:  Negative for bladder incontinence, decreased urine volume, difficulty urinating, dysuria, frequency, hematuria, scrotal swelling, testicular pain and urgency.   Musculoskeletal:  Negative for arthralgias, back pain, gait problem, joint swelling, leg pain, myalgias and neck pain.   Integumentary:  Negative for rash.   Neurological:  Positive for tremors and weakness. Negative for dizziness, seizures, syncope, speech difficulty, light-headedness,  numbness, headaches and memory loss.        Neuropathy   Hematological:  Does not bruise/bleed easily.   Psychiatric/Behavioral:  Negative for agitation, confusion, hallucinations, sleep disturbance and suicidal ideas. The patient is not nervous/anxious.             Vitals:    05/19/23 1127   BP: 108/66   BP Location: Left arm   Patient Position: Sitting   Pulse: 61   Temp: 97.6 °F (36.4 °C)   TempSrc: Oral   SpO2: 98%   Weight: 90.4 kg (199 lb 3.2 oz)   Height: 6' (1.829 m)       Wt Readings from Last 6 Encounters:   05/19/23 90.4 kg (199 lb 3.2 oz)   05/12/23 89.7 kg (197 lb 12.8 oz)   04/21/23 90.7 kg (199 lb 15.3 oz)   04/20/23 90.8 kg (200 lb 1.6 oz)   04/06/23 87.9 kg (193 lb 12.8 oz)   03/30/23 89 kg (196 lb 3.2 oz)     Body mass index is 27.02 kg/m².  Body surface area is 2.14 meters squared.       Physical Exam  Constitutional:       Appearance: He is not ill-appearing.   HENT:      Head: Normocephalic and atraumatic.   Eyes:      Extraocular Movements: Extraocular movements intact.   Pulmonary:      Effort: Pulmonary effort is normal.      Breath sounds: Normal breath sounds. No wheezing.   Abdominal:      General: Bowel sounds are normal.      Palpations: Abdomen is soft.   Musculoskeletal:      Cervical back: Neck supple.   Neurological:      Mental Status: He is alert and oriented to person, place, and time.      Sensory: No sensory deficit.      Comments: Peripheral neuropathy-mild   Psychiatric:         Mood and Affect: Mood normal.         Behavior: Behavior normal.         Judgment: Judgment normal.     ECOG SCORE             Laboratory:  CBC with Differential:  Lab Results   Component Value Date    WBC 3.71 (L) 05/19/2023    RBC 3.14 (L) 05/19/2023    HGB 10.3 (L) 05/19/2023    HCT 32.8 (L) 05/19/2023    .5 (H) 05/19/2023    MCH 32.8 (H) 05/19/2023    MCHC 31.4 (L) 05/19/2023    RDW 16.1 05/19/2023     05/19/2023    MPV 8.4 05/19/2023         Latest Reference Range & Units 08/10/22  13:49   Kappa Qnt Free Light Chains 3.30 - 19.40 mg/L 491.97 (H)   Lambda Qnt Free Light Chains 5.71 - 26.30 mg/L 4.76 (L)   Eastman/Lambda Free Light Chain Ratio 0.26 - 1.65  103.36 (H)   M SPIKE gm/dl 2.24      Latest Reference Range & Units 08/10/22 13:49   Immunoglobulin A 68 - 408 mg/dL 10 (L)   Immunoglobulin G 768 - 1632 mg/dL 4882 (H)   Immunoglobulin M 35 - 263 mg/dL 12 (L)   (L): Data is abnormally low  (H): Data is abnormally high    M-spike in the beta/gamma region.  The monoclonal protein peak accounts for 3.39 g/dL of the total protein in the beta and gamma regions.  This quantitation may include complement components.  BETTY gel pattern shows an IgG type kappa monoclonal protein.    SPEP:  Total protein and globulin are both increased.  A/G ratio is low.     Serum protein electrophoresis shows a decreased albumin fraction, decreased beta globulin fraction, and increased gamma globulin fraction.   A monoclonal protein (M-spike) is present in the gamma region (2.24 g/dL).     BETTY:  Immunofixation shows an IgG monoclonal protein with kappa light chain specificity.       CMP:  Sodium Level   Date Value Ref Range Status   05/12/2023 141 136 - 145 mmol/L Final     Potassium Level   Date Value Ref Range Status   05/12/2023 4.1 3.5 - 5.1 mmol/L Final     Carbon Dioxide   Date Value Ref Range Status   05/12/2023 22 (L) 23 - 31 mmol/L Final     Blood Urea Nitrogen   Date Value Ref Range Status   05/12/2023 12.8 8.4 - 25.7 mg/dL Final     Creatinine   Date Value Ref Range Status   05/12/2023 1.02 0.73 - 1.18 mg/dL Final     Calcium Level Total   Date Value Ref Range Status   05/12/2023 9.8 8.8 - 10.0 mg/dL Final     Albumin Level   Date Value Ref Range Status   05/12/2023 3.7 3.4 - 4.8 g/dL Final     Bilirubin Total   Date Value Ref Range Status   05/12/2023 1.0 <=1.5 mg/dL Final     Alkaline Phosphatase   Date Value Ref Range Status   05/12/2023 95 40 - 150 unit/L Final     Aspartate Aminotransferase   Date Value  Ref Range Status   05/12/2023 14 5 - 34 unit/L Final     Alanine Aminotransferase   Date Value Ref Range Status   05/12/2023 17 0 - 55 unit/L Final     Estimated GFR-Non    Date Value Ref Range Status   06/22/2022 53 mls/min/1.73/m2 Final       Latest Reference Range & Units 08/10/22 13:49   MACEY Negative  Negative   ds DNA Ab Negative  Negative   Anti-SSA Interpretation Negative  Negative   Anti-SSB Interpretation Negative  Negative      Latest Reference Range & Units 11/10/21 10:27 08/10/22 13:49   Centromere Protein Antibody Negative   Negative   HARRIS-1 Antibody Negative   Negative   Scleroderma (Scl-70S) Antibody Negative   Negative   Rheumatoid Factor <<=30 IU/mL <13    RNP70 Antibody Negative   Negative   Hand DP IgG   Negative   U1RNP Antibody Negative   Negative          Assessment:       Again, today, 4/20/2023, long discussion with the patient and his wife about of multiple myeloma.  Discussed with the patient that Multiple myeloma is a type of bone marrow cancer that forms in a type of white blood cell called a plasma cell. Plasma cells help you fight infections by making antibodies that recognize and attack germs but when they transformed into cancer cells then they are not efficient and don't work well. This cells can affect not only the bone marrow but the bones in general causing distinctive bone lesions called Lytic lesions. It also can affect the kidney. Discussed prognosis and treatment recommendations and indications.  Discussed in detail blood work, PET-CT and bone marrow biopsy.  He does have advance myeloma, stage III, therefore treatment was indicated.   Educated the patient on the risks versus benefits as well as toxicities associated with treatment.  Verbally consented the patient to the treatment plan and the patient was educated on the planned duration of the treatment and schedule of the treatment administration.  All questions were answered.  All his questions were  "answered.      Plan:      Patient was seen at [facility] in [place]. He started treatment here with Velcade/Revlimid/Dexamethamasone. He said that he is a candidate for transplant and they will eval bone marrow again after 6 cycles. He had f/u via telemedicine with Dr. Brito on 2/27/23- recommended to repeat BMB x after 6 cycles.   Darzalex (weekly x 8 dose, then q 2 week x 8 doses, then q 4 weeks per kodak study) was added on 1/27/23. Cycle 4, Day 1 of Velcade/Revlimid/Dexamethamasone/Darzalex was given on 2/10/23. He was diagnosed with Covid and PEs on 2/14/23.     Recommended HOLDING Velcade for now until neuropathy symptoms improve  Okay to proceed with Darzalex only today and continue every 2 weeks x 8 doses, then every 4 weeks-- If neuropathy worsen the q 4 weeks  Revlimid DISCONTINUED  Continue Gabapentin  Keep scheduled appointment with neuropathy clinic on 5/30/23  Refer to neurologist  Will prescribe Norco 10/325 mg 4 q hours as need for pain, to help with neuropathy - sent to pharmacy via [employer]  Keep follow ups with Dr. Brito - I discuss case personally with Dr. Brito for recommendations   Will order WB PET CT to evaluate right shoulder pain/discomfort  RTC in 2 weeks with MD for TD, MM labs, NO URINE    Encouraged to call with questions or problems  The patient was given ample opportunity to ask questions and they were all answered to satisfaction; patient demonstrated understanding of what we discussed and is agreeable to the plan.    ELSA PAREKH MD      Professional Services   I, Maddie Rangel LPN, acted solely as a scribe for and in the presence of Dr. Elsa Parekh, who performed these services.              Addendum:   5/12/23:  Dr Brito Rec:  "this pt was admitted to local hospital this week with NSTEMI and known CAD; seeing cardiologist at [facility] this week; I was going to recommend no futher ImIDs given active symptomatic CAD and thrombosis risk.  hes had great " "response to treatment and does not want SCT despite understanding benefits. I was going to recommend he complete 6 total cycles of induction, now Vita/Peng/Dex recommend velcade be dosed now every 2 weeks at decreased dose of 1mg/m2 given PN; once completed the 6 cycles ; would transition at least 1 year velcade monotherapy  maintenance q 2 weeks at 1mg/m2; ill fu up with in a few months.  full details to follow in my note."    -I have recommended no further revlimid in light of known CAD and recent NSTEMI   -recommend transition therapy to daratumumab (Complete 8 total doses of EOW dosing) then transition vita subq to q 4 weeks dosing and velcade 1mg/m2 q 2 weeks maintenance for minimum of 2 years  -close attention to PN if worsens recommend decrease velcade to 0.7 mg/m2  -ppx acyc, antiplt agents per cardiologist at [facility]       5/19/2023: he agreed with the plan            "

## 2023-05-19 NOTE — PLAN OF CARE
C6D15 Darzalex Faspro given. Tolerated well. Velcade held by MD d/t worsening neuropathy. Next appts not made at this time. Pt stated he uses portal and will call if next appts are not made in 2-3 days. Dc'd home in stable condition.

## 2023-05-24 ENCOUNTER — PATIENT MESSAGE (OUTPATIENT)
Dept: HEMATOLOGY/ONCOLOGY | Facility: CLINIC | Age: 73
End: 2023-05-24
Payer: MEDICARE

## 2023-05-24 ENCOUNTER — NURSE TRIAGE (OUTPATIENT)
Dept: ADMINISTRATIVE | Facility: CLINIC | Age: 73
End: 2023-05-24
Payer: MEDICARE

## 2023-05-24 DIAGNOSIS — G62.9 NEUROPATHY: Primary | ICD-10-CM

## 2023-05-24 RX ORDER — DULOXETIN HYDROCHLORIDE 30 MG/1
30 CAPSULE, DELAYED RELEASE ORAL DAILY
Qty: 30 CAPSULE | Refills: 1 | Status: ON HOLD | OUTPATIENT
Start: 2023-05-24 | End: 2023-06-15

## 2023-05-24 NOTE — TELEPHONE ENCOUNTER
The referral order was put in for neurology (by Dr. Harris). Will you check on that? Eprescibed duloxetine 30 mg po once daily.

## 2023-05-24 NOTE — TELEPHONE ENCOUNTER
SPOKE WITH PT AND EXPLAINED BELOW TO HIM, HE STATED UNDERSTANDING. OFFICE NUMBER OF DR. DING SENT VIA PT PORTAL PER HIS REQUEST.

## 2023-05-24 NOTE — TELEPHONE ENCOUNTER
Pts wife calling with pt, states that pt needs someone to call them to schedule for neuro because they were told someone would call, advised I can provide scheduling desk number but states not in a position to write anything down and would just like call back to schedule. Also is asking for referral to PT as pt has fallen 5 times in the last 3 days r/t neuropathy, offered triage and declined multiple times. States that the gabapentin isnt working and would like duloxetine called in to pharmacy on file as they believe it will help more. Offered triage again and declines. Advised ill send urgent message. verbalized understanding. Denies any further questions or concerns at this time, advised to call back if they have any that come up. Advised if he decides he would like triage to please call our line back. verbalized understanding. Advised pt to call back with any other concerns or worsening symptoms. Verbalized understanding and will route message to provider.     Reason for Disposition   Prescription request for new medicine (not a refill)    Protocols used: Medication Refill and Renewal Call-A-[place]

## 2023-06-01 ENCOUNTER — TELEPHONE (OUTPATIENT)
Dept: HEMATOLOGY/ONCOLOGY | Facility: CLINIC | Age: 73
End: 2023-06-01
Payer: MEDICARE

## 2023-06-01 NOTE — TELEPHONE ENCOUNTER
Pt called to inform MD he will not be able to come to office for appts tomorrow. He stated he is having difficulty walking due to the neuropathy and is pretty much staying in bed or lying down most of the day. He also canceled PET CT.    He cannot be seen by neurology until Aug. 2023 and has been put on a cancellation list.

## 2023-06-05 ENCOUNTER — TELEPHONE (OUTPATIENT)
Dept: HEMATOLOGY/ONCOLOGY | Facility: CLINIC | Age: 73
End: 2023-06-05
Payer: MEDICARE

## 2023-06-05 NOTE — TELEPHONE ENCOUNTER
Camila with [facility] called to report that patient is now bed bound. Working with both P.T. and O.T., but having a lot of difficulty with neuropathy. Neuro cannot see him until August. He has missed visits including scans and injections due to immobility.

## 2023-06-07 DIAGNOSIS — R19.7 DIARRHEA, UNSPECIFIED TYPE: Primary | ICD-10-CM

## 2023-06-07 RX ORDER — DIPHENOXYLATE HYDROCHLORIDE AND ATROPINE SULFATE 2.5; .025 MG/1; MG/1
1 TABLET ORAL 4 TIMES DAILY PRN
Qty: 30 TABLET | Refills: 0 | Status: ON HOLD | OUTPATIENT
Start: 2023-06-07 | End: 2023-07-03 | Stop reason: HOSPADM

## 2023-06-07 NOTE — TELEPHONE ENCOUNTER
Camila with [facility] states that patient's wife mentioned that he seemed confused recently. She found that he had crawled to the bathroom. Has had bouts with diarrhea. I advised Camila to have patient go into ER for evaluation. She voiced understanding.

## 2023-06-11 ENCOUNTER — HOSPITAL ENCOUNTER (INPATIENT)
Facility: HOSPITAL | Age: 73
LOS: 24 days | Discharge: NURSING, REHAB OR LONG-TERM CARE | DRG: 472 | End: 2023-07-05
Attending: INTERNAL MEDICINE | Admitting: INTERNAL MEDICINE
Payer: MEDICARE

## 2023-06-11 DIAGNOSIS — R07.9 CHEST PAIN: ICD-10-CM

## 2023-06-11 DIAGNOSIS — R53.1 WEAKNESS: ICD-10-CM

## 2023-06-11 DIAGNOSIS — G62.9 NEUROPATHY: ICD-10-CM

## 2023-06-11 DIAGNOSIS — N30.01 ACUTE CYSTITIS WITH HEMATURIA: ICD-10-CM

## 2023-06-11 DIAGNOSIS — M48.02 STENOSIS OF CERVICAL SPINE WITH MYELOPATHY: Primary | ICD-10-CM

## 2023-06-11 DIAGNOSIS — R29.898 UPPER EXTREMITY WEAKNESS: ICD-10-CM

## 2023-06-11 DIAGNOSIS — G99.2 STENOSIS OF CERVICAL SPINE WITH MYELOPATHY: Primary | ICD-10-CM

## 2023-06-11 LAB
ALBUMIN SERPL-MCNC: 3.6 G/DL (ref 3.4–4.8)
ALBUMIN/GLOB SERPL: 1.2 RATIO (ref 1.1–2)
ALP SERPL-CCNC: 122 UNIT/L (ref 40–150)
ALT SERPL-CCNC: 33 UNIT/L (ref 0–55)
APPEARANCE UR: ABNORMAL
AST SERPL-CCNC: 22 UNIT/L (ref 5–34)
BASOPHILS # BLD AUTO: 0.01 X10(3)/MCL
BASOPHILS NFR BLD AUTO: 0.2 %
BILIRUB UR QL STRIP.AUTO: NEGATIVE MG/DL
BILIRUBIN DIRECT+TOT PNL SERPL-MCNC: 0.6 MG/DL
BUN SERPL-MCNC: 11.6 MG/DL (ref 8.4–25.7)
CALCIUM SERPL-MCNC: 9.5 MG/DL (ref 8.8–10)
CHLORIDE SERPL-SCNC: 106 MMOL/L (ref 98–107)
CO2 SERPL-SCNC: 24 MMOL/L (ref 23–31)
COLOR UR: ABNORMAL
CREAT SERPL-MCNC: 0.88 MG/DL (ref 0.73–1.18)
EOSINOPHIL # BLD AUTO: 0.04 X10(3)/MCL (ref 0–0.9)
EOSINOPHIL NFR BLD AUTO: 0.7 %
ERYTHROCYTE [DISTWIDTH] IN BLOOD BY AUTOMATED COUNT: 14.8 % (ref 11.5–17)
GFR SERPLBLD CREATININE-BSD FMLA CKD-EPI: >60 MLS/MIN/1.73/M2
GLOBULIN SER-MCNC: 2.9 GM/DL (ref 2.4–3.5)
GLUCOSE SERPL-MCNC: 102 MG/DL (ref 82–115)
GLUCOSE UR QL STRIP.AUTO: NEGATIVE MG/DL
HCT VFR BLD AUTO: 38 % (ref 42–52)
HGB BLD-MCNC: 12.6 G/DL (ref 14–18)
IMM GRANULOCYTES # BLD AUTO: 0.02 X10(3)/MCL (ref 0–0.04)
IMM GRANULOCYTES NFR BLD AUTO: 0.4 %
KETONES UR QL STRIP.AUTO: NEGATIVE MG/DL
LEUKOCYTE ESTERASE UR QL STRIP.AUTO: ABNORMAL UNIT/L
LYMPHOCYTES # BLD AUTO: 1.34 X10(3)/MCL (ref 0.6–4.6)
LYMPHOCYTES NFR BLD AUTO: 24.2 %
MAGNESIUM SERPL-MCNC: 1.6 MG/DL (ref 1.6–2.6)
MCH RBC QN AUTO: 33.2 PG (ref 27–31)
MCHC RBC AUTO-ENTMCNC: 33.2 G/DL (ref 33–36)
MCV RBC AUTO: 100.3 FL (ref 80–94)
MONOCYTES # BLD AUTO: 0.57 X10(3)/MCL (ref 0.1–1.3)
MONOCYTES NFR BLD AUTO: 10.3 %
NEUTROPHILS # BLD AUTO: 3.56 X10(3)/MCL (ref 2.1–9.2)
NEUTROPHILS NFR BLD AUTO: 64.2 %
NITRITE UR QL STRIP.AUTO: POSITIVE
NRBC BLD AUTO-RTO: 0 %
PH UR STRIP.AUTO: 6 [PH]
PLATELET # BLD AUTO: 355 X10(3)/MCL (ref 130–400)
PMV BLD AUTO: 8.8 FL (ref 7.4–10.4)
POTASSIUM SERPL-SCNC: 3.7 MMOL/L (ref 3.5–5.1)
PROT SERPL-MCNC: 6.5 GM/DL (ref 5.8–7.6)
PROT UR QL STRIP.AUTO: ABNORMAL MG/DL
RBC # BLD AUTO: 3.79 X10(6)/MCL (ref 4.7–6.1)
RBC UR QL AUTO: ABNORMAL UNIT/L
SODIUM SERPL-SCNC: 140 MMOL/L (ref 136–145)
SP GR UR STRIP.AUTO: 1.02 (ref 1–1.03)
TROPONIN I SERPL-MCNC: <0.01 NG/ML (ref 0–0.04)
UROBILINOGEN UR STRIP-ACNC: 1 MG/DL
WBC # SPEC AUTO: 5.54 X10(3)/MCL (ref 4.5–11.5)

## 2023-06-11 PROCEDURE — 99285 EMERGENCY DEPT VISIT HI MDM: CPT

## 2023-06-11 PROCEDURE — 85025 COMPLETE CBC W/AUTO DIFF WBC: CPT | Performed by: PHYSICIAN ASSISTANT

## 2023-06-11 PROCEDURE — 81001 URINALYSIS AUTO W/SCOPE: CPT | Performed by: PHYSICIAN ASSISTANT

## 2023-06-11 PROCEDURE — 80053 COMPREHEN METABOLIC PANEL: CPT | Performed by: PHYSICIAN ASSISTANT

## 2023-06-11 PROCEDURE — 93010 EKG 12-LEAD: ICD-10-PCS | Mod: ,,, | Performed by: STUDENT IN AN ORGANIZED HEALTH CARE EDUCATION/TRAINING PROGRAM

## 2023-06-11 PROCEDURE — 21400001 HC TELEMETRY ROOM

## 2023-06-11 PROCEDURE — 93010 ELECTROCARDIOGRAM REPORT: CPT | Mod: ,,, | Performed by: STUDENT IN AN ORGANIZED HEALTH CARE EDUCATION/TRAINING PROGRAM

## 2023-06-11 PROCEDURE — 83735 ASSAY OF MAGNESIUM: CPT | Performed by: PHYSICIAN ASSISTANT

## 2023-06-11 PROCEDURE — 84484 ASSAY OF TROPONIN QUANT: CPT

## 2023-06-11 PROCEDURE — 25000003 PHARM REV CODE 250

## 2023-06-11 PROCEDURE — 87186 SC STD MICRODIL/AGAR DIL: CPT | Performed by: PHYSICIAN ASSISTANT

## 2023-06-11 PROCEDURE — 87088 URINE BACTERIA CULTURE: CPT | Performed by: PHYSICIAN ASSISTANT

## 2023-06-11 PROCEDURE — 11000001 HC ACUTE MED/SURG PRIVATE ROOM

## 2023-06-11 PROCEDURE — 93005 ELECTROCARDIOGRAM TRACING: CPT

## 2023-06-11 RX ORDER — SODIUM CHLORIDE 9 MG/ML
1000 INJECTION, SOLUTION INTRAVENOUS
Status: COMPLETED | OUTPATIENT
Start: 2023-06-11 | End: 2023-06-12

## 2023-06-11 RX ORDER — HYDROCODONE BITARTRATE AND ACETAMINOPHEN 7.5; 325 MG/1; MG/1
1 TABLET ORAL
Status: COMPLETED | OUTPATIENT
Start: 2023-06-11 | End: 2023-06-11

## 2023-06-11 RX ORDER — LEVOFLOXACIN 250 MG/1
500 TABLET ORAL
Status: COMPLETED | OUTPATIENT
Start: 2023-06-11 | End: 2023-06-11

## 2023-06-11 RX ADMIN — HYDROCODONE BITARTRATE AND ACETAMINOPHEN 1 TABLET: 7.5; 325 TABLET ORAL at 11:06

## 2023-06-11 RX ADMIN — LEVOFLOXACIN 500 MG: 250 TABLET, FILM COATED ORAL at 11:06

## 2023-06-11 NOTE — FIRST PROVIDER EVALUATION
Medical screening examination initiated.  I have conducted a focused provider triage encounter, findings are as follows:    Chief Complaint   Patient presents with    Numbness     Pt presents to er for numbness to bilateral hands and fingers, and bilateral numbness from knees down.  States has been going on for weeks.  Hx of multiple myeloma.     Brief history of present illness:  72 y.o. male presents to the ED via EMS with worsening numbness to BUE and BLE. Hx of multiple myeloma, sees Dr Harris. States he feels very weak and not like himself. Recently seen at [facility] for urinary retention    Vitals:    06/11/23 1713   BP: 123/76   Pulse: 72   Resp: 18   Temp: 98.1 °F (36.7 °C)   SpO2: 99%   Weight: 88.5 kg (195 lb)   Height: 6' (1.829 m)       Pertinent physical exam:  Awake, alert, non-labored respirations    Brief workup plan:  labs, UA, imaging     Preliminary workup initiated; this workup will be continued and followed by the physician or advanced practice provider that is assigned to the patient when roomed.

## 2023-06-11 NOTE — Clinical Note
Diagnosis: Weakness [364356]   Admitting Provider:: JULY NICHOLS [[id number]]   Future Attending Provider: JULY NICHOLS [[id number]]   Reason for IP Medical Treatment  (Clinical interventions that can only be accomplished in the IP setting? ) :: Treatment of UTI and evaluation of weakness.   I certify that Inpatient services for greater than or equal to 2 midnights are medically necessary:: Yes   Plans for Post-Acute care--if anticipated (pick the single best option):: A. No post acute care anticipated at this time   Special Needs:: No Special Needs [1]

## 2023-06-12 ENCOUNTER — TELEPHONE (OUTPATIENT)
Dept: HEMATOLOGY/ONCOLOGY | Facility: CLINIC | Age: 73
End: 2023-06-12
Payer: MEDICARE

## 2023-06-12 LAB
ALBUMIN SERPL-MCNC: 3.1 G/DL (ref 3.4–4.8)
ALBUMIN/GLOB SERPL: 1 RATIO (ref 1.1–2)
ALP SERPL-CCNC: 107 UNIT/L (ref 40–150)
ALT SERPL-CCNC: 26 UNIT/L (ref 0–55)
AST SERPL-CCNC: 16 UNIT/L (ref 5–34)
BACTERIA #/AREA URNS AUTO: ABNORMAL /HPF
BASOPHILS # BLD AUTO: 0.01 X10(3)/MCL
BASOPHILS NFR BLD AUTO: 0.2 %
BILIRUBIN DIRECT+TOT PNL SERPL-MCNC: 0.5 MG/DL
BUN SERPL-MCNC: 12 MG/DL (ref 8.4–25.7)
CALCIUM SERPL-MCNC: 9.4 MG/DL (ref 8.8–10)
CHLORIDE SERPL-SCNC: 104 MMOL/L (ref 98–107)
CO2 SERPL-SCNC: 23 MMOL/L (ref 23–31)
CREAT SERPL-MCNC: 0.89 MG/DL (ref 0.73–1.18)
EOSINOPHIL # BLD AUTO: 0.03 X10(3)/MCL (ref 0–0.9)
EOSINOPHIL NFR BLD AUTO: 0.6 %
ERYTHROCYTE [DISTWIDTH] IN BLOOD BY AUTOMATED COUNT: 14.9 % (ref 11.5–17)
GFR SERPLBLD CREATININE-BSD FMLA CKD-EPI: >60 MLS/MIN/1.73/M2
GLOBULIN SER-MCNC: 3.2 GM/DL (ref 2.4–3.5)
GLUCOSE SERPL-MCNC: 97 MG/DL (ref 82–115)
HCT VFR BLD AUTO: 35 % (ref 42–52)
HGB BLD-MCNC: 11.5 G/DL (ref 14–18)
IMM GRANULOCYTES # BLD AUTO: 0.02 X10(3)/MCL (ref 0–0.04)
IMM GRANULOCYTES NFR BLD AUTO: 0.4 %
LYMPHOCYTES # BLD AUTO: 1.25 X10(3)/MCL (ref 0.6–4.6)
LYMPHOCYTES NFR BLD AUTO: 24.9 %
MAGNESIUM SERPL-MCNC: 1.5 MG/DL (ref 1.6–2.6)
MCH RBC QN AUTO: 32.9 PG (ref 27–31)
MCHC RBC AUTO-ENTMCNC: 32.9 G/DL (ref 33–36)
MCV RBC AUTO: 100 FL (ref 80–94)
MONOCYTES # BLD AUTO: 0.66 X10(3)/MCL (ref 0.1–1.3)
MONOCYTES NFR BLD AUTO: 13.1 %
NEUTROPHILS # BLD AUTO: 3.05 X10(3)/MCL (ref 2.1–9.2)
NEUTROPHILS NFR BLD AUTO: 60.8 %
NRBC BLD AUTO-RTO: 0 %
PHOSPHATE SERPL-MCNC: 3.1 MG/DL (ref 2.3–4.7)
PLATELET # BLD AUTO: 296 X10(3)/MCL (ref 130–400)
PMV BLD AUTO: 8.8 FL (ref 7.4–10.4)
POTASSIUM SERPL-SCNC: 3.2 MMOL/L (ref 3.5–5.1)
PROT SERPL-MCNC: 6.3 GM/DL (ref 5.8–7.6)
RBC # BLD AUTO: 3.5 X10(6)/MCL (ref 4.7–6.1)
RBC #/AREA URNS AUTO: 11 /HPF
SODIUM SERPL-SCNC: 135 MMOL/L (ref 136–145)
SQUAMOUS #/AREA URNS AUTO: <5 /HPF
WBC # SPEC AUTO: 5.02 X10(3)/MCL (ref 4.5–11.5)
WBC #/AREA URNS AUTO: 67 /HPF
YEAST URNS QL MICRO: ABNORMAL /HPF

## 2023-06-12 PROCEDURE — 25000003 PHARM REV CODE 250: Performed by: NURSE PRACTITIONER

## 2023-06-12 PROCEDURE — 99223 1ST HOSP IP/OBS HIGH 75: CPT | Mod: ,,, | Performed by: INTERNAL MEDICINE

## 2023-06-12 PROCEDURE — 25500020 PHARM REV CODE 255: Performed by: STUDENT IN AN ORGANIZED HEALTH CARE EDUCATION/TRAINING PROGRAM

## 2023-06-12 PROCEDURE — 11000001 HC ACUTE MED/SURG PRIVATE ROOM

## 2023-06-12 PROCEDURE — 25000003 PHARM REV CODE 250

## 2023-06-12 PROCEDURE — 25000003 PHARM REV CODE 250: Performed by: INTERNAL MEDICINE

## 2023-06-12 PROCEDURE — A9577 INJ MULTIHANCE: HCPCS | Performed by: STUDENT IN AN ORGANIZED HEALTH CARE EDUCATION/TRAINING PROGRAM

## 2023-06-12 PROCEDURE — 63600175 PHARM REV CODE 636 W HCPCS: Performed by: NURSE PRACTITIONER

## 2023-06-12 PROCEDURE — 25000003 PHARM REV CODE 250: Performed by: HOSPITALIST

## 2023-06-12 PROCEDURE — 21400001 HC TELEMETRY ROOM

## 2023-06-12 PROCEDURE — 85025 COMPLETE CBC W/AUTO DIFF WBC: CPT | Performed by: NURSE PRACTITIONER

## 2023-06-12 PROCEDURE — 84100 ASSAY OF PHOSPHORUS: CPT | Performed by: NURSE PRACTITIONER

## 2023-06-12 PROCEDURE — 80053 COMPREHEN METABOLIC PANEL: CPT | Performed by: NURSE PRACTITIONER

## 2023-06-12 PROCEDURE — 83735 ASSAY OF MAGNESIUM: CPT | Performed by: NURSE PRACTITIONER

## 2023-06-12 PROCEDURE — 99223 PR INITIAL HOSPITAL CARE,LEVL III: ICD-10-PCS | Mod: ,,, | Performed by: INTERNAL MEDICINE

## 2023-06-12 RX ORDER — ZOLPIDEM TARTRATE 5 MG/1
10 TABLET ORAL NIGHTLY
Status: DISCONTINUED | OUTPATIENT
Start: 2023-06-12 | End: 2023-07-05 | Stop reason: HOSPADM

## 2023-06-12 RX ORDER — LORAZEPAM 2 MG/ML
0.5 INJECTION INTRAMUSCULAR ONCE
Status: COMPLETED | OUTPATIENT
Start: 2023-06-12 | End: 2023-06-12

## 2023-06-12 RX ORDER — TALC
6 POWDER (GRAM) TOPICAL NIGHTLY PRN
Status: DISCONTINUED | OUTPATIENT
Start: 2023-06-12 | End: 2023-07-05 | Stop reason: HOSPADM

## 2023-06-12 RX ORDER — NALOXONE HCL 0.4 MG/ML
0.02 VIAL (ML) INJECTION
Status: DISCONTINUED | OUTPATIENT
Start: 2023-06-12 | End: 2023-07-05 | Stop reason: HOSPADM

## 2023-06-12 RX ORDER — POLYETHYLENE GLYCOL 3350 17 G/17G
17 POWDER, FOR SOLUTION ORAL 2 TIMES DAILY PRN
Status: DISCONTINUED | OUTPATIENT
Start: 2023-06-12 | End: 2023-07-05 | Stop reason: HOSPADM

## 2023-06-12 RX ORDER — LEVOFLOXACIN 500 MG/1
500 TABLET, FILM COATED ORAL DAILY
Status: COMPLETED | OUTPATIENT
Start: 2023-06-12 | End: 2023-06-14

## 2023-06-12 RX ORDER — SIMETHICONE 80 MG
1 TABLET,CHEWABLE ORAL 4 TIMES DAILY PRN
Status: DISCONTINUED | OUTPATIENT
Start: 2023-06-12 | End: 2023-07-05 | Stop reason: HOSPADM

## 2023-06-12 RX ORDER — MAG HYDROX/ALUMINUM HYD/SIMETH 200-200-20
30 SUSPENSION, ORAL (FINAL DOSE FORM) ORAL 4 TIMES DAILY PRN
Status: DISCONTINUED | OUTPATIENT
Start: 2023-06-12 | End: 2023-07-05 | Stop reason: HOSPADM

## 2023-06-12 RX ORDER — ACETAMINOPHEN 325 MG/1
650 TABLET ORAL EVERY 6 HOURS PRN
Status: DISCONTINUED | OUTPATIENT
Start: 2023-06-12 | End: 2023-07-05 | Stop reason: HOSPADM

## 2023-06-12 RX ORDER — ONDANSETRON 2 MG/ML
4 INJECTION INTRAMUSCULAR; INTRAVENOUS EVERY 4 HOURS PRN
Status: DISCONTINUED | OUTPATIENT
Start: 2023-06-12 | End: 2023-07-05 | Stop reason: HOSPADM

## 2023-06-12 RX ORDER — PROCHLORPERAZINE EDISYLATE 5 MG/ML
5 INJECTION INTRAMUSCULAR; INTRAVENOUS EVERY 6 HOURS PRN
Status: DISCONTINUED | OUTPATIENT
Start: 2023-06-12 | End: 2023-07-05 | Stop reason: HOSPADM

## 2023-06-12 RX ORDER — HYDROCODONE BITARTRATE AND ACETAMINOPHEN 10; 325 MG/1; MG/1
1 TABLET ORAL
Status: COMPLETED | OUTPATIENT
Start: 2023-06-12 | End: 2023-06-12

## 2023-06-12 RX ORDER — HYDROCODONE BITARTRATE AND ACETAMINOPHEN 5; 325 MG/1; MG/1
1 TABLET ORAL EVERY 6 HOURS PRN
Status: DISCONTINUED | OUTPATIENT
Start: 2023-06-12 | End: 2023-06-25

## 2023-06-12 RX ORDER — TAMSULOSIN HYDROCHLORIDE 0.4 MG/1
0.4 CAPSULE ORAL NIGHTLY
Status: DISCONTINUED | OUTPATIENT
Start: 2023-06-12 | End: 2023-07-05 | Stop reason: HOSPADM

## 2023-06-12 RX ADMIN — ZOLPIDEM TARTRATE 10 MG: 5 TABLET ORAL at 01:06

## 2023-06-12 RX ADMIN — GADOBENATE DIMEGLUMINE 20 ML: 529 INJECTION, SOLUTION INTRAVENOUS at 12:06

## 2023-06-12 RX ADMIN — HYDROCODONE BITARTRATE AND ACETAMINOPHEN 1 TABLET: 5; 325 TABLET ORAL at 11:06

## 2023-06-12 RX ADMIN — SODIUM CHLORIDE 1000 ML: 9 INJECTION, SOLUTION INTRAVENOUS at 12:06

## 2023-06-12 RX ADMIN — LEVOFLOXACIN 500 MG: 500 TABLET, FILM COATED ORAL at 11:06

## 2023-06-12 RX ADMIN — TAMSULOSIN HYDROCHLORIDE 0.4 MG: 0.4 CAPSULE ORAL at 09:06

## 2023-06-12 RX ADMIN — HYDROCODONE BITARTRATE AND ACETAMINOPHEN 1 TABLET: 10; 325 TABLET ORAL at 07:06

## 2023-06-12 RX ADMIN — LORAZEPAM 0.5 MG: 2 INJECTION INTRAMUSCULAR; INTRAVENOUS at 10:06

## 2023-06-12 RX ADMIN — ZOLPIDEM TARTRATE 10 MG: 5 TABLET ORAL at 09:06

## 2023-06-12 NOTE — CONSULTS
[facility] - Oncology Acute  Hematology/Oncology  Progress Note      Consult Requested By: Gabby Carver DO    Reason for Consult: Multiple Myeloma    SUBJECTIVE:   Diagnosis:  Multiple Myeloma--IgG, Kappa  Macrocytic anemia     Present treatment:   VRD-Started on 12/9/22  Daratumumab added on 1/27/23    Imaging:   US abdomen 8/24/2022: Spleen: 10.8 cm. Mild hepatomegaly with suspected mild hepatic steatosis. Cholelithiasis.  PET CT 9/28/2022: 1. Right scapular small focal mild hypermetabolism without lytic or sclerotic abnormality is not convinced to be related to multiple myeloma. 2. No definite skeletal lytic abnormality with hypermetabolism to reflect multiple myeloma on this exam. 3. Bilateral small pleural effusions.   4. Gallstones. 5.  Noninflamed diverticulosis coli. 6.  Prostatomegaly.     Pathology:  BONE MARROW BIOPSY 9/1/2022: PLASMA CELL MYELOMA, KAPPA RESTRICTED. NORMOCELLULAR MARROW (30%) WITH 70% INVOLVEMENT BY PLASMA CELL MYELOMA. BACKGROUND TRILINEAGE HEMATOPOIESIS IS DECREASED.    History of Present Illness:  Patient is a 72 y.o. male that I saw for the 1st time on 08/10/2022 for persistent anemia.  Reviewing electronic medical record patient with anemia since 02/10/2016.  From 2016 to January of 2017 anemia was mild.  Anemia slowly worsening back in 2018 hemoglobin was 8.0. At that same time kidney function was worsening as well.      Further workup revealed the diagnosis of multiple myeloma.  Patient was started on treatment with Velcade, Revlimid and dexamethasone on 12/09/2022 and daratumumab was added on 01/27/2023 with significant improvement of his disease.  He was seen in [place] for possible bone marrow transplant but he was not interested in bone marrow transplant at the time.  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  Gabapentin was not helping.    Treatment was delayed 1st due to COVID-19 in February of 2023, he has few treatments in  "between but it was delayed again due to  NSTEMI .  Revlimid was stopped due to his heart disease and plan was to continue daratumumab and Velcade lower dose for neuropathy.  I saw him in hospital follow-up after his MI but he was not ready to start treatment.  Patient continued to decline with worsening of his weakness.  Home health called to let us know that patient was weak and unable to get out of bed. I called the patient Thursday 6/8/2023: "I personally spoke with the patient and  instructed to go to the ER as his weakness and neuropathy is worsening and he reports that he went to the ER in [place] because he could not urinate and a gongora cath was placed and send home.   He reports that is very difficult for him to get out of bed and he can not seeing himself going to his room to the car. I told him he will need to call an ambulance to transport him to the hospital and to come to [facility].     Patient with debilitating neuropathy of unclear etiology. I told him that may need MRI's of spine and/or brain and neurologist evaluation.     Patient verbalized understanding and he will planned to go to the ER tomorrow not today."     Patient went to the ER on 06/07/2023 and a Gongora catheter was placed he was sent home with follow-up with urologist.      Patient presents with weakness.    Came to see the patient in the ER wife was in the room but patient was in the radiology department undergoing MRIs.    I did not examine the patient.  I will be back to see him    Came back to see the patient this afternoon.  Review MRIs of the spine.  No evidence of plasmacytoma, mass or cord compression due to Malignancy.      Continuous Infusions:  Scheduled Meds:   levoFLOXacin  500 mg Oral Daily    zolpidem  10 mg Oral QHS     PRN Meds:acetaminophen, aluminum-magnesium hydroxide-simethicone, melatonin, naloxone, ondansetron, polyethylene glycol, prochlorperazine, simethicone    Past Medical History:   Diagnosis Date    " Anemia     GERD (gastroesophageal reflux disease)     Heart problem     Hyperlipidemia     Hypertension     Multiple myeloma     NSTEMI (non-ST elevated myocardial infarction)      Past Surgical History:   Procedure Laterality Date    BONE MARROW ASPIRATION N/A 9/1/2022    Procedure: ASPIRATION, BONE MARROW;  Surgeon: Robbie Gardner MD;  Location: [facility];  Service: General;  Laterality: N/A;    CARDIAC SURGERY  2021    ENDOSCOPIC RELEASE OF BOTH CARPAL TUNNELS       Family History   Problem Relation Age of Onset    Hypertension Mother     Diabetes Mother     Anemia Mother     Heart disease Mother     Heart disease Father      Social History     Tobacco Use    Smoking status: Former     Types: Cigarettes    Smokeless tobacco: Former   Substance Use Topics    Alcohol use: Not Currently    Drug use: Never       Review of patient's allergies indicates:   Allergen Reactions    Penicillins       No current facility-administered medications on file prior to encounter.     Current Outpatient Medications on File Prior to Encounter   Medication Sig Dispense Refill    amLODIPine (NORVASC) 10 MG tablet once daily.      apixaban (ELIQUIS) 5 mg Tab Take 1 tablet (5 mg total) by mouth 2 (two) times daily. 60 tablet 6    ascorbic acid, vitamin C, (VITAMIN C) 1000 MG tablet Take 1,000 mg by mouth once daily.      atorvastatin (LIPITOR) 40 MG tablet TAKE 1 TABLET ORALLY ONCE DAILY ON MON/TUE/THUR/FRI AND 1/2 ON WED. NONE ON SAT/SUN      b complex vitamins capsule Take 1 capsule by mouth once daily.      clopidogreL (PLAVIX) 75 mg tablet Take 75 mg by mouth once daily.      doxazosin (CARDURA) 4 MG tablet Take 4 mg by mouth once daily.      DULoxetine (CYMBALTA) 30 MG capsule Take 1 capsule (30 mg total) by mouth once daily. 30 capsule 1    dutasteride (AVODART) 0.5 mg capsule Take 0.5 mg by mouth once daily.      FERRETTS 325 mg (106 mg iron) Tab Take 1 tablet by mouth 2 (two) times daily.      HYDROcodone-acetaminophen (NORCO)   mg per tablet Take 1 tablet by mouth every 4 (four) hours as needed for Pain. 75 tablet 0    LINZESS 145 mcg Cap capsule Take 145 mcg by mouth.      lisinopriL (PRINIVIL,ZESTRIL) 40 MG tablet Take 40 mg by mouth once daily.      metoprolol tartrate (LOPRESSOR) 25 MG tablet Take 25 mg by mouth 2 (two) times daily. Takes 1 tab q am      testosterone cypionate (DEPOTESTOTERONE CYPIONATE) 200 mg/mL injection INJECT 1ML INTO MUSCLE ONCE A WEEK      zolpidem (AMBIEN) 10 mg Tab Take 10 mg by mouth nightly as needed.      albuterol (PROAIR HFA) 90 mcg/actuation inhaler Inhale 2 puffs into the lungs every 6 (six) hours as needed for Wheezing. Rescue 8 g 0    diphenoxylate-atropine 2.5-0.025 mg (LOMOTIL) 2.5-0.025 mg per tablet Take 1 tablet by mouth 4 (four) times daily as needed for Diarrhea. 30 tablet 0    ergocalciferol (ERGOCALCIFEROL) 50,000 unit Cap Take 1 capsule (50,000 Units total) by mouth every 7 days. 7 capsule 0    furosemide (LASIX) 20 MG tablet       gabapentin (NEURONTIN) 300 MG capsule Take 1 capsule (300 mg total) by mouth 3 (three) times daily. 90 capsule 3    glutamine 10 gram PwPk Take 10 g by mouth 2 (two) times a day.      hydrocortisone (CORTEF) 20 MG Tab TAKE ONE TABLET BY MOUTH TWICE DAILY X 14 DAYS      lactulose (CHRONULAC) 10 gram/15 mL solution GIVE 30ML BY MOUTH TWICE A DAY X7 DAY(S)      ondansetron (ZOFRAN) 4 MG tablet Take 4 mg by mouth every 8 (eight) hours as needed for Nausea.      pantoprazole (PROTONIX) 40 MG tablet Take 1 tablet (40 mg total) by mouth once daily. for 30 doses 30 tablet 1    promethazine (PHENERGAN) 25 MG tablet TAKE 1 TABLET BY MOUTH EVERY 4 HOURS AS NEEDED FOR MOTION SICKNESS      pyridoxine, vitamin B6, (B-6) 250 MG Tab Take 250 mg by mouth once daily.      SLOW RELEASE IRON 140 mg (45 mg iron) TbSR Take 1 tablet by mouth 2 (two) times daily.      sulfamethoxazole-trimethoprim 800-160mg (BACTRIM DS) 800-160 mg Tab Take 1 tablet by mouth As instructed. Tab 1 PO  Daily M-W-F      [DISCONTINUED] acyclovir (ZOVIRAX) 400 MG tablet TAKE 1 TABLET BY MOUTH TWICE DAILY 180 tablet 1       Review of Systems   Constitutional:  Positive for activity change and fatigue. Negative for appetite change, chills, diaphoresis, fever and unexpected weight change.   HENT:  Negative for nasal congestion, mouth sores, nosebleeds, sinus pressure/congestion, sore throat and trouble swallowing.    Eyes: Negative.    Respiratory:  Negative for cough and shortness of breath.    Cardiovascular:  Negative for chest pain and palpitations.   Gastrointestinal:  Negative for abdominal distention, abdominal pain, blood in stool, change in bowel habit, constipation, diarrhea, nausea, vomiting and change in bowel habit.   Endocrine: Negative.    Genitourinary:  Positive for difficulty urinating. Negative for bladder incontinence, decreased urine volume, dysuria, frequency, hematuria and urgency.   Musculoskeletal:  Negative for arthralgias, back pain, gait problem, joint swelling, leg pain and myalgias.   Integumentary:  Negative for rash.   Allergic/Immunologic: Negative.    Neurological:  Positive for weakness. Negative for dizziness, tremors, syncope, light-headedness, numbness, headaches and memory loss.   Hematological:  Negative for adenopathy. Does not bruise/bleed easily.   Psychiatric/Behavioral:  Negative for agitation, confusion, hallucinations, sleep disturbance and suicidal ideas. The patient is not nervous/anxious.        OBJECTIVE:     Vital Signs (Most Recent)  Temp: 98.1 °F (36.7 °C) (06/11/23 1713)  Pulse: (!) 59 (06/12/23 0643)  Resp: 18 (06/12/23 0753)  BP: 139/78 (06/12/23 0658)  SpO2: 100 % (06/12/23 0628)    Pain Assessment: No pain reported at this time    Vital Signs Range (Last 24H):  Temp:  [98.1 °F (36.7 °C)]   Pulse:  [55-73]   Resp:  [15-19]   BP: (123-152)/(71-86)   SpO2:  [97 %-100 %]     Physical Exam:  Physical Exam  Vitals and nursing note reviewed.   Constitutional:        General: He is not in acute distress.     Appearance: He is ill-appearing.   HENT:      Head: Normocephalic and atraumatic.      Mouth/Throat:      Mouth: Mucous membranes are moist.   Eyes:      General: No scleral icterus.     Extraocular Movements: Extraocular movements intact.      Conjunctiva/sclera: Conjunctivae normal.   Neck:      Vascular: No JVD.   Cardiovascular:      Rate and Rhythm: Normal rate and regular rhythm.      Heart sounds: No murmur heard.  Pulmonary:      Effort: Pulmonary effort is normal.      Breath sounds: Normal breath sounds. No wheezing or rhonchi.   Abdominal:      General: Bowel sounds are normal. There is no distension.      Palpations: Abdomen is soft.      Tenderness: There is no abdominal tenderness.   Musculoskeletal:         General: No swelling or deformity.      Cervical back: Neck supple.   Lymphadenopathy:      Head:      Right side of head: No submandibular adenopathy.      Left side of head: No submandibular adenopathy.      Cervical: No cervical adenopathy.      Upper Body:      Right upper body: No supraclavicular or axillary adenopathy.      Left upper body: No supraclavicular or axillary adenopathy.      Lower Body: No right inguinal adenopathy. No left inguinal adenopathy.   Skin:     General: Skin is warm.      Coloration: Skin is not jaundiced.      Findings: No rash.   Neurological:      Mental Status: He is alert and oriented to person, place, and time.      Cranial Nerves: Cranial nerves 2-12 are intact.      Motor: Weakness present.      Gait: Gait abnormal.      Comments: Moderate peripheral neuropathy   Psychiatric:         Attention and Perception: Attention normal.         Behavior: Behavior is cooperative.       Laboratory:  CBC with Differential:  Recent Labs   Lab 06/12/23  0352   WBC 5.02   RBC 3.50*   HCT 35.0*   HGB 11.5*   .0*   MCH 32.9*   RDW 14.9      MPV 8.8     CMP:  Recent Labs   Lab 06/12/23  0352   CALCIUM 9.4   ALBUMIN 3.1*    *   K 3.2*   CO2 23   BUN 12.0   CREATININE 0.89   ALKPHOS 107   ALT 26   AST 16   BILITOT 0.5     BMP:   Recent Labs   Lab 06/12/23  0352   CALCIUM 9.4   *   K 3.2*   CO2 23   BUN 12.0   CREATININE 0.89     LFTs:   Recent Labs   Lab 06/12/23  0352   ALT 26   AST 16   ALKPHOS 107   BILITOT 0.5   ALBUMIN 3.1*     Haptoglobin: No results for input(s): HAPTOGLOBIN in the last 24 hours.  Tumor Markers: No results for input(s): PSA, CEA, , AFPTM, ZH1425,  in the last 24 hours.    Invalid input(s): ALGTM  Immunology: No results for input(s): SPEP, BETTY, MACEY, FREELAMBDALI in the last 24 hours.  Coagulation: No results for input(s): PT, INR, APTT in the last 24 hours.  Specimen (24h ago, onward)      None          Microbiology Results (last 7 days)       Procedure Component Value Units Date/Time    Urine culture [[id number]] Collected: 06/11/23 2224    Order Status: Sent Specimen: Urine Updated: 06/12/23 0107            Diagnostic Results:  Imaging Results    None             ASSESSMENT/PLAN:     Patient Active Problem List   Diagnosis    Anemia    Elevated serum globulin level    Kidney disease    Multiple myeloma    Hypertension    SOB (shortness of breath)    Patient on antineoplastic chemotherapy regimen    Peripheral neuropathy   Multiple myeloma  Neuropathy  Weakness  Urinary retention  History of NSTEMI coronary artery disease      Plan  MRI CTL spine-reviewed-no plasmacytoma or mass  Cont supportive care  Cont antibiotics  May need MRI brain   May benefit of neurologist evaluation  Cont gongora as per Urology     Thank you for the consult.  Will cont to follow    Angélica Harris MD  Hematology/Oncology  [facility]

## 2023-06-12 NOTE — ED PROVIDER NOTES
Encounter Date: 6/11/2023       History     Chief Complaint   Patient presents with    Numbness     Pt presents to er for numbness to bilateral hands and fingers, and bilateral numbness from knees down.  States has been going on for weeks.  Hx of multiple myeloma.     72 y.o. White male with a history of hypertension, multiple myeloma, and GERD presents to Emergency Department with a chief complaint of numbness. Symptoms began several weeks ago and have been constant since onset. Patient has been seen by oncology for symptoms and symptoms are likely related Velcade, which has been discontinued. Patient currently on Cymbalta for neuropathy. Associated symptoms include weakness. The patient denies CP, SOB, fever, chills, cough, or dizziness. No other reported symptoms at this time. PCP: [facility] / Oncologist: Panelli       The history is provided by the patient. No  was used.   Illness   The current episode started several weeks ago. The problem occurs continuously. The problem has been unchanged. Pertinent negatives include no fever, no photophobia, no abdominal pain, no nausea, no vomiting, no stridor, no neck pain, no cough, no shortness of breath, no URI, no wheezing and no rash. He has received no recent medical care.   Review of patient's allergies indicates:   Allergen Reactions    Penicillins      Past Medical History:   Diagnosis Date    Anemia     GERD (gastroesophageal reflux disease)     Heart problem     Hyperlipidemia     Hypertension     Multiple myeloma     NSTEMI (non-ST elevated myocardial infarction)      Past Surgical History:   Procedure Laterality Date    BONE MARROW ASPIRATION N/A 9/1/2022    Procedure: ASPIRATION, BONE MARROW;  Surgeon: Robbie Gardner MD;  Location: [facility];  Service: General;  Laterality: N/A;    CARDIAC SURGERY  2021    ENDOSCOPIC RELEASE OF BOTH CARPAL TUNNELS       Family History   Problem Relation Age of Onset    Hypertension Mother     Diabetes  Mother     Anemia Mother     Heart disease Mother     Heart disease Father      Social History     Tobacco Use    Smoking status: Former     Types: Cigarettes    Smokeless tobacco: Former   Substance Use Topics    Alcohol use: Not Currently    Drug use: Never     Review of Systems   Constitutional:  Negative for chills, fatigue and fever.   Eyes:  Negative for photophobia and visual disturbance.   Respiratory:  Negative for cough, shortness of breath, wheezing and stridor.    Cardiovascular:  Negative for chest pain, palpitations and leg swelling.   Gastrointestinal:  Negative for abdominal pain, nausea and vomiting.   Musculoskeletal:  Positive for arthralgias. Negative for neck pain.   Skin:  Negative for color change and rash.   Neurological:  Positive for weakness and numbness. Negative for dizziness, syncope, speech difficulty and light-headedness.   All other systems reviewed and are negative.    Physical Exam     Initial Vitals [06/11/23 1713]   BP Pulse Resp Temp SpO2   123/76 72 18 98.1 °F (36.7 °C) 99 %      MAP       --         Physical Exam    Nursing note and vitals reviewed.  Constitutional: He appears well-developed and well-nourished. He is not diaphoretic. He is cooperative.  Non-toxic appearance. No distress.   HENT:   Head: Normocephalic and atraumatic.   Right Ear: External ear normal.   Left Ear: External ear normal.   Nose: Nose normal.   Mouth/Throat: Oropharynx is clear and moist. No oropharyngeal exudate.   Eyes: Conjunctivae and EOM are normal. Pupils are equal, round, and reactive to light. Right eye exhibits no discharge. Left eye exhibits no discharge.   Neck: Neck supple. No thyromegaly present. No JVD present.   Normal range of motion.  Cardiovascular:  Normal rate, regular rhythm, S1 normal, S2 normal, normal heart sounds, intact distal pulses and normal pulses.           Pulmonary/Chest: Effort normal and breath sounds normal. No stridor. No tachypnea and no bradypnea. No respiratory  distress. He has no decreased breath sounds. He has no wheezes. He has no rhonchi. He has no rales. He exhibits no tenderness.   Abdominal: Abdomen is soft. Bowel sounds are normal. He exhibits no distension. There is no abdominal tenderness. There is no guarding.   Genitourinary:    Genitourinary Comments: Duenas catheter in place draining cloudy urine.      Musculoskeletal:         General: No edema. Normal range of motion.      Cervical back: Normal range of motion and neck supple.      Comments: C/o numbness/tingling to BUE and BLE. Full 5/5 ROM noted. CMS intact. All other adjacent joints otherwise normal.        Neurological: He is alert and oriented to person, place, and time. He has normal strength. No sensory deficit. GCS score is 15. GCS eye subscore is 4. GCS verbal subscore is 5. GCS motor subscore is 6.   Skin: Skin is warm and dry. Capillary refill takes less than 2 seconds. No rash noted. No erythema.   Psychiatric: He has a normal mood and affect. Thought content normal.       ED Course   Procedures  Labs Reviewed   URINALYSIS, REFLEX TO URINE CULTURE - Abnormal; Notable for the following components:       Result Value    Appearance, UA Turbid (*)     Protein, UA 2+ (*)     Blood, UA 3+ (*)     Nitrites, UA Positive (*)     Leukocyte Esterase, UA 2+ (*)     All other components within normal limits   CBC WITH DIFFERENTIAL - Abnormal; Notable for the following components:    RBC 3.79 (*)     Hgb 12.6 (*)     Hct 38.0 (*)     .3 (*)     MCH 33.2 (*)     All other components within normal limits   MAGNESIUM - Normal   TROPONIN I - Normal   CBC W/ AUTO DIFFERENTIAL    Narrative:     The following orders were created for panel order CBC auto differential.  Procedure                               Abnormality         Status                     ---------                               -----------         ------                     CBC with Differential[[id number]]        Abnormal            Final result                  Please view results for these tests on the individual orders.   COMPREHENSIVE METABOLIC PANEL   URINALYSIS, MICROSCOPIC     EKG Readings: (Independently Interpreted)   Initial Reading: No STEMI. Rhythm: Normal Sinus Rhythm. Heart Rate: 65. Ectopy: No Ectopy. Conduction: Normal. ST Segments: Normal ST Segments. T Waves: Normal. Clinical Impression: Normal Sinus Rhythm     Imaging Results    None          Medications   0.9%  NaCl infusion (has no administration in time range)   levoFLOXacin tablet 500 mg (has no administration in time range)   HYDROcodone-acetaminophen 7.5-325 mg per tablet 1 tablet (1 tablet Oral Given 6/11/23 4796)     Medical Decision Making:   History:   Old Records Summarized: other records.       <> Summary of Records: Patient has been seen by oncology regarding neuropathy caused by chemo drug. Placed on Gabapentin that did not work and now on Cymbalta. Also, patient has been seen regarding weakness and frequent falls related to neuropathy. Instructed to see neurology and offered PT for falls.     Recently seen at [facility] due to urinary retention. Duenas catheter placed.   Initial Assessment:   Patient awake, alert, has non-labored breathing, and follows commands appropriately. Arrived to ed due to weakness and neuropathy. Patient has been experiencing symptoms for several weeks. Hx of multiple myeloma. Last chemo infusion 3 weeks ago. Afebrile. NAD noted.   Differential Diagnosis:   Weakness, Neuropathy, UTI   Clinical Tests:   Lab Tests: Ordered and Reviewed  Medical Tests: Reviewed and Ordered  ED Management:  Co-morbidities and/or factors adding to the complexity or risk for the patient?: hx of multiple myeloma and weakness.   Differential diagnoses: Weakness, Neuropathy, UTI   Decision to obtain previous or outside records?: I reviewed records.   Chart Review (nursing home, outside records, CareEverywhere): yes  My EKG Interpretation: see above  Labs/imaging/other tests  "obtained/considered (risk/benefits of testing discussed): cbc, cmp, trop, ua, ekg, mag  Labs/tests intepretation: Labs unremarkable. UA- WBCs, nitrates, and leukocytes noted. Informed patient of results.   My independent imaging interpretation: none  Treatment/interventions, IV fluids, IV medications: IV fluids, Norco, and  Levaquin given in ER.   Complex management (IV controlled substances, went to OR, DNR, meds requiring monitoring, transfer, etc)?: none  Workup/treatment affected by social determinants of health?:none   Point of care US done/interpretation: none  Consults/radiologist/EMS/social work/family discussion/alternate history: none  Advanced care planning/end of life discussion: none  Shared decision making: Discussed plan of care and interventions with patient. Agreed to and aware of plan of care. Comfortable being admitted.   ETOH/smoking/drug cessation discussion: none  Dispo: Patient admitted to hospital.      Other:   I discussed test(s) with the performing physician.  I have discussed this case with another health care provider.           ED Course as of 06/11/23 4387   [place] Jun 11, 2023   6327 Patient reports his allergy to PCN is that "he will die". States he has not had PCN medication since 7 years old.  [JA]   2325 Discussed patient with Dr. Hayes. Recommended admission to hospital. No further instruction or recommendations given.  [JA]   2325 Discussed patient with Dr. Calzada, [facility] medicine. Instructed to order Levaquin 500 mg PO for UTI. Will admit to services for treatment of UTI and PT/OT. No further instruction or recommendations given.  [JA]      ED Course User Index  [JA] Jenny Jauregui NP                   Clinical Impression:   Final diagnoses:  [R53.1] Weakness  [G62.9] Neuropathy (Primary)  [N30.01] Acute cystitis with hematuria        ED Disposition Condition    Admit Stable                Jenny Jauregui NP  06/11/23 9070    "

## 2023-06-12 NOTE — PLAN OF CARE
"Pt lives at [address] [place] [address] with his wife Lexis [phone]. There are 5 steps to enter. Pt is  no children. When asked about POA or living will he stated "I have something." PCP [facility], oncology Dr. Harris, Dr. Vo Urology. Pt reported having HH but couldn't recall name. Pt voiced being bedridden at this time and expressed an interest in rehab when order discussed. Pt has wheelchair, rollator, cane, CPAP, shower chair, bedside commode. Takes bed baths. Unable to attend appointments due to mobility difficulties. Uses [facility].    06/12/23 0916   Discharge Assessment   Assessment Type Discharge Planning Assessment   Confirmed/corrected address, phone number and insurance Yes   Confirmed Demographics Correct on Facesheet   Source of Information patient   When was your last doctors appointment?   (pcp [facility], Dr. Harris oncology, Dr. Vo Urology)   Communicated CALVIN with patient/caregiver Date not available/Unable to determine   People in Home spouse   Do you expect to return to your current living situation? Other (see comments)  (rehab order pending PT/ OT eval)   Do you have help at home or someone to help you manage your care at home? Yes  (Lives with wife Lexis [phone])   Prior to hospitilization cognitive status: Unable to Assess   Current cognitive status: Alert/Oriented   Walking or Climbing Stairs ambulation difficulty, requires equipment   Mobility Management wheelchair, rollator, cane   Dressing/Bathing bathing difficulty, assistance 1 person   Dressing/Bathing Management bed baths. has shower chair and bedside commode but unable to use at this time   Home Accessibility stairs to enter home   Number of Stairs, Main Entrance five   Home Layout Able to live on 1st floor   Equipment Currently Used at Home wheelchair;walker, rolling;cane, straight;CPAP;shower chair;bedside commode   Readmission within 30 days? No   Patient currently being followed by outpatient case " management? No   Do you currently have service(s) that help you manage your care at home? Yes   Name and Contact number of agency reported having HH but could not recall agency name   Is the pt/caregiver preference to resume services with current agency   (interested in rehab)   Do you take prescription medications? Yes   Do you have prescription coverage? Yes   Coverage Medicare   Do you have any problems affording any of your prescribed medications? No   Is the patient taking medications as prescribed? yes   Who is going to help you get home at discharge? unsure due to mobility and unknown dc disposition   How do you get to doctors appointments? other (see comments)  (unable to get to appointments at present due to mobility problems)   Are you on dialysis? No   Do you take coumadin? No   Discharge Plan A Rehab   DME Needed Upon Discharge  other (see comments)  (TBD)   Discharge Plan discussed with: Patient   Name(s) and Number(s) Lexis [phone]   Transition of Care Barriers None   OTHER   Name(s) of People in Home wife Lexis

## 2023-06-12 NOTE — CONSULTS
Valentino Manning 1950  [id number]  6/12/2023    CONSULTING PHYSICIAN: Kyung Dias NP    REASON FOR CONSULTATION: Urinary retention    HPI:  The patient is a 72-year-old male with a past medical history of hypertension, hyperlipidemia, CAD status post stent on Plavix, PE on Eliquis, anemia, GERD, multiple myeloma on chemotherapy (initiated in December 2022) who presented to the emergency room yesterday with complaints of numbness and weakness to bilateral hands and lower extremities which has been progressively worsening over the last 6 months.  He has been unable to ambulate due to worsening paresthesias in generalized weakness for 3 weeks and is now bedbound.  He reports a decline since initiating chemotherapy and subsequent COVID-19 in February of 2023.  He also recently began with urinary retention. Urology has been consulted for urinary retention.    Lab work this morning reveals WBC 5.02, H&H 11.5/35.0, sodium 135, potassium 3.2, BUN and creatinine 12.0/0.89; UA from yesterday with turbid dark yellow urine, positive nitrites, 2+ leukocytes, 11 RBC, 67 WBC, 1+ bacteria, few yeast. UC is pending. He is on levaquin. MRI C/T/L pending.  He has been afebrile and hemodynamically stable since arrival. No urine output documented overnight.     He was evaluated at an outlying emergency room on 06/07/2023 and Duenas catheter was placed, No evidence of UTI on UA from that time. He was supposed to follow-up with Dr. Vo in the office today for voiding trial. He was on Flomax a few years ago but has not been taking it recently.  He reports an increase in his PSA to 12, however it has since decreased back to about 4.  His urinary difficulty started about 3 weeks ago.  He feels it is about the same time as his mobility significantly worsened.  He denies any discomfort from the Duenas.    Past Medical History:   Diagnosis Date    Anemia     GERD (gastroesophageal reflux disease)     Heart problem     Hyperlipidemia      Hypertension     Multiple myeloma     NSTEMI (non-ST elevated myocardial infarction)      Past Surgical History:   Procedure Laterality Date    BONE MARROW ASPIRATION N/A 9/1/2022    Procedure: ASPIRATION, BONE MARROW;  Surgeon: Robbie Gardner MD;  Location: [facility];  Service: General;  Laterality: N/A;    CARDIAC SURGERY  2021    ENDOSCOPIC RELEASE OF BOTH CARPAL TUNNELS       Family History   Problem Relation Age of Onset    Hypertension Mother     Diabetes Mother     Anemia Mother     Heart disease Mother     Heart disease Father      Social History     Tobacco Use    Smoking status: Former     Types: Cigarettes    Smokeless tobacco: Former   Substance Use Topics    Alcohol use: Not Currently    Drug use: Never     Current Facility-Administered Medications   Medication Dose Route Frequency Provider Last Rate Last Admin    acetaminophen tablet 650 mg  650 mg Oral Q6H PRN Rebekah Foss AGACNP-BC        aluminum-magnesium hydroxide-simethicone 200-200-20 mg/5 mL suspension 30 mL  30 mL Oral QID PRN Rebekah Foss, AGACNP-BC        levoFLOXacin tablet 500 mg  500 mg Oral Daily Rebekah Foss AGACNP-BC        LORazepam injection 0.5 mg  0.5 mg Intravenous Once Kyung Dias AGACNP-BC        melatonin tablet 6 mg  6 mg Oral Nightly PRN Rebekah Foss AGACNP-BC        naloxone 0.4 mg/mL injection 0.02 mg  0.02 mg Intravenous PRN Rebekah Foss, AGACNP-BC        ondansetron injection 4 mg  4 mg Intravenous Q4H PRN Rebekah Foss AGACNP-BC        polyethylene glycol packet 17 g  17 g Oral BID PRN Rebekah Foss AGACNP-BC        prochlorperazine injection Soln 5 mg  5 mg Intravenous Q6H PRN Rebekah Foss AGACNP-BC        simethicone chewable tablet 80 mg  1 tablet Oral QID PRN Rebekah Foss AGACNP-BC        zolpidem tablet 10 mg  10 mg Oral QHS Jonathan Calzada MD   10 mg at 06/12/23 0125     Current Outpatient Medications   Medication Sig Dispense Refill    amLODIPine (NORVASC) 10 MG tablet  once daily.      apixaban (ELIQUIS) 5 mg Tab Take 1 tablet (5 mg total) by mouth 2 (two) times daily. 60 tablet 6    ascorbic acid, vitamin C, (VITAMIN C) 1000 MG tablet Take 1,000 mg by mouth once daily.      atorvastatin (LIPITOR) 40 MG tablet TAKE 1 TABLET ORALLY ONCE DAILY ON MON/TUE/THUR/FRI AND 1/2 ON WED. NONE ON SAT/SUN      b complex vitamins capsule Take 1 capsule by mouth once daily.      clopidogreL (PLAVIX) 75 mg tablet Take 75 mg by mouth once daily.      doxazosin (CARDURA) 4 MG tablet Take 4 mg by mouth once daily.      DULoxetine (CYMBALTA) 30 MG capsule Take 1 capsule (30 mg total) by mouth once daily. 30 capsule 1    dutasteride (AVODART) 0.5 mg capsule Take 0.5 mg by mouth once daily.      FERRETTS 325 mg (106 mg iron) Tab Take 1 tablet by mouth 2 (two) times daily.      HYDROcodone-acetaminophen (NORCO)  mg per tablet Take 1 tablet by mouth every 4 (four) hours as needed for Pain. 75 tablet 0    LINZESS 145 mcg Cap capsule Take 145 mcg by mouth.      lisinopriL (PRINIVIL,ZESTRIL) 40 MG tablet Take 40 mg by mouth once daily.      metoprolol tartrate (LOPRESSOR) 25 MG tablet Take 25 mg by mouth 2 (two) times daily. Takes 1 tab q am      testosterone cypionate (DEPOTESTOTERONE CYPIONATE) 200 mg/mL injection INJECT 1ML INTO MUSCLE ONCE A WEEK      zolpidem (AMBIEN) 10 mg Tab Take 10 mg by mouth nightly as needed.      albuterol (PROAIR HFA) 90 mcg/actuation inhaler Inhale 2 puffs into the lungs every 6 (six) hours as needed for Wheezing. Rescue 8 g 0    diphenoxylate-atropine 2.5-0.025 mg (LOMOTIL) 2.5-0.025 mg per tablet Take 1 tablet by mouth 4 (four) times daily as needed for Diarrhea. 30 tablet 0    ergocalciferol (ERGOCALCIFEROL) 50,000 unit Cap Take 1 capsule (50,000 Units total) by mouth every 7 days. 7 capsule 0    furosemide (LASIX) 20 MG tablet       gabapentin (NEURONTIN) 300 MG capsule Take 1 capsule (300 mg total) by mouth 3 (three) times daily. 90 capsule 3    glutamine 10 gram  PwPk Take 10 g by mouth 2 (two) times a day.      hydrocortisone (CORTEF) 20 MG Tab TAKE ONE TABLET BY MOUTH TWICE DAILY X 14 DAYS      lactulose (CHRONULAC) 10 gram/15 mL solution GIVE 30ML BY MOUTH TWICE A DAY X7 DAY(S)      ondansetron (ZOFRAN) 4 MG tablet Take 4 mg by mouth every 8 (eight) hours as needed for Nausea.      pantoprazole (PROTONIX) 40 MG tablet Take 1 tablet (40 mg total) by mouth once daily. for 30 doses 30 tablet 1    promethazine (PHENERGAN) 25 MG tablet TAKE 1 TABLET BY MOUTH EVERY 4 HOURS AS NEEDED FOR MOTION SICKNESS      pyridoxine, vitamin B6, (B-6) 250 MG Tab Take 250 mg by mouth once daily.      SLOW RELEASE IRON 140 mg (45 mg iron) TbSR Take 1 tablet by mouth 2 (two) times daily.      sulfamethoxazole-trimethoprim 800-160mg (BACTRIM DS) 800-160 mg Tab Take 1 tablet by mouth As instructed. Tab 1 PO Daily M-W-F       Review of patient's allergies indicates:   Allergen Reactions    Penicillins        ROS: 12 point review of systems negative other than the HPI    PHYSICAL EXAM:  Vitals:    06/12/23 0628 06/12/23 0643 06/12/23 0658 06/12/23 0753   BP:   139/78    BP Location:       Patient Position:       Pulse:  (!) 59     Resp:   19 18   Temp:       SpO2: 100%      Weight:       Height:         No intake or output data in the 24 hours ending 06/12/23 1005    GEN: WN/WD NAD  HEENT: normocephalic, atraumatic, PERRLA, EOMI, OP clear, nares patent  CV: RRR  RESP: Even and unlabored  ABD: soft, NTND  : cloudy yellow urine draining to  bag  EXT: no C/C/E  NEURO: AAOx4. Bilateral hand weakness (R>L); moves distal lower extremities against some resistance    LABS:  Recent Results (from the past 24 hour(s))   Comprehensive metabolic panel    Collection Time: 06/11/23  6:22 PM   Result Value Ref Range    Sodium Level 140 136 - 145 mmol/L    Potassium Level 3.7 3.5 - 5.1 mmol/L    Chloride 106 98 - 107 mmol/L    Carbon Dioxide 24 23 - 31 mmol/L    Glucose Level 102 82 - 115 mg/dL    Blood Urea  Nitrogen 11.6 8.4 - 25.7 mg/dL    Creatinine 0.88 0.73 - 1.18 mg/dL    Calcium Level Total 9.5 8.8 - 10.0 mg/dL    Protein Total 6.5 5.8 - 7.6 gm/dL    Albumin Level 3.6 3.4 - 4.8 g/dL    Globulin 2.9 2.4 - 3.5 gm/dL    Albumin/Globulin Ratio 1.2 1.1 - 2.0 ratio    Bilirubin Total 0.6 <=1.5 mg/dL    Alkaline Phosphatase 122 40 - 150 unit/L    Alanine Aminotransferase 33 0 - 55 unit/L    Aspartate Aminotransferase 22 5 - 34 unit/L    eGFR >60 mls/min/1.73/m2   Magnesium    Collection Time: 06/11/23  6:22 PM   Result Value Ref Range    Magnesium Level 1.60 1.60 - 2.60 mg/dL   CBC with Differential    Collection Time: 06/11/23  6:22 PM   Result Value Ref Range    WBC 5.54 4.50 - 11.50 x10(3)/mcL    RBC 3.79 (L) 4.70 - 6.10 x10(6)/mcL    Hgb 12.6 (L) 14.0 - 18.0 g/dL    Hct 38.0 (L) 42.0 - 52.0 %    .3 (H) 80.0 - 94.0 fL    MCH 33.2 (H) 27.0 - 31.0 pg    MCHC 33.2 33.0 - 36.0 g/dL    RDW 14.8 11.5 - 17.0 %    Platelet 355 130 - 400 x10(3)/mcL    MPV 8.8 7.4 - 10.4 fL    Neut % 64.2 %    Lymph % 24.2 %    Mono % 10.3 %    Eos % 0.7 %    Basophil % 0.2 %    Lymph # 1.34 0.6 - 4.6 x10(3)/mcL    Neut # 3.56 2.1 - 9.2 x10(3)/mcL    Mono # 0.57 0.1 - 1.3 x10(3)/mcL    Eos # 0.04 0 - 0.9 x10(3)/mcL    Baso # 0.01 <=0.2 x10(3)/mcL    IG# 0.02 0 - 0.04 x10(3)/mcL    IG% 0.4 %    NRBC% 0.0 %   Urinalysis, Reflex to Urine Culture    Collection Time: 06/11/23 10:24 PM    Specimen: Urine   Result Value Ref Range    Color, UA Dark Yellow Yellow, Light-Yellow, Dark Yellow, Mckenna, Straw    Appearance, UA Turbid (A) Clear    Specific Gravity, UA 1.021 1.005 - 1.030    pH, UA 6.0 5.0 - 8.5    Protein, UA 2+ (A) Negative mg/dL    Glucose, UA Negative Negative, Normal mg/dL    Ketones, UA Negative Negative mg/dL    Blood, UA 3+ (A) Negative unit/L    Bilirubin, UA Negative Negative mg/dL    Urobilinogen, UA 1.0 0.2, 1.0, Normal mg/dL    Nitrites, UA Positive (A) Negative    Leukocyte Esterase, UA 2+ (A) Negative unit/L   Urinalysis,  Microscopic    Collection Time: 06/11/23 10:24 PM   Result Value Ref Range    RBC, UA 11 (H) <=5 /HPF    WBC, UA 67 (H) <=5 /HPF    Squamous Epithelial Cells, UA <5 <=5 /HPF    Bacteria, UA 1+ (A) None Seen, Rare, Occasional /HPF    Yeast, UA Few (A) None Seen /HPF   Troponin I    Collection Time: 06/11/23 11:01 PM   Result Value Ref Range    Troponin-I <0.010 0.000 - 0.045 ng/mL   Comprehensive Metabolic Panel (CMP)    Collection Time: 06/12/23  3:52 AM   Result Value Ref Range    Sodium Level 135 (L) 136 - 145 mmol/L    Potassium Level 3.2 (L) 3.5 - 5.1 mmol/L    Chloride 104 98 - 107 mmol/L    Carbon Dioxide 23 23 - 31 mmol/L    Glucose Level 97 82 - 115 mg/dL    Blood Urea Nitrogen 12.0 8.4 - 25.7 mg/dL    Creatinine 0.89 0.73 - 1.18 mg/dL    Calcium Level Total 9.4 8.8 - 10.0 mg/dL    Protein Total 6.3 5.8 - 7.6 gm/dL    Albumin Level 3.1 (L) 3.4 - 4.8 g/dL    Globulin 3.2 2.4 - 3.5 gm/dL    Albumin/Globulin Ratio 1.0 (L) 1.1 - 2.0 ratio    Bilirubin Total 0.5 <=1.5 mg/dL    Alkaline Phosphatase 107 40 - 150 unit/L    Alanine Aminotransferase 26 0 - 55 unit/L    Aspartate Aminotransferase 16 5 - 34 unit/L    eGFR >60 mls/min/1.73/m2   Magnesium    Collection Time: 06/12/23  3:52 AM   Result Value Ref Range    Magnesium Level 1.50 (L) 1.60 - 2.60 mg/dL   Phosphorus    Collection Time: 06/12/23  3:52 AM   Result Value Ref Range    Phosphorus Level 3.1 2.3 - 4.7 mg/dL   CBC with Differential    Collection Time: 06/12/23  3:52 AM   Result Value Ref Range    WBC 5.02 4.50 - 11.50 x10(3)/mcL    RBC 3.50 (L) 4.70 - 6.10 x10(6)/mcL    Hgb 11.5 (L) 14.0 - 18.0 g/dL    Hct 35.0 (L) 42.0 - 52.0 %    .0 (H) 80.0 - 94.0 fL    MCH 32.9 (H) 27.0 - 31.0 pg    MCHC 32.9 (L) 33.0 - 36.0 g/dL    RDW 14.9 11.5 - 17.0 %    Platelet 296 130 - 400 x10(3)/mcL    MPV 8.8 7.4 - 10.4 fL    Neut % 60.8 %    Lymph % 24.9 %    Mono % 13.1 %    Eos % 0.6 %    Basophil % 0.2 %    Lymph # 1.25 0.6 - 4.6 x10(3)/mcL    Neut # 3.05 2.1 -  9.2 x10(3)/mcL    Mono # 0.66 0.1 - 1.3 x10(3)/mcL    Eos # 0.03 0 - 0.9 x10(3)/mcL    Baso # 0.01 <=0.2 x10(3)/mcL    IG# 0.02 0 - 0.04 x10(3)/mcL    IG% 0.4 %    NRBC% 0.0 %       IMAGING:  none    ASSESSMENT:  -Urinary retention s/p gongora insertion 6/7/2023  -UTI - UC in progress; on levaquin  -BPH  -multiple myeloma on chemotherapy  -BUE/BLEs weakess, paresthesias - MRI C/T/L pending  -h/o CAD and PE on plavix and eliquis    PLAN:  -Continue gongora for now until mobility improves. May be able to offer voiding trial prior to discharge  -flomax started  -Continue antibiotics and tailor according to final C&S  -Following      Mckenna Auguste NP

## 2023-06-12 NOTE — ED NOTES
Charge nurse at bedside assessing the left hand swelling. Pt denies sob or chest pain. MD gregory notified

## 2023-06-12 NOTE — H&P
[facility] Hospital Medicine History & Physical Examination       Patient Name: Valentino Manning  MRN: [id number]  Patient Class: IP- Inpatient   Admission Date: 06/12/2023   Admitting Service: Hospital Medicine   Length of Stay: 1  Attending Physician: Gabby Carver DO  Primary Care Provider: Kaiser Metcalf MD  Face-to-Face encounter date: 06/12/2023  Code Status: Full  Chief Complaint: Numbness (Pt presents to er for numbness to bilateral hands and fingers, and bilateral numbness from knees down.  States has been going on for weeks.  Hx of multiple myeloma.)    Source of Information: Patient. Medical Records      HISTORY OF PRESENT ILLNESS:   Valentino Manning is a 72 y.o. male with a PMHx of HTN, HLD, CAD s/p stent on Plavix, chronic anemia, GERD, multiple myeloma on chemotherapym hx of PE on Eliquis who presented to [facility] on 6/11/2023 with c/o numbness to bilateral hands and bilateral lower extremities with associated weakness has been worsening over the past 6 months.  Patient reports that he was now unable to ambulate due to worsening paresthesias and generalized weakness x3 weeks.  He was now bed-bound.  He endorsed falling about 4 times over the past 3 weeks, denied LOC or head injury.  He reports he was in his normal state of health prior to initiating chemotherapy in December 2022.  He then had COVID-19 in February 2023 and has had a rapid decline since then.  He also endorsed urinary retention for which he was seen at an outlying ED on 6/5/23 and had a Duenas catheter placed, he was supposed to follow-up with urology on 06/12/2023. He was previously on gabapentin 300 mg t.i.d. for neuropathy without symptomatic relief, he states he was not taking it as directed.  He denied CP, SOB, fever, chills, cough, dizziness, syncope.  Patient reportedly saw his oncologist, Dr. Harris, for same complaints and he was referred to ED for further evaluation.     Initial ED VS stable.  Labs  notable for hemoglobin 12.6, hematocrit 38.  Urinalysis with 2+ protein, 3+ occult blood, positive nitrites, 2+ leukocytes, 11 RBCs, 67 WBCs, 1+ bacteria.  Urine culture pending.  He was started on oral Levaquin for suspected UTI.  He was admitted to hospital medicine services for further medical management.    REVIEW OF SYSTEMS:   Except as documented, all other systems reviewed and negative     PAST MEDICAL HISTORY:   Hypertension  Hyperlipidemia  CAD status post stents on Plavix  Anemia  GERD   Multiple myeloma  Hx of COVID-19 (2/14/23)  Hx of PE on Eliquis     PAST SURGICAL HISTORY:   Bone marrow aspiration   Cardiac stents   Bilateral carpal tunnel   Colonoscopy    FAMILY HISTORY:   Father:  CKD, cardiovascular disease  Mother:  Cardiovascular disease    SOCIAL HISTORY:   Denied alcohol, tobacco or illicit drug use. Former smoker.  Lives at home with his wife, bed-bound currently.    ALLERGIES:   Penicillins    HOME MEDICATIONS:     Prior to Admission medications    Medication Sig Start Date End Date Taking? Authorizing Provider   amLODIPine (NORVASC) 10 MG tablet once daily. 7/6/22  Yes Historical Provider   apixaban (ELIQUIS) 5 mg Tab Take 1 tablet (5 mg total) by mouth 2 (two) times daily. 2/18/23  Yes Clayton Jaime MD   ascorbic acid, vitamin C, (VITAMIN C) 1000 MG tablet Take 1,000 mg by mouth once daily.   Yes Historical Provider   atorvastatin (LIPITOR) 40 MG tablet TAKE 1 TABLET ORALLY ONCE DAILY ON MON/TUE/THUR/FRI AND 1/2 ON WED. NONE ON SAT/SUN 5/5/22  Yes Historical Provider   b complex vitamins capsule Take 1 capsule by mouth once daily.   Yes Historical Provider   clopidogreL (PLAVIX) 75 mg tablet Take 75 mg by mouth once daily. 6/4/22  Yes Historical Provider   doxazosin (CARDURA) 4 MG tablet Take 4 mg by mouth once daily. 5/28/22  Yes Historical Provider   DULoxetine (CYMBALTA) 30 MG capsule Take 1 capsule (30 mg total) by mouth once daily. 5/24/23 5/23/24 Yes CINDY Early   dutasteride  (AVODART) 0.5 mg capsule Take 0.5 mg by mouth once daily. 5/2/22  Yes Historical Provider   FERRETTS 325 mg (106 mg iron) Tab Take 1 tablet by mouth 2 (two) times daily. 6/16/22  Yes Historical Provider   HYDROcodone-acetaminophen (NORCO)  mg per tablet Take 1 tablet by mouth every 4 (four) hours as needed for Pain. 5/19/23 6/18/23 Yes Angélica Harris MD   LINZESS 145 mcg Cap capsule Take 145 mcg by mouth. 10/31/22  Yes Historical Provider   lisinopriL (PRINIVIL,ZESTRIL) 40 MG tablet Take 40 mg by mouth once daily. 9/24/22  Yes Historical Provider   metoprolol tartrate (LOPRESSOR) 25 MG tablet Take 25 mg by mouth 2 (two) times daily. Takes 1 tab q am 6/9/22  Yes Historical Provider   testosterone cypionate (DEPOTESTOTERONE CYPIONATE) 200 mg/mL injection INJECT 1ML INTO MUSCLE ONCE A WEEK 10/4/22  Yes Historical Provider   zolpidem (AMBIEN) 10 mg Tab Take 10 mg by mouth nightly as needed. 6/15/22  Yes Historical Provider   albuterol (PROAIR HFA) 90 mcg/actuation inhaler Inhale 2 puffs into the lungs every 6 (six) hours as needed for Wheezing. Rescue 2/2/23 2/16/23  Rayne Crane MD   diphenoxylate-atropine 2.5-0.025 mg (LOMOTIL) 2.5-0.025 mg per tablet Take 1 tablet by mouth 4 (four) times daily as needed for Diarrhea. 6/7/23 6/6/24  Silvana Trujillo, CINDY   ergocalciferol (ERGOCALCIFEROL) 50,000 unit Cap Take 1 capsule (50,000 Units total) by mouth every 7 days. 2/22/23   Clayton Jaime MD   furosemide (LASIX) 20 MG tablet  1/13/23   Historical Provider   gabapentin (NEURONTIN) 300 MG capsule Take 1 capsule (300 mg total) by mouth 3 (three) times daily. 5/12/23 5/11/24  Angélica Harris MD   glutamine 10 gram PwPk Take 10 g by mouth 2 (two) times a day.    Historical Provider   hydrocortisone (CORTEF) 20 MG Tab TAKE ONE TABLET BY MOUTH TWICE DAILY X 14 DAYS 4/10/23   Historical Provider   lactulose (CHRONULAC) 10 gram/15 mL solution GIVE 30ML BY MOUTH TWICE A DAY X7 DAY(S) 12/5/22   Historical Provider    ondansetron (ZOFRAN) 4 MG tablet Take 4 mg by mouth every 8 (eight) hours as needed for Nausea.    Historical Provider   pantoprazole (PROTONIX) 40 MG tablet Take 1 tablet (40 mg total) by mouth once daily. for 30 doses 1/19/23 2/18/23  CINDY Early   promethazine (PHENERGAN) 25 MG tablet TAKE 1 TABLET BY MOUTH EVERY 4 HOURS AS NEEDED FOR MOTION SICKNESS 12/5/22   Historical Provider   pyridoxine, vitamin B6, (B-6) 250 MG Tab Take 250 mg by mouth once daily.    Historical Provider   SLOW RELEASE IRON 140 mg (45 mg iron) TbSR Take 1 tablet by mouth 2 (two) times daily. 12/5/22   Historical Provider   sulfamethoxazole-trimethoprim 800-160mg (BACTRIM DS) 800-160 mg Tab Take 1 tablet by mouth As instructed. Tab 1 PO Daily M-W-F    Historical Provider   acyclovir (ZOVIRAX) 400 MG tablet TAKE 1 TABLET BY MOUTH TWICE DAILY 1/9/23 6/12/23  CINDY Early     ________________________________________________________________________  INPATIENT LIST OF MEDICATIONS     Current Facility-Administered Medications:     acetaminophen tablet 650 mg, 650 mg, Oral, Q6H PRN, ANTOINETTE Lee    aluminum-magnesium hydroxide-simethicone 200-200-20 mg/5 mL suspension 30 mL, 30 mL, Oral, QID PRN, ANTOINETTE Lee    HYDROcodone-acetaminophen  mg per tablet 1 tablet, 1 tablet, Oral, ED 1 Time, Jefe Reyna MD    levoFLOXacin tablet 500 mg, 500 mg, Oral, Daily, ANTOINETTE Lee    melatonin tablet 6 mg, 6 mg, Oral, Nightly PRN, ANTOINETTE Lee    naloxone 0.4 mg/mL injection 0.02 mg, 0.02 mg, Intravenous, PRN, Rebekah G Pipo, AGACNP-BC    ondansetron injection 4 mg, 4 mg, Intravenous, Q4H PRN, Rebekah Foss, AGACNP-BC    polyethylene glycol packet 17 g, 17 g, Oral, BID PRN, Rebekah Foss, MARIBELCNP-BC    prochlorperazine injection Soln 5 mg, 5 mg, Intravenous, Q6H PRN, Rebekah Foss, MARIBELCNP-BC    simethicone chewable tablet 80 mg, 1 tablet, Oral, QID PRN, Rebekah Foss,  AGACNP-BC    zolpidem tablet 10 mg, 10 mg, Oral, QHS, Jonathan Calzada MD, 10 mg at 06/12/23 0125    Current Outpatient Medications:     amLODIPine (NORVASC) 10 MG tablet, once daily., Disp: , Rfl:     apixaban (ELIQUIS) 5 mg Tab, Take 1 tablet (5 mg total) by mouth 2 (two) times daily., Disp: 60 tablet, Rfl: 6    ascorbic acid, vitamin C, (VITAMIN C) 1000 MG tablet, Take 1,000 mg by mouth once daily., Disp: , Rfl:     atorvastatin (LIPITOR) 40 MG tablet, TAKE 1 TABLET ORALLY ONCE DAILY ON MON/TUE/THUR/FRI AND 1/2 ON WED. NONE ON SAT/SUN, Disp: , Rfl:     b complex vitamins capsule, Take 1 capsule by mouth once daily., Disp: , Rfl:     clopidogreL (PLAVIX) 75 mg tablet, Take 75 mg by mouth once daily., Disp: , Rfl:     doxazosin (CARDURA) 4 MG tablet, Take 4 mg by mouth once daily., Disp: , Rfl:     DULoxetine (CYMBALTA) 30 MG capsule, Take 1 capsule (30 mg total) by mouth once daily., Disp: 30 capsule, Rfl: 1    dutasteride (AVODART) 0.5 mg capsule, Take 0.5 mg by mouth once daily., Disp: , Rfl:     FERRETTS 325 mg (106 mg iron) Tab, Take 1 tablet by mouth 2 (two) times daily., Disp: , Rfl:     HYDROcodone-acetaminophen (NORCO)  mg per tablet, Take 1 tablet by mouth every 4 (four) hours as needed for Pain., Disp: 75 tablet, Rfl: 0    LINZESS 145 mcg Cap capsule, Take 145 mcg by mouth., Disp: , Rfl:     lisinopriL (PRINIVIL,ZESTRIL) 40 MG tablet, Take 40 mg by mouth once daily., Disp: , Rfl:     metoprolol tartrate (LOPRESSOR) 25 MG tablet, Take 25 mg by mouth 2 (two) times daily. Takes 1 tab q am, Disp: , Rfl:     testosterone cypionate (DEPOTESTOTERONE CYPIONATE) 200 mg/mL injection, INJECT 1ML INTO MUSCLE ONCE A WEEK, Disp: , Rfl:     zolpidem (AMBIEN) 10 mg Tab, Take 10 mg by mouth nightly as needed., Disp: , Rfl:     albuterol (PROAIR HFA) 90 mcg/actuation inhaler, Inhale 2 puffs into the lungs every 6 (six) hours as needed for Wheezing. Rescue, Disp: 8 g, Rfl: 0    diphenoxylate-atropine 2.5-0.025  mg (LOMOTIL) 2.5-0.025 mg per tablet, Take 1 tablet by mouth 4 (four) times daily as needed for Diarrhea., Disp: 30 tablet, Rfl: 0    ergocalciferol (ERGOCALCIFEROL) 50,000 unit Cap, Take 1 capsule (50,000 Units total) by mouth every 7 days., Disp: 7 capsule, Rfl: 0    furosemide (LASIX) 20 MG tablet, , Disp: , Rfl:     gabapentin (NEURONTIN) 300 MG capsule, Take 1 capsule (300 mg total) by mouth 3 (three) times daily., Disp: 90 capsule, Rfl: 3    glutamine 10 gram PwPk, Take 10 g by mouth 2 (two) times a day., Disp: , Rfl:     hydrocortisone (CORTEF) 20 MG Tab, TAKE ONE TABLET BY MOUTH TWICE DAILY X 14 DAYS, Disp: , Rfl:     lactulose (CHRONULAC) 10 gram/15 mL solution, GIVE 30ML BY MOUTH TWICE A DAY X7 DAY(S), Disp: , Rfl:     ondansetron (ZOFRAN) 4 MG tablet, Take 4 mg by mouth every 8 (eight) hours as needed for Nausea., Disp: , Rfl:     pantoprazole (PROTONIX) 40 MG tablet, Take 1 tablet (40 mg total) by mouth once daily. for 30 doses, Disp: 30 tablet, Rfl: 1    promethazine (PHENERGAN) 25 MG tablet, TAKE 1 TABLET BY MOUTH EVERY 4 HOURS AS NEEDED FOR MOTION SICKNESS, Disp: , Rfl:     pyridoxine, vitamin B6, (B-6) 250 MG Tab, Take 250 mg by mouth once daily., Disp: , Rfl:     SLOW RELEASE IRON 140 mg (45 mg iron) TbSR, Take 1 tablet by mouth 2 (two) times daily., Disp: , Rfl:     sulfamethoxazole-trimethoprim 800-160mg (BACTRIM DS) 800-160 mg Tab, Take 1 tablet by mouth As instructed. Tab 1 PO Daily M-W-F, Disp: , Rfl:     Scheduled Meds:   HYDROcodone-acetaminophen  1 tablet Oral ED 1 Time    levoFLOXacin  500 mg Oral Daily    zolpidem  10 mg Oral QHS     Continuous Infusions:  PRN Meds:.acetaminophen, aluminum-magnesium hydroxide-simethicone, melatonin, naloxone, ondansetron, polyethylene glycol, prochlorperazine, simethicone    PHYSICAL EXAM:     VITAL SIGNS: 24 HRS MIN & MAX LAST   Temp  Min: 98.1 °F (36.7 °C)  Max: 98.1 °F (36.7 °C) 98.1 °F (36.7 °C)   BP  Min: 123/74  Max: 152/86 139/78   Pulse  Min: 55   Max: 73  (!) 59   Resp  Min: 15  Max: 19 19   SpO2  Min: 97 %  Max: 100 % 100 %       General appearance: Well-developed  male in no apparent distress.  HENT: Atraumatic head. Moist mucous membranes of oral cavity.  Eyes: Normal extraocular movements.   Neck: Supple.   Lungs: Clear to auscultation bilaterally.   Heart: Regular rate and rhythm. S1 and S2 present. No pedal edema.  Abdomen: Soft, non-distended, non-tender.  Extremities: No cyanosis, clubbing, or edema.  Skin: No Rash.   Neuro: Motor and sensory exams grossly intact. Globally weak  Psych/mental status: Appropriate mood and affect. Responds appropriately to questions.     LABS AND IMAGING:     Recent Labs   Lab 06/11/23 1822 06/12/23  0352   WBC 5.54 5.02   RBC 3.79* 3.50*   HGB 12.6* 11.5*   HCT 38.0* 35.0*   .3* 100.0*   MCH 33.2* 32.9*   MCHC 33.2 32.9*   RDW 14.8 14.9    296   MPV 8.8 8.8       Recent Labs   Lab 06/11/23 1822 06/12/23  0352    135*   K 3.7 3.2*   CO2 24 23   BUN 11.6 12.0   CREATININE 0.88 0.89   CALCIUM 9.5 9.4   MG 1.60 1.50*   ALBUMIN 3.6 3.1*   ALKPHOS 122 107   ALT 33 26   AST 22 16   BILITOT 0.6 0.5       Microbiology Results (last 7 days)       Procedure Component Value Units Date/Time    Urine culture [[id number]] Collected: 06/11/23 2224    Order Status: Sent Specimen: Urine Updated: 06/12/23 0107             CV Ultrasound doppler venous legs bilat  There was no evidence of deep or superficial vein thrombosis in the   bilateral lower extremities.        ASSESSMENT & PLAN:   Polyneuropathy   Impaired mobility secondary to generalized weakness and neuropathy  Acute bacterial cystitis, POA   Multiple myeloma on chemotherapy  Essential HTN  Hx of PE on Eliquis  CAD s/p stent on Plavix  Chronic Anemia  Hx of GERD, HLD    Plan:  Oncology consulted, appreciate recommendations  PT/OT consulted to evaluate and treat  MRI C-Spine, T-Spine, L-spine ordered  CT Head ordered  Urology consulted, appreciate  "recommendations  Fall precautions  Resume appropriate home medications once updated  Case management consulted for discharge planning  Labs in AM      VTE Prophylaxis: Eliquis    Discharge Planning and Disposition: TBD    I, Kyung Dias, NP have reviewed and discussed the case with Gabby Carver, DO  Please see the attending MD's addendum for further assessment and plan.    Kyung Dias, AGACNP-BC  06/12/2023    _______________________________________________________________________________  MD Addendum:  For the patient encounter, I performed the substantive portion of the visit, I reviewed the NP/PA documentation, treatment plan, and medical decision making.  I had face to face time with this patient      Patient presents with complaints of numbness and weakness in bilateral hand and lower extremities that has progressively worsened in the past 6 months. About 3 weeks ago he went to get his "shot" and then had a very fast decline and functional status, he is now unable to stand and has significant upper and lower extremity neuropathy and weakness; he has no other problems at this time.  -----------------------------------------------------------    General: Appears comfortable, no acute distress.  Integumentary: Warm, dry, intact.  Neuro: awake, alert, oriented.  Increased symmetrical weakness o upper and lower extremities    Musculoskeletal: Purposeful movement noted.   Respiratory: No accessory muscle use. Breath sounds are equal.  Cardiovascular: Regular rate. No peripheral edema.     ----------------------------------------       Urinary retention --urologist has been consulted, recommendations reviewed for now continue Duenas catheter,  Flomax has been initiated     PT OT has been consulted and case management consulted for SNF      Multiple myeloma, anemia, neuropathy --MRI of the spine has been ordered with consideration for MRI of the brain.    Will consider neurosurgery/neurology consult pending " results.   All diagnosis and differential diagnosis have been reviewed; assessment and plan has been documented; I have personally reviewed the labs and test results that are presently available; I have reviewed the patients medication list; I have reviewed the consulting providers response and recommendations. I have reviewed or attempted to review medical records based upon their availability.    All of the patient and family questions have been addressed and answered. Patient's is agreeable to the above stated plan. I will continue to monitor closely and make adjustments to medical management as needed.      06/12/2023

## 2023-06-13 PROBLEM — R29.898 UPPER EXTREMITY WEAKNESS: Status: ACTIVE | Noted: 2023-06-13

## 2023-06-13 PROCEDURE — 25000003 PHARM REV CODE 250: Performed by: NURSE PRACTITIONER

## 2023-06-13 PROCEDURE — 21400001 HC TELEMETRY ROOM

## 2023-06-13 PROCEDURE — 25000003 PHARM REV CODE 250: Performed by: INTERNAL MEDICINE

## 2023-06-13 PROCEDURE — 99223 1ST HOSP IP/OBS HIGH 75: CPT | Mod: ,,, | Performed by: PSYCHIATRY & NEUROLOGY

## 2023-06-13 PROCEDURE — 99223 PR INITIAL HOSPITAL CARE,LEVL III: ICD-10-PCS | Mod: ,,, | Performed by: PSYCHIATRY & NEUROLOGY

## 2023-06-13 RX ORDER — CLOPIDOGREL BISULFATE 75 MG/1
75 TABLET ORAL DAILY
Status: DISCONTINUED | OUTPATIENT
Start: 2023-06-13 | End: 2023-06-15

## 2023-06-13 RX ORDER — ASCORBIC ACID 500 MG
1000 TABLET ORAL DAILY
Status: DISCONTINUED | OUTPATIENT
Start: 2023-06-13 | End: 2023-07-05 | Stop reason: HOSPADM

## 2023-06-13 RX ORDER — DULOXETIN HYDROCHLORIDE 30 MG/1
30 CAPSULE, DELAYED RELEASE ORAL DAILY
Status: DISCONTINUED | OUTPATIENT
Start: 2023-06-13 | End: 2023-07-05 | Stop reason: HOSPADM

## 2023-06-13 RX ORDER — ATORVASTATIN CALCIUM 40 MG/1
40 TABLET, FILM COATED ORAL EVERY OTHER DAY
Status: DISCONTINUED | OUTPATIENT
Start: 2023-06-14 | End: 2023-07-05 | Stop reason: HOSPADM

## 2023-06-13 RX ORDER — METOPROLOL TARTRATE 25 MG/1
25 TABLET, FILM COATED ORAL 2 TIMES DAILY
Status: DISCONTINUED | OUTPATIENT
Start: 2023-06-13 | End: 2023-07-05 | Stop reason: HOSPADM

## 2023-06-13 RX ORDER — DUTASTERIDE 0.5 MG/1
0.5 CAPSULE, LIQUID FILLED ORAL DAILY
Status: DISCONTINUED | OUTPATIENT
Start: 2023-06-13 | End: 2023-07-05 | Stop reason: HOSPADM

## 2023-06-13 RX ORDER — LANOLIN ALCOHOL/MO/W.PET/CERES
1 CREAM (GRAM) TOPICAL DAILY
Status: DISCONTINUED | OUTPATIENT
Start: 2023-06-13 | End: 2023-07-05 | Stop reason: HOSPADM

## 2023-06-13 RX ORDER — AMLODIPINE BESYLATE 5 MG/1
5 TABLET ORAL DAILY
Status: DISCONTINUED | OUTPATIENT
Start: 2023-06-14 | End: 2023-07-01

## 2023-06-13 RX ADMIN — APIXABAN 5 MG: 5 TABLET, FILM COATED ORAL at 08:06

## 2023-06-13 RX ADMIN — LEVOFLOXACIN 500 MG: 500 TABLET, FILM COATED ORAL at 08:06

## 2023-06-13 RX ADMIN — Medication 1000 MG: at 02:06

## 2023-06-13 RX ADMIN — HYDROCODONE BITARTRATE AND ACETAMINOPHEN 1 TABLET: 5; 325 TABLET ORAL at 08:06

## 2023-06-13 RX ADMIN — TAMSULOSIN HYDROCHLORIDE 0.4 MG: 0.4 CAPSULE ORAL at 08:06

## 2023-06-13 RX ADMIN — ZOLPIDEM TARTRATE 10 MG: 5 TABLET ORAL at 08:06

## 2023-06-13 NOTE — CONSULTS
[facility] - 5th Floor Med Surg  Neurology  Consult Note    Patient Name: Valentino Manning  MRN: [id number]  Admission Date: 6/11/2023  Hospital Length of Stay: 2 days  Code Status: Full Code   Attending Provider: Samy Loomis MD   Consulting Provider: Mallory Garcia [facility]  Primary Care Physician: Kaiser Metcalf MD  Principal Problem:<principal problem not specified>    Inpatient consult to Neurology  Consult performed by: CINDY Cole  Consult ordered by: Angélica Harris MD         Subjective:     Chief Complaint:       HPI:   72-year-old male with PMH HTN, HLD, CAD s/p PCI on Plavix, GERD, multiple myeloma on chemo (started 12/2022), PE on Eliquis who presented to ED on 06/11 for numbness and weakness to BUE and BLE.  Patient reports paresthesias began to bilateral hands last December when he started chemo, for which she attributed to his chemotherapy.  Patient reports paresthesias began to worsen as well as associated progressive weakness over the last 3 weeks and patient is now bed-bound.  Patient also having some urinary retention and is s/p indwelling catheter.  Patient denies saddle anesthesia.    CT head without unrevealing for acute intracranial abnormalities.  MRI C-spine w w/o revealing for C4-5 with signal changes.  MRI T-spine w w/o unrevealing.  MRI L-spine w w/o revealing for severe multilevel degenerative canal stenosis.  Labs significant for UTI, otherwise unremarkable.       Past Medical History:   Diagnosis Date    Anemia     GERD (gastroesophageal reflux disease)     Heart problem     Hyperlipidemia     Hypertension     Multiple myeloma     NSTEMI (non-ST elevated myocardial infarction)        Past Surgical History:   Procedure Laterality Date    BONE MARROW ASPIRATION N/A 9/1/2022    Procedure: ASPIRATION, BONE MARROW;  Surgeon: Robbie Gardner MD;  Location: [facility];  Service: General;  Laterality: N/A;    CARDIAC SURGERY  2021    ENDOSCOPIC RELEASE OF BOTH  CARPAL TUNNELS         Review of patient's allergies indicates:   Allergen Reactions    Penicillins        Current Neurological Medications:     No current facility-administered medications on file prior to encounter.     Current Outpatient Medications on File Prior to Encounter   Medication Sig    amLODIPine (NORVASC) 10 MG tablet once daily.    apixaban (ELIQUIS) 5 mg Tab Take 1 tablet (5 mg total) by mouth 2 (two) times daily.    ascorbic acid, vitamin C, (VITAMIN C) 1000 MG tablet Take 1,000 mg by mouth once daily.    atorvastatin (LIPITOR) 40 MG tablet TAKE 1 TABLET ORALLY ONCE DAILY ON MON/TUE/THUR/FRI AND 1/2 ON WED. NONE ON SAT/SUN    b complex vitamins capsule Take 1 capsule by mouth once daily.    clopidogreL (PLAVIX) 75 mg tablet Take 75 mg by mouth once daily.    doxazosin (CARDURA) 4 MG tablet Take 4 mg by mouth once daily.    DULoxetine (CYMBALTA) 30 MG capsule Take 1 capsule (30 mg total) by mouth once daily.    dutasteride (AVODART) 0.5 mg capsule Take 0.5 mg by mouth once daily.    FERRETTS 325 mg (106 mg iron) Tab Take 1 tablet by mouth 2 (two) times daily.    HYDROcodone-acetaminophen (NORCO)  mg per tablet Take 1 tablet by mouth every 4 (four) hours as needed for Pain.    LINZESS 145 mcg Cap capsule Take 145 mcg by mouth.    lisinopriL (PRINIVIL,ZESTRIL) 40 MG tablet Take 40 mg by mouth once daily.    metoprolol tartrate (LOPRESSOR) 25 MG tablet Take 25 mg by mouth 2 (two) times daily. Takes 1 tab q am    testosterone cypionate (DEPOTESTOTERONE CYPIONATE) 200 mg/mL injection INJECT 1ML INTO MUSCLE ONCE A WEEK    zolpidem (AMBIEN) 10 mg Tab Take 10 mg by mouth nightly as needed.    albuterol (PROAIR HFA) 90 mcg/actuation inhaler Inhale 2 puffs into the lungs every 6 (six) hours as needed for Wheezing. Rescue    diphenoxylate-atropine 2.5-0.025 mg (LOMOTIL) 2.5-0.025 mg per tablet Take 1 tablet by mouth 4 (four) times daily as needed for Diarrhea.    ergocalciferol  (ERGOCALCIFEROL) 50,000 unit Cap Take 1 capsule (50,000 Units total) by mouth every 7 days.    furosemide (LASIX) 20 MG tablet     gabapentin (NEURONTIN) 300 MG capsule Take 1 capsule (300 mg total) by mouth 3 (three) times daily.    glutamine 10 gram PwPk Take 10 g by mouth 2 (two) times a day.    hydrocortisone (CORTEF) 20 MG Tab TAKE ONE TABLET BY MOUTH TWICE DAILY X 14 DAYS    lactulose (CHRONULAC) 10 gram/15 mL solution GIVE 30ML BY MOUTH TWICE A DAY X7 DAY(S)    ondansetron (ZOFRAN) 4 MG tablet Take 4 mg by mouth every 8 (eight) hours as needed for Nausea.    pantoprazole (PROTONIX) 40 MG tablet Take 1 tablet (40 mg total) by mouth once daily. for 30 doses    promethazine (PHENERGAN) 25 MG tablet TAKE 1 TABLET BY MOUTH EVERY 4 HOURS AS NEEDED FOR MOTION SICKNESS    pyridoxine, vitamin B6, (B-6) 250 MG Tab Take 250 mg by mouth once daily.    SLOW RELEASE IRON 140 mg (45 mg iron) TbSR Take 1 tablet by mouth 2 (two) times daily.    sulfamethoxazole-trimethoprim 800-160mg (BACTRIM DS) 800-160 mg Tab Take 1 tablet by mouth As instructed. Tab 1 PO Daily M-W-F     Family History       Problem Relation (Age of Onset)    Anemia Mother    Diabetes Mother    Heart disease Mother, Father    Hypertension Mother          Tobacco Use    Smoking status: Former     Types: Cigarettes    Smokeless tobacco: Former   Substance and Sexual Activity    Alcohol use: Not Currently    Drug use: Never    Sexual activity: Not on file     Review of Systems  A 14pt ros was reviewed & is negative unless o/w documented in the hpi    Objective:     Vital Signs (Most Recent):  Temp: 98.2 °F (36.8 °C) (06/13/23 1100)  Pulse: 60 (06/13/23 1100)  Resp: 18 (06/13/23 1100)  BP: 127/84 (06/13/23 1100)  SpO2: 98 % (06/13/23 1100) Vital Signs (24h Range):  Temp:  [97.7 °F (36.5 °C)-98.6 °F (37 °C)] 98.2 °F (36.8 °C)  Pulse:  [54-82] 60  Resp:  [18-20] 18  SpO2:  [98 %-99 %] 98 %  BP: (126-175)/(70-88) 127/84     Weight: 88.5 kg (195  lb)  Body mass index is 26.45 kg/m².     Physical Exam  Vitals reviewed.   Constitutional:       General: He is awake.      Appearance: Normal appearance.   HENT:      Head: Normocephalic and atraumatic.      Nose: Nose normal.      Mouth/Throat:      Mouth: Mucous membranes are moist.      Pharynx: Oropharynx is clear.   Eyes:      Extraocular Movements: Extraocular movements intact and EOM normal.      Pupils: Pupils are equal, round, and reactive to light.   Pulmonary:      Effort: Pulmonary effort is normal.   Skin:     General: Skin is warm and dry.   Neurological:      Mental Status: He is alert and oriented to person, place, and time.      Deep Tendon Reflexes:      Reflex Scores:       Bicep reflexes are 2+ on the right side and 2+ on the left side.       Brachioradialis reflexes are 2+ on the right side and 2+ on the left side.       Patellar reflexes are 2+ on the right side and 2+ on the left side.       Achilles reflexes are 2+ on the right side and 2+ on the left side.  Psychiatric:         Mood and Affect: Mood normal.         Speech: Speech normal.         Behavior: Behavior normal. Behavior is cooperative.         Thought Content: Thought content normal.         Judgment: Judgment normal.        NEUROLOGICAL EXAMINATION:     MENTAL STATUS   Oriented to person, place, and time.   Follows 3 step commands.   Attention: normal. Concentration: normal.   Speech: speech is normal   Level of consciousness: alert  Knowledge: good.   Normal comprehension.     CRANIAL NERVES     CN II   Visual fields full to confrontation.     CN III, IV, VI   Pupils are equal, round, and reactive to light.  Extraocular motions are normal.   Nystagmus: none   Conjugate gaze: present    CN V   Facial sensation intact.     CN VII   Facial expression full, symmetric.     MOTOR EXAM     Strength   Right deltoid: 3/5  Left deltoid: 3/5  Right biceps: 3/5  Left biceps: 3/5  Right triceps: 3/5  Left triceps: 3/5  Right wrist flexion:  2/5  Left wrist flexion: 2/5  Right wrist extension: 2/5  Left wrist extension: 2/5  Right quadriceps: 3/5  Left quadriceps: 3/5  Right hamstring: 3/5  Left hamstring: 3/5  Right glutei: 3/5  Left glutei: 3/5  Right anterior tibial: 3/5  Left anterior tibial: 3/5  Right posterior tibial: 3/5  Left posterior tibial: 3/5    REFLEXES     Reflexes   Right brachioradialis: 2+  Left brachioradialis: 2+  Right biceps: 2+  Left biceps: 2+  Right patellar: 2+  Left patellar: 2+  Right achilles: 2+  Left achilles: 2+  Right plantar: upgoing  Left plantar: upgoing  Right Swann: present  Left Swann: present  Right ankle clonus: absent  Left ankle clonus: absent    SENSORY EXAM   Right arm light touch: decreased from wrist  Left arm light touch: decreased from wrist  Right leg light touch: normal  Left leg light touch: normal    Significant Labs:   Recent Lab Results       None            Significant Imaging:   CT head w/o 6/11/2023:  FINDINGS:  There is no intracranial mass or lesion seen.  No hemorrhage is seen.  No infarct is seen.  The ventricles and basilar cisterns appear normal.  Brain parenchyma appears grossly unremarkable.     Posterior fossa appears normal.  The calvarium is intact.  There is mucosal thickening seen in the frontal ethmoid sphenoid and maxillary sinuses..     Impression:     Sinusitis     Otherwise unremarkable    MRI c-spine w w/o 6/12/2023:  FINDINGS:  Evaluation is limited by motion artifact.  There is grade 1 retrolisthesis of C3 over C4, grade 1 anterolisthesis of C4 over C5 and grade 1 retrolisthesis of C5 over C6.  There is mild enhancement in the C4-C5 and C5-C6 facets.  There are multilevel degenerate endplate changes.     There is increased T2 signal in the cord at C4-C5.  There is no epidural fluid collection.     There is mild enhancement around the C4-C5 and C5-C6 facet joints.  There is edema in the interspinous ligament and ligamentum flavum at C4-C5.     Disc level analysis as  follows:     C2-C3: Posterior disc osteophyte complex and thickening of the ligamentum flavum with mild narrowing of the spinal canal.  Mild left neural foraminal stenosis secondary to uncovertebral and facet hypertrophy.     C3-C4: Grade 1 retrolisthesis of C3 over C4 with posterior disc osteophyte complex, thickening of the ligamentum flavum and moderate narrowing of the spinal canal flattening the ventral cord.  Moderate right and severe left neural foraminal stenosis secondary to uncovertebral and facet hypertrophy.     C4-C5: Grade 1 anterolisthesis of C4 over C5 and thickening of ligamentum flavum with severe narrowing of the spinal canal and flattening of the cord.  Severe bilateral neural foraminal stenosis secondary to uncovertebral facet hypertrophy.     C5-C6: Grade 1 retrolisthesis of C5 over C6 with posterior disc osteophyte complex, thickening of the ligamentum flavum in severe narrowing of the spinal canal with flattening of the cord.  Severe bilateral neural foraminal stenosis secondary to uncovertebral and facet hypertrophy.     C6-C7: Posterior disc osteophyte complex with mild narrowing of the spinal canal.  Moderate bilateral foraminal stenosis secondary to uncovertebral and facet hypertrophy.     C7-T1: Posterior disc osteophyte complex.  No significant spinal canal stenosis.  Moderate bilateral foraminal stenosis secondary to uncovertebral and facet hypertrophy.     Impression:     1. Severe degenerative narrowing of the spinal canal at C4-C5 and C5-C6, moderate at C3-C4, mild at other levels.  2. Multilevel neural foraminal stenoses as described  3. T2 signal changes in the cord at C4-C5 may be secondary to a compressive myelopathy.  4. Mild inflammatory changes of the bilateral C4-C5 and C5-C6 facets, may be degenerative.  No drainable fluid collection.    MRI t-spine w w/o 6/12/2023:  FINDINGS:  Thoracic alignment is preserved.  The vertebral body heights are maintained.  There is no bone  marrow edema.     There is no cord signal abnormality.  There is no epidural fluid collection.     There are multilevel degenerative changes with marginal osteophytes, disc bulges and facet hypertrophy.  There is mild narrowing of the canal most evident at T7-T10.  There is mild multilevel neural foraminal narrowing, most evident on the right at T5-T6 and on the left at T11-T12.     The paraspinal soft tissues are unremarkable.     Impression:     1. No acute abnormality of the thoracic spine.  2. Mild multilevel canal and neural foraminal narrowing.  3. No cord signal abnormality.    MRI l-spine w w/o 6/12/2023:  FINDINGS:  There are 5 non-rib-bearing lumbar type vertebral bodies.  There is a leftward scoliotic curvature of the lumbar spine with minimal retrolisthesis of L2 over L3.  There are multilevel degenerate endplate changes.     The conus terminates at the level of L1.  It is normal in signal and contour.  There is redundancy of the cauda equina nerve roots.  There is focal enhancement in the cauda equina nerve roots at the level of L3-L4 and L4-L5, likely related to stenoses at these levels.  There is no epidural fluid collection.     Disc spaces, spinal canal and neural foramina are as follows:     L1-L2: Disc bulge and facet hypertrophy with minimal narrowing of the spinal canal.  Mild bilateral foraminal stenosis.     L2-L3: Minimal retrolisthesis of L2 over L3.  Disc height loss with posterior disc osteophyte complex, facet hypertrophy and moderate narrowing of the spinal canal.  Moderate bilateral neural foraminal stenosis.     L3-L4: Disc height loss with posterior disc osteophyte complex, facet hypertrophy and severe narrowing of the spinal canal.  Moderate right and moderate to severe left neural foraminal stenosis.     L4-L5: Disc height loss with posterior disc osteophyte complex, facet hypertrophy and severe narrowing of the spinal canal.  Severe right and mild left neural foraminal stenosis.      L5-S1: Posterior disc osteophyte complex, facet hypertrophy and mild narrowing of the spinal canal.  Mild bilateral neural foraminal stenosis.     There are mild inflammatory changes around the facet joints at L4 through S1, likely degenerative.     Impression:     1. Severe degenerative spinal canal stenosis at L3-L4 and L4-L5, moderate at L2-L3, mild at L5-S1.  2. Multilevel neural foraminal stenoses as described.    I have reviewed all pertinent imaging results/findings within the past 24 hours.    Assessment and Plan:     Upper extremity weakness  B/l upper and lower extremity paresthesias and weakness with associated urinary retention    -neurosurgery consulted  -fall precautions          VTE Risk Mitigation (From admission, onward)         Ordered     Reason for No Pharmacological VTE Prophylaxis  Once        Question:  Reasons:  Answer:  Already adequately anticoagulated on oral Anticoagulants    06/12/23 0135     IP VTE HIGH RISK PATIENT  Once         06/12/23 0135     Place sequential compression device  Until discontinued         06/12/23 0135                Thank you for your consult. Further recommendations to follow per MD Mallory Garcia, [facility]  Inpatient Neurology  [facility] - 5th Floor Med Surg

## 2023-06-13 NOTE — PT/OT/SLP PROGRESS
Occupational Therapy   Treatment    Name: Valentino Manning  MRN: [id number]    Pending neurosx recs. OT will f/u as appropriate.

## 2023-06-13 NOTE — SUBJECTIVE & OBJECTIVE
Past Medical History:   Diagnosis Date    Anemia     GERD (gastroesophageal reflux disease)     Heart problem     Hyperlipidemia     Hypertension     Multiple myeloma     NSTEMI (non-ST elevated myocardial infarction)        Past Surgical History:   Procedure Laterality Date    BONE MARROW ASPIRATION N/A 9/1/2022    Procedure: ASPIRATION, BONE MARROW;  Surgeon: Robbie Gardner MD;  Location: [facility];  Service: General;  Laterality: N/A;    CARDIAC SURGERY  2021    ENDOSCOPIC RELEASE OF BOTH CARPAL TUNNELS         Review of patient's allergies indicates:   Allergen Reactions    Penicillins        Current Neurological Medications:     No current facility-administered medications on file prior to encounter.     Current Outpatient Medications on File Prior to Encounter   Medication Sig    amLODIPine (NORVASC) 10 MG tablet once daily.    apixaban (ELIQUIS) 5 mg Tab Take 1 tablet (5 mg total) by mouth 2 (two) times daily.    ascorbic acid, vitamin C, (VITAMIN C) 1000 MG tablet Take 1,000 mg by mouth once daily.    atorvastatin (LIPITOR) 40 MG tablet TAKE 1 TABLET ORALLY ONCE DAILY ON MON/TUE/THUR/FRI AND 1/2 ON WED. NONE ON SAT/SUN    b complex vitamins capsule Take 1 capsule by mouth once daily.    clopidogreL (PLAVIX) 75 mg tablet Take 75 mg by mouth once daily.    doxazosin (CARDURA) 4 MG tablet Take 4 mg by mouth once daily.    DULoxetine (CYMBALTA) 30 MG capsule Take 1 capsule (30 mg total) by mouth once daily.    dutasteride (AVODART) 0.5 mg capsule Take 0.5 mg by mouth once daily.    FERRETTS 325 mg (106 mg iron) Tab Take 1 tablet by mouth 2 (two) times daily.    HYDROcodone-acetaminophen (NORCO)  mg per tablet Take 1 tablet by mouth every 4 (four) hours as needed for Pain.    LINZESS 145 mcg Cap capsule Take 145 mcg by mouth.    lisinopriL (PRINIVIL,ZESTRIL) 40 MG tablet Take 40 mg by mouth once daily.    metoprolol tartrate (LOPRESSOR) 25 MG tablet Take 25 mg by mouth 2 (two) times daily. Takes 1 tab q am     testosterone cypionate (DEPOTESTOTERONE CYPIONATE) 200 mg/mL injection INJECT 1ML INTO MUSCLE ONCE A WEEK    zolpidem (AMBIEN) 10 mg Tab Take 10 mg by mouth nightly as needed.    albuterol (PROAIR HFA) 90 mcg/actuation inhaler Inhale 2 puffs into the lungs every 6 (six) hours as needed for Wheezing. Rescue    diphenoxylate-atropine 2.5-0.025 mg (LOMOTIL) 2.5-0.025 mg per tablet Take 1 tablet by mouth 4 (four) times daily as needed for Diarrhea.    ergocalciferol (ERGOCALCIFEROL) 50,000 unit Cap Take 1 capsule (50,000 Units total) by mouth every 7 days.    furosemide (LASIX) 20 MG tablet     gabapentin (NEURONTIN) 300 MG capsule Take 1 capsule (300 mg total) by mouth 3 (three) times daily.    glutamine 10 gram PwPk Take 10 g by mouth 2 (two) times a day.    hydrocortisone (CORTEF) 20 MG Tab TAKE ONE TABLET BY MOUTH TWICE DAILY X 14 DAYS    lactulose (CHRONULAC) 10 gram/15 mL solution GIVE 30ML BY MOUTH TWICE A DAY X7 DAY(S)    ondansetron (ZOFRAN) 4 MG tablet Take 4 mg by mouth every 8 (eight) hours as needed for Nausea.    pantoprazole (PROTONIX) 40 MG tablet Take 1 tablet (40 mg total) by mouth once daily. for 30 doses    promethazine (PHENERGAN) 25 MG tablet TAKE 1 TABLET BY MOUTH EVERY 4 HOURS AS NEEDED FOR MOTION SICKNESS    pyridoxine, vitamin B6, (B-6) 250 MG Tab Take 250 mg by mouth once daily.    SLOW RELEASE IRON 140 mg (45 mg iron) TbSR Take 1 tablet by mouth 2 (two) times daily.    sulfamethoxazole-trimethoprim 800-160mg (BACTRIM DS) 800-160 mg Tab Take 1 tablet by mouth As instructed. Tab 1 PO Daily M-W-F     Family History       Problem Relation (Age of Onset)    Anemia Mother    Diabetes Mother    Heart disease Mother, Father    Hypertension Mother          Tobacco Use    Smoking status: Former     Types: Cigarettes    Smokeless tobacco: Former   Substance and Sexual Activity    Alcohol use: Not Currently    Drug use: Never    Sexual activity: Not on file     Review of Systems  A 14pt ros was  reviewed & is negative unless o/w documented in the hpi    Objective:     Vital Signs (Most Recent):  Temp: 98.2 °F (36.8 °C) (06/13/23 1100)  Pulse: 60 (06/13/23 1100)  Resp: 18 (06/13/23 1100)  BP: 127/84 (06/13/23 1100)  SpO2: 98 % (06/13/23 1100) Vital Signs (24h Range):  Temp:  [97.7 °F (36.5 °C)-98.6 °F (37 °C)] 98.2 °F (36.8 °C)  Pulse:  [54-82] 60  Resp:  [18-20] 18  SpO2:  [98 %-99 %] 98 %  BP: (126-175)/(70-88) 127/84     Weight: 88.5 kg (195 lb)  Body mass index is 26.45 kg/m².     Physical Exam  Vitals reviewed.   Constitutional:       General: He is awake.      Appearance: Normal appearance.   HENT:      Head: Normocephalic and atraumatic.      Nose: Nose normal.      Mouth/Throat:      Mouth: Mucous membranes are moist.      Pharynx: Oropharynx is clear.   Eyes:      Extraocular Movements: Extraocular movements intact and EOM normal.      Pupils: Pupils are equal, round, and reactive to light.   Pulmonary:      Effort: Pulmonary effort is normal.   Skin:     General: Skin is warm and dry.   Neurological:      Mental Status: He is alert and oriented to person, place, and time.      Deep Tendon Reflexes:      Reflex Scores:       Bicep reflexes are 2+ on the right side and 2+ on the left side.       Brachioradialis reflexes are 2+ on the right side and 2+ on the left side.       Patellar reflexes are 2+ on the right side and 2+ on the left side.       Achilles reflexes are 2+ on the right side and 2+ on the left side.  Psychiatric:         Mood and Affect: Mood normal.         Speech: Speech normal.         Behavior: Behavior normal. Behavior is cooperative.         Thought Content: Thought content normal.         Judgment: Judgment normal.        NEUROLOGICAL EXAMINATION:     MENTAL STATUS   Oriented to person, place, and time.   Follows 3 step commands.   Attention: normal. Concentration: normal.   Speech: speech is normal   Level of consciousness: alert  Knowledge: good.   Normal comprehension.      CRANIAL NERVES     CN II   Visual fields full to confrontation.     CN III, IV, VI   Pupils are equal, round, and reactive to light.  Extraocular motions are normal.   Nystagmus: none   Conjugate gaze: present    CN V   Facial sensation intact.     CN VII   Facial expression full, symmetric.     MOTOR EXAM     Strength   Right deltoid: 3/5  Left deltoid: 3/5  Right biceps: 3/5  Left biceps: 3/5  Right triceps: 3/5  Left triceps: 3/5  Right wrist flexion: 2/5  Left wrist flexion: 2/5  Right wrist extension: 2/5  Left wrist extension: 2/5  Right quadriceps: 3/5  Left quadriceps: 3/5  Right hamstring: 3/5  Left hamstring: 3/5  Right glutei: 3/5  Left glutei: 3/5  Right anterior tibial: 3/5  Left anterior tibial: 3/5  Right posterior tibial: 3/5  Left posterior tibial: 3/5    REFLEXES     Reflexes   Right brachioradialis: 2+  Left brachioradialis: 2+  Right biceps: 2+  Left biceps: 2+  Right patellar: 2+  Left patellar: 2+  Right achilles: 2+  Left achilles: 2+  Right plantar: upgoing  Left plantar: upgoing  Right Swann: present  Left Swann: present  Right ankle clonus: absent  Left ankle clonus: absent    SENSORY EXAM   Right arm light touch: decreased from wrist  Left arm light touch: decreased from wrist  Right leg light touch: normal  Left leg light touch: normal    Significant Labs:   Recent Lab Results       None            Significant Imaging:   CT head w/o 6/11/2023:  FINDINGS:  There is no intracranial mass or lesion seen.  No hemorrhage is seen.  No infarct is seen.  The ventricles and basilar cisterns appear normal.  Brain parenchyma appears grossly unremarkable.     Posterior fossa appears normal.  The calvarium is intact.  There is mucosal thickening seen in the frontal ethmoid sphenoid and maxillary sinuses..     Impression:     Sinusitis     Otherwise unremarkable    MRI c-spine w w/o 6/12/2023:  FINDINGS:  Evaluation is limited by motion artifact.  There is grade 1 retrolisthesis of C3 over C4,  grade 1 anterolisthesis of C4 over C5 and grade 1 retrolisthesis of C5 over C6.  There is mild enhancement in the C4-C5 and C5-C6 facets.  There are multilevel degenerate endplate changes.     There is increased T2 signal in the cord at C4-C5.  There is no epidural fluid collection.     There is mild enhancement around the C4-C5 and C5-C6 facet joints.  There is edema in the interspinous ligament and ligamentum flavum at C4-C5.     Disc level analysis as follows:     C2-C3: Posterior disc osteophyte complex and thickening of the ligamentum flavum with mild narrowing of the spinal canal.  Mild left neural foraminal stenosis secondary to uncovertebral and facet hypertrophy.     C3-C4: Grade 1 retrolisthesis of C3 over C4 with posterior disc osteophyte complex, thickening of the ligamentum flavum and moderate narrowing of the spinal canal flattening the ventral cord.  Moderate right and severe left neural foraminal stenosis secondary to uncovertebral and facet hypertrophy.     C4-C5: Grade 1 anterolisthesis of C4 over C5 and thickening of ligamentum flavum with severe narrowing of the spinal canal and flattening of the cord.  Severe bilateral neural foraminal stenosis secondary to uncovertebral facet hypertrophy.     C5-C6: Grade 1 retrolisthesis of C5 over C6 with posterior disc osteophyte complex, thickening of the ligamentum flavum in severe narrowing of the spinal canal with flattening of the cord.  Severe bilateral neural foraminal stenosis secondary to uncovertebral and facet hypertrophy.     C6-C7: Posterior disc osteophyte complex with mild narrowing of the spinal canal.  Moderate bilateral foraminal stenosis secondary to uncovertebral and facet hypertrophy.     C7-T1: Posterior disc osteophyte complex.  No significant spinal canal stenosis.  Moderate bilateral foraminal stenosis secondary to uncovertebral and facet hypertrophy.     Impression:     1. Severe degenerative narrowing of the spinal canal at C4-C5  and C5-C6, moderate at C3-C4, mild at other levels.  2. Multilevel neural foraminal stenoses as described  3. T2 signal changes in the cord at C4-C5 may be secondary to a compressive myelopathy.  4. Mild inflammatory changes of the bilateral C4-C5 and C5-C6 facets, may be degenerative.  No drainable fluid collection.    MRI t-spine w w/o 6/12/2023:  FINDINGS:  Thoracic alignment is preserved.  The vertebral body heights are maintained.  There is no bone marrow edema.     There is no cord signal abnormality.  There is no epidural fluid collection.     There are multilevel degenerative changes with marginal osteophytes, disc bulges and facet hypertrophy.  There is mild narrowing of the canal most evident at T7-T10.  There is mild multilevel neural foraminal narrowing, most evident on the right at T5-T6 and on the left at T11-T12.     The paraspinal soft tissues are unremarkable.     Impression:     1. No acute abnormality of the thoracic spine.  2. Mild multilevel canal and neural foraminal narrowing.  3. No cord signal abnormality.    MRI l-spine w w/o 6/12/2023:  FINDINGS:  There are 5 non-rib-bearing lumbar type vertebral bodies.  There is a leftward scoliotic curvature of the lumbar spine with minimal retrolisthesis of L2 over L3.  There are multilevel degenerate endplate changes.     The conus terminates at the level of L1.  It is normal in signal and contour.  There is redundancy of the cauda equina nerve roots.  There is focal enhancement in the cauda equina nerve roots at the level of L3-L4 and L4-L5, likely related to stenoses at these levels.  There is no epidural fluid collection.     Disc spaces, spinal canal and neural foramina are as follows:     L1-L2: Disc bulge and facet hypertrophy with minimal narrowing of the spinal canal.  Mild bilateral foraminal stenosis.     L2-L3: Minimal retrolisthesis of L2 over L3.  Disc height loss with posterior disc osteophyte complex, facet hypertrophy and moderate  narrowing of the spinal canal.  Moderate bilateral neural foraminal stenosis.     L3-L4: Disc height loss with posterior disc osteophyte complex, facet hypertrophy and severe narrowing of the spinal canal.  Moderate right and moderate to severe left neural foraminal stenosis.     L4-L5: Disc height loss with posterior disc osteophyte complex, facet hypertrophy and severe narrowing of the spinal canal.  Severe right and mild left neural foraminal stenosis.     L5-S1: Posterior disc osteophyte complex, facet hypertrophy and mild narrowing of the spinal canal.  Mild bilateral neural foraminal stenosis.     There are mild inflammatory changes around the facet joints at L4 through S1, likely degenerative.     Impression:     1. Severe degenerative spinal canal stenosis at L3-L4 and L4-L5, moderate at L2-L3, mild at L5-S1.  2. Multilevel neural foraminal stenoses as described.    I have reviewed all pertinent imaging results/findings within the past 24 hours.

## 2023-06-13 NOTE — NURSING
Nurses Note -- 4 Eyes      6/12/2023   9:46 PM      Skin assessed during: Admit      [] No Altered Skin Integrity Present    []Prevention Measures Documented      [x] Yes- Altered Skin Integrity Present or Discovered   [] LDA Added if Not in Epic (Describe Wound)   [x] New Altered Skin Integrity was Present on Admit and Documented in LDA   [] Wound Image Taken    Wound Care Consulted? No    Attending Nurse:  Sampson Parra RN     Second RN/Staff Member:  rosaura josé

## 2023-06-13 NOTE — HPI
72-year-old male with PMH HTN, HLD, CAD s/p PCI on Plavix, GERD, multiple myeloma on chemo (started 12/2022), PE on Eliquis who presented to ED on 06/11 for numbness and weakness to BUE and BLE.  Patient reports paresthesias began to bilateral hands last December when he started chemo, for which she attributed to his chemotherapy.  Patient reports paresthesias began to worsen as well as associated progressive weakness over the last 3 weeks and patient is now bed-bound.  Patient also having some urinary retention and is s/p indwelling catheter.  Patient denies saddle anesthesia.    CT head without unrevealing for acute intracranial abnormalities.  MRI C-spine w w/o revealing for C4-5 with signal changes.  MRI T-spine w w/o unrevealing.  MRI L-spine w w/o revealing for severe multilevel degenerative canal stenosis.  Labs significant for UTI, otherwise unremarkable.

## 2023-06-13 NOTE — PROGRESS NOTES
UROLOGY  PROGRESS  NOTE    Valentino Manning 1950  [id number]  6/13/2023      Patient resting in bed  No new issues  Neurology at bedside during rounds  Wife at bedside    VSS, afebrile   200 mL urine output overnight   No labs today     Intake/Output:  I/O this shift:  In: -   Out: 400 [Urine:400]  I/O last 3 completed shifts:  In: 120 [P.O.:120]  Out: 200 [Urine:200]     Exam:    NAD  Card: RRR  Resp: unlabored  Abd: soft, NTND  : concentrated yellow urine draining to  bag  Extremity: no C/C/E    No results found for this or any previous visit (from the past 24 hour(s)).    Assessment:  -Urinary retention s/p gongora insertion 6/7/2023  -UTI - UC in progress; on levaquin  -BPH  -multiple myeloma on chemotherapy  -Cervical spondylosis/stenosis with myelopathy; lumbar stenosis with BUE and BLE weakness/paresethesias  -h/o CAD and PE on plavix and eliquis    Plan:  Continue Flomax.   Likely needs Neurosurgery eval, will defer to primary. Discussed with nursing.  Recommend continuing indwelling gongora on discharge and f/u with Dr. Vo outpatient. If his mobility improves, OK to offer voiding trial prior to d/c.       Mckenna Auguste NP

## 2023-06-13 NOTE — ASSESSMENT & PLAN NOTE
B/l upper and lower extremity paresthesias and weakness with associated urinary retention    -neurosurgery consulted  -fall precautions

## 2023-06-13 NOTE — PT/OT/SLP PROGRESS
Physical Therapy      Patient Name:  Valentino Manning   MRN:  [id number]  Awaiting neurosurgery recs. PT to f/u tomorrow

## 2023-06-13 NOTE — PROGRESS NOTES
[facility] Hospital Medicine Progress Note        Chief Complaint: Inpatient Follow-up for b.l LE numbness     HPI: Valentino Manning is a 72 y.o. male with a PMHx of HTN, HLD, CAD s/p stent on Plavix, chronic anemia, GERD, multiple myeloma on chemotherapym hx of PE on Eliquis who presented to [facility] on 6/11/2023 with c/o numbness to bilateral hands and bilateral lower extremities with associated weakness has been worsening over the past 6 months.  Patient reports that he was now unable to ambulate due to worsening paresthesias and generalized weakness x3 weeks.  He was now bed-bound.  He endorsed falling about 4 times over the past 3 weeks, denied LOC or head injury.  He reports he was in his normal state of health prior to initiating chemotherapy in December 2022.  He then had COVID-19 in February 2023 and has had a rapid decline since then.  He also endorsed urinary retention for which he was seen at an outlying ED on 6/5/23 and had a Duenas catheter placed, he was supposed to follow-up with urology on 06/12/2023. He was previously on gabapentin 300 mg t.i.d. for neuropathy without symptomatic relief, he states he was not taking it as directed.  He denied CP, SOB, fever, chills, cough, dizziness, syncope.  Patient reportedly saw his oncologist, Dr. Harris, for same complaints and he was referred to ED for further evaluation.   Initial ED VS stable.  Labs notable for hemoglobin 12.6, hematocrit 38.  Urinalysis with 2+ protein, 3+ occult blood, positive nitrites, 2+ leukocytes, 11 RBCs, 67 WBCs, 1+ bacteria.  Urine culture pending.  He was started on oral Levaquin for suspected UTI.  He was admitted to hospital medicine services for further medical management.    Interval Hx:   Laying in bed, reports he does not feel any better or worse than when he came in.  Informed that urology came by and recommended to continue Flomax  Also reported saw Dr. Harris and waiting for the  recommendations  Mentioned he is waiting for Neurology and therapy to come see him.  Neurology NP Mallory Garcia walked in as I was finishing up the exam and conversation.  Case was discussed with patient's nurse and  on the floor.    Objective/physical exam:  General: In no acute distress, afebrile  Chest: Clear to auscultation bilaterally anteriorly  Heart: RRR, +S1, S2, no appreciable murmur  Abdomen: Soft, nontender, BS +  MSK: Warm, no lower extremity edema, no clubbing or cyanosis  Neurologic: Alert and oriented x4, Cranial nerve II-XII intact, bilateral upper and lower extremity strength 3 out of 5    VITAL SIGNS: 24 HRS MIN & MAX LAST   Temp  Min: 97.7 °F (36.5 °C)  Max: 98.6 °F (37 °C) 98.2 °F (36.8 °C)   BP  Min: 126/70  Max: 175/74 (!) 175/74   Pulse  Min: 52  Max: 82  64   Resp  Min: 14  Max: 20 18   SpO2  Min: 98 %  Max: 99 % 98 %     I reviewed the labs below:  Recent Labs   Lab 06/11/23 1822 06/12/23  0352   WBC 5.54 5.02   RBC 3.79* 3.50*   HGB 12.6* 11.5*   HCT 38.0* 35.0*   .3* 100.0*   MCH 33.2* 32.9*   MCHC 33.2 32.9*   RDW 14.8 14.9    296   MPV 8.8 8.8       Recent Labs   Lab 06/11/23 1822 06/12/23  0352    135*   K 3.7 3.2*   CO2 24 23   BUN 11.6 12.0   CREATININE 0.88 0.89   CALCIUM 9.5 9.4   MG 1.60 1.50*   ALBUMIN 3.6 3.1*   ALKPHOS 122 107   ALT 33 26   AST 22 16   BILITOT 0.6 0.5          Microbiology Results (last 7 days)       Procedure Component Value Units Date/Time    Urine culture [[id number]]  (Abnormal) Collected: 06/11/23 2224    Order Status: Completed Specimen: Urine Updated: 06/13/23 0632     Urine Culture >/= 100,000 colonies/ml Gram-negative Rods             See below for Radiology    Scheduled Med:   levoFLOXacin  500 mg Oral Daily    tamsulosin  0.4 mg Oral QHS    zolpidem  10 mg Oral QHS        Continuous Infusions:       PRN Meds:  acetaminophen, aluminum-magnesium hydroxide-simethicone, HYDROcodone-acetaminophen, melatonin, naloxone,  ondansetron, polyethylene glycol, prochlorperazine, simethicone       Assessment/Plan:  Polyneuropathy   Impaired mobility secondary to generalized weakness and neuropathy since the initiation of chemo  Acute bacterial cystitis, POA   Multiple myeloma on chemotherapy- last treatment held due to weakness   Essential HTN  Hx of PE on Eliquis  CAD s/p stent on Plavix  Chronic Anemia  Hx of GERD, HLD       Oncology Dr. Harris evaluated the patient, reviewed MRI CT lumbar spine, no plasmacytoma or mass, recommended MRI brain and neurology evaluation.  Continue supportive care in the meantime    PT/OT consulted to evaluate and treat- patient feels he will benefit from rehab as he is unable to continue with his chemotherapy given his weakness  CT head without--> unrevealing for acute intracranial abnormalities.    MRI C-spine w w/o--> revealing for C4-5 with signal changes.    MRI T-spine w w/o--> unrevealing.    MRI L-spine w w/o--> revealing for severe multilevel degenerative canal stenosis.   Neurology also evaluated the patient, recommended neurosurgery eval and consult, ordered  Urology also evaluated the patient, recommended to continue Flomax, continue indwelling Duenas catheter on discharge and follow-up with Dr. Vo outpatient and if his mobility improved, okay to offer voiding trial prior to discharge.  Also recommended neurosurgery eval  Fall precautions  Resume appropriate home medication for chronic medical conditions, resumed Eliquis, Plavix  Case management consulted for discharge planning, patient interested in rehab  Morning CBC, CMP, Mag ordered        VTE Prophylaxis: Eliquis resumed 6/13    Patient condition:  Guarded    Discharge Planning and Disposition: TBD      All diagnosis and differential diagnosis have been reviewed; assessment and plan has been documented; I have personally reviewed the labs and test results that are presently available; I have reviewed the patients medication list; I have  reviewed the consulting providers response and recommendations. I have reviewed or attempted to review medical records based upon their availability    All of the patient's questions have been  addressed and answered. Patient's is agreeable to the above stated plan. I will continue to monitor closely and make adjustments to medical management as needed.  _____________________________________________________________________    Nutrition Status:    Radiology:   I have personally reviewed the images and agree with radiologist report  MRI Lumbar Spine W WO Cont  Narrative: EXAMINATION:  MRI LUMBAR SPINE W WO CONTRAST    CLINICAL HISTORY:  Myelopathy, acute, lumbar spine;    TECHNIQUE:  Multiplanar multisequence MR images of the lumbar spine are obtained without contrast.    COMPARISON:  None    FINDINGS:  There are 5 non-rib-bearing lumbar type vertebral bodies.  There is a leftward scoliotic curvature of the lumbar spine with minimal retrolisthesis of L2 over L3.  There are multilevel degenerate endplate changes.    The conus terminates at the level of L1.  It is normal in signal and contour.  There is redundancy of the cauda equina nerve roots.  There is focal enhancement in the cauda equina nerve roots at the level of L3-L4 and L4-L5, likely related to stenoses at these levels.  There is no epidural fluid collection.    Disc spaces, spinal canal and neural foramina are as follows:    L1-L2: Disc bulge and facet hypertrophy with minimal narrowing of the spinal canal.  Mild bilateral foraminal stenosis.    L2-L3: Minimal retrolisthesis of L2 over L3.  Disc height loss with posterior disc osteophyte complex, facet hypertrophy and moderate narrowing of the spinal canal.  Moderate bilateral neural foraminal stenosis.    L3-L4: Disc height loss with posterior disc osteophyte complex, facet hypertrophy and severe narrowing of the spinal canal.  Moderate right and moderate to severe left neural foraminal stenosis.    L4-L5:  Disc height loss with posterior disc osteophyte complex, facet hypertrophy and severe narrowing of the spinal canal.  Severe right and mild left neural foraminal stenosis.    L5-S1: Posterior disc osteophyte complex, facet hypertrophy and mild narrowing of the spinal canal.  Mild bilateral neural foraminal stenosis.    There are mild inflammatory changes around the facet joints at L4 through S1, likely degenerative.  Impression: 1. Severe degenerative spinal canal stenosis at L3-L4 and L4-L5, moderate at L2-L3, mild at L5-S1.  2. Multilevel neural foraminal stenoses as described.    Electronically signed by: Hazel Guaman  Date:    06/12/2023  Time:    14:03  MRI Thoracic Spine W WO Cont  Narrative: EXAMINATION:  MRI THORACIC SPINE W WO CONTRAST    CLINICAL HISTORY:  Myelopathy, acute, thoracic spine;    TECHNIQUE:  Multiplanar multisequence MR images of the thoracic spine are obtained with and without contrast.    COMPARISON:  None.    FINDINGS:  Thoracic alignment is preserved.  The vertebral body heights are maintained.  There is no bone marrow edema.    There is no cord signal abnormality.  There is no epidural fluid collection.    There are multilevel degenerative changes with marginal osteophytes, disc bulges and facet hypertrophy.  There is mild narrowing of the canal most evident at T7-T10.  There is mild multilevel neural foraminal narrowing, most evident on the right at T5-T6 and on the left at T11-T12.    The paraspinal soft tissues are unremarkable.  Impression: 1. No acute abnormality of the thoracic spine.  2. Mild multilevel canal and neural foraminal narrowing.  3. No cord signal abnormality.    Electronically signed by: Hazel Guaman  Date:    06/12/2023  Time:    13:56  MRI Cervical Spine W WO Cont  Narrative: EXAMINATION:  MRI CERVICAL SPINE W WO CONTRAST    CLINICAL HISTORY:  Myelopathy, acute, cervical spine;    TECHNIQUE:  Multiplanar, multisequence MR imaging of the cervical spine with  and without contrast.    COMPARISON:  None    FINDINGS:  Evaluation is limited by motion artifact.  There is grade 1 retrolisthesis of C3 over C4, grade 1 anterolisthesis of C4 over C5 and grade 1 retrolisthesis of C5 over C6.  There is mild enhancement in the C4-C5 and C5-C6 facets.  There are multilevel degenerate endplate changes.    There is increased T2 signal in the cord at C4-C5.  There is no epidural fluid collection.    There is mild enhancement around the C4-C5 and C5-C6 facet joints.  There is edema in the interspinous ligament and ligamentum flavum at C4-C5.    Disc level analysis as follows:    C2-C3: Posterior disc osteophyte complex and thickening of the ligamentum flavum with mild narrowing of the spinal canal.  Mild left neural foraminal stenosis secondary to uncovertebral and facet hypertrophy.    C3-C4: Grade 1 retrolisthesis of C3 over C4 with posterior disc osteophyte complex, thickening of the ligamentum flavum and moderate narrowing of the spinal canal flattening the ventral cord.  Moderate right and severe left neural foraminal stenosis secondary to uncovertebral and facet hypertrophy.    C4-C5: Grade 1 anterolisthesis of C4 over C5 and thickening of ligamentum flavum with severe narrowing of the spinal canal and flattening of the cord.  Severe bilateral neural foraminal stenosis secondary to uncovertebral facet hypertrophy.    C5-C6: Grade 1 retrolisthesis of C5 over C6 with posterior disc osteophyte complex, thickening of the ligamentum flavum in severe narrowing of the spinal canal with flattening of the cord.  Severe bilateral neural foraminal stenosis secondary to uncovertebral and facet hypertrophy.    C6-C7: Posterior disc osteophyte complex with mild narrowing of the spinal canal.  Moderate bilateral foraminal stenosis secondary to uncovertebral and facet hypertrophy.    C7-T1: Posterior disc osteophyte complex.  No significant spinal canal stenosis.  Moderate bilateral foraminal  stenosis secondary to uncovertebral and facet hypertrophy.  Impression: 1. Severe degenerative narrowing of the spinal canal at C4-C5 and C5-C6, moderate at C3-C4, mild at other levels.  2. Multilevel neural foraminal stenoses as described  3. T2 signal changes in the cord at C4-C5 may be secondary to a compressive myelopathy.  4. Mild inflammatory changes of the bilateral C4-C5 and C5-C6 facets, may be degenerative.  No drainable fluid collection.    Electronically signed by: Hazel Guaman  Date:    06/12/2023  Time:    13:28  CT Head Without Contrast  Narrative: EXAMINATION:  CT HEAD WITHOUT CONTRAST    CLINICAL HISTORY:  Metastatic disease evaluation;    TECHNIQUE:  Multiple axial images were obtained from the base of the brain to the vertex without contrast administration.  Sagittal and coronal reconstructions were performed. .Automatic exposure control  (AEC) is utilized to reduce patient radiation exposure.    COMPARISON:  None    FINDINGS:  There is no intracranial mass or lesion seen.  No hemorrhage is seen.  No infarct is seen.  The ventricles and basilar cisterns appear normal.  Brain parenchyma appears grossly unremarkable.    Posterior fossa appears normal.  The calvarium is intact.  There is mucosal thickening seen in the frontal ethmoid sphenoid and maxillary sinuses..  Impression: Sinusitis    Otherwise unremarkable    Electronically signed by: Chantel Brennan  Date:    06/12/2023  Time:    10:50      Samy Loomis MD  Department of Hospital Medicine   [facility]   06/13/2023

## 2023-06-14 LAB
ANION GAP SERPL CALC-SCNC: 8 MEQ/L
BACTERIA UR CULT: ABNORMAL
BUN SERPL-MCNC: 10.8 MG/DL (ref 8.4–25.7)
CALCIUM SERPL-MCNC: 9.3 MG/DL (ref 8.8–10)
CHLORIDE SERPL-SCNC: 106 MMOL/L (ref 98–107)
CO2 SERPL-SCNC: 23 MMOL/L (ref 23–31)
CREAT SERPL-MCNC: 0.82 MG/DL (ref 0.73–1.18)
CREAT/UREA NIT SERPL: 13
ERYTHROCYTE [DISTWIDTH] IN BLOOD BY AUTOMATED COUNT: 14.8 % (ref 11.5–17)
GFR SERPLBLD CREATININE-BSD FMLA CKD-EPI: >60 MLS/MIN/1.73/M2
GLUCOSE SERPL-MCNC: 87 MG/DL (ref 82–115)
HCT VFR BLD AUTO: 34.3 % (ref 42–52)
HGB BLD-MCNC: 11.3 G/DL (ref 14–18)
MAGNESIUM SERPL-MCNC: 1.6 MG/DL (ref 1.6–2.6)
MCH RBC QN AUTO: 32.8 PG (ref 27–31)
MCHC RBC AUTO-ENTMCNC: 32.9 G/DL (ref 33–36)
MCV RBC AUTO: 99.7 FL (ref 80–94)
NRBC BLD AUTO-RTO: 0 %
PLATELET # BLD AUTO: 291 X10(3)/MCL (ref 130–400)
PMV BLD AUTO: 8.7 FL (ref 7.4–10.4)
POTASSIUM SERPL-SCNC: 3.2 MMOL/L (ref 3.5–5.1)
RBC # BLD AUTO: 3.44 X10(6)/MCL (ref 4.7–6.1)
SODIUM SERPL-SCNC: 137 MMOL/L (ref 136–145)
WBC # SPEC AUTO: 4.15 X10(3)/MCL (ref 4.5–11.5)

## 2023-06-14 PROCEDURE — 21400001 HC TELEMETRY ROOM

## 2023-06-14 PROCEDURE — 25000003 PHARM REV CODE 250: Performed by: INTERNAL MEDICINE

## 2023-06-14 PROCEDURE — 25000003 PHARM REV CODE 250: Performed by: NURSE PRACTITIONER

## 2023-06-14 PROCEDURE — 83735 ASSAY OF MAGNESIUM: CPT | Performed by: INTERNAL MEDICINE

## 2023-06-14 PROCEDURE — 97166 OT EVAL MOD COMPLEX 45 MIN: CPT

## 2023-06-14 PROCEDURE — 99223 1ST HOSP IP/OBS HIGH 75: CPT | Mod: ,,, | Performed by: NEUROLOGICAL SURGERY

## 2023-06-14 PROCEDURE — 63600175 PHARM REV CODE 636 W HCPCS: Performed by: INTERNAL MEDICINE

## 2023-06-14 PROCEDURE — 80048 BASIC METABOLIC PNL TOTAL CA: CPT | Performed by: INTERNAL MEDICINE

## 2023-06-14 PROCEDURE — 85027 COMPLETE CBC AUTOMATED: CPT | Performed by: INTERNAL MEDICINE

## 2023-06-14 PROCEDURE — 99223 PR INITIAL HOSPITAL CARE,LEVL III: ICD-10-PCS | Mod: ,,, | Performed by: NEUROLOGICAL SURGERY

## 2023-06-14 RX ORDER — POTASSIUM CHLORIDE 20 MEQ/1
40 TABLET, EXTENDED RELEASE ORAL
Status: COMPLETED | OUTPATIENT
Start: 2023-06-14 | End: 2023-06-14

## 2023-06-14 RX ORDER — MAGNESIUM SULFATE HEPTAHYDRATE 40 MG/ML
2 INJECTION, SOLUTION INTRAVENOUS ONCE
Status: COMPLETED | OUTPATIENT
Start: 2023-06-14 | End: 2023-06-14

## 2023-06-14 RX ADMIN — Medication 1000 MG: at 09:06

## 2023-06-14 RX ADMIN — HYDROCODONE BITARTRATE AND ACETAMINOPHEN 1 TABLET: 5; 325 TABLET ORAL at 06:06

## 2023-06-14 RX ADMIN — APIXABAN 5 MG: 5 TABLET, FILM COATED ORAL at 08:06

## 2023-06-14 RX ADMIN — TAMSULOSIN HYDROCHLORIDE 0.4 MG: 0.4 CAPSULE ORAL at 08:06

## 2023-06-14 RX ADMIN — MAGNESIUM SULFATE HEPTAHYDRATE 2 G: 40 INJECTION, SOLUTION INTRAVENOUS at 03:06

## 2023-06-14 RX ADMIN — POTASSIUM CHLORIDE 40 MEQ: 1500 TABLET, EXTENDED RELEASE ORAL at 02:06

## 2023-06-14 RX ADMIN — ZOLPIDEM TARTRATE 10 MG: 5 TABLET ORAL at 08:06

## 2023-06-14 RX ADMIN — LEVOFLOXACIN 500 MG: 500 TABLET, FILM COATED ORAL at 09:06

## 2023-06-14 RX ADMIN — HYDROCODONE BITARTRATE AND ACETAMINOPHEN 1 TABLET: 5; 325 TABLET ORAL at 04:06

## 2023-06-14 RX ADMIN — LINACLOTIDE 145 MCG: 145 CAPSULE, GELATIN COATED ORAL at 06:06

## 2023-06-14 RX ADMIN — POTASSIUM CHLORIDE 40 MEQ: 1500 TABLET, EXTENDED RELEASE ORAL at 09:06

## 2023-06-14 RX ADMIN — LEVOFLOXACIN 500 MG: 500 TABLET, FILM COATED ORAL at 08:06

## 2023-06-14 NOTE — PT/OT/SLP EVAL
"Occupational Therapy  Evaluation    Name: Valentino Manning  MRN: [id number]    Recommendations:     Discharge Recommendations: rehabilitation facility vs nursing facility, skilled (TBD, pending progress)  Discharge Equipment Recommendations: BSC, dressing AEDs (TBD, pending progress)  Barriers to discharge: medical dx    Assessment:     Valentino Manning is a 72 y.o. male with a medical diagnosis of Polyneuropathy, Impaired mobility secondary to generalized weakness and neuropathy since the initiation of chemo, Acute bacterial cystitis, POA, Multiple myeloma on chemotherapy- last treatment held due to weakness, Essential HTN, Hx of PE on Eliquis, CAD s/p stent on Plavix, Chronic Anemia. Hx of GERD, HLD.  He presents with c/o neck pain and "pins and needles" sensation on B hands and from B knees and below. Performance deficits affecting function: weakness, impaired endurance, impaired sensation, impaired self care skills, impaired functional mobility, impaired balance, decreased upper extremity function, decreased lower extremity function, decreased safety awareness, pain, decreased ROM.      Rehab Prognosis: Good; patient would benefit from acute skilled OT services to address these deficits and reach maximum level of function.       Plan:     Patient to be seen 6 x/week to address the above listed problems via self-care/home management, therapeutic activities, therapeutic exercises  Plan of Care Expires: 06/28/23  Plan of Care Reviewed with: patient, spouse    Subjective     Chief Complaint: Pt c/o neck pain and "pins and needles" sensation on B hands and from B knees and below.   Patient/Family Comments/goals: Increase functional independence.  Pt reported h/o 4 falls in past 3 wks.    Occupational Profile:  Living Environment: Lives with wife in [facility] with 5 ALMITA and B HR (however, unable to reach B HR simultaneously). Owns walk-in shower with 2 ALMITA and built-in bench.  Previous level of function: Pt reported that he has " been bed bound for past 3 wks, requiring dep A with ADLs. Prior to that, pt reported that he was independent with ADLs.  DME: Pt owns walker, rolling, rollator, cane, straight, wheelchair, bedside commode, shower chair, other (see comments) (Pt also thinks that he owns a Nas Lift, reporting that he recently received lift)  Assistance upon Discharge: Pt's wife. However, pt would benefit from placement post-d/c to increase pt's functional independence and decrease burden of care.    Pain/Comfort:  Pain Rating 1: 7/10  Location 1: neck    Patients cultural, spiritual, [employer] conflicts given the current situation: no    Objective:     Communicated with: RN prior to session.  Patient found HOB elevated with gongora catheter, telemetry upon OT entry to room.    General Precautions: Standard, fall  Respiratory Status: Room air    Occupational Performance:    Bed Mobility:    Pt t/f from lying with HOB elevated to seated EOB with mod A x2 for safety.  Patient completed Sit to Supine with max A x2 for safety    Functional Mobility/Transfers:  Patient completed Sit <> Stand Transfers (x2 trials) with max A x2 and HHA provided during first trial and mod A x2 and use of RW during second trial. Pt also required vc for proper hand placement and to assume upright standing posture. Pt's B feet and knees blocked to increase safety.    Activities of Daily Living:  Lower Body Dressing: Unable to doff/don socks at this time, requiring dep A.    Cognitive/Visual Perceptual:  Cognitive/Psychosocial Skills:     -       Oriented to: Person, Place, Time, and Situation   -       Follows Commands/attention:Follows one-step commands and Follows two-step commands  -       Safety awareness/insight to disability: impaired     Physical Exam:  Sensation:    -       Impaired   - paresthesia on B hands and from B knees and below  Upper Extremity Range of Motion:     -       BUEs:  Deficits: Severe deficits noted in AROM of B shoulder flex (with  increased deficits noted in L. 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  Upper Extremity Strength:    -       BUEs: Deficits: B shoulder flex and IR; L elbow flex=3-/5    Therapeutic Positioning  Risk for acquired pressure injuries is increased due to impaired mobility.    OT interventions performed during the course of today's session in an effort to prevent and/or reduce acquired pressure injuries:   Education on how to facilitate skin integrity (specifically, no diaper in bed. However, pt insisted on keeping diaper donned in bed despite education provided).    Skin assessment:    Findings:  Redness noted on posterior aspect of neck and altered skin integrity noted on B knees (2/2 previous falls per pt)    OT recommendations for therapeutic positioning throughout hospitalization:   Follow [facility] Pressure Injury Prevention Protocol  Geomat recommended for sacral protection while C  [employer]    Patient Education:  Patient provided with verbal education regarding OT role/goals/POC.  Understanding was verbalized.     Patient left HOB elevated with all lines intact, call button in reach, and pt's wife present.    GOALS:   Multidisciplinary Problems       Occupational Therapy Goals          Problem: Occupational Therapy    Goal Priority Disciplines Outcome Interventions   Occupational Therapy Goal     OT, PT/OT Ongoing, Progressing    Description: Goals to be met by: 6/28/23     Patient will increase functional independence with ADLs by performing:    UE Dressing with Minimal Assistance.  LE Dressing with Moderate Assistance and Assistive Devices as needed.  Grooming while EOB with Set-up Assistance.  Toileting from BSC with Moderate Assistance.  Pt will perform BSC t/f with Minimal Assistance.                         History:     Past Medical History:   Diagnosis Date    Anemia     GERD (gastroesophageal reflux  disease)     Heart problem     Hyperlipidemia     Hypertension     Multiple myeloma     NSTEMI (non-ST elevated myocardial infarction)          Past Surgical History:   Procedure Laterality Date    BONE MARROW ASPIRATION N/A 9/1/2022    Procedure: ASPIRATION, BONE MARROW;  Surgeon: Robbie Gardner MD;  Location: [facility];  Service: General;  Laterality: N/A;    CARDIAC SURGERY  2021    ENDOSCOPIC RELEASE OF BOTH CARPAL TUNNELS         Time Tracking:     OT Date of Treatment: 6/14/23  OT Start Time: 1540  OT Stop Time: 1605  OT Total Time (min): 25 min    Billable Minutes:Evaluation Mod complexity 25 mins    6/14/2023

## 2023-06-14 NOTE — H&P (VIEW-ONLY)
[facility] - 5th Floor Med Surg  Neurosurgery  Consult Note    Inpatient consult to Neurosurgery  Consult performed by: SIDRA Hare-BC  Consult ordered by: CINDY Cole      Subjective:     Chief Complaint/Reason for Admission:  Progressive upper extremity weakness, urinary retention, inability to walk-over the last 3 weeks.    History of Present Illness:  This is a very pleasant 72-year-old  male with past medical history significant for hypertension, hyperlipidemia, CAD with 5 cardiac stents on Plavix, chronic anemia, GERD, multiple myeloma on chemotherapy.  Patient with history of PE on Eliquis who presented to the hospital on 06/11/2023 with complaints of progressive numbness to bilateral hands as well as weakness to upper and lower extremities, urinary retention, inability to walk that he states has progressively gotten worse over the last 3 weeks.  Patient states he has digress from a cane to a walker to the inability to hold up his body weight.  He can no longer ambulate.  He is bed-bound.  He has fallen several times over the past 3 weeks denies trauma, LOC etcetera.  In December of 2022 chemotherapy was initiated for multiple myeloma.  He also had COVID-19 in February 2023 and has had a rapid decline since then.  He reports that initially he thought that the neuropathy in his upper extremities was due to chemotherapy but states that when he had to stop chemo several times it did not go away in fact it got worse.  He also has had urinary retention recently and was supposed to follow up with Urology.  Urology has evaluated the patient in the hospital and has initiated a Duenas catheter, Flomax, collected cultures, initiated antibiotics.  Patient reports seeing Dr. Harris for Oncology as well for similar complaints to this admission.    Urine was collected in the ED with protein occult blood positive nitrates leukocytes urine culture pending.  Patient initiated on oral  Levaquin suspected UTI.  Neurology was consulted.  Oncology was consulted and has evaluated the patient.  MRI of the entire spine was performed.    MRI lumbar spine with and without contrast:  Impression:       1. Severe degenerative spinal canal stenosis at L3-L4 and L4-L5, moderate at L2-L3, mild at L5-S1.   2. Multilevel neural foraminal stenoses as described.      MRI thoracic spine with and without contrast:  Impression:       1. No acute abnormality of the thoracic spine.   2. Mild multilevel canal and neural foraminal narrowing.   3. No cord signal abnormality.        MRI cervical spine with and without contrast:  Impression:       1. Severe degenerative narrowing of the spinal canal at C4-C5 and C5-C6, moderate at C3-C4, mild at other levels.   2. Multilevel neural foraminal stenoses as described   3. T2 signal changes in the cord at C4-C5 may be secondary to a compressive myelopathy.   4. Mild inflammatory changes of the bilateral C4-C5 and C5-C6 facets, may be degenerative.  No drainable fluid collection.      CT head without contrast unremarkable.    Neurosurgery was consulted for the above findings as well as the patient's upper extremity and lower extremity weakness.    On exam the patient is extremely pleasant and an excellent historian.  GCS 15.  He is completely oriented to all spheres.  PERRLA bilateral brisk.  No facial droop.  Speech is clear.  Cranial nerves are grossly intact.  All reflexes brisk.  He did have a couple of beats of clonus to the right lower extremity.  No clonus to left lower extremity.  Involuntary spasms to lower extremities at times.  Urinary retention.  No bowel issues.  Negative Antoine's to right upper extremity.  Positive Swann's to left extremity.  Patient has mild neck pain.  He has no back pain at this time.  He is unable to raise his arms and hold against gravity.  He can raise both legs up off the bed against gravity very strong to lower extremities at this time.   He does complain of bilateral upper extremity numbness and tingling.    Strength   Deltoids Triceps Biceps Wrist Extension Intrinsics Hand    Upper: R 3/5 3/5 3/5 3/5 3/5 3/5     L 2/5 2-3/5 2-3/5 2/5 2/5 2-3/5      Strength   Hip Flexors Knee extensor Knee Flexion Ankle Dorsiflexion Plantar Flexion   Lower: R 3-4/5 4/5 4/5 5/5 5/5     L 3-4/5 4/5 4/5 5/5 5/5       PTA Medications   Medication Sig    amLODIPine (NORVASC) 10 MG tablet once daily.    apixaban (ELIQUIS) 5 mg Tab Take 1 tablet (5 mg total) by mouth 2 (two) times daily.    ascorbic acid, vitamin C, (VITAMIN C) 1000 MG tablet Take 1,000 mg by mouth once daily.    atorvastatin (LIPITOR) 40 MG tablet TAKE 1 TABLET ORALLY ONCE DAILY ON MON/TUE/THUR/FRI AND 1/2 ON WED. NONE ON SAT/SUN    b complex vitamins capsule Take 1 capsule by mouth once daily.    clopidogreL (PLAVIX) 75 mg tablet Take 75 mg by mouth once daily.    doxazosin (CARDURA) 4 MG tablet Take 4 mg by mouth once daily.    DULoxetine (CYMBALTA) 30 MG capsule Take 1 capsule (30 mg total) by mouth once daily.    dutasteride (AVODART) 0.5 mg capsule Take 0.5 mg by mouth once daily.    FERRETTS 325 mg (106 mg iron) Tab Take 1 tablet by mouth 2 (two) times daily.    HYDROcodone-acetaminophen (NORCO)  mg per tablet Take 1 tablet by mouth every 4 (four) hours as needed for Pain.    LINZESS 145 mcg Cap capsule Take 145 mcg by mouth.    lisinopriL (PRINIVIL,ZESTRIL) 40 MG tablet Take 40 mg by mouth once daily.    metoprolol tartrate (LOPRESSOR) 25 MG tablet Take 25 mg by mouth 2 (two) times daily. Takes 1 tab q am    testosterone cypionate (DEPOTESTOTERONE CYPIONATE) 200 mg/mL injection INJECT 1ML INTO MUSCLE ONCE A WEEK    zolpidem (AMBIEN) 10 mg Tab Take 10 mg by mouth nightly as needed.    albuterol (PROAIR HFA) 90 mcg/actuation inhaler Inhale 2 puffs into the lungs every 6 (six) hours as needed for Wheezing. Rescue    diphenoxylate-atropine 2.5-0.025 mg (LOMOTIL) 2.5-0.025 mg per tablet Take  1 tablet by mouth 4 (four) times daily as needed for Diarrhea.    ergocalciferol (ERGOCALCIFEROL) 50,000 unit Cap Take 1 capsule (50,000 Units total) by mouth every 7 days.    furosemide (LASIX) 20 MG tablet     gabapentin (NEURONTIN) 300 MG capsule Take 1 capsule (300 mg total) by mouth 3 (three) times daily.    glutamine 10 gram PwPk Take 10 g by mouth 2 (two) times a day.    hydrocortisone (CORTEF) 20 MG Tab TAKE ONE TABLET BY MOUTH TWICE DAILY X 14 DAYS    lactulose (CHRONULAC) 10 gram/15 mL solution GIVE 30ML BY MOUTH TWICE A DAY X7 DAY(S)    ondansetron (ZOFRAN) 4 MG tablet Take 4 mg by mouth every 8 (eight) hours as needed for Nausea.    pantoprazole (PROTONIX) 40 MG tablet Take 1 tablet (40 mg total) by mouth once daily. for 30 doses    promethazine (PHENERGAN) 25 MG tablet TAKE 1 TABLET BY MOUTH EVERY 4 HOURS AS NEEDED FOR MOTION SICKNESS    pyridoxine, vitamin B6, (B-6) 250 MG Tab Take 250 mg by mouth once daily.    SLOW RELEASE IRON 140 mg (45 mg iron) TbSR Take 1 tablet by mouth 2 (two) times daily.    sulfamethoxazole-trimethoprim 800-160mg (BACTRIM DS) 800-160 mg Tab Take 1 tablet by mouth As instructed. Tab 1 PO Daily M-W-F       Review of patient's allergies indicates:   Allergen Reactions    Penicillins        Past Medical History:   Diagnosis Date    Anemia     GERD (gastroesophageal reflux disease)     Heart problem     Hyperlipidemia     Hypertension     Multiple myeloma     NSTEMI (non-ST elevated myocardial infarction)      Past Surgical History:   Procedure Laterality Date    BONE MARROW ASPIRATION N/A 9/1/2022    Procedure: ASPIRATION, BONE MARROW;  Surgeon: Robbie Gardner MD;  Location: [facility];  Service: General;  Laterality: N/A;    CARDIAC SURGERY  2021    ENDOSCOPIC RELEASE OF BOTH CARPAL TUNNELS       Family History       Problem Relation (Age of Onset)    Anemia Mother    Diabetes Mother    Heart disease Mother, Father    Hypertension Mother          Tobacco Use    Smoking status:  "Former     Types: Cigarettes    Smokeless tobacco: Former   Substance and Sexual Activity    Alcohol use: Not Currently    Drug use: Never    Sexual activity: Not on file     Review of Systems   Constitutional:  Positive for activity change.   Musculoskeletal:  Positive for neck pain.   Neurological:  Positive for weakness and numbness.   Objective:     Weight: 88.5 kg (195 lb)  Body mass index is 26.45 kg/m².  Vital Signs (Most Recent):  Temp: 97.7 °F (36.5 °C) (06/14/23 1100)  Pulse: (!) 58 (06/14/23 1100)  Resp: 19 (06/14/23 1100)  BP: 105/67 (06/14/23 1100)  SpO2: 98 % (06/14/23 1100) Vital Signs (24h Range):  Temp:  [97.3 °F (36.3 °C)-98.2 °F (36.8 °C)] 97.7 °F (36.5 °C)  Pulse:  [51-65] 58  Resp:  [18-24] 19  SpO2:  [97 %-99 %] 98 %  BP: (105-147)/(67-80) 105/67     Date 06/14/23 0700 - 06/15/23 0659   Shift 0089-2883 1747-4238 [id number] 24 Hour Total   INTAKE   P.O. 360   360   Shift Total(mL/kg) 360(4.1)   360(4.1)   OUTPUT   Shift Total(mL/kg)       Weight (kg) 88.5 88.5 88.5 88.5              Resp Rate Total:  [18 br/min] 18 br/min             Urethral Catheter Straight-tip (Active)   Site Assessment Clean;Intact 06/14/23 0800   Collection Container Standard drainage bag 06/14/23 0800   Securement Method secured to top of thigh w/ adhesive device 06/14/23 0800   Catheter Care Performed yes 06/14/23 0800   Reason for Continuing Urinary Catheterization Urinary retention 06/14/23 0800   CAUTI Prevention Bundle Securement Device in place with 1" slack;Intact seal between catheter & drainage tubing;Drainage bag/urimeter off the floor;Sheeting clip in use;No dependent loops or kinks;Drainage bag/urimeter not overfilled (<2/3 full);Drainage bag/urimeter below bladder 06/14/23 0800       Physical Exam:  Vitals reviewed.    Constitutional: He appears well-developed and well-nourished. He is not diaphoretic. No distress.     Eyes: Pupils are equal, round, and reactive to light. Conjunctivae are normal. "     Cardiovascular: Normal rate.     Abdominal: Soft. Bowel sounds are normal.     Skin: Skin displays no rash on trunk and no rash on extremities. Skin displays no lesions on trunk.     Psych/Behavior: He is alert. He is oriented to person, place, and time. He has a normal mood and affect.     Musculoskeletal:        Right Upper Extremities: Muscle strength is 3/5.        Left Upper Extremities: Muscle strength is 3/5.       Right Lower Extremities: Muscle strength is 4/5.        Left Lower Extremities: Muscle strength is 4/5.     Neurological:        Sensory: There is sensory deficit in the extremities. Sensory deficit is located UE.        DTRs: DTRs are DTRS NORMAL AND SYMMETRICnormal and symmetric. He displays no Babinski's sign on the right side. He displays no Babinski's sign on the left side.        Cranial nerves: Cranial nerve(s) II, III, IV, V, VI, VII, VIII, IX, X, XI and XII are intact.     Significant Labs:  Recent Labs   Lab 06/14/23 0412      K 3.2*   CO2 23   BUN 10.8   CREATININE 0.82   CALCIUM 9.3   MG 1.60     Recent Labs   Lab 06/14/23  0412   WBC 4.15*   HGB 11.3*   HCT 34.3*        No results for input(s): LABPT, INR, APTT in the last 48 hours.  Microbiology Results (last 7 days)       Procedure Component Value Units Date/Time    Urine culture [[id number]]  (Abnormal)  (Susceptibility) Collected: 06/11/23 2224    Order Status: Completed Specimen: Urine Updated: 06/14/23 0952     Urine Culture >/= 100,000 colonies/ml Escherichia coli            Assessment/Plan:    Cervical myelopathy/myelopathic features   Lumbar stenosis  History of multiple myeloma   Urinary retention       Imaging performed.  See below.  MRI lumbar spine with and without contrast:  Impression:       1. Severe degenerative spinal canal stenosis at L3-L4 and L4-L5, moderate at L2-L3, mild at L5-S1.   2. Multilevel neural foraminal stenoses as described.      MRI thoracic spine with and without  contrast:  Impression:       1. No acute abnormality of the thoracic spine.   2. Mild multilevel canal and neural foraminal narrowing.   3. No cord signal abnormality.        MRI cervical spine with and without contrast:  Impression:       1. Severe degenerative narrowing of the spinal canal at C4-C5 and C5-C6, moderate at C3-C4, mild at other levels.   2. Multilevel neural foraminal stenoses as described   3. T2 signal changes in the cord at C4-C5 may be secondary to a compressive myelopathy.   4. Mild inflammatory changes of the bilateral C4-C5 and C5-C6 facets, may be degenerative.  No drainable fluid collection.        Patient being treated with antibiotics for UTI at this time.    Fall precautions  PTOT   Pain control  SCDs   Urology, oncology on case.  Prior to any neurosurgical interventions the patient would need to have cardiac clearance.     Active Diagnoses:    Diagnosis Date Noted POA    Upper extremity weakness [R29.898] 06/13/2023 Unknown      Problems Resolved During this Admission:       Thank you for your consult. I will follow-up with patient. Please contact us if you have any additional questions.    SIDRA Hare-BC  Neurosurgery  [facility] - 5th Floor Med Surg

## 2023-06-14 NOTE — PLAN OF CARE
Problem: Occupational Therapy  Goal: Occupational Therapy Goal  Description: Goals to be met by: 6/28/23     Patient will increase functional independence with ADLs by performing:    UE Dressing with Minimal Assistance.  LE Dressing with Moderate Assistance and Assistive Devices as needed.  Grooming while EOB with Set-up Assistance.  Pt will perform sit <> stand with Minimal Assistance.    Outcome: Ongoing, Progressing

## 2023-06-14 NOTE — PLAN OF CARE
Problem: Adult Inpatient Plan of Care  Goal: Plan of Care Review  6/14/2023 0129 by Tish Sears LPN  Outcome: Ongoing, Progressing  6/14/2023 0127 by Tish Sears LPN  Outcome: Ongoing, Progressing  Goal: Patient-Specific Goal (Individualized)  6/14/2023 0129 by Tish Sears LPN  Outcome: Ongoing, Progressing  6/14/2023 0127 by Tish Sears LPN  Outcome: Ongoing, Progressing  Goal: Absence of Hospital-Acquired Illness or Injury  6/14/2023 0129 by Tish Sears LPN  Outcome: Ongoing, Progressing  6/14/2023 0127 by Tish Sears LPN  Outcome: Ongoing, Progressing  Goal: Optimal Comfort and Wellbeing  6/14/2023 0129 by Tish Sears LPN  Outcome: Ongoing, Progressing  6/14/2023 0127 by Tish Seras LPN  Outcome: Ongoing, Progressing  Goal: Readiness for Transition of Care  6/14/2023 0129 by Tish Sears LPN  Outcome: Ongoing, Progressing  6/14/2023 0127 by Tish Sears LPN  Outcome: Ongoing, Progressing     Problem: Fall Injury Risk  Goal: Absence of Fall and Fall-Related Injury  6/14/2023 0129 by Tish Sears LPN  Outcome: Ongoing, Progressing  6/14/2023 0127 by Tish Sears LPN  Outcome: Ongoing, Progressing     Problem: Infection  Goal: Absence of Infection Signs and Symptoms  6/14/2023 0129 by Tish Sears LPN  Outcome: Ongoing, Progressing  6/14/2023 0127 by Tish Sears LPN  Outcome: Ongoing, Progressing     Problem: Skin Injury Risk Increased  Goal: Skin Health and Integrity  6/14/2023 0129 by Tish Sears LPN  Outcome: Ongoing, Progressing  6/14/2023 0127 by Tish Sears LPN  Outcome: Ongoing, Progressing

## 2023-06-14 NOTE — CONSULTS
[facility] - 5th Floor Med Surg  Neurosurgery  Consult Note    Inpatient consult to Neurosurgery  Consult performed by: SIDRA Hare-BC  Consult ordered by: CINDY Cole      Subjective:     Chief Complaint/Reason for Admission:  Progressive upper extremity weakness, urinary retention, inability to walk-over the last 3 weeks.    History of Present Illness:  This is a very pleasant 72-year-old  male with past medical history significant for hypertension, hyperlipidemia, CAD with 5 cardiac stents on Plavix, chronic anemia, GERD, multiple myeloma on chemotherapy.  Patient with history of PE on Eliquis who presented to the hospital on 06/11/2023 with complaints of progressive numbness to bilateral hands as well as weakness to upper and lower extremities, urinary retention, inability to walk that he states has progressively gotten worse over the last 3 weeks.  Patient states he has digress from a cane to a walker to the inability to hold up his body weight.  He can no longer ambulate.  He is bed-bound.  He has fallen several times over the past 3 weeks denies trauma, LOC etcetera.  In December of 2022 chemotherapy was initiated for multiple myeloma.  He also had COVID-19 in February 2023 and has had a rapid decline since then.  He reports that initially he thought that the neuropathy in his upper extremities was due to chemotherapy but states that when he had to stop chemo several times it did not go away in fact it got worse.  He also has had urinary retention recently and was supposed to follow up with Urology.  Urology has evaluated the patient in the hospital and has initiated a Duenas catheter, Flomax, collected cultures, initiated antibiotics.  Patient reports seeing Dr. Harris for Oncology as well for similar complaints to this admission.    Urine was collected in the ED with protein occult blood positive nitrates leukocytes urine culture pending.  Patient initiated on oral  Levaquin suspected UTI.  Neurology was consulted.  Oncology was consulted and has evaluated the patient.  MRI of the entire spine was performed.    MRI lumbar spine with and without contrast:  Impression:       1. Severe degenerative spinal canal stenosis at L3-L4 and L4-L5, moderate at L2-L3, mild at L5-S1.   2. Multilevel neural foraminal stenoses as described.      MRI thoracic spine with and without contrast:  Impression:       1. No acute abnormality of the thoracic spine.   2. Mild multilevel canal and neural foraminal narrowing.   3. No cord signal abnormality.        MRI cervical spine with and without contrast:  Impression:       1. Severe degenerative narrowing of the spinal canal at C4-C5 and C5-C6, moderate at C3-C4, mild at other levels.   2. Multilevel neural foraminal stenoses as described   3. T2 signal changes in the cord at C4-C5 may be secondary to a compressive myelopathy.   4. Mild inflammatory changes of the bilateral C4-C5 and C5-C6 facets, may be degenerative.  No drainable fluid collection.      CT head without contrast unremarkable.    Neurosurgery was consulted for the above findings as well as the patient's upper extremity and lower extremity weakness.    On exam the patient is extremely pleasant and an excellent historian.  GCS 15.  He is completely oriented to all spheres.  PERRLA bilateral brisk.  No facial droop.  Speech is clear.  Cranial nerves are grossly intact.  All reflexes brisk.  He did have a couple of beats of clonus to the right lower extremity.  No clonus to left lower extremity.  Involuntary spasms to lower extremities at times.  Urinary retention.  No bowel issues.  Negative Antoine's to right upper extremity.  Positive Swann's to left extremity.  Patient has mild neck pain.  He has no back pain at this time.  He is unable to raise his arms and hold against gravity.  He can raise both legs up off the bed against gravity very strong to lower extremities at this time.   He does complain of bilateral upper extremity numbness and tingling.    Strength   Deltoids Triceps Biceps Wrist Extension Intrinsics Hand    Upper: R 3/5 3/5 3/5 3/5 3/5 3/5     L 2/5 2-3/5 2-3/5 2/5 2/5 2-3/5      Strength   Hip Flexors Knee extensor Knee Flexion Ankle Dorsiflexion Plantar Flexion   Lower: R 3-4/5 4/5 4/5 5/5 5/5     L 3-4/5 4/5 4/5 5/5 5/5       PTA Medications   Medication Sig    amLODIPine (NORVASC) 10 MG tablet once daily.    apixaban (ELIQUIS) 5 mg Tab Take 1 tablet (5 mg total) by mouth 2 (two) times daily.    ascorbic acid, vitamin C, (VITAMIN C) 1000 MG tablet Take 1,000 mg by mouth once daily.    atorvastatin (LIPITOR) 40 MG tablet TAKE 1 TABLET ORALLY ONCE DAILY ON MON/TUE/THUR/FRI AND 1/2 ON WED. NONE ON SAT/SUN    b complex vitamins capsule Take 1 capsule by mouth once daily.    clopidogreL (PLAVIX) 75 mg tablet Take 75 mg by mouth once daily.    doxazosin (CARDURA) 4 MG tablet Take 4 mg by mouth once daily.    DULoxetine (CYMBALTA) 30 MG capsule Take 1 capsule (30 mg total) by mouth once daily.    dutasteride (AVODART) 0.5 mg capsule Take 0.5 mg by mouth once daily.    FERRETTS 325 mg (106 mg iron) Tab Take 1 tablet by mouth 2 (two) times daily.    HYDROcodone-acetaminophen (NORCO)  mg per tablet Take 1 tablet by mouth every 4 (four) hours as needed for Pain.    LINZESS 145 mcg Cap capsule Take 145 mcg by mouth.    lisinopriL (PRINIVIL,ZESTRIL) 40 MG tablet Take 40 mg by mouth once daily.    metoprolol tartrate (LOPRESSOR) 25 MG tablet Take 25 mg by mouth 2 (two) times daily. Takes 1 tab q am    testosterone cypionate (DEPOTESTOTERONE CYPIONATE) 200 mg/mL injection INJECT 1ML INTO MUSCLE ONCE A WEEK    zolpidem (AMBIEN) 10 mg Tab Take 10 mg by mouth nightly as needed.    albuterol (PROAIR HFA) 90 mcg/actuation inhaler Inhale 2 puffs into the lungs every 6 (six) hours as needed for Wheezing. Rescue    diphenoxylate-atropine 2.5-0.025 mg (LOMOTIL) 2.5-0.025 mg per tablet Take  1 tablet by mouth 4 (four) times daily as needed for Diarrhea.    ergocalciferol (ERGOCALCIFEROL) 50,000 unit Cap Take 1 capsule (50,000 Units total) by mouth every 7 days.    furosemide (LASIX) 20 MG tablet     gabapentin (NEURONTIN) 300 MG capsule Take 1 capsule (300 mg total) by mouth 3 (three) times daily.    glutamine 10 gram PwPk Take 10 g by mouth 2 (two) times a day.    hydrocortisone (CORTEF) 20 MG Tab TAKE ONE TABLET BY MOUTH TWICE DAILY X 14 DAYS    lactulose (CHRONULAC) 10 gram/15 mL solution GIVE 30ML BY MOUTH TWICE A DAY X7 DAY(S)    ondansetron (ZOFRAN) 4 MG tablet Take 4 mg by mouth every 8 (eight) hours as needed for Nausea.    pantoprazole (PROTONIX) 40 MG tablet Take 1 tablet (40 mg total) by mouth once daily. for 30 doses    promethazine (PHENERGAN) 25 MG tablet TAKE 1 TABLET BY MOUTH EVERY 4 HOURS AS NEEDED FOR MOTION SICKNESS    pyridoxine, vitamin B6, (B-6) 250 MG Tab Take 250 mg by mouth once daily.    SLOW RELEASE IRON 140 mg (45 mg iron) TbSR Take 1 tablet by mouth 2 (two) times daily.    sulfamethoxazole-trimethoprim 800-160mg (BACTRIM DS) 800-160 mg Tab Take 1 tablet by mouth As instructed. Tab 1 PO Daily M-W-F       Review of patient's allergies indicates:   Allergen Reactions    Penicillins        Past Medical History:   Diagnosis Date    Anemia     GERD (gastroesophageal reflux disease)     Heart problem     Hyperlipidemia     Hypertension     Multiple myeloma     NSTEMI (non-ST elevated myocardial infarction)      Past Surgical History:   Procedure Laterality Date    BONE MARROW ASPIRATION N/A 9/1/2022    Procedure: ASPIRATION, BONE MARROW;  Surgeon: Robbie Gardner MD;  Location: [facility];  Service: General;  Laterality: N/A;    CARDIAC SURGERY  2021    ENDOSCOPIC RELEASE OF BOTH CARPAL TUNNELS       Family History       Problem Relation (Age of Onset)    Anemia Mother    Diabetes Mother    Heart disease Mother, Father    Hypertension Mother          Tobacco Use    Smoking status:  "Former     Types: Cigarettes    Smokeless tobacco: Former   Substance and Sexual Activity    Alcohol use: Not Currently    Drug use: Never    Sexual activity: Not on file     Review of Systems   Constitutional:  Positive for activity change.   Musculoskeletal:  Positive for neck pain.   Neurological:  Positive for weakness and numbness.   Objective:     Weight: 88.5 kg (195 lb)  Body mass index is 26.45 kg/m².  Vital Signs (Most Recent):  Temp: 97.7 °F (36.5 °C) (06/14/23 1100)  Pulse: (!) 58 (06/14/23 1100)  Resp: 19 (06/14/23 1100)  BP: 105/67 (06/14/23 1100)  SpO2: 98 % (06/14/23 1100) Vital Signs (24h Range):  Temp:  [97.3 °F (36.3 °C)-98.2 °F (36.8 °C)] 97.7 °F (36.5 °C)  Pulse:  [51-65] 58  Resp:  [18-24] 19  SpO2:  [97 %-99 %] 98 %  BP: (105-147)/(67-80) 105/67     Date 06/14/23 0700 - 06/15/23 0659   Shift 4018-1937 7258-2779 [id number] 24 Hour Total   INTAKE   P.O. 360   360   Shift Total(mL/kg) 360(4.1)   360(4.1)   OUTPUT   Shift Total(mL/kg)       Weight (kg) 88.5 88.5 88.5 88.5              Resp Rate Total:  [18 br/min] 18 br/min             Urethral Catheter Straight-tip (Active)   Site Assessment Clean;Intact 06/14/23 0800   Collection Container Standard drainage bag 06/14/23 0800   Securement Method secured to top of thigh w/ adhesive device 06/14/23 0800   Catheter Care Performed yes 06/14/23 0800   Reason for Continuing Urinary Catheterization Urinary retention 06/14/23 0800   CAUTI Prevention Bundle Securement Device in place with 1" slack;Intact seal between catheter & drainage tubing;Drainage bag/urimeter off the floor;Sheeting clip in use;No dependent loops or kinks;Drainage bag/urimeter not overfilled (<2/3 full);Drainage bag/urimeter below bladder 06/14/23 0800       Physical Exam:  Vitals reviewed.    Constitutional: He appears well-developed and well-nourished. He is not diaphoretic. No distress.     Eyes: Pupils are equal, round, and reactive to light. Conjunctivae are normal. "     Cardiovascular: Normal rate.     Abdominal: Soft. Bowel sounds are normal.     Skin: Skin displays no rash on trunk and no rash on extremities. Skin displays no lesions on trunk.     Psych/Behavior: He is alert. He is oriented to person, place, and time. He has a normal mood and affect.     Musculoskeletal:        Right Upper Extremities: Muscle strength is 3/5.        Left Upper Extremities: Muscle strength is 3/5.       Right Lower Extremities: Muscle strength is 4/5.        Left Lower Extremities: Muscle strength is 4/5.     Neurological:        Sensory: There is sensory deficit in the extremities. Sensory deficit is located UE.        DTRs: DTRs are DTRS NORMAL AND SYMMETRICnormal and symmetric. He displays no Babinski's sign on the right side. He displays no Babinski's sign on the left side.        Cranial nerves: Cranial nerve(s) II, III, IV, V, VI, VII, VIII, IX, X, XI and XII are intact.     Significant Labs:  Recent Labs   Lab 06/14/23 0412      K 3.2*   CO2 23   BUN 10.8   CREATININE 0.82   CALCIUM 9.3   MG 1.60     Recent Labs   Lab 06/14/23  0412   WBC 4.15*   HGB 11.3*   HCT 34.3*        No results for input(s): LABPT, INR, APTT in the last 48 hours.  Microbiology Results (last 7 days)       Procedure Component Value Units Date/Time    Urine culture [[id number]]  (Abnormal)  (Susceptibility) Collected: 06/11/23 2224    Order Status: Completed Specimen: Urine Updated: 06/14/23 0952     Urine Culture >/= 100,000 colonies/ml Escherichia coli            Assessment/Plan:    Cervical myelopathy/myelopathic features   Lumbar stenosis  History of multiple myeloma   Urinary retention       Imaging performed.  See below.  MRI lumbar spine with and without contrast:  Impression:       1. Severe degenerative spinal canal stenosis at L3-L4 and L4-L5, moderate at L2-L3, mild at L5-S1.   2. Multilevel neural foraminal stenoses as described.      MRI thoracic spine with and without  contrast:  Impression:       1. No acute abnormality of the thoracic spine.   2. Mild multilevel canal and neural foraminal narrowing.   3. No cord signal abnormality.        MRI cervical spine with and without contrast:  Impression:       1. Severe degenerative narrowing of the spinal canal at C4-C5 and C5-C6, moderate at C3-C4, mild at other levels.   2. Multilevel neural foraminal stenoses as described   3. T2 signal changes in the cord at C4-C5 may be secondary to a compressive myelopathy.   4. Mild inflammatory changes of the bilateral C4-C5 and C5-C6 facets, may be degenerative.  No drainable fluid collection.        Patient being treated with antibiotics for UTI at this time.    Fall precautions  PTOT   Pain control  SCDs   Urology, oncology on case.  Prior to any neurosurgical interventions the patient would need to have cardiac clearance.     Active Diagnoses:    Diagnosis Date Noted POA    Upper extremity weakness [R29.898] 06/13/2023 Unknown      Problems Resolved During this Admission:       Thank you for your consult. I will follow-up with patient. Please contact us if you have any additional questions.    SIDRA Hare-BC  Neurosurgery  [facility] - 5th Floor Med Surg

## 2023-06-14 NOTE — PROGRESS NOTES
[facility]  Hospital Medicine Progress Note        Chief Complaint: Inpatient Follow-up for b.l LE numbness     HPI: Valentino Manning is a 72 y.o. male with a PMHx of HTN, HLD, CAD s/p stent on Plavix, chronic anemia, GERD, multiple myeloma on chemotherapym hx of PE on Eliquis who presented to [facility] on 6/11/2023 with c/o numbness to bilateral hands and bilateral lower extremities with associated weakness has been worsening over the past 6 months.  Patient reports that he was now unable to ambulate due to worsening paresthesias and generalized weakness x3 weeks.  He was now bed-bound.  He endorsed falling about 4 times over the past 3 weeks, denied LOC or head injury.  He reports he was in his normal state of health prior to initiating chemotherapy in December 2022.  He then had COVID-19 in February 2023 and has had a rapid decline since then.  He also endorsed urinary retention for which he was seen at an outlying ED on 6/5/23 and had a Duenas catheter placed, he was supposed to follow-up with urology on 06/12/2023. He was previously on gabapentin 300 mg t.i.d. for neuropathy without symptomatic relief, he states he was not taking it as directed.  He denied CP, SOB, fever, chills, cough, dizziness, syncope.  Patient reportedly saw his oncologist, Dr. Harris, for same complaints and he was referred to ED for further evaluation.   Initial ED VS stable.  Labs notable for hemoglobin 12.6, hematocrit 38.  Urinalysis with 2+ protein, 3+ occult blood, positive nitrites, 2+ leukocytes, 11 RBCs, 67 WBCs, 1+ bacteria.  Urine culture pending.  He was started on oral Levaquin for suspected UTI.  He was admitted to hospital medicine services for further medical management.    Interval Hx:   Laying in bed, eagerly waiting for Neurosurgery rounds.  Discussed therapy will evaluate him once Neurosurgery sees him and clears him for therapy  I answered all their questions to the best of my knowledge in  their satisfaction  Wife is at bedside  Case was discussed with patient's nurse and  on the floor.    Objective/physical exam:  General: In no acute distress, afebrile  Chest: Clear to auscultation bilaterally anteriorly  Heart: RRR, +S1, S2, no appreciable murmur  Abdomen: Soft, nontender, BS +  MSK: Warm, no lower extremity edema, no clubbing or cyanosis  Neurologic: Alert and oriented x4, Cranial nerve II-XII intact, bilateral upper and lower extremity strength 3 out of 5    VITAL SIGNS: 24 HRS MIN & MAX LAST   Temp  Min: 97.8 °F (36.6 °C)  Max: 98.2 °F (36.8 °C) 98.2 °F (36.8 °C)   BP  Min: 106/67  Max: 147/80 124/68   Pulse  Min: 51  Max: 65  (!) 51   Resp  Min: 18  Max: 19 18   SpO2  Min: 97 %  Max: 98 % 97 %     I reviewed the labs below:  Recent Labs   Lab 06/11/23 1822 06/12/23 0352 06/14/23 0412   WBC 5.54 5.02 4.15*   RBC 3.79* 3.50* 3.44*   HGB 12.6* 11.5* 11.3*   HCT 38.0* 35.0* 34.3*   .3* 100.0* 99.7*   MCH 33.2* 32.9* 32.8*   MCHC 33.2 32.9* 32.9*   RDW 14.8 14.9 14.8    296 291   MPV 8.8 8.8 8.7         Recent Labs   Lab 06/11/23 1822 06/12/23 0352 06/14/23 0412    135* 137   K 3.7 3.2* 3.2*   CO2 24 23 23   BUN 11.6 12.0 10.8   CREATININE 0.88 0.89 0.82   CALCIUM 9.5 9.4 9.3   MG 1.60 1.50* 1.60   ALBUMIN 3.6 3.1*  --    ALKPHOS 122 107  --    ALT 33 26  --    AST 22 16  --    BILITOT 0.6 0.5  --             Microbiology Results (last 7 days)       Procedure Component Value Units Date/Time    Urine culture [[id number]]  (Abnormal) Collected: 06/11/23 2224    Order Status: Completed Specimen: Urine Updated: 06/13/23 0632     Urine Culture >/= 100,000 colonies/ml Gram-negative Rods             See below for Radiology    Scheduled Med:   amLODIPine  5 mg Oral Daily    apixaban  5 mg Oral BID    ascorbic acid (vitamin C)  1,000 mg Oral Daily    atorvastatin  40 mg Oral Every other day    clopidogreL  75 mg Oral Daily    DULoxetine  30 mg Oral Daily    dutasteride   0.5 mg Oral Daily    ferrous sulfate  1 tablet Oral Daily    levoFLOXacin  500 mg Oral Daily    linaCLOtide  145 mcg Oral Before breakfast    metoprolol tartrate  25 mg Oral BID    tamsulosin  0.4 mg Oral QHS    zolpidem  10 mg Oral QHS        Continuous Infusions:       PRN Meds:  acetaminophen, aluminum-magnesium hydroxide-simethicone, HYDROcodone-acetaminophen, melatonin, naloxone, ondansetron, polyethylene glycol, prochlorperazine, simethicone       Assessment/Plan:  Polyneuropathy with progressive weakness to bilateral upper extremity and lower extremity  Impaired mobility secondary to generalized weakness and neuropathy since the initiation of chemo  E coli UTI-pansensitive  Multiple myeloma on chemotherapy- last treatment held due to weakness   Essential HTN  Hx of PE on Eliquis  CAD s/p stent on Plavix  Chronic Anemia  Hx of GERD, HLD  Hypokalemia  Hypomagnesemia       Oncology Dr. Harris evaluated the patient, reviewed MRI CT lumbar spine, no plasmacytoma or mass, recommended MRI brain and neurology evaluation.  Continue supportive care in the meantime    PT/OT consulted, patient feels he will benefit from rehab as he is unable to continue with his chemotherapy given his weakness, eval is pending given clearance from Neurosurgery  CT head without--> unrevealing for acute intracranial abnormalities.    MRI C-spine w w/o--> revealing for C4-5 with signal changes.    MRI T-spine w w/o--> unrevealing.    MRI L-spine w w/o--> revealing for severe multilevel degenerative canal stenosis.   Neurology also evaluated the patient, recommended neurosurgery eval and consult  Neurosurgery eval and recommendation pending  Urology also evaluated the patient, recommended to continue Flomax, continue indwelling Duenas catheter on discharge and follow-up with Dr. Vo outpatient and if his mobility improved, okay to offer voiding trial prior to discharge.  Also recommended neurosurgery eval  Urine culture finalized as  pansensitive E coli.  Cont levofloxacin 500 mg po daily  for UTI- Day 3 of 3 tonight  Fall precautions  Given KCl 40 mEq p.o. q.6 x2 doses for potassium of 3.2  Will give Mag sulfate 2 g IV x1 for Mag of 1.6  Resume appropriate home medication for chronic medical conditions, resumed Eliquis, Plavix  Case management consulted for discharge planning, patient interested in rehab  Morning CBC, CMP, Mag ordered        VTE Prophylaxis: Eliquis resumed 6/13    Patient condition:  Guarded    Discharge Planning and Disposition: TBD      Critical care note:  Critical care diagnosis:  Hypomagnesemia requiring IV Mag sulfate  Critical care interventions: Hands-on evaluation, review of labs/radiographs/records and discussion with patient and family if present  Critical care time spent: 35 minutes      All diagnosis and differential diagnosis have been reviewed; assessment and plan has been documented; I have personally reviewed the labs and test results that are presently available; I have reviewed the patients medication list; I have reviewed the consulting providers response and recommendations. I have reviewed or attempted to review medical records based upon their availability    All of the patient's questions have been  addressed and answered. Patient's is agreeable to the above stated plan. I will continue to monitor closely and make adjustments to medical management as needed.  _____________________________________________________________________    Nutrition Status:    Radiology:   I have personally reviewed the images and agree with radiologist report  MRI Lumbar Spine W WO Cont  Narrative: EXAMINATION:  MRI LUMBAR SPINE W WO CONTRAST    CLINICAL HISTORY:  Myelopathy, acute, lumbar spine;    TECHNIQUE:  Multiplanar multisequence MR images of the lumbar spine are obtained without contrast.    COMPARISON:  None    FINDINGS:  There are 5 non-rib-bearing lumbar type vertebral bodies.  There is a leftward scoliotic curvature  of the lumbar spine with minimal retrolisthesis of L2 over L3.  There are multilevel degenerate endplate changes.    The conus terminates at the level of L1.  It is normal in signal and contour.  There is redundancy of the cauda equina nerve roots.  There is focal enhancement in the cauda equina nerve roots at the level of L3-L4 and L4-L5, likely related to stenoses at these levels.  There is no epidural fluid collection.    Disc spaces, spinal canal and neural foramina are as follows:    L1-L2: Disc bulge and facet hypertrophy with minimal narrowing of the spinal canal.  Mild bilateral foraminal stenosis.    L2-L3: Minimal retrolisthesis of L2 over L3.  Disc height loss with posterior disc osteophyte complex, facet hypertrophy and moderate narrowing of the spinal canal.  Moderate bilateral neural foraminal stenosis.    L3-L4: Disc height loss with posterior disc osteophyte complex, facet hypertrophy and severe narrowing of the spinal canal.  Moderate right and moderate to severe left neural foraminal stenosis.    L4-L5: Disc height loss with posterior disc osteophyte complex, facet hypertrophy and severe narrowing of the spinal canal.  Severe right and mild left neural foraminal stenosis.    L5-S1: Posterior disc osteophyte complex, facet hypertrophy and mild narrowing of the spinal canal.  Mild bilateral neural foraminal stenosis.    There are mild inflammatory changes around the facet joints at L4 through S1, likely degenerative.  Impression: 1. Severe degenerative spinal canal stenosis at L3-L4 and L4-L5, moderate at L2-L3, mild at L5-S1.  2. Multilevel neural foraminal stenoses as described.    Electronically signed by: Hazel Guaman  Date:    06/12/2023  Time:    14:03  MRI Thoracic Spine W WO Cont  Narrative: EXAMINATION:  MRI THORACIC SPINE W WO CONTRAST    CLINICAL HISTORY:  Myelopathy, acute, thoracic spine;    TECHNIQUE:  Multiplanar multisequence MR images of the thoracic spine are obtained with and  without contrast.    COMPARISON:  None.    FINDINGS:  Thoracic alignment is preserved.  The vertebral body heights are maintained.  There is no bone marrow edema.    There is no cord signal abnormality.  There is no epidural fluid collection.    There are multilevel degenerative changes with marginal osteophytes, disc bulges and facet hypertrophy.  There is mild narrowing of the canal most evident at T7-T10.  There is mild multilevel neural foraminal narrowing, most evident on the right at T5-T6 and on the left at T11-T12.    The paraspinal soft tissues are unremarkable.  Impression: 1. No acute abnormality of the thoracic spine.  2. Mild multilevel canal and neural foraminal narrowing.  3. No cord signal abnormality.    Electronically signed by: Hazel Guaman  Date:    06/12/2023  Time:    13:56  MRI Cervical Spine W WO Cont  Narrative: EXAMINATION:  MRI CERVICAL SPINE W WO CONTRAST    CLINICAL HISTORY:  Myelopathy, acute, cervical spine;    TECHNIQUE:  Multiplanar, multisequence MR imaging of the cervical spine with and without contrast.    COMPARISON:  None    FINDINGS:  Evaluation is limited by motion artifact.  There is grade 1 retrolisthesis of C3 over C4, grade 1 anterolisthesis of C4 over C5 and grade 1 retrolisthesis of C5 over C6.  There is mild enhancement in the C4-C5 and C5-C6 facets.  There are multilevel degenerate endplate changes.    There is increased T2 signal in the cord at C4-C5.  There is no epidural fluid collection.    There is mild enhancement around the C4-C5 and C5-C6 facet joints.  There is edema in the interspinous ligament and ligamentum flavum at C4-C5.    Disc level analysis as follows:    C2-C3: Posterior disc osteophyte complex and thickening of the ligamentum flavum with mild narrowing of the spinal canal.  Mild left neural foraminal stenosis secondary to uncovertebral and facet hypertrophy.    C3-C4: Grade 1 retrolisthesis of C3 over C4 with posterior disc osteophyte complex,  thickening of the ligamentum flavum and moderate narrowing of the spinal canal flattening the ventral cord.  Moderate right and severe left neural foraminal stenosis secondary to uncovertebral and facet hypertrophy.    C4-C5: Grade 1 anterolisthesis of C4 over C5 and thickening of ligamentum flavum with severe narrowing of the spinal canal and flattening of the cord.  Severe bilateral neural foraminal stenosis secondary to uncovertebral facet hypertrophy.    C5-C6: Grade 1 retrolisthesis of C5 over C6 with posterior disc osteophyte complex, thickening of the ligamentum flavum in severe narrowing of the spinal canal with flattening of the cord.  Severe bilateral neural foraminal stenosis secondary to uncovertebral and facet hypertrophy.    C6-C7: Posterior disc osteophyte complex with mild narrowing of the spinal canal.  Moderate bilateral foraminal stenosis secondary to uncovertebral and facet hypertrophy.    C7-T1: Posterior disc osteophyte complex.  No significant spinal canal stenosis.  Moderate bilateral foraminal stenosis secondary to uncovertebral and facet hypertrophy.  Impression: 1. Severe degenerative narrowing of the spinal canal at C4-C5 and C5-C6, moderate at C3-C4, mild at other levels.  2. Multilevel neural foraminal stenoses as described  3. T2 signal changes in the cord at C4-C5 may be secondary to a compressive myelopathy.  4. Mild inflammatory changes of the bilateral C4-C5 and C5-C6 facets, may be degenerative.  No drainable fluid collection.    Electronically signed by: Hazel Guaman  Date:    06/12/2023  Time:    13:28  CT Head Without Contrast  Narrative: EXAMINATION:  CT HEAD WITHOUT CONTRAST    CLINICAL HISTORY:  Metastatic disease evaluation;    TECHNIQUE:  Multiple axial images were obtained from the base of the brain to the vertex without contrast administration.  Sagittal and coronal reconstructions were performed. .Automatic exposure control  (AEC) is utilized to reduce patient  radiation exposure.    COMPARISON:  None    FINDINGS:  There is no intracranial mass or lesion seen.  No hemorrhage is seen.  No infarct is seen.  The ventricles and basilar cisterns appear normal.  Brain parenchyma appears grossly unremarkable.    Posterior fossa appears normal.  The calvarium is intact.  There is mucosal thickening seen in the frontal ethmoid sphenoid and maxillary sinuses..  Impression: Sinusitis    Otherwise unremarkable    Electronically signed by: Chantel Brennan  Date:    06/12/2023  Time:    10:50      Samy Loomis MD  Department of Hospital Medicine   [facility]   06/14/2023

## 2023-06-15 DIAGNOSIS — G62.9 NEUROPATHY: ICD-10-CM

## 2023-06-15 LAB
ANION GAP SERPL CALC-SCNC: 6 MEQ/L
APTT PPP: 29.3 SECONDS (ref 23.2–33.7)
BUN SERPL-MCNC: 11.4 MG/DL (ref 8.4–25.7)
CALCIUM SERPL-MCNC: 8.9 MG/DL (ref 8.8–10)
CHLORIDE SERPL-SCNC: 109 MMOL/L (ref 98–107)
CO2 SERPL-SCNC: 22 MMOL/L (ref 23–31)
CREAT SERPL-MCNC: 0.92 MG/DL (ref 0.73–1.18)
CREAT/UREA NIT SERPL: 12
ERYTHROCYTE [DISTWIDTH] IN BLOOD BY AUTOMATED COUNT: 14.8 % (ref 11.5–17)
GFR SERPLBLD CREATININE-BSD FMLA CKD-EPI: >60 MLS/MIN/1.73/M2
GLUCOSE SERPL-MCNC: 90 MG/DL (ref 82–115)
HCT VFR BLD AUTO: 32.8 % (ref 42–52)
HGB BLD-MCNC: 10.8 G/DL (ref 14–18)
INR BLD: 1.19 (ref 0–1.3)
MAGNESIUM SERPL-MCNC: 2 MG/DL (ref 1.6–2.6)
MCH RBC QN AUTO: 32.5 PG (ref 27–31)
MCHC RBC AUTO-ENTMCNC: 32.9 G/DL (ref 33–36)
MCV RBC AUTO: 98.8 FL (ref 80–94)
NRBC BLD AUTO-RTO: 0 %
PLATELET # BLD AUTO: 300 X10(3)/MCL (ref 130–400)
PMV BLD AUTO: 8.9 FL (ref 7.4–10.4)
POTASSIUM SERPL-SCNC: 3.7 MMOL/L (ref 3.5–5.1)
PROTHROMBIN TIME: 15 SECONDS (ref 12.5–14.5)
RBC # BLD AUTO: 3.32 X10(6)/MCL (ref 4.7–6.1)
SODIUM SERPL-SCNC: 137 MMOL/L (ref 136–145)
WBC # SPEC AUTO: 5.04 X10(3)/MCL (ref 4.5–11.5)

## 2023-06-15 PROCEDURE — 25000003 PHARM REV CODE 250: Performed by: NURSE PRACTITIONER

## 2023-06-15 PROCEDURE — 25000003 PHARM REV CODE 250: Performed by: INTERNAL MEDICINE

## 2023-06-15 PROCEDURE — 25000003 PHARM REV CODE 250: Performed by: STUDENT IN AN ORGANIZED HEALTH CARE EDUCATION/TRAINING PROGRAM

## 2023-06-15 PROCEDURE — 81001 URINALYSIS AUTO W/SCOPE: CPT | Performed by: STUDENT IN AN ORGANIZED HEALTH CARE EDUCATION/TRAINING PROGRAM

## 2023-06-15 PROCEDURE — 99232 PR SUBSEQUENT HOSPITAL CARE,LEVL II: ICD-10-PCS | Mod: ,,, | Performed by: INTERNAL MEDICINE

## 2023-06-15 PROCEDURE — 97535 SELF CARE MNGMENT TRAINING: CPT

## 2023-06-15 PROCEDURE — 85027 COMPLETE CBC AUTOMATED: CPT | Performed by: INTERNAL MEDICINE

## 2023-06-15 PROCEDURE — 83735 ASSAY OF MAGNESIUM: CPT | Performed by: INTERNAL MEDICINE

## 2023-06-15 PROCEDURE — 85730 THROMBOPLASTIN TIME PARTIAL: CPT | Performed by: NEUROLOGICAL SURGERY

## 2023-06-15 PROCEDURE — 99232 SBSQ HOSP IP/OBS MODERATE 35: CPT | Mod: ,,, | Performed by: INTERNAL MEDICINE

## 2023-06-15 PROCEDURE — 80048 BASIC METABOLIC PNL TOTAL CA: CPT | Performed by: INTERNAL MEDICINE

## 2023-06-15 PROCEDURE — 21400001 HC TELEMETRY ROOM

## 2023-06-15 PROCEDURE — 97110 THERAPEUTIC EXERCISES: CPT

## 2023-06-15 PROCEDURE — 85610 PROTHROMBIN TIME: CPT | Performed by: NEUROLOGICAL SURGERY

## 2023-06-15 RX ORDER — DULOXETIN HYDROCHLORIDE 30 MG/1
CAPSULE, DELAYED RELEASE ORAL
Qty: 30 CAPSULE | Refills: 1 | Status: SHIPPED | OUTPATIENT
Start: 2023-06-15 | End: 2023-12-28 | Stop reason: SDUPTHER

## 2023-06-15 RX ORDER — GABAPENTIN 300 MG/1
300 CAPSULE ORAL 3 TIMES DAILY
Status: DISCONTINUED | OUTPATIENT
Start: 2023-06-15 | End: 2023-06-26

## 2023-06-15 RX ORDER — NAPROXEN SODIUM 220 MG/1
81 TABLET, FILM COATED ORAL DAILY
Status: DISCONTINUED | OUTPATIENT
Start: 2023-06-15 | End: 2023-06-16

## 2023-06-15 RX ADMIN — ASPIRIN 81 MG CHEWABLE TABLET 81 MG: 81 TABLET CHEWABLE at 02:06

## 2023-06-15 RX ADMIN — DULOXETINE 30 MG: 30 CAPSULE, DELAYED RELEASE ORAL at 08:06

## 2023-06-15 RX ADMIN — TAMSULOSIN HYDROCHLORIDE 0.4 MG: 0.4 CAPSULE ORAL at 08:06

## 2023-06-15 RX ADMIN — FERROUS SULFATE TAB 325 MG (65 MG ELEMENTAL FE) 1 EACH: 325 (65 FE) TAB at 08:06

## 2023-06-15 RX ADMIN — HYDROCODONE BITARTRATE AND ACETAMINOPHEN 1 TABLET: 5; 325 TABLET ORAL at 08:06

## 2023-06-15 RX ADMIN — Medication 1000 MG: at 08:06

## 2023-06-15 RX ADMIN — APIXABAN 5 MG: 5 TABLET, FILM COATED ORAL at 08:06

## 2023-06-15 RX ADMIN — ZOLPIDEM TARTRATE 10 MG: 5 TABLET ORAL at 08:06

## 2023-06-15 RX ADMIN — GABAPENTIN 300 MG: 300 CAPSULE ORAL at 08:06

## 2023-06-15 RX ADMIN — CLOPIDOGREL BISULFATE 75 MG: 75 TABLET ORAL at 08:06

## 2023-06-15 NOTE — PROGRESS NOTES
[facility] - Oncology Acute  Hematology/Oncology  Progress Note      Consult Requested By: Samy Loomis MD    Reason for Consult: Multiple Myeloma    SUBJECTIVE:   Diagnosis:  Multiple Myeloma--IgG, Kappa  Macrocytic anemia     Present treatment:   VRD-Started on 12/9/22  Daratumumab added on 1/27/23    Imaging:   US abdomen 8/24/2022: Spleen: 10.8 cm. Mild hepatomegaly with suspected mild hepatic steatosis. Cholelithiasis.  PET CT 9/28/2022: 1. Right scapular small focal mild hypermetabolism without lytic or sclerotic abnormality is not convinced to be related to multiple myeloma. 2. No definite skeletal lytic abnormality with hypermetabolism to reflect multiple myeloma on this exam. 3. Bilateral small pleural effusions.   4. Gallstones. 5.  Noninflamed diverticulosis coli. 6.  Prostatomegaly.     Pathology:  BONE MARROW BIOPSY 9/1/2022: PLASMA CELL MYELOMA, KAPPA RESTRICTED. NORMOCELLULAR MARROW (30%) WITH 70% INVOLVEMENT BY PLASMA CELL MYELOMA. BACKGROUND TRILINEAGE HEMATOPOIESIS IS DECREASED.    History of Present Illness:  Patient is a 72 y.o. male that I saw for the 1st time on 08/10/2022 for persistent anemia.  Reviewing electronic medical record patient with anemia since 02/10/2016.  From 2016 to January of 2017 anemia was mild.  Anemia slowly worsening back in 2018 hemoglobin was 8.0. At that same time kidney function was worsening as well.      Further workup revealed the diagnosis of multiple myeloma.  Patient was started on treatment with Velcade, Revlimid and dexamethasone on 12/09/2022 and daratumumab was added on 01/27/2023 with significant improvement of his disease.  He was seen in [place] for possible bone marrow transplant but he was not interested in bone marrow transplant at the time.  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  Gabapentin was not helping.    Treatment was delayed 1st due to COVID-19 in February of 2023, he has few treatments in between but  "it was delayed again due to  NSTEMI .  Revlimid was stopped due to his heart disease and plan was to continue daratumumab and Velcade lower dose for neuropathy.  I saw him in hospital follow-up after his MI but he was not ready to start treatment.  Patient continued to decline with worsening of his weakness.  Home health called to let us know that patient was weak and unable to get out of bed. I called the patient Thursday 6/8/2023: "I personally spoke with the patient and  instructed to go to the ER as his weakness and neuropathy is worsening and he reports that he went to the ER in [place] because he could not urinate and a gongora cath was placed and send home.   He reports that is very difficult for him to get out of bed and he can not seeing himself going to his room to the car. I told him he will need to call an ambulance to transport him to the hospital and to come to [facility].     Patient with debilitating neuropathy of unclear etiology. I told him that may need MRI's of spine and/or brain and neurologist evaluation.     Patient verbalized understanding and he will planned to go to the ER tomorrow not today."     Patient went to the ER on 06/07/2023 and a Gongora catheter was placed he was sent home with follow-up with urologist.      Patient presents with weakness.    Came to see the patient in the ER wife was in the room but patient was in the radiology department undergoing MRIs.    I did not examine the patient.  I will be back to see him    Came back to see the patient this afternoon.  Review MRIs of the spine.  No evidence of plasmacytoma, mass or cord compression due to Malignancy.    6/15/2023:  Came to see the patient.  Wife at bedside.  He is doing about the same but happy that were able to find out what is going on with him.  Patient with cervical myelopathy and stenosis.  He was seen by Neurosurgery and he is a candidate for laminectomy and posterior fusion.  No fever, chills, sweats.  No " chest pain or short of breath.      Continuous Infusions:  Scheduled Meds:   amLODIPine  5 mg Oral Daily    apixaban  5 mg Oral BID    ascorbic acid (vitamin C)  1,000 mg Oral Daily    atorvastatin  40 mg Oral Every other day    DULoxetine  30 mg Oral Daily    dutasteride  0.5 mg Oral Daily    ferrous sulfate  1 tablet Oral Daily    linaCLOtide  145 mcg Oral Before breakfast    metoprolol tartrate  25 mg Oral BID    tamsulosin  0.4 mg Oral QHS    zolpidem  10 mg Oral QHS     PRN Meds:acetaminophen, aluminum-magnesium hydroxide-simethicone, HYDROcodone-acetaminophen, melatonin, naloxone, ondansetron, polyethylene glycol, prochlorperazine, simethicone    Past Medical History:   Diagnosis Date    Anemia     GERD (gastroesophageal reflux disease)     Heart problem     Hyperlipidemia     Hypertension     Multiple myeloma     NSTEMI (non-ST elevated myocardial infarction)      Past Surgical History:   Procedure Laterality Date    BONE MARROW ASPIRATION N/A 9/1/2022    Procedure: ASPIRATION, BONE MARROW;  Surgeon: Robbie Gardner MD;  Location: [facility];  Service: General;  Laterality: N/A;    CARDIAC SURGERY  2021    ENDOSCOPIC RELEASE OF BOTH CARPAL TUNNELS       Family History   Problem Relation Age of Onset    Hypertension Mother     Diabetes Mother     Anemia Mother     Heart disease Mother     Heart disease Father      Social History     Tobacco Use    Smoking status: Former     Types: Cigarettes    Smokeless tobacco: Former   Substance Use Topics    Alcohol use: Not Currently    Drug use: Never       Review of patient's allergies indicates:   Allergen Reactions    Penicillins       No current facility-administered medications on file prior to encounter.     Current Outpatient Medications on File Prior to Encounter   Medication Sig Dispense Refill    amLODIPine (NORVASC) 10 MG tablet once daily.      apixaban (ELIQUIS) 5 mg Tab Take 1 tablet (5 mg total) by mouth 2 (two) times daily. 60 tablet 6    ascorbic acid,  vitamin C, (VITAMIN C) 1000 MG tablet Take 1,000 mg by mouth once daily.      atorvastatin (LIPITOR) 40 MG tablet TAKE 1 TABLET ORALLY ONCE DAILY ON MON/TUE/THUR/FRI AND 1/2 ON WED. NONE ON SAT/SUN      b complex vitamins capsule Take 1 capsule by mouth once daily.      clopidogreL (PLAVIX) 75 mg tablet Take 75 mg by mouth once daily.      doxazosin (CARDURA) 4 MG tablet Take 4 mg by mouth once daily.      DULoxetine (CYMBALTA) 30 MG capsule Take 1 capsule (30 mg total) by mouth once daily. 30 capsule 1    dutasteride (AVODART) 0.5 mg capsule Take 0.5 mg by mouth once daily.      FERRETTS 325 mg (106 mg iron) Tab Take 1 tablet by mouth 2 (two) times daily.      HYDROcodone-acetaminophen (NORCO)  mg per tablet Take 1 tablet by mouth every 4 (four) hours as needed for Pain. 75 tablet 0    LINZESS 145 mcg Cap capsule Take 145 mcg by mouth.      lisinopriL (PRINIVIL,ZESTRIL) 40 MG tablet Take 40 mg by mouth once daily.      metoprolol tartrate (LOPRESSOR) 25 MG tablet Take 25 mg by mouth 2 (two) times daily. Takes 1 tab q am      testosterone cypionate (DEPOTESTOTERONE CYPIONATE) 200 mg/mL injection INJECT 1ML INTO MUSCLE ONCE A WEEK      zolpidem (AMBIEN) 10 mg Tab Take 10 mg by mouth nightly as needed.      albuterol (PROAIR HFA) 90 mcg/actuation inhaler Inhale 2 puffs into the lungs every 6 (six) hours as needed for Wheezing. Rescue 8 g 0    diphenoxylate-atropine 2.5-0.025 mg (LOMOTIL) 2.5-0.025 mg per tablet Take 1 tablet by mouth 4 (four) times daily as needed for Diarrhea. 30 tablet 0    ergocalciferol (ERGOCALCIFEROL) 50,000 unit Cap Take 1 capsule (50,000 Units total) by mouth every 7 days. 7 capsule 0    furosemide (LASIX) 20 MG tablet       gabapentin (NEURONTIN) 300 MG capsule Take 1 capsule (300 mg total) by mouth 3 (three) times daily. 90 capsule 3    glutamine 10 gram PwPk Take 10 g by mouth 2 (two) times a day.      hydrocortisone (CORTEF) 20 MG Tab TAKE ONE TABLET BY MOUTH TWICE DAILY X 14 DAYS       lactulose (CHRONULAC) 10 gram/15 mL solution GIVE 30ML BY MOUTH TWICE A DAY X7 DAY(S)      ondansetron (ZOFRAN) 4 MG tablet Take 4 mg by mouth every 8 (eight) hours as needed for Nausea.      pantoprazole (PROTONIX) 40 MG tablet Take 1 tablet (40 mg total) by mouth once daily. for 30 doses 30 tablet 1    promethazine (PHENERGAN) 25 MG tablet TAKE 1 TABLET BY MOUTH EVERY 4 HOURS AS NEEDED FOR MOTION SICKNESS      pyridoxine, vitamin B6, (B-6) 250 MG Tab Take 250 mg by mouth once daily.      SLOW RELEASE IRON 140 mg (45 mg iron) TbSR Take 1 tablet by mouth 2 (two) times daily.      sulfamethoxazole-trimethoprim 800-160mg (BACTRIM DS) 800-160 mg Tab Take 1 tablet by mouth As instructed. Tab 1 PO Daily M-W-F         Review of Systems   Constitutional:  Positive for activity change and fatigue. Negative for appetite change, chills, diaphoresis, fever and unexpected weight change.   HENT:  Negative for nasal congestion, mouth sores, nosebleeds, sinus pressure/congestion, sore throat and trouble swallowing.    Eyes: Negative.    Respiratory:  Negative for cough and shortness of breath.    Cardiovascular:  Negative for chest pain and palpitations.   Gastrointestinal:  Negative for abdominal distention, abdominal pain, blood in stool, change in bowel habit, constipation, diarrhea, nausea, vomiting and change in bowel habit.   Endocrine: Negative.    Genitourinary:  Positive for difficulty urinating. Negative for bladder incontinence, decreased urine volume, dysuria, frequency, hematuria and urgency.   Musculoskeletal:  Negative for arthralgias, back pain, gait problem, joint swelling, leg pain and myalgias.   Integumentary:  Negative for rash.   Allergic/Immunologic: Negative.    Neurological:  Positive for weakness. Negative for dizziness, tremors, syncope, light-headedness, numbness, headaches and memory loss.   Hematological:  Negative for adenopathy. Does not bruise/bleed easily.   Psychiatric/Behavioral:  Negative  for agitation, confusion, hallucinations, sleep disturbance and suicidal ideas. The patient is not nervous/anxious.        OBJECTIVE:     Vital Signs (Most Recent)  Temp: 97.7 °F (36.5 °C) (06/15/23 0700)  Pulse: (!) 58 (06/15/23 0700)  Resp: 18 (06/15/23 0852)  BP: 117/71 (06/15/23 0700)  SpO2: 98 % (06/15/23 0700)    Pain Assessment: No pain reported at this time    Vital Signs Range (Last 24H):  Temp:  [97.4 °F (36.3 °C)-98.4 °F (36.9 °C)]   Pulse:  [57-68]   Resp:  [17-18]   BP: (116-167)/(69-96)   SpO2:  [94 %-98 %]     Physical Exam:  Physical Exam  Vitals and nursing note reviewed.   Constitutional:       General: He is not in acute distress.     Appearance: He is ill-appearing.   HENT:      Head: Normocephalic and atraumatic.      Mouth/Throat:      Mouth: Mucous membranes are moist.   Eyes:      General: No scleral icterus.     Extraocular Movements: Extraocular movements intact.      Conjunctiva/sclera: Conjunctivae normal.   Neck:      Vascular: No JVD.   Cardiovascular:      Rate and Rhythm: Normal rate and regular rhythm.      Heart sounds: No murmur heard.  Pulmonary:      Effort: Pulmonary effort is normal.      Breath sounds: Normal breath sounds. No wheezing or rhonchi.   Abdominal:      General: Bowel sounds are normal. There is no distension.      Palpations: Abdomen is soft.      Tenderness: There is no abdominal tenderness.   Musculoskeletal:         General: No swelling or deformity.      Cervical back: Neck supple.   Lymphadenopathy:      Head:      Right side of head: No submandibular adenopathy.      Left side of head: No submandibular adenopathy.      Cervical: No cervical adenopathy.      Upper Body:      Right upper body: No supraclavicular or axillary adenopathy.      Left upper body: No supraclavicular or axillary adenopathy.      Lower Body: No right inguinal adenopathy. No left inguinal adenopathy.   Skin:     General: Skin is warm.      Coloration: Skin is not jaundiced.       Findings: No rash.   Neurological:      Mental Status: He is alert and oriented to person, place, and time.      Cranial Nerves: Cranial nerves 2-12 are intact.      Motor: Weakness present.      Gait: Gait abnormal.      Comments: Moderate peripheral neuropathy   Psychiatric:         Attention and Perception: Attention normal.         Behavior: Behavior is cooperative.       Laboratory:  CBC with Differential:  Recent Labs   Lab 06/15/23  0557   WBC 5.04   RBC 3.32*   HCT 32.8*   HGB 10.8*   MCV 98.8*   MCH 32.5*   RDW 14.8      MPV 8.9       CMP:  Recent Labs   Lab 06/15/23  0557   CALCIUM 8.9      K 3.7   CO2 22*   BUN 11.4   CREATININE 0.92       BMP:   Recent Labs   Lab 06/15/23  0557   CALCIUM 8.9      K 3.7   CO2 22*   BUN 11.4   CREATININE 0.92       LFTs:   No results for input(s): ALT, AST, ALKPHOS, BILITOT, PROT, ALBUMIN in the last 24 hours.    Haptoglobin: No results for input(s): HAPTOGLOBIN in the last 24 hours.  Tumor Markers: No results for input(s): PSA, CEA, , AFPTM, EC8841,  in the last 24 hours.    Invalid input(s): ALGTM  Immunology: No results for input(s): SPEP, BETTY, MACEY, FREELAMBDALI in the last 24 hours.  Coagulation:   Recent Labs   Lab 06/15/23  0557   INR 1.19     Specimen (24h ago, onward)      None          Microbiology Results (last 7 days)       Procedure Component Value Units Date/Time    Urine culture [[id number]]  (Abnormal)  (Susceptibility) Collected: 06/11/23 2224    Order Status: Completed Specimen: Urine Updated: 06/14/23 0952     Urine Culture >/= 100,000 colonies/ml Escherichia coli            Diagnostic Results:  Imaging Results              MRI Lumbar Spine W WO Cont (Final result)  Result time 06/12/23 14:03:40      Final result by Hazel Guaman MD (06/12/23 14:03:40)                   Impression:      1. Severe degenerative spinal canal stenosis at L3-L4 and L4-L5, moderate at L2-L3, mild at L5-S1.  2. Multilevel neural foraminal  stenoses as described.      Electronically signed by: Hazel Guaman  Date:    06/12/2023  Time:    14:03               Narrative:    EXAMINATION:  MRI LUMBAR SPINE W WO CONTRAST    CLINICAL HISTORY:  Myelopathy, acute, lumbar spine;    TECHNIQUE:  Multiplanar multisequence MR images of the lumbar spine are obtained without contrast.    COMPARISON:  None    FINDINGS:  There are 5 non-rib-bearing lumbar type vertebral bodies.  There is a leftward scoliotic curvature of the lumbar spine with minimal retrolisthesis of L2 over L3.  There are multilevel degenerate endplate changes.    The conus terminates at the level of L1.  It is normal in signal and contour.  There is redundancy of the cauda equina nerve roots.  There is focal enhancement in the cauda equina nerve roots at the level of L3-L4 and L4-L5, likely related to stenoses at these levels.  There is no epidural fluid collection.    Disc spaces, spinal canal and neural foramina are as follows:    L1-L2: Disc bulge and facet hypertrophy with minimal narrowing of the spinal canal.  Mild bilateral foraminal stenosis.    L2-L3: Minimal retrolisthesis of L2 over L3.  Disc height loss with posterior disc osteophyte complex, facet hypertrophy and moderate narrowing of the spinal canal.  Moderate bilateral neural foraminal stenosis.    L3-L4: Disc height loss with posterior disc osteophyte complex, facet hypertrophy and severe narrowing of the spinal canal.  Moderate right and moderate to severe left neural foraminal stenosis.    L4-L5: Disc height loss with posterior disc osteophyte complex, facet hypertrophy and severe narrowing of the spinal canal.  Severe right and mild left neural foraminal stenosis.    L5-S1: Posterior disc osteophyte complex, facet hypertrophy and mild narrowing of the spinal canal.  Mild bilateral neural foraminal stenosis.    There are mild inflammatory changes around the facet joints at L4 through S1, likely degenerative.                                        MRI Cervical Spine W WO Cont (Final result)  Result time 06/12/23 13:28:51      Final result by Hazel Guaman MD (06/12/23 13:28:51)                   Impression:      1. Severe degenerative narrowing of the spinal canal at C4-C5 and C5-C6, moderate at C3-C4, mild at other levels.  2. Multilevel neural foraminal stenoses as described  3. T2 signal changes in the cord at C4-C5 may be secondary to a compressive myelopathy.  4. Mild inflammatory changes of the bilateral C4-C5 and C5-C6 facets, may be degenerative.  No drainable fluid collection.      Electronically signed by: Hazel Guaman  Date:    06/12/2023  Time:    13:28               Narrative:    EXAMINATION:  MRI CERVICAL SPINE W WO CONTRAST    CLINICAL HISTORY:  Myelopathy, acute, cervical spine;    TECHNIQUE:  Multiplanar, multisequence MR imaging of the cervical spine with and without contrast.    COMPARISON:  None    FINDINGS:  Evaluation is limited by motion artifact.  There is grade 1 retrolisthesis of C3 over C4, grade 1 anterolisthesis of C4 over C5 and grade 1 retrolisthesis of C5 over C6.  There is mild enhancement in the C4-C5 and C5-C6 facets.  There are multilevel degenerate endplate changes.    There is increased T2 signal in the cord at C4-C5.  There is no epidural fluid collection.    There is mild enhancement around the C4-C5 and C5-C6 facet joints.  There is edema in the interspinous ligament and ligamentum flavum at C4-C5.    Disc level analysis as follows:    C2-C3: Posterior disc osteophyte complex and thickening of the ligamentum flavum with mild narrowing of the spinal canal.  Mild left neural foraminal stenosis secondary to uncovertebral and facet hypertrophy.    C3-C4: Grade 1 retrolisthesis of C3 over C4 with posterior disc osteophyte complex, thickening of the ligamentum flavum and moderate narrowing of the spinal canal flattening the ventral cord.  Moderate right and severe left neural foraminal stenosis  secondary to uncovertebral and facet hypertrophy.    C4-C5: Grade 1 anterolisthesis of C4 over C5 and thickening of ligamentum flavum with severe narrowing of the spinal canal and flattening of the cord.  Severe bilateral neural foraminal stenosis secondary to uncovertebral facet hypertrophy.    C5-C6: Grade 1 retrolisthesis of C5 over C6 with posterior disc osteophyte complex, thickening of the ligamentum flavum in severe narrowing of the spinal canal with flattening of the cord.  Severe bilateral neural foraminal stenosis secondary to uncovertebral and facet hypertrophy.    C6-C7: Posterior disc osteophyte complex with mild narrowing of the spinal canal.  Moderate bilateral foraminal stenosis secondary to uncovertebral and facet hypertrophy.    C7-T1: Posterior disc osteophyte complex.  No significant spinal canal stenosis.  Moderate bilateral foraminal stenosis secondary to uncovertebral and facet hypertrophy.                                       MRI Thoracic Spine W WO Cont (Final result)  Result time 06/12/23 13:56:48      Final result by Hazel Guaman MD (06/12/23 13:56:48)                   Impression:      1. No acute abnormality of the thoracic spine.  2. Mild multilevel canal and neural foraminal narrowing.  3. No cord signal abnormality.      Electronically signed by: Hazel Guaman  Date:    06/12/2023  Time:    13:56               Narrative:    EXAMINATION:  MRI THORACIC SPINE W WO CONTRAST    CLINICAL HISTORY:  Myelopathy, acute, thoracic spine;    TECHNIQUE:  Multiplanar multisequence MR images of the thoracic spine are obtained with and without contrast.    COMPARISON:  None.    FINDINGS:  Thoracic alignment is preserved.  The vertebral body heights are maintained.  There is no bone marrow edema.    There is no cord signal abnormality.  There is no epidural fluid collection.    There are multilevel degenerative changes with marginal osteophytes, disc bulges and facet hypertrophy.  There is  mild narrowing of the canal most evident at T7-T10.  There is mild multilevel neural foraminal narrowing, most evident on the right at T5-T6 and on the left at T11-T12.    The paraspinal soft tissues are unremarkable.                                       CT Head Without Contrast (Final result)  Result time 06/12/23 10:50:29      Final result by Chantel Brennan MD (06/12/23 10:50:29)                   Impression:      Sinusitis    Otherwise unremarkable      Electronically signed by: Chantel Brennan  Date:    06/12/2023  Time:    10:50               Narrative:    EXAMINATION:  CT HEAD WITHOUT CONTRAST    CLINICAL HISTORY:  Metastatic disease evaluation;    TECHNIQUE:  Multiple axial images were obtained from the base of the brain to the vertex without contrast administration.  Sagittal and coronal reconstructions were performed. .Automatic exposure control  (AEC) is utilized to reduce patient radiation exposure.    COMPARISON:  None    FINDINGS:  There is no intracranial mass or lesion seen.  No hemorrhage is seen.  No infarct is seen.  The ventricles and basilar cisterns appear normal.  Brain parenchyma appears grossly unremarkable.    Posterior fossa appears normal.  The calvarium is intact.  There is mucosal thickening seen in the frontal ethmoid sphenoid and maxillary sinuses..                                          ASSESSMENT/PLAN:     Patient Active Problem List   Diagnosis    Anemia    Elevated serum globulin level    Kidney disease    Multiple myeloma    Hypertension    SOB (shortness of breath)    Patient on antineoplastic chemotherapy regimen    Peripheral neuropathy    Upper extremity weakness   Multiple myeloma  Neuropathy  Weakness  Urinary retention  History of NSTEMI coronary artery disease  Cervical degenerative compressive myelopathy, with myelopathic features  Lumbar spinal stenosis    Plan  MRI CTL spine-reviewed-no plasmacytoma or mass. Patient with Cervical degenerative compressive  myelopathy. Evaluated by Neurosurgery and he will have Cardiology clearance, Urology clearance, completion of antibiotics for UTI prior to his surgery     Cont supportive care  Cont antibiotics for UTI  Agree with surgery  Most likely will need Rehab after his surgery  I will see him after d/c    I will sign of but be available for any questions    Angélica Harris MD  Hematology/Oncology  [facility]

## 2023-06-15 NOTE — CONSULTS
[facility] - 5th [facility] Med Surg  Cardiology  Consult Note    Patient Name: Valentino Manning  MRN: [id number]  Admission Date: 6/11/2023  Hospital Length of Stay: 4 days  Code Status: Full Code   Attending Provider: Samy Loomis MD   Consulting Provider: Анна Rushing MD  Primary Care Physician: Kaiser Metcalf MD  Principal Problem:<principal problem not specified>    Patient information was obtained from patient, past medical records, and ER records.     Subjective:     Chief Complaint:  weakness with progressively worsening bilateral neuropathy     HPI:   Pt is a 71yo M with PMH HTN, CAD s/p stents on plavix, chronic anemia, GERD, multiple myeloma, h/o PE on eliquis 2/2023 when had covid infection who presented to [facility] ED on 6/11/23 for progressive numbness to bilateral hands and BLE associated with weakness and known recurrent falls over past 6 months. Previously thought to be 2/2 chemotherapy, however, despite breaks from chemo, continued to have progression of sxs. Developed urinary retention 6/5/23, now with gongora placed with +UTI. Trialed gabapentin without sxs improvement. Oncology consulted - reviewed scans without evidence of mass effect to lumbar CT and MRI. Neurology consulted - CT head without intracranial abnormalities. MRI C-, T-, and L-spine with changes noted to C4-C5 and L -spine with severe multilevel degenerative canal stenosis. Neurocurgery consulted for eval - pt is candidate for C3-C4, C4-C5, C5-C6 laminectomy and posterior fusion with recs for [place] J C-collar in the interim. Pt had recent LHC with findings below. Plans for repeat LHC in 1 month. Cardiology consulted for pre-op risk stratification.    PMH: PMH HTN, CAD s/p stents on plavix, chronic anemia, GERD, multiple myeloma, h/o PE on eliquis 2/2023  PSH: angiogram, bone marrow aspiration, carpal tunnel release  Family History: anemia, diabetes, CAD in mother, cad and HTN in father  Social History: former smoker, denies  EtOH, illicit drug use    Previous Cardiac Diagnostics:   [facility] 4.23.23  Left main:  The left main distal 30% disease   The left anterior descending ostium appears to have an   eccentric lesion of 50-60%. LAD has diffuse mild 20-30% disease. Gives   rise to 3 Diagonals which have luminal irregularities.   The circumflex ostium has possibly 50% lesion. Gives rise to 2   OM s which have angiographic luminal irregularities. OM1 at ostium has   30-40% disease.   The right coronary artery 100% occluded at ostium within a   prior stent. Difficult to say angiographically if this is acute or   chronic. Collaterals from left to right seen. (Is a  by the behavior of   coronary wire upon attempted intervention)     TTE 4.22.23  LVEF 60 to 65%, grade 1 diastolic dysfunction   Normal RV size and function   Trace MR, trace TR.  Pulmonary pressure 30 mmHg    Normal IVC   Trivial pericardial effusion without tamponade     TTE 2.16.23  The left ventricle is normal in size with moderate concentric hypertrophy and hyperdynamic systolic function.  The estimated ejection fraction is 65-70%.  Grade I left ventricular diastolic dysfunction.  Normal right ventricular size with normal right ventricular systolic function. No evidence of RV strain pattern.  Increased velocity across aortic valve and LVOT which is probably related to hyperdynamic LV function. Cine images suggests no hemodynamically significant aortic stenosis.  Normal central venous pressure (3 mmHg).  The estimated PA systolic pressure is 20 mmHg.    CV US BLE Venous 2.16.23  The right common femoral vein is normal.   The right greater saphenous vein is normal.   The right superficial femoral proximal vein is normal.   The right superficial femoral middle vein is normal.   The right superficial femoral distal vein is normal.  The right popliteal vein is is normal.   The right posterior tibial vein is normal.   The right peroneal vein is normal.   The right saphenofemoral  junction is normal.   There is no evidence of a right lower extremity DVT.    The left common femoral vein is normal.   The left greater saphenous vein is normal.   The left superficial femoral proximal vein is normal.   The left superficial femoral middle vein is normal.   The left superficial femoral distal vein is normal.   The left popliteal vein is normal.   The left posterior tibial vein is normal.   The left peroneal vein is normal.   The left saphenofemoral junction is normal.     There is no evidence of a left lower extremity DVT.  Past Medical History:   Diagnosis Date    Anemia     GERD (gastroesophageal reflux disease)     Heart problem     Hyperlipidemia     Hypertension     Multiple myeloma     NSTEMI (non-ST elevated myocardial infarction)        Past Surgical History:   Procedure Laterality Date    BONE MARROW ASPIRATION N/A 9/1/2022    Procedure: ASPIRATION, BONE MARROW;  Surgeon: Robbie Gardner MD;  Location: [facility];  Service: General;  Laterality: N/A;    CARDIAC SURGERY  2021    ENDOSCOPIC RELEASE OF BOTH CARPAL TUNNELS         Review of patient's allergies indicates:   Allergen Reactions    Penicillins        No current facility-administered medications on file prior to encounter.     Current Outpatient Medications on File Prior to Encounter   Medication Sig    amLODIPine (NORVASC) 10 MG tablet once daily.    apixaban (ELIQUIS) 5 mg Tab Take 1 tablet (5 mg total) by mouth 2 (two) times daily.    ascorbic acid, vitamin C, (VITAMIN C) 1000 MG tablet Take 1,000 mg by mouth once daily.    atorvastatin (LIPITOR) 40 MG tablet TAKE 1 TABLET ORALLY ONCE DAILY ON MON/TUE/THUR/FRI AND 1/2 ON WED. NONE ON SAT/SUN    b complex vitamins capsule Take 1 capsule by mouth once daily.    clopidogreL (PLAVIX) 75 mg tablet Take 75 mg by mouth once daily.    doxazosin (CARDURA) 4 MG tablet Take 4 mg by mouth once daily.    DULoxetine (CYMBALTA) 30 MG capsule Take 1 capsule (30 mg total) by mouth once daily.     dutasteride (AVODART) 0.5 mg capsule Take 0.5 mg by mouth once daily.    FERRETTS 325 mg (106 mg iron) Tab Take 1 tablet by mouth 2 (two) times daily.    HYDROcodone-acetaminophen (NORCO)  mg per tablet Take 1 tablet by mouth every 4 (four) hours as needed for Pain.    LINZESS 145 mcg Cap capsule Take 145 mcg by mouth.    lisinopriL (PRINIVIL,ZESTRIL) 40 MG tablet Take 40 mg by mouth once daily.    metoprolol tartrate (LOPRESSOR) 25 MG tablet Take 25 mg by mouth 2 (two) times daily. Takes 1 tab q am    testosterone cypionate (DEPOTESTOTERONE CYPIONATE) 200 mg/mL injection INJECT 1ML INTO MUSCLE ONCE A WEEK    zolpidem (AMBIEN) 10 mg Tab Take 10 mg by mouth nightly as needed.    albuterol (PROAIR HFA) 90 mcg/actuation inhaler Inhale 2 puffs into the lungs every 6 (six) hours as needed for Wheezing. Rescue    diphenoxylate-atropine 2.5-0.025 mg (LOMOTIL) 2.5-0.025 mg per tablet Take 1 tablet by mouth 4 (four) times daily as needed for Diarrhea.    ergocalciferol (ERGOCALCIFEROL) 50,000 unit Cap Take 1 capsule (50,000 Units total) by mouth every 7 days.    furosemide (LASIX) 20 MG tablet     gabapentin (NEURONTIN) 300 MG capsule Take 1 capsule (300 mg total) by mouth 3 (three) times daily.    glutamine 10 gram PwPk Take 10 g by mouth 2 (two) times a day.    hydrocortisone (CORTEF) 20 MG Tab TAKE ONE TABLET BY MOUTH TWICE DAILY X 14 DAYS    lactulose (CHRONULAC) 10 gram/15 mL solution GIVE 30ML BY MOUTH TWICE A DAY X7 DAY(S)    ondansetron (ZOFRAN) 4 MG tablet Take 4 mg by mouth every 8 (eight) hours as needed for Nausea.    pantoprazole (PROTONIX) 40 MG tablet Take 1 tablet (40 mg total) by mouth once daily. for 30 doses    promethazine (PHENERGAN) 25 MG tablet TAKE 1 TABLET BY MOUTH EVERY 4 HOURS AS NEEDED FOR MOTION SICKNESS    pyridoxine, vitamin B6, (B-6) 250 MG Tab Take 250 mg by mouth once daily.    SLOW RELEASE IRON 140 mg (45 mg iron) TbSR Take 1 tablet by mouth 2 (two) times daily.     sulfamethoxazole-trimethoprim 800-160mg (BACTRIM DS) 800-160 mg Tab Take 1 tablet by mouth As instructed. Tab 1 PO Daily M-W-F     Family History       Problem Relation (Age of Onset)    Anemia Mother    Diabetes Mother    Heart disease Mother, Father    Hypertension Mother          Tobacco Use    Smoking status: Former     Types: Cigarettes    Smokeless tobacco: Former   Substance and Sexual Activity    Alcohol use: Not Currently    Drug use: Never    Sexual activity: Not on file       Review of Systems   Constitutional:  Positive for activity change. Negative for chills and fatigue.   Respiratory:  Negative for shortness of breath.    Cardiovascular:  Negative for chest pain, palpitations and leg swelling.   Gastrointestinal:  Negative for abdominal pain, nausea and vomiting.   Neurological:  Positive for weakness and numbness. Negative for dizziness and speech difficulty.     Objective:     Vital Signs (Most Recent):  Temp: 97.7 °F (36.5 °C) (06/15/23 0700)  Pulse: (!) 58 (06/15/23 0700)  Resp: 18 (06/15/23 0852)  BP: 117/71 (06/15/23 0700)  SpO2: 98 % (06/15/23 0700) Vital Signs (24h Range):  Temp:  [97.4 °F (36.3 °C)-98.4 °F (36.9 °C)] 97.7 °F (36.5 °C)  Pulse:  [57-68] 58  Resp:  [17-19] 18  SpO2:  [94 %-98 %] 98 %  BP: (105-167)/(67-96) 117/71     Weight: 88.5 kg (195 lb)  Body mass index is 26.45 kg/m².    SpO2: 98 %         Intake/Output Summary (Last 24 hours) at 6/15/2023 0929  Last data filed at 6/14/2023 2328  Gross per 24 hour   Intake 360 ml   Output 300 ml   Net 60 ml       Lines/Drains/Airways       Drain  Duration                  Urethral Catheter Straight-tip -- days              Peripheral Intravenous Line  Duration                  Peripheral IV - Single Lumen 06/12/23 1024 20 G Right Antecubital 2 days                    Significant Labs:  Recent Results (from the past 72 hour(s))   CBC Without Differential    Collection Time: 06/14/23  4:12 AM   Result Value Ref Range    WBC 4.15 (L) 4.50 -  11.50 x10(3)/mcL    RBC 3.44 (L) 4.70 - 6.10 x10(6)/mcL    Hgb 11.3 (L) 14.0 - 18.0 g/dL    Hct 34.3 (L) 42.0 - 52.0 %    MCV 99.7 (H) 80.0 - 94.0 fL    MCH 32.8 (H) 27.0 - 31.0 pg    MCHC 32.9 (L) 33.0 - 36.0 g/dL    RDW 14.8 11.5 - 17.0 %    Platelet 291 130 - 400 x10(3)/mcL    MPV 8.7 7.4 - 10.4 fL    NRBC% 0.0 %   Basic Metabolic Panel    Collection Time: 06/14/23  4:12 AM   Result Value Ref Range    Sodium Level 137 136 - 145 mmol/L    Potassium Level 3.2 (L) 3.5 - 5.1 mmol/L    Chloride 106 98 - 107 mmol/L    Carbon Dioxide 23 23 - 31 mmol/L    Glucose Level 87 82 - 115 mg/dL    Blood Urea Nitrogen 10.8 8.4 - 25.7 mg/dL    Creatinine 0.82 0.73 - 1.18 mg/dL    BUN/Creatinine Ratio 13     Calcium Level Total 9.3 8.8 - 10.0 mg/dL    Anion Gap 8.0 mEq/L    eGFR >60 mls/min/1.73/m2   Magnesium    Collection Time: 06/14/23  4:12 AM   Result Value Ref Range    Magnesium Level 1.60 1.60 - 2.60 mg/dL   CBC Without Differential    Collection Time: 06/15/23  5:57 AM   Result Value Ref Range    WBC 5.04 4.50 - 11.50 x10(3)/mcL    RBC 3.32 (L) 4.70 - 6.10 x10(6)/mcL    Hgb 10.8 (L) 14.0 - 18.0 g/dL    Hct 32.8 (L) 42.0 - 52.0 %    MCV 98.8 (H) 80.0 - 94.0 fL    MCH 32.5 (H) 27.0 - 31.0 pg    MCHC 32.9 (L) 33.0 - 36.0 g/dL    RDW 14.8 11.5 - 17.0 %    Platelet 300 130 - 400 x10(3)/mcL    MPV 8.9 7.4 - 10.4 fL    NRBC% 0.0 %   Basic Metabolic Panel    Collection Time: 06/15/23  5:57 AM   Result Value Ref Range    Sodium Level 137 136 - 145 mmol/L    Potassium Level 3.7 3.5 - 5.1 mmol/L    Chloride 109 (H) 98 - 107 mmol/L    Carbon Dioxide 22 (L) 23 - 31 mmol/L    Glucose Level 90 82 - 115 mg/dL    Blood Urea Nitrogen 11.4 8.4 - 25.7 mg/dL    Creatinine 0.92 0.73 - 1.18 mg/dL    BUN/Creatinine Ratio 12     Calcium Level Total 8.9 8.8 - 10.0 mg/dL    Anion Gap 6.0 mEq/L    eGFR >60 mls/min/1.73/m2   Magnesium    Collection Time: 06/15/23  5:57 AM   Result Value Ref Range    Magnesium Level 2.00 1.60 - 2.60 mg/dL   Protime-INR     Collection Time: 06/15/23  5:57 AM   Result Value Ref Range    PT 15.0 (H) 12.5 - 14.5 seconds    INR 1.19 0.00 - 1.30   APTT    Collection Time: 06/15/23  5:57 AM   Result Value Ref Range    PTT 29.3 23.2 - 33.7 seconds       Significant Imaging:  Imaging Results              MRI Lumbar Spine W WO Cont (Final result)  Result time 06/12/23 14:03:40      Final result by Hazel Guaman MD (06/12/23 14:03:40)                   Impression:      1. Severe degenerative spinal canal stenosis at L3-L4 and L4-L5, moderate at L2-L3, mild at L5-S1.  2. Multilevel neural foraminal stenoses as described.      Electronically signed by: Hazel Guaman  Date:    06/12/2023  Time:    14:03               Narrative:    EXAMINATION:  MRI LUMBAR SPINE W WO CONTRAST    CLINICAL HISTORY:  Myelopathy, acute, lumbar spine;    TECHNIQUE:  Multiplanar multisequence MR images of the lumbar spine are obtained without contrast.    COMPARISON:  None    FINDINGS:  There are 5 non-rib-bearing lumbar type vertebral bodies.  There is a leftward scoliotic curvature of the lumbar spine with minimal retrolisthesis of L2 over L3.  There are multilevel degenerate endplate changes.    The conus terminates at the level of L1.  It is normal in signal and contour.  There is redundancy of the cauda equina nerve roots.  There is focal enhancement in the cauda equina nerve roots at the level of L3-L4 and L4-L5, likely related to stenoses at these levels.  There is no epidural fluid collection.    Disc spaces, spinal canal and neural foramina are as follows:    L1-L2: Disc bulge and facet hypertrophy with minimal narrowing of the spinal canal.  Mild bilateral foraminal stenosis.    L2-L3: Minimal retrolisthesis of L2 over L3.  Disc height loss with posterior disc osteophyte complex, facet hypertrophy and moderate narrowing of the spinal canal.  Moderate bilateral neural foraminal stenosis.    L3-L4: Disc height loss with posterior disc osteophyte  complex, facet hypertrophy and severe narrowing of the spinal canal.  Moderate right and moderate to severe left neural foraminal stenosis.    L4-L5: Disc height loss with posterior disc osteophyte complex, facet hypertrophy and severe narrowing of the spinal canal.  Severe right and mild left neural foraminal stenosis.    L5-S1: Posterior disc osteophyte complex, facet hypertrophy and mild narrowing of the spinal canal.  Mild bilateral neural foraminal stenosis.    There are mild inflammatory changes around the facet joints at L4 through S1, likely degenerative.                                       MRI Cervical Spine W WO Cont (Final result)  Result time 06/12/23 13:28:51      Final result by Hazel Guaman MD (06/12/23 13:28:51)                   Impression:      1. Severe degenerative narrowing of the spinal canal at C4-C5 and C5-C6, moderate at C3-C4, mild at other levels.  2. Multilevel neural foraminal stenoses as described  3. T2 signal changes in the cord at C4-C5 may be secondary to a compressive myelopathy.  4. Mild inflammatory changes of the bilateral C4-C5 and C5-C6 facets, may be degenerative.  No drainable fluid collection.      Electronically signed by: Hazel Guaman  Date:    06/12/2023  Time:    13:28               Narrative:    EXAMINATION:  MRI CERVICAL SPINE W WO CONTRAST    CLINICAL HISTORY:  Myelopathy, acute, cervical spine;    TECHNIQUE:  Multiplanar, multisequence MR imaging of the cervical spine with and without contrast.    COMPARISON:  None    FINDINGS:  Evaluation is limited by motion artifact.  There is grade 1 retrolisthesis of C3 over C4, grade 1 anterolisthesis of C4 over C5 and grade 1 retrolisthesis of C5 over C6.  There is mild enhancement in the C4-C5 and C5-C6 facets.  There are multilevel degenerate endplate changes.    There is increased T2 signal in the cord at C4-C5.  There is no epidural fluid collection.    There is mild enhancement around the C4-C5 and C5-C6  facet joints.  There is edema in the interspinous ligament and ligamentum flavum at C4-C5.    Disc level analysis as follows:    C2-C3: Posterior disc osteophyte complex and thickening of the ligamentum flavum with mild narrowing of the spinal canal.  Mild left neural foraminal stenosis secondary to uncovertebral and facet hypertrophy.    C3-C4: Grade 1 retrolisthesis of C3 over C4 with posterior disc osteophyte complex, thickening of the ligamentum flavum and moderate narrowing of the spinal canal flattening the ventral cord.  Moderate right and severe left neural foraminal stenosis secondary to uncovertebral and facet hypertrophy.    C4-C5: Grade 1 anterolisthesis of C4 over C5 and thickening of ligamentum flavum with severe narrowing of the spinal canal and flattening of the cord.  Severe bilateral neural foraminal stenosis secondary to uncovertebral facet hypertrophy.    C5-C6: Grade 1 retrolisthesis of C5 over C6 with posterior disc osteophyte complex, thickening of the ligamentum flavum in severe narrowing of the spinal canal with flattening of the cord.  Severe bilateral neural foraminal stenosis secondary to uncovertebral and facet hypertrophy.    C6-C7: Posterior disc osteophyte complex with mild narrowing of the spinal canal.  Moderate bilateral foraminal stenosis secondary to uncovertebral and facet hypertrophy.    C7-T1: Posterior disc osteophyte complex.  No significant spinal canal stenosis.  Moderate bilateral foraminal stenosis secondary to uncovertebral and facet hypertrophy.                                       MRI Thoracic Spine W WO Cont (Final result)  Result time 06/12/23 13:56:48      Final result by Hazel Guaman MD (06/12/23 13:56:48)                   Impression:      1. No acute abnormality of the thoracic spine.  2. Mild multilevel canal and neural foraminal narrowing.  3. No cord signal abnormality.      Electronically signed by: Hazel  Rowena  Date:    06/12/2023  Time:    13:56               Narrative:    EXAMINATION:  MRI THORACIC SPINE W WO CONTRAST    CLINICAL HISTORY:  Myelopathy, acute, thoracic spine;    TECHNIQUE:  Multiplanar multisequence MR images of the thoracic spine are obtained with and without contrast.    COMPARISON:  None.    FINDINGS:  Thoracic alignment is preserved.  The vertebral body heights are maintained.  There is no bone marrow edema.    There is no cord signal abnormality.  There is no epidural fluid collection.    There are multilevel degenerative changes with marginal osteophytes, disc bulges and facet hypertrophy.  There is mild narrowing of the canal most evident at T7-T10.  There is mild multilevel neural foraminal narrowing, most evident on the right at T5-T6 and on the left at T11-T12.    The paraspinal soft tissues are unremarkable.                                       CT Head Without Contrast (Final result)  Result time 06/12/23 10:50:29      Final result by Chantel Brennan MD (06/12/23 10:50:29)                   Impression:      Sinusitis    Otherwise unremarkable      Electronically signed by: Chantel Brennan  Date:    06/12/2023  Time:    10:50               Narrative:    EXAMINATION:  CT HEAD WITHOUT CONTRAST    CLINICAL HISTORY:  Metastatic disease evaluation;    TECHNIQUE:  Multiple axial images were obtained from the base of the brain to the vertex without contrast administration.  Sagittal and coronal reconstructions were performed. .Automatic exposure control  (AEC) is utilized to reduce patient radiation exposure.    COMPARISON:  None    FINDINGS:  There is no intracranial mass or lesion seen.  No hemorrhage is seen.  No infarct is seen.  The ventricles and basilar cisterns appear normal.  Brain parenchyma appears grossly unremarkable.    Posterior fossa appears normal.  The calvarium is intact.  There is mucosal thickening seen in the frontal ethmoid sphenoid and maxillary sinuses..                                       EKG:    Results for orders placed or performed during the hospital encounter of 06/11/23   EKG 12-lead    Narrative    Test Reason : R53.1,    Vent. Rate : 065 BPM     Atrial Rate : 065 BPM     P-R Int : 190 ms          QRS Dur : 096 ms      QT Int : 400 ms       P-R-T Axes : 044 -23 058 degrees     QTc Int : 416 ms    Normal sinus rhythm  Normal ECG  Confirmed by Chris Machado MD ([id number]) on 6/12/2023 12:57:04 PM    Referred By: [email or web address]   SELF           Confirmed By:Chris Machado MD       Telemetry:  SR with bradycardia    Physical Exam  Constitutional:       General: He is not in acute distress.  HENT:      Head: Normocephalic.      Mouth/Throat:      Mouth: Mucous membranes are moist.      Pharynx: Oropharynx is clear.   Eyes:      Extraocular Movements: Extraocular movements intact.      Conjunctiva/sclera: Conjunctivae normal.   Cardiovascular:      Rate and Rhythm: Regular rhythm. Bradycardia present.      Pulses: Normal pulses.      Heart sounds: Normal heart sounds. No murmur heard.  Pulmonary:      Effort: Pulmonary effort is normal. No respiratory distress.      Breath sounds: Normal breath sounds.   Abdominal:      General: Abdomen is flat. Bowel sounds are normal. There is no distension.      Palpations: Abdomen is soft.      Tenderness: There is no abdominal tenderness.   Neurological:      General: No focal deficit present.      Mental Status: He is alert and oriented to person, place, and time.   Psychiatric:         Mood and Affect: Mood normal.       Home Medications:   No current facility-administered medications on file prior to encounter.     Current Outpatient Medications on File Prior to Encounter   Medication Sig Dispense Refill    amLODIPine (NORVASC) 10 MG tablet once daily.      apixaban (ELIQUIS) 5 mg Tab Take 1 tablet (5 mg total) by mouth 2 (two) times daily. 60 tablet 6    ascorbic acid, vitamin C, (VITAMIN C) 1000 MG tablet Take 1,000 mg by mouth  once daily.      atorvastatin (LIPITOR) 40 MG tablet TAKE 1 TABLET ORALLY ONCE DAILY ON MON/TUE/THUR/FRI AND 1/2 ON WED. NONE ON SAT/SUN      b complex vitamins capsule Take 1 capsule by mouth once daily.      clopidogreL (PLAVIX) 75 mg tablet Take 75 mg by mouth once daily.      doxazosin (CARDURA) 4 MG tablet Take 4 mg by mouth once daily.      DULoxetine (CYMBALTA) 30 MG capsule Take 1 capsule (30 mg total) by mouth once daily. 30 capsule 1    dutasteride (AVODART) 0.5 mg capsule Take 0.5 mg by mouth once daily.      FERRETTS 325 mg (106 mg iron) Tab Take 1 tablet by mouth 2 (two) times daily.      HYDROcodone-acetaminophen (NORCO)  mg per tablet Take 1 tablet by mouth every 4 (four) hours as needed for Pain. 75 tablet 0    LINZESS 145 mcg Cap capsule Take 145 mcg by mouth.      lisinopriL (PRINIVIL,ZESTRIL) 40 MG tablet Take 40 mg by mouth once daily.      metoprolol tartrate (LOPRESSOR) 25 MG tablet Take 25 mg by mouth 2 (two) times daily. Takes 1 tab q am      testosterone cypionate (DEPOTESTOTERONE CYPIONATE) 200 mg/mL injection INJECT 1ML INTO MUSCLE ONCE A WEEK      zolpidem (AMBIEN) 10 mg Tab Take 10 mg by mouth nightly as needed.      albuterol (PROAIR HFA) 90 mcg/actuation inhaler Inhale 2 puffs into the lungs every 6 (six) hours as needed for Wheezing. Rescue 8 g 0    diphenoxylate-atropine 2.5-0.025 mg (LOMOTIL) 2.5-0.025 mg per tablet Take 1 tablet by mouth 4 (four) times daily as needed for Diarrhea. 30 tablet 0    ergocalciferol (ERGOCALCIFEROL) 50,000 unit Cap Take 1 capsule (50,000 Units total) by mouth every 7 days. 7 capsule 0    furosemide (LASIX) 20 MG tablet       gabapentin (NEURONTIN) 300 MG capsule Take 1 capsule (300 mg total) by mouth 3 (three) times daily. 90 capsule 3    glutamine 10 gram PwPk Take 10 g by mouth 2 (two) times a day.      hydrocortisone (CORTEF) 20 MG Tab TAKE ONE TABLET BY MOUTH TWICE DAILY X 14 DAYS      lactulose (CHRONULAC) 10 gram/15 mL solution GIVE 30ML BY  MOUTH TWICE A DAY X7 DAY(S)      ondansetron (ZOFRAN) 4 MG tablet Take 4 mg by mouth every 8 (eight) hours as needed for Nausea.      pantoprazole (PROTONIX) 40 MG tablet Take 1 tablet (40 mg total) by mouth once daily. for 30 doses 30 tablet 1    promethazine (PHENERGAN) 25 MG tablet TAKE 1 TABLET BY MOUTH EVERY 4 HOURS AS NEEDED FOR MOTION SICKNESS      pyridoxine, vitamin B6, (B-6) 250 MG Tab Take 250 mg by mouth once daily.      SLOW RELEASE IRON 140 mg (45 mg iron) TbSR Take 1 tablet by mouth 2 (two) times daily.      sulfamethoxazole-trimethoprim 800-160mg (BACTRIM DS) 800-160 mg Tab Take 1 tablet by mouth As instructed. Tab 1 PO Daily M-W-F         Current Inpatient Medications:    Current Facility-Administered Medications:     acetaminophen tablet 650 mg, 650 mg, Oral, Q6H PRN, ANTOINETTE Lee    aluminum-magnesium hydroxide-simethicone 200-200-20 mg/5 mL suspension 30 mL, 30 mL, Oral, QID PRN, ANTOINETTE Lee    amLODIPine tablet 5 mg, 5 mg, Oral, Daily, Samy Loomis MD    apixaban tablet 5 mg, 5 mg, Oral, BID, Samy Loomis MD, 5 mg at 06/15/23 0852    ascorbic acid (vitamin C) tablet 1,000 mg, 1,000 mg, Oral, Daily, Samy Loomis MD, 1,000 mg at 06/15/23 0853    atorvastatin tablet 40 mg, 40 mg, Oral, Every other day, Samy Loomis MD    clopidogreL tablet 75 mg, 75 mg, Oral, Daily, Samy Loomis MD, 75 mg at 06/15/23 0853    DULoxetine DR capsule 30 mg, 30 mg, Oral, Daily, Samy Loomis MD, 30 mg at 06/15/23 0852    dutasteride capsule 0.5 mg, 0.5 mg, Oral, Daily, Samy Loomis MD    ferrous sulfate tablet 1 each, 1 tablet, Oral, Daily, Samy Loomis MD, 1 each at 06/15/23 0853    HYDROcodone-acetaminophen 5-325 mg per tablet 1 tablet, 1 tablet, Oral, Q6H PRN, Carrie Brower, KAYLEENP, 1 tablet at 06/15/23 0852    linaCLOtide capsule 145 mcg, 145 mcg, Oral, Before breakfast, Samy Loomis MD, 145 mcg at 06/14/23 0607    melatonin tablet 6 mg, 6 mg, Oral, Nightly PRN, Rebekah Foss, [facility]     metoprolol tartrate (LOPRESSOR) tablet 25 mg, 25 mg, Oral, BID, Samy Loomis MD    naloxone 0.4 mg/mL injection 0.02 mg, 0.02 mg, Intravenous, PRN, Rebekah Foss AGACNP-BC    ondansetron injection 4 mg, 4 mg, Intravenous, Q4H PRN, Rebekah Foss AGACNP-BC    polyethylene glycol packet 17 g, 17 g, Oral, BID PRN, Rebekah Foss, AGACNP-BC    prochlorperazine injection Soln 5 mg, 5 mg, Intravenous, Q6H PRN, Rebekah Foss AGACNP-BC    simethicone chewable tablet 80 mg, 1 tablet, Oral, QID PRN, Rebekah Foss AGACNP-BC    tamsulosin 24 hr capsule 0.4 mg, 0.4 mg, Oral, QHS, Mckenna Auguste AGACNP-BC, 0.4 mg at 06/14/23 2038    zolpidem tablet 10 mg, 10 mg, Oral, QHS, Jonathan Calzada MD, 10 mg at 06/14/23 2039         VTE Risk Mitigation (From admission, onward)           Ordered     apixaban tablet 5 mg  2 times daily         06/13/23 1338     Reason for No Pharmacological VTE Prophylaxis  Once        Question:  Reasons:  Answer:  Already adequately anticoagulated on oral Anticoagulants    06/12/23 0135     IP VTE HIGH RISK PATIENT  Once         06/12/23 0135     Place sequential compression device  Until discontinued         06/12/23 0135                    Assessment:     CAD s/p stents with recent NSTEMI and findings of    -- scheduled for repeat [facility] in 1 month   HTN  HLD  GERD  Multiple myeloma  H/o PE on Eliquis  Neuropathy 2/2 cervical myelopathy    Plan:     Pt is a at low risk for major adverse cardiac events.  Dc plavix today. Replace with ASA daily until completion of surgery.  Cont Eliquis for now, will need to be stopped 2-3 days before surgery. Bridge during surgery with heparin gtt without bolus  Upon completion of surgery, dc ASA and heparin and resume plavix and elquis.    Management of multiple myeloma per oncologist  Remainder of management per primary team    Анна Rushing MD  Cardiology  [facility] - 5th Floor Med Surg  06/15/2023 9:29 AM     I have seen the  patient, reviewed the resident's note, assessment and plan. I have personally interviewed and examined the patient at bedside and agree with the findings. Medical decision making listed above were done under my guidance.    Physical exam:  Cardiovascular system: regular rhythm, no murmur.  Lungs: CTAB.  Extremities: No leg edema.    Plan:  Patient needs preop cardiac clearance  He is at low risk for MACE <1%  Cervical fusion surgery is scheduled next week.  Need to stop Eliquis 2-3 days prior neck surgery, bridge with Lovenox or heparin given the patient history of PE.  Also stop now Plavix as the patient does not recent PCI, last C showed  of the RCA which will be further intervened on by Dr. Whitehead.  Start ASA now instead of Plavix. But once Eliquis and Plavix are resumed, then stop ASA.  Will sign off, please call us back if needed.

## 2023-06-15 NOTE — PLAN OF CARE
Problem: Adult Inpatient Plan of Care  Goal: Plan of Care Review  6/15/2023 0242 by Lelo Damon RN  Outcome: Ongoing, Progressing  6/15/2023 0234 by Lelo Damon RN  Outcome: Ongoing, Progressing  Goal: Patient-Specific Goal (Individualized)  6/15/2023 0242 by Lelo Damon RN  Outcome: Ongoing, Progressing  6/15/2023 0234 by Lelo Damon RN  Outcome: Ongoing, Progressing  Goal: Absence of Hospital-Acquired Illness or Injury  6/15/2023 0242 by Lelo Damon RN  Outcome: Ongoing, Progressing  6/15/2023 0234 by Lelo Damon RN  Outcome: Ongoing, Progressing  Goal: Optimal Comfort and Wellbeing  6/15/2023 0242 by Lelo Damon RN  Outcome: Ongoing, Progressing  6/15/2023 0234 by Lelo Damon RN  Outcome: Ongoing, Progressing  Goal: Readiness for Transition of Care  6/15/2023 0242 by Lelo Damon RN  Outcome: Ongoing, Progressing  6/15/2023 0234 by Lelo Damon RN  Outcome: Ongoing, Progressing     Problem: Fall Injury Risk  Goal: Absence of Fall and Fall-Related Injury  6/15/2023 0242 by Lelo Damon RN  Outcome: Ongoing, Progressing  6/15/2023 0234 by Lelo Damon RN  Outcome: Ongoing, Progressing     Problem: Infection  Goal: Absence of Infection Signs and Symptoms  6/15/2023 0242 by Lelo Damon RN  Outcome: Ongoing, Progressing  6/15/2023 0234 by Lelo Damon RN  Outcome: Ongoing, Progressing     Problem: Skin Injury Risk Increased  Goal: Skin Health and Integrity  6/15/2023 0242 by Lelo Damon RN  Outcome: Ongoing, Progressing  6/15/2023 0234 by Lelo Damon RN  Outcome: Ongoing, Progressing

## 2023-06-15 NOTE — PT/OT/SLP PROGRESS
Occupational Therapy   Treatment    Name: Valentino Manning  MRN: [id number]    Recommendations:     Discharge Recommendations: rehabilitation facility vs nursing facility, skilled (TBD, pending progress)  Discharge Equipment Recommendations: BSC, dressing AEDs (TBD, pending progress)  Barriers to discharge: medical dx    Assessment:     Valentino Manning is a 72 y.o. male with a medical diagnosis of Polyneuropathy, Impaired mobility secondary to generalized weakness and neuropathy since the initiation of chemo, Acute bacterial cystitis, POA, Multiple myeloma on chemotherapy- last treatment held due to weakness, Essential HTN, Hx of PE on Eliquis, CAD s/p stent on Plavix, Chronic Anemia. Hx of GERD, HLD. He presents with c/o LBP with stand. Pt also demonstrated R knee buckling during stand. Performance deficits affecting function are weakness, impaired endurance, impaired sensation, impaired self care skills, impaired functional mobility, impaired balance, decreased upper extremity function, decreased lower extremity function, decreased safety awareness, pain, decreased ROM.     Rehab Prognosis:  Good; patient would benefit from acute skilled OT services to address these deficits and reach maximum level of function.       Plan:     Patient to be seen 6 x/week to address the above listed problems via self-care/home management, therapeutic activities, therapeutic exercises  Plan of Care Expires: 06/28/23  Plan of Care Reviewed with: patient    Subjective     Pain/Comfort:  Pt c/o LBP with stand.    Objective:     Communicated with: RN prior to session. Patient found HOB elevated with telemetry, gongora catheter upon OT entry to room.    General Precautions: Standard, fall, c-spinal pxns  Braces: [place] J C-collar at all times and [place] C-collar while showering.  Respiratory Status: Room air     Occupational Performance:     Bed Mobility:    Pt t/f from lying with HOB elevated to seated EOB with mod A x2 for safety,  requiring assist to lift trunk.  Patient completed Sit to Supine with min A provided (with OT providing assist to lift LLE). Therapy tech provided CGA at pt's trunk to increase pt's safety.    Functional Mobility/Transfers:  Patient completed Sit <> Stand Transfers (x5 trials) with mod A x2 and vc/tcs provided to assume upright standing posture, using rolling walker. Pt's B feet and knees blocked to increase safety 2/2 pt's R knee buckling during stand.    Activities of Daily Living:  Upper Body Dressing: Dep A provided to abi VALERO C-collar while in bed upon arrival.    Therapeutic Exercise:  Pt performed sit to stand exercise x5 with mod A x2 and vc/tcs provided to assume upright standing posture, using rolling walker. Pt participated in therapeutic exercise to increase strength and endurance of BU/LE and standing balance needed for ADL participation.    Therapeutic Positioning    OT interventions performed during the course of today's session in an effort to prevent and/or reduce acquired pressure injuries:   Education on Pressure Ulcer Prevention provided (use of wedge and turning schedule). However, pt refused wedge while in bed. Therefore, OT recommended rolling and performing bridge exercise in bed in order to facilitate pt's skin integrity.     Patient Education:  Patient provided with verbal education regarding pressure ulcer prevention and neuro rec regarding cervical collars. Understanding was verbalized. Pt continues to insist on wearing diaper in bed and politely refused wedge while in bed despite education provided. Therefore, OT recommended rolling and performing bridge exercise in bed in order to facilitate pt's skin integrity.     Patient left HOB elevated with all lines intact and call button in reach.    GOALS:   Multidisciplinary Problems       Occupational Therapy Goals          Problem: Occupational Therapy    Goal Priority Disciplines Outcome Interventions   Occupational Therapy Goal     OT,  PT/OT Ongoing, Progressing    Description: Goals to be met by: 6/28/23     Patient will increase functional independence with ADLs by performing:    UE Dressing with Minimal Assistance.  LE Dressing with Moderate Assistance and Assistive Devices as needed.  Grooming while EOB with Set-up Assistance.  Toileting from BSC with Moderate Assistance.  Pt will perform BSC t/f with Minimal Assistance.                         Time Tracking:     OT Date of Treatment: 6/15/23  OT Start Time: 1446  OT Stop Time: 1511  OT Total Time (min): 25 min    Billable Minutes: Self Care/Home Mgmt 1 unit  Therapeutic Exercise 1 unit    OT/CAMERON: OT     Number of CAMERON visits since last OT visit: 1    6/15/2023

## 2023-06-15 NOTE — PLAN OF CARE
Problem: Occupational Therapy  Goal: Occupational Therapy Goal  Description: Goals to be met by: 6/28/23     Patient will increase functional independence with ADLs by performing:    UE Dressing with Minimal Assistance.  LE Dressing with Moderate Assistance and Assistive Devices as needed.  Grooming while EOB with Set-up Assistance.  Toileting from BSC with Moderate Assistance.  Pt will perform BSC t/f with Minimal Assistance.    Outcome: Ongoing, Progressing

## 2023-06-15 NOTE — PROGRESS NOTES
[facility] Hospital Medicine Progress Note        Chief Complaint: Inpatient Follow-up for b.l LE numbness     HPI: Valentino Manning is a 72 y.o. male with a PMHx of HTN, HLD, CAD s/p stent on Plavix, chronic anemia, GERD, multiple myeloma on chemotherapym hx of PE on Eliquis who presented to [facility] on 6/11/2023 with c/o numbness to bilateral hands and bilateral lower extremities with associated weakness has been worsening over the past 6 months.  Patient reports that he was now unable to ambulate due to worsening paresthesias and generalized weakness x3 weeks.  He was now bed-bound.  He endorsed falling about 4 times over the past 3 weeks, denied LOC or head injury.  He reports he was in his normal state of health prior to initiating chemotherapy in December 2022.  He then had COVID-19 in February 2023 and has had a rapid decline since then.  He also endorsed urinary retention for which he was seen at an outlying ED on 6/5/23 and had a Duenas catheter placed, he was supposed to follow-up with urology on 06/12/2023. He was previously on gabapentin 300 mg t.i.d. for neuropathy without symptomatic relief, he states he was not taking it as directed.  He denied CP, SOB, fever, chills, cough, dizziness, syncope.  Patient reportedly saw his oncologist, Dr. Harris, for same complaints and he was referred to ED for further evaluation.   Initial ED VS stable.  Labs notable for hemoglobin 12.6, hematocrit 38.  Urinalysis with 2+ protein, 3+ occult blood, positive nitrites, 2+ leukocytes, 11 RBCs, 67 WBCs, 1+ bacteria.  Urine culture pending.  He was started on oral Levaquin for suspected UTI.  He was admitted to hospital medicine services for further medical management.    Interval Hx:   Seen and examined the patient.  Afebrile vitals stable and hemodynamically stable.  Denying pain or discomfort    Objective/physical exam:  General: In no acute distress, afebrile  Chest: Clear to auscultation  bilaterally anteriorly  Heart: RRR, +S1, S2, no appreciable murmur  Abdomen: Soft, nontender, BS +  MSK: Warm, no lower extremity edema, no clubbing or cyanosis  Neurologic: Alert and oriented x4, Cranial nerve II-XII intact, bilateral upper and lower extremity strength 3 out of 5    VITAL SIGNS: 24 HRS MIN & MAX LAST   Temp  Min: 97.4 °F (36.3 °C)  Max: 98.4 °F (36.9 °C) 97.7 °F (36.5 °C)   BP  Min: 116/70  Max: 167/96 117/71   Pulse  Min: 57  Max: 68  (!) 58   Resp  Min: 17  Max: 18 18   SpO2  Min: 94 %  Max: 98 % 98 %     I reviewed the labs below:  Recent Labs   Lab 06/12/23  0352 06/14/23  0412 06/15/23  0557   WBC 5.02 4.15* 5.04   RBC 3.50* 3.44* 3.32*   HGB 11.5* 11.3* 10.8*   HCT 35.0* 34.3* 32.8*   .0* 99.7* 98.8*   MCH 32.9* 32.8* 32.5*   MCHC 32.9* 32.9* 32.9*   RDW 14.9 14.8 14.8    291 300   MPV 8.8 8.7 8.9         Recent Labs   Lab 06/11/23  1822 06/12/23 0352 06/14/23 0412 06/15/23  0557    135* 137 137   K 3.7 3.2* 3.2* 3.7   CO2 24 23 23 22*   BUN 11.6 12.0 10.8 11.4   CREATININE 0.88 0.89 0.82 0.92   CALCIUM 9.5 9.4 9.3 8.9   MG 1.60 1.50* 1.60 2.00   ALBUMIN 3.6 3.1*  --   --    ALKPHOS 122 107  --   --    ALT 33 26  --   --    AST 22 16  --   --    BILITOT 0.6 0.5  --   --             Microbiology Results (last 7 days)       Procedure Component Value Units Date/Time    Urine culture [[id number]]  (Abnormal)  (Susceptibility) Collected: 06/11/23 2224    Order Status: Completed Specimen: Urine Updated: 06/14/23 0952     Urine Culture >/= 100,000 colonies/ml Escherichia coli             See below for Radiology    Scheduled Med:   amLODIPine  5 mg Oral Daily    apixaban  5 mg Oral BID    ascorbic acid (vitamin C)  1,000 mg Oral Daily    atorvastatin  40 mg Oral Every other day    clopidogreL  75 mg Oral Daily    DULoxetine  30 mg Oral Daily    dutasteride  0.5 mg Oral Daily    ferrous sulfate  1 tablet Oral Daily    linaCLOtide  145 mcg Oral Before breakfast    metoprolol  tartrate  25 mg Oral BID    tamsulosin  0.4 mg Oral QHS    zolpidem  10 mg Oral QHS        Continuous Infusions:       PRN Meds:  acetaminophen, aluminum-magnesium hydroxide-simethicone, HYDROcodone-acetaminophen, melatonin, naloxone, ondansetron, polyethylene glycol, prochlorperazine, simethicone       Assessment/Plan:  Polyneuropathy with progressive weakness to bilateral upper extremity and lower extremity  Impaired mobility secondary to generalized weakness and neuropathy since the initiation of chemo  E coli UTI-pansensitive  Multiple myeloma on chemotherapy- last treatment held due to weakness   Essential HTN  Hx of PE on Eliquis  CAD s/p stent on Plavix  Chronic Anemia  Hx of GERD, HLD  Hypokalemia  Hypomagnesemia     - neurosurgery evaluation suggested that patient has a candidate for cervical spinal laminectomy and posterior fusion.  Needs to wear [place] J C-collar at all times as well as [place] C-collar that can be used while showering.  Cardiology consult for clearance was put in by Neurosurgery, patient is on Eliquis and Plavix need to hold Plavix for 7 days preoperatively and Eliquis for 3 days per Neurosurgery do not have any objections to that.    Continue supportive care in the meantime    PT/OT consulted, patient feels he will benefit from rehab as he is unable to continue with his chemotherapy given his weakness, eval is pending given clearance from Neurosurgery  CT head without--> unrevealing for acute intracranial abnormalities.    Urology also evaluated the patient, recommended to continue Flomax, continue indwelling Duenas catheter on discharge and follow-up with Dr. Vo outpatient and if his mobility improved, okay to offer voiding trial prior to discharge.  Also recommended neurosurgery eval  Urine culture finalized as pansensitive E coli.  Cont levofloxacin 500 mg po daily  for UTI- Day 3 of 3 tonight  Fall precautions  Given KCl 40 mEq p.o. q.6 x2 doses for potassium of 3.2  Will  give Mag sulfate 2 g IV x1 for Mag of 1.6  Resume appropriate home medication for chronic medical conditions, resumed Eliquis, Plavix  Case management consulted for discharge planning, patient interested in rehab  Morning CBC, CMP, Mag ordered        VTE Prophylaxis: Eliquis resumed 6/13    Patient condition:  Guarded    Discharge Planning and Disposition: TBD      Critical care note:  Critical care diagnosis:  Hypomagnesemia requiring IV Mag sulfate  Critical care interventions: Hands-on evaluation, review of labs/radiographs/records and discussion with patient and family if present  Critical care time spent: 35 minutes      All diagnosis and differential diagnosis have been reviewed; assessment and plan has been documented; I have personally reviewed the labs and test results that are presently available; I have reviewed the patients medication list; I have reviewed the consulting providers response and recommendations. I have reviewed or attempted to review medical records based upon their availability    All of the patient's questions have been  addressed and answered. Patient's is agreeable to the above stated plan. I will continue to monitor closely and make adjustments to medical management as needed.  _____________________________________________________________________    Nutrition Status:    Radiology:   I have personally reviewed the images and agree with radiologist report  MRI Lumbar Spine W WO Cont  Narrative: EXAMINATION:  MRI LUMBAR SPINE W WO CONTRAST    CLINICAL HISTORY:  Myelopathy, acute, lumbar spine;    TECHNIQUE:  Multiplanar multisequence MR images of the lumbar spine are obtained without contrast.    COMPARISON:  None    FINDINGS:  There are 5 non-rib-bearing lumbar type vertebral bodies.  There is a leftward scoliotic curvature of the lumbar spine with minimal retrolisthesis of L2 over L3.  There are multilevel degenerate endplate changes.    The conus terminates at the level of L1.  It is  normal in signal and contour.  There is redundancy of the cauda equina nerve roots.  There is focal enhancement in the cauda equina nerve roots at the level of L3-L4 and L4-L5, likely related to stenoses at these levels.  There is no epidural fluid collection.    Disc spaces, spinal canal and neural foramina are as follows:    L1-L2: Disc bulge and facet hypertrophy with minimal narrowing of the spinal canal.  Mild bilateral foraminal stenosis.    L2-L3: Minimal retrolisthesis of L2 over L3.  Disc height loss with posterior disc osteophyte complex, facet hypertrophy and moderate narrowing of the spinal canal.  Moderate bilateral neural foraminal stenosis.    L3-L4: Disc height loss with posterior disc osteophyte complex, facet hypertrophy and severe narrowing of the spinal canal.  Moderate right and moderate to severe left neural foraminal stenosis.    L4-L5: Disc height loss with posterior disc osteophyte complex, facet hypertrophy and severe narrowing of the spinal canal.  Severe right and mild left neural foraminal stenosis.    L5-S1: Posterior disc osteophyte complex, facet hypertrophy and mild narrowing of the spinal canal.  Mild bilateral neural foraminal stenosis.    There are mild inflammatory changes around the facet joints at L4 through S1, likely degenerative.  Impression: 1. Severe degenerative spinal canal stenosis at L3-L4 and L4-L5, moderate at L2-L3, mild at L5-S1.  2. Multilevel neural foraminal stenoses as described.    Electronically signed by: Hazel Guaman  Date:    06/12/2023  Time:    14:03  MRI Thoracic Spine W WO Cont  Narrative: EXAMINATION:  MRI THORACIC SPINE W WO CONTRAST    CLINICAL HISTORY:  Myelopathy, acute, thoracic spine;    TECHNIQUE:  Multiplanar multisequence MR images of the thoracic spine are obtained with and without contrast.    COMPARISON:  None.    FINDINGS:  Thoracic alignment is preserved.  The vertebral body heights are maintained.  There is no bone marrow  edema.    There is no cord signal abnormality.  There is no epidural fluid collection.    There are multilevel degenerative changes with marginal osteophytes, disc bulges and facet hypertrophy.  There is mild narrowing of the canal most evident at T7-T10.  There is mild multilevel neural foraminal narrowing, most evident on the right at T5-T6 and on the left at T11-T12.    The paraspinal soft tissues are unremarkable.  Impression: 1. No acute abnormality of the thoracic spine.  2. Mild multilevel canal and neural foraminal narrowing.  3. No cord signal abnormality.    Electronically signed by: Hazel Guaman  Date:    06/12/2023  Time:    13:56  MRI Cervical Spine W WO Cont  Narrative: EXAMINATION:  MRI CERVICAL SPINE W WO CONTRAST    CLINICAL HISTORY:  Myelopathy, acute, cervical spine;    TECHNIQUE:  Multiplanar, multisequence MR imaging of the cervical spine with and without contrast.    COMPARISON:  None    FINDINGS:  Evaluation is limited by motion artifact.  There is grade 1 retrolisthesis of C3 over C4, grade 1 anterolisthesis of C4 over C5 and grade 1 retrolisthesis of C5 over C6.  There is mild enhancement in the C4-C5 and C5-C6 facets.  There are multilevel degenerate endplate changes.    There is increased T2 signal in the cord at C4-C5.  There is no epidural fluid collection.    There is mild enhancement around the C4-C5 and C5-C6 facet joints.  There is edema in the interspinous ligament and ligamentum flavum at C4-C5.    Disc level analysis as follows:    C2-C3: Posterior disc osteophyte complex and thickening of the ligamentum flavum with mild narrowing of the spinal canal.  Mild left neural foraminal stenosis secondary to uncovertebral and facet hypertrophy.    C3-C4: Grade 1 retrolisthesis of C3 over C4 with posterior disc osteophyte complex, thickening of the ligamentum flavum and moderate narrowing of the spinal canal flattening the ventral cord.  Moderate right and severe left neural foraminal  stenosis secondary to uncovertebral and facet hypertrophy.    C4-C5: Grade 1 anterolisthesis of C4 over C5 and thickening of ligamentum flavum with severe narrowing of the spinal canal and flattening of the cord.  Severe bilateral neural foraminal stenosis secondary to uncovertebral facet hypertrophy.    C5-C6: Grade 1 retrolisthesis of C5 over C6 with posterior disc osteophyte complex, thickening of the ligamentum flavum in severe narrowing of the spinal canal with flattening of the cord.  Severe bilateral neural foraminal stenosis secondary to uncovertebral and facet hypertrophy.    C6-C7: Posterior disc osteophyte complex with mild narrowing of the spinal canal.  Moderate bilateral foraminal stenosis secondary to uncovertebral and facet hypertrophy.    C7-T1: Posterior disc osteophyte complex.  No significant spinal canal stenosis.  Moderate bilateral foraminal stenosis secondary to uncovertebral and facet hypertrophy.  Impression: 1. Severe degenerative narrowing of the spinal canal at C4-C5 and C5-C6, moderate at C3-C4, mild at other levels.  2. Multilevel neural foraminal stenoses as described  3. T2 signal changes in the cord at C4-C5 may be secondary to a compressive myelopathy.  4. Mild inflammatory changes of the bilateral C4-C5 and C5-C6 facets, may be degenerative.  No drainable fluid collection.    Electronically signed by: Hazel Guaman  Date:    06/12/2023  Time:    13:28  CT Head Without Contrast  Narrative: EXAMINATION:  CT HEAD WITHOUT CONTRAST    CLINICAL HISTORY:  Metastatic disease evaluation;    TECHNIQUE:  Multiple axial images were obtained from the base of the brain to the vertex without contrast administration.  Sagittal and coronal reconstructions were performed. .Automatic exposure control  (AEC) is utilized to reduce patient radiation exposure.    COMPARISON:  None    FINDINGS:  There is no intracranial mass or lesion seen.  No hemorrhage is seen.  No infarct is seen.  The ventricles  and basilar cisterns appear normal.  Brain parenchyma appears grossly unremarkable.    Posterior fossa appears normal.  The calvarium is intact.  There is mucosal thickening seen in the frontal ethmoid sphenoid and maxillary sinuses..  Impression: Sinusitis    Otherwise unremarkable    Electronically signed by: Chantel Brennan  Date:    06/12/2023  Time:    10:50      Dilshad Tran MD  Department of Hospital Medicine   [facility]   06/15/2023

## 2023-06-16 LAB
ALBUMIN SERPL-MCNC: 3.2 G/DL (ref 3.4–4.8)
ALBUMIN/GLOB SERPL: 1.3 RATIO (ref 1.1–2)
ALP SERPL-CCNC: 107 UNIT/L (ref 40–150)
ALT SERPL-CCNC: 26 UNIT/L (ref 0–55)
APPEARANCE UR: ABNORMAL
AST SERPL-CCNC: 13 UNIT/L (ref 5–34)
BACTERIA #/AREA URNS AUTO: ABNORMAL /HPF
BASOPHILS # BLD AUTO: 0.02 X10(3)/MCL
BASOPHILS NFR BLD AUTO: 0.4 %
BILIRUB UR QL STRIP.AUTO: ABNORMAL MG/DL
BILIRUBIN DIRECT+TOT PNL SERPL-MCNC: 0.5 MG/DL
BUN SERPL-MCNC: 9.6 MG/DL (ref 8.4–25.7)
CALCIUM SERPL-MCNC: 9.3 MG/DL (ref 8.8–10)
CHLORIDE SERPL-SCNC: 109 MMOL/L (ref 98–107)
CO2 SERPL-SCNC: 23 MMOL/L (ref 23–31)
COLOR UR: ABNORMAL
CREAT SERPL-MCNC: 0.83 MG/DL (ref 0.73–1.18)
EOSINOPHIL # BLD AUTO: 0.15 X10(3)/MCL (ref 0–0.9)
EOSINOPHIL NFR BLD AUTO: 2.8 %
ERYTHROCYTE [DISTWIDTH] IN BLOOD BY AUTOMATED COUNT: 14.8 % (ref 11.5–17)
GFR SERPLBLD CREATININE-BSD FMLA CKD-EPI: >60 MLS/MIN/1.73/M2
GLOBULIN SER-MCNC: 2.5 GM/DL (ref 2.4–3.5)
GLUCOSE SERPL-MCNC: 88 MG/DL (ref 82–115)
GLUCOSE UR QL STRIP.AUTO: NEGATIVE MG/DL
HCT VFR BLD AUTO: 36.2 % (ref 42–52)
HGB BLD-MCNC: 11.8 G/DL (ref 14–18)
IMM GRANULOCYTES # BLD AUTO: 0.02 X10(3)/MCL (ref 0–0.04)
IMM GRANULOCYTES NFR BLD AUTO: 0.4 %
KETONES UR QL STRIP.AUTO: ABNORMAL MG/DL
LEUKOCYTE ESTERASE UR QL STRIP.AUTO: ABNORMAL UNIT/L
LYMPHOCYTES # BLD AUTO: 1.36 X10(3)/MCL (ref 0.6–4.6)
LYMPHOCYTES NFR BLD AUTO: 25.5 %
MAGNESIUM SERPL-MCNC: 1.8 MG/DL (ref 1.6–2.6)
MCH RBC QN AUTO: 32.6 PG (ref 27–31)
MCHC RBC AUTO-ENTMCNC: 32.6 G/DL (ref 33–36)
MCV RBC AUTO: 100 FL (ref 80–94)
MONOCYTES # BLD AUTO: 0.48 X10(3)/MCL (ref 0.1–1.3)
MONOCYTES NFR BLD AUTO: 9 %
NEUTROPHILS # BLD AUTO: 3.3 X10(3)/MCL (ref 2.1–9.2)
NEUTROPHILS NFR BLD AUTO: 61.9 %
NITRITE UR QL STRIP.AUTO: POSITIVE
NRBC BLD AUTO-RTO: 0 %
PH UR STRIP.AUTO: 6.5 [PH]
PLATELET # BLD AUTO: 308 X10(3)/MCL (ref 130–400)
PMV BLD AUTO: 8.6 FL (ref 7.4–10.4)
POTASSIUM SERPL-SCNC: 4.1 MMOL/L (ref 3.5–5.1)
PROT SERPL-MCNC: 5.7 GM/DL (ref 5.8–7.6)
PROT UR QL STRIP.AUTO: ABNORMAL MG/DL
RBC # BLD AUTO: 3.62 X10(6)/MCL (ref 4.7–6.1)
RBC #/AREA URNS AUTO: >100 /HPF
RBC UR QL AUTO: ABNORMAL UNIT/L
SODIUM SERPL-SCNC: 141 MMOL/L (ref 136–145)
SP GR UR STRIP.AUTO: >=1.03 (ref 1–1.03)
SQUAMOUS #/AREA URNS AUTO: ABNORMAL /HPF
UROBILINOGEN UR STRIP-ACNC: 1 MG/DL
WBC # SPEC AUTO: 5.33 X10(3)/MCL (ref 4.5–11.5)
WBC #/AREA URNS AUTO: ABNORMAL /HPF

## 2023-06-16 PROCEDURE — 97162 PT EVAL MOD COMPLEX 30 MIN: CPT

## 2023-06-16 PROCEDURE — 25000003 PHARM REV CODE 250: Performed by: NURSE PRACTITIONER

## 2023-06-16 PROCEDURE — 25000003 PHARM REV CODE 250: Performed by: INTERNAL MEDICINE

## 2023-06-16 PROCEDURE — 97140 MANUAL THERAPY 1/> REGIONS: CPT

## 2023-06-16 PROCEDURE — 83735 ASSAY OF MAGNESIUM: CPT | Performed by: STUDENT IN AN ORGANIZED HEALTH CARE EDUCATION/TRAINING PROGRAM

## 2023-06-16 PROCEDURE — 25000003 PHARM REV CODE 250: Performed by: NEUROLOGICAL SURGERY

## 2023-06-16 PROCEDURE — 21400001 HC TELEMETRY ROOM

## 2023-06-16 PROCEDURE — 85025 COMPLETE CBC W/AUTO DIFF WBC: CPT | Performed by: STUDENT IN AN ORGANIZED HEALTH CARE EDUCATION/TRAINING PROGRAM

## 2023-06-16 PROCEDURE — 25000003 PHARM REV CODE 250: Performed by: STUDENT IN AN ORGANIZED HEALTH CARE EDUCATION/TRAINING PROGRAM

## 2023-06-16 PROCEDURE — 80053 COMPREHEN METABOLIC PANEL: CPT | Performed by: STUDENT IN AN ORGANIZED HEALTH CARE EDUCATION/TRAINING PROGRAM

## 2023-06-16 RX ADMIN — DULOXETINE 30 MG: 30 CAPSULE, DELAYED RELEASE ORAL at 08:06

## 2023-06-16 RX ADMIN — TAMSULOSIN HYDROCHLORIDE 0.4 MG: 0.4 CAPSULE ORAL at 09:06

## 2023-06-16 RX ADMIN — HYDROCODONE BITARTRATE AND ACETAMINOPHEN 1 TABLET: 5; 325 TABLET ORAL at 09:06

## 2023-06-16 RX ADMIN — GABAPENTIN 300 MG: 300 CAPSULE ORAL at 08:06

## 2023-06-16 RX ADMIN — ZOLPIDEM TARTRATE 10 MG: 5 TABLET ORAL at 09:06

## 2023-06-16 RX ADMIN — ASPIRIN 81 MG CHEWABLE TABLET 81 MG: 81 TABLET CHEWABLE at 08:06

## 2023-06-16 RX ADMIN — Medication 1000 MG: at 08:06

## 2023-06-16 RX ADMIN — HYDROCODONE BITARTRATE AND ACETAMINOPHEN 1 TABLET: 5; 325 TABLET ORAL at 08:06

## 2023-06-16 RX ADMIN — FERROUS SULFATE TAB 325 MG (65 MG ELEMENTAL FE) 1 EACH: 325 (65 FE) TAB at 08:06

## 2023-06-16 RX ADMIN — APIXABAN 5 MG: 5 TABLET, FILM COATED ORAL at 09:06

## 2023-06-16 RX ADMIN — GABAPENTIN 300 MG: 300 CAPSULE ORAL at 03:06

## 2023-06-16 RX ADMIN — AMLODIPINE BESYLATE 5 MG: 5 TABLET ORAL at 08:06

## 2023-06-16 RX ADMIN — GABAPENTIN 300 MG: 300 CAPSULE ORAL at 09:06

## 2023-06-16 RX ADMIN — APIXABAN 5 MG: 5 TABLET, FILM COATED ORAL at 08:06

## 2023-06-16 RX ADMIN — LEVOFLOXACIN 750 MG: 500 TABLET, FILM COATED ORAL at 10:06

## 2023-06-16 RX ADMIN — ATORVASTATIN CALCIUM 40 MG: 40 TABLET, FILM COATED ORAL at 08:06

## 2023-06-16 RX ADMIN — METOPROLOL TARTRATE 25 MG: 25 TABLET, FILM COATED ORAL at 09:06

## 2023-06-16 NOTE — PROGRESS NOTES
"  Neurosurgical chart check:     Patient afebrile with stable vital signs.  Patient with dark urine and sediments.  Antibiotics to be continued for 3 more days.  Patient is on levofloxacin 750 mg.  Urine culture with E coli.    Cardiology has evaluated the patient-  Deemed low risk.  Please see their note for recommendations regarding anticoagulation.     "Need to stop Eliquis 2-3 days prior neck surgery, bridge with Lovenox or heparin given the patient history of PE.  Also stop now Plavix as the patient does not recent PCI, last C showed  of the RCA which will be further intervened on by Dr. Whitehead.  Start ASA now instead of Plavix. But once Eliquis and Plavix are resumed, then stop ASA".    Continue PTOT   Continue to wear [place] J collar at all times with [place] collar for showering.        Mr. Manning is scheduled for a C3-4, C4-5, C5-6 laminectomy and posterior fusion on Wednesday, 06/21/2023 at 12:00 PM as long as the outlined prerequisite below have been completed.      Cardiology provided preoperative clearance with low preoperative cardiac risk.  His Plavix has been changed to aspirin 81 mg.  I discontinued his aspirin on 06/16/2023 in preparation for spine surgery, where aspirin is generally held 7-10 days preoperatively.      The end date for his Eliquis before surgery is Saturday, 06/17/2023 to allow 3 full days without Eliquis prior to his operative date.  Cardiologist mentions bridging the patient on heparin until his surgery date.                 "

## 2023-06-16 NOTE — PROGRESS NOTES
Mr. Manning is scheduled for a C3-4, C4-5, C5-6 laminectomy and posterior fusion on Wednesday, 06/21/2023 at 12:00 PM.     I personally met with Mr. Manning and his daughter.  We reviewed his neurological condition, imaging studies, surgical plan of care, and anticipated course of postoperative recovery.  I reviewed the risks, benefits, and alternatives of surgery.  The patient agrees to proceed.  All questions were answered to their satisfaction.  A surgical consent form will be completed and placed in the medical record.        The risks, benefits, and alternatives to surgery were discussed with the patient.    Complications of a Posterior Cervical Laminectomy and Fusion may include:  Failure to improve symptoms and/or increased or persistent pain; Recurrence, continuation, or worsening of the condition that required the operation; Need for further surgical intervention or treatment; Neurological injury, which may include spinal cord or nerve root injury, paralysis (which involves the inability to move arms and/or legs), clumsiness, loss of sympathetic response, loss of sensation, and loss of bowel, bladder, and sexual function; Delayed or immediate spinal instability; Failure of hardware; Failure to fuse (increased risk with nicotine use); Theoretical risk of disease transmission from allograft material; Cerebral spinal fluid leak; Meningitis; Damage to major blood vessels, nerves, and surrounding anatomical structures; Scarring; Blindness; and Positioning problems such as neuropathy or compartment syndrome    Complications of any surgery may include:  Adverse reaction to anesthesia; Bleeding; Transfusion of blood products, which carries a risk of infection or reaction; Infection, which requires treatment with antibiotics by mouth or intravenously, or even further surgeries; Urinary tract infection; Heart attack, stroke, pneumonia, and DVT/PE (blood clot in the legs or pelvis that can dislodge and go to the lungs);  Other unforeseen complications; Coma; and Death.    Benefits of surgery include:  Possible improvement in arm pain, numbness, and/or weakness; and possible improvement in neck pain.    Alternatives to the procedure include:   Physical therapy, chiropractic care, acupuncture, medical therapy, and pain management.

## 2023-06-16 NOTE — PROGRESS NOTES
[facility] Hospital Medicine Progress Note        Chief Complaint: Inpatient Follow-up for b.l LE numbness     HPI: Valentino Manning is a 72 y.o. male with a PMHx of HTN, HLD, CAD s/p stent on Plavix, chronic anemia, GERD, multiple myeloma on chemotherapym hx of PE on Eliquis who presented to [facility] on 6/11/2023 with c/o numbness to bilateral hands and bilateral lower extremities with associated weakness has been worsening over the past 6 months.  Patient reports that he was now unable to ambulate due to worsening paresthesias and generalized weakness x3 weeks.  He was now bed-bound.  He endorsed falling about 4 times over the past 3 weeks, denied LOC or head injury.  He reports he was in his normal state of health prior to initiating chemotherapy in December 2022.  He then had COVID-19 in February 2023 and has had a rapid decline since then.  He also endorsed urinary retention for which he was seen at an outlying ED on 6/5/23 and had a Duenas catheter placed, he was supposed to follow-up with urology on 06/12/2023. He was previously on gabapentin 300 mg t.i.d. for neuropathy without symptomatic relief, he states he was not taking it as directed.  He denied CP, SOB, fever, chills, cough, dizziness, syncope.  Patient reportedly saw his oncologist, Dr. Harris, for same complaints and he was referred to ED for further evaluation.   Initial ED VS stable.  Labs notable for hemoglobin 12.6, hematocrit 38.  Urinalysis with 2+ protein, 3+ occult blood, positive nitrites, 2+ leukocytes, 11 RBCs, 67 WBCs, 1+ bacteria.  Urine culture pending.  He was started on oral Levaquin for suspected UTI.  He was admitted to hospital medicine services for further medical management.    Interval Hx:   Seen and examined the patient.  Afebrile vitals stable hemodynamically stable.  Patient started to have dark urine, does not have any fever or leukocytosis.    Objective/physical exam:  General: In no acute  distress, afebrile  Chest: Clear to auscultation bilaterally anteriorly  Heart: RRR, +S1, S2, no appreciable murmur  Abdomen: Soft, nontender, BS +  MSK: Warm, no lower extremity edema, no clubbing or cyanosis  Neurologic: Alert and oriented x4, Cranial nerve II-XII intact, bilateral upper and lower extremity strength 3 out of 5    VITAL SIGNS: 24 HRS MIN & MAX LAST   Temp  Min: 97.2 °F (36.2 °C)  Max: 98.1 °F (36.7 °C) 97.4 °F (36.3 °C)   BP  Min: 125/80  Max: 165/97 (!) 165/97   Pulse  Min: 56  Max: 86  67   Resp  Min: 16  Max: 19 18   SpO2  Min: 96 %  Max: 99 % 98 %     I reviewed the labs below:  Recent Labs   Lab 06/14/23  0412 06/15/23  0557 06/16/23  0724   WBC 4.15* 5.04 5.33   RBC 3.44* 3.32* 3.62*   HGB 11.3* 10.8* 11.8*   HCT 34.3* 32.8* 36.2*   MCV 99.7* 98.8* 100.0*   MCH 32.8* 32.5* 32.6*   MCHC 32.9* 32.9* 32.6*   RDW 14.8 14.8 14.8    300 308   MPV 8.7 8.9 8.6         Recent Labs   Lab 06/11/23  1822 06/12/23  0352 06/14/23  0412 06/15/23  0557 06/16/23  0724    135* 137 137 141   K 3.7 3.2* 3.2* 3.7 4.1   CO2 24 23 23 22* 23   BUN 11.6 12.0 10.8 11.4 9.6   CREATININE 0.88 0.89 0.82 0.92 0.83   CALCIUM 9.5 9.4 9.3 8.9 9.3   MG 1.60 1.50* 1.60 2.00 1.80   ALBUMIN 3.6 3.1*  --   --  3.2*   ALKPHOS 122 107  --   --  107   ALT 33 26  --   --  26   AST 22 16  --   --  13   BILITOT 0.6 0.5  --   --  0.5            Microbiology Results (last 7 days)       Procedure Component Value Units Date/Time    Urine culture [[id number]]  (Abnormal)  (Susceptibility) Collected: 06/11/23 2224    Order Status: Completed Specimen: Urine Updated: 06/14/23 0952     Urine Culture >/= 100,000 colonies/ml Escherichia coli             See below for Radiology    Scheduled Med:   amLODIPine  5 mg Oral Daily    apixaban  5 mg Oral BID    ascorbic acid (vitamin C)  1,000 mg Oral Daily    aspirin  81 mg Oral Daily    atorvastatin  40 mg Oral Every other day    DULoxetine  30 mg Oral Daily    dutasteride  0.5 mg Oral  Daily    ferrous sulfate  1 tablet Oral Daily    gabapentin  300 mg Oral TID    linaCLOtide  145 mcg Oral Before breakfast    metoprolol tartrate  25 mg Oral BID    tamsulosin  0.4 mg Oral QHS    zolpidem  10 mg Oral QHS        Continuous Infusions:       PRN Meds:  acetaminophen, aluminum-magnesium hydroxide-simethicone, HYDROcodone-acetaminophen, melatonin, naloxone, ondansetron, polyethylene glycol, prochlorperazine, simethicone       Assessment/Plan:  Polyneuropathy with progressive weakness to bilateral upper extremity and lower extremity  Impaired mobility secondary to generalized weakness and neuropathy since the initiation of chemo  E coli UTI-pansensitive  Multiple myeloma on chemotherapy- last treatment held due to weakness   Essential HTN  Hx of PE on Eliquis  CAD s/p stent on Plavix  Chronic Anemia  Hx of GERD, HLD  Hypokalemia  Hypomagnesemia     - Urine is dark with the sediments in the Duenas.   - Neurosurgery evaluation suggested that patient has a candidate for cervical spinal laminectomy and posterior fusion.  Needs to wear [place] J C-collar at all times as well as [place] C-collar that can be used while showering.  - Cardiology consult for clearance was put in by Neurosurgery, patient is on Eliquis and Plavix need to hold Plavix for 7 days preoperatively and Eliquis for 3 days per Neurosurgery do not have any objections to that.    PT/OT consulted, patient feels he will benefit from rehab as he is unable to continue with his chemotherapy given his weakness, eval is pending given clearance from Neurosurgery  CT head without--> unrevealing for acute intracranial abnormalities.    Urology also evaluated the patient, recommended to continue Flomax, continue indwelling Duenas catheter on discharge and follow-up with Dr. Vo outpatient and if his mobility improved, okay to offer voiding trial prior to discharge.  Also recommended neurosurgery eval  Urine culture finalized as pansensitive E coli.    Will continue the levofloxacin 750 mg 3 more days.  Resume appropriate home medication for chronic medical conditions, resumed Eliquis, Plavix  Case management consulted for discharge planning, patient interested in rehab.      VTE Prophylaxis: Eliquis resumed 6/13    Patient condition:  Guarded    Discharge Planning and Disposition: TBD    Critical care note:  Critical care diagnosis:  Hypomagnesemia requiring IV Mag sulfate  Critical care interventions: Hands-on evaluation, review of labs/radiographs/records and discussion with patient and family if present  Critical care time spent: 35 minutes    All diagnosis and differential diagnosis have been reviewed; assessment and plan has been documented; I have personally reviewed the labs and test results that are presently available; I have reviewed the patients medication list; I have reviewed the consulting providers response and recommendations. I have reviewed or attempted to review medical records based upon their availability. All of the patient's questions have been  addressed and answered. Patient's is agreeable to the above stated plan. I will continue to monitor closely and make adjustments to medical management as needed.  _____________________________________________________________________    Nutrition Status:    Radiology:   I have personally reviewed the images and agree with radiologist report  X-Ray Chest 1 View  Narrative: EXAMINATION:  XR CHEST 1 VIEW    CLINICAL HISTORY:  HTN, CAD, preop;, Spinal stenosis, cervical region.    COMPARISON:  February 14, 2023    FINDINGS:  No alveolar consolidation, effusion, or pneumothorax is seen.   The thoracic aorta is normal  cardiac silhouette, central pulmonary vessels and mediastinum are normal in size and are grossly unremarkable.   visualized osseous structures are grossly unremarkable.  Impression: No acute chest disease is identified.    Electronically signed by: Dilan  Lilo  Date:    06/16/2023  Time:    08:48      Dilshad Tran MD  Department of [facility]   [facility]   06/16/2023

## 2023-06-16 NOTE — PT/OT/SLP EVAL
Physical Therapy Evaluation    Patient Name:  Valentino Manning   MRN:  [id number]    Recommendations:     Discharge Recommendations: rehabilitation facility   Discharge Equipment Recommendations:     Barriers to discharge: None    Assessment:     Valentino Manning is a 72 y.o. male admitted with a medical diagnosis of progressive weakness w/ polyneuropathy, cervical stenosis --> scheduled for surgery on 6/21. Neurosurgery has cleared pt to work with therapy services in C collar until surgery. UTI, multiple myeloma on chemo w/ last treatment held 2/2 weakness. Pt alert, oriented, very pleasant and motivated to get stronger. He presents with the following impairments/functional limitations: weakness, impaired endurance, impaired functional mobility, gait instability, impaired balance.    Rehab Prognosis: Good; patient would benefit from acute skilled PT services to address these deficits and reach maximum level of function.    Recent Surgery: Procedure(s) (LRB):  FUSION, SPINE, POSTERIOR SPINAL COLUMN, CERVICAL, USING COMPUTER-ASSISTED NAVIGATION (N/A)      Plan:     During this hospitalization, patient to be seen 6 x/week to address the identified rehab impairments via gait training, therapeutic activities, therapeutic exercises and progress toward the following goals:    Plan of Care Expires:  07/27/23    Subjective     Chief Complaint: weakness  Patient/Family Comments/goals: return to PLOF  Pain/Comfort:       Patients cultural, spiritual, Scientologist conflicts given the current situation: no    Living Environment:  Pt lives at home w/ spouse. Pt has gotten weaker in the past 2 weeks to the point of being unable to ambulate. Prior to getting weak, pt was independent.  Upon discharge, patient will have assistance from wife.    Objective:      Patient found HOB elevated upon PT entry to room.    General Precautions: Standard,    Orthopedic Precautions:    Braces: [place] J collar  Respiratory Status: Room air    Exams:  RLE  ROM: WFL  RLE Strength: 3+/5  LLE ROM: WFL  LLE Strength: 3+/5  B LE sensation: intact    Functional Mobility:  Bed Mobility:     Supine to Sit: minimum assistance  Sit to Supine: minimum assistance  Transfers:     Sit to Stand:  moderate assistance with rolling walker  Gait: Pt performed weight shifting R & L in standing and standing marches  Pt reported feeling like legs were going to buckle when weightshifting    Patient left HOB elevated with all lines intact.    GOALS:   Multidisciplinary Problems       Physical Therapy Goals          Problem: Physical Therapy    Goal Priority Disciplines Outcome Goal Variances Interventions   Physical Therapy Goal     PT, PT/OT Ongoing, Progressing     Description: Goals to be met by: 23     Patient will increase functional independence with mobility by performin. Supine to sit with Stand-by Assistance  2. Sit to supine with Stand-by Assistance  3. Sit to stand transfer with Stand-by Assistance  4. Gait  x 50 feet with Stand-by Assistance using Rolling Walker.                          History:     Past Medical History:   Diagnosis Date    Anemia     GERD (gastroesophageal reflux disease)     Heart problem     Hyperlipidemia     Hypertension     Multiple myeloma     NSTEMI (non-ST elevated myocardial infarction)        Past Surgical History:   Procedure Laterality Date    BONE MARROW ASPIRATION N/A 2022    Procedure: ASPIRATION, BONE MARROW;  Surgeon: Robbie Gardner MD;  Location: [facility];  Service: General;  Laterality: N/A;    CARDIAC SURGERY      ENDOSCOPIC RELEASE OF BOTH CARPAL TUNNELS         Time Tracking:     PT Received On: 23  PT Start Time: 1430     PT Stop Time: 1455  PT Total Time (min): 25 min     Billable Minutes: Evaluation 25      2023

## 2023-06-16 NOTE — PLAN OF CARE
Problem: Physical Therapy  Goal: Physical Therapy Goal  Description: Goals to be met by: 23     Patient will increase functional independence with mobility by performin. Supine to sit with Stand-by Assistance  2. Sit to supine with Stand-by Assistance  3. Sit to stand transfer with Stand-by Assistance  4. Gait  x 50 feet with Stand-by Assistance using Rolling Walker.     Outcome: Ongoing, Progressing

## 2023-06-16 NOTE — DISCHARGE INSTRUCTIONS
POSTERIOR CERVICAL FUSION  DISCHARGE INSTRUCTIONS      1.  Wear your cervical collar at all times.  Use the [place] J collar (blue color).  The lining of the collar can be removed and washed with soap and water.   When showering, use the [place] collar (peach color).  Your collar will likely be discontinued after 8-12 weeks.      2.  Keep your incision clean and dry.    Your sutures will be removed at your first postoperative follow-up appointment in 10-14 days.   Place clean gauze and tape over your wound daily until your sutures are removed.  Wait at least 72 hours from the time of your surgery to take a shower.  After showering, pat your incision dry and replace the wound dressing.  Do not immerse your incision in water for 4-6 weeks (e.g. bath tub, hot tub, swimming pool).      3.  Activity restrictions:  No lifting greater than 15 pounds until your return appointment in 4-6 weeks.  No bending, stooping, or twisting.  No impact exercise or contact sports for at least 3 months.  No driving until your cervical collar is removed in 8-12 weeks.  To resume driving short distances (<30 minutes), you must be off of your narcotic medications and be able to comfortably brake suddenly, should the need arise.    Get up and walk.      4.  Contact your Neurosurgeon if the following occurs:  Signs and symptoms of infection, including fever above 101.5 degrees Fahrenheit and/or chills.  Redness, swelling, warmth, or drainage from the incision.  Any lasting changes in sensation, numbness, and/or tingling.  Increased weakness or increased pain.  Swelling of the foot and/or lower leg with calf pain.      Neurosurgery has office hours Monday through Friday, 8:00 AM to 4:00 PM except for holidays. There is an answering service available during non-office hours, with 24 hours neurosurgery coverage.  Report to the Emergency Department if you need immediate medical assistance.      Because you have had a fusion, you are not to take  steroidal medications or non-steroidal anti-inflammatory (NSAID) medications such as Motrin/ Ibuprofen or Naprosyn/ Naproxen unless agreed upon with your neurosurgeon.      Please contact Dr. Aviles's office for any questions or concerns.  Typically, your first follow-up appointment after a posterior cervical fusion surgery is 10-14 days from the date of your operation.  At this time, sutures will be removed.  For your second postoperative appointment in 6-8 weeks, an x-ray of your cervical spine will be arranged in Radiology before your neurosurgery appointment.  You may be requested to remove your cervical collar for these flexion/ extension C-spine x-rays, and then replace the brace after your x-rays are completed.

## 2023-06-16 NOTE — PROGRESS NOTES
Inpatient Nutrition Evaluation    Admit Date: 6/11/2023   Total duration of encounter: 5 days    Nutrition Recommendation/Prescription     Continue heart healthy diet as tolerated  RD to monitor po intake and weight changes    Nutrition Assessment     Chart Review    Reason Seen: length of stay    Malnutrition Screening Tool Results   Have you recently lost weight without trying?: No  Have you been eating poorly because of a decreased appetite?: No   MST Score: 0     Diagnosis:  Polyneuropathy with progressive weakness to bilateral upper extremity and lower extremity  Impaired mobility secondary to generalized weakness and neuropathy since the initiation of chemo  E coli UTI-pansensitive  Multiple myeloma on chemotherapy- last treatment held due to weakness   Essential HTN  Hypokalemia  Hypomagnesemia    Relevant Medical History: HTN, HLD, CAD s/p stent, chronic anemia, GERD, multiple myeloma on chemotherapy, PE    Nutrition-Related Medications: vitamin C, ferrous sulfate, zofran prn, miralax prn    Nutrition-Related Labs: 6/16: RBC-3.62, H/H-11.8/36.2, Cl-109      Diet Order: Diet heart healthy  Diet NPO  Oral Supplement Order: none  Appetite/Oral Intake: good/% of meals  Factors Affecting Nutritional Intake: none identified  Food/Quaker/Cultural Preferences: unable to obtain  Food Allergies: unable to obtain    Skin Integrity: excoriation  Wound(s):       Comments    6/16: pt sleeping at time of rounds. Spoke with RN, who reports good appetite and no GI issues. 100% intake documented on 6/12. Per MST, no decreased appetite or weight loss prior admission. Unable to obtain weight history at this time. Weight of 88.5 kg (195 lb) on 6/14. Per EMR weights, 2.5% weight loss in ~2 months noted. Not significant at this time. Unable to perform NFPA at this time, no wasting visualized. Will continue to monitor.    Anthropometrics    Height: 6' (182.9 cm) Height Method: Stated  Last Weight: 88.5 kg (195 lb)  (23 0051) Weight Method: Standard Scale  BMI (Calculated): 26.4  BMI Classification: overweight (BMI 25-29.9)        Ideal Body Weight (IBW), Male: 178 lb     % Ideal Body Weight, Male (lb): 109.55 %                 Usual Body Weight (UBW), k.8 kg  % Usual Body Weight: 97.62     Usual Weight Provided By: EMR weight history    Wt Readings from Last 5 Encounters:   23 88.5 kg (195 lb)   23 90.4 kg (199 lb 3.2 oz)   23 89.7 kg (197 lb 12.8 oz)   23 90.7 kg (199 lb 15.3 oz)   23 90.8 kg (200 lb 1.6 oz)     Weight Change(s) Since Admission:  Admit Weight: 88.5 kg (195 lb) (23 1713)      Patient Education    Not applicable.    Monitoring & Evaluation     Dietitian will monitor food and beverage intake and weight change.  Nutrition Risk/Follow-Up: low (follow-up in 5-7 days)  Patients assigned 'low nutrition risk' status do not qualify for a full nutritional assessment but will be monitored and re-evaluated in a 5-7 day time period. Please consult if re-evaluation needed sooner.

## 2023-06-16 NOTE — PT/OT/SLP PROGRESS
Occupational Therapy   Treatment    Name: Valentino Manning  MRN: [id number]    Recommendations:     Discharge Recommendations: rehabilitation facility vs nursing facility, skilled (TBD, pending progress)  Discharge Equipment Recommendations: BSC, dressing AEDs (TBD, pending progress)  Barriers to discharge: medical dx    Assessment:     Valentino Manning is a 72 y.o. male with a medical diagnosis of Polyneuropathy, Impaired mobility secondary to generalized weakness and neuropathy since the initiation of chemo, Acute bacterial cystitis, POA, Multiple myeloma on chemotherapy- last treatment held due to weakness, Essential HTN, Hx of PE on Eliquis, CAD s/p stent on Plavix, Chronic Anemia. Hx of GERD, HLD. He presents with reports of slight pain at cervical spine. Pt also presents with continued AROM limitations in B shoulder flex, B ER/IR, and L elbow flex. Lastly, pt presents with decreased  strength of R hand during session. Pt also demonstrated R knee buckling during stand. Performance deficits affecting function are weakness, impaired endurance, impaired sensation, impaired self care skills, impaired functional mobility, impaired balance, decreased upper extremity function, decreased lower extremity function, decreased safety awareness, pain, decreased ROM.     Rehab Prognosis:  Good; patient would benefit from acute skilled OT services to address these deficits and reach maximum level of function.       Plan:     Patient to be seen 6 x/week to address the above listed problems via self-care/home management, therapeutic activities, therapeutic exercises  Plan of Care Expires: 06/28/23  Plan of Care Reviewed with: patient, spouse, daughter, other (see comments) (JULISSA)    Subjective     Pain/Comfort:  Pt reported slight pain at cervical spine when asked by OT.    Objective:     Communicated with: RN prior to session. Patient found HOB elevated with telemetry, gongora catheter upon OT entry to room.    General Precautions:  Standard, fall, c-spinal pxns  Braces: [place] J C-collar at all times and [place] C-collar while showering.  Respiratory Status: Room air     Manual Therapy  Pt seen for multiple reps of AROM/PROM of B shoulder flex, B ER/IR, and L elbow flex with prolonged stretching performed at tolerated end ranges. Pt also performed AROM of L elbow ext; R elbow flex/ext; B digit flex/ext; and B wrist flex/ext, ulnar/radial deviation, and rotation. Lastly, pt performed B shoulder protraction/retraction, elevation/depression, and rotation while lying in bed with HOB elevated.    Patient Education:  Patient and pt's family provided with verbal education regarding neuro rec regarding cervical collar (with neuro reporting that [place] J C-collar should be worn at all times). However, pt refused to don collar while in bed despite education provided.    Patient left HOB elevated with family present, all lines intact, and call button in reach.    GOALS:   Multidisciplinary Problems       Occupational Therapy Goals          Problem: Occupational Therapy    Goal Priority Disciplines Outcome Interventions   Occupational Therapy Goal     OT, PT/OT Ongoing, Progressing    Description: Goals to be met by: 6/28/23     Patient will increase functional independence with ADLs by performing:    UE Dressing with Minimal Assistance.  LE Dressing with Moderate Assistance and Assistive Devices as needed.  Grooming while EOB with Set-up Assistance.  Toileting from BSC with Moderate Assistance.  Pt will perform BSC t/f with Minimal Assistance.                         Time Tracking:     OT Date of Treatment: 6/16/23  OT Start Time: 1542  OT Stop Time: 1558  OT Total Time (min): 16 min    Billable Minutes: Manual Therapy 16 mins    OT/CAMERON: OT     Number of CAMERON visits since last OT visit: 2    6/16/2023

## 2023-06-17 PROCEDURE — 25000003 PHARM REV CODE 250: Performed by: NEUROLOGICAL SURGERY

## 2023-06-17 PROCEDURE — 25000003 PHARM REV CODE 250: Performed by: NURSE PRACTITIONER

## 2023-06-17 PROCEDURE — 97535 SELF CARE MNGMENT TRAINING: CPT | Mod: CO

## 2023-06-17 PROCEDURE — 25000003 PHARM REV CODE 250: Performed by: INTERNAL MEDICINE

## 2023-06-17 PROCEDURE — 21400001 HC TELEMETRY ROOM

## 2023-06-17 PROCEDURE — 25000003 PHARM REV CODE 250: Performed by: STUDENT IN AN ORGANIZED HEALTH CARE EDUCATION/TRAINING PROGRAM

## 2023-06-17 PROCEDURE — 97530 THERAPEUTIC ACTIVITIES: CPT | Mod: CQ

## 2023-06-17 RX ADMIN — APIXABAN 5 MG: 5 TABLET, FILM COATED ORAL at 09:06

## 2023-06-17 RX ADMIN — HYDROCODONE BITARTRATE AND ACETAMINOPHEN 1 TABLET: 5; 325 TABLET ORAL at 05:06

## 2023-06-17 RX ADMIN — DULOXETINE 30 MG: 30 CAPSULE, DELAYED RELEASE ORAL at 09:06

## 2023-06-17 RX ADMIN — METOPROLOL TARTRATE 25 MG: 25 TABLET, FILM COATED ORAL at 09:06

## 2023-06-17 RX ADMIN — GABAPENTIN 300 MG: 300 CAPSULE ORAL at 09:06

## 2023-06-17 RX ADMIN — HYDROCODONE BITARTRATE AND ACETAMINOPHEN 1 TABLET: 5; 325 TABLET ORAL at 10:06

## 2023-06-17 RX ADMIN — GABAPENTIN 300 MG: 300 CAPSULE ORAL at 03:06

## 2023-06-17 RX ADMIN — LEVOFLOXACIN 750 MG: 500 TABLET, FILM COATED ORAL at 09:06

## 2023-06-17 RX ADMIN — FERROUS SULFATE TAB 325 MG (65 MG ELEMENTAL FE) 1 EACH: 325 (65 FE) TAB at 09:06

## 2023-06-17 RX ADMIN — ZOLPIDEM TARTRATE 10 MG: 5 TABLET ORAL at 09:06

## 2023-06-17 RX ADMIN — AMLODIPINE BESYLATE 5 MG: 5 TABLET ORAL at 09:06

## 2023-06-17 RX ADMIN — Medication 1000 MG: at 09:06

## 2023-06-17 RX ADMIN — TAMSULOSIN HYDROCHLORIDE 0.4 MG: 0.4 CAPSULE ORAL at 09:06

## 2023-06-17 NOTE — PLAN OF CARE
Problem: Adult Inpatient Plan of Care  Goal: Plan of Care Review  Outcome: Ongoing, Progressing  Goal: Patient-Specific Goal (Individualized)  Outcome: Ongoing, Progressing  Goal: Absence of Hospital-Acquired Illness or Injury  Outcome: Ongoing, Progressing  Goal: Optimal Comfort and Wellbeing  Outcome: Ongoing, Progressing  Goal: Readiness for Transition of Care  Outcome: Ongoing, Progressing     Problem: Fall Injury Risk  Goal: Absence of Fall and Fall-Related Injury  Outcome: Ongoing, Progressing     Problem: Infection  Goal: Absence of Infection Signs and Symptoms  Outcome: Ongoing, Progressing     Problem: Skin Injury Risk Increased  Goal: Skin Health and Integrity  Outcome: Ongoing, Progressing     Problem: Pain Acute  Goal: Acceptable Pain Control and Functional Ability  Outcome: Ongoing, Progressing

## 2023-06-17 NOTE — PROGRESS NOTES
[facility] Hospital Medicine Progress Note        Chief Complaint: Inpatient Follow-up for b.l LE numbness     HPI: Valentino Manning is a 72 y.o. male with a PMHx of HTN, HLD, CAD s/p stent on Plavix, chronic anemia, GERD, multiple myeloma on chemotherapym hx of PE on Eliquis who presented to [facility] on 6/11/2023 with c/o numbness to bilateral hands and bilateral lower extremities with associated weakness has been worsening over the past 6 months.  Patient reports that he was now unable to ambulate due to worsening paresthesias and generalized weakness x3 weeks.  He was now bed-bound.  He endorsed falling about 4 times over the past 3 weeks, denied LOC or head injury.  He reports he was in his normal state of health prior to initiating chemotherapy in December 2022.  He then had COVID-19 in February 2023 and has had a rapid decline since then.  He also endorsed urinary retention for which he was seen at an outlying ED on 6/5/23 and had a Duenas catheter placed, he was supposed to follow-up with urology on 06/12/2023. He was previously on gabapentin 300 mg t.i.d. for neuropathy without symptomatic relief, he states he was not taking it as directed.  He denied CP, SOB, fever, chills, cough, dizziness, syncope.  Patient reportedly saw his oncologist, Dr. Harris, for same complaints and he was referred to ED for further evaluation.   Initial ED VS stable.  Labs notable for hemoglobin 12.6, hematocrit 38.  Urinalysis with 2+ protein, 3+ occult blood, positive nitrites, 2+ leukocytes, 11 RBCs, 67 WBCs, 1+ bacteria.  Urine culture pending.  He was started on oral Levaquin for suspected UTI.  He was admitted to hospital medicine services for further medical management.    Interval Hx:   Seen and examined the pt. VSS and HDS. Pt denies pain and discomfort.     Objective/physical exam:  General: In no acute distress, afebrile  Chest: Clear to auscultation bilaterally anteriorly  Heart: RRR, +S1, S2,  no appreciable murmur  Abdomen: Soft, nontender, BS +  MSK: Warm, no lower extremity edema, no clubbing or cyanosis  Neurologic: Alert and oriented x4, Cranial nerve II-XII intact, bilateral upper and lower extremity strength 3 out of 5    VITAL SIGNS: 24 HRS MIN & MAX LAST   Temp  Min: 97.4 °F (36.3 °C)  Max: 98.3 °F (36.8 °C) 97.8 °F (36.6 °C)   BP  Min: 101/62  Max: 162/83 110/69   Pulse  Min: 61  Max: 90  64   Resp  Min: 17  Max: 18 18   SpO2  Min: 96 %  Max: 100 % 99 %     I reviewed the labs below:  Recent Labs   Lab 06/14/23  0412 06/15/23  0557 06/16/23  0724   WBC 4.15* 5.04 5.33   RBC 3.44* 3.32* 3.62*   HGB 11.3* 10.8* 11.8*   HCT 34.3* 32.8* 36.2*   MCV 99.7* 98.8* 100.0*   MCH 32.8* 32.5* 32.6*   MCHC 32.9* 32.9* 32.6*   RDW 14.8 14.8 14.8    300 308   MPV 8.7 8.9 8.6         Recent Labs   Lab 06/11/23  1822 06/12/23  0352 06/14/23  0412 06/15/23  0557 06/16/23  0724    135* 137 137 141   K 3.7 3.2* 3.2* 3.7 4.1   CO2 24 23 23 22* 23   BUN 11.6 12.0 10.8 11.4 9.6   CREATININE 0.88 0.89 0.82 0.92 0.83   CALCIUM 9.5 9.4 9.3 8.9 9.3   MG 1.60 1.50* 1.60 2.00 1.80   ALBUMIN 3.6 3.1*  --   --  3.2*   ALKPHOS 122 107  --   --  107   ALT 33 26  --   --  26   AST 22 16  --   --  13   BILITOT 0.6 0.5  --   --  0.5            Microbiology Results (last 7 days)       Procedure Component Value Units Date/Time    Urine culture [[id number]]  (Abnormal)  (Susceptibility) Collected: 06/11/23 2228    Order Status: Completed Specimen: Urine Updated: 06/14/23 0951     Urine Culture >/= 100,000 colonies/ml Escherichia coli             See below for Radiology    Scheduled Med:   amLODIPine  5 mg Oral Daily    apixaban  5 mg Oral BID    ascorbic acid (vitamin C)  1,000 mg Oral Daily    atorvastatin  40 mg Oral Every other day    DULoxetine  30 mg Oral Daily    dutasteride  0.5 mg Oral Daily    ferrous sulfate  1 tablet Oral Daily    gabapentin  300 mg Oral TID    levoFLOXacin  750 mg Oral Daily    linaCLOtide   145 mcg Oral Before breakfast    metoprolol tartrate  25 mg Oral BID    tamsulosin  0.4 mg Oral QHS    zolpidem  10 mg Oral QHS        Continuous Infusions:       PRN Meds:  acetaminophen, aluminum-magnesium hydroxide-simethicone, HYDROcodone-acetaminophen, melatonin, naloxone, ondansetron, polyethylene glycol, prochlorperazine, simethicone       Assessment/Plan:  Polyneuropathy with progressive weakness to bilateral upper extremity and lower extremity  Impaired mobility secondary to generalized weakness and neuropathy since the initiation of chemo  E coli UTI-pansensitive  Multiple myeloma on chemotherapy- last treatment held due to weakness   Essential HTN  Hx of PE on Eliquis  CAD s/p stent on Plavix  Chronic Anemia  Hx of GERD, HLD  Hypokalemia  Hypomagnesemia     - Urine is dark with the sediments in the Duenas.   - Will continue the abx to complete the  days due to catheter.   - Neurosurgery evaluation suggested that patient has a candidate for cervical spinal laminectomy and posterior fusion.  Needs to wear [place] J C-collar at all times as well as [place] C-collar that can be used while showering.  - Cardiology consult for clearance was put in by Neurosurgery, patient is on Eliquis and Plavix need to hold Plavix for 7 days preoperatively and Eliquis for 3 days per Neurosurgery do not have any objections to that.    PT/OT consulted, patient feels he will benefit from rehab as he is unable to continue with his chemotherapy given his weakness, eval is pending given clearance from Neurosurgery  CT head without--> unrevealing for acute intracranial abnormalities.    Urology also evaluated the patient, recommended to continue Flomax, continue indwelling Duenas catheter on discharge and follow-up with Dr. Vo outpatient and if his mobility improved, okay to offer voiding trial prior to discharge.  Also recommended neurosurgery eval  Urine culture finalized as pansensitive E coli.   Will continue the  levofloxacin 750 mg 3 more days.  Resume appropriate home medication for chronic medical conditions, resumed Eliquis, Plavix  Case management consulted for discharge planning, patient interested in rehab.      VTE Prophylaxis: Eliquis resumed 6/13    Patient condition:  Guarded    Discharge Planning and Disposition: TBD    Critical care note:  Critical care diagnosis:  Hypomagnesemia requiring IV Mag sulfate  Critical care interventions: Hands-on evaluation, review of labs/radiographs/records and discussion with patient and family if present  Critical care time spent: 35 minutes    All diagnosis and differential diagnosis have been reviewed; assessment and plan has been documented; I have personally reviewed the labs and test results that are presently available; I have reviewed the patients medication list; I have reviewed the consulting providers response and recommendations. I have reviewed or attempted to review medical records based upon their availability. All of the patient's questions have been  addressed and answered. Patient's is agreeable to the above stated plan. I will continue to monitor closely and make adjustments to medical management as needed.  _____________________________________________________________________    Nutrition Status:    Radiology:   I have personally reviewed the images and agree with radiologist report  X-Ray Chest 1 View  Narrative: EXAMINATION:  XR CHEST 1 VIEW    CLINICAL HISTORY:  HTN, CAD, preop;, Spinal stenosis, cervical region.    COMPARISON:  February 14, 2023    FINDINGS:  No alveolar consolidation, effusion, or pneumothorax is seen.   The thoracic aorta is normal  cardiac silhouette, central pulmonary vessels and mediastinum are normal in size and are grossly unremarkable.   visualized osseous structures are grossly unremarkable.  Impression: No acute chest disease is identified.    Electronically signed by: Dilan  Lilo  Date:    06/16/2023  Time:    08:48      Dilshad Tran MD  Department of [facility] Medicine   [facility]   06/17/2023

## 2023-06-17 NOTE — PT/OT/SLP PROGRESS
Occupational Therapy   Treatment    Name: Valentino Manning  MRN: [id number]  Admitting Diagnosis:  Upper extremity weakness       Recommendations:     Discharge Recommendations: rehabilitation facility  Discharge Equipment Recommendations:  walker, rolling, rollator, cane, straight, wheelchair, bedside commode, shower chair, other (see comments) (Thinks Nas Lift)  Barriers to discharge:    severity of deficits    Assessment:     Valentino Manning is a 72 y.o. male with a medical diagnosis of upper extremity weakness.  He presents with RN. Performance deficits affecting function are weakness, impaired endurance, impaired self care skills, impaired functional mobility, gait instability, impaired balance, decreased coordination, orthopedic precautions.     Rehab Prognosis:  Good; patient would benefit from acute skilled OT services to address these deficits and reach maximum level of function.       Plan:     Patient to be seen 6 x/week to address the above listed problems via self-care/home management, therapeutic activities, therapeutic exercises  Plan of Care Expires: 06/28/23  Plan of Care Reviewed with: patient    Subjective     Chief Complaint: to get better  Patient/Family Comments/goals: to go home  Pain/Comfort:  Pain Rating 1: 0/10    Objective:     Communicated with: RN prior to session.  Patient found HOB elevated with cervical collar, pulse ox (continuous), SCD, telemetry upon OT entry to room.    General Precautions: Standard, fall    Orthopedic Precautions:spinal precautions  Braces: [place] J collar  Respiratory Status: Room air     Occupational Performance:     Bed Mobility:    Patient completed Supine to Sit with minimum assistance  Patient completed Sit to Supine with minimum assistance     Functional Mobility/Transfers:  Patient completed Sit <> Stand Transfer with minimum assistance x 3 attempts  with  rolling walker   Functional Mobility: Min A    Activities of Daily Living:  Grooming: Pt performed oral  care while sitting EOB with set up A for manage caps/containers.       [facility] 6 Click ADL:      Treatment & Education:  Educated Pt on safety with ADL's and POC.      Patient left supine with all lines intact and call button in reach    GOALS:   Multidisciplinary Problems       Occupational Therapy Goals          Problem: Occupational Therapy    Goal Priority Disciplines Outcome Interventions   Occupational Therapy Goal     OT, PT/OT Ongoing, Progressing    Description: Goals to be met by: 6/28/23     Patient will increase functional independence with ADLs by performing:    UE Dressing with Minimal Assistance.  LE Dressing with Moderate Assistance and Assistive Devices as needed.  Grooming while EOB with Set-up Assistance.  Toileting from BSC with Moderate Assistance.  Pt will perform BSC t/f with Minimal Assistance.                         Time Tracking:     OT Date of Treatment: 06/17/23  OT Start Time: 0840  OT Stop Time: 0915  OT Total Time (min): 35 min    Billable Minutes:Self Care/Home Management 35    OT/CAMERON: CAMERON     Number of CAMERON visits since last OT visit: 3    6/17/2023

## 2023-06-17 NOTE — PT/OT/SLP PROGRESS
Physical Therapy Treatment    Patient Name:  Valentino Manning   MRN:  [id number]    Recommendations:     Discharge Recommendations: rehabilitation facility  Discharge Equipment Recommendations:    Barriers to discharge: Decreased caregiver support    Assessment:     Valentino Manning is a 72 y.o. male admitted with a medical diagnosis of <principal problem not specified>.  He presents with the following impairments/functional limitations: weakness, impaired endurance, impaired functional mobility, gait instability, impaired balance.    Rehab Prognosis: Fair; patient would benefit from acute skilled PT services to address these deficits and reach maximum level of function.    Recent Surgery: Procedure(s) (LRB):  FUSION, SPINE, POSTERIOR SPINAL COLUMN, CERVICAL, USING COMPUTER-ASSISTED NAVIGATION (N/A)      Plan:     During this hospitalization, patient to be seen 6 x/week to address the identified rehab impairments via gait training, therapeutic activities, therapeutic exercises and progress toward the following goals:    Plan of Care Expires:  07/27/23    Subjective     Chief Complaint: None  Pain/Comfort:  6/10      Objective:     Communicated with NSG prior to session.  Patient found supine upon PT entry to room.     General Precautions: Standard,    Orthopedic Precautions:    Braces: [place] J collar  Respiratory Status: Room air  Skin Integrity: Visible skin intact      Functional Mobility:  Pt in bed on arrival, pt was asst with donning on collar, pt was min A from supine to sitting EOB, pt completed B LE therex at EOB for 20 reps, pt also had 3 standing trials with min-,mod A, pt standing for appx 30-60 seconds while working on WB through his LE, pt did attempt marching and was not safe. Pt was fatigue, he had OT tx this AM and stood several times. Pt is motivated to do as much as he can safely.        Patient left supine with call button in reach.    GOALS:   Multidisciplinary Problems       Physical Therapy Goals           Problem: Physical Therapy    Goal Priority Disciplines Outcome Goal Variances Interventions   Physical Therapy Goal     PT, PT/OT Ongoing, Progressing     Description: Goals to be met by: 23     Patient will increase functional independence with mobility by performin. Supine to sit with Stand-by Assistance  2. Sit to supine with Stand-by Assistance  3. Sit to stand transfer with Stand-by Assistance  4. Gait  x 50 feet with Stand-by Assistance using Rolling Walker.                          Time Tracking:     Billable Minutes: Therapeutic Activity 23       PT/PTA: PTA     Number of PTA visits since last PT visit: 2023

## 2023-06-18 LAB
APPEARANCE UR: ABNORMAL
BACTERIA #/AREA URNS AUTO: ABNORMAL /HPF
BILIRUB UR QL STRIP.AUTO: NEGATIVE MG/DL
CAOX CRY URNS QL MICRO: ABNORMAL /HPF
COLOR UR: YELLOW
GLUCOSE UR QL STRIP.AUTO: NEGATIVE MG/DL
KETONES UR QL STRIP.AUTO: NEGATIVE MG/DL
LEUKOCYTE ESTERASE UR QL STRIP.AUTO: ABNORMAL UNIT/L
MUCOUS THREADS URNS QL MICRO: ABNORMAL /LPF
NITRITE UR QL STRIP.AUTO: NEGATIVE
PH UR STRIP.AUTO: 5 [PH]
PROT UR QL STRIP.AUTO: ABNORMAL MG/DL
RBC #/AREA URNS AUTO: ABNORMAL /HPF
RBC UR QL AUTO: ABNORMAL UNIT/L
SP GR UR STRIP.AUTO: 1.02 (ref 1–1.03)
SQUAMOUS #/AREA URNS AUTO: <5 /HPF
UROBILINOGEN UR STRIP-ACNC: 0.2 MG/DL
WBC #/AREA URNS AUTO: <5 /HPF

## 2023-06-18 PROCEDURE — 25000003 PHARM REV CODE 250: Performed by: NURSE PRACTITIONER

## 2023-06-18 PROCEDURE — 25000003 PHARM REV CODE 250: Performed by: INTERNAL MEDICINE

## 2023-06-18 PROCEDURE — 21400001 HC TELEMETRY ROOM

## 2023-06-18 PROCEDURE — 25000003 PHARM REV CODE 250: Performed by: STUDENT IN AN ORGANIZED HEALTH CARE EDUCATION/TRAINING PROGRAM

## 2023-06-18 PROCEDURE — 81001 URINALYSIS AUTO W/SCOPE: CPT | Performed by: STUDENT IN AN ORGANIZED HEALTH CARE EDUCATION/TRAINING PROGRAM

## 2023-06-18 RX ADMIN — FERROUS SULFATE TAB 325 MG (65 MG ELEMENTAL FE) 1 EACH: 325 (65 FE) TAB at 08:06

## 2023-06-18 RX ADMIN — ATORVASTATIN CALCIUM 40 MG: 40 TABLET, FILM COATED ORAL at 08:06

## 2023-06-18 RX ADMIN — TAMSULOSIN HYDROCHLORIDE 0.4 MG: 0.4 CAPSULE ORAL at 08:06

## 2023-06-18 RX ADMIN — HYDROCODONE BITARTRATE AND ACETAMINOPHEN 1 TABLET: 5; 325 TABLET ORAL at 11:06

## 2023-06-18 RX ADMIN — Medication 1000 MG: at 08:06

## 2023-06-18 RX ADMIN — GABAPENTIN 300 MG: 300 CAPSULE ORAL at 04:06

## 2023-06-18 RX ADMIN — METOPROLOL TARTRATE 25 MG: 25 TABLET, FILM COATED ORAL at 08:06

## 2023-06-18 RX ADMIN — AMLODIPINE BESYLATE 5 MG: 5 TABLET ORAL at 08:06

## 2023-06-18 RX ADMIN — GABAPENTIN 300 MG: 300 CAPSULE ORAL at 08:06

## 2023-06-18 RX ADMIN — HYDROCODONE BITARTRATE AND ACETAMINOPHEN 1 TABLET: 5; 325 TABLET ORAL at 12:06

## 2023-06-18 RX ADMIN — DULOXETINE 30 MG: 30 CAPSULE, DELAYED RELEASE ORAL at 08:06

## 2023-06-18 RX ADMIN — LEVOFLOXACIN 750 MG: 500 TABLET, FILM COATED ORAL at 08:06

## 2023-06-18 RX ADMIN — HYDROCODONE BITARTRATE AND ACETAMINOPHEN 1 TABLET: 5; 325 TABLET ORAL at 06:06

## 2023-06-18 RX ADMIN — ZOLPIDEM TARTRATE 10 MG: 5 TABLET ORAL at 08:06

## 2023-06-18 RX ADMIN — LINACLOTIDE 145 MCG: 145 CAPSULE, GELATIN COATED ORAL at 04:06

## 2023-06-18 NOTE — PROGRESS NOTES
[facility] Hospital Medicine Progress Note        Chief Complaint: Inpatient Follow-up for b.l LE numbness     HPI: Valentino Manning is a 72 y.o. male with a PMHx of HTN, HLD, CAD s/p stent on Plavix, chronic anemia, GERD, multiple myeloma on chemotherapym hx of PE on Eliquis who presented to [facility] on 6/11/2023 with c/o numbness to bilateral hands and bilateral lower extremities with associated weakness has been worsening over the past 6 months.  Patient reports that he was now unable to ambulate due to worsening paresthesias and generalized weakness x3 weeks.  He was now bed-bound.  He endorsed falling about 4 times over the past 3 weeks, denied LOC or head injury.  He reports he was in his normal state of health prior to initiating chemotherapy in December 2022.  He then had COVID-19 in February 2023 and has had a rapid decline since then.  He also endorsed urinary retention for which he was seen at an outlying ED on 6/5/23 and had a Duenas catheter placed, he was supposed to follow-up with urology on 06/12/2023. He was previously on gabapentin 300 mg t.i.d. for neuropathy without symptomatic relief, he states he was not taking it as directed.  He denied CP, SOB, fever, chills, cough, dizziness, syncope.  Patient reportedly saw his oncologist, Dr. Harris, for same complaints and he was referred to ED for further evaluation.   Initial ED VS stable.  Labs notable for hemoglobin 12.6, hematocrit 38.  Urinalysis with 2+ protein, 3+ occult blood, positive nitrites, 2+ leukocytes, 11 RBCs, 67 WBCs, 1+ bacteria.  Urine culture pending.  He was started on oral Levaquin for suspected UTI.  He was admitted to hospital medicine services for further medical management.    Interval Hx:   Seen and examined the pt. VSS and HDS. Pt denies pain and discomfort.     Objective/physical exam:  General: In no acute distress, afebrile  Chest: Clear to auscultation bilaterally anteriorly  Heart: RRR, +S1, S2,  no appreciable murmur  Abdomen: Soft, nontender, BS +  MSK: Warm, no lower extremity edema, no clubbing or cyanosis  Neurologic: Alert and oriented x4, Cranial nerve II-XII intact, bilateral upper and lower extremity strength 3 out of 5    VITAL SIGNS: 24 HRS MIN & MAX LAST   Temp  Min: 97.5 °F (36.4 °C)  Max: 98.4 °F (36.9 °C) 98.4 °F (36.9 °C)   BP  Min: 104/62  Max: 113/66 105/66   Pulse  Min: 60  Max: 69  60   Resp  Min: 17  Max: 18 18   SpO2  Min: 96 %  Max: 99 % 96 %     I reviewed the labs below:  Recent Labs   Lab 06/14/23  0412 06/15/23  0557 06/16/23  0724   WBC 4.15* 5.04 5.33   RBC 3.44* 3.32* 3.62*   HGB 11.3* 10.8* 11.8*   HCT 34.3* 32.8* 36.2*   MCV 99.7* 98.8* 100.0*   MCH 32.8* 32.5* 32.6*   MCHC 32.9* 32.9* 32.6*   RDW 14.8 14.8 14.8    300 308   MPV 8.7 8.9 8.6         Recent Labs   Lab 06/11/23  1822 06/12/23  0352 06/14/23  0412 06/15/23  0557 06/16/23  0724    135* 137 137 141   K 3.7 3.2* 3.2* 3.7 4.1   CO2 24 23 23 22* 23   BUN 11.6 12.0 10.8 11.4 9.6   CREATININE 0.88 0.89 0.82 0.92 0.83   CALCIUM 9.5 9.4 9.3 8.9 9.3   MG 1.60 1.50* 1.60 2.00 1.80   ALBUMIN 3.6 3.1*  --   --  3.2*   ALKPHOS 122 107  --   --  107   ALT 33 26  --   --  26   AST 22 16  --   --  13   BILITOT 0.6 0.5  --   --  0.5            Microbiology Results (last 7 days)       Procedure Component Value Units Date/Time    Urine culture [[id number]]  (Abnormal)  (Susceptibility) Collected: 06/11/23 2220    Order Status: Completed Specimen: Urine Updated: 06/14/23 0940     Urine Culture >/= 100,000 colonies/ml Escherichia coli             See below for Radiology    Scheduled Med:   amLODIPine  5 mg Oral Daily    ascorbic acid (vitamin C)  1,000 mg Oral Daily    atorvastatin  40 mg Oral Every other day    DULoxetine  30 mg Oral Daily    dutasteride  0.5 mg Oral Daily    ferrous sulfate  1 tablet Oral Daily    gabapentin  300 mg Oral TID    linaCLOtide  145 mcg Oral Before breakfast    metoprolol tartrate  25 mg Oral  BID    tamsulosin  0.4 mg Oral QHS    zolpidem  10 mg Oral QHS        Continuous Infusions:       PRN Meds:  acetaminophen, aluminum-magnesium hydroxide-simethicone, HYDROcodone-acetaminophen, melatonin, naloxone, ondansetron, polyethylene glycol, prochlorperazine, simethicone       Assessment/Plan:  Polyneuropathy with progressive weakness to bilateral upper extremity and lower extremity  Impaired mobility secondary to generalized weakness and neuropathy since the initiation of chemo  E coli UTI-pansensitive  Multiple myeloma on chemotherapy- last treatment held due to weakness   Essential HTN  Hx of PE on Eliquis  CAD s/p stent on Plavix  Chronic Anemia  Hx of GERD, HLD  Hypokalemia  Hypomagnesemia     - Repeat UA since it is still on the darker side.   - Neurosurgery evaluation suggested that patient has a candidate for cervical spinal laminectomy and posterior fusion.  Needs to wear [place] J C-collar at all times as well as [place] C-collar that can be used while showering.  - Cardiology consult for clearance was put in by Neurosurgery, patient is on Eliquis and Plavix need to hold Plavix for 7 days preoperatively and Eliquis for 3 days per Neurosurgery do not have any objections to that.    PT/OT consulted, patient feels he will benefit from rehab as he is unable to continue with his chemotherapy given his weakness, eval is pending given clearance from Neurosurgery  CT head without--> unrevealing for acute intracranial abnormalities.    Urology also evaluated the patient, recommended to continue Flomax, continue indwelling Duenas catheter on discharge and follow-up with Dr. Vo outpatient and if his mobility improved, okay to offer voiding trial prior to discharge.  Also recommended neurosurgery eval  Urine culture finalized as pansensitive E coli.   Will continue the levofloxacin 750 mg 3 more days.  Resume appropriate home medication for chronic medical conditions, resumed Eliquis, Plavix  Case  management consulted for discharge planning, patient interested in rehab.      VTE Prophylaxis: Eliquis resumed 6/13    Patient condition:  Guarded    Discharge Planning and Disposition: TBD    Critical care note:  Critical care diagnosis:  Hypomagnesemia requiring IV Mag sulfate  Critical care interventions: Hands-on evaluation, review of labs/radiographs/records and discussion with patient and family if present  Critical care time spent: 35 minutes    All diagnosis and differential diagnosis have been reviewed; assessment and plan has been documented; I have personally reviewed the labs and test results that are presently available; I have reviewed the patients medication list; I have reviewed the consulting providers response and recommendations. I have reviewed or attempted to review medical records based upon their availability. All of the patient's questions have been  addressed and answered. Patient's is agreeable to the above stated plan. I will continue to monitor closely and make adjustments to medical management as needed.  _____________________________________________________________________    Nutrition Status:    Radiology:   I have personally reviewed the images and agree with radiologist report  X-Ray Chest 1 View  Narrative: EXAMINATION:  XR CHEST 1 VIEW    CLINICAL HISTORY:  HTN, CAD, preop;, Spinal stenosis, cervical region.    COMPARISON:  February 14, 2023    FINDINGS:  No alveolar consolidation, effusion, or pneumothorax is seen.   The thoracic aorta is normal  cardiac silhouette, central pulmonary vessels and mediastinum are normal in size and are grossly unremarkable.   visualized osseous structures are grossly unremarkable.  Impression: No acute chest disease is identified.    Electronically signed by: Dilan Nagy  Date:    06/16/2023  Time:    08:48      Dilshad Tran MD  Department of Hospital Medicine   [facility]   06/18/2023

## 2023-06-19 LAB
APPEARANCE UR: CLEAR
BACTERIA #/AREA URNS AUTO: ABNORMAL /HPF
BILIRUB UR QL STRIP.AUTO: NEGATIVE MG/DL
COLOR UR: YELLOW
GLUCOSE UR QL STRIP.AUTO: NEGATIVE MG/DL
KETONES UR QL STRIP.AUTO: NEGATIVE MG/DL
LEUKOCYTE ESTERASE UR QL STRIP.AUTO: ABNORMAL UNIT/L
NITRITE UR QL STRIP.AUTO: NEGATIVE
PH UR STRIP.AUTO: 5 [PH]
PROT UR QL STRIP.AUTO: NEGATIVE MG/DL
RBC #/AREA URNS AUTO: ABNORMAL /HPF
RBC UR QL AUTO: ABNORMAL UNIT/L
SP GR UR STRIP.AUTO: 1.01 (ref 1–1.03)
SQUAMOUS #/AREA URNS AUTO: <5 /HPF
UROBILINOGEN UR STRIP-ACNC: 0.2 MG/DL
WBC #/AREA URNS AUTO: 5 /HPF

## 2023-06-19 PROCEDURE — 21400001 HC TELEMETRY ROOM

## 2023-06-19 PROCEDURE — 25000003 PHARM REV CODE 250: Performed by: INTERNAL MEDICINE

## 2023-06-19 PROCEDURE — 99232 PR SUBSEQUENT HOSPITAL CARE,LEVL II: ICD-10-PCS | Mod: ,,, | Performed by: NEUROLOGICAL SURGERY

## 2023-06-19 PROCEDURE — 25000003 PHARM REV CODE 250: Performed by: NURSE PRACTITIONER

## 2023-06-19 PROCEDURE — 81001 URINALYSIS AUTO W/SCOPE: CPT | Performed by: NEUROLOGICAL SURGERY

## 2023-06-19 PROCEDURE — 99232 SBSQ HOSP IP/OBS MODERATE 35: CPT | Mod: ,,, | Performed by: NEUROLOGICAL SURGERY

## 2023-06-19 PROCEDURE — 97535 SELF CARE MNGMENT TRAINING: CPT | Mod: CO

## 2023-06-19 RX ADMIN — GABAPENTIN 300 MG: 300 CAPSULE ORAL at 07:06

## 2023-06-19 RX ADMIN — AMLODIPINE BESYLATE 5 MG: 5 TABLET ORAL at 07:06

## 2023-06-19 RX ADMIN — DULOXETINE 30 MG: 30 CAPSULE, DELAYED RELEASE ORAL at 07:06

## 2023-06-19 RX ADMIN — GABAPENTIN 300 MG: 300 CAPSULE ORAL at 09:06

## 2023-06-19 RX ADMIN — HYDROCODONE BITARTRATE AND ACETAMINOPHEN 1 TABLET: 5; 325 TABLET ORAL at 02:06

## 2023-06-19 RX ADMIN — METOPROLOL TARTRATE 25 MG: 25 TABLET, FILM COATED ORAL at 09:06

## 2023-06-19 RX ADMIN — METOPROLOL TARTRATE 25 MG: 25 TABLET, FILM COATED ORAL at 07:06

## 2023-06-19 RX ADMIN — LINACLOTIDE 145 MCG: 145 CAPSULE, GELATIN COATED ORAL at 04:06

## 2023-06-19 RX ADMIN — ZOLPIDEM TARTRATE 10 MG: 5 TABLET ORAL at 09:06

## 2023-06-19 RX ADMIN — GABAPENTIN 300 MG: 300 CAPSULE ORAL at 02:06

## 2023-06-19 RX ADMIN — HYDROCODONE BITARTRATE AND ACETAMINOPHEN 1 TABLET: 5; 325 TABLET ORAL at 09:06

## 2023-06-19 RX ADMIN — FERROUS SULFATE TAB 325 MG (65 MG ELEMENTAL FE) 1 EACH: 325 (65 FE) TAB at 07:06

## 2023-06-19 RX ADMIN — HYDROCODONE BITARTRATE AND ACETAMINOPHEN 1 TABLET: 5; 325 TABLET ORAL at 07:06

## 2023-06-19 RX ADMIN — Medication 1000 MG: at 07:06

## 2023-06-19 RX ADMIN — TAMSULOSIN HYDROCHLORIDE 0.4 MG: 0.4 CAPSULE ORAL at 09:06

## 2023-06-19 NOTE — PT/OT/SLP PROGRESS
Occupational Therapy   Treatment    Name: Valention Manning  MRN: [id number]  Admitting Diagnosis:  Cervical Stenosis       Recommendations:     Discharge Recommendations: rehabilitation facility (Neuro Rehab)  Discharge Equipment Recommendations:  walker, rolling, bedside commode, wheelchair  Barriers to discharge:       Assessment:     Valentino Manning is a 72 y.o. male with a medical diagnosis of Cervical Stenosis .  He presents with increased B LE/UE weakness and tingling . Performance deficits affecting function are weakness, impaired endurance, impaired self care skills, impaired functional mobility, impaired balance.     Rehab Prognosis:  Good; patient would benefit from acute skilled OT services to address these deficits and reach maximum level of function.       Plan:     Patient to be seen 6 x/week to address the above listed problems via self-care/home management, therapeutic activities, therapeutic exercises  Plan of Care Expires: 06/28/23  Plan of Care Reviewed with: patient    Subjective     Pain/Comfort:       Objective:     Communicated with: RN prior to session.  Patient found HOB elevated with   upon OT entry to room.    General Precautions: Standard, fall    Orthopedic Precautions:spinal precautions  Braces: Viola J collar  Respiratory Status: Room air     Occupational Performance:   [place] J collar donned prior to mobility with total A.   (Bed Mobility-Max A) (Log Roll-Adherence given to pxns.)  (Sitting balance-SBA using L UE for support with balance).  Pt. Preforming grooming task (brushing teeth) with appropriate preparation and setup noted. Pt. Able to open containers using R UE.   Pt. Educated on built up handle for facilitation of grasp during grooming and feeding activity.    (Sit to stand- Max A) R LE blocked in prevention of knee buckling. (RW used for supported with balance.) x2 sit to stands.    Therapeutic Positioning    OT interventions performed during the course of today's session in an  effort to prevent and/or reduce acquired pressure injuries:   Therapeutic positioning completed   Pt. Placed on wedge,  L sidelying. Pt. Educated on importance on wedge.     [facility] 6 Click ADL:      Patient Education:  Patient provided with verbal education regarding pressure ulcer prevention.  Additional teaching is warranted.      Patient left left sidelying with all lines intact and call button in reach    GOALS:   Multidisciplinary Problems       Occupational Therapy Goals          Problem: Occupational Therapy    Goal Priority Disciplines Outcome Interventions   Occupational Therapy Goal     OT, PT/OT Ongoing, Progressing    Description: Goals to be met by: 6/28/23     Patient will increase functional independence with ADLs by performing:    UE Dressing with Minimal Assistance.  LE Dressing with Moderate Assistance and Assistive Devices as needed.  Grooming while EOB with Set-up Assistance.  Toileting from BSC with Moderate Assistance.  Pt will perform BSC t/f with Minimal Assistance.                         Time Tracking:     OT Date of Treatment: 06/19/23  OT Start Time: 1005  OT Stop Time: 1040  OT Total Time (min): 35 min    Billable Minutes:Self Care/Home Management 2    OT/CAMERON: CAMERON     Number of CAMERON visits since last OT visit: 4    6/19/2023

## 2023-06-19 NOTE — PLAN OF CARE
Problem: Adult Inpatient Plan of Care  Goal: Plan of Care Review  Outcome: Ongoing, Progressing  Goal: Patient-Specific Goal (Individualized)  Outcome: Ongoing, Progressing  Goal: Absence of Hospital-Acquired Illness or Injury  Outcome: Ongoing, Progressing  Goal: Optimal Comfort and Wellbeing  Outcome: Ongoing, Progressing     Problem: Fall Injury Risk  Goal: Absence of Fall and Fall-Related Injury  Outcome: Ongoing, Progressing     Problem: Infection  Goal: Absence of Infection Signs and Symptoms  Outcome: Ongoing, Progressing     Problem: Skin Injury Risk Increased  Goal: Skin Health and Integrity  Outcome: Ongoing, Progressing     Problem: Pain Acute  Goal: Acceptable Pain Control and Functional Ability  Outcome: Ongoing, Progressing

## 2023-06-19 NOTE — PROGRESS NOTES
Hospital Progress Note  [facility] Neurosurgery      Admit Date: 6/11/2023  Post-operative Day:    Hospital Day: 9      SUBJECTIVE:     Interval History:  Mr. Manning continues to have weakness in all his extremities with associated numbness. The patient is not wearing his [place] J C-collar, but puts this around his neck when he is OOB. His wife is at the bedside.            Scheduled Meds:   amLODIPine  5 mg Oral Daily    ascorbic acid (vitamin C)  1,000 mg Oral Daily    atorvastatin  40 mg Oral Every other day    DULoxetine  30 mg Oral Daily    dutasteride  0.5 mg Oral Daily    ferrous sulfate  1 tablet Oral Daily    gabapentin  300 mg Oral TID    linaCLOtide  145 mcg Oral Before breakfast    metoprolol tartrate  25 mg Oral BID    tamsulosin  0.4 mg Oral QHS    zolpidem  10 mg Oral QHS     Continuous Infusions:  PRN Meds:acetaminophen, aluminum-magnesium hydroxide-simethicone, HYDROcodone-acetaminophen, melatonin, naloxone, ondansetron, polyethylene glycol, prochlorperazine, simethicone    Review of patient's allergies indicates:   Allergen Reactions    Penicillins        Past Medical History:   Diagnosis Date    Anemia     GERD (gastroesophageal reflux disease)     Heart problem     Hyperlipidemia     Hypertension     Multiple myeloma     NSTEMI (non-ST elevated myocardial infarction)      Past Surgical History:   Procedure Laterality Date    BONE MARROW ASPIRATION N/A 9/1/2022    Procedure: ASPIRATION, BONE MARROW;  Surgeon: Robbie Gardner MD;  Location: [facility];  Service: General;  Laterality: N/A;    CARDIAC SURGERY  2021    ENDOSCOPIC RELEASE OF BOTH CARPAL TUNNELS         OBJECTIVE:     Vital Signs (Most Recent)  Temp: 97.3 °F (36.3 °C) (06/19/23 1100)  Pulse: (!) 59 (06/19/23 1100)  Resp: 18 (06/19/23 1100)  BP: 115/72 (06/19/23 1100)  SpO2: 98 % (06/19/23 1100)    Vital Signs Range (Last 24H):  Temp:  [97.3 °F (36.3 °C)-98.3 °F (36.8 °C)]   Pulse:  [59-70]   Resp:  [18-20]   BP:  (115-129)/(63-75)   SpO2:  [96 %-98 %]       Physical Exam:  Awake, highly conversant  Right deltoid 3/5, left deltoid 2/5, otherwise b UE 4/5  B proximal LE 3/4, otherwise b LE 4/5  Decreased sensation to light touch in bilateral hands and from bilateral knees distally to feet      Laboratory Review:    CBC without Differential:  Lab Results   Component Value Date    WBC 5.33 06/16/2023    HGB 11.8 (L) 06/16/2023    HCT 36.2 (L) 06/16/2023     06/16/2023    .0 (H) 06/16/2023     Differential:   No results found for: NEUTROABS, BASOSABS, MONOSABS    Basic Metabolic Panel:  Lab Results   Component Value Date     06/16/2023    K 4.1 06/16/2023     04/24/2023    BUN 9.6 06/16/2023    MG 1.80 06/16/2023    PHOS 3.1 06/12/2023    ANIONGAP 6 (L) 04/24/2023     Coagulation Studies:  Lab Results   Component Value Date    INR 1.19 06/15/2023    INR 1.1 04/24/2023    INR 1.20 02/14/2023    PROTIME 15.0 (H) 06/15/2023    PROTIME 15.0 (H) 02/14/2023    PROTIME 13.3 09/17/2021     Lab Results   Component Value Date    PTT 29.3 06/15/2023     Urinalysis:  Lab Results   Component Value Date    LEUKOCYTESUR Trace (A) 06/19/2023    UROBILINOGEN 0.2 06/19/2023        ASSESSMENT/PLAN:     Mr. Manning is a 73 yo male who has a past history significant for hypertension, coronary artery disease, PE, multiple myeloma on chemotherapy, chronic anemia, and GERD.  He was previously taking Plavix and Eliquis.  The patient presented to the ER on 06/11/2023 with progressive numbness in all his extremities, urinary retention, and progressive difficulty walking 3 weeks prior to admission.  Mr. Manning has numbness in his distal lower extremities with generalized weakness, especially in his bilateral proximal arms.  He has myelopathy on exam.    A MRI cervical spine with and without gadolinium demonstrates Grade I retrolisthesis of C3 on C4.  Grade I anterolisthesis of C4 on C5.  Grade I retrolisthesis of C5 on C6.  There is  enhancement in the C4-5 and C5-6 facets. There is moderate to severe stenosis with bilateral neuroforaminal narrowing at C3-4, C4-5, and C5-6. Increased T2 signal within the spinal cord at C4-5, which is consistent with myelomalacia.      Plan:     1.  I discussed with Mr. Manning and his wife that I attribute his neurological symptoms to severe cervical stenosis with myelopathy.  He is a candidate for surgery, specifically a C3-4, C4-5, C5-6 laminectomy and posterior fusion.  This surgery is not a guarantee that his neurological symptoms will improve. However, without decompression of his cervical spinal cord, Mr. Manning is anticipated to continue declining in regards to his functional status.      2.  The patient's C3-4, C4-5, C5-6 laminectomy and posterior fusion surgery has been scheduled for Wednesday, 06/21/2023 at 12:00 PM.   I reviewed the risks, benefits, and alternatives of this surgery.  He agrees to proceed.  All questions were answered to his satisfaction.  Mr. Manning signed a surgical consent form today.    3.  Mr. Manning will be managed in the ICU postoperatively.       4.  He is to continue wearing the [place] J C-collar when out of bed.  Postoperatively, he needs to wear this at all times.    5.  Mr. Manning's urinary tract infection has cleared with Levofloxacin.     6.  Cardiology provided preoperative clearance with low preoperative cardiac risk.  His Plavix has been changed to aspirin 81 mg.  I discontinued his aspirin on 06/16/2023 in preparation for spine surgery.  His Eliquis was discontinued yesterday on 06/18/2023 to allow for him to be off Eliquis for 3 days preoperatively.      7.  The patient continues to be followed by oncologist Dr. Harris for his multiple myeloma.       8.  Neurologist Dr. Kunz is also following the patient.     9.  In the meantime, Mr. Manning is encouraged to continue working with physical therapy and occupational therapy.      10.  He will require placement in an inpatient  rehab or skilled nursing facility after he is discharged from the hospital.     11.  Neurosurgery will continue to follow Mr. Manning's progress.  Please do not hesitate to contact Neurosurgery for any additional questions or concerns.           Montserrat Aviles MD  Neurosurgery

## 2023-06-20 LAB
ANION GAP SERPL CALC-SCNC: 9 MEQ/L
ANTIBODY IDENTIFICATION: NORMAL
APTT PPP: 25.1 SECONDS (ref 23.2–33.7)
BASOPHILS # BLD AUTO: 0.02 X10(3)/MCL
BASOPHILS NFR BLD AUTO: 0.3 %
BUN SERPL-MCNC: 12.7 MG/DL (ref 8.4–25.7)
CALCIUM SERPL-MCNC: 9.6 MG/DL (ref 8.8–10)
CHLORIDE SERPL-SCNC: 107 MMOL/L (ref 98–107)
CO2 SERPL-SCNC: 23 MMOL/L (ref 23–31)
CREAT SERPL-MCNC: 0.87 MG/DL (ref 0.73–1.18)
CREAT/UREA NIT SERPL: 15
EOSINOPHIL # BLD AUTO: 0.17 X10(3)/MCL (ref 0–0.9)
EOSINOPHIL NFR BLD AUTO: 2.5 %
ERYTHROCYTE [DISTWIDTH] IN BLOOD BY AUTOMATED COUNT: 14.9 % (ref 11.5–17)
GFR SERPLBLD CREATININE-BSD FMLA CKD-EPI: >60 MLS/MIN/1.73/M2
GLUCOSE SERPL-MCNC: 89 MG/DL (ref 82–115)
GROUP & RH: ABNORMAL
HCT VFR BLD AUTO: 37.5 % (ref 42–52)
HGB BLD-MCNC: 12.2 G/DL (ref 14–18)
IMM GRANULOCYTES # BLD AUTO: 0.04 X10(3)/MCL (ref 0–0.04)
IMM GRANULOCYTES NFR BLD AUTO: 0.6 %
INDIRECT COOMBS GEL: ABNORMAL
INR BLD: 1.07 (ref 0–1.3)
LYMPHOCYTES # BLD AUTO: 1.44 X10(3)/MCL (ref 0.6–4.6)
LYMPHOCYTES NFR BLD AUTO: 21 %
MCH RBC QN AUTO: 32.8 PG (ref 27–31)
MCHC RBC AUTO-ENTMCNC: 32.5 G/DL (ref 33–36)
MCV RBC AUTO: 100.8 FL (ref 80–94)
MONOCYTES # BLD AUTO: 0.55 X10(3)/MCL (ref 0.1–1.3)
MONOCYTES NFR BLD AUTO: 8 %
NEUTROPHILS # BLD AUTO: 4.63 X10(3)/MCL (ref 2.1–9.2)
NEUTROPHILS NFR BLD AUTO: 67.6 %
NRBC BLD AUTO-RTO: 0 %
PLATELET # BLD AUTO: 308 X10(3)/MCL (ref 130–400)
PMV BLD AUTO: 8.4 FL (ref 7.4–10.4)
POTASSIUM SERPL-SCNC: 3.8 MMOL/L (ref 3.5–5.1)
PROTHROMBIN TIME: 13.8 SECONDS (ref 12.5–14.5)
RBC # BLD AUTO: 3.72 X10(6)/MCL (ref 4.7–6.1)
SODIUM SERPL-SCNC: 139 MMOL/L (ref 136–145)
SPECIMEN OUTDATE: ABNORMAL
WBC # SPEC AUTO: 6.85 X10(3)/MCL (ref 4.5–11.5)

## 2023-06-20 PROCEDURE — 25000003 PHARM REV CODE 250: Performed by: NURSE PRACTITIONER

## 2023-06-20 PROCEDURE — 21400001 HC TELEMETRY ROOM

## 2023-06-20 PROCEDURE — 25000003 PHARM REV CODE 250: Performed by: INTERNAL MEDICINE

## 2023-06-20 PROCEDURE — 85610 PROTHROMBIN TIME: CPT | Performed by: NEUROLOGICAL SURGERY

## 2023-06-20 PROCEDURE — 85025 COMPLETE CBC W/AUTO DIFF WBC: CPT | Performed by: NEUROLOGICAL SURGERY

## 2023-06-20 PROCEDURE — 86922 COMPATIBILITY TEST ANTIGLOB: CPT | Performed by: NEUROLOGICAL SURGERY

## 2023-06-20 PROCEDURE — 97110 THERAPEUTIC EXERCISES: CPT

## 2023-06-20 PROCEDURE — 97535 SELF CARE MNGMENT TRAINING: CPT

## 2023-06-20 PROCEDURE — 86870 RBC ANTIBODY IDENTIFICATION: CPT | Performed by: NEUROLOGICAL SURGERY

## 2023-06-20 PROCEDURE — 86900 BLOOD TYPING SEROLOGIC ABO: CPT | Performed by: NEUROLOGICAL SURGERY

## 2023-06-20 PROCEDURE — 85730 THROMBOPLASTIN TIME PARTIAL: CPT | Performed by: NEUROLOGICAL SURGERY

## 2023-06-20 PROCEDURE — 80048 BASIC METABOLIC PNL TOTAL CA: CPT | Performed by: NEUROLOGICAL SURGERY

## 2023-06-20 RX ADMIN — HYDROCODONE BITARTRATE AND ACETAMINOPHEN 1 TABLET: 5; 325 TABLET ORAL at 01:06

## 2023-06-20 RX ADMIN — DULOXETINE 30 MG: 30 CAPSULE, DELAYED RELEASE ORAL at 08:06

## 2023-06-20 RX ADMIN — TAMSULOSIN HYDROCHLORIDE 0.4 MG: 0.4 CAPSULE ORAL at 09:06

## 2023-06-20 RX ADMIN — GABAPENTIN 300 MG: 300 CAPSULE ORAL at 09:06

## 2023-06-20 RX ADMIN — ZOLPIDEM TARTRATE 10 MG: 5 TABLET ORAL at 09:06

## 2023-06-20 RX ADMIN — Medication 1000 MG: at 08:06

## 2023-06-20 RX ADMIN — GABAPENTIN 300 MG: 300 CAPSULE ORAL at 02:06

## 2023-06-20 RX ADMIN — METOPROLOL TARTRATE 25 MG: 25 TABLET, FILM COATED ORAL at 09:06

## 2023-06-20 RX ADMIN — AMLODIPINE BESYLATE 5 MG: 5 TABLET ORAL at 08:06

## 2023-06-20 RX ADMIN — FERROUS SULFATE TAB 325 MG (65 MG ELEMENTAL FE) 1 EACH: 325 (65 FE) TAB at 08:06

## 2023-06-20 RX ADMIN — ATORVASTATIN CALCIUM 40 MG: 40 TABLET, FILM COATED ORAL at 08:06

## 2023-06-20 RX ADMIN — HYDROCODONE BITARTRATE AND ACETAMINOPHEN 1 TABLET: 5; 325 TABLET ORAL at 08:06

## 2023-06-20 RX ADMIN — HYDROCODONE BITARTRATE AND ACETAMINOPHEN 1 TABLET: 5; 325 TABLET ORAL at 06:06

## 2023-06-20 RX ADMIN — GABAPENTIN 300 MG: 300 CAPSULE ORAL at 08:06

## 2023-06-20 NOTE — PLAN OF CARE
Problem: Adult Inpatient Plan of Care  Goal: Plan of Care Review  Outcome: Ongoing, Progressing  Goal: Absence of Hospital-Acquired Illness or Injury  Outcome: Ongoing, Progressing  Goal: Optimal Comfort and Wellbeing  Outcome: Ongoing, Progressing     Problem: Fall Injury Risk  Goal: Absence of Fall and Fall-Related Injury  Outcome: Ongoing, Progressing     Problem: Infection  Goal: Absence of Infection Signs and Symptoms  Outcome: Ongoing, Progressing     Problem: Skin Injury Risk Increased  Goal: Skin Health and Integrity  Outcome: Ongoing, Progressing     Problem: Pain Acute  Goal: Acceptable Pain Control and Functional Ability  Outcome: Ongoing, Progressing

## 2023-06-20 NOTE — PT/OT/SLP PROGRESS
Physical Therapy  Treatment    Valentino Manning   MRN: [id number]   Admitting Diagnosis: <principal problem not specified>    PT Received On: 23  PT Start Time: 1530     PT Stop Time: 1550    PT Total Time (min): 20 min       Billable Minutes:  Therapeutic Exercise 20    Treatment Type: Treatment  PT/PTA: PT     Number of PTA visits since last PT visit: 2       General Precautions: Standard,    Orthopedic Precautions:    Braces: Rice J collar  Respiratory Status: Room air    Spiritual, Cultural Beliefs, Anglican Practices, Values that Affect Care: no      Objective:   Pt semi supine in bed upon arrival to room. Agreeable to session    Functional Mobility:  Sup>sit, mod A. Pt sat EOB and performed there ex: LAQ B LE 10xs, seated ankle pumps B LE 10xs, seated dorsiflexion B LE 10xs. Pt then performed sit<>std w/ RW, mod A for safety. Pt tolerated standing for about 1 minute;Pt reported legs feeling weak. Sit>sup, mod A. Pt positioned w/ HOB elevated and all lines in reach.          Patient left HOB elevated with all lines intact.    Assessment:  Valentino Manning is a 72 y.o. male tolerated mobility well and remains motivated to get stronger. Per chart, pt will be going to surgery tomorrow. PT to f/u as appropriate post op    Rehab identified problem list/impairments: weakness, impaired endurance, impaired functional mobility, gait instability, impaired balance    Rehab potential is good.    Activity tolerance: Good    Discharge recommendations: rehabilitation facility      Barriers to discharge:      Equipment recommendations:       GOALS:   Multidisciplinary Problems       Physical Therapy Goals          Problem: Physical Therapy    Goal Priority Disciplines Outcome Goal Variances Interventions   Physical Therapy Goal     PT, PT/OT Ongoing, Progressing     Description: Goals to be met by: 23     Patient will increase functional independence with mobility by performin. Supine to sit with Stand-by  Assistance  2. Sit to supine with Stand-by Assistance  3. Sit to stand transfer with Stand-by Assistance  4. Gait  x 50 feet with Stand-by Assistance using Rolling Walker.                          PLAN:    Patient to be seen 6 x/week to address the above listed problems via gait training, therapeutic activities, therapeutic exercises  Plan of Care expires: 07/27/23  Plan of Care reviewed with: patient, daughter         06/20/2023

## 2023-06-20 NOTE — PT/OT/SLP PROGRESS
Occupational Therapy   Treatment    Name: Valentino Manning  MRN: [id number]    Recommendations:     Discharge Recommendations: rehabilitation facility (possibly neuro rehab; pending progress after surgery on 6/21)  Discharge Equipment Recommendations:  to be determined by next level of care  Barriers to discharge:   (surgery 6/21; functional deficits; ongoing medical needs)    Assessment:     Valentino Manning is a 72 y.o. male with a medical diagnosis of progressive weakness w/ polyneuropathy, cervical stenosis (surgery on 6/21). Performance deficits affecting function are weakness, impaired endurance, impaired self care skills, impaired functional mobility, impaired balance, ROM deficits, impaired sensation. Pt requesting assist to feed himself breakfast upon OT entry.    Rehab Prognosis:  Good; patient would benefit from acute skilled OT services to address these deficits and reach maximum level of function.       Plan:     Patient to be seen 6 x/week to address the above listed problems via self-care/home management, therapeutic activities, therapeutic exercises, neuromuscular re-education  Plan of Care Expires: 06/28/23  Plan of Care Reviewed with: patient    Subjective     Pain/Comfort:  Pain Rating 1: 0/10    Objective:     Communicated with: NSG prior to session.  Patient found HOB elevated with gongora catheter, peripheral IV upon OT entry to room.    General Precautions: Standard, fall    Orthopedic Precautions:spinal precautions  Braces: [place] J collar  Respiratory Status: Room air  Vital Signs: WNL throughout     Occupational Performance:     Activities of Daily Living:  Feeding:  stand by assistance Pt able to utilize B UEs at midline to grasp utensils, initially with foam on handles, progressed to utensils without; no spillage with excursion to mouth for 50%-75% of plate    Therapeutic Exercise:  Pt given HEP with blue foam, 3x10 each hand; 1:3 con-ecc contraction  A/PROM performed for B elbows and shoulders  (right shoulder limited by arthritis, left elbow limited by old bicep tear)    Therapeutic Positioning    OT interventions performed during the course of today's session in an effort to prevent and/or reduce acquired pressure injuries:   Education on Pressure Ulcer Prevention provided  Therapeutic positioning completed     Patient Education:  Patient provided with verbal education regarding OT role/goals/POC, Discharge/DME recommendations, pressure ulcer prevention, and home exercise program .  Understanding was verbalized.      Patient left HOB elevated with all lines intact, call button in reach, NSG notified, and spouse present    GOALS:   Multidisciplinary Problems       Occupational Therapy Goals          Problem: Occupational Therapy    Goal Priority Disciplines Outcome Interventions   Occupational Therapy Goal     OT, PT/OT Ongoing, Progressing    Description: Goals to be met by: 6/28/23     Patient will increase functional independence with ADLs by performing:    UE Dressing with Minimal Assistance.  LE Dressing with Moderate Assistance and Assistive Devices as needed.  Grooming while EOB with Set-up Assistance.  Toileting from BSC with Moderate Assistance.  Pt will perform BSC t/f with Minimal Assistance.                         Time Tracking:     OT Date of Treatment:    OT Start Time: 0942  OT Stop Time: 1009  OT Total Time (min): 27 min    Billable Minutes:Self Care/Home Management 1  Therapeutic Exercise 1    OT/CAMERON: OT     Number of CAMERON visits since last OT visit: 5    6/20/2023

## 2023-06-20 NOTE — PROGRESS NOTES
[facility] Hospital Medicine Progress Note        Chief Complaint: Inpatient Follow-up for b.l LE numbness     HPI: Valentino Manning is a 72 y.o. male with a PMHx of HTN, HLD, CAD s/p stent on Plavix, chronic anemia, GERD, multiple myeloma on chemotherapym hx of PE on Eliquis who presented to [facility] on 6/11/2023 with c/o numbness to bilateral hands and bilateral lower extremities with associated weakness has been worsening over the past 6 months.  Patient reports that he was now unable to ambulate due to worsening paresthesias and generalized weakness x3 weeks.  He was now bed-bound.  He endorsed falling about 4 times over the past 3 weeks, denied LOC or head injury.  He reports he was in his normal state of health prior to initiating chemotherapy in December 2022.  He then had COVID-19 in February 2023 and has had a rapid decline since then.  He also endorsed urinary retention for which he was seen at an outlying ED on 6/5/23 and had a Duenas catheter placed, he was supposed to follow-up with urology on 06/12/2023. He was previously on gabapentin 300 mg t.i.d. for neuropathy without symptomatic relief, he states he was not taking it as directed.  He denied CP, SOB, fever, chills, cough, dizziness, syncope.  Patient reportedly saw his oncologist, Dr. Harris, for same complaints and he was referred to ED for further evaluation.   Initial ED VS stable.  Labs notable for hemoglobin 12.6, hematocrit 38.  Urinalysis with 2+ protein, 3+ occult blood, positive nitrites, 2+ leukocytes, 11 RBCs, 67 WBCs, 1+ bacteria.  Urine culture pending.  He was started on oral Levaquin for suspected UTI.  He was admitted to hospital medicine services for further medical management.    Interval Hx:   Seen and examined the pt. VSS and HDS. Pt denies pain and discomfort.     Objective/physical exam:  General: In no acute distress, afebrile  Chest: Clear to auscultation bilaterally anteriorly  Heart: RRR, +S1, S2,  no appreciable murmur  Abdomen: Soft, nontender, BS +  MSK: Warm, no lower extremity edema, no clubbing or cyanosis  Neurologic: Alert and oriented x4, Cranial nerve II-XII intact, bilateral upper and lower extremity strength 3 out of 5    VITAL SIGNS: 24 HRS MIN & MAX LAST   Temp  Min: 97.5 °F (36.4 °C)  Max: 98.1 °F (36.7 °C) 97.7 °F (36.5 °C)   BP  Min: 109/68  Max: 160/85 109/68   Pulse  Min: 63  Max: 78  78   Resp  Min: 18  Max: 18 18   SpO2  Min: 97 %  Max: 100 % 99 %     I reviewed the labs below:  Recent Labs   Lab 06/15/23  0557 06/16/23  0724 06/20/23  0533   WBC 5.04 5.33 6.85   RBC 3.32* 3.62* 3.72*   HGB 10.8* 11.8* 12.2*   HCT 32.8* 36.2* 37.5*   MCV 98.8* 100.0* 100.8*   MCH 32.5* 32.6* 32.8*   MCHC 32.9* 32.6* 32.5*   RDW 14.8 14.8 14.9    308 308   MPV 8.9 8.6 8.4         Recent Labs   Lab 06/14/23  0412 06/15/23  0557 06/16/23  0724 06/20/23  0533    137 141 139   K 3.2* 3.7 4.1 3.8   CO2 23 22* 23 23   BUN 10.8 11.4 9.6 12.7   CREATININE 0.82 0.92 0.83 0.87   CALCIUM 9.3 8.9 9.3 9.6   MG 1.60 2.00 1.80  --    ALBUMIN  --   --  3.2*  --    ALKPHOS  --   --  107  --    ALT  --   --  26  --    AST  --   --  13  --    BILITOT  --   --  0.5  --             Microbiology Results (last 7 days)       Procedure Component Value Units Date/Time    Urine culture [[id number]]  (Abnormal)  (Susceptibility) Collected: 06/11/23 2224    Order Status: Completed Specimen: Urine Updated: 06/14/23 0952     Urine Culture >/= 100,000 colonies/ml Escherichia coli             See below for Radiology    Scheduled Med:   amLODIPine  5 mg Oral Daily    ascorbic acid (vitamin C)  1,000 mg Oral Daily    atorvastatin  40 mg Oral Every other day    DULoxetine  30 mg Oral Daily    dutasteride  0.5 mg Oral Daily    ferrous sulfate  1 tablet Oral Daily    gabapentin  300 mg Oral TID    linaCLOtide  145 mcg Oral Before breakfast    metoprolol tartrate  25 mg Oral BID    tamsulosin  0.4 mg Oral QHS    zolpidem  10 mg Oral  QHS        Continuous Infusions:       PRN Meds:  acetaminophen, aluminum-magnesium hydroxide-simethicone, HYDROcodone-acetaminophen, melatonin, naloxone, ondansetron, polyethylene glycol, prochlorperazine, simethicone       Assessment/Plan:  Polyneuropathy with progressive weakness to bilateral upper extremity and lower extremity  Impaired mobility secondary to generalized weakness and neuropathy since the initiation of chemo  E coli UTI-pansensitive  Multiple myeloma on chemotherapy- last treatment held due to weakness   Essential HTN  Hx of PE on Eliquis  CAD s/p stent on Plavix  Chronic Anemia  Hx of GERD, HLD  Hypokalemia  Hypomagnesemia     - UA is clear today.   - completed abx,   - CT is negative.   - Neurosurgery evaluation suggested that patient has a candidate for cervical spinal laminectomy and posterior fusion.  Needs to wear [place] J C-collar at all times as well as [place] C-collar that can be used while showering.  - Cardiology consult for clearance was put in by Neurosurgery, patient is on Eliquis and Plavix need to hold Plavix for 7 days preoperatively and Eliquis for 3 days per Neurosurgery do not have any objections to that.    PT/OT consulted, patient feels he will benefit from rehab as he is unable to continue with his chemotherapy given his weakness, eval is pending given clearance from Neurosurgery  CT head without--> unrevealing for acute intracranial abnormalities.    Urology also evaluated the patient, recommended to continue Flomax, continue indwelling Duenas catheter on discharge and follow-up with Dr. Vo outpatient and if his mobility improved, okay to offer voiding trial prior to discharge.  Also recommended neurosurgery eval  Urine culture finalized as pansensitive E coli.   Will continue the levofloxacin 750 mg 3 more days.  Resume appropriate home medication for chronic medical conditions, resumed Eliquis, Plavix  Case management consulted for discharge planning, patient  interested in rehab.      VTE Prophylaxis: Eliquis resumed 6/13    Patient condition:  Guarded    Discharge Planning and Disposition: TBD    Critical care note:  Critical care diagnosis:  Hypomagnesemia requiring IV Mag sulfate  Critical care interventions: Hands-on evaluation, review of labs/radiographs/records and discussion with patient and family if present  Critical care time spent: 35 minutes    All diagnosis and differential diagnosis have been reviewed; assessment and plan has been documented; I have personally reviewed the labs and test results that are presently available; I have reviewed the patients medication list; I have reviewed the consulting providers response and recommendations. I have reviewed or attempted to review medical records based upon their availability. All of the patient's questions have been  addressed and answered. Patient's is agreeable to the above stated plan. I will continue to monitor closely and make adjustments to medical management as needed.  _____________________________________________________________________    Nutrition Status:    Radiology:   I have personally reviewed the images and agree with radiologist report  CT Abdomen Pelvis  Without Contrast  Narrative: EXAMINATION:  CT ABDOMEN PELVIS WITHOUT CONTRAST    CLINICAL HISTORY:  Flank pain, kidney stone suspected;    TECHNIQUE:  Helical acquisition through the abdomen and pelvis without IV contrast.  Lack of contrast limits evaluation of solid organs and vascular structures .  Three plane reconstructions were provided for review.  mGycm. Automatic exposure control, adjustment of mA/kV or iterative reconstruction technique was used to reduce radiation.    COMPARISON:  28 September 2022    FINDINGS:  Small right and trace left pleural effusions.    There is stable left hepatic lobe hypodensity.  There are calcified gallstones.  No pericholecystic inflammation.  No significant abnormality of the spleen, pancreas or  adrenals.  No hydronephrosis.  No urinary tract calculi.    There is no bowel obstruction.  No significant inflammatory changes of the bowel.  There is a large amount of stool in the distal colon.    There is bladder wall thickening.  Duenas is in place.  The prostate is moderately enlarged.  No pelvic free fluid.  Abdominal aorta normal in caliber.  Dense atherosclerotic disease.    Moderate degenerative change of the spine.  Impression: 1. No acute abdominopelvic findings on this noncontrast scan.  2. Large amount of stool distal colon.  3. Small right and trace left pleural effusions.  4. Other chronic findings above.    Electronically signed by: Cody Duong  Date:    06/19/2023  Time:    12:01      Dilshad Tran MD  Department of Hospital Medicine   [facility]   06/20/2023

## 2023-06-20 NOTE — PROGRESS NOTES
[facility] Hospital Medicine Progress Note        Chief Complaint: Inpatient Follow-up for b.l LE numbness     HPI: Valentino Manning is a 72 y.o. male with a PMHx of HTN, HLD, CAD s/p stent on Plavix, chronic anemia, GERD, multiple myeloma on chemotherapym hx of PE on Eliquis who presented to [facility] on 6/11/2023 with c/o numbness to bilateral hands and bilateral lower extremities with associated weakness has been worsening over the past 6 months.  Patient reports that he was now unable to ambulate due to worsening paresthesias and generalized weakness x3 weeks.  He was now bed-bound.  He endorsed falling about 4 times over the past 3 weeks, denied LOC or head injury.  He reports he was in his normal state of health prior to initiating chemotherapy in December 2022.  He then had COVID-19 in February 2023 and has had a rapid decline since then.  He also endorsed urinary retention for which he was seen at an outlying ED on 6/5/23 and had a Duenas catheter placed, he was supposed to follow-up with urology on 06/12/2023. He was previously on gabapentin 300 mg t.i.d. for neuropathy without symptomatic relief, he states he was not taking it as directed.  He denied CP, SOB, fever, chills, cough, dizziness, syncope.  Patient reportedly saw his oncologist, Dr. Harris, for same complaints and he was referred to ED for further evaluation.   Initial ED VS stable.  Labs notable for hemoglobin 12.6, hematocrit 38.  Urinalysis with 2+ protein, 3+ occult blood, positive nitrites, 2+ leukocytes, 11 RBCs, 67 WBCs, 1+ bacteria.  Urine culture pending.  He was started on oral Levaquin for suspected UTI.  He was admitted to hospital medicine services for further medical management.    Interval Hx:   Seen and examined the pt. VSS and HDS. Pt denies pain and discomfort.     Objective/physical exam:  General: In no acute distress, afebrile  Chest: Clear to auscultation bilaterally anteriorly  Heart: RRR, +S1, S2,  no appreciable murmur  Abdomen: Soft, nontender, BS +  MSK: Warm, no lower extremity edema, no clubbing or cyanosis  Neurologic: Alert and oriented x4, Cranial nerve II-XII intact, bilateral upper and lower extremity strength 3 out of 5    VITAL SIGNS: 24 HRS MIN & MAX LAST   Temp  Min: 97.5 °F (36.4 °C)  Max: 98.1 °F (36.7 °C) 97.7 °F (36.5 °C)   BP  Min: 109/68  Max: 160/85 109/68   Pulse  Min: 63  Max: 78  78   Resp  Min: 18  Max: 18 18   SpO2  Min: 97 %  Max: 100 % 99 %     I reviewed the labs below:  Recent Labs   Lab 06/15/23  0557 06/16/23  0724 06/20/23  0533   WBC 5.04 5.33 6.85   RBC 3.32* 3.62* 3.72*   HGB 10.8* 11.8* 12.2*   HCT 32.8* 36.2* 37.5*   MCV 98.8* 100.0* 100.8*   MCH 32.5* 32.6* 32.8*   MCHC 32.9* 32.6* 32.5*   RDW 14.8 14.8 14.9    308 308   MPV 8.9 8.6 8.4         Recent Labs   Lab 06/14/23  0412 06/15/23  0557 06/16/23  0724 06/20/23  0533    137 141 139   K 3.2* 3.7 4.1 3.8   CO2 23 22* 23 23   BUN 10.8 11.4 9.6 12.7   CREATININE 0.82 0.92 0.83 0.87   CALCIUM 9.3 8.9 9.3 9.6   MG 1.60 2.00 1.80  --    ALBUMIN  --   --  3.2*  --    ALKPHOS  --   --  107  --    ALT  --   --  26  --    AST  --   --  13  --    BILITOT  --   --  0.5  --             Microbiology Results (last 7 days)       Procedure Component Value Units Date/Time    Urine culture [[id number]]  (Abnormal)  (Susceptibility) Collected: 06/11/23 2224    Order Status: Completed Specimen: Urine Updated: 06/14/23 0952     Urine Culture >/= 100,000 colonies/ml Escherichia coli             See below for Radiology    Scheduled Med:   amLODIPine  5 mg Oral Daily    ascorbic acid (vitamin C)  1,000 mg Oral Daily    atorvastatin  40 mg Oral Every other day    DULoxetine  30 mg Oral Daily    dutasteride  0.5 mg Oral Daily    ferrous sulfate  1 tablet Oral Daily    gabapentin  300 mg Oral TID    linaCLOtide  145 mcg Oral Before breakfast    metoprolol tartrate  25 mg Oral BID    tamsulosin  0.4 mg Oral QHS    zolpidem  10 mg Oral  QHS        Continuous Infusions:       PRN Meds:  acetaminophen, aluminum-magnesium hydroxide-simethicone, HYDROcodone-acetaminophen, melatonin, naloxone, ondansetron, polyethylene glycol, prochlorperazine, simethicone       Assessment/Plan:  Polyneuropathy with progressive weakness to bilateral upper extremity and lower extremity  Impaired mobility secondary to generalized weakness and neuropathy since the initiation of chemo  E coli UTI-pansensitive  Multiple myeloma on chemotherapy- last treatment held due to weakness   Essential HTN  Hx of PE on Eliquis  CAD s/p stent on Plavix  Chronic Anemia  Hx of GERD, HLD  Hypokalemia  Hypomagnesemia     - UA is clear today.   - completed abx,   - CT is negative.   - Neurosurgery evaluation suggested that patient has a candidate for cervical spinal laminectomy and posterior fusion.  Needs to wear [place] J C-collar at all times as well as [place] C-collar that can be used while showering.  - Cardiology consult for clearance was put in by Neurosurgery, patient is on Eliquis and Plavix need to hold Plavix for 7 days preoperatively and Eliquis for 3 days per Neurosurgery do not have any objections to that.    PT/OT consulted, patient feels he will benefit from rehab as he is unable to continue with his chemotherapy given his weakness, eval is pending given clearance from Neurosurgery  CT head without--> unrevealing for acute intracranial abnormalities.    Urology also evaluated the patient, recommended to continue Flomax, continue indwelling Duenas catheter on discharge and follow-up with Dr. Vo outpatient and if his mobility improved, okay to offer voiding trial prior to discharge.  Also recommended neurosurgery eval  Urine culture finalized as pansensitive E coli.   Will continue the levofloxacin 750 mg 3 more days.  Resume appropriate home medication for chronic medical conditions, resumed Eliquis, Plavix  Case management consulted for discharge planning, patient  interested in rehab.      VTE Prophylaxis: Eliquis resumed 6/13    Patient condition:  Guarded    Discharge Planning and Disposition: TBD    Critical care note:  Critical care diagnosis:  Hypomagnesemia requiring IV Mag sulfate  Critical care interventions: Hands-on evaluation, review of labs/radiographs/records and discussion with patient and family if present  Critical care time spent: 35 minutes    All diagnosis and differential diagnosis have been reviewed; assessment and plan has been documented; I have personally reviewed the labs and test results that are presently available; I have reviewed the patients medication list; I have reviewed the consulting providers response and recommendations. I have reviewed or attempted to review medical records based upon their availability. All of the patient's questions have been  addressed and answered. Patient's is agreeable to the above stated plan. I will continue to monitor closely and make adjustments to medical management as needed.  _____________________________________________________________________    Nutrition Status:    Radiology:   I have personally reviewed the images and agree with radiologist report  CT Abdomen Pelvis  Without Contrast  Narrative: EXAMINATION:  CT ABDOMEN PELVIS WITHOUT CONTRAST    CLINICAL HISTORY:  Flank pain, kidney stone suspected;    TECHNIQUE:  Helical acquisition through the abdomen and pelvis without IV contrast.  Lack of contrast limits evaluation of solid organs and vascular structures .  Three plane reconstructions were provided for review.  mGycm. Automatic exposure control, adjustment of mA/kV or iterative reconstruction technique was used to reduce radiation.    COMPARISON:  28 September 2022    FINDINGS:  Small right and trace left pleural effusions.    There is stable left hepatic lobe hypodensity.  There are calcified gallstones.  No pericholecystic inflammation.  No significant abnormality of the spleen, pancreas or  adrenals.  No hydronephrosis.  No urinary tract calculi.    There is no bowel obstruction.  No significant inflammatory changes of the bowel.  There is a large amount of stool in the distal colon.    There is bladder wall thickening.  Duenas is in place.  The prostate is moderately enlarged.  No pelvic free fluid.  Abdominal aorta normal in caliber.  Dense atherosclerotic disease.    Moderate degenerative change of the spine.  Impression: 1. No acute abdominopelvic findings on this noncontrast scan.  2. Large amount of stool distal colon.  3. Small right and trace left pleural effusions.  4. Other chronic findings above.    Electronically signed by: Cody Duong  Date:    06/19/2023  Time:    12:01      Dilshad Tran MD  Department of Hospital Medicine   [facility]   06/20/2023

## 2023-06-21 PROCEDURE — 25000003 PHARM REV CODE 250: Performed by: STUDENT IN AN ORGANIZED HEALTH CARE EDUCATION/TRAINING PROGRAM

## 2023-06-21 PROCEDURE — 25000003 PHARM REV CODE 250: Performed by: INTERNAL MEDICINE

## 2023-06-21 PROCEDURE — 97168 OT RE-EVAL EST PLAN CARE: CPT

## 2023-06-21 PROCEDURE — 25000003 PHARM REV CODE 250: Performed by: NURSE PRACTITIONER

## 2023-06-21 PROCEDURE — 97535 SELF CARE MNGMENT TRAINING: CPT

## 2023-06-21 PROCEDURE — 21400001 HC TELEMETRY ROOM

## 2023-06-21 RX ADMIN — METOPROLOL TARTRATE 25 MG: 25 TABLET, FILM COATED ORAL at 09:06

## 2023-06-21 RX ADMIN — TAMSULOSIN HYDROCHLORIDE 0.4 MG: 0.4 CAPSULE ORAL at 09:06

## 2023-06-21 RX ADMIN — APIXABAN 5 MG: 5 TABLET, FILM COATED ORAL at 12:06

## 2023-06-21 RX ADMIN — ZOLPIDEM TARTRATE 10 MG: 5 TABLET ORAL at 09:06

## 2023-06-21 RX ADMIN — AMLODIPINE BESYLATE 5 MG: 5 TABLET ORAL at 09:06

## 2023-06-21 RX ADMIN — Medication 1000 MG: at 09:06

## 2023-06-21 RX ADMIN — HYDROCODONE BITARTRATE AND ACETAMINOPHEN 1 TABLET: 5; 325 TABLET ORAL at 03:06

## 2023-06-21 RX ADMIN — GABAPENTIN 300 MG: 300 CAPSULE ORAL at 09:06

## 2023-06-21 RX ADMIN — HYDROCODONE BITARTRATE AND ACETAMINOPHEN 1 TABLET: 5; 325 TABLET ORAL at 06:06

## 2023-06-21 RX ADMIN — APIXABAN 5 MG: 5 TABLET, FILM COATED ORAL at 09:06

## 2023-06-21 RX ADMIN — GABAPENTIN 300 MG: 300 CAPSULE ORAL at 03:06

## 2023-06-21 RX ADMIN — FERROUS SULFATE TAB 325 MG (65 MG ELEMENTAL FE) 1 EACH: 325 (65 FE) TAB at 09:06

## 2023-06-21 RX ADMIN — DULOXETINE 30 MG: 30 CAPSULE, DELAYED RELEASE ORAL at 09:06

## 2023-06-21 NOTE — PT/OT/SLP RE-EVAL
Occupational Therapy   Re-evaluation    Name: Valentino Manning  MRN: [id number]  Admitting Diagnosis: Polyneuropathy, Impaired mobility secondary to generalized weakness and neuropathy since the initiation of chemo, Acute bacterial cystitis, POA, Multiple myeloma on chemotherapy- last treatment held due to weakness, Essential HTN, Hx of PE on Eliquis, CAD s/p stent on Plavix, Chronic Anemia. Hx of GERD, HLD  Recent Surgery: Procedure(s) (LRB):  FUSION, SPINE, POSTERIOR SPINAL COLUMN, CERVICAL, USING COMPUTER-ASSISTED NAVIGATION (N/A)      Recommendations:     Discharge Recommendations: neuro rehabilitation facility  Discharge Equipment Recommendations: to be determined by next level of care  Barriers to discharge: pending sx     Assessment:     Valentino Manning is a 72 y.o. male with a medical diagnosis of Polyneuropathy, Impaired mobility secondary to generalized weakness and neuropathy since the initiation of chemo, Acute bacterial cystitis, POA, Multiple myeloma on chemotherapy- last treatment held due to weakness, Essential HTN, Hx of PE on Eliquis, CAD s/p stent on Plavix, Chronic Anemia. Hx of GERD, HLD. He presents with c/o LBP while EOB and knee pain while standing. Pt also presents with continued reports of paresthesia on B hands and from B knees and below. Performance deficits affecting function are weakness, impaired endurance, impaired sensation, impaired self care skills, impaired functional mobility, impaired balance, decreased upper extremity function, decreased lower extremity function, decreased safety awareness, pain, decreased ROM.      Rehab Prognosis:  Good; patient would benefit from acute skilled OT services to address these deficits and reach maximum level of function.       Plan:     Patient to be seen 6 x/week to address the above listed problems via self-care/home management, therapeutic activities, therapeutic exercises  Plan of Care Expires: 07/05/23  Plan of Care Reviewed with: patient,  "spouse    Subjective     Chief Complaint: Pt c/o LBP while EOB and knee pain while standing. Pt also presents with continued reports of paresthesia on B hands and from B knees and below.   Pt also appeared discouraged during session, requiring encouragement.    Pt reported that his "feet feel like bricks" during session.    Objective:     Communicated with: RN prior to session. Patient found HOB elevated with: telemetry, gongora catheter upon OT entry to room.    General Precautions: Standard, fall, c-spinal pxns  Braces: [place] J C-collar when OOB (must wear at all times post-op per neurosx) and [place] C-collar while showering.  Respiratory Status: Room air  Vital Signs:   Blood Pressure: 110/71  HR: 64-65  Sp02: 98%      Occupational Performance:    Bed Mobility:    Patient completed Supine to Sit with moderate assistance, requiring assist to lift trunk.  Patient completed Sit to Supine with minimum assistance, requiring assist to lift LLE. CGA provided at pt's trunk to increase pt safety.    Functional Mobility/Transfers:  Patient completed Sit <> Stand Transfers (x4 trials) using RW with mod-max A x2, vc/tcs to assume upright standing posture, and with OT blocking pt's R knee and foot to increase pt's safety. Pt demonstrated increased difficulty assuming upright standing posture during 2nd trial. Pt also demonstrated B knee buckling.    Activities of Daily Living:  Grooming: Pt able to perform oral hygiene task and wash face using bath cloth with SBA provided for safety while seated EOB. Pt required max A to comb hair 2/2 AROM deficits of BUE.  Upper Body Dressing: Dep A to don/doff [place] C-collar  Lower Body Dressing: Pt required max A to don/doff B shoes.    Cognitive/Visual Perceptual:  Cognitive/Psychosocial Skills:     -       Oriented to: Person, Place, Time, and Situation   -       Follows Commands/attention:Follows one-step commands and Follows two-step commands    Physical Exam:  Balance:    -       " Pt able to assume/maintain static/dynamic sitting balance with SBA   -       Pt continued to demonstrate poor static standing balance  Sensation:    -       Impaired - paresthesia on B hands and from B knees and below  Upper Extremity Range of Motion:     -       BUEs:    Deficits:   Severe deficits noted in AROM of B shoulder flex (with increased deficits noted in L. 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  Min deficits noted in AROM of L elbow flex and R IR  Mod deficits noted in AROM of L IR and R ER  Upper Extremity Strength:    -       BUEs: Deficits: B shoulder flex, B IR, R ER, and L elbow flex=3-/5   Strength: Bilaterally impaired (with increased deficit in R)    Therapeutic Positioning  Risk for acquired pressure injuries is increased due to impaired mobility.    OT interventions performed during the course of today's session in an effort to prevent and/or reduce acquired pressure injuries:  Education on Pressure Ulcer Prevention provided    Skin assessment:    Findings:  Redness noted on B knees 2/2 healing wounds    OT recommendations for therapeutic positioning throughout hospitalization:   Follow [facility] Pressure Injury Prevention Protocol  Geomat recommended for sacral protection while [facility]  Specialty Mattress    Education:  Patient provided with verbal education regarding how to facilitate skin integrity (specifically, no diaper in bed and 3 layer rule. However, pt insisted on keeping diaper donned in bed and 4 layers despite education provided. RN notified) and cervical pxns.     Patient left HOB elevated (without wedge to adhere to turning schedule) with RN notified, all lines intact, and call button in reach.    GOALS:   Multidisciplinary Problems       Occupational Therapy Goals          Problem: Occupational Therapy    Goal Priority Disciplines Outcome Interventions   Occupational Therapy Goal     OT, PT/OT Ongoing, Progressing    Description: Goals to be met by: 7/5/23      Patient will increase functional independence with ADLs by performing:    UE Dressing with Minimal Assistance.  LE Dressing with Moderate Assistance and Assistive Devices as needed.  Grooming tasks while EOB with Minimal Assistance. - Revised.  Toileting from BSC with Moderate Assistance.  Pt will perform BSC t/f with Minimal Assistance.                         History:     Past Medical History:   Diagnosis Date    Anemia     GERD (gastroesophageal reflux disease)     Heart problem     Hyperlipidemia     Hypertension     Multiple myeloma     NSTEMI (non-ST elevated myocardial infarction)          Past Surgical History:   Procedure Laterality Date    BONE MARROW ASPIRATION N/A 9/1/2022    Procedure: ASPIRATION, BONE MARROW;  Surgeon: Robbie Gardner MD;  Location: [facility];  Service: General;  Laterality: N/A;    CARDIAC SURGERY  2021    ENDOSCOPIC RELEASE OF BOTH CARPAL TUNNELS         Time Tracking:     OT Date of Treatment: 6/21/23  OT Start Time: 1040  OT Stop Time: 1133  OT Total Time (min): 53 min    Billable Minutes: Re-eval 33 mins  Self Care/Home Management 20 mins    6/21/2023

## 2023-06-21 NOTE — PROGRESS NOTES
I met with Mr. Manning today to inform him that his C3-4, C4-5, C5-6 laminectomy and posterior fusion surgery that was scheduled today needs to be postponed due to an emergency surgery for a trauma patient that came in last night when I was on call.    He is very understanding of this situation.  I will be discussing rescheduling options with the OR staff and let Mr. Manning know when his surgery will be posted.  He has a diet ordered and can eat this morning.     I will be out of town starting tomorrow, Thursday, 6/22/2023, but will be returning on Monday, 6/26/2023.  Dr. Mims from [facility] will be cross covering the neurosurgery service.       ADDENDUM 6/21/2023 at 12:05 PM:    Mr. Manning's surgery has been rescheduled to Monday, 6/26/2023 at approximately 11:00 AM.

## 2023-06-21 NOTE — PLAN OF CARE
Problem: Occupational Therapy  Goal: Occupational Therapy Goal  Description: Goals to be met by: 7/5/23     Patient will increase functional independence with ADLs by performing:    UE Dressing with Minimal Assistance.  LE Dressing with Moderate Assistance and Assistive Devices as needed.  Grooming tasks while EOB with Minimal Assistance. - Revised.  Toileting from BSC with Moderate Assistance.  Pt will perform BSC t/f with Minimal Assistance.    6/21/2023 1304 by Rimma Matt OT  Outcome: Plan slightly revised.

## 2023-06-21 NOTE — PROGRESS NOTES
[facility] Hospital Medicine Progress Note        Chief Complaint: Inpatient Follow-up for b.l LE numbness     HPI: Valentino Manning is a 72 y.o. male with a PMHx of HTN, HLD, CAD s/p stent on Plavix, chronic anemia, GERD, multiple myeloma on chemotherapym hx of PE on Eliquis who presented to [facility] on 6/11/2023 with c/o numbness to bilateral hands and bilateral lower extremities with associated weakness has been worsening over the past 6 months.  Patient reports that he was now unable to ambulate due to worsening paresthesias and generalized weakness x3 weeks.  He was now bed-bound.  He endorsed falling about 4 times over the past 3 weeks, denied LOC or head injury.  He reports he was in his normal state of health prior to initiating chemotherapy in December 2022.  He then had COVID-19 in February 2023 and has had a rapid decline since then.  He also endorsed urinary retention for which he was seen at an outlying ED on 6/5/23 and had a Duenas catheter placed, he was supposed to follow-up with urology on 06/12/2023. He was previously on gabapentin 300 mg t.i.d. for neuropathy without symptomatic relief, he states he was not taking it as directed.  He denied CP, SOB, fever, chills, cough, dizziness, syncope.  Patient reportedly saw his oncologist, Dr. Harris, for same complaints and he was referred to ED for further evaluation.   Initial ED VS stable.  Labs notable for hemoglobin 12.6, hematocrit 38.  Urinalysis with 2+ protein, 3+ occult blood, positive nitrites, 2+ leukocytes, 11 RBCs, 67 WBCs, 1+ bacteria.  Urine culture pending.  He was started on oral Levaquin for suspected UTI.  He was admitted to hospital medicine services for further medical management.    Interval Hx:   Seen and examined the patient.  Afebrile vitals stable hemodynamically stable.  Denying pain or discomfort waiting for surgery in Monday since surgery was canceled today due to emergency.    Objective/physical  exam:  General: In no acute distress, afebrile  Chest: Clear to auscultation bilaterally anteriorly  Heart: RRR, +S1, S2, no appreciable murmur  Abdomen: Soft, nontender, BS +  MSK: Warm, no lower extremity edema, no clubbing or cyanosis  Neurologic: Alert and oriented x4, Cranial nerve II-XII intact, bilateral upper and lower extremity strength 3 out of 5    VITAL SIGNS: 24 HRS MIN & MAX LAST   Temp  Min: 97.4 °F (36.3 °C)  Max: 98.1 °F (36.7 °C) 98.1 °F (36.7 °C)   BP  Min: 109/68  Max: 134/68 123/75   Pulse  Min: 60  Max: 83  60   Resp  Min: 18  Max: 18 18   SpO2  Min: 96 %  Max: 100 % 98 %     I reviewed the labs below:  Recent Labs   Lab 06/15/23  0557 06/16/23  0724 06/20/23  0533   WBC 5.04 5.33 6.85   RBC 3.32* 3.62* 3.72*   HGB 10.8* 11.8* 12.2*   HCT 32.8* 36.2* 37.5*   MCV 98.8* 100.0* 100.8*   MCH 32.5* 32.6* 32.8*   MCHC 32.9* 32.6* 32.5*   RDW 14.8 14.8 14.9    308 308   MPV 8.9 8.6 8.4         Recent Labs   Lab 06/15/23  0557 06/16/23  0724 06/20/23  0533    141 139   K 3.7 4.1 3.8   CO2 22* 23 23   BUN 11.4 9.6 12.7   CREATININE 0.92 0.83 0.87   CALCIUM 8.9 9.3 9.6   MG 2.00 1.80  --    ALBUMIN  --  3.2*  --    ALKPHOS  --  107  --    ALT  --  26  --    AST  --  13  --    BILITOT  --  0.5  --             Microbiology Results (last 7 days)       Procedure Component Value Units Date/Time    Urine culture [[id number]]  (Abnormal)  (Susceptibility) Collected: 06/11/23 2224    Order Status: Completed Specimen: Urine Updated: 06/14/23 0952     Urine Culture >/= 100,000 colonies/ml Escherichia coli             See below for Radiology    Scheduled Med:   amLODIPine  5 mg Oral Daily    ascorbic acid (vitamin C)  1,000 mg Oral Daily    atorvastatin  40 mg Oral Every other day    DULoxetine  30 mg Oral Daily    dutasteride  0.5 mg Oral Daily    ferrous sulfate  1 tablet Oral Daily    gabapentin  300 mg Oral TID    linaCLOtide  145 mcg Oral Before breakfast    metoprolol tartrate  25 mg Oral BID     tamsulosin  0.4 mg Oral QHS    zolpidem  10 mg Oral QHS        Continuous Infusions:       PRN Meds:  acetaminophen, aluminum-magnesium hydroxide-simethicone, HYDROcodone-acetaminophen, melatonin, naloxone, ondansetron, polyethylene glycol, prochlorperazine, simethicone       Assessment/Plan:  Polyneuropathy with progressive weakness to bilateral upper extremity and lower extremity  Impaired mobility secondary to generalized weakness and neuropathy since the initiation of chemo  E coli UTI-pansensitive  Multiple myeloma on chemotherapy- last treatment held due to weakness   Essential HTN  Hx of PE on Eliquis  CAD s/p stent on Plavix  Chronic Anemia  Hx of GERD, HLD  Hypokalemia  Hypomagnesemia     - pending surgery in Monday.   - Neurosurgery evaluation suggested that patient has a candidate for cervical spinal laminectomy and posterior fusion.  Needs to wear [place] J C-collar at all times as well as [place] C-collar that can be used while showering.  - Cardiology consult for clearance was put in by Neurosurgery, patient is on Eliquis and Plavix need to hold Plavix for 7 days preoperatively and Eliquis for 3 days per Neurosurgery do not have any objections to that.    PT/OT consulted, patient feels he will benefit from rehab as he is unable to continue with his chemotherapy given his weakness, eval is pending given clearance from Neurosurgery  CT head without--> unrevealing for acute intracranial abnormalities.    Urology also evaluated the patient, recommended to continue Flomax, continue indwelling Duenas catheter on discharge and follow-up with Dr. Vo outpatient and if his mobility improved, okay to offer voiding trial prior to discharge.  Also recommended neurosurgery eval  Urine culture finalized as pansensitive E coli.   Resume appropriate home medication for chronic medical conditions, resumed Eliquis, Plavix  Case management consulted for discharge planning, patient interested in rehab.      VTE  Prophylaxis: Eliquis resumed 6/13    Patient condition:  Guarded    Discharge Planning and Disposition: TBD    Critical care note:  Critical care diagnosis:  Hypomagnesemia requiring IV Mag sulfate  Critical care interventions: Hands-on evaluation, review of labs/radiographs/records and discussion with patient and family if present  Critical care time spent: 35 minutes    All diagnosis and differential diagnosis have been reviewed; assessment and plan has been documented; I have personally reviewed the labs and test results that are presently available; I have reviewed the patients medication list; I have reviewed the consulting providers response and recommendations. I have reviewed or attempted to review medical records based upon their availability. All of the patient's questions have been  addressed and answered. Patient's is agreeable to the above stated plan. I will continue to monitor closely and make adjustments to medical management as needed.  _____________________________________________________________________    Nutrition Status:    Radiology:   I have personally reviewed the images and agree with radiologist report  CT Abdomen Pelvis  Without Contrast  Narrative: EXAMINATION:  CT ABDOMEN PELVIS WITHOUT CONTRAST    CLINICAL HISTORY:  Flank pain, kidney stone suspected;    TECHNIQUE:  Helical acquisition through the abdomen and pelvis without IV contrast.  Lack of contrast limits evaluation of solid organs and vascular structures .  Three plane reconstructions were provided for review.  mGycm. Automatic exposure control, adjustment of mA/kV or iterative reconstruction technique was used to reduce radiation.    COMPARISON:  28 September 2022    FINDINGS:  Small right and trace left pleural effusions.    There is stable left hepatic lobe hypodensity.  There are calcified gallstones.  No pericholecystic inflammation.  No significant abnormality of the spleen, pancreas or adrenals.  No hydronephrosis.   No urinary tract calculi.    There is no bowel obstruction.  No significant inflammatory changes of the bowel.  There is a large amount of stool in the distal colon.    There is bladder wall thickening.  Duenas is in place.  The prostate is moderately enlarged.  No pelvic free fluid.  Abdominal aorta normal in caliber.  Dense atherosclerotic disease.    Moderate degenerative change of the spine.  Impression: 1. No acute abdominopelvic findings on this noncontrast scan.  2. Large amount of stool distal colon.  3. Small right and trace left pleural effusions.  4. Other chronic findings above.    Electronically signed by: Cody Duong  Date:    06/19/2023  Time:    12:01      Dilshad Tran MD  Department of Hospital Medicine   [facility]   06/21/2023

## 2023-06-22 PROCEDURE — 25000003 PHARM REV CODE 250: Performed by: STUDENT IN AN ORGANIZED HEALTH CARE EDUCATION/TRAINING PROGRAM

## 2023-06-22 PROCEDURE — 21400001 HC TELEMETRY ROOM

## 2023-06-22 PROCEDURE — 97530 THERAPEUTIC ACTIVITIES: CPT | Mod: CO

## 2023-06-22 PROCEDURE — 25000003 PHARM REV CODE 250: Performed by: INTERNAL MEDICINE

## 2023-06-22 PROCEDURE — 11000001 HC ACUTE MED/SURG PRIVATE ROOM

## 2023-06-22 PROCEDURE — 97535 SELF CARE MNGMENT TRAINING: CPT | Mod: CO

## 2023-06-22 PROCEDURE — 25000003 PHARM REV CODE 250: Performed by: NURSE PRACTITIONER

## 2023-06-22 RX ADMIN — ZOLPIDEM TARTRATE 10 MG: 5 TABLET ORAL at 08:06

## 2023-06-22 RX ADMIN — HYDROCODONE BITARTRATE AND ACETAMINOPHEN 1 TABLET: 5; 325 TABLET ORAL at 09:06

## 2023-06-22 RX ADMIN — FERROUS SULFATE TAB 325 MG (65 MG ELEMENTAL FE) 1 EACH: 325 (65 FE) TAB at 09:06

## 2023-06-22 RX ADMIN — AMLODIPINE BESYLATE 5 MG: 5 TABLET ORAL at 09:06

## 2023-06-22 RX ADMIN — METOPROLOL TARTRATE 25 MG: 25 TABLET, FILM COATED ORAL at 09:06

## 2023-06-22 RX ADMIN — HYDROCODONE BITARTRATE AND ACETAMINOPHEN 1 TABLET: 5; 325 TABLET ORAL at 04:06

## 2023-06-22 RX ADMIN — METOPROLOL TARTRATE 25 MG: 25 TABLET, FILM COATED ORAL at 08:06

## 2023-06-22 RX ADMIN — GABAPENTIN 300 MG: 300 CAPSULE ORAL at 09:06

## 2023-06-22 RX ADMIN — TAMSULOSIN HYDROCHLORIDE 0.4 MG: 0.4 CAPSULE ORAL at 08:06

## 2023-06-22 RX ADMIN — LINACLOTIDE 145 MCG: 145 CAPSULE, GELATIN COATED ORAL at 04:06

## 2023-06-22 RX ADMIN — ATORVASTATIN CALCIUM 40 MG: 40 TABLET, FILM COATED ORAL at 09:06

## 2023-06-22 RX ADMIN — DULOXETINE 30 MG: 30 CAPSULE, DELAYED RELEASE ORAL at 09:06

## 2023-06-22 RX ADMIN — Medication 1000 MG: at 09:06

## 2023-06-22 RX ADMIN — GABAPENTIN 300 MG: 300 CAPSULE ORAL at 08:06

## 2023-06-22 RX ADMIN — APIXABAN 5 MG: 5 TABLET, FILM COATED ORAL at 09:06

## 2023-06-22 RX ADMIN — APIXABAN 5 MG: 5 TABLET, FILM COATED ORAL at 08:06

## 2023-06-22 RX ADMIN — GABAPENTIN 300 MG: 300 CAPSULE ORAL at 04:06

## 2023-06-22 NOTE — PLAN OF CARE
Electronic list sent to patient he would like to go to [facility]. Referral sent via [facility]

## 2023-06-22 NOTE — PROGRESS NOTES
[facility] Hospital Medicine Progress Note        Chief Complaint: Inpatient Follow-up for Solomon UE and LE weakness and numbness     HPI:   Valentino Manning is a 72 y.o. male with a PMHx of HTN, HLD, CAD s/p stent on Plavix, chronic anemia, GERD, multiple myeloma on chemotherapym hx of PE on Eliquis who presented to [facility] on 6/11/2023 with c/o numbness to bilateral hands and bilateral lower extremities with associated weakness has been worsening over the past 6 months.  Patient reports that he was now unable to ambulate due to worsening paresthesias and generalized weakness x3 weeks.  He was now bed-bound.  He endorsed falling about 4 times over the past 3 weeks, denied LOC or head injury.  He reports he was in his normal state of health prior to initiating chemotherapy in December 2022.  He then had COVID-19 in February 2023 and has had a rapid decline since then.  He also endorsed urinary retention for which he was seen at an outlying ED on 6/5/23 and had a Duenas catheter placed, he was supposed to follow-up with urology on 06/12/2023. He was previously on gabapentin 300 mg t.i.d. for neuropathy without symptomatic relief, he states he was not taking it as directed.  He denied CP, SOB, fever, chills, cough, dizziness, syncope.  Patient reportedly saw his oncologist, Dr. Harris, for same complaints and he was referred to ED for further evaluation.   Initial ED VS stable.  Labs notable for hemoglobin 12.6, hematocrit 38.  Urinalysis with 2+ protein, 3+ occult blood, positive nitrites, 2+ leukocytes, 11 RBCs, 67 WBCs, 1+ bacteria.  Urine culture pending.  He was started on oral Levaquin for suspected UTI.  He was admitted to hospital medicine services for further medical management.  MRI C-spine done and showed  Impression:     1. Severe degenerative narrowing of the spinal canal at C4-C5 and C5-C6, moderate at C3-C4, mild at other levels.  2. Multilevel neural foraminal stenoses as  described  3. T2 signal changes in the cord at C4-C5 may be secondary to a compressive myelopathy.  4. Mild inflammatory changes of the bilateral C4-C5 and C5-C6 facets, may be degenerative.  No drainable fluid collection.    He was seen by Neurosurgery team and recommended  C3-4, C4-5, C5-6 laminectomy and posterior fusion surgery. Patient was on plavix and eliquis. Plavix has been stopped in the perioperative period per cardiology and eliquis will be stopped 3 days before surgery.   Surgery was scheduled for 6/26/23    Interval Hx:   Patient today awake and comfortable. Has persistent UE and LE weakness and numbness. Has urinary retention and gongora was continued. Draining clear urine. He has been afebrile.   PT working with patient.     Case was discussed with patient's  on the floor.    Objective/physical exam:  General: In no acute distress  Chest: Clear to auscultation bilaterally  Heart: RRR, +S1, S2, no appreciable murmur  Abdomen: Soft, nontender, BS +  MSK: Warm, no lower extremity edema, no clubbing or cyanosis  Neurologic: Cranial nerve II-XII intact, Strength Solomon UE 3/5, LE 4/5    VITAL SIGNS: 24 HRS MIN & MAX LAST   Temp  Min: 97.4 °F (36.3 °C)  Max: 98.3 °F (36.8 °C) 97.4 °F (36.3 °C)   BP  Min: 108/69  Max: 155/86 (!) 155/86   Pulse  Min: 68  Max: 74  74   Resp  Min: 18  Max: 20 18   SpO2  Min: 96 %  Max: 98 % 96 %     I have reviewed the following labs:    Recent Labs   Lab 06/16/23  0724 06/20/23  0533   WBC 5.33 6.85   RBC 3.62* 3.72*   HGB 11.8* 12.2*   HCT 36.2* 37.5*   .0* 100.8*   MCH 32.6* 32.8*   MCHC 32.6* 32.5*   RDW 14.8 14.9    308   MPV 8.6 8.4       Recent Labs   Lab 06/16/23  0724 06/20/23  0533    139   K 4.1 3.8   CO2 23 23   BUN 9.6 12.7   CREATININE 0.83 0.87   CALCIUM 9.3 9.6   MG 1.80  --    ALBUMIN 3.2*  --    ALKPHOS 107  --    ALT 26  --    AST 13  --    BILITOT 0.5  --           Microbiology Results (last 7 days)       ** No results found for  the last 168 hours. **             See below for Radiology    Scheduled Med:   amLODIPine  5 mg Oral Daily    apixaban  5 mg Oral BID    ascorbic acid (vitamin C)  1,000 mg Oral Daily    atorvastatin  40 mg Oral Every other day    DULoxetine  30 mg Oral Daily    dutasteride  0.5 mg Oral Daily    ferrous sulfate  1 tablet Oral Daily    gabapentin  300 mg Oral TID    linaCLOtide  145 mcg Oral Before breakfast    metoprolol tartrate  25 mg Oral BID    tamsulosin  0.4 mg Oral QHS    zolpidem  10 mg Oral QHS        Continuous Infusions:       PRN Meds:  acetaminophen, aluminum-magnesium hydroxide-simethicone, HYDROcodone-acetaminophen, melatonin, naloxone, ondansetron, polyethylene glycol, prochlorperazine, simethicone       Assessment/Plan:  Bilateral upper extremity and lower extremity weakness and numbness from cervical canal stenosis   E coli UTI-pansensitive - asymptomatic   Multiple myeloma on chemotherapy  Essential HTN  Hx of PE on Eliquis  CAD s/p stent   Chronic Anemia  Hx of GERD, HLD      Plan:  Patient has persistent Solomon LE and UE weakness with numbness   Will cont PT per neurosurgery reocmmendations  Plavix now on hold per [facility] team. Hold Eliquis on from 6/23/23  [facility] recommended to bridge with   Surgery planned for 6/26/23  Continue strict aspiration, fall and decubitus precautions    Current meds reviewed lovenox   CM consulted for neuro rehab placement after surgery     Cont supportive  care      VTE prophylaxis: Eliquis     Patient condition:  Fair    Anticipated discharge and Disposition:   Neuro rehab       All diagnosis and differential diagnosis have been reviewed; assessment and plan has been documented; I have personally reviewed the labs and test results that are presently available; I have reviewed the patients medication list; I have reviewed the consulting providers response and recommendations. I have reviewed or attempted to review medical records based upon their availability    All of the  patient's questions have been  addressed and answered. Patient's is agreeable to the above stated plan. I will continue to monitor closely and make adjustments to medical management as needed.  _____________________________________________________________________    Nutrition Status:    Radiology:  I have personally reviewed the following imaging and agree with the radiologist.     CT Abdomen Pelvis  Without Contrast  Narrative: EXAMINATION:  CT ABDOMEN PELVIS WITHOUT CONTRAST    CLINICAL HISTORY:  Flank pain, kidney stone suspected;    TECHNIQUE:  Helical acquisition through the abdomen and pelvis without IV contrast.  Lack of contrast limits evaluation of solid organs and vascular structures .  Three plane reconstructions were provided for review.  mGycm. Automatic exposure control, adjustment of mA/kV or iterative reconstruction technique was used to reduce radiation.    COMPARISON:  28 September 2022    FINDINGS:  Small right and trace left pleural effusions.    There is stable left hepatic lobe hypodensity.  There are calcified gallstones.  No pericholecystic inflammation.  No significant abnormality of the spleen, pancreas or adrenals.  No hydronephrosis.  No urinary tract calculi.    There is no bowel obstruction.  No significant inflammatory changes of the bowel.  There is a large amount of stool in the distal colon.    There is bladder wall thickening.  Duenas is in place.  The prostate is moderately enlarged.  No pelvic free fluid.  Abdominal aorta normal in caliber.  Dense atherosclerotic disease.    Moderate degenerative change of the spine.  Impression: 1. No acute abdominopelvic findings on this noncontrast scan.  2. Large amount of stool distal colon.  3. Small right and trace left pleural effusions.  4. Other chronic findings above.    Electronically signed by: Cody Duong  Date:    06/19/2023  Time:    12:01      Garett Solorio MD   06/22/2023

## 2023-06-22 NOTE — PT/OT/SLP PROGRESS
Occupational Therapy   Treatment    Name: Valentino Manning  MRN: [id number]  Admitting Diagnosis:  <principal problem not specified>       Recommendations:     Discharge Recommendations: rehabilitation facility  Discharge Equipment Recommendations:  to be determined by next level of care  Barriers to discharge:       Assessment:     Valentino Manning is a 72 y.o. male with a medical diagnosis of <principal problem not specified>.  Performance deficits affecting function are weakness, impaired endurance, impaired sensation, impaired self care skills, impaired functional mobility, impaired balance, decreased upper extremity function, decreased lower extremity function, decreased safety awareness, pain, decreased ROM.     Rehab Prognosis:  Fair; patient would benefit from acute skilled OT services to address these deficits and reach maximum level of function.       Plan:     Patient to be seen 6 x/week to address the above listed problems via self-care/home management, therapeutic activities, therapeutic exercises  Plan of Care Expires: 07/05/23  Plan of Care Reviewed with: patient, spouse    Subjective     Pain/Comfort:       Objective:     Communicated with: RN prior to session.  Patient found HOB elevated with   upon OT entry to room.    General Precautions: Standard, fall    Orthopedic Precautions:spinal precautions  Braces: [place] J collar  BP: 162/91     Occupational Performance:     Bed Mobility:    Patient completed Supine to Sit with minimum assistance  Patient completed Sit to Supine with minimum assistance       Therapeutic Activities:  Patient BP taken in supine stated at above, patient transitioned from supine to sit and BP assessed  133/82 with patient c/o dizziness. Had patient perform LE exercises /78. Due to this session performed EOB. Patient performing reaching activities through B UE 5x. No LOB. Patient placed back into supine position requiring pericare to be performed in sidelying with total A.      Therapeutic Positioning    OT interventions performed during the course of today's session in an effort to prevent and/or reduce acquired pressure injuries:   Education on Pressure Ulcer Prevention provided  Therapeutic positioning completed     Skin assessment: all bony prominences were assessed    Findings: new area of altered skin integrity discovered at inner gleuteal cleft, noted patient to have some healed skin break down. Barrier cream applied to buttocks.      Patient Education:  Patient provided with verbal education regarding OT role/goals/POC and pressure ulcer prevention.  Understanding was verbalized.      Patient left HOB elevated with all lines intact and call button in reach    GOALS:   Multidisciplinary Problems       Occupational Therapy Goals          Problem: Occupational Therapy    Goal Priority Disciplines Outcome Interventions   Occupational Therapy Goal     OT, PT/OT Ongoing, Progressing    Description: Goals to be met by: 7/5/23     Patient will increase functional independence with ADLs by performing:    UE Dressing with Minimal Assistance.  LE Dressing with Moderate Assistance and Assistive Devices as needed.  Grooming tasks while EOB with Minimal Assistance. - Revised.  Toileting from BSC with Moderate Assistance.  Pt will perform BSC t/f with Minimal Assistance.                         Time Tracking:     OT Date of Treatment: 06/22/23  OT Start Time: 1052  OT Stop Time: 1125  OT Total Time (min): 33 min    Billable Minutes:Therapeutic Activity 2    OT/CAMERON: CAMERON     Number of CAMERON visits since last OT visit: 1    6/22/2023

## 2023-06-23 ENCOUNTER — ANESTHESIA EVENT (OUTPATIENT)
Dept: SURGERY | Facility: HOSPITAL | Age: 73
DRG: 472 | End: 2023-06-23
Payer: MEDICARE

## 2023-06-23 PROCEDURE — 97535 SELF CARE MNGMENT TRAINING: CPT | Mod: CO

## 2023-06-23 PROCEDURE — 25000003 PHARM REV CODE 250: Performed by: NURSE PRACTITIONER

## 2023-06-23 PROCEDURE — 25000003 PHARM REV CODE 250: Performed by: INTERNAL MEDICINE

## 2023-06-23 PROCEDURE — 63600175 PHARM REV CODE 636 W HCPCS: Performed by: NEUROLOGICAL SURGERY

## 2023-06-23 PROCEDURE — 97164 PT RE-EVAL EST PLAN CARE: CPT

## 2023-06-23 PROCEDURE — 97530 THERAPEUTIC ACTIVITIES: CPT | Mod: CO

## 2023-06-23 PROCEDURE — 11000001 HC ACUTE MED/SURG PRIVATE ROOM

## 2023-06-23 PROCEDURE — 21400001 HC TELEMETRY ROOM

## 2023-06-23 PROCEDURE — 99232 PR SUBSEQUENT HOSPITAL CARE,LEVL II: ICD-10-PCS | Mod: ,,, | Performed by: NEUROLOGICAL SURGERY

## 2023-06-23 PROCEDURE — 99232 SBSQ HOSP IP/OBS MODERATE 35: CPT | Mod: ,,, | Performed by: NEUROLOGICAL SURGERY

## 2023-06-23 RX ORDER — MUPIROCIN 20 MG/G
OINTMENT TOPICAL 2 TIMES DAILY
Status: DISPENSED | OUTPATIENT
Start: 2023-06-23 | End: 2023-06-28

## 2023-06-23 RX ORDER — HEPARIN SODIUM 5000 [USP'U]/ML
5000 INJECTION, SOLUTION INTRAVENOUS; SUBCUTANEOUS EVERY 12 HOURS
Status: DISCONTINUED | OUTPATIENT
Start: 2023-06-23 | End: 2023-06-23

## 2023-06-23 RX ORDER — HEPARIN SODIUM 5000 [USP'U]/ML
5000 INJECTION, SOLUTION INTRAVENOUS; SUBCUTANEOUS EVERY 12 HOURS
Status: COMPLETED | OUTPATIENT
Start: 2023-06-23 | End: 2023-06-25

## 2023-06-23 RX ADMIN — GABAPENTIN 300 MG: 300 CAPSULE ORAL at 08:06

## 2023-06-23 RX ADMIN — LINACLOTIDE 145 MCG: 145 CAPSULE, GELATIN COATED ORAL at 05:06

## 2023-06-23 RX ADMIN — FERROUS SULFATE TAB 325 MG (65 MG ELEMENTAL FE) 1 EACH: 325 (65 FE) TAB at 08:06

## 2023-06-23 RX ADMIN — HYDROCODONE BITARTRATE AND ACETAMINOPHEN 1 TABLET: 5; 325 TABLET ORAL at 08:06

## 2023-06-23 RX ADMIN — GABAPENTIN 300 MG: 300 CAPSULE ORAL at 03:06

## 2023-06-23 RX ADMIN — GABAPENTIN 300 MG: 300 CAPSULE ORAL at 09:06

## 2023-06-23 RX ADMIN — METOPROLOL TARTRATE 25 MG: 25 TABLET, FILM COATED ORAL at 08:06

## 2023-06-23 RX ADMIN — ZOLPIDEM TARTRATE 10 MG: 5 TABLET ORAL at 09:06

## 2023-06-23 RX ADMIN — MUPIROCIN: 20 OINTMENT TOPICAL at 09:06

## 2023-06-23 RX ADMIN — METOPROLOL TARTRATE 25 MG: 25 TABLET, FILM COATED ORAL at 09:06

## 2023-06-23 RX ADMIN — AMLODIPINE BESYLATE 5 MG: 5 TABLET ORAL at 08:06

## 2023-06-23 RX ADMIN — DULOXETINE 30 MG: 30 CAPSULE, DELAYED RELEASE ORAL at 08:06

## 2023-06-23 RX ADMIN — Medication 1000 MG: at 08:06

## 2023-06-23 RX ADMIN — TAMSULOSIN HYDROCHLORIDE 0.4 MG: 0.4 CAPSULE ORAL at 09:06

## 2023-06-23 RX ADMIN — HEPARIN SODIUM 5000 UNITS: 5000 INJECTION, SOLUTION INTRAVENOUS; SUBCUTANEOUS at 09:06

## 2023-06-23 RX ADMIN — HYDROCODONE BITARTRATE AND ACETAMINOPHEN 1 TABLET: 5; 325 TABLET ORAL at 07:06

## 2023-06-23 NOTE — PT/OT/SLP RE-EVAL
Physical Therapy Re-evaluation    Patient Name:  Valentino Manning   MRN:  [id number]    Recommendations:     Discharge Recommendations: rehabilitation facility  Discharge Equipment Recommendations:     Barriers to discharge: None    Assessment:     Valentino Manning is a 72 y.o. male admitted with a medical diagnosis of cervical stenosis awaiting cervical surgery on Monday 6/26. Neurosurgery has cleared pt to work with therapy prior to surgery in [facility]. He presents with the following impairments/functional limitations: weakness, impaired endurance, impaired functional mobility, gait instability, impaired balance.    Rehab Prognosis:  good; patient would benefit from acute skilled PT services to address these deficits and reach maximum level of function.      Recent Surgery: Procedure(s) (LRB):  FUSION, SPINE, POSTERIOR SPINAL COLUMN, CERVICAL, USING COMPUTER-ASSISTED NAVIGATION (N/A)      Plan:     During this hospitalization, patient to be seen 6 x/week to address the above listed problems via gait training, therapeutic activities, therapeutic exercises  Plan of Care Expires:  07/28/23  Plan of Care Reviewed with: patient    Subjective     Communicated with RN prior to session.  Patient found HOB elevated upon PT entry to room, agreeable to evaluation.      Chief Complaint: weakness  Patient comments/goals: return to PLOF  Pain/Comfort:       Patients cultural, spiritual, Sabianist conflicts given the current situation: no      Objective:     General Precautions: Standard,    Orthopedic Precautions:    Braces: [facility]  Respiratory Status: Room air    Exams:  RLE ROM: WFL  RLE Strength: 3+/5  LLE ROM: WFL  LLE Strength: 3/5    Functional Mobility: ASPEN donned while pt in bed  Bed Mobility:     Supine to Sit: minimum assistance  Sit to Supine: minimum assistance  Transfers:     Sit to Stand:  moderate assistance with rolling walker  Pt performed sit<>std w/ RW x2. Pt performed weight shifting activity  while standing; L LE buckled       Patient left HOB elevated with all lines intact.    GOALS:   Multidisciplinary Problems       Physical Therapy Goals          Problem: Physical Therapy    Goal Priority Disciplines Outcome Goal Variances Interventions   Physical Therapy Goal     PT, PT/OT Ongoing, Progressing     Description: Goals to be met by: 23     Patient will increase functional independence with mobility by performin. Supine to sit with Stand-by Assistance  2. Sit to supine with Stand-by Assistance  3. Sit to stand transfer with Stand-by Assistance  4. Gait  x 50 feet with Stand-by Assistance using Rolling Walker.                          History:     Past Medical History:   Diagnosis Date    Anemia     GERD (gastroesophageal reflux disease)     Heart problem     Hyperlipidemia     Hypertension     Multiple myeloma     NSTEMI (non-ST elevated myocardial infarction)        Past Surgical History:   Procedure Laterality Date    BONE MARROW ASPIRATION N/A 2022    Procedure: ASPIRATION, BONE MARROW;  Surgeon: Robbie Gardner MD;  Location: [facility];  Service: General;  Laterality: N/A;    CARDIAC SURGERY      ENDOSCOPIC RELEASE OF BOTH CARPAL TUNNELS         Time Tracking:     PT Received On: 23  PT Start Time: 1100     PT Stop Time: 1130  PT Total Time (min): 30 min     Billable Minutes: Re-eval 30      2023

## 2023-06-23 NOTE — PROGRESS NOTES
Inpatient Nutrition Evaluation    Admit Date: 6/11/2023   Total duration of encounter: 12 days    Nutrition Recommendation/Prescription     Continue heart healthy diet as tolerated    Nutrition Assessment     Chart Review    Reason Seen: length of stay and follow-up    Malnutrition Screening Tool Results   Have you recently lost weight without trying?: No  Have you been eating poorly because of a decreased appetite?: No   MST Score: 0     Diagnosis:  Polyneuropathy with progressive weakness to bilateral upper extremity and lower extremity  Impaired mobility secondary to generalized weakness and neuropathy since the initiation of chemo  E coli UTI-pansensitive  Multiple myeloma on chemotherapy- last treatment held due to weakness   Essential HTN  Hypokalemia  Hypomagnesemia    Relevant Medical History: HTN, HLD, CAD s/p stent, chronic anemia, GERD, multiple myeloma on chemotherapy, PE    Nutrition-Related Medications: vitamin C, ferrous sulfate    Nutrition-Related Labs:   6/16: RBC-3.62, H/H-11.8/36.2, Cl-109  6/20: CMP WNL     Diet Order: Diet heart healthy  Diet NPO  Oral Supplement Order: none  Appetite/Oral Intake: good/% of meals  Factors Affecting Nutritional Intake: none identified  Food/Jainism/Cultural Preferences: unable to obtain  Food Allergies: unable to obtain    Skin Integrity: excoriation  Wound(s):       Comments    6/16: pt sleeping at time of rounds. Spoke with RN, who reports good appetite and no GI issues. 100% intake documented on 6/12. Per MST, no decreased appetite or weight loss prior admission. Unable to obtain weight history at this time. Weight of 88.5 kg (195 lb) on 6/14. Per EMR weights, 2.5% weight loss in ~2 months noted. Not significant at this time. Unable to perform NFPA at this time, no wasting visualized. Will continue to monitor.    6/23: Continues with good appetite and intake of meals. Denies any GI complaints. Plans for cervical laminectomy and posterior instrumented  fusion on Monday.     Anthropometrics    Height: 6' (182.9 cm) Height Method: Stated  Last Weight: 88.5 kg (195 lb) (23 0051) Weight Method: Standard Scale  BMI (Calculated): 26.4  BMI Classification: overweight (BMI 25-29.9)        Ideal Body Weight (IBW), Male: 178 lb     % Ideal Body Weight, Male (lb): 109.55 %                 Usual Body Weight (UBW), k.8 kg  % Usual Body Weight: 97.62     Usual Weight Provided By: EMR weight history    Wt Readings from Last 5 Encounters:   23 88.5 kg (195 lb)   23 90.4 kg (199 lb 3.2 oz)   23 89.7 kg (197 lb 12.8 oz)   23 90.7 kg (199 lb 15.3 oz)   23 90.8 kg (200 lb 1.6 oz)     Weight Change(s) Since Admission:  Admit Weight: 88.5 kg (195 lb) (23 1713)  23 no updated wts     Patient Education    Not applicable.    Monitoring & Evaluation     Dietitian will monitor food and beverage intake and weight change.  Nutrition Risk/Follow-Up: low (follow-up in 5-7 days)  Patients assigned 'low nutrition risk' status do not qualify for a full nutritional assessment but will be monitored and re-evaluated in a 5-7 day time period. Please consult if re-evaluation needed sooner.

## 2023-06-23 NOTE — PROGRESS NOTES
[facility] Hospital Medicine Progress Note        Chief Complaint: Inpatient Follow-up for Solomon UE and LE weakness and numbness     HPI:   Valentino Manning is a 72 y.o. male with a PMHx of HTN, HLD, CAD s/p stent on Plavix, chronic anemia, GERD, multiple myeloma on chemotherapym hx of PE on Eliquis who presented to [facility] on 6/11/2023 with c/o numbness to bilateral hands and bilateral lower extremities with associated weakness has been worsening over the past 6 months.  Patient reports that he was now unable to ambulate due to worsening paresthesias and generalized weakness x3 weeks.  He was now bed-bound.  He endorsed falling about 4 times over the past 3 weeks, denied LOC or head injury.  He reports he was in his normal state of health prior to initiating chemotherapy in December 2022.  He then had COVID-19 in February 2023 and has had a rapid decline since then.  He also endorsed urinary retention for which he was seen at an outlying ED on 6/5/23 and had a Duenas catheter placed, he was supposed to follow-up with urology on 06/12/2023. He was previously on gabapentin 300 mg t.i.d. for neuropathy without symptomatic relief, he states he was not taking it as directed.  He denied CP, SOB, fever, chills, cough, dizziness, syncope.  Patient reportedly saw his oncologist, Dr. Harris, for same complaints and he was referred to ED for further evaluation.   Initial ED VS stable.  Labs notable for hemoglobin 12.6, hematocrit 38.  Urinalysis with 2+ protein, 3+ occult blood, positive nitrites, 2+ leukocytes, 11 RBCs, 67 WBCs, 1+ bacteria.  Urine culture pending.  He was started on oral Levaquin for suspected UTI.  He was admitted to hospital medicine services for further medical management.  MRI C-spine done and showed  Impression:     1. Severe degenerative narrowing of the spinal canal at C4-C5 and C5-C6, moderate at C3-C4, mild at other levels.  2. Multilevel neural foraminal stenoses as  described  3. T2 signal changes in the cord at C4-C5 may be secondary to a compressive myelopathy.  4. Mild inflammatory changes of the bilateral C4-C5 and C5-C6 facets, may be degenerative.  No drainable fluid collection.    He was seen by Neurosurgery team and recommended  C3-4, C4-5, C5-6 laminectomy and posterior fusion surgery. Patient was on plavix and eliquis. Plavix has been stopped in the perioperative period per cardiology and eliquis will be stopped 3 days before surgery.   Surgery was scheduled for 6/26/23    Interval Hx:   Patient today awake and comfortable. Has no new issues. Eating well. Has solomon LE weakness and needing assist to ambulate.     Case was discussed with patient's  on the floor.    Objective/physical exam:  General: In no acute distress  Chest: Clear to auscultation bilaterally  Heart: RRR, +S1, S2, no appreciable murmur  Abdomen: Soft, nontender, BS +  MSK: Warm, no lower extremity edema, no clubbing or cyanosis  Neurologic: Cranial nerve II-XII intact, Strength Solomon UE 3/5, LE 4/5    VITAL SIGNS: 24 HRS MIN & MAX LAST   Temp  Min: 97.5 °F (36.4 °C)  Max: 98.2 °F (36.8 °C) 98.2 °F (36.8 °C)   BP  Min: 120/76  Max: 143/94 124/68   Pulse  Min: 61  Max: 76  65   Resp  Min: 18  Max: 20 18   SpO2  Min: 97 %  Max: 98 % 98 %     I have reviewed the following labs:    Recent Labs   Lab 06/20/23  0533   WBC 6.85   RBC 3.72*   HGB 12.2*   HCT 37.5*   .8*   MCH 32.8*   MCHC 32.5*   RDW 14.9      MPV 8.4       Recent Labs   Lab 06/20/23  0533      K 3.8   CO2 23   BUN 12.7   CREATININE 0.87   CALCIUM 9.6          Microbiology Results (last 7 days)       ** No results found for the last 168 hours. **             See below for Radiology    Scheduled Med:   amLODIPine  5 mg Oral Daily    ascorbic acid (vitamin C)  1,000 mg Oral Daily    atorvastatin  40 mg Oral Every other day    DULoxetine  30 mg Oral Daily    dutasteride  0.5 mg Oral Daily    ferrous sulfate  1 tablet  Oral Daily    gabapentin  300 mg Oral TID    linaCLOtide  145 mcg Oral Before breakfast    metoprolol tartrate  25 mg Oral BID    tamsulosin  0.4 mg Oral QHS    zolpidem  10 mg Oral QHS        Continuous Infusions:       PRN Meds:  acetaminophen, aluminum-magnesium hydroxide-simethicone, HYDROcodone-acetaminophen, melatonin, naloxone, ondansetron, polyethylene glycol, prochlorperazine, simethicone       Assessment/Plan:  Bilateral upper extremity and lower extremity weakness and numbness from cervical canal stenosis   E coli UTI-pansensitive - asymptomatic   Multiple myeloma on chemotherapy  Essential HTN  Hx of PE on Eliquis  CAD s/p stent   Chronic Anemia  Hx of GERD, HLD      Plan:  Patient doing well with no acute issues. Has been afebrile  Has a good appetite.   Will cont PT per neurosurgery reocmmendations  Plavix now on hold per [facility] team. Holding Eliquis from 6/23/23  [facility] recommended to bridge with Lovenox or iv heparin drip, will confirm with Neurosurgery team if this is ok.   Surgery planned for 6/26/23  Continue strict aspiration, fall and decubitus precautions    Current meds reviewed lovenox   CM consulted for neuro rehab placement after surgery     Cont supportive  care      VTE prophylaxis: Eliquis     Patient condition:  Fair    Anticipated discharge and Disposition:   Neuro rehab       All diagnosis and differential diagnosis have been reviewed; assessment and plan has been documented; I have personally reviewed the labs and test results that are presently available; I have reviewed the patients medication list; I have reviewed the consulting providers response and recommendations. I have reviewed or attempted to review medical records based upon their availability    All of the patient's questions have been  addressed and answered. Patient's is agreeable to the above stated plan. I will continue to monitor closely and make adjustments to medical management as  needed.  _____________________________________________________________________    Nutrition Status:    Radiology:  I have personally reviewed the following imaging and agree with the radiologist.     CT Abdomen Pelvis  Without Contrast  Narrative: EXAMINATION:  CT ABDOMEN PELVIS WITHOUT CONTRAST    CLINICAL HISTORY:  Flank pain, kidney stone suspected;    TECHNIQUE:  Helical acquisition through the abdomen and pelvis without IV contrast.  Lack of contrast limits evaluation of solid organs and vascular structures .  Three plane reconstructions were provided for review.  mGycm. Automatic exposure control, adjustment of mA/kV or iterative reconstruction technique was used to reduce radiation.    COMPARISON:  28 September 2022    FINDINGS:  Small right and trace left pleural effusions.    There is stable left hepatic lobe hypodensity.  There are calcified gallstones.  No pericholecystic inflammation.  No significant abnormality of the spleen, pancreas or adrenals.  No hydronephrosis.  No urinary tract calculi.    There is no bowel obstruction.  No significant inflammatory changes of the bowel.  There is a large amount of stool in the distal colon.    There is bladder wall thickening.  Duenas is in place.  The prostate is moderately enlarged.  No pelvic free fluid.  Abdominal aorta normal in caliber.  Dense atherosclerotic disease.    Moderate degenerative change of the spine.  Impression: 1. No acute abdominopelvic findings on this noncontrast scan.  2. Large amount of stool distal colon.  3. Small right and trace left pleural effusions.  4. Other chronic findings above.    Electronically signed by: Cody Duong  Date:    06/19/2023  Time:    12:01      Garett Solorio MD   06/23/2023

## 2023-06-23 NOTE — PT/OT/SLP PROGRESS
Occupational Therapy   Treatment    Name: Valentino Manning  MRN: [id number]  Admitting Diagnosis:  <principal problem not specified>       Recommendations:     Discharge Recommendations: rehabilitation facility  Discharge Equipment Recommendations:  to be determined by next level of care  Barriers to discharge:       Assessment:     Valentino Manning is a 72 y.o. male with a medical diagnosis of <principal problem not specified>.  Performance deficits affecting function are weakness, impaired endurance, impaired sensation, impaired self care skills, impaired functional mobility, impaired balance, decreased upper extremity function, decreased lower extremity function, decreased safety awareness, pain, decreased ROM.     Rehab Prognosis:  Fair; patient would benefit from acute skilled OT services to address these deficits and reach maximum level of function.       Plan:     Patient to be seen 6 x/week to address the above listed problems via self-care/home management, therapeutic activities, therapeutic exercises  Plan of Care Expires: 07/05/23  Plan of Care Reviewed with: patient, spouse    Subjective     Pain/Comfort:       Objective:     Communicated with: RN prior to session.  Patient found HOB elevated with   upon OT entry to room.    General Precautions: Standard, fall    Orthopedic Precautions:spinal precautions  Braces: [place] J collar  Vital Signs: Orthostatics: Supine 121/68, Sitting 125/71, Standing 99/42  back in sitting /69     Occupational Performance:     Bed Mobility:    Patient completed Supine to Sit with minimum assistance     Functional Mobility/Transfers:  Patient completed Sit <> Stand Transfer with moderate assistance  with  rolling walker   Functional Mobility: patient with forward flexed posture during stands requiring Mod A with posture.     Activities of Daily Living:  Lower Body Dressing: minimum assistance donning/doffing B socks in figure-4      Patient Education:  Patient provided with  verbal education regarding OT role/goals/POC.  Understanding was verbalized.      Patient left HOB elevated with all lines intact and call button in reach    GOALS:   Multidisciplinary Problems       Occupational Therapy Goals          Problem: Occupational Therapy    Goal Priority Disciplines Outcome Interventions   Occupational Therapy Goal     OT, PT/OT Ongoing, Progressing    Description: Goals to be met by: 7/5/23     Patient will increase functional independence with ADLs by performing:    UE Dressing with Minimal Assistance.  LE Dressing with Moderate Assistance and Assistive Devices as needed.  Grooming tasks while EOB with Minimal Assistance. - Revised.  Toileting from BSC with Moderate Assistance.  Pt will perform BSC t/f with Minimal Assistance.                         Time Tracking:     OT Date of Treatment: 06/23/23  OT Start Time: 1119  OT Stop Time: 1147  OT Total Time (min): 28 min    Billable Minutes:Self Care/Home Management 1  Therapeutic Activity 1    OT/CAMERON: CAMERON     Number of CAMERON visits since last OT visit: 2    6/23/2023

## 2023-06-23 NOTE — PROGRESS NOTES
NEUROSURGICAL PROGRESS NOTE    DATE OF SERVICE:  06/23/2023    ATTENDING PHYSICIAN:  Noman Mims MD    SUBJECTIVE:    INTERIM HISTORY:  He presented with worsening myelopathy symptoms, difficulty ambulating, bilateral upper extremity numbness.    Doing well today.  His upper extremity and lower extremity symptoms have remained stable since admission.  No other complaint.              PAST MEDICAL HISTORY:  Active Ambulatory Problems     Diagnosis Date Noted    Anemia 08/10/2022    Elevated serum globulin level 08/10/2022    Kidney disease 08/10/2022    Multiple myeloma 08/29/2022    Hypertension     SOB (shortness of breath) 02/18/2023    Patient on antineoplastic chemotherapy regimen 03/30/2023    Peripheral neuropathy 04/20/2023     Resolved Ambulatory Problems     Diagnosis Date Noted    No Resolved Ambulatory Problems     Past Medical History:   Diagnosis Date    GERD (gastroesophageal reflux disease)     Heart problem     Hyperlipidemia     NSTEMI (non-ST elevated myocardial infarction)        PAST SURGICAL HISTORY:  Past Surgical History:   Procedure Laterality Date    BONE MARROW ASPIRATION N/A 9/1/2022    Procedure: ASPIRATION, BONE MARROW;  Surgeon: Robbie Gardner MD;  Location: [facility];  Service: General;  Laterality: N/A;    CARDIAC SURGERY  2021    ENDOSCOPIC RELEASE OF BOTH CARPAL TUNNELS         SOCIAL HISTORY:   Social History     Socioeconomic History    Marital status:    Tobacco Use    Smoking status: Former     Types: Cigarettes    Smokeless tobacco: Former   Substance and Sexual Activity    Alcohol use: Not Currently    Drug use: Never     Social Determinants of Health     Food Insecurity: Unknown    Worried About Running Out of Food in the Last Year: Never true   Transportation Needs: Unknown    Lack of Transportation (Medical): No   Housing Stability: Unknown    Unable to Pay for Housing in the Last Year: No       FAMILY HISTORY:  Family History   Problem Relation Age of Onset     Hypertension Mother     Diabetes Mother     Anemia Mother     Heart disease Mother     Heart disease Father        CURRENTS MEDICATIONS:  No current facility-administered medications on file prior to encounter.     Current Outpatient Medications on File Prior to Encounter   Medication Sig Dispense Refill    amLODIPine (NORVASC) 10 MG tablet once daily.      apixaban (ELIQUIS) 5 mg Tab Take 1 tablet (5 mg total) by mouth 2 (two) times daily. 60 tablet 6    ascorbic acid, vitamin C, (VITAMIN C) 1000 MG tablet Take 1,000 mg by mouth once daily.      atorvastatin (LIPITOR) 40 MG tablet TAKE 1 TABLET ORALLY ONCE DAILY ON MON/TUE/THUR/FRI AND 1/2 ON WED. NONE ON SAT/SUN      b complex vitamins capsule Take 1 capsule by mouth once daily.      clopidogreL (PLAVIX) 75 mg tablet Take 75 mg by mouth once daily.      doxazosin (CARDURA) 4 MG tablet Take 4 mg by mouth once daily.      dutasteride (AVODART) 0.5 mg capsule Take 0.5 mg by mouth once daily.      FERRETTS 325 mg (106 mg iron) Tab Take 1 tablet by mouth 2 (two) times daily.      LINZESS 145 mcg Cap capsule Take 145 mcg by mouth.      lisinopriL (PRINIVIL,ZESTRIL) 40 MG tablet Take 40 mg by mouth once daily.      metoprolol tartrate (LOPRESSOR) 25 MG tablet Take 25 mg by mouth 2 (two) times daily. Takes 1 tab q am      testosterone cypionate (DEPOTESTOTERONE CYPIONATE) 200 mg/mL injection INJECT 1ML INTO MUSCLE ONCE A WEEK      zolpidem (AMBIEN) 10 mg Tab Take 10 mg by mouth nightly as needed.      albuterol (PROAIR HFA) 90 mcg/actuation inhaler Inhale 2 puffs into the lungs every 6 (six) hours as needed for Wheezing. Rescue 8 g 0    diphenoxylate-atropine 2.5-0.025 mg (LOMOTIL) 2.5-0.025 mg per tablet Take 1 tablet by mouth 4 (four) times daily as needed for Diarrhea. 30 tablet 0    ergocalciferol (ERGOCALCIFEROL) 50,000 unit Cap Take 1 capsule (50,000 Units total) by mouth every 7 days. 7 capsule 0    furosemide (LASIX) 20 MG tablet       gabapentin (NEURONTIN) 300  MG capsule Take 1 capsule (300 mg total) by mouth 3 (three) times daily. 90 capsule 3    glutamine 10 gram PwPk Take 10 g by mouth 2 (two) times a day.      hydrocortisone (CORTEF) 20 MG Tab TAKE ONE TABLET BY MOUTH TWICE DAILY X 14 DAYS      lactulose (CHRONULAC) 10 gram/15 mL solution GIVE 30ML BY MOUTH TWICE A DAY X7 DAY(S)      ondansetron (ZOFRAN) 4 MG tablet Take 4 mg by mouth every 8 (eight) hours as needed for Nausea.      pantoprazole (PROTONIX) 40 MG tablet Take 1 tablet (40 mg total) by mouth once daily. for 30 doses 30 tablet 1    promethazine (PHENERGAN) 25 MG tablet TAKE 1 TABLET BY MOUTH EVERY 4 HOURS AS NEEDED FOR MOTION SICKNESS      pyridoxine, vitamin B6, (B-6) 250 MG Tab Take 250 mg by mouth once daily.      SLOW RELEASE IRON 140 mg (45 mg iron) TbSR Take 1 tablet by mouth 2 (two) times daily.      sulfamethoxazole-trimethoprim 800-160mg (BACTRIM DS) 800-160 mg Tab Take 1 tablet by mouth As instructed. Tab 1 PO Daily M-W-F         ALLERGIES:  Review of patient's allergies indicates:   Allergen Reactions    Penicillins        REVIEW OF SYSTEMS:  ROS      OBJECTIVE:    PHYSICAL EXAMINATION:   Vitals:    06/23/23 0700   BP: 129/77   Pulse: 76   Resp: 18   Temp: 98.1 °F (36.7 °C)     Awake, highly conversant  Right deltoid 3/5, left deltoid 2/5, otherwise b UE 4/5  B proximal LE 3/4, otherwise b LE 4/5  Decreased sensation to light touch in bilateral hands and from bilateral knees distally to feet    DIAGNOSTIC DATA:  I personally interpreted the following imaging:   A MRI cervical spine with and without gadolinium demonstrates Grade I retrolisthesis of C3 on C4.  Grade I anterolisthesis of C4 on C5.  Grade I retrolisthesis of C5 on C6.  There is enhancement in the C4-5 and C5-6 facets. There is moderate to severe stenosis with bilateral neuroforaminal narrowing at C3-4, C4-5, and C5-6. Increased T2 signal within the spinal cord at C4-5, which is consistent with myelomalacia.  ASSESMENT:  This is a  72 y.o. male with     Problem List Items Addressed This Visit          Orthopedic    Upper extremity weakness    Relevant Orders    Protime-INR (Completed)    APTT (Completed)    Neuro checks:  LOC, Orientation, GCS, Motor     Other Visit Diagnoses       Stenosis of cervical spine with myelopathy    -  Primary    Relevant Orders    Nursing communication    Inpatient consult to Orthotics    X-Ray Chest 1 View (Completed)    Type & Screen (Completed)    Protime-INR (Completed)    APTT (Completed)    Urinalysis, Reflex to Urine Culture (Completed)    CBC Auto Differential (Completed)    Basic Metabolic Panel (Completed)    Urinalysis, Microscopic (Completed)    Antibody identification (Completed)    Type & Screen    Diet NPO    Weakness        Relevant Orders    EKG 12-lead (Completed)    Neuropathy        Acute cystitis with hematuria        Chest pain        Relevant Orders    EKG 12-lead              PLAN:  Cervical laminectomy and posterior instrumented fusion scheduled for this Monday.   All questions answered.        Noman Mims MD  Cell:[phone]

## 2023-06-24 LAB
ABO + RH BLD: NORMAL
ABO + RH BLD: NORMAL
BLD PROD TYP BPU: NORMAL
BLD PROD TYP BPU: NORMAL
BLOOD UNIT EXPIRATION DATE: NORMAL
BLOOD UNIT EXPIRATION DATE: NORMAL
BLOOD UNIT TYPE CODE: 6200
BLOOD UNIT TYPE CODE: 6200
CROSSMATCH INTERPRETATION: NORMAL
CROSSMATCH INTERPRETATION: NORMAL
DISPENSE STATUS: NORMAL
DISPENSE STATUS: NORMAL
UNIT NUMBER: NORMAL
UNIT NUMBER: NORMAL

## 2023-06-24 PROCEDURE — 21400001 HC TELEMETRY ROOM

## 2023-06-24 PROCEDURE — 11000001 HC ACUTE MED/SURG PRIVATE ROOM

## 2023-06-24 PROCEDURE — 25000003 PHARM REV CODE 250: Performed by: INTERNAL MEDICINE

## 2023-06-24 PROCEDURE — 25000003 PHARM REV CODE 250: Performed by: NURSE PRACTITIONER

## 2023-06-24 PROCEDURE — 63600175 PHARM REV CODE 636 W HCPCS: Performed by: NEUROLOGICAL SURGERY

## 2023-06-24 RX ADMIN — LINACLOTIDE 145 MCG: 145 CAPSULE, GELATIN COATED ORAL at 07:06

## 2023-06-24 RX ADMIN — ATORVASTATIN CALCIUM 40 MG: 40 TABLET, FILM COATED ORAL at 09:06

## 2023-06-24 RX ADMIN — GABAPENTIN 300 MG: 300 CAPSULE ORAL at 02:06

## 2023-06-24 RX ADMIN — Medication 1000 MG: at 09:06

## 2023-06-24 RX ADMIN — AMLODIPINE BESYLATE 5 MG: 5 TABLET ORAL at 09:06

## 2023-06-24 RX ADMIN — HEPARIN SODIUM 5000 UNITS: 5000 INJECTION, SOLUTION INTRAVENOUS; SUBCUTANEOUS at 09:06

## 2023-06-24 RX ADMIN — HYDROCODONE BITARTRATE AND ACETAMINOPHEN 1 TABLET: 5; 325 TABLET ORAL at 09:06

## 2023-06-24 RX ADMIN — GABAPENTIN 300 MG: 300 CAPSULE ORAL at 09:06

## 2023-06-24 RX ADMIN — TAMSULOSIN HYDROCHLORIDE 0.4 MG: 0.4 CAPSULE ORAL at 09:06

## 2023-06-24 RX ADMIN — ZOLPIDEM TARTRATE 10 MG: 5 TABLET ORAL at 09:06

## 2023-06-24 RX ADMIN — MUPIROCIN: 20 OINTMENT TOPICAL at 09:06

## 2023-06-24 RX ADMIN — DULOXETINE 30 MG: 30 CAPSULE, DELAYED RELEASE ORAL at 09:06

## 2023-06-24 RX ADMIN — METOPROLOL TARTRATE 25 MG: 25 TABLET, FILM COATED ORAL at 09:06

## 2023-06-24 RX ADMIN — MUPIROCIN: 20 OINTMENT TOPICAL at 10:06

## 2023-06-24 RX ADMIN — FERROUS SULFATE TAB 325 MG (65 MG ELEMENTAL FE) 1 EACH: 325 (65 FE) TAB at 09:06

## 2023-06-24 NOTE — PLAN OF CARE
Problem: Adult Inpatient Plan of Care  Goal: Optimal Comfort and Wellbeing  Outcome: Ongoing, Progressing     Problem: Fall Injury Risk  Goal: Absence of Fall and Fall-Related Injury  Outcome: Ongoing, Progressing     Problem: Skin Injury Risk Increased  Goal: Skin Health and Integrity  Outcome: Ongoing, Progressing     Problem: Pain Acute  Goal: Acceptable Pain Control and Functional Ability  Outcome: Ongoing, Progressing

## 2023-06-25 LAB
ALBUMIN SERPL-MCNC: 3 G/DL (ref 3.4–4.8)
ALBUMIN/GLOB SERPL: 1.1 RATIO (ref 1.1–2)
ALP SERPL-CCNC: 82 UNIT/L (ref 40–150)
ALT SERPL-CCNC: 30 UNIT/L (ref 0–55)
ANTIBODY IDENTIFICATION: NORMAL
AST SERPL-CCNC: 16 UNIT/L (ref 5–34)
BASOPHILS # BLD AUTO: 0.03 X10(3)/MCL
BASOPHILS NFR BLD AUTO: 0.4 %
BILIRUBIN DIRECT+TOT PNL SERPL-MCNC: 0.5 MG/DL
BUN SERPL-MCNC: 11.3 MG/DL (ref 8.4–25.7)
CALCIUM SERPL-MCNC: 9.6 MG/DL (ref 8.8–10)
CHLORIDE SERPL-SCNC: 105 MMOL/L (ref 98–107)
CO2 SERPL-SCNC: 24 MMOL/L (ref 23–31)
CREAT SERPL-MCNC: 0.83 MG/DL (ref 0.73–1.18)
EOSINOPHIL # BLD AUTO: 0.21 X10(3)/MCL (ref 0–0.9)
EOSINOPHIL NFR BLD AUTO: 2.6 %
ERYTHROCYTE [DISTWIDTH] IN BLOOD BY AUTOMATED COUNT: 14.5 % (ref 11.5–17)
GFR SERPLBLD CREATININE-BSD FMLA CKD-EPI: >60 MLS/MIN/1.73/M2
GLOBULIN SER-MCNC: 2.8 GM/DL (ref 2.4–3.5)
GLUCOSE SERPL-MCNC: 88 MG/DL (ref 82–115)
GROUP & RH: ABNORMAL
HCT VFR BLD AUTO: 33.9 % (ref 42–52)
HGB BLD-MCNC: 11.1 G/DL (ref 14–18)
IMM GRANULOCYTES # BLD AUTO: 0.05 X10(3)/MCL (ref 0–0.04)
IMM GRANULOCYTES NFR BLD AUTO: 0.6 %
INDIRECT COOMBS GEL: ABNORMAL
INR BLD: 1.03 (ref 0–1.3)
LYMPHOCYTES # BLD AUTO: 1.75 X10(3)/MCL (ref 0.6–4.6)
LYMPHOCYTES NFR BLD AUTO: 21.3 %
MAGNESIUM SERPL-MCNC: 1.7 MG/DL (ref 1.6–2.6)
MCH RBC QN AUTO: 32.9 PG (ref 27–31)
MCHC RBC AUTO-ENTMCNC: 32.7 G/DL (ref 33–36)
MCV RBC AUTO: 100.6 FL (ref 80–94)
MONOCYTES # BLD AUTO: 0.76 X10(3)/MCL (ref 0.1–1.3)
MONOCYTES NFR BLD AUTO: 9.3 %
NEUTROPHILS # BLD AUTO: 5.4 X10(3)/MCL (ref 2.1–9.2)
NEUTROPHILS NFR BLD AUTO: 65.8 %
NRBC BLD AUTO-RTO: 0 %
PHOSPHATE SERPL-MCNC: 3.9 MG/DL (ref 2.3–4.7)
PLATELET # BLD AUTO: 315 X10(3)/MCL (ref 130–400)
PMV BLD AUTO: 8.6 FL (ref 7.4–10.4)
POTASSIUM SERPL-SCNC: 3.8 MMOL/L (ref 3.5–5.1)
PROT SERPL-MCNC: 5.8 GM/DL (ref 5.8–7.6)
PROTHROMBIN TIME: 13.4 SECONDS (ref 12.5–14.5)
RBC # BLD AUTO: 3.37 X10(6)/MCL (ref 4.7–6.1)
SODIUM SERPL-SCNC: 137 MMOL/L (ref 136–145)
SPECIMEN OUTDATE: ABNORMAL
WBC # SPEC AUTO: 8.2 X10(3)/MCL (ref 4.5–11.5)

## 2023-06-25 PROCEDURE — 84100 ASSAY OF PHOSPHORUS: CPT | Performed by: INTERNAL MEDICINE

## 2023-06-25 PROCEDURE — 25000003 PHARM REV CODE 250: Performed by: INTERNAL MEDICINE

## 2023-06-25 PROCEDURE — 80053 COMPREHEN METABOLIC PANEL: CPT | Performed by: INTERNAL MEDICINE

## 2023-06-25 PROCEDURE — 86922 COMPATIBILITY TEST ANTIGLOB: CPT | Performed by: INTERNAL MEDICINE

## 2023-06-25 PROCEDURE — 97110 THERAPEUTIC EXERCISES: CPT | Mod: CQ

## 2023-06-25 PROCEDURE — 63600175 PHARM REV CODE 636 W HCPCS: Performed by: NEUROLOGICAL SURGERY

## 2023-06-25 PROCEDURE — 83735 ASSAY OF MAGNESIUM: CPT | Performed by: INTERNAL MEDICINE

## 2023-06-25 PROCEDURE — 86870 RBC ANTIBODY IDENTIFICATION: CPT | Performed by: NEUROLOGICAL SURGERY

## 2023-06-25 PROCEDURE — 86900 BLOOD TYPING SEROLOGIC ABO: CPT | Performed by: NEUROLOGICAL SURGERY

## 2023-06-25 PROCEDURE — 85025 COMPLETE CBC W/AUTO DIFF WBC: CPT | Performed by: INTERNAL MEDICINE

## 2023-06-25 PROCEDURE — 25000003 PHARM REV CODE 250: Performed by: NURSE PRACTITIONER

## 2023-06-25 PROCEDURE — 85610 PROTHROMBIN TIME: CPT | Performed by: INTERNAL MEDICINE

## 2023-06-25 PROCEDURE — 97530 THERAPEUTIC ACTIVITIES: CPT | Mod: CO

## 2023-06-25 PROCEDURE — 97535 SELF CARE MNGMENT TRAINING: CPT | Mod: CO

## 2023-06-25 PROCEDURE — 21400001 HC TELEMETRY ROOM

## 2023-06-25 PROCEDURE — 11000001 HC ACUTE MED/SURG PRIVATE ROOM

## 2023-06-25 RX ORDER — HYDROCODONE BITARTRATE AND ACETAMINOPHEN 5; 325 MG/1; MG/1
1 TABLET ORAL EVERY 4 HOURS PRN
Status: DISCONTINUED | OUTPATIENT
Start: 2023-06-25 | End: 2023-06-28 | Stop reason: SDUPTHER

## 2023-06-25 RX ORDER — ALPRAZOLAM 0.25 MG/1
0.25 TABLET ORAL ONCE
Status: DISCONTINUED | OUTPATIENT
Start: 2023-06-25 | End: 2023-06-26

## 2023-06-25 RX ADMIN — Medication 1000 MG: at 09:06

## 2023-06-25 RX ADMIN — AMLODIPINE BESYLATE 5 MG: 5 TABLET ORAL at 09:06

## 2023-06-25 RX ADMIN — GABAPENTIN 300 MG: 300 CAPSULE ORAL at 09:06

## 2023-06-25 RX ADMIN — GABAPENTIN 300 MG: 300 CAPSULE ORAL at 03:06

## 2023-06-25 RX ADMIN — HYDROCODONE BITARTRATE AND ACETAMINOPHEN 1 TABLET: 5; 325 TABLET ORAL at 03:06

## 2023-06-25 RX ADMIN — HYDROCODONE BITARTRATE AND ACETAMINOPHEN 1 TABLET: 5; 325 TABLET ORAL at 07:06

## 2023-06-25 RX ADMIN — METOPROLOL TARTRATE 25 MG: 25 TABLET, FILM COATED ORAL at 09:06

## 2023-06-25 RX ADMIN — DULOXETINE 30 MG: 30 CAPSULE, DELAYED RELEASE ORAL at 09:06

## 2023-06-25 RX ADMIN — MUPIROCIN: 20 OINTMENT TOPICAL at 09:06

## 2023-06-25 RX ADMIN — HEPARIN SODIUM 5000 UNITS: 5000 INJECTION, SOLUTION INTRAVENOUS; SUBCUTANEOUS at 09:06

## 2023-06-25 RX ADMIN — HYDROCODONE BITARTRATE AND ACETAMINOPHEN 1 TABLET: 5; 325 TABLET ORAL at 09:06

## 2023-06-25 RX ADMIN — HYDROCODONE BITARTRATE AND ACETAMINOPHEN 1 TABLET: 5; 325 TABLET ORAL at 11:06

## 2023-06-25 RX ADMIN — FERROUS SULFATE TAB 325 MG (65 MG ELEMENTAL FE) 1 EACH: 325 (65 FE) TAB at 09:06

## 2023-06-25 RX ADMIN — ZOLPIDEM TARTRATE 10 MG: 5 TABLET ORAL at 09:06

## 2023-06-25 RX ADMIN — TAMSULOSIN HYDROCHLORIDE 0.4 MG: 0.4 CAPSULE ORAL at 09:06

## 2023-06-25 RX ADMIN — LINACLOTIDE 145 MCG: 145 CAPSULE, GELATIN COATED ORAL at 08:06

## 2023-06-25 NOTE — PROGRESS NOTES
[facility] Hospital Medicine Progress Note        Chief Complaint: Inpatient Follow-up for Solomon UE and LE weakness and paresthesia     HPI:     Valentino Manning is a 72 y.o. male with a PMHx of HTN, HLD, CAD s/p stent on Plavix, chronic anemia, GERD, multiple myeloma on chemotherapym hx of PE on Eliquis who presented to [facility] on 6/11/2023 with c/o numbness to bilateral hands and bilateral lower extremities with associated weakness has been worsening over the past 6 months.  Patient reports that he was now unable to ambulate due to worsening paresthesias and generalized weakness x3 weeks.  He was now bed-bound.  He endorsed falling about 4 times over the past 3 weeks, denied LOC or head injury.  He reports he was in his normal state of health prior to initiating chemotherapy in December 2022.  He then had COVID-19 in February 2023 and has had a rapid decline since then.  He also endorsed urinary retention for which he was seen at an outlying ED on 6/5/23 and had a Duenas catheter placed, he was supposed to follow-up with urology on 06/12/2023. He was previously on gabapentin 300 mg t.i.d. for neuropathy without symptomatic relief, he states he was not taking it as directed.  He denied CP, SOB, fever, chills, cough, dizziness, syncope.  Patient reportedly saw his oncologist, Dr. Harris, for same complaints and he was referred to ED for further evaluation.   Initial ED VS stable.  Labs notable for hemoglobin 12.6, hematocrit 38.  Urinalysis with 2+ protein, 3+ occult blood, positive nitrites, 2+ leukocytes, 11 RBCs, 67 WBCs, 1+ bacteria.  Urine culture pending.  He was started on oral Levaquin for suspected UTI.  He was admitted to hospital medicine services for further medical management.  MRI C-spine done and showed  Impression:     1. Severe degenerative narrowing of the spinal canal at C4-C5 and C5-C6, moderate at C3-C4, mild at other levels.  2. Multilevel neural foraminal stenoses as  described  3. T2 signal changes in the cord at C4-C5 may be secondary to a compressive myelopathy.  4. Mild inflammatory changes of the bilateral C4-C5 and C5-C6 facets, may be degenerative.  No drainable fluid collection.     He was seen by Neurosurgery team and recommended  C3-4, C4-5, C5-6 laminectomy and posterior fusion surgery. Patient was on plavix and eliquis. Plavix has been stopped in the perioperative period per cardiology and eliquis will be stopped 3 days before surgery.   Surgery was scheduled for 6/26/23      Interval Hx:   Pt has no new c/o today . Symptoms remain unchanged. Pt is looking forward to having surgery on Monday.     Case was discussed with patient's nurse and  on the floor.    Objective/physical exam:  General: In no acute distress  Chest: Clear to auscultation bilaterally  Heart: RRR, +S1, S2, no appreciable murmur  Abdomen: Soft, nontender, BS +  MSK: Warm, no lower extremity edema, no clubbing or cyanosis  Neurologic: Cranial nerve II-XII intact, Strength Solomon UE 3/5, LE 4/5, weak  strength , Gait - impaired     VITAL SIGNS: 24 HRS MIN & MAX LAST   Temp  Min: 97.5 °F (36.4 °C)  Max: 98.9 °F (37.2 °C) 98.2 °F (36.8 °C)   BP  Min: 103/64  Max: 162/85 (!) 151/88   Pulse  Min: 59  Max: 72  68   Resp  Min: 16  Max: 20 18   SpO2  Min: 96 %  Max: 99 % 98 %     I have reviewed the following labs:    Recent Labs   Lab 06/20/23  0533   WBC 6.85   RBC 3.72*   HGB 12.2*   HCT 37.5*   .8*   MCH 32.8*   MCHC 32.5*   RDW 14.9      MPV 8.4       Recent Labs   Lab 06/20/23  0533      K 3.8   CO2 23   BUN 12.7   CREATININE 0.87   CALCIUM 9.6          Microbiology Results (last 7 days)       ** No results found for the last 168 hours. **             See below for Radiology    Scheduled Med:   amLODIPine  5 mg Oral Daily    ascorbic acid (vitamin C)  1,000 mg Oral Daily    atorvastatin  40 mg Oral Every other day    DULoxetine  30 mg Oral Daily    dutasteride  0.5 mg  Oral Daily    ferrous sulfate  1 tablet Oral Daily    gabapentin  300 mg Oral TID    heparin (porcine)  5,000 Units Subcutaneous Q12H    linaCLOtide  145 mcg Oral Before breakfast    metoprolol tartrate  25 mg Oral BID    mupirocin   Nasal BID    tamsulosin  0.4 mg Oral QHS    zolpidem  10 mg Oral QHS        Continuous Infusions:       PRN Meds:  acetaminophen, aluminum-magnesium hydroxide-simethicone, HYDROcodone-acetaminophen, melatonin, naloxone, ondansetron, polyethylene glycol, prochlorperazine, simethicone       Assessment/Plan:  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   Spine Surgery planned for 6/26/23   CM consulted for neuro rehab placement after surgery    Cont supportive  care    VTE prophylaxis: Heparin subQ    Patient condition:  Fair     Anticipated discharge and Disposition:     Neuro rehab post op    All diagnosis and differential diagnosis have been reviewed; assessment and plan has been documented; I have personally reviewed the labs and test results that are presently available; I have reviewed the patients medication list; I have reviewed the consulting providers response and recommendations. I have reviewed or attempted to review medical records based upon their availability    All of the patient's questions have been  addressed and answered. Patient's is agreeable to the above stated plan. I will continue to monitor closely and make adjustments to medical management as  needed.  _____________________________________________________________________    Nutrition Status:    Radiology:  I have personally reviewed the following imaging and agree with the radiologist.     CT Abdomen Pelvis  Without Contrast  Narrative: EXAMINATION:  CT ABDOMEN PELVIS WITHOUT CONTRAST    CLINICAL HISTORY:  Flank pain, kidney stone suspected;    TECHNIQUE:  Helical acquisition through the abdomen and pelvis without IV contrast.  Lack of contrast limits evaluation of solid organs and vascular structures .  Three plane reconstructions were provided for review.  mGycm. Automatic exposure control, adjustment of mA/kV or iterative reconstruction technique was used to reduce radiation.    COMPARISON:  28 September 2022    FINDINGS:  Small right and trace left pleural effusions.    There is stable left hepatic lobe hypodensity.  There are calcified gallstones.  No pericholecystic inflammation.  No significant abnormality of the spleen, pancreas or adrenals.  No hydronephrosis.  No urinary tract calculi.    There is no bowel obstruction.  No significant inflammatory changes of the bowel.  There is a large amount of stool in the distal colon.    There is bladder wall thickening.  Duenas is in place.  The prostate is moderately enlarged.  No pelvic free fluid.  Abdominal aorta normal in caliber.  Dense atherosclerotic disease.    Moderate degenerative change of the spine.  Impression: 1. No acute abdominopelvic findings on this noncontrast scan.  2. Large amount of stool distal colon.  3. Small right and trace left pleural effusions.  4. Other chronic findings above.    Electronically signed by: Cody Duong  Date:    06/19/2023  Time:    12:01      Omid Vale MD   06/24/2023

## 2023-06-25 NOTE — PT/OT/SLP PROGRESS
Occupational Therapy   Treatment    Name: Valentino Manning  MRN: [id number]  Admitting Diagnosis:  <principal problem not specified>       Recommendations:     Discharge Recommendations: rehabilitation facility  Discharge Equipment Recommendations:  to be determined by next level of care  Barriers to discharge:       Assessment:     Valentino Manning is a 72 y.o. male with a medical diagnosis of <principal problem not specified>.  Performance deficits affecting function are weakness, impaired endurance, impaired sensation, impaired self care skills, impaired functional mobility, impaired balance, decreased upper extremity function, decreased lower extremity function, decreased safety awareness, pain, decreased ROM.     Rehab Prognosis:  Fair; patient would benefit from acute skilled OT services to address these deficits and reach maximum level of function.       Plan:     Patient to be seen 6 x/week to address the above listed problems via self-care/home management, therapeutic activities, therapeutic exercises  Plan of Care Expires: 07/05/23  Plan of Care Reviewed with: patient, spouse    Subjective     Pain/Comfort:       Objective:     Communicated with: RN prior to session.  Patient found HOB elevated with   upon OT entry to room.    General Precautions: Standard, fall    Orthopedic Precautions:spinal precautions  Braces: [place] J collar  Vital Signs: Orthostatics: Supine 150/80 HR 75, Sitting 138/81 HR 87, Standing 129/86      Occupational Performance: [place] J collar donned prior to sitting EOB    Bed Mobility:    Patient completed Supine to Sit with moderate assistance     Functional Mobility/Transfers:  Patient completed Sit <> Stand Transfer with maximal assistance  with  rolling walker   Patient completed Bed <> Chair Transfer using Squat Pivot technique with maximal assistance with hand-held assist  Functional Mobility: patient with B knee Buckling x3 stands requiring B knee blocking to maintain errect  position    Activities of Daily Living:  Lower Body Dressing: minimum assistance donning socks      Patient Education:  Patient provided with verbal education regarding OT role/goals/POC.  Understanding was verbalized.      Patient left up in chair with all lines intact, call button in reach, and sitting on geomat    GOALS:   Multidisciplinary Problems       Occupational Therapy Goals          Problem: Occupational Therapy    Goal Priority Disciplines Outcome Interventions   Occupational Therapy Goal     OT, PT/OT Ongoing, Progressing    Description: Goals to be met by: 7/5/23     Patient will increase functional independence with ADLs by performing:    UE Dressing with Minimal Assistance.  LE Dressing with Moderate Assistance and Assistive Devices as needed.  Grooming tasks while EOB with Minimal Assistance. - Revised.  Toileting from BSC with Moderate Assistance.  Pt will perform BSC t/f with Minimal Assistance.                         Time Tracking:     OT Date of Treatment: 06/25/23  OT Start Time: 0858  OT Stop Time: 0942  OT Total Time (min): 44 min    Billable Minutes:Self Care/Home Management 1  Therapeutic Activity 2    OT/CAMERON: CAMERON     Number of CAMERON visits since last OT visit: 3    6/25/2023

## 2023-06-25 NOTE — PLAN OF CARE
Problem: Adult Inpatient Plan of Care  Goal: Plan of Care Review  Outcome: Ongoing, Progressing  Goal: Absence of Hospital-Acquired Illness or Injury  Outcome: Ongoing, Progressing  Goal: Optimal Comfort and Wellbeing  Outcome: Ongoing, Progressing     Problem: Fall Injury Risk  Goal: Absence of Fall and Fall-Related Injury  Outcome: Ongoing, Progressing     Problem: Infection  Goal: Absence of Infection Signs and Symptoms  Outcome: Ongoing, Progressing     Problem: Skin Injury Risk Increased  Goal: Skin Health and Integrity  Outcome: Ongoing, Progressing

## 2023-06-25 NOTE — PROGRESS NOTES
[facility] Hospital Medicine Progress Note      Chief Complaint: Inpatient Follow-up for Solomon UE and LE weakness and paresthesia      HPI:      Valentino Manning is a 72 y.o. male with a PMHx of HTN, HLD, CAD s/p stent on Plavix, chronic anemia, GERD, multiple myeloma on chemotherapym hx of PE on Eliquis who presented to [facility] on 6/11/2023 with c/o numbness to bilateral hands and bilateral lower extremities with associated weakness has been worsening over the past 6 months.  Patient reports that he was now unable to ambulate due to worsening paresthesias and generalized weakness x3 weeks.  He was now bed-bound.  He endorsed falling about 4 times over the past 3 weeks, denied LOC or head injury.  He reports he was in his normal state of health prior to initiating chemotherapy in December 2022.  He then had COVID-19 in February 2023 and has had a rapid decline since then.  He also endorsed urinary retention for which he was seen at an outlying ED on 6/5/23 and had a Duenas catheter placed, he was supposed to follow-up with urology on 06/12/2023. He was previously on gabapentin 300 mg t.i.d. for neuropathy without symptomatic relief, he states he was not taking it as directed.  He denied CP, SOB, fever, chills, cough, dizziness, syncope.  Patient reportedly saw his oncologist, Dr. Harris, for same complaints and he was referred to ED for further evaluation.   Initial ED VS stable.  Labs notable for hemoglobin 12.6, hematocrit 38.  Urinalysis with 2+ protein, 3+ occult blood, positive nitrites, 2+ leukocytes, 11 RBCs, 67 WBCs, 1+ bacteria.  Urine culture pending.  He was started on oral Levaquin for suspected UTI.  He was admitted to hospital medicine services for further medical management.  MRI C-spine done and showed  Impression:     1. Severe degenerative narrowing of the spinal canal at C4-C5 and C5-C6, moderate at C3-C4, mild at other levels.  2. Multilevel neural foraminal stenoses as  described  3. T2 signal changes in the cord at C4-C5 may be secondary to a compressive myelopathy.  4. Mild inflammatory changes of the bilateral C4-C5 and C5-C6 facets, may be degenerative.  No drainable fluid collection.     He was seen by Neurosurgery team and recommended  C3-4, C4-5, C5-6 laminectomy and posterior fusion surgery. Patient was on plavix and eliquis. Plavix has been stopped in the perioperative period per cardiology and eliquis will be stopped 3 days before surgery.   Surgery was scheduled for 6/26/23        Interval Hx:   Pt has no new c/o today.  Symptoms remain unchanged. Pt is looking forward to having surgery on Monday.      Case was discussed with patient's nurse and  on the floor.     Objective/physical exam:  General: In no acute distress  Chest: Clear to auscultation bilaterally  Heart: RRR, +S1, S2, no appreciable murmur  Abdomen: Soft, nontender, BS +  MSK: Warm, no lower extremity edema, no clubbing or cyanosis  Neurologic: Cranial nerve II-XII intact, Strength Solomon UE 3/5, LE 4/5, weak  strength , Gait - impaired       Assessment/Plan:  Cervical spine stenosis with myelopathy due to degenerative spine disease   Severe degenerative lumbar spinal canal stenosis at L3-L4 and L4-L5  Multilevel neural foraminal stenosis cervical and lumbar spine   H/O Multiple myeloma   H/O PE  2/2023 , on Eliquis - currently on hold for upcoming surgery   H/O CAD, s/p stent   Anemia of chronic disease  with macrocytosis   Asymptomatic pyuria / bacteruria   Hx of GERD, HLD     Plan-   Plavix and Eliquis on hold   Neurosurgery started Heparin subQ at prophylactic dose.   Spine Surgery planned for 6/26/23   CM consulted for neuro rehab placement after surgery   Cont supportive  care     VTE prophylaxis: Heparin subQ     Patient condition:  Fair      Anticipated discharge and Disposition:     Neuro rehab post op     All diagnosis and differential diagnosis have been reviewed; assessment and plan  has been documented; I have personally reviewed the labs and test results that are presently available; I have reviewed the patients medication list; I have reviewed the consulting providers response and recommendations. I have reviewed or attempted to review medical records based upon their availability     All of the patient's questions have been  addressed and answered. Patient's is agreeable to the above stated plan. I will continue to monitor closely and make adjustments to medical management as needed.  _____________________________________________________________________       VITAL SIGNS: 24 HRS MIN & MAX LAST   Temp  Min: 97.3 °F (36.3 °C)  Max: 98.4 °F (36.9 °C) 98.4 °F (36.9 °C)   BP  Min: 125/92  Max: 151/88 (!) 125/92   Pulse  Min: 57  Max: 90  90   Resp  Min: 18  Max: 20 18   SpO2  Min: 95 %  Max: 98 % 97 %     I have reviewed the following labs:    Recent Labs   Lab 06/20/23  0533 06/25/23 0319   WBC 6.85 8.20   RBC 3.72* 3.37*   HGB 12.2* 11.1*   HCT 37.5* 33.9*   .8* 100.6*   MCH 32.8* 32.9*   MCHC 32.5* 32.7*   RDW 14.9 14.5    315   MPV 8.4 8.6       Recent Labs   Lab 06/20/23  0533 06/25/23 0319    137   K 3.8 3.8   CO2 23 24   BUN 12.7 11.3   CREATININE 0.87 0.83   CALCIUM 9.6 9.6   MG  --  1.70   ALBUMIN  --  3.0*   ALKPHOS  --  82   ALT  --  30   AST  --  16   BILITOT  --  0.5          Microbiology Results (last 7 days)       ** No results found for the last 168 hours. **             See below for Radiology    Scheduled Med:   amLODIPine  5 mg Oral Daily    ascorbic acid (vitamin C)  1,000 mg Oral Daily    atorvastatin  40 mg Oral Every other day    DULoxetine  30 mg Oral Daily    dutasteride  0.5 mg Oral Daily    ferrous sulfate  1 tablet Oral Daily    gabapentin  300 mg Oral TID    heparin (porcine)  5,000 Units Subcutaneous Q12H    linaCLOtide  145 mcg Oral Before breakfast    metoprolol tartrate  25 mg Oral BID    mupirocin   Nasal BID    tamsulosin  0.4 mg Oral QHS     zolpidem  10 mg Oral QHS        Continuous Infusions:       PRN Meds:  acetaminophen, aluminum-magnesium hydroxide-simethicone, HYDROcodone-acetaminophen, melatonin, naloxone, ondansetron, polyethylene glycol, prochlorperazine, simethicone       Assessment/Plan:      VTE prophylaxis:     Patient condition:  Stable/Fair/Guarded/ Serious/ Critical    Anticipated discharge and Disposition:         All diagnosis and differential diagnosis have been reviewed; assessment and plan has been documented; I have personally reviewed the labs and test results that are presently available; I have reviewed the patients medication list; I have reviewed the consulting providers response and recommendations. I have reviewed or attempted to review medical records based upon their availability    All of the patient's questions have been  addressed and answered. Patient's is agreeable to the above stated plan. I will continue to monitor closely and make adjustments to medical management as needed.  _____________________________________________________________________    Nutrition Status:    Radiology:  I have personally reviewed the following imaging and agree with the radiologist.     CT Abdomen Pelvis  Without Contrast  Narrative: EXAMINATION:  CT ABDOMEN PELVIS WITHOUT CONTRAST    CLINICAL HISTORY:  Flank pain, kidney stone suspected;    TECHNIQUE:  Helical acquisition through the abdomen and pelvis without IV contrast.  Lack of contrast limits evaluation of solid organs and vascular structures .  Three plane reconstructions were provided for review.  mGycm. Automatic exposure control, adjustment of mA/kV or iterative reconstruction technique was used to reduce radiation.    COMPARISON:  28 September 2022    FINDINGS:  Small right and trace left pleural effusions.    There is stable left hepatic lobe hypodensity.  There are calcified gallstones.  No pericholecystic inflammation.  No significant abnormality of the spleen,  pancreas or adrenals.  No hydronephrosis.  No urinary tract calculi.    There is no bowel obstruction.  No significant inflammatory changes of the bowel.  There is a large amount of stool in the distal colon.    There is bladder wall thickening.  Duenas is in place.  The prostate is moderately enlarged.  No pelvic free fluid.  Abdominal aorta normal in caliber.  Dense atherosclerotic disease.    Moderate degenerative change of the spine.  Impression: 1. No acute abdominopelvic findings on this noncontrast scan.  2. Large amount of stool distal colon.  3. Small right and trace left pleural effusions.  4. Other chronic findings above.    Electronically signed by: Cody Duong  Date:    06/19/2023  Time:    12:01      Shane Kaba MD   06/25/2023

## 2023-06-25 NOTE — PT/OT/SLP PROGRESS
Physical Therapy Treatment    Patient Name:  Valentino Manning   MRN:  [id number]    Recommendations:     Discharge Recommendations: rehabilitation facility (neuro)  Discharge Equipment Recommendations: to be determined by next level of care  Barriers to discharge:     Assessment:     Valentino Manning is a 72 y.o. male admitted with a medical diagnosis of cervical spine stenosis with myelopathy 2/2 degenerative spine disease.  He presents with the following impairments/functional limitations: weakness, gait instability, impaired endurance, impaired self care skills, impaired functional mobility, impaired balance.    Rehab Prognosis: Good; patient would benefit from acute skilled PT services to address these deficits and reach maximum level of function.    Recent Surgery: Procedure(s) (LRB):  FUSION, SPINE, POSTERIOR SPINAL COLUMN, CERVICAL, USING COMPUTER-ASSISTED NAVIGATION (N/A)      Plan:     During this hospitalization, patient to be seen 6 x/week to address the identified rehab impairments via gait training, therapeutic activities, therapeutic exercises and progress toward the following goals:    Plan of Care Expires:  07/28/23    Subjective     Chief Complaint: Pt states he has difficulty extending his OMAR knees when attempting to stand.   Patient/Family Comments/goals:   Pain/Comfort:         Objective:     Communicated with NSG prior to session.  Patient found up in chair with telemetry, gongora catheter upon PTA entry to room. OT transferred pt to chair one hour prior to PTA's arrival.     General Precautions: Standard, fall  Orthopedic Precautions: spinal precautions  Braces: [place] J collar  Respiratory Status: Room air  Blood Pressure:   Skin Integrity: Visible skin intact      Functional Mobility:  T/F: MaxA squat pivot t/f from bed to chair  Bed: ModA - MaxA log roll from sit to supine    Therapeutic Activities/Exercises:  B LE therex x 10 reps: manual resisted ankle pump, heel slides, hip abduction in long  sitting, LAQ in short sitting.     Education:  Patient provided with verbal education regarding spinal pxn.  Understanding was verbalized.     Patient left HOB elevated with all lines intact and call button in reach..    GOALS:   Multidisciplinary Problems       Physical Therapy Goals          Problem: Physical Therapy    Goal Priority Disciplines Outcome Goal Variances Interventions   Physical Therapy Goal     PT, PT/OT Ongoing, Progressing     Description: Goals to be met by: 23     Patient will increase functional independence with mobility by performin. Supine to sit with Stand-by Assistance  2. Sit to supine with Stand-by Assistance  3. Sit to stand transfer with Stand-by Assistance  4. Gait  x 50 feet with Stand-by Assistance using Rolling Walker.                          Time Tracking:     PT Received On:    PT Start Time: 1020     PT Stop Time: 1034  PT Total Time (min): 14 min     Billable Minutes: Therapeutic Exercise 14    Treatment Type: Treatment  PT/PTA: PTA     Number of PTA visits since last PT visit: 2023

## 2023-06-26 ENCOUNTER — ANESTHESIA (OUTPATIENT)
Dept: SURGERY | Facility: HOSPITAL | Age: 73
DRG: 472 | End: 2023-06-26
Payer: MEDICARE

## 2023-06-26 LAB
INR BLD: 1.09 (ref 0–1.3)
PROTHROMBIN TIME: 14 SECONDS (ref 12.5–14.5)

## 2023-06-26 PROCEDURE — 97535 SELF CARE MNGMENT TRAINING: CPT

## 2023-06-26 PROCEDURE — 22614 PR ARTHRODESIS, POST/POSTLAT, SNGL INTERSPACE, EA ADDTL: ICD-10-PCS | Mod: ,,, | Performed by: NEUROLOGICAL SURGERY

## 2023-06-26 PROCEDURE — 36000713 HC OR TIME LEV V EA ADD 15 MIN: Performed by: NEUROLOGICAL SURGERY

## 2023-06-26 PROCEDURE — 61783 PR STEREOTACTIC COMP ASSIST PROC,SPINAL: ICD-10-PCS | Mod: 59,,, | Performed by: NEUROLOGICAL SURGERY

## 2023-06-26 PROCEDURE — 37000009 HC ANESTHESIA EA ADD 15 MINS: Performed by: NEUROLOGICAL SURGERY

## 2023-06-26 PROCEDURE — 20936 SP BONE AGRFT LOCAL ADD-ON: CPT | Mod: ,,, | Performed by: NEUROLOGICAL SURGERY

## 2023-06-26 PROCEDURE — D9220A PRA ANESTHESIA: Mod: ANES,,, | Performed by: ANESTHESIOLOGY

## 2023-06-26 PROCEDURE — 20930 SP BONE ALGRFT MORSEL ADD-ON: CPT | Mod: ,,, | Performed by: NEUROLOGICAL SURGERY

## 2023-06-26 PROCEDURE — C1713 ANCHOR/SCREW BN/BN,TIS/BN: HCPCS | Performed by: NEUROLOGICAL SURGERY

## 2023-06-26 PROCEDURE — 22614 ARTHRD PST TQ 1NTRSPC EA ADD: CPT | Mod: ,,, | Performed by: NEUROLOGICAL SURGERY

## 2023-06-26 PROCEDURE — 71000039 HC RECOVERY, EACH ADD'L HOUR: Performed by: NEUROLOGICAL SURGERY

## 2023-06-26 PROCEDURE — 63600175 PHARM REV CODE 636 W HCPCS

## 2023-06-26 PROCEDURE — 97110 THERAPEUTIC EXERCISES: CPT

## 2023-06-26 PROCEDURE — 36620 INSERTION CATHETER ARTERY: CPT

## 2023-06-26 PROCEDURE — D9220A PRA ANESTHESIA: ICD-10-PCS | Mod: CRNA,,,

## 2023-06-26 PROCEDURE — 25000003 PHARM REV CODE 250

## 2023-06-26 PROCEDURE — 63600175 PHARM REV CODE 636 W HCPCS: Performed by: ANESTHESIOLOGY

## 2023-06-26 PROCEDURE — 85610 PROTHROMBIN TIME: CPT | Performed by: INTERNAL MEDICINE

## 2023-06-26 PROCEDURE — 20936 PR AUTOGRAFT SPINE SURGERY LOCAL FROM SAME INCISION: ICD-10-PCS | Mod: ,,, | Performed by: NEUROLOGICAL SURGERY

## 2023-06-26 PROCEDURE — 25000003 PHARM REV CODE 250: Performed by: NEUROLOGICAL SURGERY

## 2023-06-26 PROCEDURE — 20930 PR ALLOGRAFT FOR SPINE SURGERY ONLY MORSELIZED: ICD-10-PCS | Mod: ,,, | Performed by: NEUROLOGICAL SURGERY

## 2023-06-26 PROCEDURE — 22612 PR ARTHRODESIS, POST/POSTLAT, SNGL INTERSPACE, LUMBAR: ICD-10-PCS | Mod: ,,, | Performed by: NEUROLOGICAL SURGERY

## 2023-06-26 PROCEDURE — 37000008 HC ANESTHESIA 1ST 15 MINUTES: Performed by: NEUROLOGICAL SURGERY

## 2023-06-26 PROCEDURE — D9220A PRA ANESTHESIA: Mod: CRNA,,,

## 2023-06-26 PROCEDURE — 27201423 OPTIME MED/SURG SUP & DEVICES STERILE SUPPLY: Performed by: NEUROLOGICAL SURGERY

## 2023-06-26 PROCEDURE — 25000003 PHARM REV CODE 250: Performed by: INTERNAL MEDICINE

## 2023-06-26 PROCEDURE — 27800903 OPTIME MED/SURG SUP & DEVICES OTHER IMPLANTS: Performed by: NEUROLOGICAL SURGERY

## 2023-06-26 PROCEDURE — 22612 ARTHRD PST TQ 1NTRSPC LUMBAR: CPT | Mod: ,,, | Performed by: NEUROLOGICAL SURGERY

## 2023-06-26 PROCEDURE — 61783 SCAN PROC SPINAL: CPT | Mod: 59,,, | Performed by: NEUROLOGICAL SURGERY

## 2023-06-26 PROCEDURE — 71000033 HC RECOVERY, INTIAL HOUR: Performed by: NEUROLOGICAL SURGERY

## 2023-06-26 PROCEDURE — 63017 REMOVE SPINE LAMINA >2 LMBR: CPT | Mod: ,,, | Performed by: NEUROLOGICAL SURGERY

## 2023-06-26 PROCEDURE — 63017 PR LAMINECTOMY,>2 SGMT,LUMBAR: ICD-10-PCS | Mod: ,,, | Performed by: NEUROLOGICAL SURGERY

## 2023-06-26 PROCEDURE — 36000712 HC OR TIME LEV V 1ST 15 MIN: Performed by: NEUROLOGICAL SURGERY

## 2023-06-26 PROCEDURE — D9220A PRA ANESTHESIA: ICD-10-PCS | Mod: ANES,,, | Performed by: ANESTHESIOLOGY

## 2023-06-26 PROCEDURE — 22842 PR POSTERIOR SEGMENTAL INSTRUMENTATION 3-6 VRT SEG: ICD-10-PCS | Mod: ,,, | Performed by: NEUROLOGICAL SURGERY

## 2023-06-26 PROCEDURE — 63600175 PHARM REV CODE 636 W HCPCS: Performed by: NEUROLOGICAL SURGERY

## 2023-06-26 PROCEDURE — 22842 INSERT SPINE FIXATION DEVICE: CPT | Mod: ,,, | Performed by: NEUROLOGICAL SURGERY

## 2023-06-26 DEVICE — GRAFT CHIPS FD 4-10MM 15CC: Type: IMPLANTABLE DEVICE | Site: SPINE CERVICAL | Status: FUNCTIONAL

## 2023-06-26 DEVICE — SCREW INFINTIY MA 3.5X12MM: Type: IMPLANTABLE DEVICE | Site: SPINE CERVICAL | Status: FUNCTIONAL

## 2023-06-26 DEVICE — SCREW INFINITY MA 3.5X14MM: Type: IMPLANTABLE DEVICE | Site: SPINE CERVICAL | Status: FUNCTIONAL

## 2023-06-26 DEVICE — IMPLANTABLE DEVICE: Type: IMPLANTABLE DEVICE | Site: SPINE CERVICAL | Status: FUNCTIONAL

## 2023-06-26 RX ORDER — MIDAZOLAM HYDROCHLORIDE 1 MG/ML
INJECTION INTRAMUSCULAR; INTRAVENOUS
Status: COMPLETED
Start: 2023-06-26 | End: 2023-06-26

## 2023-06-26 RX ORDER — SODIUM CITRATE AND CITRIC ACID MONOHYDRATE 334; 500 MG/5ML; MG/5ML
30 SOLUTION ORAL ONCE
Status: CANCELLED | OUTPATIENT
Start: 2023-06-26 | End: 2023-06-26

## 2023-06-26 RX ORDER — MIDAZOLAM HYDROCHLORIDE 1 MG/ML
2 INJECTION INTRAMUSCULAR; INTRAVENOUS ONCE AS NEEDED
Status: CANCELLED | OUTPATIENT
Start: 2023-06-26 | End: 2034-11-22

## 2023-06-26 RX ORDER — ROCURONIUM BROMIDE 10 MG/ML
INJECTION, SOLUTION INTRAVENOUS
Status: DISCONTINUED | OUTPATIENT
Start: 2023-06-26 | End: 2023-06-26

## 2023-06-26 RX ORDER — MIDAZOLAM HYDROCHLORIDE 1 MG/ML
INJECTION INTRAMUSCULAR; INTRAVENOUS
Status: DISCONTINUED | OUTPATIENT
Start: 2023-06-26 | End: 2023-06-26

## 2023-06-26 RX ORDER — PROPOFOL 10 MG/ML
VIAL (ML) INTRAVENOUS CONTINUOUS PRN
Status: DISCONTINUED | OUTPATIENT
Start: 2023-06-26 | End: 2023-06-26

## 2023-06-26 RX ORDER — EPHEDRINE SULFATE 50 MG/ML
INJECTION, SOLUTION INTRAVENOUS
Status: DISCONTINUED | OUTPATIENT
Start: 2023-06-26 | End: 2023-06-26

## 2023-06-26 RX ORDER — SODIUM CHLORIDE, SODIUM LACTATE, POTASSIUM CHLORIDE, CALCIUM CHLORIDE 600; 310; 30; 20 MG/100ML; MG/100ML; MG/100ML; MG/100ML
INJECTION, SOLUTION INTRAVENOUS CONTINUOUS
Status: CANCELLED | OUTPATIENT
Start: 2023-06-26

## 2023-06-26 RX ORDER — LIDOCAINE HYDROCHLORIDE 20 MG/ML
INJECTION INTRAVENOUS
Status: DISCONTINUED | OUTPATIENT
Start: 2023-06-26 | End: 2023-06-26

## 2023-06-26 RX ORDER — METHOCARBAMOL 100 MG/ML
500 INJECTION, SOLUTION INTRAMUSCULAR; INTRAVENOUS EVERY 8 HOURS PRN
Status: DISCONTINUED | OUTPATIENT
Start: 2023-06-26 | End: 2023-06-29

## 2023-06-26 RX ORDER — REMIFENTANIL HYDROCHLORIDE 1 MG/ML
INJECTION, POWDER, LYOPHILIZED, FOR SOLUTION INTRAVENOUS
Status: DISCONTINUED | OUTPATIENT
Start: 2023-06-26 | End: 2023-06-26

## 2023-06-26 RX ORDER — HYDROCODONE BITARTRATE AND ACETAMINOPHEN 500; 5 MG/1; MG/1
TABLET ORAL
Status: DISCONTINUED | OUTPATIENT
Start: 2023-06-26 | End: 2023-06-27 | Stop reason: HOSPADM

## 2023-06-26 RX ORDER — DEXAMETHASONE SODIUM PHOSPHATE 4 MG/ML
INJECTION, SOLUTION INTRA-ARTICULAR; INTRALESIONAL; INTRAMUSCULAR; INTRAVENOUS; SOFT TISSUE
Status: DISCONTINUED | OUTPATIENT
Start: 2023-06-26 | End: 2023-06-26

## 2023-06-26 RX ORDER — VANCOMYCIN HYDROCHLORIDE 1 G/20ML
INJECTION, POWDER, LYOPHILIZED, FOR SOLUTION INTRAVENOUS
Status: DISCONTINUED
Start: 2023-06-26 | End: 2023-06-26

## 2023-06-26 RX ORDER — HYDROMORPHONE HYDROCHLORIDE 2 MG/ML
0.2 INJECTION, SOLUTION INTRAMUSCULAR; INTRAVENOUS; SUBCUTANEOUS EVERY 5 MIN PRN
Status: DISCONTINUED | OUTPATIENT
Start: 2023-06-26 | End: 2023-06-27 | Stop reason: HOSPADM

## 2023-06-26 RX ORDER — SODIUM CHLORIDE 9 MG/ML
INJECTION, SOLUTION INTRAVENOUS CONTINUOUS
Status: DISCONTINUED | OUTPATIENT
Start: 2023-06-27 | End: 2023-06-27

## 2023-06-26 RX ORDER — MEPERIDINE HYDROCHLORIDE 25 MG/ML
12.5 INJECTION INTRAMUSCULAR; INTRAVENOUS; SUBCUTANEOUS ONCE AS NEEDED
Status: DISCONTINUED | OUTPATIENT
Start: 2023-06-26 | End: 2023-06-27 | Stop reason: HOSPADM

## 2023-06-26 RX ORDER — PHENYLEPHRINE HCL IN 0.9% NACL 1 MG/10 ML
SYRINGE (ML) INTRAVENOUS
Status: DISCONTINUED | OUTPATIENT
Start: 2023-06-26 | End: 2023-06-26

## 2023-06-26 RX ORDER — FENTANYL CITRATE 50 UG/ML
INJECTION, SOLUTION INTRAMUSCULAR; INTRAVENOUS
Status: DISCONTINUED | OUTPATIENT
Start: 2023-06-26 | End: 2023-06-26

## 2023-06-26 RX ORDER — LIDOCAINE HYDROCHLORIDE 10 MG/ML
1 INJECTION, SOLUTION EPIDURAL; INFILTRATION; INTRACAUDAL; PERINEURAL ONCE
Status: CANCELLED | OUTPATIENT
Start: 2023-06-26 | End: 2023-06-26

## 2023-06-26 RX ORDER — DEXMEDETOMIDINE HYDROCHLORIDE 100 UG/ML
INJECTION, SOLUTION INTRAVENOUS CONTINUOUS PRN
Status: DISCONTINUED | OUTPATIENT
Start: 2023-06-26 | End: 2023-06-26

## 2023-06-26 RX ORDER — KETAMINE HYDROCHLORIDE 100 MG/ML
INJECTION, SOLUTION INTRAMUSCULAR; INTRAVENOUS
Status: DISCONTINUED | OUTPATIENT
Start: 2023-06-26 | End: 2023-06-26

## 2023-06-26 RX ORDER — MORPHINE SULFATE 10 MG/ML
2 INJECTION INTRAMUSCULAR; INTRAVENOUS; SUBCUTANEOUS EVERY 4 HOURS PRN
Status: DISCONTINUED | OUTPATIENT
Start: 2023-06-26 | End: 2023-06-27

## 2023-06-26 RX ORDER — ONDANSETRON 2 MG/ML
INJECTION INTRAMUSCULAR; INTRAVENOUS
Status: DISCONTINUED | OUTPATIENT
Start: 2023-06-26 | End: 2023-06-26

## 2023-06-26 RX ORDER — ALPRAZOLAM 0.5 MG/1
0.5 TABLET ORAL ONCE
Status: COMPLETED | OUTPATIENT
Start: 2023-06-26 | End: 2023-06-26

## 2023-06-26 RX ORDER — PROPOFOL 10 MG/ML
INJECTION, EMULSION INTRAVENOUS
Status: DISCONTINUED | OUTPATIENT
Start: 2023-06-26 | End: 2023-06-26

## 2023-06-26 RX ORDER — ONDANSETRON 4 MG/1
4 TABLET, ORALLY DISINTEGRATING ORAL EVERY 6 HOURS PRN
Status: CANCELLED | OUTPATIENT
Start: 2023-06-26

## 2023-06-26 RX ORDER — ACETAMINOPHEN 10 MG/ML
INJECTION, SOLUTION INTRAVENOUS
Status: DISCONTINUED | OUTPATIENT
Start: 2023-06-26 | End: 2023-06-26

## 2023-06-26 RX ORDER — LIDOCAINE HYDROCHLORIDE AND EPINEPHRINE 5; 5 MG/ML; UG/ML
INJECTION, SOLUTION INFILTRATION; PERINEURAL
Status: DISCONTINUED | OUTPATIENT
Start: 2023-06-26 | End: 2023-06-26 | Stop reason: HOSPADM

## 2023-06-26 RX ORDER — SUCCINYLCHOLINE CHLORIDE 20 MG/ML
INJECTION INTRAMUSCULAR; INTRAVENOUS
Status: DISCONTINUED | OUTPATIENT
Start: 2023-06-26 | End: 2023-06-26

## 2023-06-26 RX ORDER — GABAPENTIN 300 MG/1
600 CAPSULE ORAL 3 TIMES DAILY
Status: DISCONTINUED | OUTPATIENT
Start: 2023-06-27 | End: 2023-06-29

## 2023-06-26 RX ORDER — ACETAMINOPHEN 10 MG/ML
1000 INJECTION, SOLUTION INTRAVENOUS ONCE
Status: DISCONTINUED | OUTPATIENT
Start: 2023-06-26 | End: 2023-06-27 | Stop reason: HOSPADM

## 2023-06-26 RX ORDER — VANCOMYCIN HCL IN 5 % DEXTROSE 1G/250ML
1000 PLASTIC BAG, INJECTION (ML) INTRAVENOUS EVERY 12 HOURS
Status: DISCONTINUED | OUTPATIENT
Start: 2023-06-27 | End: 2023-06-30

## 2023-06-26 RX ADMIN — EPHEDRINE SULFATE 25 MG: 50 INJECTION INTRAVENOUS at 04:06

## 2023-06-26 RX ADMIN — PROPOFOL 50 MG: 10 INJECTION, EMULSION INTRAVENOUS at 04:06

## 2023-06-26 RX ADMIN — HYDROMORPHONE HYDROCHLORIDE 0.2 MG: 2 INJECTION INTRAMUSCULAR; INTRAVENOUS; SUBCUTANEOUS at 11:06

## 2023-06-26 RX ADMIN — HYDROCODONE BITARTRATE AND ACETAMINOPHEN 1 TABLET: 5; 325 TABLET ORAL at 10:06

## 2023-06-26 RX ADMIN — KETAMINE HYDROCHLORIDE 20 MG: 100 INJECTION, SOLUTION, CONCENTRATE INTRAMUSCULAR; INTRAVENOUS at 06:06

## 2023-06-26 RX ADMIN — REMIFENTANIL HYDROCHLORIDE 250 MCG: 1 INJECTION, POWDER, LYOPHILIZED, FOR SOLUTION INTRAVENOUS at 06:06

## 2023-06-26 RX ADMIN — REMIFENTANIL HYDROCHLORIDE 0.05 MCG/KG/MIN: 1 INJECTION, POWDER, LYOPHILIZED, FOR SOLUTION INTRAVENOUS at 04:06

## 2023-06-26 RX ADMIN — MUPIROCIN: 20 OINTMENT TOPICAL at 09:06

## 2023-06-26 RX ADMIN — DEXMEDETOMIDINE HYDROCHLORIDE 0.4 MCG/KG/HR: 100 INJECTION, SOLUTION INTRAVENOUS at 04:06

## 2023-06-26 RX ADMIN — PROPOFOL 150 MCG/KG/MIN: 10 INJECTION, EMULSION INTRAVENOUS at 08:06

## 2023-06-26 RX ADMIN — SUCCINYLCHOLINE CHLORIDE 140 MG: 20 INJECTION, SOLUTION INTRAMUSCULAR; INTRAVENOUS at 04:06

## 2023-06-26 RX ADMIN — DEXAMETHASONE SODIUM PHOSPHATE 8 MG: 4 INJECTION, SOLUTION INTRA-ARTICULAR; INTRALESIONAL; INTRAMUSCULAR; INTRAVENOUS; SOFT TISSUE at 04:06

## 2023-06-26 RX ADMIN — ROCURONIUM BROMIDE 5 MG: 10 SOLUTION INTRAVENOUS at 04:06

## 2023-06-26 RX ADMIN — MIDAZOLAM HYDROCHLORIDE 2 MG: 1 INJECTION, SOLUTION INTRAMUSCULAR; INTRAVENOUS at 03:06

## 2023-06-26 RX ADMIN — PROPOFOL 150 MCG/KG/MIN: 10 INJECTION, EMULSION INTRAVENOUS at 07:06

## 2023-06-26 RX ADMIN — KETAMINE HYDROCHLORIDE 30 MG: 100 INJECTION, SOLUTION, CONCENTRATE INTRAMUSCULAR; INTRAVENOUS at 04:06

## 2023-06-26 RX ADMIN — FENTANYL CITRATE 100 MCG: 50 INJECTION, SOLUTION INTRAMUSCULAR; INTRAVENOUS at 04:06

## 2023-06-26 RX ADMIN — ONDANSETRON 4 MG: 2 INJECTION INTRAMUSCULAR; INTRAVENOUS at 08:06

## 2023-06-26 RX ADMIN — KETAMINE HYDROCHLORIDE 10 MG: 100 INJECTION, SOLUTION, CONCENTRATE INTRAMUSCULAR; INTRAVENOUS at 08:06

## 2023-06-26 RX ADMIN — METOPROLOL TARTRATE 25 MG: 25 TABLET, FILM COATED ORAL at 02:06

## 2023-06-26 RX ADMIN — Medication 400 MCG: at 04:06

## 2023-06-26 RX ADMIN — PHENYLEPHRINE HYDROCHLORIDE 20 MCG/MIN: 10 INJECTION INTRAVENOUS at 04:06

## 2023-06-26 RX ADMIN — PROPOFOL 150 MCG/KG/MIN: 10 INJECTION, EMULSION INTRAVENOUS at 06:06

## 2023-06-26 RX ADMIN — REMIFENTANIL HYDROCHLORIDE 250 MCG: 1 INJECTION, POWDER, LYOPHILIZED, FOR SOLUTION INTRAVENOUS at 05:06

## 2023-06-26 RX ADMIN — PROPOFOL 100 MCG/KG/MIN: 10 INJECTION, EMULSION INTRAVENOUS at 04:06

## 2023-06-26 RX ADMIN — Medication 200 MCG: at 06:06

## 2023-06-26 RX ADMIN — ALPRAZOLAM 0.5 MG: 0.5 TABLET ORAL at 09:06

## 2023-06-26 RX ADMIN — REMIFENTANIL HYDROCHLORIDE 250 MCG: 1 INJECTION, POWDER, LYOPHILIZED, FOR SOLUTION INTRAVENOUS at 04:06

## 2023-06-26 RX ADMIN — LIDOCAINE HYDROCHLORIDE 80 MG: 20 INJECTION INTRAVENOUS at 04:06

## 2023-06-26 RX ADMIN — SODIUM CHLORIDE, SODIUM GLUCONATE, SODIUM ACETATE, POTASSIUM CHLORIDE AND MAGNESIUM CHLORIDE: 526; 502; 368; 37; 30 INJECTION, SOLUTION INTRAVENOUS at 04:06

## 2023-06-26 RX ADMIN — Medication 200 MCG: at 04:06

## 2023-06-26 RX ADMIN — ACETAMINOPHEN 1000 MG: 10 INJECTION, SOLUTION INTRAVENOUS at 10:06

## 2023-06-26 RX ADMIN — KETAMINE HYDROCHLORIDE 20 MG: 100 INJECTION, SOLUTION, CONCENTRATE INTRAMUSCULAR; INTRAVENOUS at 05:06

## 2023-06-26 RX ADMIN — PROPOFOL 100 MG: 10 INJECTION, EMULSION INTRAVENOUS at 05:06

## 2023-06-26 RX ADMIN — VANCOMYCIN HYDROCHLORIDE 1000 MG: 1 INJECTION, POWDER, LYOPHILIZED, FOR SOLUTION INTRAVENOUS at 03:06

## 2023-06-26 RX ADMIN — PROPOFOL 100 MG: 10 INJECTION, EMULSION INTRAVENOUS at 06:06

## 2023-06-26 RX ADMIN — PROPOFOL 150 MCG/KG/MIN: 10 INJECTION, EMULSION INTRAVENOUS at 05:06

## 2023-06-26 RX ADMIN — Medication 100 MCG: at 09:06

## 2023-06-26 RX ADMIN — KETAMINE HYDROCHLORIDE 20 MG: 100 INJECTION, SOLUTION, CONCENTRATE INTRAMUSCULAR; INTRAVENOUS at 04:06

## 2023-06-26 NOTE — ANESTHESIA PROCEDURE NOTES
Intubation    Date/Time: 6/26/2023 4:03 PM  Performed by: Noman Bustillos CRNA  Authorized by: Ashwin Quiroga MD     Intubation:     Induction:  Intravenous    Intubated:  Postinduction    Mask Ventilation:  Easy with oral airway    Attempts:  1    Attempted By:  Student    Method of Intubation:  Video laryngoscopy    Blade:  Solitario 4    Laryngeal View Grade: Grade I - full view of cords      Difficult Airway Encountered?: No      Complications:  None    Airway Device:  Oral endotracheal tube    Airway Device Size:  7.5    Style/Cuff Inflation:  Cuffed (inflated to minimal occlusive pressure)    Tube secured:  24    Secured at:  The lips    Placement Verified By:  Capnometry    Complicating Factors:  None    Findings Post-Intubation:  Atraumatic/condition of teeth unchanged and BS equal bilateral

## 2023-06-26 NOTE — ANESTHESIA PREPROCEDURE EVALUATION
06/26/2023  Valentino Manning is a 72 y.o., male w/ hx of multiple myeloma (on chemo), HTN, CAD, stents, IMANI uses CPAP, and other medical problems listed in the EMR needs C3-4, C4-5, C5-6 laminectomy and posterior fusion surgery. Plavix has been stopped.      Pre-op Assessment    I have reviewed the Patient Summary Reports.     I have reviewed the Nursing Notes. I have reviewed the NPO Status.   I have reviewed the Medications.     Review of Systems      Physical Exam  General: Well nourished and Cooperative    Airway:  Mallampati: II   Mouth Opening: Normal  TM Distance: Normal  Tongue: Normal  Neck ROM: Normal ROM    Dental:  Intact    Chest/Lungs:  Clear to auscultation    Heart:  Rate: Normal        Anesthesia Plan  Type of Anesthesia, risks & benefits discussed:    Anesthesia Type: Gen ETT  Intra-op Monitoring Plan: Standard ASA Monitors and Art Line  Post Op Pain Control Plan: multimodal analgesia  Induction:  IV  Informed Consent: Informed consent signed with the Patient and all parties understand the risks and agree with anesthesia plan.  All questions answered. Patient consented to blood products? Yes  ASA Score: 3  Day of Surgery Review of History & Physical: H&P Update referred to the surgeon/provider.  Anesthesia Plan Notes: Ef nl, Hct 33, pt is ok receiving blood if needed    Ready For Surgery From Anesthesia Perspective.     .    I explained anesthesia plan to patient/responsbile party if available.  Anesthesia consent done going over the material facts, risks, complications & alternatives, obtained which includes the possibility of altering the anesthesia plan.  I reviewed problem list, appropriate labs, any workup, Xray, EKG etc noted below.  Patients condition is satisfactory to proceed with anesthesia plan unless otherwise noted (see anesthesia chart for details of the anesthesia plan carried  out).      Pre-operative evaluation for Procedure(s) (LRB):  FUSION, SPINE, POSTERIOR SPINAL COLUMN, CERVICAL, USING COMPUTER-ASSISTED NAVIGATION (N/A)    BP (!) 142/76   Pulse 60   Temp 36.4 °C (97.6 °F) (Oral)   Resp 18   Ht 6' (1.829 m)   Wt 88.5 kg (195 lb)   SpO2 99%   BMI 26.45 kg/m²     Patient Active Problem List   Diagnosis    Anemia    Elevated serum globulin level    Kidney disease    Multiple myeloma    Hypertension    SOB (shortness of breath)    Patient on antineoplastic chemotherapy regimen    Peripheral neuropathy    Upper extremity weakness       Review of patient's allergies indicates:   Allergen Reactions    Penicillins        Current Outpatient Medications   Medication Instructions    albuterol (PROAIR HFA) 90 mcg/actuation inhaler 2 puffs, Inhalation, Every 6 hours PRN, Rescue    amLODIPine (NORVASC) 10 MG tablet Daily    apixaban (ELIQUIS) 5 mg, Oral, 2 times daily    ascorbic acid (vitamin C) (VITAMIN C) 1,000 mg, Oral, Daily    atorvastatin (LIPITOR) 40 MG tablet TAKE 1 TABLET ORALLY ONCE DAILY ON MON/TUE/THUR/FRI AND 1/2 ON WED. NONE ON SAT/SUN    b complex vitamins capsule 1 capsule, Oral, Daily    clopidogreL (PLAVIX) 75 mg, Oral, Daily    diphenoxylate-atropine 2.5-0.025 mg (LOMOTIL) 2.5-0.025 mg per tablet 1 tablet, Oral, 4 times daily PRN    doxazosin (CARDURA) 4 mg, Oral, Daily    DULoxetine (CYMBALTA) 30 MG capsule TAKE 1 CAPSULE BY MOUTH EVERY DAY    dutasteride (AVODART) 0.5 mg, Oral, Daily    ergocalciferol (ERGOCALCIFEROL) 50,000 Units, Oral, Every 7 days    FERRETTS 325 mg (106 mg iron) Tab 1 tablet, Oral, 2 times daily    furosemide (LASIX) 20 MG tablet No dose, route, or frequency recorded.    gabapentin (NEURONTIN) 300 mg, Oral, 3 times daily    glutamine 10 g, Oral, 2 times daily    hydrocortisone (CORTEF) 20 MG Tab TAKE ONE TABLET BY MOUTH TWICE DAILY X 14 DAYS    lactulose (CHRONULAC) 10 gram/15 mL solution GIVE 30ML BY MOUTH TWICE A DAY  X7 DAY(S)    LINZESS 145 mcg, Oral    lisinopriL (PRINIVIL,ZESTRIL) 40 mg, Oral, Daily    metoprolol tartrate (LOPRESSOR) 25 mg, Oral, 2 times daily, Takes 1 tab q am    ondansetron (ZOFRAN) 4 mg, Oral, Every 8 hours PRN    pantoprazole (PROTONIX) 40 mg, Oral, Daily    promethazine (PHENERGAN) 25 MG tablet TAKE 1 TABLET BY MOUTH EVERY 4 HOURS AS NEEDED FOR MOTION SICKNESS    pyridoxine (vitamin B6) (B-6) 250 mg, Oral, Daily    SLOW RELEASE IRON 140 mg (45 mg iron) TbSR 1 tablet, Oral, 2 times daily    sulfamethoxazole-trimethoprim 800-160mg (BACTRIM DS) 800-160 mg Tab 1 tablet, Oral, See admin instructions, Tab 1 PO Daily M-W-F    testosterone cypionate (DEPOTESTOTERONE CYPIONATE) 200 mg/mL injection INJECT 1ML INTO MUSCLE ONCE A WEEK    zolpidem (AMBIEN) 10 mg, Oral, Nightly PRN       Past Surgical History:   Procedure Laterality Date    BONE MARROW ASPIRATION N/A 9/1/2022    Procedure: ASPIRATION, BONE MARROW;  Surgeon: Robbie Gardner MD;  Location: [facility];  Service: General;  Laterality: N/A;    CARDIAC SURGERY  2021    ENDOSCOPIC RELEASE OF BOTH CARPAL TUNNELS         Social History     Socioeconomic History    Marital status:    Tobacco Use    Smoking status: Former     Types: Cigarettes    Smokeless tobacco: Former   Substance and Sexual Activity    Alcohol use: Not Currently    Drug use: Never     Social Determinants of Health     Food Insecurity: Unknown    Worried About Running Out of Food in the Last Year: Never true   Transportation Needs: Unknown    Lack of Transportation (Medical): No   Housing Stability: Unknown    Unable to Pay for Housing in the Last Year: No       Lab Results   Component Value Date    WBC 8.20 06/25/2023    HGB 11.1 (L) 06/25/2023    HCT 33.9 (L) 06/25/2023    .6 (H) 06/25/2023     06/25/2023          BMP  Lab Results   Component Value Date    HCT 33.9 (L) 06/25/2023     06/25/2023    K 3.8 06/25/2023    BUN 11.3 06/25/2023     CREATININE 0.83 06/25/2023    CALCIUM 9.6 06/25/2023        INR  Recent Labs     06/25/23  0318 06/26/23  0620   INR 1.03 1.09   PROTIME 13.4 14.0           Diagnostic Studies:      EKG:  Results for orders placed or performed during the hospital encounter of 06/11/23   EKG 12-lead    Collection Time: 06/11/23  5:48 PM    Narrative    Test Reason : R53.1,    Vent. Rate : 065 BPM     Atrial Rate : 065 BPM     P-R Int : 190 ms          QRS Dur : 096 ms      QT Int : 400 ms       P-R-T Axes : 044 -23 058 degrees     QTc Int : 416 ms    Normal sinus rhythm  Normal ECG  Confirmed by Chris Machado MD ([id number]) on 6/12/2023 12:57:04 PM    Referred By: ARACELI   SELF           Confirmed By:Chris Machado MD

## 2023-06-26 NOTE — ANESTHESIA PROCEDURE NOTES
Arterial    Diagnosis: Cervical stenosis    Patient location during procedure: holding area  Procedure start time: 6/26/2023 3:31 PM  Timeout: 6/26/2023 3:30 PM  Procedure end time: 6/26/2023 3:38 PM    Staffing  Authorizing Provider: Ashwin Quiroga MD  Performing Provider: Noman Bustillos CRNA      Preanesthetic Checklist  Completed: patient identified, IV checked, site marked, risks and benefits discussed, surgical consent, monitors and equipment checked, pre-op evaluation, timeout performed and anesthesia consent givenArterial  Skin Prep: chlorhexidine gluconate  Local Infiltration: lidocaine  Location: radial    Catheter Size: 20 G  Catheter placement by Anatomical landmarks. Heme positive aspiration all ports. Insertion Attempts: 1  Assessment  Dressing: secured with tape and tegaderm  Patient: Tolerated well

## 2023-06-26 NOTE — INTERVAL H&P NOTE
The patient has been examined and the H&P has been reviewed:    I concur with the findings and no changes have occurred since H&P was written.    Surgery risks, benefits and alternative options discussed and understood by patient/family.          Active Hospital Problems    Diagnosis  POA    Upper extremity weakness [R29.898]  Unknown      Resolved Hospital Problems   No resolved problems to display.

## 2023-06-26 NOTE — PROGRESS NOTES
[facility]  Hospital Medicine Progress Note        Chief Complaint: Inpatient Follow-up for Solomon UE and LE weakness and paresthesia      HPI:      Valentino Manning is a 72 y.o. male with a PMHx of HTN, HLD, CAD s/p stent on Plavix, chronic anemia, GERD, multiple myeloma on chemotherapym hx of PE on Eliquis who presented to [facility] on 6/11/2023 with c/o numbness to bilateral hands and bilateral lower extremities with associated weakness has been worsening over the past 6 months.  Patient reports that he was now unable to ambulate due to worsening paresthesias and generalized weakness x3 weeks.  He was now bed-bound.  He endorsed falling about 4 times over the past 3 weeks, denied LOC or head injury.  He reports he was in his normal state of health prior to initiating chemotherapy in December 2022.  He then had COVID-19 in February 2023 and has had a rapid decline since then.  He also endorsed urinary retention for which he was seen at an outlying ED on 6/5/23 and had a Duenas catheter placed, he was supposed to follow-up with urology on 06/12/2023. He was previously on gabapentin 300 mg t.i.d. for neuropathy without symptomatic relief, he states he was not taking it as directed.  He denied CP, SOB, fever, chills, cough, dizziness, syncope.  Patient reportedly saw his oncologist, Dr. Harris, for same complaints and he was referred to ED for further evaluation.   Initial ED VS stable.  Labs notable for hemoglobin 12.6, hematocrit 38.  Urinalysis with 2+ protein, 3+ occult blood, positive nitrites, 2+ leukocytes, 11 RBCs, 67 WBCs, 1+ bacteria.  Urine culture pending.  He was started on oral Levaquin for suspected UTI.  He was admitted to hospital medicine services for further medical management.  MRI C-spine done and showed  Severe degenerative narrowing of the spinal canal at C4-C5 and C5-C6, moderate at C3-C4, mild at other levels and  T2 signal changes in the cord at C4-C5 may be secondary to  a compressive myelopathy.        Patient with complaints of neck pain and upper extremity neuropathy including numbness, tingling and weakness.  MRI of the C-spine showed severe degenerative narrowing of the spinal canal at C4-C5 and C5-C6, T2 signal changes in the cord at C4-C5 due to compressive myelopathy.  He was seen by Neurosurgery team and recommended  C3-4, C4-5, C5-6 laminectomy and posterior fusion surgery. Patient was on plavix and eliquis. Plavix has been stopped in the perioperative period per cardiology and eliquis will be stopped 3 days before surgery.   Surgery was scheduled for 6/26/23        Interval Hx:   Patient is scheduled for neurosurgery procedure today later at noon   He is feeling anxious will give a dose of Xanax   Follow-up Neurosurgery recs postop   Pain control   PT OT     Case was discussed with patient's nurse and  on the floor.     Objective/physical exam:  General: In no acute distress  Chest: Clear to auscultation bilaterally  Heart: RRR, +S1, S2, no appreciable murmur  Abdomen: Soft, nontender, BS +  MSK: Warm, no lower extremity edema, no clubbing or cyanosis  Neurologic: Cranial nerve II-XII intact, Strength Solomon UE 3/5, LE 4/5, weak  strength , Gait - impaired         Assessment/Plan:  Cervical spine stenosis with myelopathy due to degenerative spine disease   Severe degenerative lumbar spinal canal stenosis at L3-L4 and L4-L5  Multilevel neural foraminal stenosis cervical and lumbar spine   H/O Multiple myeloma   H/O PE  2/2023 , on Eliquis - currently on hold for upcoming surgery   H/O CAD, s/p stent   Anemia of chronic disease  with macrocytosis   Asymptomatic pyuria / bacteruria   Hx of GERD, HLD     Plan-   Patient is scheduled for neurosurgery procedure today later at noon   He is feeling anxious will give a dose of Xanax   Follow-up Neurosurgery recs postop   Pain control   PT OT  Plavix and Eliquis on hold   Neurosurgery started Heparin subQ at  prophylactic dose.   CM consulted for neuro rehab placement after surgery        VTE prophylaxis: Heparin subQ     Patient condition:  Fair      Anticipated discharge and Disposition:     Neuro rehab post op-tourro      All diagnosis and differential diagnosis have been reviewed; assessment and plan has been documented; I have personally reviewed the labs and test results that are presently available; I have reviewed the patients medication list; I have reviewed the consulting providers response and recommendations. I have reviewed or attempted to review medical records based upon their availability     All of the patient's questions have been  addressed and answered. Patient's is agreeable to the above stated plan. I will continue to monitor closely and make adjustments to medical management as needed.    VITAL SIGNS: 24 HRS MIN & MAX LAST   Temp  Min: 97.5 °F (36.4 °C)  Max: 98.4 °F (36.9 °C) 97.6 °F (36.4 °C)   BP  Min: 116/64  Max: 136/79 130/75   Pulse  Min: 64  Max: 85  67   Resp  Min: 18  Max: 18 18   SpO2  Min: 96 %  Max: 99 % 99 %     I have reviewed the following labs:    Recent Labs   Lab 06/20/23  0533 06/25/23 0319   WBC 6.85 8.20   RBC 3.72* 3.37*   HGB 12.2* 11.1*   HCT 37.5* 33.9*   .8* 100.6*   MCH 32.8* 32.9*   MCHC 32.5* 32.7*   RDW 14.9 14.5    315   MPV 8.4 8.6       Recent Labs   Lab 06/20/23  0533 06/25/23 0319    137   K 3.8 3.8   CO2 23 24   BUN 12.7 11.3   CREATININE 0.87 0.83   CALCIUM 9.6 9.6   MG  --  1.70   ALBUMIN  --  3.0*   ALKPHOS  --  82   ALT  --  30   AST  --  16   BILITOT  --  0.5          Microbiology Results (last 7 days)       ** No results found for the last 168 hours. **             See below for Radiology    Scheduled Med:   amLODIPine  5 mg Oral Daily    ascorbic acid (vitamin C)  1,000 mg Oral Daily    atorvastatin  40 mg Oral Every other day    DULoxetine  30 mg Oral Daily    dutasteride  0.5 mg Oral Daily    ferrous sulfate  1 tablet Oral Daily     gabapentin  300 mg Oral TID    linaCLOtide  145 mcg Oral Before breakfast    metoprolol tartrate  25 mg Oral BID    mupirocin   Nasal BID    tamsulosin  0.4 mg Oral QHS    zolpidem  10 mg Oral QHS        Continuous Infusions:       PRN Meds:  acetaminophen, aluminum-magnesium hydroxide-simethicone, HYDROcodone-acetaminophen, melatonin, naloxone, ondansetron, polyethylene glycol, prochlorperazine, simethicone       Assessment/Plan:      VTE prophylaxis:     Patient condition:  Stable/Fair/Guarded/ Serious/ Critical    Anticipated discharge and Disposition:         All diagnosis and differential diagnosis have been reviewed; assessment and plan has been documented; I have personally reviewed the labs and test results that are presently available; I have reviewed the patients medication list; I have reviewed the consulting providers response and recommendations. I have reviewed or attempted to review medical records based upon their availability    All of the patient's questions have been  addressed and answered. Patient's is agreeable to the above stated plan. I will continue to monitor closely and make adjustments to medical management as needed.  _____________________________________________________________________    Nutrition Status:    Radiology:  I have personally reviewed the following imaging and agree with the radiologist.     CT Abdomen Pelvis  Without Contrast  Narrative: EXAMINATION:  CT ABDOMEN PELVIS WITHOUT CONTRAST    CLINICAL HISTORY:  Flank pain, kidney stone suspected;    TECHNIQUE:  Helical acquisition through the abdomen and pelvis without IV contrast.  Lack of contrast limits evaluation of solid organs and vascular structures .  Three plane reconstructions were provided for review.  mGycm. Automatic exposure control, adjustment of mA/kV or iterative reconstruction technique was used to reduce radiation.    COMPARISON:  28 September 2022    FINDINGS:  Small right and trace left pleural  effusions.    There is stable left hepatic lobe hypodensity.  There are calcified gallstones.  No pericholecystic inflammation.  No significant abnormality of the spleen, pancreas or adrenals.  No hydronephrosis.  No urinary tract calculi.    There is no bowel obstruction.  No significant inflammatory changes of the bowel.  There is a large amount of stool in the distal colon.    There is bladder wall thickening.  Duenas is in place.  The prostate is moderately enlarged.  No pelvic free fluid.  Abdominal aorta normal in caliber.  Dense atherosclerotic disease.    Moderate degenerative change of the spine.  Impression: 1. No acute abdominopelvic findings on this noncontrast scan.  2. Large amount of stool distal colon.  3. Small right and trace left pleural effusions.  4. Other chronic findings above.    Electronically signed by: Cody Duong  Date:    06/19/2023  Time:    12:01      Shane Kaba MD   06/26/2023

## 2023-06-26 NOTE — PT/OT/SLP PROGRESS
Occupational Therapy   Treatment    Name: Valentino Manning  MRN: [id number]  Admitting Diagnosis: Cervical spine stenosis with myelopathy due to degenerative spine disease, Severe degenerative lumbar spinal canal stenosis at L3-L4 and L4-L5, Multilevel neural foraminal stenosis cervical and lumbar spine, H/O Multiple myeloma, H/O PE  2/2023 , on Eliquis - currently on hold for upcoming surgery, H/O CAD, s/p stent, Anemia of chronic disease  with macrocytosis, Asymptomatic pyuria / bacteruria, Hx of GERD, HLD    Recommendations:     Discharge Recommendations: neuro rehabilitation facility  Discharge Equipment Recommendations: to be determined by next level of care  Barriers to discharge: pending sx     Assessment:     Valentino Manning is a 72 y.o. male with a medical diagnosis of Cervical spine stenosis with myelopathy due to degenerative spine disease, Severe degenerative lumbar spinal canal stenosis at L3-L4 and L4-L5, Multilevel neural foraminal stenosis cervical and lumbar spine, H/O Multiple myeloma, H/O PE  2/2023 , on Eliquis - currently on hold for upcoming surgery, H/O CAD, s/p stent, Anemia of chronic disease  with macrocytosis, Asymptomatic pyuria / bacteruria, Hx of GERD, HLD. He presents with c/o LBP. Pt also c/o L aspect of back and LE giving out on him while standing. Pt demonstrated B knee buckling and increased difficulty assuming upright standing posture during session. Performance deficits affecting function are weakness, impaired endurance, impaired sensation, impaired self care skills, impaired functional mobility, impaired balance, decreased upper extremity function, decreased lower extremity function, decreased safety awareness, pain, decreased ROM.     Rehab Prognosis:  Good; patient would benefit from acute skilled OT services to address these deficits and reach maximum level of function.       Plan:     Patient to be seen 6 x/week to address the above listed problems via self-care/home management,  therapeutic activities, therapeutic exercises  Plan of Care Expires: 07/05/23  Plan of Care Reviewed with: patient, spouse    Subjective     Pain/Comfort:  Pt c/o LBP. Pt also c/o L aspect of back and LE giving out on him while standing.    Note: Pt also reported that he received anti-anxiety medication this morning in preparation for sx later this afternoon.    Objective:     Communicated with: RN prior to session. Patient found HOB elevated with gongora catheter upon OT entry to room.    General Precautions: Standard, fall, c-spinal pxns  Braces: [place] J C-collar when OOB (must wear at all times post-op per neurosx) and [place] C-collar while showering.  Respiratory Status: Room air  Vital Signs:   Blood Pressure:    Lying with HOB elevated: 116/71   Seated EOB: 122/85   Supine: 158/79 (pt c/o dizziness)   Lying with HOB elevated: 122/66  HR: 67-78     Occupational Performance:     Bed Mobility:    Patient t/f from lying with HOB elevated to seated position with increased time, vcs, and mod A x2 for safety (requiring assist to lift trunk). Max A then provided to assist pt with scooting toward EOB.  Patient completed Sit to Supine with max A x2 for safety.    Functional Mobility/Transfers:  Patient completed Sit <> Stand Transfers from EOB with mod-max A x2 using rolling walker. OT and therapy tech blocked pt's B feet and knees in order to increase pt's safety. Pt demonstrated B knee buckling and increased difficulty assuming upright standing posture during session.    Activities of Daily Living:  Grooming: Pt wash face using bath cloth with SBA provided while seated EOB. Pt combed hair with max A provided while lying with HOB elevated.  UB dressing: Dep A to don/doff [place] J c-collar.    Therapeutic Exercise:  Pt performed sit to stand exercise from EOB x5 with mod-max A x2 using rolling walker. OT and therapy tech blocked pt's B feet and knees in order to increase pt's safety. Pt demonstrated B knee buckling  and increased difficulty assuming upright standing posture. Pt participated in therapeutic exercise to increase strength and endurance of BU/LE and standing balance needed for ADL participation.    Patient left HOB elevated with all lines intact, call button in reach, and pt's wife present.    GOALS:   Multidisciplinary Problems       Occupational Therapy Goals          Problem: Occupational Therapy    Goal Priority Disciplines Outcome Interventions   Occupational Therapy Goal     OT, PT/OT Ongoing, Progressing    Description: Goals to be met by: 7/5/23     Patient will increase functional independence with ADLs by performing:    UE Dressing with Minimal Assistance.  LE Dressing with Moderate Assistance and Assistive Devices as needed.  Grooming tasks while EOB with Minimal Assistance. - Revised.  Toileting from BSC with Moderate Assistance.  Pt will perform BSC t/f with Minimal Assistance.                         Time Tracking:     OT Date of Treatment: 6/26/23  OT Start Time: 0948  OT Stop Time: 1023  OT Total Time (min): 35 min    Billable Minutes: Self Care/Home Management 1 unit  Therapeutic Exercise 1 unit    OT/CAMERON: OT     Number of CAMERON visits since last OT visit: 4    6/26/2023

## 2023-06-26 NOTE — PT/OT/SLP PROGRESS
Physical Therapy      Patient Name:  Valentino Manning   MRN:  [id number]    Pt off the floor for procedure. PT will follow up tomorrow.

## 2023-06-27 LAB
ANION GAP SERPL CALC-SCNC: 16 MEQ/L
BASOPHILS # BLD AUTO: 0.01 X10(3)/MCL
BASOPHILS NFR BLD AUTO: 0.1 %
BUN SERPL-MCNC: 13.8 MG/DL (ref 8.4–25.7)
CALCIUM SERPL-MCNC: 9.3 MG/DL (ref 8.8–10)
CHLORIDE SERPL-SCNC: 103 MMOL/L (ref 98–107)
CO2 SERPL-SCNC: 17 MMOL/L (ref 23–31)
CREAT SERPL-MCNC: 0.93 MG/DL (ref 0.73–1.18)
CREAT/UREA NIT SERPL: 15
EOSINOPHIL # BLD AUTO: 0 X10(3)/MCL (ref 0–0.9)
EOSINOPHIL NFR BLD AUTO: 0 %
ERYTHROCYTE [DISTWIDTH] IN BLOOD BY AUTOMATED COUNT: 14.3 % (ref 11.5–17)
GFR SERPLBLD CREATININE-BSD FMLA CKD-EPI: >60 MLS/MIN/1.73/M2
GLUCOSE SERPL-MCNC: 147 MG/DL (ref 82–115)
HCT VFR BLD AUTO: 32.8 % (ref 42–52)
HGB BLD-MCNC: 10.6 G/DL (ref 14–18)
IMM GRANULOCYTES # BLD AUTO: 0.04 X10(3)/MCL (ref 0–0.04)
IMM GRANULOCYTES NFR BLD AUTO: 0.4 %
LYMPHOCYTES # BLD AUTO: 0.49 X10(3)/MCL (ref 0.6–4.6)
LYMPHOCYTES NFR BLD AUTO: 5 %
MCH RBC QN AUTO: 32.5 PG (ref 27–31)
MCHC RBC AUTO-ENTMCNC: 32.3 G/DL (ref 33–36)
MCV RBC AUTO: 100.6 FL (ref 80–94)
MONOCYTES # BLD AUTO: 0.24 X10(3)/MCL (ref 0.1–1.3)
MONOCYTES NFR BLD AUTO: 2.4 %
NEUTROPHILS # BLD AUTO: 9.03 X10(3)/MCL (ref 2.1–9.2)
NEUTROPHILS NFR BLD AUTO: 92.1 %
NRBC BLD AUTO-RTO: 0 %
PLATELET # BLD AUTO: 288 X10(3)/MCL (ref 130–400)
PMV BLD AUTO: 8.7 FL (ref 7.4–10.4)
POTASSIUM SERPL-SCNC: 4.4 MMOL/L (ref 3.5–5.1)
RBC # BLD AUTO: 3.26 X10(6)/MCL (ref 4.7–6.1)
SODIUM SERPL-SCNC: 136 MMOL/L (ref 136–145)
WBC # SPEC AUTO: 9.81 X10(3)/MCL (ref 4.5–11.5)

## 2023-06-27 PROCEDURE — 25000003 PHARM REV CODE 250: Performed by: NURSE PRACTITIONER

## 2023-06-27 PROCEDURE — 97535 SELF CARE MNGMENT TRAINING: CPT

## 2023-06-27 PROCEDURE — 99024 PR POST-OP FOLLOW-UP VISIT: ICD-10-PCS | Mod: POP,,, | Performed by: NEUROLOGICAL SURGERY

## 2023-06-27 PROCEDURE — 85025 COMPLETE CBC W/AUTO DIFF WBC: CPT | Performed by: NEUROLOGICAL SURGERY

## 2023-06-27 PROCEDURE — 25000003 PHARM REV CODE 250: Performed by: INTERNAL MEDICINE

## 2023-06-27 PROCEDURE — 80048 BASIC METABOLIC PNL TOTAL CA: CPT | Performed by: NEUROLOGICAL SURGERY

## 2023-06-27 PROCEDURE — 11000001 HC ACUTE MED/SURG PRIVATE ROOM

## 2023-06-27 PROCEDURE — 97530 THERAPEUTIC ACTIVITIES: CPT

## 2023-06-27 PROCEDURE — 63600175 PHARM REV CODE 636 W HCPCS: Performed by: ANESTHESIOLOGY

## 2023-06-27 PROCEDURE — 27000221 HC OXYGEN, UP TO 24 HOURS

## 2023-06-27 PROCEDURE — 25000003 PHARM REV CODE 250: Performed by: NEUROLOGICAL SURGERY

## 2023-06-27 PROCEDURE — 63600175 PHARM REV CODE 636 W HCPCS: Performed by: NEUROLOGICAL SURGERY

## 2023-06-27 PROCEDURE — 51702 INSERT TEMP BLADDER CATH: CPT

## 2023-06-27 PROCEDURE — 99024 POSTOP FOLLOW-UP VISIT: CPT | Mod: POP,,, | Performed by: NEUROLOGICAL SURGERY

## 2023-06-27 PROCEDURE — 97164 PT RE-EVAL EST PLAN CARE: CPT

## 2023-06-27 PROCEDURE — 51798 US URINE CAPACITY MEASURE: CPT

## 2023-06-27 PROCEDURE — 97168 OT RE-EVAL EST PLAN CARE: CPT

## 2023-06-27 RX ORDER — MORPHINE SULFATE 10 MG/ML
2 INJECTION INTRAMUSCULAR; INTRAVENOUS; SUBCUTANEOUS EVERY 4 HOURS PRN
Status: DISCONTINUED | OUTPATIENT
Start: 2023-06-27 | End: 2023-06-28

## 2023-06-27 RX ADMIN — VANCOMYCIN HYDROCHLORIDE 1000 MG: 1 INJECTION, POWDER, LYOPHILIZED, FOR SOLUTION INTRAVENOUS at 10:06

## 2023-06-27 RX ADMIN — METHOCARBAMOL 500 MG: 100 INJECTION INTRAMUSCULAR; INTRAVENOUS at 02:06

## 2023-06-27 RX ADMIN — SODIUM CHLORIDE: 9 INJECTION, SOLUTION INTRAVENOUS at 01:06

## 2023-06-27 RX ADMIN — MUPIROCIN: 20 OINTMENT TOPICAL at 08:06

## 2023-06-27 RX ADMIN — HYDROMORPHONE HYDROCHLORIDE 0.2 MG: 2 INJECTION INTRAMUSCULAR; INTRAVENOUS; SUBCUTANEOUS at 12:06

## 2023-06-27 RX ADMIN — FERROUS SULFATE TAB 325 MG (65 MG ELEMENTAL FE) 1 EACH: 325 (65 FE) TAB at 08:06

## 2023-06-27 RX ADMIN — MORPHINE SULFATE 2 MG: 10 INJECTION, SOLUTION INTRAMUSCULAR; INTRAVENOUS at 03:06

## 2023-06-27 RX ADMIN — METOPROLOL TARTRATE 25 MG: 25 TABLET, FILM COATED ORAL at 08:06

## 2023-06-27 RX ADMIN — GABAPENTIN 600 MG: 300 CAPSULE ORAL at 08:06

## 2023-06-27 RX ADMIN — HYDROCODONE BITARTRATE AND ACETAMINOPHEN 1 TABLET: 5; 325 TABLET ORAL at 05:06

## 2023-06-27 RX ADMIN — HYDROCODONE BITARTRATE AND ACETAMINOPHEN 1 TABLET: 5; 325 TABLET ORAL at 09:06

## 2023-06-27 RX ADMIN — DULOXETINE 30 MG: 30 CAPSULE, DELAYED RELEASE ORAL at 08:06

## 2023-06-27 RX ADMIN — GABAPENTIN 600 MG: 300 CAPSULE ORAL at 09:06

## 2023-06-27 RX ADMIN — HYDROCODONE BITARTRATE AND ACETAMINOPHEN 1 TABLET: 5; 325 TABLET ORAL at 06:06

## 2023-06-27 RX ADMIN — GABAPENTIN 600 MG: 300 CAPSULE ORAL at 02:06

## 2023-06-27 RX ADMIN — ZOLPIDEM TARTRATE 10 MG: 5 TABLET ORAL at 09:06

## 2023-06-27 RX ADMIN — MUPIROCIN: 20 OINTMENT TOPICAL at 09:06

## 2023-06-27 RX ADMIN — AMLODIPINE BESYLATE 5 MG: 5 TABLET ORAL at 08:06

## 2023-06-27 RX ADMIN — METOPROLOL TARTRATE 25 MG: 25 TABLET, FILM COATED ORAL at 09:06

## 2023-06-27 RX ADMIN — VANCOMYCIN HYDROCHLORIDE 1000 MG: 1 INJECTION, POWDER, LYOPHILIZED, FOR SOLUTION INTRAVENOUS at 09:06

## 2023-06-27 RX ADMIN — Medication 1000 MG: at 08:06

## 2023-06-27 RX ADMIN — METHOCARBAMOL 500 MG: 100 INJECTION INTRAMUSCULAR; INTRAVENOUS at 01:06

## 2023-06-27 RX ADMIN — TAMSULOSIN HYDROCHLORIDE 0.4 MG: 0.4 CAPSULE ORAL at 09:06

## 2023-06-27 RX ADMIN — LINACLOTIDE 145 MCG: 145 CAPSULE, GELATIN COATED ORAL at 09:06

## 2023-06-27 NOTE — TRANSFER OF CARE
Anesthesia Transfer of Care Note    Patient: Valentino Manning    Procedure(s) Performed: Procedure(s) (LRB):  FUSION, SPINE, POSTERIOR SPINAL COLUMN, CERVICAL, USING COMPUTER-ASSISTED NAVIGATION (N/A)    Patient location: PACU    Anesthesia Type: general    Transport from OR: Transported from OR on room air with adequate spontaneous ventilation    Post pain: adequate analgesia    Post assessment: no apparent anesthetic complications    Post vital signs: stable    Level of consciousness: sedated    Nausea/Vomiting: no nausea/vomiting    Complications: none    Transfer of care protocol was followed      Last vitals:   Visit Vitals  /60   Pulse 102   Temp 36.4 °C (97.6 °F) (Oral)   Resp 10   Ht 6' (1.829 m)   Wt 88.5 kg (195 lb)   SpO2 98%   BMI 26.45 kg/m²

## 2023-06-27 NOTE — NURSING
Nurses Note -- 4 Eyes      6/27/2023   6:52 PM      Skin assessed during: Transfer      [x] No Altered Skin Integrity Present    [x]Prevention Measures Documented      [] Yes- Altered Skin Integrity Present or Discovered   [] LDA Added if Not in Epic (Describe Wound)   [] New Altered Skin Integrity was Present on Admit and Documented in LDA   [] Wound Image Taken    Wound Care Consulted? No    Attending Nurse:  Sea Brito RN     Second RN/Staff Member:  Eileen REZA

## 2023-06-27 NOTE — PT/OT/SLP RE-EVAL
Physical Therapy Re-evaluation    Patient Name:  Valentino Manning   MRN:  [id number]    Recommendations:     Discharge Recommendations: rehabilitation facility  Discharge Equipment Recommendations: to be determined by next level of care   Barriers to discharge: None    Assessment:     Valentino Manning is a 72 y.o. male admitted with a medical diagnosis of cervical stenosis s/p C3-6 laminectomy and posterior fusion. Pt remains in [facility]. He presents with the following impairments/functional limitations: weakness, impaired endurance, impaired self care skills, impaired functional mobility, impaired balance, decreased upper extremity function, decreased lower extremity function, pain, impaired skin. Pt with significant improvement in strength and functional mobility but is easily fatigued with exertion. Pt would benefit from aggressive rehab services in inpatient setting in order to regain functional independence.    Rehab Prognosis:  good; patient would benefit from acute skilled PT services to address these deficits and reach maximum level of function.      Recent Surgery: Procedure(s) (LRB):  FUSION, SPINE, POSTERIOR SPINAL COLUMN, CERVICAL, USING COMPUTER-ASSISTED NAVIGATION (N/A) 1 Day Post-Op    Plan:     During this hospitalization, patient to be seen 6 x/week (QD to BID) to address the above listed problems via gait training, therapeutic activities, therapeutic exercises  Plan of Care Expires:  07/28/23  Plan of Care Reviewed with: patient    Subjective     Communicated with RN prior to session.  Patient found up in chair upon PT entry to room,     Chief Complaint: sacral discomfort  Patient comments/goals: return to PLOF  Pain/Comfort:  Pain Rating 1: 7/10  Location - Side 1: Right  Location 1: shoulder  Pain Addressed 1: Reposition, Distraction    Patients cultural, spiritual, Yazdanism conflicts given the current situation: no      Objective:     General Precautions: Standard, fall  Orthopedic  Precautions: spinal precautions  Braces: [place] J collar  Respiratory Status: Room air    Exams:  RLE ROM: WFL  RLE Strength: hip 4-/5, knee 4/5, ankle 5/5  LLE ROM: WFL  LLE Strength: hip 4/5, knee 4/5, ankle 5/5    Functional Mobility:   Bed Mobility:     Rolling Left:  minimum assistance  Sit to Supine: minimum assistance  Transfers:     Sit to Stand:  moderate assistance with rolling walker x 2 trials  Gait: 4 steps to bed with RW mod A x 2  Balance: Supported standing balance with RW, min A with verbal cues to correct posture. Wide NIR with single episode of knee buckling, able to recover with UE support and PT assist.         Patient left HOB elevated with all lines intact.    GOALS:   Multidisciplinary Problems       Physical Therapy Goals          Problem: Physical Therapy    Goal Priority Disciplines Outcome Goal Variances Interventions   Physical Therapy Goal     PT, PT/OT Ongoing, Progressing     Description: Goals to be met by: 23     Patient will increase functional independence with mobility by performin. Supine to sit with Stand-by Assistance  2. Sit to supine with Stand-by Assistance  3. Sit to stand transfer with Stand-by Assistance  4. Gait  x 50 feet with Stand-by Assistance using Rolling Walker.                          History:     Past Medical History:   Diagnosis Date    Anemia     GERD (gastroesophageal reflux disease)     Heart problem     Hyperlipidemia     Hypertension     Multiple myeloma     NSTEMI (non-ST elevated myocardial infarction)        Past Surgical History:   Procedure Laterality Date    BONE MARROW ASPIRATION N/A 2022    Procedure: ASPIRATION, BONE MARROW;  Surgeon: Robbie Gardner MD;  Location: [facility];  Service: General;  Laterality: N/A;    CARDIAC SURGERY      ENDOSCOPIC RELEASE OF BOTH CARPAL TUNNELS      FUSION OF POSTERIOR COLUMN OF CERVICAL SPINE USING COMPUTER AIDED NAVIGATION N/A 2023    Procedure: FUSION, SPINE, POSTERIOR SPINAL COLUMN,  CERVICAL, USING COMPUTER-ASSISTED NAVIGATION;  Surgeon: Montserrat Aviles MD;  Location: [facility];  Service: Neurosurgery;  Laterality: N/A;  C3-4, C4-5, C5-6 laminectomies and fusion, o-arm, stealth  TIVA setup  NTI //  XX       Time Tracking:     PT Received On: 06/27/23  PT Start Time: 1108     PT Stop Time: 1132  PT Total Time (min): 24 min     Billable Minutes: Re-eval 14 and Therapeutic Activity 10      06/27/2023

## 2023-06-27 NOTE — PROGRESS NOTES
Pharmacokinetic Initial Assessment: IV Vancomycin    Assessment/Plan:    Initiate intravenous vancomycin with loading dose of 1000 mg once followed by a maintenance dose of vancomycin 1000 mg IV every 12 hours  Desired empiric serum trough concentration is 15 to 20 mcg/mL  Draw vancomycin trough level 60 min prior to fourth dose on 06/28 at approximately 2000  Pharmacy will continue to follow and monitor vancomycin.      Please contact pharmacy at extension [phone] with any questions regarding this assessment.     Thank you for the consult,   Jeff Sahu       Patient brief summary:  Valentino Manning is a 72 y.o. male initiated on antimicrobial therapy with IV Vancomycin for treatment of suspected  Surgical prophylaxis    Drug Allergies:   Review of patient's allergies indicates:   Allergen Reactions    Penicillins        Actual Body Weight:   88.5 kg    Renal Function:   Estimated Creatinine Clearance: 90.9 mL/min (based on SCr of 0.83 mg/dL).,     Dialysis Method (if applicable):  N/A    CBC (last 72 hours):  Recent Labs   Lab Result Units 06/25/23  0319   WBC x10(3)/mcL 8.20   Hgb g/dL 11.1*   Hct % 33.9*   Platelet x10(3)/mcL 315   Mono % % 9.3   Eos % % 2.6   Basophil % % 0.4       Metabolic Panel (last 72 hours):  Recent Labs   Lab Result Units 06/25/23  0319   Sodium Level mmol/L 137   Potassium Level mmol/L 3.8   Chloride mmol/L 105   Carbon Dioxide mmol/L 24   Glucose Level mg/dL 88   Blood Urea Nitrogen mg/dL 11.3   Creatinine mg/dL 0.83   Albumin Level g/dL 3.0*   Bilirubin Total mg/dL 0.5   Alkaline Phosphatase unit/L 82   Aspartate Aminotransferase unit/L 16   Alanine Aminotransferase unit/L 30   Magnesium Level mg/dL 1.70   Phosphorus Level mg/dL 3.9       Drug levels (last 3 results):  No results for input(s): VANCOMYCINRA, VANCORANDOM, VANCOMYCINPE, VANCOPEAK, VANCOMYCINTR, VANCOTROUGH in the last 72 hours.    Microbiologic Results:  Microbiology Results (last 7 days)       ** No results found for the  last 168 hours. **

## 2023-06-27 NOTE — PLAN OF CARE
Problem: Occupational Therapy  Goal: Occupational Therapy Goal  Description: Goals to be met by: 7/11/23 (revised on re-eval 6/27)    Patient will increase functional independence with ADLs by performing:    Self feeding, including drinking, with set up assist. -new  UE Dressing with set up Assistance.-revised  LE Dressing with Moderate Assistance and Assistive Devices as needed.-progressing/continue  Grooming tasks while EOB with set up Assistance. - Revised.  Toileting from BSC with min Assistance.-revised  Pt will perform BSC t/f with contact guard Assistance.-revised    Outcome: Ongoing, Progressing

## 2023-06-27 NOTE — PROGRESS NOTES
"POD#1 decompressive laminectomies at C3-4, C4-5, and C5-6 with fusion  He is working with PT at time of rounds  He did stand at bedside and states "I feel stronger than pre op"  He is tolerating diet  Rigid collar in place    AFVSS  Right UE: limited shoulder motion d/t pain/arthritis. 3/5 deltoids, 4/5 biceps/triceps and intrinsics  Left UE: 3/5 deltoids, 4/5 biceps/triceps and intrinsics  3/5 bilateral hip flexion, 4/5 bilateral knee ext, DF/PF/EHL  Sensation grossly intact to bilateral UE; sensation in hands is improved  Decreased sensation from knees down, unchanged from pre op  Incision c/d/I  Drain output 90cc since surgery    Plan: OK to downgrade to the floor with Q4 hour neuro checks  Continue current dressing  Continue drain  Rigid collar at all times  PT/OT  SCDs for DVT prophylaxis; we will hold Plavix until 7 days post op  Rehab placement pending  "

## 2023-06-27 NOTE — H&P
[facility] - Periop Services  Pulmonary Critical Care Note    Patient Name: Valentino Manning  MRN: [id number]  Admission Date: 6/11/2023  Hospital Length of Stay: 15 days  Code Status: Full Code  Attending Provider: Omid Vale MD  Primary Care Provider: Kaiser Metcalf MD     Subjective:     HPI:     Valentino Manning is a 72 y.o. male with PMH of CAD s/p PCI RCA, prior PE on Eliquis, multiple myeloma, HTN, HLD, GERD, who presents to [facility] 06/11/2023 with chief complaint of numbness and weakness to his bilateral hands and legs that was progressively worsening over the last 6 months.  It had reportedly worsened to the point where he could no longer walk over the 3 weeks prior to presentation. Patient had MRI of his entire spine which revealed severe degenerative narrowing of the spinal canal at C4-C5, C5-C6, moderate at  C3-C4 with signal changes at C4-C5 concerning for compressive myelopathy.  He also had severe stenosis at L3-L4, L4-L5, and moderate stenosis at L2-L3.  Neurosurgery was consulted, but surgery was delayed as the patient was on both Eliquis and Plavix.  He was cleared by Cardiology to proceed with surgery after his antiplatelets and anti coagulation was held.  Patient was also treated for a UTI in the interim with levofloxacin which contributed to the slight delay in surgery.  Patient underwent C3-C6 laminectomy and posterior cervical fusion on 06/26/2023.  He was admitted to the ICU postoperatively for continued monitoring.    Hospital Course/Significant events:    6/26/23:  C3-C6 laminectomy and PCF    24 Hour Interval History:    N/A    Past Medical History:   Diagnosis Date    Anemia     GERD (gastroesophageal reflux disease)     Heart problem     Hyperlipidemia     Hypertension     Multiple myeloma     NSTEMI (non-ST elevated myocardial infarction)        Past Surgical History:   Procedure Laterality Date    BONE MARROW ASPIRATION N/A 9/1/2022    Procedure: ASPIRATION, BONE MARROW;   Surgeon: Robbie Gardner MD;  Location: [facility];  Service: General;  Laterality: N/A;    CARDIAC SURGERY  2021    ENDOSCOPIC RELEASE OF BOTH CARPAL TUNNELS         Social History     Socioeconomic History    Marital status:    Tobacco Use    Smoking status: Former     Types: Cigarettes    Smokeless tobacco: Former   Substance and Sexual Activity    Alcohol use: Not Currently    Drug use: Never     Social Determinants of Health     Food Insecurity: Unknown    Worried About Running Out of Food in the Last Year: Never true   Transportation Needs: Unknown    Lack of Transportation (Medical): No   Housing Stability: Unknown    Unable to Pay for Housing in the Last Year: No           Current Outpatient Medications   Medication Instructions    albuterol (PROAIR HFA) 90 mcg/actuation inhaler 2 puffs, Inhalation, Every 6 hours PRN, Rescue    amLODIPine (NORVASC) 10 MG tablet Daily    apixaban (ELIQUIS) 5 mg, Oral, 2 times daily    ascorbic acid (vitamin C) (VITAMIN C) 1,000 mg, Oral, Daily    atorvastatin (LIPITOR) 40 MG tablet TAKE 1 TABLET ORALLY ONCE DAILY ON MON/TUE/THUR/FRI AND 1/2 ON WED. NONE ON SAT/SUN    b complex vitamins capsule 1 capsule, Oral, Daily    clopidogreL (PLAVIX) 75 mg, Oral, Daily    diphenoxylate-atropine 2.5-0.025 mg (LOMOTIL) 2.5-0.025 mg per tablet 1 tablet, Oral, 4 times daily PRN    doxazosin (CARDURA) 4 mg, Oral, Daily    DULoxetine (CYMBALTA) 30 MG capsule TAKE 1 CAPSULE BY MOUTH EVERY DAY    dutasteride (AVODART) 0.5 mg, Oral, Daily    ergocalciferol (ERGOCALCIFEROL) 50,000 Units, Oral, Every 7 days    FERRETTS 325 mg (106 mg iron) Tab 1 tablet, Oral, 2 times daily    furosemide (LASIX) 20 MG tablet No dose, route, or frequency recorded.    gabapentin (NEURONTIN) 300 mg, Oral, 3 times daily    glutamine 10 g, Oral, 2 times daily    hydrocortisone (CORTEF) 20 MG Tab TAKE ONE TABLET BY MOUTH TWICE DAILY X 14 DAYS    lactulose (CHRONULAC) 10 gram/15 mL solution GIVE 30ML BY MOUTH TWICE A  DAY X7 DAY(S)    LINZESS 145 mcg, Oral    lisinopriL (PRINIVIL,ZESTRIL) 40 mg, Oral, Daily    metoprolol tartrate (LOPRESSOR) 25 mg, Oral, 2 times daily, Takes 1 tab q am    ondansetron (ZOFRAN) 4 mg, Oral, Every 8 hours PRN    pantoprazole (PROTONIX) 40 mg, Oral, Daily    promethazine (PHENERGAN) 25 MG tablet TAKE 1 TABLET BY MOUTH EVERY 4 HOURS AS NEEDED FOR MOTION SICKNESS    pyridoxine (vitamin B6) (B-6) 250 mg, Oral, Daily    SLOW RELEASE IRON 140 mg (45 mg iron) TbSR 1 tablet, Oral, 2 times daily    sulfamethoxazole-trimethoprim 800-160mg (BACTRIM DS) 800-160 mg Tab 1 tablet, Oral, See admin instructions, Tab 1 PO Daily M-W-F    testosterone cypionate (DEPOTESTOTERONE CYPIONATE) 200 mg/mL injection INJECT 1ML INTO MUSCLE ONCE A WEEK    zolpidem (AMBIEN) 10 mg, Oral, Nightly PRN       Current Inpatient Medications   acetaminophen  1,000 mg Intravenous Once    amLODIPine  5 mg Oral Daily    ascorbic acid (vitamin C)  1,000 mg Oral Daily    atorvastatin  40 mg Oral Every other day    DULoxetine  30 mg Oral Daily    dutasteride  0.5 mg Oral Daily    ferrous sulfate  1 tablet Oral Daily    [START ON 6/27/2023] gabapentin  600 mg Oral TID    linaCLOtide  145 mcg Oral Before breakfast    metoprolol tartrate  25 mg Oral BID    mupirocin   Nasal BID    tamsulosin  0.4 mg Oral QHS    [START ON 6/27/2023] vancomycin  1,000 mg Intravenous Q12H    zolpidem  10 mg Oral QHS       Current Intravenous Infusions   [START ON 6/27/2023] sodium chloride 0.9%           Review of Systems   Constitutional:  Negative for chills and fever.   Eyes:  Negative for blurred vision.   Respiratory:  Negative for shortness of breath.    Cardiovascular:  Negative for chest pain.   Gastrointestinal:  Negative for abdominal pain, diarrhea, nausea and vomiting.   Musculoskeletal:  Positive for falls.   Neurological:  Positive for sensory change, focal weakness and weakness. Negative for loss of consciousness.        Objective:       Intake/Output  Summary (Last 24 hours) at 6/26/2023 2343  Last data filed at 6/26/2023 2240  Gross per 24 hour   Intake 1651.15 ml   Output 550 ml   Net 1101.15 ml         Vital Signs (Most Recent):  Temp: 98 °F (36.7 °C) (06/26/23 2244)  Pulse: 88 (06/26/23 2330)  Resp: 14 (06/26/23 2330)  BP: 109/63 (06/26/23 2330)  SpO2: 100 % (06/26/23 2330)  Body mass index is 26.45 kg/m².  Weight: 88.5 kg (195 lb) Vital Signs (24h Range):  Temp:  [97.6 °F (36.4 °C)-98.1 °F (36.7 °C)] 98 °F (36.7 °C)  Pulse:  [] 88  Resp:  [10-18] 14  SpO2:  [94 %-100 %] 100 %  BP: ()/(59-76) 109/63  Arterial Line BP: ()/(52-55) 111/55     Physical Exam  Constitutional:       General: He is not in acute distress.     Appearance: Normal appearance.   HENT:      Head: Normocephalic and atraumatic.      Right Ear: External ear normal.      Left Ear: External ear normal.      Nose: Nose normal.      Mouth/Throat:      Mouth: Mucous membranes are dry.   Eyes:      Extraocular Movements: Extraocular movements intact.      Pupils: Pupils are equal, round, and reactive to light.   Neck:      Comments: In C-collar, posterior cervical drain in place  Cardiovascular:      Rate and Rhythm: Regular rhythm. Tachycardia present.      Heart sounds: No murmur heard.    No friction rub. No gallop.   Pulmonary:      Breath sounds: No wheezing, rhonchi or rales.   Abdominal:      General: There is no distension.      Palpations: Abdomen is soft.      Tenderness: There is no abdominal tenderness.   Musculoskeletal:      Right lower leg: No edema.      Left lower leg: No edema.   Skin:     General: Skin is warm and dry.   Neurological:      Mental Status: He is alert.      Comments: Awake, alert, oriented x3. CN II-XII intact. Has mild expressive aphasia may be post-anesthesia related. 3/5 strength RUE 4/5 LUE, 4/5 BLE (foot/ankle dorsi/plantarflexion) though with difficulty raising legs against gravity. Sensation to light touch present in all 4 extremities  though subjectively decreased in LUE         Lines/Drains/Airways       Drain  Duration                  Urethral Catheter Straight-tip -- days         Closed/Suction Drain 06/26/23 2147 Posterior Neck Accordion 10 Fr. <1 day         Urethral Catheter 06/26/23 1632 Non-latex;Double-lumen;Silicone 16 Fr. <1 day              Arterial Line  Duration             Arterial Line 06/26/23 1531 Right Radial <1 day              Peripheral Intravenous Line  Duration                  Peripheral IV - Single Lumen 06/26/23 0510 18 G Anterior;Right Wrist <1 day                    Significant Labs:    Lab Results   Component Value Date    WBC 8.20 06/25/2023    HGB 11.1 (L) 06/25/2023    HCT 33.9 (L) 06/25/2023    .6 (H) 06/25/2023     06/25/2023           BMP  Lab Results   Component Value Date     06/25/2023    K 3.8 06/25/2023    CHLORIDE 105 06/25/2023    CO2 24 06/25/2023    BUN 11.3 06/25/2023    CREATININE 0.83 06/25/2023    CALCIUM 9.6 06/25/2023    AGAP 9.0 06/20/2023    ESTGFRAFRICA 85 04/24/2023    EGFRNONAA 53 06/22/2022         ABG  No results for input(s): PH, PO2, PCO2, HCO3, POCBASEDEF in the last 168 hours.    Mechanical Ventilation Support:         Significant Imaging:  I have reviewed the pertinent imaging within the past 24 hours.        Assessment/Plan:     Assessment  Cervical spine stenosis with myelopathy due to degenerative spine disease   S/p C3-C6 laminectomy and posterior fusion  Lumbar stenosis  UTI s/p course of levofloxacin  CAD s/p PCI of RCA, on aspirin and plavix  Prior PE, on eliquis therapy  Hx Htn, HLD, GERD, multiple myeloma    Plan  Admit to ICU postoperatively for continued monitoring  Per NSGY, okay for q2h neurochecks, no specific BP parameters  Will discuss with NSGY when eliquis and plavix can be restarted. Per prior documentation appears plavix needs to be helpd 7 days postoperatively  Monitor posterior neck drain output  Continue PT/OT  Continue rest of home meds for  his various comorbidities    DVT Prophylaxis: SCDs     32 minutes of critical care was time spent personally by me on the following activities: development of treatment plan with patient or surrogate and bedside caregivers, discussions with consultants, evaluation of patient's response to treatment, examination of patient, ordering and performing treatments and interventions, ordering and review of laboratory studies, ordering and review of radiographic studies, pulse oximetry, re-evaluation of patient's condition.  This critical care time did not overlap with that of any other provider or involve time for any procedures.     Cody Browne MD  Pulmonary Critical Care Medicine  [facility] - Periop Services

## 2023-06-27 NOTE — ANESTHESIA POSTPROCEDURE EVALUATION
Anesthesia Post Evaluation    Patient: Valentino Manning    Procedure(s) Performed: Procedure(s) (LRB):  FUSION, SPINE, POSTERIOR SPINAL COLUMN, CERVICAL, USING COMPUTER-ASSISTED NAVIGATION (N/A)    Final Anesthesia Type: general      Patient location during evaluation: PACU  Patient participation: Yes- Able to Participate  Level of consciousness: awake  Post-procedure vital signs: reviewed and stable  Pain management: adequate  Airway patency: patent  IMANI mitigation strategies: Multimodal analgesia    Anesthetic complications: no      Cardiovascular status: stable  Respiratory status: unassisted  Hydration status: euvolemic  Follow-up not needed.          Vitals Value Taken Time   /81 06/27/23 1601   Temp 36.6 °C (97.9 °F) 06/27/23 0700   Pulse 68 06/27/23 1611   Resp 21 06/27/23 1611   SpO2 97 % 06/27/23 1611   Vitals shown include unvalidated device data.      Event Time   Out of Recovery 06/27/2023 00:21:00         Pain/Collins Score: Pain Rating Prior to Med Admin: 8 (6/27/2023  9:27 AM)  Pain Rating Post Med Admin: 4 (6/26/2023 11:34 AM)  Collins Score: 4 (6/26/2023 10:50 PM)

## 2023-06-27 NOTE — NURSING
Nurses Note -- 4 Eyes      6/27/2023   4:26 PM      Skin assessed during: Daily Assessment      [] No Altered Skin Integrity Present    [x]Prevention Measures Documented      [x] Yes- Altered Skin Integrity Present or Discovered   [x] LDA Added if Not in Epic (Describe Wound)   [] New Altered Skin Integrity was Present on Admit and Documented in LDA   [] Wound Image Taken    Wound Care Consulted? Yes    Attending Nurse:  Toya Lee RN     Second RN/Staff Member:  Martin Whelan RN

## 2023-06-27 NOTE — OP NOTE
Neurosurgery Operative Note  [facility]      Patient Name: Valentino Manning  MRN: [id number]  CSN:  [id number]  : 1950  Age:72 yrs  Sex:male  Admit Date: 2023    Surgery date: 2023  Surgeon(s) and Role:     * Montserrat Aviles MD - Primary  Assistant(s): None    Preop/ Preprocedure Diagnosis: Cervical stenosis with myelopathy    Postop/ Postprocedure Diagnosis: Cervical stenosis with myelopathy    Surgery:   1) Placement of bilateral Medtronic lateral mass screws from C3-4 to C5-6 with Medtronic Infinity instrumentation    A. C3- bilateral 3.5 x 14 mm lateral mass screws   B. C4- right- 3.5 x 14 mm lateral mass screw; left- 4.0 x 14 mm lateral mass screw   C. C5- bilateral 3.5 x 14 mm lateral mass screws   D. C6- right- 4.0 x 12 lateral mass screw; left- 3.5 x 12 mm lateral mass screw    F. Bilateral rods were 60 mm in length     2) Decompressive laminectomies at C3-4, C4-5, and C5-6     3) Placement of local bone, allograft, and Magnafuse strips bilaterally     4) Use of Navitaalth navigation and intraoperative O Arm for placement of cervical lateral mass screws and to verify final placement of spinal instrumentation     5) Use of intraoperative C-arm      6) Use of intraoperative neuromonitoring with no change from supine, prone, intraoperative, and final readings      Findings: Good placement of posterior cervical instrumentation on intraoperative O- arm and C-arm imaging.  The spinal cord from C3-6 was decompressed intraoperatively.      Fluids: See anesthesia record  Estimated Blood Loss: 300 mL  Anesthesia Type: General  Blood Products: none  Specimens:  * No specimens in log *    Implants/Grafts:   Implant Name Type Inv. Item Serial No.  Lot No. LRB No. Used Action   SCREW INFINTIY MA 3.5X12MM - [id number]  SCREW INFINTIY MA 3.5X12MM  [employer] [facility]  N/A 2 Implanted   SCREW INFINITY MA 3.5X14MM - [id number]  SCREW INFINITY MA 3.5X14MM  [employer]  [place]  N/A 5 Implanted   Screw    [employer]   1 Implanted   Screw    [employer]ON[employer]   1 Implanted   Set screw    [employer]  N/A 8 Implanted   Cancellous chips   [id number] [employer]  N/A 1 Implanted   Cancellous chips   [id number] [employer]  N/A 1 Implanted   Magnifuse Bone Graft   [id number] [employer]TRONIC  N/A 1 Implanted   Magnafuse Bone graft   [id number]   N/A 1 Implanted   Rods    [employer]  N/A 2 Implanted     Drain(s), Tube(s), Packing: Hemovac drain    Complications: No immediate    Preoperative Indications:     Mr. Manning is a 73 yo male who has a past history significant for hypertension, coronary artery disease, PE, multiple myeloma on chemotherapy, chronic anemia, and GERD.  He was previously taking Plavix and Eliquis.  The patient presented to the ER on 06/11/2023 with progressive numbness in all his extremities, urinary retention, and progressive difficulty walking 3 weeks prior to admission.  Mr. Manning had numbness in his distal lower extremities with generalized weakness, especially in his bilateral proximal arms.  He had myelopathy on exam.     A MRI cervical spine with and without gadolinium demonstrated Grade I retrolisthesis of C3 on C4.  Grade I anterolisthesis of C4 on C5.  Grade I retrolisthesis of C5 on C6.  There was enhancement in the C4-5 and C5-6 facets. There was moderate to severe stenosis with bilateral neuroforaminal narrowing at C3-4, C4-5, and C5-6. Increased T2 signal within the spinal cord at C4-5, which was consistent with myelomalacia.     I discussed with Mr. Manning and his wife that I attributed his neurological symptoms to severe cervical stenosis with myelopathy.  He was a candidate for surgery, specifically a C3-4, C4-5, C5-6 laminectomy and posterior fusion.  This surgery was not a guarantee that his neurological symptoms would improve. However, without decompression of his cervical spinal cord, Mr. Manning was anticipated to continue declining in regards to his functional status.  I  reviewed the risks, benefits, and alternatives of this surgery.  He agreed to proceed.  All questions were answered to his satisfaction.       Operative Procedure:  The patient was brought to the operating room.  General endotracheal intubation with in-line precautions was instituted by the anesthesia team. A radiolucent Holm head clamp was applied.  The patient was then turned prone with a [place] J C-collar in place. His arms and legs were appropriately padded.  Neuro monitoring with SSEP, EMG, and motor-evoked potentials were completed before positioning, after positioning, during surgery, and after surgery.  Neuro monitoring remained unchanged throughout the procedure.     It was anticipated that a midline incision be made on the patient's posterior neck.  A C-arm image was performed that correctly identified the C2 reference level before proceeding.  The patient's posterior neck was prepped and draped in a normal sterile fashion.  A timeout was performed that correctly identified the patient, preoperative antibiotics, and procedure.     A solution of 0.5% lidocaine with epinephrine was infused into the anticipated incisional site.  The initial skin incision was made with an 10 scalpel blade. Bovie cautery was used for hemostasis and for carrying out this incision through the muscles and fascia to the spinous processes.  Dissection was carried out to the lateral edges of the facets.  Cerebellar retractors were placed for visualization and then changed to a radiolucent  retractors.  The Bovie cautery was used to dissect the soft tissues from the spinous processes and lamina.  Hemostasis was achieved with the Bovie cautery, bipolar, and FloSeal.     A damian clamp was placed on a cervical spinous process that was thought to be C2 . A lateral C-arm image was performed, which correctly identified the C2 reference level.  Then, a localization clamp was placed on the C2 spinous process for Stealth  neuronavigation.  An O arm image was obtained.     Using neuro navigation with the O arm, bilateral lateral mass screws were placed at C3, C4, C5, and C6.      A. C3- bilateral 3.5 x 14 mm lateral mass screws   B. C4- right- 3.5 x 14 mm lateral mass screw; left- 4.0 x 14 mm lateral mass screw   C. C5- bilateral 3.5 x 14 mm lateral mass screws   D. C6- right- 4.0 x 12 lateral mass screw; left- 3.5 x 12 mm lateral mass screw    A technique involving a navigated drill to create an area of entry on the cervical lateral mass, use of a navigated hand drill, application of a ball-tip probe, and then placement of a lateral mass screw under Stealh neuronavigation guidance was the sequence of events that was followed bilaterally at C3, C4, C5, and C6. An O arm image was conducted and demonstrated that there was good placement of posterior cervical instrumentation from C3 to C6, although the right C6 lateral mass screw was redirected for an optimized trajectory.     At this time, the spinous processes of C3, C4, C5, and C6 were removed with a rongeur.  A curved curette was used to separate the underside of the bone from the ligamentum flavum.  A high-speed drill was used to thin the lamina.  Then, a Kerrison punch was used to remove this bone in a piecemeal fashion. The ligamentum flavum was lifted up with a curved curette and then a Kerrison punch was used to remove this ligament, thereby exposing the underlying dura, which had been significantly compressed.      Bipolar cautery was used for hemostasis. At the conclusion of the case, there was good decompression of the spinal cord at the C3-4 to C5-6 levels.  Bilateral 60 mm rods spanned the C3, C4, C5, and C6 lateral mass screws on each side.  Caps were placed and tightened.  The cervical lamina were decorticated. Magnifuse strips were placed bilaterally adjacent to the posterior cervical spine hardware.  Morselized autograft and allograft were placed bilaterally.  AP and  lateral C-arm imaging demonstrated that the posterior cervical instrumentation was in place.     Then, the surgical field was copiously irrigated. FloSeal was used for hemostasis and then irrigated out.  The  retractors were removed.     Closure was then in order.  A medium size Hemovac drain was placed and secured into place with an 0 Vicryl suture.  The fascia was closed with interrupted 0 Vicryl sutures. The fascia was infused with a solution of 0.5% lidocaine with epinephrine.  The subcutaneous layer was closed with interrupted, buried 2-0 Vicryl sutures.  The skin was everted and closed with at 3.0 running nylon suture. Xeroform gauze, 4 x 4's, and Medipore tape were placed on top.     The patient was brought back into the supine position and extubated.  A [place] J C-collar was secured around his neck.  He was transported to the PACU and will be monitored in the ICU postoperatively.       Montserrat Aviles MD  Neurosurgery

## 2023-06-27 NOTE — PT/OT/SLP RE-EVAL
"Occupational Therapy  Re-Evaluation    Name: Valentino Manning  MRN: [id number]  Admitting Diagnosis: stenosis  Recent Surgery: Procedure(s) (LRB):  FUSION, SPINE, POSTERIOR SPINAL COLUMN, CERVICAL, USING COMPUTER-ASSISTED NAVIGATION (N/A) 1 Day Post-Op    Recommendations:     Discharge Recommendations: rehabilitation facility  Discharge Equipment Recommendations:  to be determined by next level of care  Barriers to discharge:  None    Assessment:     Valentino Manning is a 72 y.o. male with a medical diagnosis of cervical stenosis with myelopathy. New OT orders received s/p decompressive laminectomies C3-C6, UTI s/p gongora. On re-eval today, He presents with significant functional improvements in strength and balance with decreased levels of assist required for ADLs and mobility post op. Performance deficits affecting function: weakness, impaired endurance, impaired sensation, impaired self care skills, impaired functional mobility, impaired balance, decreased upper extremity function, decreased lower extremity function, pain, impaired skin. Pt is very motivated and cooperative. He is an excellent candidate for IP rehab placement upon d/c to maximize functional recovery.    Rehab Prognosis: Good; patient would benefit from acute skilled OT services to address these deficits and reach maximum level of function.       Plan:     Patient to be seen 6 x/week to address the above listed problems via self-care/home management, therapeutic activities, therapeutic exercises, neuromuscular re-education  Plan of Care Expires: 07/11/23  Plan of Care Reviewed with: patient    Subjective   Pt reports h/o progressive weakness over the last 3 months following "COVID and heart attack." He lives with spouse in a single story home with 5 steps to enter and uses a walk in shower at baseline. 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 He reports that he had began uses a cane after having COVID, eventually progressing to " RW then w/c with inability to walk prior to admit. His spouse has been assisting him with ADLs and mobility tasks, but he is agreeable to rehab placement very motivated to regain his independence.    Chief Complaint: R shoulder arthritis  Patient/Family Comments/goals: to take care of himself again    Pain/Comfort:  Pain Rating 1: 4/10  Location - Side 1: Right  Location 1: shoulder  Pain Addressed 1: Reposition, Distraction  Pain Rating 2: 4/10  Location - Orientation 2: midline  Location 2: neck  Pain Addressed 2: Reposition, Distraction    Patients cultural, spiritual, Restorationism conflicts given the current situation: no    Objective:     Communicated with: RN prior to session.  Patient found right sidelying with telemetry, blood pressure cuff, pulse ox (continuous), peripheral IV, gongora catheter upon OT entry to room.    General Precautions: Standard, fall  Orthopedic Precautions: spinal precautions  Braces: [place] J collar    Vital Signs  Blood Pressure: 139/73  HR: 89  Sp02: 99%  Respiratory Status: on room air  With Activity: stable    Occupational Performance:    Bed Mobility:    Patient completed Rolling/Turning to Left with  maximal assistance  Patient completed Rolling/Turning to Right with maximal assistance  Patient completed Supine to Sit with maximal assistance  *training provided on spinal pxns and log roll techniques throughout     Functional Mobility/Transfers:  Anterior/Posterior Scooting: maximal assistance   Lateral Scooting along EOB: moderate assistance   Sit<>Stand Transition: mod A x 2 persons without AD. Min A x 2 persons with RW. No significant knee buckling today.   Bed<>Chair Transfer: mod A stand pivot with assist for weight shift needed to advance LLE     Activities of Daily Living:  Feeding:  set up assist for self feeding with minimal spilling. Min A needed for drinking (once cup is placed in hand, pt is able to drink without assist, but requires assist to reach/grasp without  spilling)   Upper Body Dressing: maximal assistance   Lower Body Dressing: total assistance   Toileting: gongora in place. Pt reports incontinence with BMs      AMPAC 6 Click ADL Scale:  Total Score: 14    Functional Cognition  WFL    Visual Perceptual Skills:  WFL    Physical Exam:  Sitting Balance  SBA x 20 min    Standing Balance:  Min A x 2 with RLE knee blocking but no buckling noted. Able to stand ~60 sec x 3 trials    UE Function:  RUE: pt endorses h/o R should arthritis with chronic pain and ROM deficits. Minimal to no AROM appreciated in shoulder; +crepitus. Distal ROM intact with 3+/5 strength except 3-/5 at tricep. Pt is R hand dominant.     LUE:  pt endorses h/o bicep rupture at baseline. Shoulder ROM decreased to ~80*, but distal ROM is WFL with 4/5 , 3+/5 bicep/tricep, 3-/5 shoulder     Therapeutic Positioning  Risk for acquired pressure injuries is increased due to impaired mobility, impaired sensation, incontinence, and altered skin already present .    Skin assessment: full body assessment performed  Findings: redness to B knees (pt reports healing wounds from previous falls). sacral breakdown noted. RNGay, notified. Wound care consult recommended.    The following interventions were performed in an effort to prevent and/or reduce acquired pressure ulcers:   -skin assessment was performed  -education was provided on pressure ulcer prevention   -Geomat was ordered for sacral protection while UIC  -collaboration with nursing staff on therapeutic positioning recommendations     OT recommendations for therapeutic positioning throughout hospitalization:  geomat UIC in addition to standard pressure injury prevention measures. (Defer to wound care for surface recs; pt is currently on ICU JOHNATHAN mattress)    Additional Treatment:  Initial OT eval completed. ADL training then provided for self feeding ax on EOB. Pt able to maintain sit balance ~20 min with SBA, no LOB. He demos ability to feed self with  set up assist for container management and cutting of food without use of built up handle. Difficulty with grasp/release of cups, but with assist to place in hand, able to drink from open cup with no spilling.     Patient Education:  Patient provided with verbal education regarding OT role/goals/POC, post op precautions, fall prevention, safety awareness, Discharge/DME recommendations, and pressure ulcer prevention.  Understanding was verbalized, however additional teaching warranted.     Patient left up in chair with all lines intact, call button in reach, and pillow in chair (geomat ordered) and PT to assist back to bed in ~2 hrs    GOALS:   Multidisciplinary Problems       Occupational Therapy Goals          Problem: Occupational Therapy    Goal Priority Disciplines Outcome Interventions   Occupational Therapy Goal     OT, PT/OT Ongoing, Progressing    Description: Goals to be met by: 7/11/23 (revised on re-eval 6/27)    Patient will increase functional independence with ADLs by performing:    Self feeding, including drinking, with set up assist. -new  UE Dressing with set up Assistance.-revised  LE Dressing with Moderate Assistance and Assistive Devices as needed.-progressing/continue  Grooming tasks while EOB with set up Assistance. - Revised.  Toileting from BSC with min Assistance.-revised  Pt will perform BSC t/f with contact guard Assistance.-revised                         History:     Past Medical History:   Diagnosis Date    Anemia     GERD (gastroesophageal reflux disease)     Heart problem     Hyperlipidemia     Hypertension     Multiple myeloma     NSTEMI (non-ST elevated myocardial infarction)          Past Surgical History:   Procedure Laterality Date    BONE MARROW ASPIRATION N/A 9/1/2022    Procedure: ASPIRATION, BONE MARROW;  Surgeon: Robbie Gardner MD;  Location: [facility];  Service: General;  Laterality: N/A;    CARDIAC SURGERY  2021    ENDOSCOPIC RELEASE OF BOTH CARPAL TUNNELS      FUSION OF  POSTERIOR COLUMN OF CERVICAL SPINE USING COMPUTER AIDED NAVIGATION N/A 6/26/2023    Procedure: FUSION, SPINE, POSTERIOR SPINAL COLUMN, CERVICAL, USING COMPUTER-ASSISTED NAVIGATION;  Surgeon: Montserrat Aviles MD;  Location: [facility];  Service: Neurosurgery;  Laterality: N/A;  C3-4, C4-5, C5-6 laminectomies and fusion, o-arm, stealth  TIVA setup  NTI //  XX       Time Tracking:     OT Date of Treatment: 06/27/23  OT Start Time: 0841  OT Stop Time: 0918  OT Total Time (min): 37 min    Billable Minutes:Re-eval 14 min  Self Care/Home Management 23 min    6/27/2023

## 2023-06-28 LAB — VANCOMYCIN TROUGH SERPL-MCNC: 13.9 UG/ML (ref 15–20)

## 2023-06-28 PROCEDURE — 25000003 PHARM REV CODE 250: Performed by: INTERNAL MEDICINE

## 2023-06-28 PROCEDURE — 97535 SELF CARE MNGMENT TRAINING: CPT

## 2023-06-28 PROCEDURE — 11000001 HC ACUTE MED/SURG PRIVATE ROOM

## 2023-06-28 PROCEDURE — 97110 THERAPEUTIC EXERCISES: CPT | Mod: CQ

## 2023-06-28 PROCEDURE — 25000003 PHARM REV CODE 250: Performed by: NURSE PRACTITIONER

## 2023-06-28 PROCEDURE — 99024 PR POST-OP FOLLOW-UP VISIT: ICD-10-PCS | Mod: POP,,, | Performed by: NEUROLOGICAL SURGERY

## 2023-06-28 PROCEDURE — 99024 POSTOP FOLLOW-UP VISIT: CPT | Mod: POP,,, | Performed by: NEUROLOGICAL SURGERY

## 2023-06-28 PROCEDURE — 63600175 PHARM REV CODE 636 W HCPCS: Performed by: NEUROLOGICAL SURGERY

## 2023-06-28 PROCEDURE — 97530 THERAPEUTIC ACTIVITIES: CPT | Mod: CQ

## 2023-06-28 PROCEDURE — 80202 ASSAY OF VANCOMYCIN: CPT | Performed by: INTERNAL MEDICINE

## 2023-06-28 PROCEDURE — 25000003 PHARM REV CODE 250: Performed by: NEUROLOGICAL SURGERY

## 2023-06-28 RX ORDER — OXYCODONE AND ACETAMINOPHEN 10; 325 MG/1; MG/1
1 TABLET ORAL EVERY 6 HOURS PRN
Status: DISCONTINUED | OUTPATIENT
Start: 2023-06-28 | End: 2023-06-29

## 2023-06-28 RX ADMIN — MUPIROCIN: 20 OINTMENT TOPICAL at 09:06

## 2023-06-28 RX ADMIN — GABAPENTIN 600 MG: 300 CAPSULE ORAL at 08:06

## 2023-06-28 RX ADMIN — VANCOMYCIN HYDROCHLORIDE 1000 MG: 1 INJECTION, POWDER, LYOPHILIZED, FOR SOLUTION INTRAVENOUS at 09:06

## 2023-06-28 RX ADMIN — GABAPENTIN 600 MG: 300 CAPSULE ORAL at 09:06

## 2023-06-28 RX ADMIN — METOPROLOL TARTRATE 25 MG: 25 TABLET, FILM COATED ORAL at 09:06

## 2023-06-28 RX ADMIN — ATORVASTATIN CALCIUM 40 MG: 40 TABLET, FILM COATED ORAL at 09:06

## 2023-06-28 RX ADMIN — ACETAMINOPHEN 650 MG: 325 TABLET, FILM COATED ORAL at 10:06

## 2023-06-28 RX ADMIN — FERROUS SULFATE TAB 325 MG (65 MG ELEMENTAL FE) 1 EACH: 325 (65 FE) TAB at 09:06

## 2023-06-28 RX ADMIN — DULOXETINE 30 MG: 30 CAPSULE, DELAYED RELEASE ORAL at 09:06

## 2023-06-28 RX ADMIN — OXYCODONE AND ACETAMINOPHEN 1 TABLET: 10; 325 TABLET ORAL at 01:06

## 2023-06-28 RX ADMIN — Medication: at 02:06

## 2023-06-28 RX ADMIN — Medication: at 08:06

## 2023-06-28 RX ADMIN — DUTASTERIDE 0.5 MG: 0.5 CAPSULE, LIQUID FILLED ORAL at 09:06

## 2023-06-28 RX ADMIN — Medication 1000 MG: at 09:06

## 2023-06-28 RX ADMIN — OXYCODONE AND ACETAMINOPHEN 1 TABLET: 10; 325 TABLET ORAL at 08:06

## 2023-06-28 RX ADMIN — VANCOMYCIN HYDROCHLORIDE 1000 MG: 1 INJECTION, POWDER, LYOPHILIZED, FOR SOLUTION INTRAVENOUS at 10:06

## 2023-06-28 RX ADMIN — HYDROCODONE BITARTRATE AND ACETAMINOPHEN 1 TABLET: 5; 325 TABLET ORAL at 05:06

## 2023-06-28 RX ADMIN — AMLODIPINE BESYLATE 5 MG: 5 TABLET ORAL at 09:06

## 2023-06-28 RX ADMIN — METOPROLOL TARTRATE 25 MG: 25 TABLET, FILM COATED ORAL at 08:06

## 2023-06-28 RX ADMIN — TAMSULOSIN HYDROCHLORIDE 0.4 MG: 0.4 CAPSULE ORAL at 08:06

## 2023-06-28 RX ADMIN — GABAPENTIN 600 MG: 300 CAPSULE ORAL at 03:06

## 2023-06-28 RX ADMIN — ZOLPIDEM TARTRATE 10 MG: 5 TABLET ORAL at 09:06

## 2023-06-28 RX ADMIN — HYDROCODONE BITARTRATE AND ACETAMINOPHEN 1 TABLET: 5; 325 TABLET ORAL at 10:06

## 2023-06-28 RX ADMIN — LINACLOTIDE 145 MCG: 145 CAPSULE, GELATIN COATED ORAL at 05:06

## 2023-06-28 NOTE — PROGRESS NOTES
Came to see the patient   He has surgery and is doing good  He is in good spirits and is feeling good  Case management working on Mihaela mike, maybe [place] in [place]    I will see him at the office after he completes reha.      Angélica Harris MD

## 2023-06-28 NOTE — PT/OT/SLP PROGRESS
Physical Therapy Treatment    Patient Name:  Valentino Manning   MRN:  [id number]    Recommendations:     Discharge Recommendations: rehabilitation facility  Discharge Equipment Recommendations: to be determined by next level of care  Barriers to discharge:     Assessment:     Valentino Manning is a 72 y.o. male admitted with a medical diagnosis of cervical spine stenosis with myelopathy 2/2 degenerative spine disease, s/p decompressive laminectomies C3-C6.  He presents with the following impairments/functional limitations: weakness, gait instability, impaired endurance, impaired balance, decreased lower extremity function, impaired self care skills, impaired functional mobility, pain.    Rehab Prognosis: Good; patient would benefit from acute skilled PT services to address these deficits and reach maximum level of function.    Recent Surgery: Procedure(s) (LRB):  FUSION, SPINE, POSTERIOR SPINAL COLUMN, CERVICAL, USING COMPUTER-ASSISTED NAVIGATION (N/A) 2 Days Post-Op    Plan:     During this hospitalization, patient to be seen 6 x/week to address the identified rehab impairments via gait training, therapeutic activities, therapeutic exercises and progress toward the following goals:    Plan of Care Expires:  07/28/23    Subjective     Chief Complaint: tired from sitting in the chair  Patient/Family Comments/goals: to get better  Pain/Comfort:  Pain Rating 1: 8/10  Location - Side 1: Bilateral  Location 1: neck  Pain Addressed 1: Reposition, Nurse notified      Objective:     Communicated with NSG prior to session.  Patient found up in chair with telemetry, pulse ox (continuous), blood pressure cuff, hemovac, gongora catheter upon PTA entry to room. OT transferred pt to chair 1.5 hours prior to PTA's arrival.     General Precautions: Standard, fall  Orthopedic Precautions: spinal precautions  Braces: [place] J collar  Respiratory Status: Room air  Blood Pressure: 140/67, HR 60, SPO2 97%  Skin Integrity: Visible skin  intact      Functional Mobility:  Transfers:   Sit<->stand with min assist x 2  Stand step recliner to bed with mod assist; 1 episode of knee buckling without major LOB. Reported his legs did not feel strong enough to walk.     Therapeutic Activities/Exercises:  OMAR SLR with quad set, ap's, hip abd/add x 8-10 reps    Education:  Patient provided with verbal education regarding POC and HEP.  Understanding was verbalized.     Patient left HOB elevated with all lines intact and call button in reach.    GOALS:   Multidisciplinary Problems       Physical Therapy Goals          Problem: Physical Therapy    Goal Priority Disciplines Outcome Goal Variances Interventions   Physical Therapy Goal     PT, PT/OT Ongoing, Progressing     Description: Goals to be met by: 23     Patient will increase functional independence with mobility by performin. Supine to sit with Stand-by Assistance  2. Sit to supine with Stand-by Assistance  3. Sit to stand transfer with Stand-by Assistance  4. Gait  x 50 feet with Stand-by Assistance using Rolling Walker.                          Time Tracking:     PT Received On: 23  PT Start Time: 1130     PT Stop Time: 1208  PT Total Time (min): 38 min     Billable Minutes: Therapeutic Activity 2 unit and Therapeutic Exercise 1 unit    Treatment Type: Treatment  PT/PTA: PTA     Number of PTA visits since last PT visit: 2023

## 2023-06-28 NOTE — NURSING
Subjective:      Patient ID: Valentino Manning is a 72 y.o. male.    Chief Complaint: Numbness (Pt presents to er for numbness to bilateral hands and fingers, and bilateral numbness from knees down.  States has been going on for weeks.  Hx of multiple myeloma.)    HPI  Review of Systems   Objective:     Physical Exam   Assessment:     1. Stenosis of cervical spine with myelopathy    2. Weakness    3. Neuropathy    4. Acute cystitis with hematuria    5. Chest pain    6. Upper extremity weakness           Altered Skin Integrity 06/27/23 1600 midline;medial Buttocks (Active)   06/27/23 1600   Altered Skin Integrity Present on Admission - Did Patient arrive to the hospital with altered skin?: suspected hospital acquired   Side:    Orientation: midline;medial   Location: Buttocks   Wound Number:    Is this injury device related?:    Primary Wound Type:    Description of Altered Skin Integrity:    Ankle-Brachial Index:    Pulses:    Removal Indication and Assessment:    Wound Outcome:    (Retired) Wound Length (cm):    (Retired) Wound Width (cm):    (Retired) Depth (cm):    Wound Description (Comments):    Removal Indications:    Wound Image   06/28/23 1333   Dressing Appearance No dressing 06/28/23 1333   Drainage Amount Scant 06/28/23 1333   Drainage Characteristics/Odor Serosanguineous 06/28/23 1333   Appearance Pink;Red;Moist 06/28/23 1333   Care Cleansed with:;Wound cleanser;Skin Barrier 06/28/23 1333            Incision/Site 09/01/22 1121 Right Buttocks upper;lateral;posterior other (see comments) (Active)   09/01/22 1121   Present Prior to Hospital Arrival?:    Side: Right   Location: Buttocks   Orientation: upper;lateral;posterior   Incision Type: other (see comments)   Closure Method:    Additional Comments:    Removal Indication and Assessment:    Wound Outcome: Healed   Removal Indications:    Incision WDL WDL 06/28/23 0753            Incision/Site 06/26/23 1717 Cervical spine posterior (Active)   06/26/23 1717    Present Prior to Hospital Arrival?: No   Side:    Location: Cervical spine   Orientation: posterior   Incision Type:    Closure Method: Staples   Additional Comments:    Removal Indication and Assessment:    Wound Outcome:    Removal Indications:    Incision WDL WDL 06/28/23 0753   Dressing Appearance Dry;Intact;Clean 06/28/23 0753   Drainage Amount None 06/28/23 0753   Dressing Island/border 06/28/23 0753            Incision/Site 06/26/23 2122 Neck (Active)   06/26/23 2122   Present Prior to Hospital Arrival?:    Side:    Location: Neck   Orientation:    Incision Type:    Closure Method:    Additional Comments:    Removal Indication and Assessment:    Wound Outcome:    Removal Indications:    Incision WDL WDL 06/28/23 0753   Appearance Intact;Pink 06/28/23 0753       Plan:     Upper extremity weakness  B/l upper and lower extremity paresthesias and weakness with associated urinary retention    -neurosurgery consulted  -fall precautions      WOCN consult-73 y/o male in with stenosis of cervical spine with myelopathy . Currently up in chair with multiple assist to get there.   Awake alert oriented.     Consulted for  buttock issues.   See photo.   Care put in place with instructions to staffing.     Will recheck buttock in a few days.

## 2023-06-28 NOTE — PLAN OF CARE
Problem: Adult Inpatient Plan of Care  Goal: Plan of Care Review  Outcome: Ongoing, Progressing  Goal: Patient-Specific Goal (Individualized)  Outcome: Ongoing, Progressing  Goal: Absence of Hospital-Acquired Illness or Injury  Outcome: Ongoing, Progressing  Goal: Optimal Comfort and Wellbeing  Outcome: Ongoing, Progressing  Goal: Readiness for Transition of Care  Outcome: Ongoing, Progressing     Problem: Fall Injury Risk  Goal: Absence of Fall and Fall-Related Injury  Outcome: Ongoing, Progressing     Problem: Infection  Goal: Absence of Infection Signs and Symptoms  Outcome: Ongoing, Progressing     Problem: Skin Injury Risk Increased  Goal: Skin Health and Integrity  Outcome: Ongoing, Progressing     Problem: Pain Acute  Goal: Acceptable Pain Control and Functional Ability  Outcome: Ongoing, Progressing     Problem: Impaired Wound Healing  Goal: Optimal Wound Healing  Outcome: Ongoing, Progressing

## 2023-06-28 NOTE — PROGRESS NOTES
Hospital Medicine  Progress Note    Patient Name: Valentino Manning  MRN: [id number]  Status: IP- Inpatient   Admission Date: 6/11/2023  Length of Stay: 17  Date of Service: 06/28/2023       CC: hospital follow-up for cervical decompression/fusion       SUBJECTIVE   72 y.o. male with a history that includes CAD s/p stent on Plavix, PE on Eliquis, and multiple myeloma on chemotherapy, presented to [facility] on 6/11 with numbness to bilateral hands and bilateral lower extremities with associated weakness, worsening x 6 months.  Patient reports that he was now unable to ambulate due to worsening paresthesias and generalized weakness x 3 weeks, subsequently been bed-bound.  He endorsed falling about 4 times over the past 3 weeks, denied LOC or head injury.  He reported he was in his normal state of health prior to initiating chemotherapy in December 2022.  He then had COVID-19 in February 2023 and has had a rapid decline since then.  He also endorsed urinary retention for which he was seen at an outlying ED on 6/5/23 and had a Duenas catheter placed, he was supposed to follow-up with urology on 06/12/2023.  He was admitted to hospital medicine services for further management.  MRI C-spine done and showed severe degenerative narrowing of the spinal canal at C4-C5 and C5-C6, moderate at C3-C4, mild at other levels and  T2 signal changes in the cord at C4-C5 may be secondary to a compressive myelopathy.  Evaluated by Neurosurgery, planned for surgical intervention. Cardiology consulted and Plavix and Eliquis held.  Patient then underwent decompressive laminectomies at C3-4, C4-5, and C5-6 with fusion on 6/26; admitted to ICU post-operatively for monitoring.      Today: Patient seen and examined at bedside, and chart reviewed. Patient remain stable, no new complaints or issues.      MEDICATIONS   Scheduled   amLODIPine  5 mg Oral Daily    ascorbic acid (vitamin C)  1,000 mg Oral Daily    atorvastatin  40 mg Oral Every other day     DULoxetine  30 mg Oral Daily    dutasteride  0.5 mg Oral Daily    ferrous sulfate  1 tablet Oral Daily    gabapentin  600 mg Oral TID    linaCLOtide  145 mcg Oral Before breakfast    metoprolol tartrate  25 mg Oral BID    mupirocin   Nasal BID    tamsulosin  0.4 mg Oral QHS    vancomycin in dextrose 5 %  1,000 mg Intravenous Q12H    zinc oxide-cod liver oil   Topical (Top) BID    zolpidem  10 mg Oral QHS     Continuous Infusions  None      PHYSICAL EXAM   VITALS: T 97.8 °F (36.6 °C)   /67   P 71   RR (!) 22   O2 99 %    GENERAL: Awake and in NAD  LUNGS: CTA B/L  CVS: Normal rate  GI/: Soft, NT, bowel sounds positive.  EXTREMITIES: No peripheral edema  NEURO: AAOx3  PSYCH: Cooperative      LABS   CBC  Recent Labs     06/27/23  0145   WBC 9.81   RBC 3.26*   HGB 10.6*   HCT 32.8*   .6*   MCH 32.5*   MCHC 32.3*   RDW 14.3        CHEM  Recent Labs     06/27/23  0145      K 4.4   CHLORIDE 103   CO2 17*   BUN 13.8   CREATININE 0.93   GLUCOSE 147*   CALCIUM 9.3         ASSESSMENT   Cervical spine stenosis with myelopathy due to degenerative spine disease   Severe degenerative lumbar spinal canal stenosis at L3-L4 and L4-L5  Multilevel neural foraminal stenosis cervical and lumbar spine   h/o Multiple myeloma   h/o PE, on Eliquis  h/o CAD, s/p prior stent, on PLavix  Anemia of chronic disease  with macrocytosis   Asymptomatic bacteruria     PLAN   Post-op, wound care per NSG  Accepted to [facility], awaiting d/c of surgical drain, clearance from NSG  Will continue current management and monitoring in the interim  Continue with PT/OT  Plavix to be held 7 days post op.  Urology recommending keep Duenas for 7-10 days due to retention; continue with Flomax, Proscar added back as well      Prophylaxis: B/L SCDs        Margarito Posada MD  [facility]

## 2023-06-28 NOTE — PT/OT/SLP PROGRESS
Occupational Therapy   Treatment    Name: Valentino Manning  MRN: [id number]  Admitting Diagnosis:  laminectomy  2 Days Post-Op    Recommendations:     Discharge Recommendations: rehabilitation facility  Discharge Equipment Recommendations:  to be determined by next level of care  Barriers to discharge:  None    Assessment:     Valentino Manning is a 72 y.o. male with a medical diagnosis of cervical stenosis with myelopathy s/p decompressive laminectomies C3-C6, UTI s/p gongora. Performance deficits affecting function are weakness, impaired endurance, impaired sensation, impaired self care skills, impaired functional mobility, impaired balance, decreased upper extremity function, decreased lower extremity function, pain, impaired skin.  He presents with continued improvements in ADL and mobility function today. He is an excellent candidate for IP rehab placement for aggressive multidisciplinary therapies to maximize functional recovery.     Rehab Prognosis:  Good; patient would benefit from acute skilled OT services to address these deficits and reach maximum level of function.       Plan:     Patient to be seen 6 x/week to address the above listed problems via self-care/home management, therapeutic activities, therapeutic exercises, neuromuscular re-education  Plan of Care Expires: 07/11/23  Plan of Care Reviewed with: patient    Subjective     Pain/Comfort:  Pain Rating 1: 4/10  Location - Orientation 1: posterior  Location 1: neck  Pain Addressed 1: Reposition, Distraction, Pre-medicate for activity    Objective:     Communicated with: RN prior to session.  Patient found right sidelying with telemetry, pulse ox (continuous), blood pressure cuff, hemovac, gongora catheter upon OT entry to room.    General Precautions: Standard, fall    Orthopedic Precautions:spinal precautions  Braces: [place] J collar  Vital Signs: Blood Pressure: 122/82  HR: 64  Sp02: 99%  Respiratory Status: on room air     Occupational Performance:     Bed  Mobility Training:    Patient completed Rolling/Turning to Right with moderate assistance  Patient completed Supine to Sit with maximal assistance     Functional Mobility Training/Transfer Training:  Anterior/Posterior Scooting: contact guard assistance   Sit<>Stand Transition: minimum assistance and of 2 persons with cues for hand placement and upright posture  Bed<>Chair Transfer: minimum assistance and of 2 persons stand step t/f  Functional Mobility to<>from bathroom: minimum assistance and of 2 persons, ~5 ft chair>sink with decreased step length and slow ila    Activities of Daily Living Training:  Feeding:  set up assist to self feed breakfast. Improved independence with drinking from open cup with straw today (set up assist)   Grooming: set up assist to brush teeth standing at sink, though mod A needed for standing balance with decreased tolerance. After ~2 min standing with pt B knee buckling   Upper Body Dressing: max A to don robe seated EOB; limited by B chronic shoulder deficits     [facility] 6 Click ADL  Total Score: 14    Therapeutic Positioning  Skin integrity:  known sacral breakdown; RN in to assess, WOC RN notified in ICU huddle      The following interventions were performed in an effort to prevent and/or reduce acquired pressure ulcers: skin assessment was performed  education was provided on pressure ulcer prevention   [facility]    Patient/Caregiver Education:  Patient provided with verbal and demonstration education regarding OT role/goals/POC, post op precautions, fall prevention, safety awareness, Discharge/DME recommendations, and pressure ulcer prevention.  Understanding was verbalized, however additional teaching warranted.      Patient left up in chair with all lines intact and call button in reach    GOALS:   Multidisciplinary Problems       Occupational Therapy Goals          Problem: Occupational Therapy    Goal Priority Disciplines Outcome Interventions   Occupational Therapy Goal      OT, PT/OT Ongoing, Progressing    Description: Goals to be met by: 7/11/23 (revised on re-eval 6/27)    Patient will increase functional independence with ADLs by performing:    Self feeding, including drinking, with set up assist. -new  UE Dressing with set up Assistance.-revised  LE Dressing with Moderate Assistance and Assistive Devices as needed.-progressing/continue  Grooming tasks while EOB with set up Assistance. - Revised.  Toileting from BSC with min Assistance.-revised  Pt will perform BSC t/f with contact guard Assistance.-revised                         Time Tracking:     OT Date of Treatment: 06/28/23  OT Start Time: 0905  OT Stop Time: 0944  OT Total Time (min): 39 min    Billable Minutes:Self Care/Home Management 3    OT/CAMERON: OT     Number of CAMERON visits since last OT visit: 1 6/28/2023

## 2023-06-28 NOTE — NURSING
Nurses Note -- 4 Eyes      6/28/2023   5:18 PM      Skin assessed during: Daily Assessment      [] No Altered Skin Integrity Present    []Prevention Measures Documented      [x] Yes- Altered Skin Integrity Present or Discovered   [] LDA Added if Not in Epic (Describe Wound)   [] New Altered Skin Integrity was Present on Admit and Documented in LDA   [] Wound Image Taken    Wound Care Consulted? Yes    Attending Nurse:  Tera Luna RN     Second RN/Staff Member:  Darwin Gay RN

## 2023-06-28 NOTE — PROGRESS NOTES
POD#2 decompressive laminectomies at C3-4, C4-5, and C5-6 with fusion  He is lying in bed, NAD   His neck pain is improved today  He states that his hand numbness continues to improve  He is tolerating diet  Rigid collar in place    AFVSS  Right UE: limited shoulder motion d/t pain/arthritis. 3/5 deltoids, 4/5 biceps/triceps and intrinsics  Left UE: 3/5 deltoids, 4/5 biceps/triceps and intrinsics  4/5 bilateral hip flexion, knee ext, 5/5 DF/PF/EHL  Sensation grossly intact to bilateral UE; sensation in hands is improved  Decreased sensation from knees down, unchanged from pre op  Incision c/d/I  Drain output 20/140, serosanguineous    Plan: He has been downgraded to the floor with Q4 hour neuro checks, awaiting bed  Ok for daily dressing changes  Continue drain  Rigid collar at all times  PT/OT  SCDs for DVT prophylaxis; we will hold Plavix until 7 days post op  Ok to start lovenox 24hrs after drain discontinued  Rehab placement pending

## 2023-06-28 NOTE — PROGRESS NOTES
UROLOGY  PROGRESS  NOTE    Valentino Manning 1950  [id number]  6/28/2023    Gongora removed yesterday for voiding trial. Bladder scan at 6 hrs post-removal with about 600ml urine. Patient unable to urinate spontaneously, indwelling gongora replaced with about 900ml urine obtained    Patient resting in recliner  No discomfort from gongora  No acute events overnight  (+) BM daily per patient    VSS, afebrile  1500ml UOP  No labs today    Intake/Output:  I/O this shift:  In: -   Out: 325 [Urine:325]  I/O last 3 completed shifts:  In: 3301.2 [I.V.:1400; IV Piggyback:1901.2]  Out: 4225 [Urine:3975; Drains:250]     Exam:    NAD  Card: RRR  Resp: unlabored  Abd: soft, NTND  : clear yellow urine with some sediment draining to  b ag  Extremity: weakness improving    No results found for this or any previous visit (from the past 24 hour(s)).    Assessment:  -h/o BPH with Urinary retention s/p gongora reinsertion yesterday  -cervical spondylosis/stenosis with myelopathy s/p decompression and fusion  -multiple myeloma   -h/o CAD and PE on plavix and eliquis    Plan:  Rehab placement in progress  Continue indwelling gongora, OK to offer voiding trial in 7-10days as his mobility continues to improve  He will need to f/u after rehab with Dr. Vo for evaluation of BPH/retention.  Discussed with patient and nursing. Please call as needed with any additional issues.       Mckenna Auguste NP

## 2023-06-29 PROCEDURE — 99024 POSTOP FOLLOW-UP VISIT: CPT | Mod: POP,,, | Performed by: NEUROLOGICAL SURGERY

## 2023-06-29 PROCEDURE — 99024 PR POST-OP FOLLOW-UP VISIT: ICD-10-PCS | Mod: POP,,, | Performed by: NEUROLOGICAL SURGERY

## 2023-06-29 PROCEDURE — 97535 SELF CARE MNGMENT TRAINING: CPT | Mod: CO

## 2023-06-29 PROCEDURE — 25000003 PHARM REV CODE 250: Performed by: INTERNAL MEDICINE

## 2023-06-29 PROCEDURE — 25000003 PHARM REV CODE 250: Performed by: NURSE PRACTITIONER

## 2023-06-29 PROCEDURE — 63600175 PHARM REV CODE 636 W HCPCS: Performed by: INTERNAL MEDICINE

## 2023-06-29 PROCEDURE — 25000003 PHARM REV CODE 250: Performed by: NEUROLOGICAL SURGERY

## 2023-06-29 PROCEDURE — 97530 THERAPEUTIC ACTIVITIES: CPT | Mod: CQ

## 2023-06-29 PROCEDURE — 63600175 PHARM REV CODE 636 W HCPCS: Performed by: NEUROLOGICAL SURGERY

## 2023-06-29 PROCEDURE — 11000001 HC ACUTE MED/SURG PRIVATE ROOM

## 2023-06-29 RX ORDER — TETRAHYDROZOLINE HCL 0.05 %
1 DROPS OPHTHALMIC (EYE) 3 TIMES DAILY
Status: DISCONTINUED | OUTPATIENT
Start: 2023-06-29 | End: 2023-07-05 | Stop reason: HOSPADM

## 2023-06-29 RX ORDER — METHOCARBAMOL 100 MG/ML
500 INJECTION, SOLUTION INTRAMUSCULAR; INTRAVENOUS EVERY 8 HOURS
Status: DISCONTINUED | OUTPATIENT
Start: 2023-06-29 | End: 2023-06-30

## 2023-06-29 RX ORDER — OXYCODONE AND ACETAMINOPHEN 10; 325 MG/1; MG/1
1 TABLET ORAL EVERY 4 HOURS PRN
Status: DISCONTINUED | OUTPATIENT
Start: 2023-06-29 | End: 2023-07-05 | Stop reason: HOSPADM

## 2023-06-29 RX ADMIN — VANCOMYCIN HYDROCHLORIDE 1000 MG: 1 INJECTION, POWDER, LYOPHILIZED, FOR SOLUTION INTRAVENOUS at 08:06

## 2023-06-29 RX ADMIN — Medication: at 08:06

## 2023-06-29 RX ADMIN — DULOXETINE 30 MG: 30 CAPSULE, DELAYED RELEASE ORAL at 08:06

## 2023-06-29 RX ADMIN — Medication 1000 MG: at 08:06

## 2023-06-29 RX ADMIN — METHOCARBAMOL 500 MG: 100 INJECTION INTRAMUSCULAR; INTRAVENOUS at 09:06

## 2023-06-29 RX ADMIN — TETRAHYDROZOLINE HCL 1 DROP: 0.05 LIQUID OPHTHALMIC at 02:06

## 2023-06-29 RX ADMIN — LINACLOTIDE 145 MCG: 145 CAPSULE, GELATIN COATED ORAL at 06:06

## 2023-06-29 RX ADMIN — METOPROLOL TARTRATE 25 MG: 25 TABLET, FILM COATED ORAL at 08:06

## 2023-06-29 RX ADMIN — AMLODIPINE BESYLATE 5 MG: 5 TABLET ORAL at 08:06

## 2023-06-29 RX ADMIN — OXYCODONE AND ACETAMINOPHEN 1 TABLET: 10; 325 TABLET ORAL at 02:06

## 2023-06-29 RX ADMIN — GABAPENTIN 800 MG: 100 CAPSULE ORAL at 08:06

## 2023-06-29 RX ADMIN — OXYCODONE AND ACETAMINOPHEN 1 TABLET: 10; 325 TABLET ORAL at 10:06

## 2023-06-29 RX ADMIN — TETRAHYDROZOLINE HCL 1 DROP: 0.05 LIQUID OPHTHALMIC at 08:06

## 2023-06-29 RX ADMIN — OXYCODONE AND ACETAMINOPHEN 1 TABLET: 10; 325 TABLET ORAL at 08:06

## 2023-06-29 RX ADMIN — TAMSULOSIN HYDROCHLORIDE 0.4 MG: 0.4 CAPSULE ORAL at 08:06

## 2023-06-29 RX ADMIN — GABAPENTIN 800 MG: 100 CAPSULE ORAL at 02:06

## 2023-06-29 RX ADMIN — ZOLPIDEM TARTRATE 10 MG: 5 TABLET ORAL at 08:06

## 2023-06-29 RX ADMIN — METHOCARBAMOL 500 MG: 100 INJECTION INTRAMUSCULAR; INTRAVENOUS at 02:06

## 2023-06-29 RX ADMIN — DUTASTERIDE 0.5 MG: 0.5 CAPSULE, LIQUID FILLED ORAL at 08:06

## 2023-06-29 RX ADMIN — FERROUS SULFATE TAB 325 MG (65 MG ELEMENTAL FE) 1 EACH: 325 (65 FE) TAB at 08:06

## 2023-06-29 RX ADMIN — OXYCODONE AND ACETAMINOPHEN 1 TABLET: 10; 325 TABLET ORAL at 03:06

## 2023-06-29 RX ADMIN — GABAPENTIN 600 MG: 300 CAPSULE ORAL at 08:06

## 2023-06-29 NOTE — NURSING
Nurses Note -- 4 Eyes      6/29/2023   11:22 AM      Skin assessed during: Daily Assessment      [] No Altered Skin Integrity Present    []Prevention Measures Documented      [x] Yes- Altered Skin Integrity Present or Discovered   [] LDA Added if Not in Epic (Describe Wound)   [] New Altered Skin Integrity was Present on Admit and Documented in LDA   [] Wound Image Taken    Wound Care Consulted? No    Attending Nurse:  Chuckie Roldan RN     Second RN/Staff Member:  Tera Luna RN

## 2023-06-29 NOTE — PROGRESS NOTES
POD#3 decompressive laminectomies at C3-4, C4-5, and C5-6 with fusion  He is sitting up in bed, NAD   His neck pain continues to improve, c/o bilateral trap soreness  He is tolerating diet  He did stand and walk to the sink with PT yesterday  Rigid collar in place  Gongora removed and replaced yesterday. Urology evaluated with recs to continue gongora for 7-10days    AFVSS  Motor and sensory exam are unchanged:  Right UE: limited shoulder motion d/t pain/arthritis. 3/5 deltoids, 4/5 biceps/triceps and intrinsics  Left UE: 3/5 deltoids, 4/5 biceps/triceps and intrinsics  4/5 bilateral hip flexion, knee ext, 5/5 DF/PF/EHL  Sensation grossly intact to bilateral UE  Decreased sensation from knees down, unchanged from pre op  Incision c/d/I  Drain output 70/80, serosanguineous    Plan: He has been downgraded to the floor with Q4 hour neuro checks, awaiting bed  Continue daily dressing changes  Continue drain for now  Rigid collar at all times  PT/OT  SCDs for DVT prophylaxis; we will hold Plavix until 7 days post op  He has been accepted to rehab; awaiting drain removal

## 2023-06-29 NOTE — PT/OT/SLP PROGRESS
Occupational Therapy   Treatment    Name: Valentino Manning  MRN: [id number]  Admitting Diagnosis:  <principal problem not specified>  3 Days Post-Op    Recommendations:     Discharge Recommendations: rehabilitation facility  Discharge Equipment Recommendations:  to be determined by next level of care  Barriers to discharge:  None    Assessment:     Valentino Manning is a 72 y.o. male with a medical diagnosis of cervical stenosis with myelopathy s/p decompressive laminectomies C3-C6, UTI s/p gongora.  He presents with good motivation and efforts during therapy, although limited by pain and endurance. Provided edu re: prognosis and pain management. Verbalized understanding.     Performance deficits affecting function are weakness, gait instability, impaired endurance, impaired balance, decreased lower extremity function, decreased upper extremity function, decreased safety awareness, impaired self care skills, impaired functional mobility, pain, orthopedic precautions.     Rehab Prognosis:  Good; patient would benefit from acute skilled OT services to address these deficits and reach maximum level of function.       Plan:     Patient to be seen 6 x/week to address the above listed problems via self-care/home management, therapeutic activities, therapeutic exercises  Plan of Care Expires: 07/11/23  Plan of Care Reviewed with: patient    Subjective     Pain/Comfort:  Location 1: neck  Pain Addressed 1: Reposition, Pre-medicate for activity, Distraction    Objective:     Communicated with:  Patient found HOB elevated with pulse ox (continuous), hemovac, blood pressure cuff, gongora catheter, telemetry upon OT entry to room.    General Precautions: Standard, fall    Orthopedic Precautions:spinal precautions  Braces: [place] J collar  Respiratory Status: Room air  Vital Signs: Blood Pressure: 128/64; 64  HR: 64  Sp02: 98%     Occupational Performance:     Bed Mobility:    Patient completed Supine to Sit with moderate  assistance  Patient completed Sit to Supine with maximal assistance     Activities of Daily Living:  Lower Body Dressing: moderate assistance fig 4 position while seated EOB. Extended rest breaks provided 2/2 pain and low endurance. Educated on pursed lip breathing for pain. Good carryover noted.     Therapeutic Positioning    OT interventions performed during the course of today's session in an effort to prevent and/or reduce acquired pressure injuries:   Therapeutic positioning completed     Skin assessment: all bony prominences were assessed    Findings: no redness or breakdown noted    [facility] 6 Click ADL:      Patient Education:  Patient and spouse provided with verbal education regarding OT role/goals/POC, post op precautions, fall prevention, safety awareness, Discharge/DME recommendations, and pressure ulcer prevention.  Understanding was verbalized, however additional teaching warranted.      Patient left HOB elevated with all lines intact, call button in reach, and spouse present    GOALS:   Multidisciplinary Problems       Occupational Therapy Goals          Problem: Occupational Therapy    Goal Priority Disciplines Outcome Interventions   Occupational Therapy Goal     OT, PT/OT Ongoing, Progressing    Description: Goals to be met by: 7/11/23 (revised on re-eval 6/27)    Patient will increase functional independence with ADLs by performing:    Self feeding, including drinking, with set up assist. -new  UE Dressing with set up Assistance.-revised  LE Dressing with Moderate Assistance and Assistive Devices as needed.-progressing/continue  Grooming tasks while EOB with set up Assistance. - Revised.  Toileting from BSC with min Assistance.-revised  Pt will perform BSC t/f with contact guard Assistance.-revised                         Time Tracking:     OT Date of Treatment: 06/29/23  OT Start Time: 1400  OT Stop Time: 1425  OT Total Time (min): 25 min    Billable Minutes:Self Care/Home Management 25    OT/CAMERON:  CAMERON     Number of CAMERON visits since last OT visit: 2    6/29/2023

## 2023-06-29 NOTE — PT/OT/SLP PROGRESS
"Physical Therapy Treatment    Patient Name:  Valentino Manning   MRN:  [id number]    Recommendations:     Discharge Recommendations: rehabilitation facility  Discharge Equipment Recommendations: to be determined by next level of care  Barriers to discharge:  placement    Assessment:     Valentino Manning is a 72 y.o. 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  He presents with the following impairments/functional limitations: weakness, impaired endurance, impaired self care skills, impaired functional mobility, gait instability .    Tx limited secondary to neck pain when OOB.    Rehab Prognosis: Good; patient would benefit from acute skilled PT services to address these deficits and reach maximum level of function.    Recent Surgery: Procedure(s) (LRB):  FUSION, SPINE, POSTERIOR SPINAL COLUMN, CERVICAL, USING COMPUTER-ASSISTED NAVIGATION (N/A) 3 Days Post-Op    Plan:     During this hospitalization, patient to be seen 6 x/week to address the identified rehab impairments via gait training, therapeutic activities, therapeutic exercises and progress toward the following goals:    Plan of Care Expires:  07/28/23    Subjective     Chief Complaint: neck pain  Patient/Family Comments/goals: go to rehab  Pain/Comfort:         Objective:     Communicated with RN prior to session.  Patient found HOB elevated with   upon PT entry to room.     General Precautions: Standard, fall  Orthopedic Precautions: spinal precautions  Braces: [place] J collar  Respiratory Status: Room air  Blood Pressure: 128/64  Skin Integrity: Visible skin intact      Functional Mobility:  Bed Mobility:     Supine to Sit: moderate assistance  Sit to Supine: moderate assistance  Transfers:     Sit to Stand:  minimum assistance with rolling walker    Therapeutic Activities/Exercises:  Pt sat EOB sba while doing ADLs with OT. Performed marching in RW 20" " Tiburcio.    Education:  Patient provided with verbal education regarding POC.  Understanding was verbalized, however additional teaching warranted.     Patient left HOB elevated with all lines intact, call button in reach, and Wife present..    GOALS:   Multidisciplinary Problems       Physical Therapy Goals          Problem: Physical Therapy    Goal Priority Disciplines Outcome Goal Variances Interventions   Physical Therapy Goal     PT, PT/OT Ongoing, Progressing     Description: Goals to be met by: 23     Patient will increase functional independence with mobility by performin. Supine to sit with Stand-by Assistance  2. Sit to supine with Stand-by Assistance  3. Sit to stand transfer with Stand-by Assistance  4. Gait  x 50 feet with Stand-by Assistance using Rolling Walker.                          Time Tracking:     PT Received On: 23  PT Start Time: 1400     PT Stop Time: 1425  PT Total Time (min): 25 min     Billable Minutes: Therapeutic Activity 25    Treatment Type: Treatment  PT/PTA: PTA     Number of PTA visits since last PT visit: 2     2023

## 2023-06-29 NOTE — PLAN OF CARE
Patient pain management under control this afternoon after adjusting medications. Patient stated he is feeling much better and looking forward to working with therapy tomorrow.

## 2023-06-29 NOTE — PROGRESS NOTES
Pharmacokinetic Assessment Follow Up: IV Vancomycin    Vancomycin serum concentration assessment(s):    The trough level was drawn correctly and can be used to guide therapy at this time. The measurement is within the desired definitive target range of 10 to 20 mcg/mL.    Vancomycin Regimen Plan:    Continue 1 gm q12h and check trough on 6/30/23.    Drug levels (last 3 results):  Recent Labs   Lab Result Units 06/28/23 2001   Vancomycin Trough ug/ml 13.9*       Pharmacy will continue to follow and monitor vancomycin.       Patient brief summary:  Valentino Manning is a 72 y.o. male initiated on antimicrobial therapy with IV Vancomycin for treatment of  surgical prophylaxis.      Drug Allergies:   Review of patient's allergies indicates:   Allergen Reactions    Penicillins        Actual Body Weight:   88.5 kg    Renal Function:   Estimated Creatinine Clearance: 81.1 mL/min (based on SCr of 0.93 mg/dL).,     Dialysis Method (if applicable):  N/A    CBC (last 72 hours):  Recent Labs   Lab Result Units 06/27/23  0145   WBC x10(3)/mcL 9.81   Hgb g/dL 10.6*   Hct % 32.8*   Platelet x10(3)/mcL 288   Mono % % 2.4   Eos % % 0.0   Basophil % % 0.1       Metabolic Panel (last 72 hours):  Recent Labs   Lab Result Units 06/27/23  0145   Sodium Level mmol/L 136   Potassium Level mmol/L 4.4   Chloride mmol/L 103   Carbon Dioxide mmol/L 17*   Glucose Level mg/dL 147*   Blood Urea Nitrogen mg/dL 13.8   Creatinine mg/dL 0.93       Vancomycin Administrations:  vancomycin given in the last 96 hours                     vancomycin in dextrose 5 % 1 gram/250 mL IVPB 1,000 mg (mg) 1,000 mg New Bag 06/28/23 1016     1,000 mg New Bag 06/27/23 2206     1,000 mg New Bag  0907    vancomycin (VANCOCIN) 1,000 mg in sodium chloride 0.9% 1,000 mL IRRIGATION (mg) 1,000 mg Given 06/26/23 1724    vancomycin (VANCOCIN) 1,000 mg injection (mg) 1,000 mg Given 06/26/23 1500                    Microbiologic Results:  Microbiology Results (last 7 days)        ** No results found for the last 168 hours. **

## 2023-06-29 NOTE — PROGRESS NOTES
[facility]  Hospital Medicine Progress Note        CC: hospital follow-up for cervical decompression/fusion         SUBJECTIVE   72 y.o. male with a history that includes CAD s/p stent on Plavix, PE on Eliquis, and multiple myeloma on chemotherapy, presented to [facility] on 6/11 with numbness to bilateral hands and bilateral lower extremities with associated weakness, worsening x 6 months.  Patient reports that he was now unable to ambulate due to worsening paresthesias and generalized weakness x 3 weeks, subsequently been bed-bound.  He endorsed falling about 4 times over the past 3 weeks, denied LOC or head injury.  He reported he was in his normal state of health prior to initiating chemotherapy in December 2022.  He then had COVID-19 in February 2023 and has had a rapid decline since then.  He also endorsed urinary retention for which he was seen at an outlying ED on 6/5/23 and had a Duenas catheter placed, he was supposed to follow-up with urology on 06/12/2023.  He was admitted to hospital medicine services for further management.  MRI C-spine done and showed severe degenerative narrowing of the spinal canal at C4-C5 and C5-C6, moderate at C3-C4, mild at other levels and  T2 signal changes in the cord at C4-C5 may be secondary to a compressive myelopathy.  Evaluated by Neurosurgery, planned for surgical intervention. Cardiology consulted and Plavix and Eliquis held.  Patient then underwent decompressive laminectomies at C3-4, C4-5, and C5-6 with fusion on 6/26; admitted to ICU post-operatively for monitoring.       Patient was currently status post cervical decompression and fusion surgery.  Postop day 3    Today's information  Patient is seen and examined at bedside   Patient was complaining of right-sided cervical neck pain, he is not having a good day today   He is also having bilateral eye watering and itching  Vitals reviewed stable on room air   No new labs   Appreciate  neurosurgery input continue CELIA drain  Plavix on hold till 7/3   Patient will be going to the rehab with his Duenas for 7-10 days on follow-up urology outpatient       Exam  GENERAL: Awake and in NAD  LUNGS: CTA B/L  CVS: Normal rate  GI/: Soft, NT, bowel sounds positive.  EXTREMITIES: No peripheral edema  NEURO: AAOx3  PSYCH: Cooperative        ASSESSMENT   Cervical spine stenosis with myelopathy due to degenerative spine disease   Severe degenerative lumbar spinal canal stenosis at L3-L4 and L4-L5  Multilevel neural foraminal stenosis cervical and lumbar spine   h/o Multiple myeloma   h/o PE, on Eliquis  h/o CAD, s/p prior stent, on PLavix  Anemia of chronic disease  with macrocytosis   Asymptomatic bacteruria      PLAN   Adjust Robaxin to IV Q 8 hourly  Adjust his pain meds to Q 4 hourly p.r.n.  Add on Visine eyedrops   Post-op, wound care per NSGy  Accepted to [facility], awaiting d/c of surgical drain, clearance from NSGY  Will continue current management and monitoring in the interim  Continue with PT/OT  Plavix to be held 7 days post op. 7/3/23  Urology recommending keep Duenas for 7-10 days due to retention; continue with Flomax, Proscar added back as well        Prophylaxis: B/L SCDs       VITAL SIGNS: 24 HRS MIN & MAX LAST   Temp  Min: 97.8 °F (36.6 °C)  Max: 98.2 °F (36.8 °C) 98.2 °F (36.8 °C)   BP  Min: 101/65  Max: 140/85 136/67   Pulse  Min: 53  Max: 82  (!) 56   Resp  Min: 11  Max: 25 18   SpO2  Min: 94 %  Max: 100 % 96 %     I have reviewed the following labs:    Recent Labs   Lab 06/25/23 0319 06/27/23 0145   WBC 8.20 9.81   RBC 3.37* 3.26*   HGB 11.1* 10.6*   HCT 33.9* 32.8*   .6* 100.6*   MCH 32.9* 32.5*   MCHC 32.7* 32.3*   RDW 14.5 14.3    288   MPV 8.6 8.7       Recent Labs   Lab 06/25/23 0319 06/27/23 0145    136   K 3.8 4.4   CO2 24 17*   BUN 11.3 13.8   CREATININE 0.83 0.93   CALCIUM 9.6 9.3   MG 1.70  --    ALBUMIN 3.0*  --    ALKPHOS 82  --    ALT 30  --    AST 16  --     BILITOT 0.5  --           Microbiology Results (last 7 days)       ** No results found for the last 168 hours. **             See below for Radiology    Scheduled Med:   amLODIPine  5 mg Oral Daily    ascorbic acid (vitamin C)  1,000 mg Oral Daily    atorvastatin  40 mg Oral Every other day    DULoxetine  30 mg Oral Daily    dutasteride  0.5 mg Oral Daily    ferrous sulfate  1 tablet Oral Daily    gabapentin  800 mg Oral TID    linaCLOtide  145 mcg Oral Before breakfast    methocarbamoL  500 mg Intravenous Q8H    metoprolol tartrate  25 mg Oral BID    tamsulosin  0.4 mg Oral QHS    tetrahydrozoline 0.05%  1 drop Both Eyes TID    vancomycin in dextrose 5 %  1,000 mg Intravenous Q12H    zinc oxide-cod liver oil   Topical (Top) BID    zolpidem  10 mg Oral QHS        Continuous Infusions:       PRN Meds:  acetaminophen, aluminum-magnesium hydroxide-simethicone, melatonin, naloxone, ondansetron, oxyCODONE-acetaminophen, polyethylene glycol, prochlorperazine, simethicone, vancomycin - pharmacy to dose       Assessment/Plan:      VTE prophylaxis:     Patient condition:  Stable/Fair/Guarded/ Serious/ Critical    Anticipated discharge and Disposition:         All diagnosis and differential diagnosis have been reviewed; assessment and plan has been documented; I have personally reviewed the labs and test results that are presently available; I have reviewed the patients medication list; I have reviewed the consulting providers response and recommendations. I have reviewed or attempted to review medical records based upon their availability    All of the patient's questions have been  addressed and answered. Patient's is agreeable to the above stated plan. I will continue to monitor closely and make adjustments to medical management as needed.  _____________________________________________________________________    Nutrition Status:    Radiology:  I have personally reviewed the following imaging and agree with the  radiologist.     SURG FL Surgery Fluoro Usage  See OP Notes for results.     IMPRESSION: See OP Notes for results.     This procedure was auto-finalized by: Virtual Radiologist      Shane Kaba MD   06/29/2023

## 2023-06-30 PROCEDURE — 99024 PR POST-OP FOLLOW-UP VISIT: ICD-10-PCS | Mod: POP,,, | Performed by: NEUROLOGICAL SURGERY

## 2023-06-30 PROCEDURE — 97116 GAIT TRAINING THERAPY: CPT | Mod: CQ

## 2023-06-30 PROCEDURE — 25000003 PHARM REV CODE 250: Performed by: INTERNAL MEDICINE

## 2023-06-30 PROCEDURE — 97535 SELF CARE MNGMENT TRAINING: CPT | Mod: CO

## 2023-06-30 PROCEDURE — 99024 POSTOP FOLLOW-UP VISIT: CPT | Mod: POP,,, | Performed by: NEUROLOGICAL SURGERY

## 2023-06-30 PROCEDURE — 25000003 PHARM REV CODE 250: Performed by: NURSE PRACTITIONER

## 2023-06-30 PROCEDURE — 63600175 PHARM REV CODE 636 W HCPCS: Performed by: INTERNAL MEDICINE

## 2023-06-30 PROCEDURE — 97530 THERAPEUTIC ACTIVITIES: CPT | Mod: CQ

## 2023-06-30 PROCEDURE — 11000001 HC ACUTE MED/SURG PRIVATE ROOM

## 2023-06-30 RX ORDER — METHOCARBAMOL 500 MG/1
500 TABLET, FILM COATED ORAL 3 TIMES DAILY PRN
Status: DISCONTINUED | OUTPATIENT
Start: 2023-06-30 | End: 2023-07-05 | Stop reason: HOSPADM

## 2023-06-30 RX ADMIN — TAMSULOSIN HYDROCHLORIDE 0.4 MG: 0.4 CAPSULE ORAL at 08:06

## 2023-06-30 RX ADMIN — AMLODIPINE BESYLATE 5 MG: 5 TABLET ORAL at 08:06

## 2023-06-30 RX ADMIN — FERROUS SULFATE TAB 325 MG (65 MG ELEMENTAL FE) 1 EACH: 325 (65 FE) TAB at 08:06

## 2023-06-30 RX ADMIN — METHOCARBAMOL 500 MG: 500 TABLET ORAL at 01:06

## 2023-06-30 RX ADMIN — DUTASTERIDE 0.5 MG: 0.5 CAPSULE, LIQUID FILLED ORAL at 08:06

## 2023-06-30 RX ADMIN — LINACLOTIDE 145 MCG: 145 CAPSULE, GELATIN COATED ORAL at 05:06

## 2023-06-30 RX ADMIN — OXYCODONE AND ACETAMINOPHEN 1 TABLET: 10; 325 TABLET ORAL at 02:06

## 2023-06-30 RX ADMIN — METOPROLOL TARTRATE 25 MG: 25 TABLET, FILM COATED ORAL at 08:06

## 2023-06-30 RX ADMIN — OXYCODONE AND ACETAMINOPHEN 1 TABLET: 10; 325 TABLET ORAL at 11:06

## 2023-06-30 RX ADMIN — GABAPENTIN 800 MG: 100 CAPSULE ORAL at 02:06

## 2023-06-30 RX ADMIN — GABAPENTIN 800 MG: 100 CAPSULE ORAL at 08:06

## 2023-06-30 RX ADMIN — DULOXETINE 30 MG: 30 CAPSULE, DELAYED RELEASE ORAL at 08:06

## 2023-06-30 RX ADMIN — Medication: at 08:06

## 2023-06-30 RX ADMIN — METHOCARBAMOL 500 MG: 500 TABLET ORAL at 11:06

## 2023-06-30 RX ADMIN — ATORVASTATIN CALCIUM 40 MG: 40 TABLET, FILM COATED ORAL at 08:06

## 2023-06-30 RX ADMIN — Medication 1000 MG: at 08:06

## 2023-06-30 RX ADMIN — TETRAHYDROZOLINE HCL 1 DROP: 0.05 LIQUID OPHTHALMIC at 08:06

## 2023-06-30 RX ADMIN — ZOLPIDEM TARTRATE 10 MG: 5 TABLET ORAL at 08:06

## 2023-06-30 RX ADMIN — METHOCARBAMOL 500 MG: 100 INJECTION INTRAMUSCULAR; INTRAVENOUS at 05:06

## 2023-06-30 RX ADMIN — OXYCODONE AND ACETAMINOPHEN 1 TABLET: 10; 325 TABLET ORAL at 09:06

## 2023-06-30 RX ADMIN — METHOCARBAMOL 500 MG: 500 TABLET ORAL at 07:06

## 2023-06-30 RX ADMIN — OXYCODONE AND ACETAMINOPHEN 1 TABLET: 10; 325 TABLET ORAL at 07:06

## 2023-06-30 NOTE — NURSING
Nurses Note -- 4 Eyes      6/30/2023   4:57 PM      Skin assessed during: Daily Assessment      [] No Altered Skin Integrity Present    []Prevention Measures Documented      [x] Yes- Altered Skin Integrity Present or Discovered   [] LDA Added if Not in Epic (Describe Wound)   [] New Altered Skin Integrity was Present on Admit and Documented in LDA   [] Wound Image Taken    Wound Care Consulted? No    Attending Nurse:  Cesilia Ortiz RN     Second RN/Staff Member:  Kelly Cantor RN

## 2023-06-30 NOTE — PROGRESS NOTES
POD#4 decompressive laminectomies at C3-4, C4-5, and C5-6 with fusion      Very pleasant sitting up in bed talking on phone with wife.  He is wearing the [place] J collar.  Neck pain improving.  Eating and drinking.  He did work with therapy yesterday and sat in the chair.    Gongora removed and replaced yesterday. Urology evaluated with recs to continue gongora for 7-10days  Vital signs are stable.  Afebrile.    Motor and sensory exam are unchanged from previous exam.  Right UE: limited shoulder motion d/t pain/arthritis. 3/5 deltoids, 4/5 biceps/triceps and intrinsics  Left UE: 3/5 deltoids, 4/5 biceps/triceps and intrinsics  4/5 bilateral hip flexion, knee ext, 5/5 DF/PF/EHL  Sensation grossly intact but decreased to bilateral UE      Incision c/d/I  The CELIA drain is being discontinued per Dr. Aviles's instructions this morning per nursing.        Plan:     Okay to downgrade  Every 4 hour neuro/neuromuscular exams   Fall precautions   Continue daily dressing changes   Discontinue CELIA   [place] J collar at all times   PTOT  SCDs   Holding Plavix until 7 days postoperatively which corresponds with Monday 07/03/2023.  Accepted to rehab.  Continue aggressive mobilization.      Sutures need to be discontinued 2 weeks postoperatively on Monday 07/10/2023.    Arrangements are being made for the patient to follow up in the neurosurgery clinic with EMEKA Zapata 4-6 weeks postoperatively after he is discharged from inpatient rehab.  Two days prior to this visit, he needs to complete upright cervical spine x-rays in his [place] J C-collar with AP and lateral views.        Post-Op Info:  Procedure(s) (LRB):  FUSION, SPINE, POSTERIOR SPINAL COLUMN, CERVICAL, USING COMPUTER-ASSISTED NAVIGATION (N/A)   4 Days Post-Op      Medications:  Continuous Infusions:  Scheduled Meds:   amLODIPine  5 mg Oral Daily    ascorbic acid (vitamin C)  1,000 mg Oral Daily    atorvastatin  40 mg Oral Every other day    DULoxetine  30 mg Oral Daily    dutasteride   "0.5 mg Oral Daily    ferrous sulfate  1 tablet Oral Daily    gabapentin  800 mg Oral TID    linaCLOtide  145 mcg Oral Before breakfast    methocarbamoL  500 mg Intravenous Q8H    metoprolol tartrate  25 mg Oral BID    tamsulosin  0.4 mg Oral QHS    tetrahydrozoline 0.05%  1 drop Both Eyes TID    zinc oxide-cod liver oil   Topical (Top) BID    zolpidem  10 mg Oral QHS     PRN Meds:acetaminophen, aluminum-magnesium hydroxide-simethicone, melatonin, naloxone, ondansetron, oxyCODONE-acetaminophen, polyethylene glycol, prochlorperazine, simethicone     Review of Systems  Objective:     Weight: 88.5 kg (195 lb)  Body mass index is 26.45 kg/m².  Vital Signs (Most Recent):  Temp: 98.4 °F (36.9 °C) (06/30/23 0400)  Pulse: (!) 58 (06/30/23 0400)  Resp: 18 (06/30/23 0911)  BP: 122/68 (06/30/23 0400)  SpO2: 98 % (06/30/23 0400) Vital Signs (24h Range):  Temp:  [98.1 °F (36.7 °C)-98.4 °F (36.9 °C)] 98.4 °F (36.9 °C)  Pulse:  [58-86] 58  Resp:  [14-22] 18  SpO2:  [96 %-100 %] 98 %  BP: ()/(58-75) 122/68                              Closed/Suction Drain 06/26/23 2147 Posterior Neck Accordion 10 Fr. (Active)   Dressing Type Transparent (Tegaderm) 06/30/23 0710   Dressing Status Clean;Dry;Intact 06/30/23 0710   Dressing Intervention Integrity maintained 06/30/23 0710   Drainage Bloody 06/30/23 0710   Status To bulb suction 06/30/23 0710   Output (mL) 40 mL 06/30/23 0634            Urethral Catheter 06/27/23 1715 16 Fr. (Active)   $ Duenas Insertion Bedside Insertion Performed 06/27/23 1719   Site Assessment Clean;Intact 06/30/23 0710   Collection Container Urimeter 06/30/23 0710   Securement Method secured to top of thigh w/ adhesive device 06/30/23 0710   Catheter Care Performed yes 06/30/23 0710   Reason for Continuing Urinary Catheterization Urinary retention 06/30/23 0710   CAUTI Prevention Bundle Securement Device in place with 1" slack;Intact seal between catheter & drainage tubing;Drainage bag/urimeter off the " floor;Sheeting clip in use;No dependent loops or kinks;Drainage bag/urimeter not overfilled (<2/3 full);Drainage bag/urimeter below bladder 06/30/23 0710   Output (mL) 1400 mL 06/30/23 0634       Neurosurgery Physical Exam    Significant Labs:  No results for input(s): GLU, NA, K, CL, CO2, BUN, CREATININE, CALCIUM, MG in the last 48 hours.  No results for input(s): WBC, HGB, HCT, PLT in the last 48 hours.  No results for input(s): LABPT, INR, APTT in the last 48 hours.  Microbiology Results (last 7 days)       ** No results found for the last 168 hours. **              Active Diagnoses:    Diagnosis Date Noted POA    Upper extremity weakness [R29.898] 06/13/2023 Unknown      Problems Resolved During this Admission:       Brooke Al[facility]  Neurosurgery  [facility] - [facility] ICU

## 2023-06-30 NOTE — PT/OT/SLP PROGRESS
Physical Therapy Treatment    Patient Name:  Valentino Manning   MRN:  [id number]    Recommendations:     Discharge Recommendations: rehabilitation facility  Discharge Equipment Recommendations: to be determined by next level of care  Barriers to discharge:  Pleasure    Assessment:     Valentino Manning is a 72 y.o. male admitted with a medical diagnosis of cervical spine stenosis with myelopathy 2/2 degenerative spine disease, s/p decompressive laminectomies C3-C6.  He presents with the following impairments/functional limitations: weakness, impaired endurance, impaired self care skills, impaired functional mobility, gait instability, impaired balance, pain .    Rehab Prognosis: Good; patient would benefit from acute skilled PT services to address these deficits and reach maximum level of function.    Recent Surgery: Procedure(s) (LRB):  FUSION, SPINE, POSTERIOR SPINAL COLUMN, CERVICAL, USING COMPUTER-ASSISTED NAVIGATION (N/A) 4 Days Post-Op    Plan:     During this hospitalization, patient to be seen 6 x/week to address the identified rehab impairments via gait training, therapeutic activities, therapeutic exercises and progress toward the following goals:    Plan of Care Expires:  07/28/23    Subjective     Chief Complaint: pain  Patient/Family Comments/goals:   Pain/Comfort:         Objective:     Communicated with RN prior to session.  Patient found HOB elevated with   upon PT entry to room.     General Precautions: Standard, fall  Orthopedic Precautions: spinal precautions  Braces: [place] J collar  Respiratory Status: Room air  Skin Integrity: Visible skin intact      Functional Mobility:  Bed Mobility:     Supine to Sit: moderate assistance  Transfers:     Sit to Stand:  minimum assistance with rolling walker  Gait: Pt amb 6ftx1, 4ft x2 with RW Tiburcio. Cues needed for erect posture d/t fwd lean    Therapeutic Activities/Exercises:  Pt sat EOB sba while RN pulled drain    Education:  Patient provided with verbal  education regarding POC.  Understanding was verbalized, however additional teaching warranted.     Patient left up in chair with all lines intact and call button in reach..    GOALS:   Multidisciplinary Problems       Physical Therapy Goals          Problem: Physical Therapy    Goal Priority Disciplines Outcome Goal Variances Interventions   Physical Therapy Goal     PT, PT/OT Ongoing, Progressing     Description: Goals to be met by: 23     Patient will increase functional independence with mobility by performin. Supine to sit with Stand-by Assistance  2. Sit to supine with Stand-by Assistance  3. Sit to stand transfer with Stand-by Assistance  4. Gait  x 50 feet with Stand-by Assistance using Rolling Walker.                          Time Tracking:     PT Received On: 23  PT Start Time: 0940     PT Stop Time: 1003  PT Total Time (min): 23 min     Billable Minutes: Gait Training 10 and Therapeutic Activity 13    Treatment Type: Treatment  PT/PTA: PTA     Number of PTA visits since last PT visit: 3     2023

## 2023-06-30 NOTE — PROGRESS NOTES
Inpatient Nutrition Evaluation    Admit Date: 6/11/2023   Total duration of encounter: 19 days    Nutrition Recommendation/Prescription     Continue heart healthy diet as tolerated    Nutrition Assessment     Chart Review    Reason Seen: length of stay and follow-up    Malnutrition Screening Tool Results   Have you recently lost weight without trying?: No  Have you been eating poorly because of a decreased appetite?: No   MST Score: 0     Diagnosis:  Polyneuropathy with progressive weakness to bilateral upper extremity and lower extremity  Impaired mobility secondary to generalized weakness and neuropathy since the initiation of chemo  E coli UTI-pansensitive  Multiple myeloma on chemotherapy- last treatment held due to weakness   Essential HTN  Hypokalemia  Hypomagnesemia    Relevant Medical History: HTN, HLD, CAD s/p stent, chronic anemia, GERD, multiple myeloma on chemotherapy, PE    Nutrition-Related Medications: vitamin C, ferrous sulfate    Nutrition-Related Labs:   6/16: RBC-3.62, H/H-11.8/36.2, Cl-109  6/20: CMP WNL   6/30: no new    Diet Order: Diet heart healthy  Oral Supplement Order: none  Appetite/Oral Intake: good/% of meals  Factors Affecting Nutritional Intake: none identified  Food/Jehovah's witness/Cultural Preferences: unable to obtain  Food Allergies: unable to obtain    Skin Integrity: incision, intact  Wound(s):       Comments    6/16: pt sleeping at time of rounds. Spoke with RN, who reports good appetite and no GI issues. 100% intake documented on 6/12. Per MST, no decreased appetite or weight loss prior admission. Unable to obtain weight history at this time. Weight of 88.5 kg (195 lb) on 6/14. Per EMR weights, 2.5% weight loss in ~2 months noted. Not significant at this time. Unable to perform NFPA at this time, no wasting visualized. Will continue to monitor.    6/23: Continues with good appetite and intake of meals. Denies any GI complaints. Plans for cervical laminectomy and posterior  instrumented fusion on Monday.     23: PT continues with good po intake of meals.     Anthropometrics    Height: 6' (182.9 cm) Height Method: Stated  Last Weight: 88.5 kg (195 lb) (23 0051) Weight Method: Standard Scale  BMI (Calculated): 26.4  BMI Classification: overweight (BMI 25-29.9)        Ideal Body Weight (IBW), Male: 178 lb     % Ideal Body Weight, Male (lb): 109.55 %                 Usual Body Weight (UBW), k.8 kg  % Usual Body Weight: 97.62     Usual Weight Provided By: EMR weight history    Wt Readings from Last 5 Encounters:   23 88.5 kg (195 lb)   23 90.4 kg (199 lb 3.2 oz)   23 89.7 kg (197 lb 12.8 oz)   23 90.7 kg (199 lb 15.3 oz)   23 90.8 kg (200 lb 1.6 oz)     Weight Change(s) Since Admission:  Admit Weight: 88.5 kg (195 lb) (23 1713)  23 no updated wts   23 no new wt    Patient Education    Not applicable.    Monitoring & Evaluation     Dietitian will monitor food and beverage intake and weight change.  Nutrition Risk/Follow-Up: low (follow-up in 5-7 days)  Patients assigned 'low nutrition risk' status do not qualify for a full nutritional assessment but will be monitored and re-evaluated in a 5-7 day time period. Please consult if re-evaluation needed sooner.

## 2023-06-30 NOTE — PROGRESS NOTES
[facility]  Hospital Medicine Progress Note        CC: hospital follow-up for cervical decompression/fusion         SUBJECTIVE   72 y.o. male with a history that includes CAD s/p stent on Plavix, PE on Eliquis, and multiple myeloma on chemotherapy, presented to [facility] on 6/11 with numbness to bilateral hands and bilateral lower extremities with associated weakness, worsening x 6 months.  Patient reports that he was now unable to ambulate due to worsening paresthesias and generalized weakness x 3 weeks, subsequently been bed-bound.  He endorsed falling about 4 times over the past 3 weeks, denied LOC or head injury.  He reported he was in his normal state of health prior to initiating chemotherapy in December 2022.  He then had COVID-19 in February 2023 and has had a rapid decline since then.  He also endorsed urinary retention for which he was seen at an outlying ED on 6/5/23 and had a Duenas catheter placed, he was supposed to follow-up with urology on 06/12/2023.  He was admitted to hospital medicine services for further management.  MRI C-spine done and showed severe degenerative narrowing of the spinal canal at C4-C5 and C5-C6, moderate at C3-C4, mild at other levels and  T2 signal changes in the cord at C4-C5 may be secondary to a compressive myelopathy.  Evaluated by Neurosurgery, planned for surgical intervention. Cardiology consulted and Plavix and Eliquis held.  Patient then underwent decompressive laminectomies at C3-4, C4-5, and C5-6 with fusion on 6/26; admitted to ICU post-operatively for monitoring.       Patient was currently status post cervical decompression and fusion surgery.  Postop day 3    Today's information  6/29/23-Patient is seen and examined at bedside   Patient was complaining of right-sided cervical neck pain, he is not having a good day today   He is also having bilateral eye watering and itching  Vitals reviewed stable on room air   No new labs   Appreciate  neurosurgery input continue CELIA drain  Plavix on hold till 7/3   Patient will be going to the rehab with his Duenas for 7-10 days on follow-up urology outpatient       6/30/23 dr seay - pt has been accepted to [facility]. Will resume plavix and eliquis on 7-3-23. Eliquis for PE while sick w covid closer to a yr ago. He voices no complains and feels fine appears still with Duenas catheter.  Tolerating PT/OT and accepted to [facility]      Exam  GENERAL: Awake and in NAD  LUNGS: CTA B/L  CVS: Normal rate  GI/: Soft, NT, bowel sounds positive.  EXTREMITIES: No peripheral edema  NEURO: AAOx3 .  Upper extremity strength 4/out of 5.  Lower extremity strength 4/5.  PSYCH: Cooperative        ASSESSMENT   Cervical spine stenosis with myelopathy due to degenerative spine disease   Severe degenerative lumbar spinal canal stenosis at L3-L4 and L4-L5  Multilevel neural foraminal stenosis cervical and lumbar spine   - patient is status post decompressive laminectomies at C3/4, C4/5 and C5/6 with fusion.  h/o Multiple myeloma   h/o PE, on Eliquis  h/o CAD, s/p prior stent, on PLavix  Anemia of chronic disease  with macrocytosis   Asymptomatic bacteruria      PLAN     Adjust his pain meds to Q 4 hourly p.r.n.  Visine eyedrops  Post-op, wound care per NSGy  Accepted to [facility], will dc Monday., clearance from NSGY granted  Will continue current management and monitoring in the interim  Continue with PT/OT  Plavix on hold until  7/3/23  Urology recommending keep Duenas for 7-10 days due to retention; continue with Flomax, Proscar added back as well      Will discharge monday        Prophylaxis: B/L SCDs       VITAL SIGNS: 24 HRS MIN & MAX LAST   Temp  Min: 98 °F (36.7 °C)  Max: 98.4 °F (36.9 °C) 98 °F (36.7 °C)   BP  Min: 96/58  Max: 138/75 125/69   Pulse  Min: 58  Max: 86  72   Resp  Min: 14  Max: 20 16   SpO2  Min: 95 %  Max: 100 % 96 %     I have reviewed the following labs:    Recent Labs   Lab 06/25/23  0319 06/27/23  0145   WBC 8.20 9.81    RBC 3.37* 3.26*   HGB 11.1* 10.6*   HCT 33.9* 32.8*   .6* 100.6*   MCH 32.9* 32.5*   MCHC 32.7* 32.3*   RDW 14.5 14.3    288   MPV 8.6 8.7         Recent Labs   Lab 06/25/23  0319 06/27/23  0145    136   K 3.8 4.4   CO2 24 17*   BUN 11.3 13.8   CREATININE 0.83 0.93   CALCIUM 9.6 9.3   MG 1.70  --    ALBUMIN 3.0*  --    ALKPHOS 82  --    ALT 30  --    AST 16  --    BILITOT 0.5  --             Microbiology Results (last 7 days)       ** No results found for the last 168 hours. **             See below for Radiology    Scheduled Med:   amLODIPine  5 mg Oral Daily    ascorbic acid (vitamin C)  1,000 mg Oral Daily    atorvastatin  40 mg Oral Every other day    DULoxetine  30 mg Oral Daily    dutasteride  0.5 mg Oral Daily    ferrous sulfate  1 tablet Oral Daily    gabapentin  800 mg Oral TID    linaCLOtide  145 mcg Oral Before breakfast    metoprolol tartrate  25 mg Oral BID    tamsulosin  0.4 mg Oral QHS    tetrahydrozoline 0.05%  1 drop Both Eyes TID    zinc oxide-cod liver oil   Topical (Top) BID    zolpidem  10 mg Oral QHS        Continuous Infusions:       PRN Meds:  acetaminophen, aluminum-magnesium hydroxide-simethicone, melatonin, methocarbamoL, naloxone, ondansetron, oxyCODONE-acetaminophen, polyethylene glycol, prochlorperazine, simethicone           VTE prophylaxis: scd    Patient condition:  Stable    Anticipated discharge and Disposition:   [facility]      All diagnosis and differential diagnosis have been reviewed; assessment and plan has been documented; I have personally reviewed the labs and test results that are presently available; I have reviewed the patients medication list; I have reviewed the consulting providers response and recommendations. I have reviewed or attempted to review medical records based upon their availability    All of the patient's questions have been  addressed and answered. Patient's is agreeable to the above stated plan. I will continue to monitor closely and  make adjustments to medical management as needed.  _____________________________________________________________________    Nutrition Status:    Radiology:  I have personally reviewed the following imaging and agree with the radiologist.     SURG FL Surgery Fluoro Usage  See OP Notes for results.     IMPRESSION: See OP Notes for results.     This procedure was auto-finalized by: Virtual Radiologist      Raymundo Stephenson MD   06/30/2023

## 2023-07-01 PROCEDURE — 11000001 HC ACUTE MED/SURG PRIVATE ROOM

## 2023-07-01 PROCEDURE — 25000003 PHARM REV CODE 250: Performed by: INTERNAL MEDICINE

## 2023-07-01 PROCEDURE — 97535 SELF CARE MNGMENT TRAINING: CPT | Mod: CO

## 2023-07-01 PROCEDURE — 25000003 PHARM REV CODE 250: Performed by: NURSE PRACTITIONER

## 2023-07-01 PROCEDURE — 97530 THERAPEUTIC ACTIVITIES: CPT | Mod: CQ

## 2023-07-01 RX ADMIN — OXYCODONE AND ACETAMINOPHEN 1 TABLET: 10; 325 TABLET ORAL at 02:07

## 2023-07-01 RX ADMIN — AMLODIPINE BESYLATE 5 MG: 5 TABLET ORAL at 07:07

## 2023-07-01 RX ADMIN — GABAPENTIN 800 MG: 100 CAPSULE ORAL at 07:07

## 2023-07-01 RX ADMIN — OXYCODONE AND ACETAMINOPHEN 1 TABLET: 10; 325 TABLET ORAL at 10:07

## 2023-07-01 RX ADMIN — TETRAHYDROZOLINE HCL 1 DROP: 0.05 LIQUID OPHTHALMIC at 08:07

## 2023-07-01 RX ADMIN — Medication: at 07:07

## 2023-07-01 RX ADMIN — METHOCARBAMOL 500 MG: 500 TABLET ORAL at 05:07

## 2023-07-01 RX ADMIN — LINACLOTIDE 145 MCG: 145 CAPSULE, GELATIN COATED ORAL at 06:07

## 2023-07-01 RX ADMIN — DULOXETINE 30 MG: 30 CAPSULE, DELAYED RELEASE ORAL at 07:07

## 2023-07-01 RX ADMIN — DUTASTERIDE 0.5 MG: 0.5 CAPSULE, LIQUID FILLED ORAL at 07:07

## 2023-07-01 RX ADMIN — GABAPENTIN 800 MG: 100 CAPSULE ORAL at 02:07

## 2023-07-01 RX ADMIN — ZOLPIDEM TARTRATE 10 MG: 5 TABLET ORAL at 08:07

## 2023-07-01 RX ADMIN — Medication 1000 MG: at 07:07

## 2023-07-01 RX ADMIN — OXYCODONE AND ACETAMINOPHEN 1 TABLET: 10; 325 TABLET ORAL at 05:07

## 2023-07-01 RX ADMIN — TAMSULOSIN HYDROCHLORIDE 0.4 MG: 0.4 CAPSULE ORAL at 08:07

## 2023-07-01 RX ADMIN — METOPROLOL TARTRATE 25 MG: 25 TABLET, FILM COATED ORAL at 07:07

## 2023-07-01 RX ADMIN — FERROUS SULFATE TAB 325 MG (65 MG ELEMENTAL FE) 1 EACH: 325 (65 FE) TAB at 07:07

## 2023-07-01 RX ADMIN — TETRAHYDROZOLINE HCL 1 DROP: 0.05 LIQUID OPHTHALMIC at 02:07

## 2023-07-01 RX ADMIN — TETRAHYDROZOLINE HCL 1 DROP: 0.05 LIQUID OPHTHALMIC at 07:07

## 2023-07-01 RX ADMIN — GABAPENTIN 800 MG: 100 CAPSULE ORAL at 08:07

## 2023-07-01 RX ADMIN — Medication: at 09:07

## 2023-07-01 RX ADMIN — METOPROLOL TARTRATE 25 MG: 25 TABLET, FILM COATED ORAL at 08:07

## 2023-07-01 NOTE — PT/OT/SLP PROGRESS
Physical Therapy Treatment    Patient Name:  Valentino Manning   MRN:  [id number]    Recommendations:     Discharge Recommendations: rehabilitation facility  Discharge Equipment Recommendations: to be determined by next level of care  Barriers to discharge: Ongoing medical tx    Assessment:     Valentino Manning is a 72 y.o. male admitted with a medical diagnosis of cervical spine stenosis with myelopathy 2/2 degenerative spine disease, s/p decompressive laminectomies C3-C6.  He presents with the following impairments/functional limitations: weakness, impaired endurance, impaired self care skills, impaired functional mobility, gait instability, impaired balance, pain.    RN reports pt confused this morning. Pt semi-supine in bed awake upon arrival. Pt expressed how he felt confused this morning and never felt that way before. Noted significantly more weak compared to last tx sessions with poor trunk control and max assist to perform transfers. RN notified of concern.     Rehab Prognosis: Good; patient would benefit from acute skilled PT services to address these deficits and reach maximum level of function.    Recent Surgery: Procedure(s) (LRB):  FUSION, SPINE, POSTERIOR SPINAL COLUMN, CERVICAL, USING COMPUTER-ASSISTED NAVIGATION (N/A) 5 Days Post-Op    Plan:     During this hospitalization, patient to be seen 6 x/week to address the identified rehab impairments via gait training, therapeutic activities, therapeutic exercises and progress toward the following goals:    Plan of Care Expires:  07/28/23    Subjective     Chief Complaint: confusion   Patient/Family Comments/goals: to get better  Pain/Comfort:  Pain Rating 1: other (see comments) (neck pain but did not rate)      Objective:     Communicated with NSG prior to session.  Patient found HOB elevated with telemetry, pulse ox (continuous), blood pressure cuff, hemovac, gongora catheter upon PTA entry to room.     General Precautions: Standard, fall  Orthopedic  Precautions: spinal precautions  Braces: Millard J collar  Respiratory Status: Room air  Blood Pressure: 109/65, HR 84, Spo2 96%  Skin Integrity: Visible skin intact      Functional Mobility:  Bed mobility:   Supine<->sit with log rolling with max assist  Balance:  Static sitting balance with strong R lateral lean and several posterior LOB.   Improved from mod/max assist to min/mod assist for balance following transfer  Transfers:   Sit<->stand x 3 trials with max x 2 assist  Unable to achieve full erect posture on first two trial, cleared buttocks but remained forward flexed  Last trial, stood nearly erect but unable to maintain >5 seconds.     Therapeutic Activities/Exercises:  OMAR LAQ's x 5 reps    Education:  Patient provided with verbal education regarding POC.  Understanding was verbalized, however additional teaching warranted.     Patient left HOB elevated with all lines intact and call button in reach.    GOALS:   Multidisciplinary Problems       Physical Therapy Goals          Problem: Physical Therapy    Goal Priority Disciplines Outcome Goal Variances Interventions   Physical Therapy Goal     PT, PT/OT Ongoing, Progressing     Description: Goals to be met by: 23     Patient will increase functional independence with mobility by performin. Supine to sit with Stand-by Assistance  2. Sit to supine with Stand-by Assistance  3. Sit to stand transfer with Stand-by Assistance  4. Gait  x 50 feet with Stand-by Assistance using Rolling Walker.                          Time Tracking:     PT Received On: 23  PT Start Time: 1143     PT Stop Time: 1206  PT Total Time (min): 23 min     Billable Minutes: Therapeutic Activity 2 units    Treatment Type: Treatment  PT/PTA: PTA     Number of PTA visits since last PT visit: 4     2023

## 2023-07-01 NOTE — NURSING
Nurses Note -- 4 Eyes      7/1/2023   4:25 PM      Skin assessed during: Daily Assessment      [] No Altered Skin Integrity Present    []Prevention Measures Documented      [x] Yes- Altered Skin Integrity Present or Discovered   [] LDA Added if Not in Epic (Describe Wound)   [] New Altered Skin Integrity was Present on Admit and Documented in LDA   [] Wound Image Taken    Wound Care Consulted? No    Attending Nurse:  Cesilia Ortiz RN     Second RN/Staff Member:  LIBIA Kong

## 2023-07-01 NOTE — NURSING
Nurses Note -- 4 Eyes      7/1/2023   12:58 AM      Skin assessed during: Daily Assessment      [] No Altered Skin Integrity Present    []Prevention Measures Documented      [x] Yes- Altered Skin Integrity Present or Discovered   [] LDA Added if Not in Epic (Describe Wound)   [] New Altered Skin Integrity was Present on Admit and Documented in LDA   [] Wound Image Taken    Wound Care Consulted? No    Attending Nurse:  Andrey Koroma RN     Second RN/Staff Member:  og rosenbaum rn

## 2023-07-01 NOTE — PT/OT/SLP PROGRESS
Occupational Therapy   Treatment    Name: Valentino Manning  MRN: [id number]  Admitting Diagnosis:  Cervical stenosis with myelopathy 5 Days Post-Op    Recommendations:     Discharge Recommendations: rehabilitation facility  Discharge Equipment Recommendations:   (TBD by next level of care)  Barriers to discharge:   (severity of deficits)  Assessment:     Valentino Manning is a 72 y.o. male with a medical diagnosis of  Cervical stenosis with myelopathy.  He presents with decreased strength/endurance limiting performance and participation; Pt only tolerating a few minutes sitting EOB before requesting to return to supine; ADL performed with HOB elevated; Pt stated that he was confused and with days/nights mixed up. NSG notified and aware. Performance deficits affecting function are weakness, impaired endurance, impaired self care skills, impaired functional mobility, impaired balance, decreased coordination, pain, orthopedic precautions.     Rehab Prognosis:  Good; patient would benefit from acute skilled OT services to address these deficits and reach maximum level of function.       Plan:     Patient to be seen 6 x/week to address the above listed problems via self-care/home management, therapeutic activities, therapeutic exercises  Plan of Care Expires: 07/11/23  Plan of Care Reviewed with: patient    Subjective     Chief Complaint: pain/increased confusion/tired  Patient/Family Comments/goals: to go to rehab  Pain/Comfort:  Location 1: neck  Pain Addressed 1: Pre-medicate for activity, Reposition, Distraction    Objective:     Communicated with: RN prior to session.  Patient found supine with pulse ox (continuous), cervical collar, telemetry upon OT entry to room.    General Precautions: Standard, fall    Orthopedic Precautions:spinal precautions  Braces: [place] J collar  Respiratory Status: Room air  BP:118/71  HR:93  02:97%     Occupational Performance:     Bed Mobility:    Patient completed Supine to Sit with maximal  assistance  Patient completed Sit to Supine with maximal assistance       Activities of Daily Living:  Grooming: setup/SBA for oral care HOB elevated      AMPAC 6 Click ADL:      Treatment & Education:  Educated Pt on POC/Roles/goals of OT    Patient left HOB elevated with all lines intact, call button in reach, and nsg notified    GOALS:   Multidisciplinary Problems       Occupational Therapy Goals          Problem: Occupational Therapy    Goal Priority Disciplines Outcome Interventions   Occupational Therapy Goal     OT, PT/OT Ongoing, Progressing    Description: Goals to be met by: 7/11/23 (revised on re-eval 6/27)    Patient will increase functional independence with ADLs by performing:    Self feeding, including drinking, with set up assist. -new  UE Dressing with set up Assistance.-revised  LE Dressing with Moderate Assistance and Assistive Devices as needed.-progressing/continue  Grooming tasks while EOB with set up Assistance. - Revised.  Toileting from BSC with min Assistance.-revised  Pt will perform BSC t/f with contact guard Assistance.-revised                         Time Tracking:     OT Date of Treatment: 07/01/23  OT Start Time: 1057  OT Stop Time: 1119  OT Total Time (min): 22 min    Billable Minutes:Self Care/Home Management 22    OT/CAMERON: CAMERON     Number of CAMERON visits since last OT visit: 2    7/1/2023

## 2023-07-01 NOTE — PROGRESS NOTES
[facility]  Hospital Medicine Progress Note        CC: hospital follow-up for cervical decompression/fusion         SUBJECTIVE   72 y.o. male with a history that includes CAD s/p stent on Plavix, PE on Eliquis, and multiple myeloma on chemotherapy, presented to [facility] on 6/11 with numbness to bilateral hands and bilateral lower extremities with associated weakness, worsening x 6 months.  Patient reports that he was now unable to ambulate due to worsening paresthesias and generalized weakness x 3 weeks, subsequently been bed-bound.  He endorsed falling about 4 times over the past 3 weeks, denied LOC or head injury.  He reported he was in his normal state of health prior to initiating chemotherapy in December 2022.  He then had COVID-19 in February 2023 and has had a rapid decline since then.  He also endorsed urinary retention for which he was seen at an outlying ED on 6/5/23 and had a Duenas catheter placed, he was supposed to follow-up with urology on 06/12/2023.  He was admitted to hospital medicine services for further management.  MRI C-spine done and showed severe degenerative narrowing of the spinal canal at C4-C5 and C5-C6, moderate at C3-C4, mild at other levels and  T2 signal changes in the cord at C4-C5 may be secondary to a compressive myelopathy.  Evaluated by Neurosurgery, planned for surgical intervention. Cardiology consulted and Plavix and Eliquis held.  Patient then underwent decompressive laminectomies at C3-4, C4-5, and C5-6 with fusion on 6/26; admitted to ICU post-operatively for monitoring.       Patient was currently status post cervical decompression and fusion surgery.  Postop day 3    Today's information  6/29/23-Patient is seen and examined at bedside   Patient was complaining of right-sided cervical neck pain, he is not having a good day today   He is also having bilateral eye watering and itching  Vitals reviewed stable on room air   No new labs   Appreciate  "neurosurgery input continue CELIA drain  Plavix on hold till 7/3   Patient will be going to the rehab with his Duenas for 7-10 days on follow-up urology outpatient       6/30/23 dr seay - pt has been accepted to [facility]. Will resume plavix and eliquis on 7-3-23. Eliquis for PE while sick w covid closer to a yr ago. He voices no complains and feels fine appears still with Duenas catheter.  Tolerating PT/OT and accepted to [facility]      7/1/23 dr seay- no new problems or complains.       Exam  GENERAL: Awake and in NAD  LUNGS: CTA B/L  CVS: Normal rate  GI/: Soft, NT, bowel sounds positive.  EXTREMITIES: No peripheral edema  NEURO: AAOx3 .  Upper extremity strength 4/out of 5.  Lower extremity strength 4/5.  PSYCH: Cooperative        ASSESSMENT   Cervical spine stenosis with myelopathy due to degenerative spine disease   Severe degenerative lumbar spinal canal stenosis at L3-L4 and L4-L5  Multilevel neural foraminal stenosis cervical and lumbar spine   - patient is status post decompressive laminectomies at C3/4, C4/5 and C5/6 with fusion.  h/o Multiple myeloma   h/o PE, on Eliquis  h/o CAD, s/p prior stent, on PLavix  Anemia of chronic disease  with macrocytosis   Asymptomatic bacteruria      PLAN     Adjust his pain meds to Q 4 hourly p.r.n.  Visine eyedrops  Post-op, wound care per NSGy  Accepted to [facility], will dc Monday., clearance from NSGY granted  Will continue current management and monitoring in the interim  Continue with PT/OT  Plavix/eliquis on hold until  7/3/23  Urology recommending keep Duenas for 7-10 days due to retention; continue with Flomax, Proscar added back as well  Discontinue norvasc 5 mgs(7-1-23) due to systolic in 90"s    Will discharge monday        Prophylaxis: B/L SCDs       VITAL SIGNS: 24 HRS MIN & MAX LAST   Temp  Min: 98 °F (36.7 °C)  Max: 98.9 °F (37.2 °C) 98.9 °F (37.2 °C)   BP  Min: 92/54  Max: 144/73 (!) 92/54   Pulse  Min: 72  Max: 104  87   Resp  Min: 15  Max: 22 16   SpO2  Min: 92 %  " Max: 100 % 96 %     I have reviewed the following labs:    Recent Labs   Lab 06/25/23 0319 06/27/23  0145   WBC 8.20 9.81   RBC 3.37* 3.26*   HGB 11.1* 10.6*   HCT 33.9* 32.8*   .6* 100.6*   MCH 32.9* 32.5*   MCHC 32.7* 32.3*   RDW 14.5 14.3    288   MPV 8.6 8.7         Recent Labs   Lab 06/25/23 0319 06/27/23  0145    136   K 3.8 4.4   CO2 24 17*   BUN 11.3 13.8   CREATININE 0.83 0.93   CALCIUM 9.6 9.3   MG 1.70  --    ALBUMIN 3.0*  --    ALKPHOS 82  --    ALT 30  --    AST 16  --    BILITOT 0.5  --             Microbiology Results (last 7 days)       ** No results found for the last 168 hours. **             See below for Radiology    Scheduled Med:   amLODIPine  5 mg Oral Daily    ascorbic acid (vitamin C)  1,000 mg Oral Daily    atorvastatin  40 mg Oral Every other day    DULoxetine  30 mg Oral Daily    dutasteride  0.5 mg Oral Daily    ferrous sulfate  1 tablet Oral Daily    gabapentin  800 mg Oral TID    linaCLOtide  145 mcg Oral Before breakfast    metoprolol tartrate  25 mg Oral BID    tamsulosin  0.4 mg Oral QHS    tetrahydrozoline 0.05%  1 drop Both Eyes TID    zinc oxide-cod liver oil   Topical (Top) BID    zolpidem  10 mg Oral QHS        Continuous Infusions:       PRN Meds:  acetaminophen, aluminum-magnesium hydroxide-simethicone, melatonin, methocarbamoL, naloxone, ondansetron, oxyCODONE-acetaminophen, polyethylene glycol, prochlorperazine, simethicone           VTE prophylaxis: scd    Patient condition:  Stable    Anticipated discharge and Disposition:   [facility]      All diagnosis and differential diagnosis have been reviewed; assessment and plan has been documented; I have personally reviewed the labs and test results that are presently available; I have reviewed the patients medication list; I have reviewed the consulting providers response and recommendations. I have reviewed or attempted to review medical records based upon their availability    All of the patient's questions  have been  addressed and answered. Patient's is agreeable to the above stated plan. I will continue to monitor closely and make adjustments to medical management as needed.  _____________________________________________________________________    Nutrition Status:    Radiology:  I have personally reviewed the following imaging and agree with the radiologist.     SURG FL Surgery Fluoro Usage  See OP Notes for results.     IMPRESSION: See OP Notes for results.     This procedure was auto-finalized by: Virtual Radiologist      Raymundo Stephenson MD   07/01/2023

## 2023-07-02 LAB
ALBUMIN SERPL-MCNC: 2.6 G/DL (ref 3.4–4.8)
ALBUMIN/GLOB SERPL: 1 RATIO (ref 1.1–2)
ALP SERPL-CCNC: 76 UNIT/L (ref 40–150)
ALT SERPL-CCNC: 20 UNIT/L (ref 0–55)
AST SERPL-CCNC: 11 UNIT/L (ref 5–34)
BASOPHILS # BLD AUTO: 0.02 X10(3)/MCL
BASOPHILS NFR BLD AUTO: 0.2 %
BILIRUBIN DIRECT+TOT PNL SERPL-MCNC: 0.9 MG/DL
BUN SERPL-MCNC: 12.1 MG/DL (ref 8.4–25.7)
CALCIUM SERPL-MCNC: 9 MG/DL (ref 8.8–10)
CHLORIDE SERPL-SCNC: 101 MMOL/L (ref 98–107)
CO2 SERPL-SCNC: 25 MMOL/L (ref 23–31)
CREAT SERPL-MCNC: 0.95 MG/DL (ref 0.73–1.18)
EOSINOPHIL # BLD AUTO: 0.15 X10(3)/MCL (ref 0–0.9)
EOSINOPHIL NFR BLD AUTO: 1.5 %
ERYTHROCYTE [DISTWIDTH] IN BLOOD BY AUTOMATED COUNT: 14.6 % (ref 11.5–17)
GFR SERPLBLD CREATININE-BSD FMLA CKD-EPI: >60 MLS/MIN/1.73/M2
GLOBULIN SER-MCNC: 2.6 GM/DL (ref 2.4–3.5)
GLUCOSE SERPL-MCNC: 110 MG/DL (ref 82–115)
HCT VFR BLD AUTO: 28.2 % (ref 42–52)
HGB BLD-MCNC: 9.1 G/DL (ref 14–18)
IMM GRANULOCYTES # BLD AUTO: 0.03 X10(3)/MCL (ref 0–0.04)
IMM GRANULOCYTES NFR BLD AUTO: 0.3 %
LYMPHOCYTES # BLD AUTO: 1.6 X10(3)/MCL (ref 0.6–4.6)
LYMPHOCYTES NFR BLD AUTO: 16.4 %
MAGNESIUM SERPL-MCNC: 1.7 MG/DL (ref 1.6–2.6)
MCH RBC QN AUTO: 31.8 PG (ref 27–31)
MCHC RBC AUTO-ENTMCNC: 32.3 G/DL (ref 33–36)
MCV RBC AUTO: 98.6 FL (ref 80–94)
MONOCYTES # BLD AUTO: 1.05 X10(3)/MCL (ref 0.1–1.3)
MONOCYTES NFR BLD AUTO: 10.8 %
NEUTROPHILS # BLD AUTO: 6.91 X10(3)/MCL (ref 2.1–9.2)
NEUTROPHILS NFR BLD AUTO: 70.8 %
NRBC BLD AUTO-RTO: 0 %
PLATELET # BLD AUTO: 257 X10(3)/MCL (ref 130–400)
PMV BLD AUTO: 8.9 FL (ref 7.4–10.4)
POTASSIUM SERPL-SCNC: 4.2 MMOL/L (ref 3.5–5.1)
PROT SERPL-MCNC: 5.2 GM/DL (ref 5.8–7.6)
RBC # BLD AUTO: 2.86 X10(6)/MCL (ref 4.7–6.1)
SODIUM SERPL-SCNC: 134 MMOL/L (ref 136–145)
WBC # SPEC AUTO: 9.76 X10(3)/MCL (ref 4.5–11.5)

## 2023-07-02 PROCEDURE — 83735 ASSAY OF MAGNESIUM: CPT | Performed by: INTERNAL MEDICINE

## 2023-07-02 PROCEDURE — 25000003 PHARM REV CODE 250: Performed by: INTERNAL MEDICINE

## 2023-07-02 PROCEDURE — 25000003 PHARM REV CODE 250: Performed by: NURSE PRACTITIONER

## 2023-07-02 PROCEDURE — 80053 COMPREHEN METABOLIC PANEL: CPT | Performed by: INTERNAL MEDICINE

## 2023-07-02 PROCEDURE — 85025 COMPLETE CBC W/AUTO DIFF WBC: CPT | Performed by: INTERNAL MEDICINE

## 2023-07-02 PROCEDURE — 11000001 HC ACUTE MED/SURG PRIVATE ROOM

## 2023-07-02 RX ADMIN — DULOXETINE 30 MG: 30 CAPSULE, DELAYED RELEASE ORAL at 07:07

## 2023-07-02 RX ADMIN — METOPROLOL TARTRATE 25 MG: 25 TABLET, FILM COATED ORAL at 08:07

## 2023-07-02 RX ADMIN — Medication 1000 MG: at 07:07

## 2023-07-02 RX ADMIN — TETRAHYDROZOLINE HCL 1 DROP: 0.05 LIQUID OPHTHALMIC at 02:07

## 2023-07-02 RX ADMIN — METHOCARBAMOL 500 MG: 500 TABLET ORAL at 08:07

## 2023-07-02 RX ADMIN — TETRAHYDROZOLINE HCL 1 DROP: 0.05 LIQUID OPHTHALMIC at 07:07

## 2023-07-02 RX ADMIN — FERROUS SULFATE TAB 325 MG (65 MG ELEMENTAL FE) 1 EACH: 325 (65 FE) TAB at 07:07

## 2023-07-02 RX ADMIN — OXYCODONE AND ACETAMINOPHEN 1 TABLET: 10; 325 TABLET ORAL at 12:07

## 2023-07-02 RX ADMIN — GABAPENTIN 800 MG: 100 CAPSULE ORAL at 08:07

## 2023-07-02 RX ADMIN — GABAPENTIN 800 MG: 100 CAPSULE ORAL at 02:07

## 2023-07-02 RX ADMIN — Medication: at 07:07

## 2023-07-02 RX ADMIN — Medication: at 08:07

## 2023-07-02 RX ADMIN — METOPROLOL TARTRATE 25 MG: 25 TABLET, FILM COATED ORAL at 07:07

## 2023-07-02 RX ADMIN — TETRAHYDROZOLINE HCL 1 DROP: 0.05 LIQUID OPHTHALMIC at 08:07

## 2023-07-02 RX ADMIN — ZOLPIDEM TARTRATE 10 MG: 5 TABLET ORAL at 08:07

## 2023-07-02 RX ADMIN — OXYCODONE AND ACETAMINOPHEN 1 TABLET: 10; 325 TABLET ORAL at 08:07

## 2023-07-02 RX ADMIN — Medication 6 MG: at 08:07

## 2023-07-02 RX ADMIN — DUTASTERIDE 0.5 MG: 0.5 CAPSULE, LIQUID FILLED ORAL at 07:07

## 2023-07-02 RX ADMIN — TAMSULOSIN HYDROCHLORIDE 0.4 MG: 0.4 CAPSULE ORAL at 08:07

## 2023-07-02 RX ADMIN — GABAPENTIN 800 MG: 100 CAPSULE ORAL at 07:07

## 2023-07-02 RX ADMIN — LINACLOTIDE 145 MCG: 145 CAPSULE, GELATIN COATED ORAL at 05:07

## 2023-07-02 NOTE — NURSING
Nurses Note -- 4 Eyes      7/2/2023   1:31 AM      Skin assessed during: Daily Assessment      [] No Altered Skin Integrity Present    [x]Prevention Measures Documented      [x] Yes- Altered Skin Integrity Present or Discovered   [] LDA Added if Not in Epic (Describe Wound)   [] New Altered Skin Integrity was Present on Admit and Documented in LDA   [] Wound Image Taken    Wound Care Consulted? Yes    Attending Nurse:  Leatha ledbetter RN     Second RN/Staff Member:  Patel LONG CNA

## 2023-07-02 NOTE — NURSING
Nurses Note -- 4 Eyes      7/2/2023   4:08 PM      Skin assessed during: Daily Assessment      [] No Altered Skin Integrity Present    []Prevention Measures Documented      [x] Yes- Altered Skin Integrity Present or Discovered   [] LDA Added if Not in Epic (Describe Wound)   [] New Altered Skin Integrity was Present on Admit and Documented in LDA   [] Wound Image Taken    Wound Care Consulted? No    Attending Nurse:  Cesilia Ortiz RN     Second RN/Staff Member:  LIBIA Kong

## 2023-07-02 NOTE — PROGRESS NOTES
[facility]  Hospital Medicine Progress Note        CC: hospital follow-up for cervical decompression/fusion         SUBJECTIVE   72 y.o. male with a history that includes CAD s/p stent on Plavix, PE on Eliquis, and multiple myeloma on chemotherapy, presented to [facility] on 6/11 with numbness to bilateral hands and bilateral lower extremities with associated weakness, worsening x 6 months.  Patient reports that he was now unable to ambulate due to worsening paresthesias and generalized weakness x 3 weeks, subsequently been bed-bound.  He endorsed falling about 4 times over the past 3 weeks, denied LOC or head injury.  He reported he was in his normal state of health prior to initiating chemotherapy in December 2022.  He then had COVID-19 in February 2023 and has had a rapid decline since then.  He also endorsed urinary retention for which he was seen at an outlying ED on 6/5/23 and had a Duenas catheter placed, he was supposed to follow-up with urology on 06/12/2023.  He was admitted to hospital medicine services for further management.  MRI C-spine done and showed severe degenerative narrowing of the spinal canal at C4-C5 and C5-C6, moderate at C3-C4, mild at other levels and  T2 signal changes in the cord at C4-C5 may be secondary to a compressive myelopathy.  Evaluated by Neurosurgery, planned for surgical intervention. Cardiology consulted and Plavix and Eliquis held.  Patient then underwent decompressive laminectomies at C3-4, C4-5, and C5-6 with fusion on 6/26; admitted to ICU post-operatively for monitoring.       Patient was currently status post cervical decompression and fusion surgery.  Postop day 3    Today's information  6/29/23-Patient is seen and examined at bedside   Patient was complaining of right-sided cervical neck pain, he is not having a good day today   He is also having bilateral eye watering and itching  Vitals reviewed stable on room air   No new labs   Appreciate  "neurosurgery input continue CELIA drain  Plavix on hold till 7/3   Patient will be going to the rehab with his Duenas for 7-10 days on follow-up urology outpatient       6/30/23 dr seay - pt has been accepted to [facility]. Will resume plavix and eliquis on 7-3-23. Eliquis for PE while sick w covid closer to a yr ago. He voices no complains and feels fine appears still with Duenas catheter.  Tolerating PT/OT and accepted to [facility]      7/1/23 dr seay- no new problems or complains.     7/2/23 dr seay - no new issues/ feels fine. Will discharge to [facility] tomorrow      Exam  GENERAL: Awake and in NAD  LUNGS: CTA B/L  CVS: Normal rate  GI/: Soft, NT, bowel sounds positive.  EXTREMITIES: No peripheral edema  NEURO: AAOx3 .  Upper extremity strength 4/out of 5.  Lower extremity strength 4/5.  PSYCH: Cooperative        ASSESSMENT   Cervical spine stenosis with myelopathy due to degenerative spine disease   Severe degenerative lumbar spinal canal stenosis at L3-L4 and L4-L5  Multilevel neural foraminal stenosis cervical and lumbar spine   - patient is status post decompressive laminectomies at C3/4, C4/5 and C5/6 with fusion.  h/o Multiple myeloma   h/o PE, on Eliquis  h/o CAD, s/p prior stent, on PLavix  Anemia of chronic disease  with macrocytosis   Asymptomatic bacteruria      PLAN     Adjust his pain meds to Q 4 hourly p.r.n.  Visine eyedrops  Post-op, wound care per NSGy  Accepted to [facility], will dc tomorrow., clearance from NSGY granted  Will continue current management and monitoring in the interim  Continue with PT/OT  Plavix/eliquis on hold until  7/3/23  Urology recommending keep Duenas for 7-10 days due to retention; continue with Flomax, Proscar added back as well  Discontinue norvasc 5 mgs(7-1-23) due to systolic in 90"s    Will discharge to [facility] tomorrow        Prophylaxis: B/L SCDs       VITAL SIGNS: 24 HRS MIN & MAX LAST   Temp  Min: 98.1 °F (36.7 °C)  Max: 100.3 °F (37.9 °C) 98.1 °F (36.7 °C)   BP  Min: 100/59  " Max: 149/73 125/82   Pulse  Min: 64  Max: 92  67   Resp  Min: 12  Max: 25 (!) 21   SpO2  Min: 91 %  Max: 98 % 98 %     I have reviewed the following labs:    Recent Labs   Lab 06/27/23 0145 07/02/23 0252   WBC 9.81 9.76   RBC 3.26* 2.86*   HGB 10.6* 9.1*   HCT 32.8* 28.2*   .6* 98.6*   MCH 32.5* 31.8*   MCHC 32.3* 32.3*   RDW 14.3 14.6    257   MPV 8.7 8.9         Recent Labs   Lab 06/27/23 0145 07/02/23 0252    134*   K 4.4 4.2   CO2 17* 25   BUN 13.8 12.1   CREATININE 0.93 0.95   CALCIUM 9.3 9.0   MG  --  1.70   ALBUMIN  --  2.6*   ALKPHOS  --  76   ALT  --  20   AST  --  11   BILITOT  --  0.9            Microbiology Results (last 7 days)       ** No results found for the last 168 hours. **             See below for Radiology    Scheduled Med:   ascorbic acid (vitamin C)  1,000 mg Oral Daily    atorvastatin  40 mg Oral Every other day    DULoxetine  30 mg Oral Daily    dutasteride  0.5 mg Oral Daily    ferrous sulfate  1 tablet Oral Daily    gabapentin  800 mg Oral TID    linaCLOtide  145 mcg Oral Before breakfast    metoprolol tartrate  25 mg Oral BID    tamsulosin  0.4 mg Oral QHS    tetrahydrozoline 0.05%  1 drop Both Eyes TID    zinc oxide-cod liver oil   Topical (Top) BID    zolpidem  10 mg Oral QHS        Continuous Infusions:       PRN Meds:  acetaminophen, aluminum-magnesium hydroxide-simethicone, melatonin, methocarbamoL, naloxone, ondansetron, oxyCODONE-acetaminophen, polyethylene glycol, prochlorperazine, simethicone           VTE prophylaxis: scd    Patient condition:  Stable    Anticipated discharge and Disposition:   vangie      All diagnosis and differential diagnosis have been reviewed; assessment and plan has been documented; I have personally reviewed the labs and test results that are presently available; I have reviewed the patients medication list; I have reviewed the consulting providers response and recommendations. I have reviewed or attempted to review medical  records based upon their availability    All of the patient's questions have been  addressed and answered. Patient's is agreeable to the above stated plan. I will continue to monitor closely and make adjustments to medical management as needed.  _____________________________________________________________________    Nutrition Status:    Radiology:  I have personally reviewed the following imaging and agree with the radiologist.     SURG FL Surgery Fluoro Usage  See OP Notes for results.     IMPRESSION: See OP Notes for results.     This procedure was auto-finalized by: Virtual Radiologist      Raymundo Stephenson MD   07/02/2023

## 2023-07-03 PROBLEM — G99.2 MYELOPATHY CONCURRENT WITH AND DUE TO SPINAL STENOSIS OF CERVICAL REGION: Status: ACTIVE | Noted: 2023-07-03

## 2023-07-03 PROBLEM — M48.02 MYELOPATHY CONCURRENT WITH AND DUE TO SPINAL STENOSIS OF CERVICAL REGION: Status: ACTIVE | Noted: 2023-07-03

## 2023-07-03 PROCEDURE — 25000003 PHARM REV CODE 250: Performed by: INTERNAL MEDICINE

## 2023-07-03 PROCEDURE — 97535 SELF CARE MNGMENT TRAINING: CPT | Mod: CO

## 2023-07-03 PROCEDURE — 25000003 PHARM REV CODE 250: Performed by: NURSE PRACTITIONER

## 2023-07-03 PROCEDURE — 97530 THERAPEUTIC ACTIVITIES: CPT | Mod: CQ

## 2023-07-03 PROCEDURE — 11000001 HC ACUTE MED/SURG PRIVATE ROOM

## 2023-07-03 PROCEDURE — 97116 GAIT TRAINING THERAPY: CPT | Mod: CQ

## 2023-07-03 PROCEDURE — 97530 THERAPEUTIC ACTIVITIES: CPT | Mod: CO

## 2023-07-03 RX ORDER — TAMSULOSIN HYDROCHLORIDE 0.4 MG/1
0.8 CAPSULE ORAL NIGHTLY
Qty: 60 CAPSULE | Refills: 11 | Status: SHIPPED | OUTPATIENT
Start: 2023-07-03 | End: 2024-07-02

## 2023-07-03 RX ORDER — METHOCARBAMOL 500 MG/1
500 TABLET, FILM COATED ORAL 3 TIMES DAILY PRN
Qty: 21 TABLET | Refills: 0 | Status: SHIPPED | OUTPATIENT
Start: 2023-07-03 | End: 2023-07-13

## 2023-07-03 RX ORDER — OXYCODONE AND ACETAMINOPHEN 10; 325 MG/1; MG/1
1 TABLET ORAL EVERY 6 HOURS PRN
Qty: 28 TABLET | Refills: 0 | Status: SHIPPED | OUTPATIENT
Start: 2023-07-03 | End: 2023-07-10

## 2023-07-03 RX ORDER — GABAPENTIN 400 MG/1
800 CAPSULE ORAL 3 TIMES DAILY
Qty: 42 CAPSULE | Refills: 0 | Status: SHIPPED | OUTPATIENT
Start: 2023-07-03 | End: 2023-09-21

## 2023-07-03 RX ORDER — METOPROLOL TARTRATE 25 MG/1
25 TABLET, FILM COATED ORAL 2 TIMES DAILY
Qty: 60 TABLET | Refills: 11 | Status: SHIPPED | OUTPATIENT
Start: 2023-07-03 | End: 2024-07-02

## 2023-07-03 RX ADMIN — METHOCARBAMOL 500 MG: 500 TABLET ORAL at 09:07

## 2023-07-03 RX ADMIN — TETRAHYDROZOLINE HCL 1 DROP: 0.05 LIQUID OPHTHALMIC at 07:07

## 2023-07-03 RX ADMIN — DULOXETINE 30 MG: 30 CAPSULE, DELAYED RELEASE ORAL at 07:07

## 2023-07-03 RX ADMIN — DUTASTERIDE 0.5 MG: 0.5 CAPSULE, LIQUID FILLED ORAL at 07:07

## 2023-07-03 RX ADMIN — METHOCARBAMOL 500 MG: 500 TABLET ORAL at 05:07

## 2023-07-03 RX ADMIN — GABAPENTIN 800 MG: 100 CAPSULE ORAL at 02:07

## 2023-07-03 RX ADMIN — LINACLOTIDE 145 MCG: 145 CAPSULE, GELATIN COATED ORAL at 05:07

## 2023-07-03 RX ADMIN — OXYCODONE AND ACETAMINOPHEN 1 TABLET: 10; 325 TABLET ORAL at 05:07

## 2023-07-03 RX ADMIN — FERROUS SULFATE TAB 325 MG (65 MG ELEMENTAL FE) 1 EACH: 325 (65 FE) TAB at 07:07

## 2023-07-03 RX ADMIN — METOPROLOL TARTRATE 25 MG: 25 TABLET, FILM COATED ORAL at 07:07

## 2023-07-03 RX ADMIN — TETRAHYDROZOLINE HCL 1 DROP: 0.05 LIQUID OPHTHALMIC at 02:07

## 2023-07-03 RX ADMIN — TETRAHYDROZOLINE HCL 1 DROP: 0.05 LIQUID OPHTHALMIC at 09:07

## 2023-07-03 RX ADMIN — METOPROLOL TARTRATE 25 MG: 25 TABLET, FILM COATED ORAL at 09:07

## 2023-07-03 RX ADMIN — GABAPENTIN 800 MG: 100 CAPSULE ORAL at 09:07

## 2023-07-03 RX ADMIN — Medication: at 09:07

## 2023-07-03 RX ADMIN — Medication 1000 MG: at 07:07

## 2023-07-03 RX ADMIN — ZOLPIDEM TARTRATE 10 MG: 5 TABLET ORAL at 09:07

## 2023-07-03 RX ADMIN — GABAPENTIN 800 MG: 100 CAPSULE ORAL at 07:07

## 2023-07-03 RX ADMIN — OXYCODONE AND ACETAMINOPHEN 1 TABLET: 10; 325 TABLET ORAL at 02:07

## 2023-07-03 RX ADMIN — Medication: at 07:07

## 2023-07-03 RX ADMIN — TAMSULOSIN HYDROCHLORIDE 0.4 MG: 0.4 CAPSULE ORAL at 09:07

## 2023-07-03 RX ADMIN — OXYCODONE AND ACETAMINOPHEN 1 TABLET: 10; 325 TABLET ORAL at 09:07

## 2023-07-03 NOTE — PLAN OF CARE
Problem: Adult Inpatient Plan of Care  Goal: Plan of Care Review  Outcome: Adequate for Care Transition  Goal: Patient-Specific Goal (Individualized)  Outcome: Adequate for Care Transition  Goal: Absence of Hospital-Acquired Illness or Injury  Outcome: Adequate for Care Transition  Goal: Optimal Comfort and Wellbeing  Outcome: Adequate for Care Transition  Goal: Readiness for Transition of Care  Outcome: Adequate for Care Transition     Problem: Fall Injury Risk  Goal: Absence of Fall and Fall-Related Injury  Outcome: Adequate for Care Transition     Problem: Infection  Goal: Absence of Infection Signs and Symptoms  Outcome: Adequate for Care Transition     Problem: Skin Injury Risk Increased  Goal: Skin Health and Integrity  Outcome: Adequate for Care Transition     Problem: Pain Acute  Goal: Acceptable Pain Control and Functional Ability  Outcome: Adequate for Care Transition     Problem: Impaired Wound Healing  Goal: Optimal Wound Healing  Outcome: Adequate for Care Transition

## 2023-07-03 NOTE — PT/OT/SLP PROGRESS
"Occupational Therapy   Treatment    Name: Valentino Manning  MRN: [id number]  Admitting Diagnosis:  Myelopathy concurrent with and due to spinal stenosis of cervical region  7 Days Post-Op    Recommendations:     Discharge Recommendations: rehabilitation facility  Discharge Equipment Recommendations:  to be determined by next level of care  Barriers to discharge:  None    Assessment:     Valentino Manning is a 72 y.o. male with a medical diagnosis of Myelopathy concurrent with and due to spinal stenosis of cervical region.  RN notified therapy of some confusion and weakness over the weekend from meds. Assessed mobility during therapy session. He presents with improvement with clarity and bouts of confusion but able to recall with prompt and redirection. B knees buckling upon standing EOB. Improvement noted at end of session. Motivated to participate in OT session. Educated pt on importance of safety and "just right challenge." Verbalized understanding.     Continues to demo great motivation and participation, as well as progress towards goals. Would highly benefit from neuro rehab to increase independence with ADLs and quality of life.     Performance deficits affecting function are weakness, gait instability, decreased lower extremity function, impaired endurance, impaired balance, decreased safety awareness, impaired self care skills, impaired functional mobility, pain, orthopedic precautions.     Rehab Prognosis:  Good; patient would benefit from acute skilled OT services to address these deficits and reach maximum level of function.       Plan:     Patient to be seen 6 x/week to address the above listed problems via self-care/home management, therapeutic activities, therapeutic exercises  Plan of Care Expires: 07/11/23  Plan of Care Reviewed with: patient, spouse    Subjective     Pain/Comfort:  Location 1: neck  Pain Addressed 1: Reposition, Distraction, Pre-medicate for activity    Objective:     Communicated with: RN " prior to session.  Patient found HOB elevated with pulse ox (continuous), cervical collar, telemetry, blood pressure cuff upon OT entry to room.    General Precautions: Standard, fall    Orthopedic Precautions:spinal precautions  Braces: [place] J collar  Respiratory Status: Room air  Vital Signs: Blood Pressure: 115/82; 63  HR: 63  Sp02: 99%     Occupational Performance:     Bed Mobility:    Patient completed Supine to Sit with mod-min A for log roll. Limited by pain      Functional Mobility/Transfers:  Patient completed Sit <> Stand Transfer with moderate assistance and of 2 persons  with  rolling walker   Patient completed Bed <> Chair Transfer using Step Transfer technique with moderate assistance with rolling walker  R knee buckling at initial STS and unable to step. Provided with seated rest break prior to t/f. No buckling noted but A for RW coordination and safety.     Activities of Daily Living:  Grooming: moderate assistance and of 2 persons while standing at sink. Flexed posture. B knees blocked to prevent buckling. Cues for upright standing, R knee noted to be supported by BS recliner. L knee buckling. Completed task in sitting 2/2 pain and fatigue.     Therapeutic Positioning    OT interventions performed during the course of today's session in an effort to prevent and/or reduce acquired pressure injuries:   Therapeutic positioning completed     Skin assessment: all bony prominences were assessed    Findings: no redness or breakdown noted    [facility] 6 Click ADL:      Patient Education:  Patient and spouse provided with verbal education regarding OT role/goals/POC, fall prevention, and safety awareness.  Understanding was verbalized, however additional teaching warranted.      Patient left up in chair with all lines intact, call button in reach, RN notified, and spouse present    GOALS:   Multidisciplinary Problems       Occupational Therapy Goals          Problem: Occupational Therapy    Goal Priority  Disciplines Outcome Interventions   Occupational Therapy Goal     OT, PT/OT Ongoing, Progressing    Description: Goals to be met by: 7/11/23 (revised on re-eval 6/27)    Patient will increase functional independence with ADLs by performing:    Self feeding, including drinking, with set up assist. -new  UE Dressing with set up Assistance.-revised  LE Dressing with Moderate Assistance and Assistive Devices as needed.-progressing/continue  Grooming tasks while EOB with set up Assistance. - Revised.  Toileting from BSC with min Assistance.-revised  Pt will perform BSC t/f with contact guard Assistance.-revised                         Time Tracking:     OT Date of Treatment: 07/03/23  OT Start Time: 1100  OT Stop Time: 1128  OT Total Time (min): 28 min    Billable Minutes:Self Care/Home Management 14  Therapeutic Activity 14    OT/CAMERON: CAMERON     Number of CAMERON visits since last OT visit: 3    7/3/2023

## 2023-07-03 NOTE — DISCHARGE SUMMARY
[facility]  Hospital Medicine Discharge Summary    Admit Date: 6/11/2023  Discharge Date and Time: 7/3/07987:17 AM  Admitting Physician:  Team  Discharging Physician: Raymundo Stephenson MD.  Primary Care Physician: Kaiser Metcalf MD  Consults: Hospital Medicine, Neurosurgery, Pulmonary/Intensive care, wound care /urology/hematology oncology    Discharge Diagnoses:  Cervical spine stenosis with myelopathy due to degenerative spine disease     Severe degenerative lumbar spinal canal stenosis at L3-L4 and L4-L5    Multilevel neural foraminal stenosis cervical and lumbar spine     - patient is status post decompressive laminectomies at C3/4, C4/5 and C5/6 with fusion.    h/o Multiple myeloma     h/o PE, on Eliquis    h/o CAD, s/p prior stent, on PLavix    Anemia of chronic disease  with macrocytosis     Asymptomatic bacteruria     Falls    Neuropathy    htn     Hospital Course:   72 y.o. male with a history that includes CAD s/p stent on Plavix, PE on Eliquis, and multiple myeloma on chemotherapy, presented to [facility] on 6/11 with numbness to bilateral hands and bilateral lower extremities with associated weakness, worsening x 6 months.  Patient reports that he was  unable to ambulate due to worsening paresthesias and generalized weakness x 3 weeks, subsequently been bed-bound.  He endorsed falling about 4 times over the past 3 weeks, denied LOC or head injury.  He reported he was in his normal state of health prior to initiating chemotherapy in December 2022.  He then had COVID-19 in February 2023 and has had a rapid decline since then.  He also endorsed urinary retention for which he was seen at an outlying ED on 6/5/23 and had a Duenas catheter placed, he was supposed to follow-up with urology on 06/12/2023.  He was admitted to hospital medicine services for further management.  MRI C-spine showed severe degenerative narrowing of the spinal canal at C4-C5 and C5-C6, moderate at C3-C4, mild  at other levels and  T2 signal changes in the cord at C4-C5 may be secondary to a compressive myelopathy.  Evaluated by Neurosurgery, planned for surgical intervention. Cardiology consulted and Plavix and Eliquis held.  Patient then underwent decompressive laminectomies at C3-4, C4-5, and C5-6 with fusion on 6/26; admitted to ICU post-operatively for monitoring.    Patient is currently status post cervical decompression and fusion surgery.  Continued CELIA drain  Plavix/eliquis  on hold till 7/3 by KAMI casas  Patient will be going to the rehab with his Duenas for 7-10 days on follow-up urology outpatient   Pt has been accepted to [facility]. Will resume plavix and eliquis on 7-3-23. Eliquis for PE while sick w covid closer to a yr ago. He voices no complains and feels fine appears still with Duenas catheter.  Tolerating PT/OT and accepted to [facility],Will discharge to [facility] .     Functional Mobility:  Bed mobility:   Supine<->sit with log rolling with max assist  Balance:  Static sitting balance with strong R lateral lean and several posterior LOB.   Improved from mod/max assist to min/mod assist for balance following transfer  Transfers:   Sit<->stand x 3 trials with max x 2 assist  Unable to achieve full erect posture on first two trial, cleared buttocks but remained forward flexed  Last trial, stood nearly erect but unable to maintain >5 seconds.   Pt was seen and examined on the day of discharge  Vitals:  VITAL SIGNS: 24 HRS MIN & MAX LAST   Temp  Min: 98.1 °F (36.7 °C)  Max: 98.9 °F (37.2 °C) 98.6 °F (37 °C)   BP  Min: 103/75  Max: 167/82 137/63   Pulse  Min: 61  Max: 79  74   Resp  Min: 12  Max: 25 16   SpO2  Min: 91 %  Max: 98 % (!) 91 %       Physical Exam:  GENERAL: Awake and in NAD  LUNGS: CTA B/L  CVS: Normal rate  GI/: Soft, NT, bowel sounds positive.  EXTREMITIES: No peripheral edema  NEURO: AAOx3 .  Upper extremity strength 4/out of 5.  Lower extremity strength 4/5.  PSYCH: Cooperative    Procedures Performed: No  admission procedures for hospital encounter.     Significant Diagnostic Studies: See Full reports for all details    Recent Labs   Lab 06/27/23 0145 07/02/23 0252   WBC 9.81 9.76   RBC 3.26* 2.86*   HGB 10.6* 9.1*   HCT 32.8* 28.2*   .6* 98.6*   MCH 32.5* 31.8*   MCHC 32.3* 32.3*   RDW 14.3 14.6    257   MPV 8.7 8.9       Recent Labs   Lab 06/27/23  0145 07/02/23 0252    134*   K 4.4 4.2   CO2 17* 25   BUN 13.8 12.1   CREATININE 0.93 0.95   CALCIUM 9.3 9.0   MG  --  1.70   ALBUMIN  --  2.6*   ALKPHOS  --  76   ALT  --  20   AST  --  11   BILITOT  --  0.9        Microbiology Results (last 7 days)       ** No results found for the last 168 hours. **                  Explained in detail to the patient about the discharge plan, medications, and follow-up visits. Pt understands and agrees with the treatment plan  Discharge Disposition: transferred to [facility]  Discharged Condition: stable  Diet- cardiac  Dietary Orders (From admission, onward)       Start     Ordered    06/27/23 0827  Diet heart healthy  Diet effective now         06/27/23 0826                   Medications Per DC med rec  Activities as tolerated   Follow-up Information       Marcos oV MD Follow up.    Specialty: Urology  Why: Call for appt after d/c from rehab for retention/BPH  Contact information:  [address] [place] [address]  Jai [place] [address]  [phone]               Montserrat Aviles MD. Schedule an appointment as soon as possible for a visit.    Specialty: Neurosurgery  Why: Call and make appointment when discharged from [facility]. Needs upright cervical spine x-rays in his [facility] C-collar with AP and lateral views done 2 days prior to appointment.  Contact information:  [address] [place] [address] Dr [place]ai [place] [address]  [phone]                           For further questions contact hospitalist office    Discharge time 33 minutes    For worsening symptoms, chest pain, shortness of breath, increased abdominal pain, high grade  fever, stroke or stroke like symptoms, immediately go to the nearest Emergency Room or call 911 as soon as possible.      Raymundo Estrada M.D, on 7/3/2023. at 8:17 AM.

## 2023-07-03 NOTE — NURSING
Nurses Note -- 4 Eyes      7/3/2023   5:58 AM      Skin assessed during: Daily Assessment      [] No Altered Skin Integrity Present    []Prevention Measures Documented      [x] Yes- Altered Skin Integrity Present or Discovered   [] LDA Added if Not in Epic (Describe Wound)   [] New Altered Skin Integrity was Present on Admit and Documented in LDA   [] Wound Image Taken    Wound Care Consulted? No    Attending Nurse:  Kathy Romero RN     Second RN/Staff Member:  LIBIA Chin

## 2023-07-03 NOTE — NURSING
Nurses Note -- 4 Eyes      7/3/2023   3:43 PM      Skin assessed during: Daily Assessment      [] No Altered Skin Integrity Present    []Prevention Measures Documented      [x] Yes- Altered Skin Integrity Present or Discovered   [] LDA Added if Not in Epic (Describe Wound)   [] New Altered Skin Integrity was Present on Admit and Documented in LDA   [] Wound Image Taken    Wound Care Consulted? No    Attending Nurse:  Cesilia Ortiz RN     Second RN/Staff Member:  Chayo Reis RN

## 2023-07-03 NOTE — PT/OT/SLP PROGRESS
Physical Therapy Treatment    Patient Name:  Valentino Manning   MRN:  [id number]    Recommendations:     Discharge Recommendations: rehabilitation facility  Discharge Equipment Recommendations: to be determined by next level of care  Barriers to discharge:  placement    Assessment:     Valentino Manning is a 72 y.o. male admitted with a medical diagnosis of Myelopathy concurrent with and due to spinal stenosis of cervical region.  He presents with the following impairments/functional limitations: weakness, impaired endurance, impaired self care skills, impaired functional mobility, gait instability, impaired balance, decreased coordination, impaired coordination .    Rehab Prognosis: Good; patient would benefit from acute skilled PT services to address these deficits and reach maximum level of function.    Recent Surgery: Procedure(s) (LRB):  FUSION, SPINE, POSTERIOR SPINAL COLUMN, CERVICAL, USING COMPUTER-ASSISTED NAVIGATION (N/A) 7 Days Post-Op    Plan:     During this hospitalization, patient to be seen 6 x/week to address the identified rehab impairments via gait training, therapeutic activities, therapeutic exercises and progress toward the following goals:    Plan of Care Expires:  07/28/23    Subjective     Chief Complaint: neck pain  Patient/Family Comments/goals: get to rehab  Pain/Comfort:         Objective:     Communicated with RN prior to session.  Patient found up in chair with   upon PT entry to room.     General Precautions: Standard, fall  Orthopedic Precautions: spinal precautions  Braces: [place] J collar  Respiratory Status: Room air  Skin Integrity: Visible skin intact      Functional Mobility:  Bed Mobility:     Sit to Supine: moderate assistance  Transfers:     Sit to Stand:  moderate assistance with rolling walker  Bed to Chair: maximal assistance with  rolling walker  using  Step Transfer  Gait: Pt amb 5ftx1, 8ftx2 , 10ftx3 with RW maxA. Decreased hip ext requiring constant cuing. Occasional Knee  bucking noted. Seated rest breaks needed between trials.     Therapeutic Activities/Exercises:  Pt performed 4 sit<>stands with RW modA with cuing for foot placement    Education:  Patient provided with verbal education regarding POC.  Understanding was verbalized, however additional teaching warranted.     Patient left HOB elevated with all lines intact, call button in reach, Wife present, and wedge placed ..    GOALS:   Multidisciplinary Problems       Physical Therapy Goals          Problem: Physical Therapy    Goal Priority Disciplines Outcome Goal Variances Interventions   Physical Therapy Goal     PT, PT/OT Ongoing, Progressing     Description: Goals to be met by: 23     Patient will increase functional independence with mobility by performin. Supine to sit with Stand-by Assistance  2. Sit to supine with Stand-by Assistance  3. Sit to stand transfer with Stand-by Assistance  4. Gait  x 50 feet with Stand-by Assistance using Rolling Walker.                          Time Tracking:     PT Received On: 23  PT Start Time: 1401     PT Stop Time: 1435  PT Total Time (min): 34 min     Billable Minutes: Gait Training 24 and Therapeutic Activity 10    Treatment Type: Treatment  PT/PTA: PTA     Number of PTA visits since last PT visit: 5     2023

## 2023-07-04 PROCEDURE — 25000003 PHARM REV CODE 250: Performed by: INTERNAL MEDICINE

## 2023-07-04 PROCEDURE — 25000003 PHARM REV CODE 250: Performed by: NURSE PRACTITIONER

## 2023-07-04 PROCEDURE — 11000001 HC ACUTE MED/SURG PRIVATE ROOM

## 2023-07-04 PROCEDURE — 97164 PT RE-EVAL EST PLAN CARE: CPT

## 2023-07-04 PROCEDURE — 97530 THERAPEUTIC ACTIVITIES: CPT

## 2023-07-04 PROCEDURE — 97535 SELF CARE MNGMENT TRAINING: CPT

## 2023-07-04 RX ORDER — CLOPIDOGREL BISULFATE 75 MG/1
75 TABLET ORAL DAILY
Status: DISCONTINUED | OUTPATIENT
Start: 2023-07-05 | End: 2023-07-05 | Stop reason: HOSPADM

## 2023-07-04 RX ADMIN — DUTASTERIDE 0.5 MG: 0.5 CAPSULE, LIQUID FILLED ORAL at 08:07

## 2023-07-04 RX ADMIN — METOPROLOL TARTRATE 25 MG: 25 TABLET, FILM COATED ORAL at 08:07

## 2023-07-04 RX ADMIN — Medication: at 08:07

## 2023-07-04 RX ADMIN — OXYCODONE AND ACETAMINOPHEN 1 TABLET: 10; 325 TABLET ORAL at 06:07

## 2023-07-04 RX ADMIN — METHOCARBAMOL 500 MG: 500 TABLET ORAL at 11:07

## 2023-07-04 RX ADMIN — Medication 1000 MG: at 08:07

## 2023-07-04 RX ADMIN — OXYCODONE AND ACETAMINOPHEN 1 TABLET: 10; 325 TABLET ORAL at 11:07

## 2023-07-04 RX ADMIN — METHOCARBAMOL 500 MG: 500 TABLET ORAL at 06:07

## 2023-07-04 RX ADMIN — GABAPENTIN 800 MG: 100 CAPSULE ORAL at 03:07

## 2023-07-04 RX ADMIN — OXYCODONE AND ACETAMINOPHEN 1 TABLET: 10; 325 TABLET ORAL at 10:07

## 2023-07-04 RX ADMIN — ATORVASTATIN CALCIUM 40 MG: 40 TABLET, FILM COATED ORAL at 08:07

## 2023-07-04 RX ADMIN — TAMSULOSIN HYDROCHLORIDE 0.4 MG: 0.4 CAPSULE ORAL at 08:07

## 2023-07-04 RX ADMIN — POLYETHYLENE GLYCOL 3350 17 G: 17 POWDER, FOR SOLUTION ORAL at 03:07

## 2023-07-04 RX ADMIN — APIXABAN 5 MG: 5 TABLET, FILM COATED ORAL at 08:07

## 2023-07-04 RX ADMIN — TETRAHYDROZOLINE HCL 1 DROP: 0.05 LIQUID OPHTHALMIC at 08:07

## 2023-07-04 RX ADMIN — DULOXETINE 30 MG: 30 CAPSULE, DELAYED RELEASE ORAL at 08:07

## 2023-07-04 RX ADMIN — TETRAHYDROZOLINE HCL 1 DROP: 0.05 LIQUID OPHTHALMIC at 03:07

## 2023-07-04 RX ADMIN — LINACLOTIDE 145 MCG: 145 CAPSULE, GELATIN COATED ORAL at 06:07

## 2023-07-04 RX ADMIN — GABAPENTIN 800 MG: 100 CAPSULE ORAL at 08:07

## 2023-07-04 RX ADMIN — ZOLPIDEM TARTRATE 10 MG: 5 TABLET ORAL at 08:07

## 2023-07-04 RX ADMIN — FERROUS SULFATE TAB 325 MG (65 MG ELEMENTAL FE) 1 EACH: 325 (65 FE) TAB at 08:07

## 2023-07-04 NOTE — NURSING
Nurses Note -- 4 Eyes      7/4/2023   3:33 PM      Skin assessed during: Daily Assessment      [] No Altered Skin Integrity Present    []Prevention Measures Documented      [x] Yes- Altered Skin Integrity Present or Discovered   [] LDA Added if Not in Epic (Describe Wound)   [] New Altered Skin Integrity was Present on Admit and Documented in LDA   [] Wound Image Taken    Wound Care Consulted? No - already consulted    Attending Nurse:  Chayo Reis RN     Second RN/Staff Member:  Angela Zaman RN

## 2023-07-04 NOTE — PLAN OF CARE
Problem: Adult Inpatient Plan of Care  Goal: Plan of Care Review  Outcome: Ongoing, Progressing  Goal: Patient-Specific Goal (Individualized)  Outcome: Ongoing, Progressing  Goal: Absence of Hospital-Acquired Illness or Injury  Outcome: Ongoing, Progressing  Goal: Optimal Comfort and Wellbeing  Outcome: Ongoing, Progressing     Problem: Fall Injury Risk  Goal: Absence of Fall and Fall-Related Injury  Outcome: Ongoing, Progressing     Problem: Infection  Goal: Absence of Infection Signs and Symptoms  Outcome: Ongoing, Progressing     Problem: Skin Injury Risk Increased  Goal: Skin Health and Integrity  Outcome: Ongoing, Progressing     Problem: Pain Acute  Goal: Acceptable Pain Control and Functional Ability  Outcome: Ongoing, Progressing     Problem: Impaired Wound Healing  Goal: Optimal Wound Healing  Outcome: Ongoing, Progressing

## 2023-07-04 NOTE — PT/OT/SLP RE-EVAL
Physical Therapy Re-Evaluation    Patient Name:  Valentino Manning   MRN:  [id number]    Recommendations:     Discharge Recommendations: rehabilitation facility   Discharge Equipment Recommendations: to be determined by next level of care   Barriers to discharge: Impaired mobility    Assessment:     Valentino Manning is a 72 y.o. male admitted with a medical diagnosis of Myelopathy concurrent with and due to spinal stenosis of cervical region.  He presents with the following impairments/functional limitations: gait instability, weakness, impaired balance, impaired endurance, decreased safety awareness, impaired functional mobility. The pt tolerated session well. Pt is progressing well toward goals, and continues to be an excellent inpt rehab candidate, and is very motivated for therapy.     Rehab Prognosis: Good; patient would benefit from acute skilled PT services to address these deficits and reach maximum level of function.    Recent Surgery: Procedure(s) (LRB):  FUSION, SPINE, POSTERIOR SPINAL COLUMN, CERVICAL, USING COMPUTER-ASSISTED NAVIGATION (N/A) 8 Days Post-Op    Plan:     During this hospitalization, patient to be seen 6 x/week to address the identified rehab impairments via gait training, therapeutic activities, therapeutic exercises and progress toward the following goals:    Plan of Care Expires:  07/28/23    Subjective     Chief Complaint: pain  Patient/Family Comments/goals: return to PLOF  Pain/Comfort:  Location - Side 1: Left  Location 1: neck  Pain Addressed 1: Reposition, Distraction    Patients cultural, spiritual, Confucianism conflicts given the current situation: no    Objective:     Communicated with NSG prior to session.  Patient found supine with pulse ox (continuous), cervical collar, telemetry, blood pressure cuff  upon PT entry to room.    General Precautions: Standard, fall  Orthopedic Precautions:spinal precautions   Braces: [place] J collar  Respiratory Status: Room air  Blood Pressure:  99/67  HR: 74 bpm  O2: 96%      Exams:  RLE ROM: WFL  RLE Strength: grossly >4-/5  LLE ROM: WFL  LLE Strength: grossly >4-/5  Skin integrity: Visible skin intact      Functional Mobility:  Bed Mobility:     Scooting: minimum assistance  Supine to Sit: moderate assistance- assist at trunk  Transfers:     Sit to Stand:  minimum assistance with rolling walker- with assist with excessive anterior lean  Gait: pt ambulates x 5 steps, then x 10 steps with seated rest break in between. Requires assist with RW manipulation, and verbal cues for cervical/ thoracic extension  Balance: Pt able to sit EOB x 5 minutes with B UE support with SBA, verbal cues for anterior lean      Patient provided with verbal education regarding POC/dc recs.  Understanding was verbalized.     Patient left up in chair with all lines intact, call button in reach, and NSG notified. RW ordered to assist NSG with tf back to bed.    GOALS:   Multidisciplinary Problems       Physical Therapy Goals          Problem: Physical Therapy    Goal Priority Disciplines Outcome Goal Variances Interventions   Physical Therapy Goal     PT, PT/OT Ongoing, Progressing     Description: Goals to be met by: 23     Patient will increase functional independence with mobility by performin. Supine to sit with Stand-by Assistance  2. Sit to supine with Stand-by Assistance  3. Sit to stand transfer with Stand-by Assistance  4. Gait  x 50 feet with Stand-by Assistance using Rolling Walker.                          History:     Past Medical History:   Diagnosis Date    Anemia     GERD (gastroesophageal reflux disease)     Heart problem     Hyperlipidemia     Hypertension     Multiple myeloma     NSTEMI (non-ST elevated myocardial infarction)        Past Surgical History:   Procedure Laterality Date    BONE MARROW ASPIRATION N/A 2022    Procedure: ASPIRATION, BONE MARROW;  Surgeon: Robbie Gardner MD;  Location: [facility];  Service: General;  Laterality: N/A;     CARDIAC SURGERY  2021    ENDOSCOPIC RELEASE OF BOTH CARPAL TUNNELS      FUSION OF POSTERIOR COLUMN OF CERVICAL SPINE USING COMPUTER AIDED NAVIGATION N/A 6/26/2023    Procedure: FUSION, SPINE, POSTERIOR SPINAL COLUMN, CERVICAL, USING COMPUTER-ASSISTED NAVIGATION;  Surgeon: Montserrat Aviles MD;  Location: [facility];  Service: Neurosurgery;  Laterality: N/A;  C3-4, C4-5, C5-6 laminectomies and fusion, o-arm, stealth  TIVA setup  NTI //  XX       Time Tracking:     PT Received On: 07/04/23  PT Start Time: 1350     PT Stop Time: 1414  PT Total Time (min): 24 min     Billable Minutes: Re-eval 15 and Therapeutic Activity 9      07/04/2023

## 2023-07-04 NOTE — PT/OT/SLP PROGRESS
Occupational Therapy   Treatment    Name: Valentino Manning  MRN: [id number]  Admitting Diagnosis:  Myelopathy concurrent with and due to spinal stenosis of cervical region  8 Days Post-Op    Recommendations:     Discharge Recommendations: rehabilitation facility (neuro)  Discharge Equipment Recommendations:  to be determined by next level of care  Barriers to discharge:   (severity of deficits)    Assessment:     Valentino Manning is a 72 y.o. male with a medical diagnosis of Myelopathy concurrent with and due to spinal stenosis of cervical region. Performance deficits affecting function are weakness, gait instability, decreased lower extremity function, impaired endurance, impaired balance, decreased safety awareness, impaired self care skills, impaired functional mobility, pain, orthopedic precautions.     Rehab Prognosis:  Good; patient would benefit from acute skilled OT services to address these deficits and reach maximum level of function.       Plan:     Patient to be seen 6 x/week to address the above listed problems via self-care/home management, therapeutic activities, therapeutic exercises  Plan of Care Expires: 07/11/23  Plan of Care Reviewed with: patient, spouse    Subjective     Pain/Comfort:  Pain Rating 1: 0/10    Objective:     Communicated with: NSG prior to session.  Patient found HOB elevated with gongora catheter, peripheral IV upon OT entry to room.    General Precautions: Standard, fall    Orthopedic Precautions:spinal precautions  Braces: [place] J collar  Respiratory Status: Room air  Vital Signs:     Prior: 121/86 99% 73 bpm  After: 99/65 97% 74 bpm     Occupational Performance:     Bed Mobility:    Patient completed Supine to Sit with moderate assistance and 2 persons     Functional Mobility/Transfers:  Patient completed Sit <> Stand Transfer with Min A; requires Min A to achieve an erect posture (anterior lateral lean)  with  rolling walker   Functional Mobility: able to take 15 steps with Min A  initially using RW, required Mod A towards end 2/2 fatigue    Activities of Daily Living:  Feeding:  set up/SBA with built up handles  Grooming: moderate assistance to comb hair 2/2 shoulder limitations  Upper Body Dressing: maximal assistance to don gown like a jacket    Therapeutic Positioning    OT interventions performed during the course of today's session in an effort to prevent and/or reduce acquired pressure injuries:   Education on Pressure Ulcer Prevention provided  Therapeutic positioning completed       Patient Education:  Patient and spouse provided with verbal education regarding OT role/goals/POC, post op precautions, fall prevention, safety awareness, Discharge/DME recommendations, and pressure ulcer prevention.  Understanding was verbalized.      Patient left up in chair with all lines intact, call button in reach, NSG notified, and wife present    GOALS:   Multidisciplinary Problems       Occupational Therapy Goals          Problem: Occupational Therapy    Goal Priority Disciplines Outcome Interventions   Occupational Therapy Goal     OT, PT/OT Ongoing, Progressing    Description: Goals to be met by: 7/11/23 (revised on re-eval 6/27)    Patient will increase functional independence with ADLs by performing:    Self feeding, including drinking, with set up assist. -new  UE Dressing with set up Assistance.-revised  LE Dressing with Moderate Assistance and Assistive Devices as needed.-progressing/continue  Grooming tasks while EOB with set up Assistance. - Revised.  Toileting from BSC with min Assistance.-revised  Pt will perform BSC t/f with contact guard Assistance.-revised                         Time Tracking:     OT Date of Treatment:    OT Start Time: 1347  OT Stop Time: 1410  OT Total Time (min): 23 min    Billable Minutes:Self Care/Home Management 2    OT/CAMERON: OT     Number of CAMERON visits since last OT visit: 4    7/4/2023

## 2023-07-04 NOTE — PROGRESS NOTES
[facility]  Hospital Medicine Progress Note        CC: hospital follow-up for cervical decompression/fusion         SUBJECTIVE   72 y.o. male with a history that includes CAD s/p stent on Plavix, PE on Eliquis, and multiple myeloma on chemotherapy, presented to [facility] on 6/11 with numbness to bilateral hands and bilateral lower extremities with associated weakness, worsening x 6 months.  Patient reports that he was now unable to ambulate due to worsening paresthesias and generalized weakness x 3 weeks, subsequently been bed-bound.  He endorsed falling about 4 times over the past 3 weeks, denied LOC or head injury.  He reported he was in his normal state of health prior to initiating chemotherapy in December 2022.  He then had COVID-19 in February 2023 and has had a rapid decline since then.  He also endorsed urinary retention for which he was seen at an outlying ED on 6/5/23 and had a Duenas catheter placed, he was supposed to follow-up with urology on 06/12/2023.  He was admitted to hospital medicine services for further management.  MRI C-spine done and showed severe degenerative narrowing of the spinal canal at C4-C5 and C5-C6, moderate at C3-C4, mild at other levels and  T2 signal changes in the cord at C4-C5 may be secondary to a compressive myelopathy.  Evaluated by Neurosurgery, planned for surgical intervention. Cardiology consulted and Plavix and Eliquis held.  Patient then underwent decompressive laminectomies at C3-4, C4-5, and C5-6 with fusion on 6/26; admitted to ICU post-operatively for monitoring.       Patient was currently status post cervical decompression and fusion surgery.  Postop day 3    6/29/23-Patient is seen and examined at bedside   Patient was complaining of right-sided cervical neck pain, he is not having a good day today   He is also having bilateral eye watering and itching  Vitals reviewed stable on room air   No new labs   Appreciate neurosurgery input  continue CELIA drain  Plavix on hold till 7/3   Patient will be going to the rehab with his Duenas for 7-10 days on follow-up urology outpatient     6/30/23 dr seay - pt has been accepted to [facility]. Will resume plavix and eliquis on 7-3-23. Eliquis for PE while sick w covid closer to a yr ago. He voices no complains and feels fine appears still with Duenas catheter.  Tolerating PT/OT and accepted to [facility]    7/1/23 dr seay- no new problems or complains.      July 3, 2023, the patient took his own Tylenol p.m. and drowsy, could not participate in physical therapy, [facility] requested for another physical therapy evaluation     July 4, 2023, patient can move 4 limbs freely but can not walk, no fever, no shortness for breath, no nausea, waiting for physical therapy evaluation        Exam  GENERAL: Awake and in NAD  LUNGS: CTA B/L  CVS: Normal rate  GI/: Soft, NT, bowel sounds positive.  EXTREMITIES: No peripheral edema  NEURO: AAOx3 .  Upper extremity strength 4/out of 5.  Lower extremity strength 4/5.  PSYCH: Cooperative        ASSESSMENT   Cervical spine stenosis with myelopathy due to degenerative spine disease   Severe degenerative lumbar spinal canal stenosis at L3-L4 and L4-L5  Multilevel neural foraminal stenosis cervical and lumbar spine   - patient is status post decompressive laminectomies at C3/4, C4/5 and C5/6 with fusion.  h/o Multiple myeloma   h/o PE, on Eliquis  h/o CAD, s/p prior stent, on PLavix  Anemia of chronic disease  with macrocytosis   Asymptomatic bacteruria      PLAN     Adjust his pain meds to Q 4 hourly p.r.n.  Visine eyedrops  Post-op, wound care per NSGy  Accepted to [facility]--hopefully can be transferred to [facility] tomorrow  Will continue current management and monitoring in the interim  Continue with PT/OT  Plavix/eliquis on hold until  7/3/23  Urology recommending keep Duenas for 7-10 days due to retention; continue with Flomax, Proscar added back as well  Discontinue norvasc 5  "mgs(7-1-23) due to systolic in 90"s            Prophylaxis: B/L SCDs       VITAL SIGNS: 24 HRS MIN & MAX LAST   Temp  Min: 97.6 °F (36.4 °C)  Max: 99.1 °F (37.3 °C) 99.1 °F (37.3 °C)   BP  Min: 101/56  Max: 134/78 130/63   Pulse  Min: 63  Max: 83  83   Resp  Min: 12  Max: 22 19   SpO2  Min: 93 %  Max: 100 % 96 %     I have reviewed the following labs:    Recent Labs   Lab 07/02/23  0252   WBC 9.76   RBC 2.86*   HGB 9.1*   HCT 28.2*   MCV 98.6*   MCH 31.8*   MCHC 32.3*   RDW 14.6      MPV 8.9         Recent Labs   Lab 07/02/23  0252   *   K 4.2   CO2 25   BUN 12.1   CREATININE 0.95   CALCIUM 9.0   MG 1.70   ALBUMIN 2.6*   ALKPHOS 76   ALT 20   AST 11   BILITOT 0.9            Microbiology Results (last 7 days)       ** No results found for the last 168 hours. **             See below for Radiology    Scheduled Med:   ascorbic acid (vitamin C)  1,000 mg Oral Daily    atorvastatin  40 mg Oral Every other day    DULoxetine  30 mg Oral Daily    dutasteride  0.5 mg Oral Daily    ferrous sulfate  1 tablet Oral Daily    gabapentin  800 mg Oral TID    linaCLOtide  145 mcg Oral Before breakfast    metoprolol tartrate  25 mg Oral BID    tamsulosin  0.4 mg Oral QHS    tetrahydrozoline 0.05%  1 drop Both Eyes TID    zinc oxide-cod liver oil   Topical (Top) BID    zolpidem  10 mg Oral QHS        Continuous Infusions:       PRN Meds:  acetaminophen, aluminum-magnesium hydroxide-simethicone, melatonin, methocarbamoL, naloxone, ondansetron, oxyCODONE-acetaminophen, polyethylene glycol, prochlorperazine, simethicone           VTE prophylaxis: scd    Patient condition:  Stable    Anticipated discharge and Disposition:   vangie      All diagnosis and differential diagnosis have been reviewed; assessment and plan has been documented; I have personally reviewed the labs and test results that are presently available; I have reviewed the patients medication list; I have reviewed the consulting providers response and " recommendations. I have reviewed or attempted to review medical records based upon their availability    All of the patient's questions have been  addressed and answered. Patient's is agreeable to the above stated plan. I will continue to monitor closely and make adjustments to medical management as needed.  _____________________________________________________________________    Nutrition Status:    Radiology:  I have personally reviewed the following imaging and agree with the radiologist.     SURG FL Surgery Fluoro Usage  See OP Notes for results.     IMPRESSION: See OP Notes for results.     This procedure was auto-finalized by: Virtual Radiologist      Jair Ch MD   07/04/2023

## 2023-07-05 VITALS
HEIGHT: 72 IN | DIASTOLIC BLOOD PRESSURE: 52 MMHG | WEIGHT: 195 LBS | HEART RATE: 65 BPM | TEMPERATURE: 98 F | RESPIRATION RATE: 17 BRPM | BODY MASS INDEX: 26.41 KG/M2 | SYSTOLIC BLOOD PRESSURE: 93 MMHG | OXYGEN SATURATION: 98 %

## 2023-07-05 PROCEDURE — 97530 THERAPEUTIC ACTIVITIES: CPT | Mod: CQ

## 2023-07-05 PROCEDURE — 25000003 PHARM REV CODE 250: Performed by: NURSE PRACTITIONER

## 2023-07-05 PROCEDURE — 97116 GAIT TRAINING THERAPY: CPT | Mod: CQ

## 2023-07-05 PROCEDURE — 97530 THERAPEUTIC ACTIVITIES: CPT | Mod: CO

## 2023-07-05 PROCEDURE — 25000003 PHARM REV CODE 250: Performed by: INTERNAL MEDICINE

## 2023-07-05 RX ORDER — CLOPIDOGREL BISULFATE 75 MG/1
75 TABLET ORAL DAILY
Qty: 30 TABLET | Refills: 11 | Status: SHIPPED | OUTPATIENT
Start: 2023-07-05 | End: 2023-08-24 | Stop reason: SDUPTHER

## 2023-07-05 RX ADMIN — METOPROLOL TARTRATE 25 MG: 25 TABLET, FILM COATED ORAL at 08:07

## 2023-07-05 RX ADMIN — TETRAHYDROZOLINE HCL 1 DROP: 0.05 LIQUID OPHTHALMIC at 08:07

## 2023-07-05 RX ADMIN — FERROUS SULFATE TAB 325 MG (65 MG ELEMENTAL FE) 1 EACH: 325 (65 FE) TAB at 08:07

## 2023-07-05 RX ADMIN — Medication: at 08:07

## 2023-07-05 RX ADMIN — OXYCODONE AND ACETAMINOPHEN 1 TABLET: 10; 325 TABLET ORAL at 09:07

## 2023-07-05 RX ADMIN — DULOXETINE 30 MG: 30 CAPSULE, DELAYED RELEASE ORAL at 08:07

## 2023-07-05 RX ADMIN — DUTASTERIDE 0.5 MG: 0.5 CAPSULE, LIQUID FILLED ORAL at 08:07

## 2023-07-05 RX ADMIN — GABAPENTIN 800 MG: 100 CAPSULE ORAL at 08:07

## 2023-07-05 RX ADMIN — LINACLOTIDE 145 MCG: 145 CAPSULE, GELATIN COATED ORAL at 06:07

## 2023-07-05 RX ADMIN — Medication 1000 MG: at 08:07

## 2023-07-05 RX ADMIN — APIXABAN 5 MG: 5 TABLET, FILM COATED ORAL at 08:07

## 2023-07-05 RX ADMIN — METHOCARBAMOL 500 MG: 500 TABLET ORAL at 09:07

## 2023-07-05 NOTE — PLAN OF CARE
Patient is accepted and will be transferred to [facility] in [place], transportation has been set up with [facility].

## 2023-07-05 NOTE — DISCHARGE SUMMARY
[facility]  Hospital Medicine Discharge Summary    Admit Date: 6/11/2023  Discharge Date and Time: 7/5/202311:59 AM  Admitting Physician:  Team  Discharging Physician: Jair Ch MD.  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  h/o Multiple myeloma   h/o PE, on Eliquis  h/o CAD, s/p prior stent, on PLavix  Anemia of chronic disease  with macrocytosis   Asymptomatic bacteruria     Hospital Course:   72 y.o. male with a history that includes CAD s/p stent on Plavix, PE on Eliquis, and multiple myeloma on chemotherapy, presented to [facility] on 6/11 with numbness to bilateral hands and bilateral lower extremities with associated weakness, worsening x 6 months.  Patient reports that he was now unable to ambulate due to worsening paresthesias and generalized weakness x 3 weeks, subsequently been bed-bound.  He endorsed falling about 4 times over the past 3 weeks, denied LOC or head injury.  He reported he was in his normal state of health prior to initiating chemotherapy in December 2022.  He then had COVID-19 in February 2023 and has had a rapid decline since then.  He also endorsed urinary retention for which he was seen at an outlying ED on 6/5/23 and had a Duenas catheter placed, he was supposed to follow-up with urology on 06/12/2023.  He was admitted to hospital medicine services for further management.  MRI C-spine done and showed severe degenerative narrowing of the spinal canal at C4-C5 and C5-C6, moderate at C3-C4, mild at other levels and  T2 signal changes in the cord at C4-C5 may be secondary to a compressive myelopathy.  Evaluated by  Neurosurgery, planned for surgical intervention. Cardiology consulted and Plavix and Eliquis held.  Patient then underwent decompressive laminectomies at C3-4, C4-5, and C5-6 with fusion on 6/26; admitted to ICU post-operatively for monitoring.       Patient was currently status post cervical decompression and fusion surgery.  Postop day 3     6/29/23-Patient is seen and examined at bedside   Patient was complaining of right-sided cervical neck pain, he is not having a good day today   He is also having bilateral eye watering and itching  Vitals reviewed stable on room air   No new labs   Appreciate neurosurgery input continue CELIA drain  Plavix on hold till 7/3   Patient will be going to the rehab with his Duenas for 7-10 days on follow-up urology outpatient      6/30/23 dr seay - pt has been accepted to [place]. Will resume plavix and eliquis on 7-3-23. Eliquis for PE while sick w covid closer to a yr ago. He voices no complains and feels fine appears still with Duenas catheter.  Tolerating PT/OT and accepted to [place]     7/1/23 dr seay- no new problems or complains.      July 3, 2023, the patient took his own Tylenol p.m. and drowsy, could not participate in physical therapy, [facility] requested for another physical therapy evaluation      July 4, 2023, patient can move 4 limbs freely but can not walk, no fever, no shortness for breath, no nausea, waiting for physical therapy evaluation     A July 5th, patient is doing well, no fever, no shortness of breath, no nausea, waiting for [facility] rehab,  The patient is accepted by , patient can continue Eliquis for pulmonary embolism, continue Plavix for cardiac stent     Pt was seen and examined on the day of discharge      Vitals:  VITAL SIGNS: 24 HRS MIN & MAX LAST   Temp  Min: 98.1 °F (36.7 °C)  Max: 98.4 °F (36.9 °C) 98.4 °F (36.9 °C)   BP  Min: 102/59  Max: 147/85 123/65   Pulse  Min: 66  Max: 99  99   Resp  Min: 16  Max: 24 20   SpO2   Min: 97 %  Max: 99 % 98 %       Physical Exam:  GENERAL: Awake and in NAD  LUNGS: CTA B/L  CVS: Normal rate  GI/: Soft, NT, bowel sounds positive.  EXTREMITIES: No peripheral edema  NEURO: AAOx3 .  Upper extremity strength 4/out of 5.  Lower extremity strength 4/5.  PSYCH: Cooperative    Procedures Performed: No admission procedures for hospital encounter.     Significant Diagnostic Studies: See Full reports for all details    Recent Labs   Lab 07/02/23  0252   WBC 9.76   RBC 2.86*   HGB 9.1*   HCT 28.2*   MCV 98.6*   MCH 31.8*   MCHC 32.3*   RDW 14.6      MPV 8.9       Recent Labs   Lab 07/02/23  0252   *   K 4.2   CO2 25   BUN 12.1   CREATININE 0.95   CALCIUM 9.0   MG 1.70   ALBUMIN 2.6*   ALKPHOS 76   ALT 20   AST 11   BILITOT 0.9        Microbiology Results (last 7 days)       ** No results found for the last 168 hours. **             SURG FL Surgery Fluoro Usage  See OP Notes for results.     IMPRESSION: See OP Notes for results.     This procedure was auto-finalized by: Virtual Radiologist         Medication List        START taking these medications      methocarbamoL 500 MG Tab  Commonly known as: ROBAXIN  Take 1 tablet (500 mg total) by mouth 3 (three) times daily as needed.     oxyCODONE-acetaminophen  mg per tablet  Commonly known as: PERCOCET  Take 1 tablet by mouth every 6 (six) hours as needed for Pain.     tamsulosin 0.4 mg Cap  Commonly known as: FLOMAX  Take 2 capsules (0.8 mg total) by mouth every evening.            CHANGE how you take these medications      * apixaban 5 mg Tab  Commonly known as: ELIQUIS  Take 1 tablet (5 mg total) by mouth 2 (two) times daily.  What changed: Another medication with the same name was added. Make sure you understand how and when to take each.     * apixaban 5 mg Tab  Commonly known as: ELIQUIS  Take 1 tablet (5 mg total) by mouth 2 (two) times daily.  What changed: You were already taking a medication with the same name, and this prescription  was added. Make sure you understand how and when to take each.     * clopidogreL 75 mg tablet  Commonly known as: PLAVIX  What changed: Another medication with the same name was added. Make sure you understand how and when to take each.     * clopidogreL 75 mg tablet  Commonly known as: PLAVIX  Take 1 tablet (75 mg total) by mouth once daily.  What changed: You were already taking a medication with the same name, and this prescription was added. Make sure you understand how and when to take each.     DULoxetine 30 MG capsule  Commonly known as: CYMBALTA  TAKE 1 CAPSULE BY MOUTH EVERY DAY  What changed: when to take this     gabapentin 400 MG capsule  Commonly known as: NEURONTIN  Take 2 capsules (800 mg total) by mouth 3 (three) times daily. for 7 days  What changed:   medication strength  how much to take     metoprolol tartrate 25 MG tablet  Commonly known as: LOPRESSOR  Take 1 tablet (25 mg total) by mouth 2 (two) times daily.  What changed: additional instructions           * This list has 4 medication(s) that are the same as other medications prescribed for you. Read the directions carefully, and ask your doctor or other care provider to review them with you.                CONTINUE taking these medications      albuterol 90 mcg/actuation inhaler  Commonly known as: PROAIR HFA  Inhale 2 puffs into the lungs every 6 (six) hours as needed for Wheezing. Rescue     atorvastatin 40 MG tablet  Commonly known as: LIPITOR     dutasteride 0.5 mg capsule  Commonly known as: AVODART     ergocalciferol 50,000 unit Cap  Commonly known as: ERGOCALCIFEROL  Take 1 capsule (50,000 Units total) by mouth every 7 days.     FERRETTS 325 mg (106 mg iron) Tab  Generic drug: ferrous fumarate     LINZESS 145 mcg Cap capsule  Generic drug: linaCLOtide     zolpidem 10 mg Tab  Commonly known as: AMBIEN            STOP taking these medications      amLODIPine 10 MG tablet  Commonly known as: NORVASC     ascorbic acid (vitamin C) 1000 MG  tablet  Commonly known as: VITAMIN C     b complex vitamins capsule     diphenoxylate-atropine 2.5-0.025 mg 2.5-0.025 mg per tablet  Commonly known as: LomotiL     doxazosin 4 MG tablet  Commonly known as: CARDURA     furosemide 20 MG tablet  Commonly known as: LASIX     glutamine 10 gram Pwpk     HYDROcodone-acetaminophen  mg per tablet  Commonly known as: NORCO     hydrocortisone 20 MG Tab  Commonly known as: CORTEF     lactulose 10 gram/15 mL solution  Commonly known as: CHRONULAC     lisinopriL 40 MG tablet  Commonly known as: PRINIVIL,ZESTRIL     ondansetron 4 MG tablet  Commonly known as: ZOFRAN     pantoprazole 40 MG tablet  Commonly known as: PROTONIX     promethazine 25 MG tablet  Commonly known as: PHENERGAN     pyridoxine (vitamin B6) 250 MG Tab  Commonly known as: B-6     SLOW RELEASE IRON 140 mg (45 mg iron) Tbsr  Generic drug: ferrous sulfate     sulfamethoxazole-trimethoprim 800-160mg 800-160 mg Tab  Commonly known as: BACTRIM DS     testosterone cypionate 200 mg/mL injection  Commonly known as: DEPOTESTOTERONE CYPIONATE               Where to Get Your Medications        These medications were sent to [facility]/pharmacy #[id number] - [place] - [address]   [address] [address]      Phone: [phone]   apixaban 5 mg Tab  clopidogreL 75 mg tablet  DULoxetine 30 MG capsule       You can get these medications from any pharmacy    Bring a paper prescription for each of these medications  gabapentin 400 MG capsule  methocarbamoL 500 MG Tab  metoprolol tartrate 25 MG tablet  oxyCODONE-acetaminophen  mg per tablet  tamsulosin 0.4 mg Cap          Explained in detail to the patient about the discharge plan, medications, and follow-up visits. Pt understands and agrees with the treatment plan  Discharge Disposition: Home-Health Care [facility]   Discharged Condition: stable  Diet-   Dietary Orders (From admission, onward)       Start     Ordered    06/27/23 0827  Diet heart healthy  Diet effective now          06/27/23 0826                   Medications Per DC med rec  Activities as tolerated   Follow-up Information       Marcos Vo MD Follow up.    Specialty: Urology  Why: Call for appt after d/c from rehab for retention/BPH  Contact information:  [address] [facility] [address]  [facility] [address]  [phone]               Montserrat Aviles MD. Schedule an appointment as soon as possible for a visit.    Specialty: Neurosurgery  Why: Call and make appointment when discharged from [facility]. Needs upright cervical spine x-rays in his [facility] C-collar with AP and lateral views done 2 days prior to appointment.  Contact information:  [address] [place] [address] Dr Jai [place] [address]  [phone]               Kaiser Metcalf MD Follow up.    Specialty: Internal Medicine  Contact information:  [address] [facility] [address]  [phone]                           For further questions contact hospitalist office    Discharge time 33 minutes    For worsening symptoms, chest pain, shortness of breath, increased abdominal pain, high grade fever, stroke or stroke like symptoms, immediately go to the nearest Emergency Room or call 911 as soon as possible.      Jair Barfield M.D on 7/5/2023. at 11:59 AM.

## 2023-07-05 NOTE — PT/OT/SLP PROGRESS
Occupational Therapy   Treatment    Name: Valentino Manning  MRN: [id number]  Admitting Diagnosis:  Myelopathy concurrent with and due to spinal stenosis of cervical region  9 Days Post-Op    Recommendations:     Discharge Recommendations: rehabilitation facility  Discharge Equipment Recommendations:  to be determined by next level of care  Barriers to discharge:  None    Assessment:     Valentino Manning is a 72 y.o. male with a medical diagnosis of Myelopathy concurrent with and due to spinal stenosis of cervical region.  He presents with pain and sleepiness as pt just received pain meds and muscle relaxer. Continues to present with great motivation and participation during OT session. Reported of dizziness with mobility. Able to subside with rest breaks. RN notified and aware.     Performance deficits affecting function are weakness, gait instability, impaired endurance, impaired balance, impaired cardiopulmonary response to activity, decreased safety awareness, impaired self care skills, impaired functional mobility, pain, orthopedic precautions.     Rehab Prognosis:  Good; patient would benefit from acute skilled OT services to address these deficits and reach maximum level of function.       Plan:     Patient to be seen 6 x/week to address the above listed problems via self-care/home management, therapeutic activities, therapeutic exercises  Plan of Care Expires: 07/11/23  Plan of Care Reviewed with: patient    Subjective     Pain/Comfort:  Location 1: neck  Pain Addressed 1: Reposition, Distraction, Pre-medicate for activity  Location - Side 2: Bilateral  Location 2: shoulder  Pain Addressed 2: Reposition, Pre-medicate for activity, Distraction    Objective:     Communicated with: RN prior to session.  Patient found HOB elevated with blood pressure cuff, pulse ox (continuous), cervical collar, telemetry, peripheral IV, SCD upon OT entry to room.    General Precautions: Standard, fall    Orthopedic Precautions:spinal  precautions  Braces: [place] J collar  Respiratory Status: Room air  Vital Signs: Blood Pressure: HOB- 97/55; 74. 90/54; 73 UIC  HR: 74     Occupational Performance:     Bed Mobility:    Patient completed Supine to Sit with moderate assistance log roll.     Functional Mobility/Transfers:  Patient completed Sit <> Stand Transfer with moderate assistance  with  rolling walker and swaying noted, but no major LOB. Reported of dizziness, returned to sitting. BP assess but not recorded. Dizziness subsided with seated rest breaks.    Patient completed Bed <> Chair Transfer using Step Transfer technique with moderate assistance and of 2 persons with rolling walker. A for RW coordination and proper step advancement.     Therapeutic Positioning    OT interventions performed during the course of today's session in an effort to prevent and/or reduce acquired pressure injuries:   Therapeutic positioning completed     Skin assessment: all bony prominences were assessed    Findings: no redness or breakdown noted    [facility] 6 Click ADL:      Patient Education:  Patient provided with verbal education regarding OT role/goals/POC, fall prevention, and safety awareness.  Understanding was verbalized, however additional teaching warranted.      Patient left  UI with BLE elevated  with all lines intact, call button in reach, and RN notified.    GOALS:   Multidisciplinary Problems       Occupational Therapy Goals          Problem: Occupational Therapy    Goal Priority Disciplines Outcome Interventions   Occupational Therapy Goal     OT, PT/OT Ongoing, Progressing    Description: Goals to be met by: 7/11/23 (revised on re-eval 6/27)    Patient will increase functional independence with ADLs by performing:    Self feeding, including drinking, with set up assist. -new  UE Dressing with set up Assistance.-revised  LE Dressing with Moderate Assistance and Assistive Devices as needed.-progressing/continue  Grooming tasks while EOB with set up  Assistance. - Revised.  Toileting from BSC with min Assistance.-revised  Pt will perform BSC t/f with contact guard Assistance.-revised                         Time Tracking:     OT Date of Treatment: 07/05/23  OT Start Time: 1029  OT Stop Time: 1054  OT Total Time (min): 25 min    Billable Minutes:Therapeutic Activity 25    OT/CAMERON: CAMERON     Number of CAMERON visits since last OT visit: 5    7/5/2023

## 2023-07-05 NOTE — PT/OT/SLP PROGRESS
Physical Therapy Treatment    Patient Name:  Valentino Manning   MRN:  [id number]    Recommendations:     Discharge Recommendations: rehabilitation facility  Discharge Equipment Recommendations: to be determined by next level of care  Barriers to discharge: medical     Assessment:     Valentino Manning is a 72 y.o. male admitted with a medical diagnosis of Myelopathy concurrent with and due to spinal stenosis of cervical region.  He presents with the following impairments/functional limitations: gait instability, weakness, impaired balance, impaired endurance, decreased safety awareness, impaired functional mobility .    Rehab Prognosis: Good; patient would benefit from acute skilled PT services to address these deficits and reach maximum level of function.    Recent Surgery: Procedure(s) (LRB):  FUSION, SPINE, POSTERIOR SPINAL COLUMN, CERVICAL, USING COMPUTER-ASSISTED NAVIGATION (N/A) 9 Days Post-Op    Plan:     During this hospitalization, patient to be seen 6 x/week to address the identified rehab impairments via gait training, therapeutic activities, therapeutic exercises and progress toward the following goals:    Plan of Care Expires:  07/28/23    Subjective     Chief Complaint:   Patient/Family Comments/goals:   Pain/Comfort:         Objective:     Communicated with NSG prior to session.  Patient found HOB elevated with   upon PT entry to room.     General Precautions: Standard, fall  Orthopedic Precautions: spinal precautions  Braces: [place] J collar  Respiratory Status: Room air  Blood Pressure:   Skin Integrity: Visible skin intact      Functional Mobility:  Bed Mobility:     Sit to Supine: maximal assistance  Transfers:     Sit to Stand:  moderate assistance with rolling walker  Gait: Pt ambulated ~5ft from the chair to the bed. Decreased step length ila. Slow but steady. RW.       Patient left HOB elevated with all lines intact and call button in reach..    GOALS:   Multidisciplinary Problems       Physical  Therapy Goals          Problem: Physical Therapy    Goal Priority Disciplines Outcome Goal Variances Interventions   Physical Therapy Goal     PT, PT/OT Ongoing, Progressing     Description: Goals to be met by: 23     Patient will increase functional independence with mobility by performin. Supine to sit with Stand-by Assistance  2. Sit to supine with Stand-by Assistance  3. Sit to stand transfer with Stand-by Assistance  4. Gait  x 50 feet with Stand-by Assistance using Rolling Walker.                          Time Tracking:     PT Received On:    PT Start Time: 1400     PT Stop Time: 1424  PT Total Time (min): 24 min     Billable Minutes: Gait Training 12 and Therapeutic Activity 12    Treatment Type: Treatment        Number of PTA visits since last PT visit: 2023

## 2023-07-05 NOTE — PROGRESS NOTES
[facility]  Hospital Medicine Progress Note        CC: hospital follow-up for cervical decompression/fusion         SUBJECTIVE   72 y.o. male with a history that includes CAD s/p stent on Plavix, PE on Eliquis, and multiple myeloma on chemotherapy, presented to [facility] on 6/11 with numbness to bilateral hands and bilateral lower extremities with associated weakness, worsening x 6 months.  Patient reports that he was now unable to ambulate due to worsening paresthesias and generalized weakness x 3 weeks, subsequently been bed-bound.  He endorsed falling about 4 times over the past 3 weeks, denied LOC or head injury.  He reported he was in his normal state of health prior to initiating chemotherapy in December 2022.  He then had COVID-19 in February 2023 and has had a rapid decline since then.  He also endorsed urinary retention for which he was seen at an outlying ED on 6/5/23 and had a Duenas catheter placed, he was supposed to follow-up with urology on 06/12/2023.  He was admitted to hospital medicine services for further management.  MRI C-spine done and showed severe degenerative narrowing of the spinal canal at C4-C5 and C5-C6, moderate at C3-C4, mild at other levels and  T2 signal changes in the cord at C4-C5 may be secondary to a compressive myelopathy.  Evaluated by Neurosurgery, planned for surgical intervention. Cardiology consulted and Plavix and Eliquis held.  Patient then underwent decompressive laminectomies at C3-4, C4-5, and C5-6 with fusion on 6/26; admitted to ICU post-operatively for monitoring.       Patient was currently status post cervical decompression and fusion surgery.  Postop day 3    6/29/23-Patient is seen and examined at bedside   Patient was complaining of right-sided cervical neck pain, he is not having a good day today   He is also having bilateral eye watering and itching  Vitals reviewed stable on room air   No new labs   Appreciate neurosurgery input  continue CELIA drain  Plavix on hold till 7/3   Patient will be going to the rehab with his Duenas for 7-10 days on follow-up urology outpatient     6/30/23 dr seay - pt has been accepted to [facility]. Will resume plavix and eliquis on 7-3-23. Eliquis for PE while sick w covid closer to a yr ago. He voices no complains and feels fine appears still with Duenas catheter.  Tolerating PT/OT and accepted to [facility]    7/1/23 dr seay- no new problems or complains.      July 3, 2023, the patient took his own Tylenol p.m. and drowsy, could not participate in physical therapy, [facility] requested for another physical therapy evaluation     July 4, 2023, patient can move 4 limbs freely but can not walk, no fever, no shortness for breath, no nausea, waiting for physical therapy evaluation    A July 5th, patient is doing well, no fever, no shortness of breath, no nausea, waiting for [facility] rehab approval    Exam  GENERAL: Awake and in NAD  LUNGS: CTA B/L  CVS: Normal rate  GI/: Soft, NT, bowel sounds positive.  EXTREMITIES: No peripheral edema  NEURO: AAOx3 .  Upper extremity strength 4/out of 5.  Lower extremity strength 4/5.  PSYCH: Cooperative        ASSESSMENT   Cervical spine stenosis with myelopathy due to degenerative spine disease   Severe degenerative lumbar spinal canal stenosis at L3-L4 and L4-L5  Multilevel neural foraminal stenosis cervical and lumbar spine   - patient is status post decompressive laminectomies at C3/4, C4/5 and C5/6 with fusion.  h/o Multiple myeloma   h/o PE, on Eliquis  h/o CAD, s/p prior stent, on PLavix  Anemia of chronic disease  with macrocytosis   Asymptomatic bacteruria      PLAN     Adjust his pain meds to Q 4 hourly p.r.n.  Visine eyedrops  Post-op, wound care per NSGy  hopefully can be transferred to [facility]  today or tomorrow  Will continue current management and monitoring in the interim  Continue with PT/OT  Continue Eliquis and Plavix  Urology recommending keep Duenas  "for 7-10 days due to retention; continue with Flomax, Proscar added back as well  Discontinue norvasc 5 mgs(7-1-23) due to systolic in 90"s            Prophylaxis: B/L SCDs       VITAL SIGNS: 24 HRS MIN & MAX LAST   Temp  Min: 98.1 °F (36.7 °C)  Max: 98.4 °F (36.9 °C) 98.4 °F (36.9 °C)   BP  Min: 102/59  Max: 147/85 123/65   Pulse  Min: 66  Max: 99  99   Resp  Min: 16  Max: 24 16   SpO2  Min: 96 %  Max: 99 % 98 %     I have reviewed the following labs:    Recent Labs   Lab 07/02/23  0252   WBC 9.76   RBC 2.86*   HGB 9.1*   HCT 28.2*   MCV 98.6*   MCH 31.8*   MCHC 32.3*   RDW 14.6      MPV 8.9         Recent Labs   Lab 07/02/23  0252   *   K 4.2   CO2 25   BUN 12.1   CREATININE 0.95   CALCIUM 9.0   MG 1.70   ALBUMIN 2.6*   ALKPHOS 76   ALT 20   AST 11   BILITOT 0.9            Microbiology Results (last 7 days)       ** No results found for the last 168 hours. **             See below for Radiology    Scheduled Med:   apixaban  5 mg Oral BID    ascorbic acid (vitamin C)  1,000 mg Oral Daily    atorvastatin  40 mg Oral Every other day    clopidogreL  75 mg Oral Daily    DULoxetine  30 mg Oral Daily    dutasteride  0.5 mg Oral Daily    ferrous sulfate  1 tablet Oral Daily    gabapentin  800 mg Oral TID    linaCLOtide  145 mcg Oral Before breakfast    metoprolol tartrate  25 mg Oral BID    tamsulosin  0.4 mg Oral QHS    tetrahydrozoline 0.05%  1 drop Both Eyes TID    zinc oxide-cod liver oil   Topical (Top) BID    zolpidem  10 mg Oral QHS        Continuous Infusions:       PRN Meds:  acetaminophen, aluminum-magnesium hydroxide-simethicone, melatonin, methocarbamoL, naloxone, ondansetron, oxyCODONE-acetaminophen, polyethylene glycol, prochlorperazine, simethicone           VTE prophylaxis: scd    Patient condition:  Stable    Anticipated discharge and Disposition:   touro      All diagnosis and differential diagnosis have been reviewed; assessment and plan has been documented; I have personally reviewed the " labs and test results that are presently available; I have reviewed the patients medication list; I have reviewed the consulting providers response and recommendations. I have reviewed or attempted to review medical records based upon their availability    All of the patient's questions have been  addressed and answered. Patient's is agreeable to the above stated plan. I will continue to monitor closely and make adjustments to medical management as needed.  _____________________________________________________________________    Nutrition Status:    Radiology:  I have personally reviewed the following imaging and agree with the radiologist.     SURG FL Surgery Fluoro Usage  See OP Notes for results.     IMPRESSION: See OP Notes for results.     This procedure was auto-finalized by: Virtual Radiologist      Jair Ch MD   07/05/2023

## 2023-07-08 DIAGNOSIS — C90.00 MULTIPLE MYELOMA NOT HAVING ACHIEVED REMISSION: ICD-10-CM

## 2023-07-10 RX ORDER — ACYCLOVIR 400 MG/1
TABLET ORAL
Qty: 180 TABLET | Refills: 1 | Status: SHIPPED | OUTPATIENT
Start: 2023-07-10

## 2023-07-28 DIAGNOSIS — M48.02 MYELOPATHY CONCURRENT WITH AND DUE TO SPINAL STENOSIS OF CERVICAL REGION: Primary | ICD-10-CM

## 2023-07-28 DIAGNOSIS — G99.2 MYELOPATHY CONCURRENT WITH AND DUE TO SPINAL STENOSIS OF CERVICAL REGION: Primary | ICD-10-CM

## 2023-08-14 ENCOUNTER — OFFICE VISIT (OUTPATIENT)
Dept: HEMATOLOGY/ONCOLOGY | Facility: CLINIC | Age: 73
End: 2023-08-14
Payer: MEDICARE

## 2023-08-14 ENCOUNTER — TELEPHONE (OUTPATIENT)
Dept: HEMATOLOGY/ONCOLOGY | Facility: CLINIC | Age: 73
End: 2023-08-14

## 2023-08-14 ENCOUNTER — TELEPHONE (OUTPATIENT)
Dept: NEUROSURGERY | Facility: CLINIC | Age: 73
End: 2023-08-14
Payer: MEDICARE

## 2023-08-14 DIAGNOSIS — C90.00 MULTIPLE MYELOMA, REMISSION STATUS UNSPECIFIED: ICD-10-CM

## 2023-08-14 DIAGNOSIS — I25.10 CORONARY ARTERY DISEASE, UNSPECIFIED VESSEL OR LESION TYPE, UNSPECIFIED WHETHER ANGINA PRESENT, UNSPECIFIED WHETHER NATIVE OR TRANSPLANTED HEART: ICD-10-CM

## 2023-08-14 DIAGNOSIS — G99.2 MYELOPATHY CONCURRENT WITH AND DUE TO SPINAL STENOSIS OF CERVICAL REGION: Primary | ICD-10-CM

## 2023-08-14 DIAGNOSIS — M48.02 MYELOPATHY CONCURRENT WITH AND DUE TO SPINAL STENOSIS OF CERVICAL REGION: Primary | ICD-10-CM

## 2023-08-14 PROCEDURE — 99442 PR PHYSICIAN TELEPHONE EVALUATION 11-20 MIN: CPT | Mod: 95,,, | Performed by: INTERNAL MEDICINE

## 2023-08-14 PROCEDURE — 99442 PR PHYSICIAN TELEPHONE EVALUATION 11-20 MIN: ICD-10-PCS | Mod: 95,,, | Performed by: INTERNAL MEDICINE

## 2023-08-14 NOTE — PROGRESS NOTES
The patient location is: home  The chief complaint leading to consultation is: MM/SCT eval     Visit type: audio only    Face to Face time with patient: 15  25 minutes of total time spent on the encounter, which includes face to face time and non-face to face time preparing to see the patient (eg, review of tests), Obtaining and/or reviewing separately obtained history, Documenting clinical information in the electronic or other health record, Independently interpreting results (not separately reported) and communicating results to the patient/family/caregiver, or Care coordination (not separately reported).         Each patient to whom he or she provides medical services by telemedicine is:  (1) informed of the relationship between the physician and patient and the respective role of any other health care provider with respect to management of the patient; and (2) notified that he or she may decline to receive medical services by telemedicine and may withdraw from such care at any time.    Notes:     SECTION OF HEMATOLOGY AND BONE MARROW TRANSPLANT  Return  Patient Visit   08/14/2023  Referred by:  Dr. Harris  Referred for: MM/SCT eval     CHIEF COMPLAINT: No chief complaint on file.      HISTORY OF PRESENT ILLNESS:   72 y.o.male; pmh as below; non oncologic history notable for CAD sp multiple stents followed regularly by cardiology locally; asymptomatic; oncologic history notable for MM diagnosed referral for anemia evaluation;  was initially evaluated by Dr. Harris at [facility]; he met diagnostic criteria for active myeloma; 3.4 grams of IgG kappa MM at diagnosis;  marrow at diagnosis with 70% clonal plasma cells; fish/CG normal cw with standard risk disease; whole body pet and 24 hr urine studies unremarkable. Other than fatigue was largely asymptomatic.  Runs haunted house/trail during halloween. No family history of malignancy.   Non smoker. Non drinker.  and wife present during our interview today.   Regular PCP fu prior to diagnosis.  Active with ADL's.  PS1.  KPFS  80.    Of note sought 2nd opinion at [facility] who recommended induction with VRd and consideration for SCT pending adequate response and pre transplant evaluation.      He initiated VRd therapy locally for a cycle then gabriella was addded for gabriella VRD .  Completed  6 of therapy with at least VGRP      At our last appt given pt declined SCT recommend transition to q 4 weeks gabriella/q 2 week velcade maintenance which he was receiving until June 2023;   Subsequently required admission for cervical spine surgery for severe stenosis unrelated to myeloma.  Had surgery on 6/26/23 and did well but was in rehab and just recently home with improving functional status.  All myeloma directed therapy has been held since surgery.   Last myeloma labs were may 2023 and with no progression.   Presents to discuss next steps.      PAST MEDICAL HISTORY:   Past Medical History:   Diagnosis Date    Anemia     GERD (gastroesophageal reflux disease)     Heart problem     Hyperlipidemia     Hypertension     Multiple myeloma     NSTEMI (non-ST elevated myocardial infarction)        PAST SURGICAL HISTORY:   Past Surgical History:   Procedure Laterality Date    BONE MARROW ASPIRATION N/A 9/1/2022    Procedure: ASPIRATION, BONE MARROW;  Surgeon: Robbie Gardner MD;  Location: [facility];  Service: General;  Laterality: N/A;    CARDIAC SURGERY  2021    ENDOSCOPIC RELEASE OF BOTH CARPAL TUNNELS      FUSION OF POSTERIOR COLUMN OF CERVICAL SPINE USING COMPUTER AIDED NAVIGATION N/A 6/26/2023    Procedure: FUSION, SPINE, POSTERIOR SPINAL COLUMN, CERVICAL, USING COMPUTER-ASSISTED NAVIGATION;  Surgeon: Montserrat Aviles MD;  Location: [facility];  Service: Neurosurgery;  Laterality: N/A;  C3-4, C4-5, C5-6 laminectomies and fusion, o-arm, stealth  TIVA setup  NTI //  XX       PAST SOCIAL HISTORY:   reports that he has quit smoking. His smoking use included cigarettes. He has quit using smokeless tobacco.  He reports that he does not currently use alcohol. He reports that he does not use drugs.    FAMILY HISTORY:  Family History   Problem Relation Age of Onset    Hypertension Mother     Diabetes Mother     Anemia Mother     Heart disease Mother     Heart disease Father        CURRENT MEDICATIONS:   Current Outpatient Medications   Medication Sig    acyclovir (ZOVIRAX) 400 MG tablet TAKE 1 TABLET BY MOUTH TWICE A DAY    albuterol (PROAIR HFA) 90 mcg/actuation inhaler Inhale 2 puffs into the lungs every 6 (six) hours as needed for Wheezing. Rescue    apixaban (ELIQUIS) 5 mg Tab Take 1 tablet (5 mg total) by mouth 2 (two) times daily.    apixaban (ELIQUIS) 5 mg Tab Take 1 tablet (5 mg total) by mouth 2 (two) times daily.    atorvastatin (LIPITOR) 40 MG tablet TAKE 1 TABLET ORALLY ONCE DAILY ON MON/TUE/THUR/FRI AND 1/2 ON WED. NONE ON SAT/SUN    clopidogreL (PLAVIX) 75 mg tablet Take 75 mg by mouth once daily.    clopidogreL (PLAVIX) 75 mg tablet Take 1 tablet (75 mg total) by mouth once daily.    DULoxetine (CYMBALTA) 30 MG capsule TAKE 1 CAPSULE BY MOUTH EVERY DAY    dutasteride (AVODART) 0.5 mg capsule Take 0.5 mg by mouth once daily.    ergocalciferol (ERGOCALCIFEROL) 50,000 unit Cap Take 1 capsule (50,000 Units total) by mouth every 7 days.    FERRETTS 325 mg (106 mg iron) Tab Take 1 tablet by mouth 2 (two) times daily.    gabapentin (NEURONTIN) 400 MG capsule Take 2 capsules (800 mg total) by mouth 3 (three) times daily. for 7 days    LINZESS 145 mcg Cap capsule Take 145 mcg by mouth.    metoprolol tartrate (LOPRESSOR) 25 MG tablet Take 1 tablet (25 mg total) by mouth 2 (two) times daily.    tamsulosin (FLOMAX) 0.4 mg Cap Take 2 capsules (0.8 mg total) by mouth every evening.    zolpidem (AMBIEN) 10 mg Tab Take 10 mg by mouth nightly as needed.     No current facility-administered medications for this visit.     ALLERGIES:   Review of patient's allergies indicates:   Allergen Reactions    Penicillins   "            REVIEW OF SYSTEMS:   See HPI     PHYSICAL EXAM:   physical exam deferred due to telemed   Appears well and below stated age on camera     ECOG Performance Status: (foot note - ECOG PS provided by Eastern Cooperative Oncology Group) 1 - Symptomatic but completely ambulatory    Karnofsky Performance Score:  80%- Normal Activity with Effort: Some Symptoms of Disease  DATA:   Lab Results   Component Value Date    WBC 9.76 07/02/2023    HGB 9.1 (L) 07/02/2023    HCT 28.2 (L) 07/02/2023    MCV 98.6 (H) 07/02/2023     07/02/2023       No results found for: "GRAN"  CMP  Sodium   Date Value Ref Range Status   04/24/2023 139 136 - 145 mmol/L Final     Sodium Level   Date Value Ref Range Status   07/02/2023 134 (L) 136 - 145 mmol/L Final     Potassium   Date Value Ref Range Status   04/24/2023 3.7 3.5 - 5.1 mmol/L Final     Potassium Level   Date Value Ref Range Status   07/02/2023 4.2 3.5 - 5.1 mmol/L Final     Chloride   Date Value Ref Range Status   04/24/2023 108 100 - 109 mmol/L Final     Carbon Dioxide   Date Value Ref Range Status   07/02/2023 25 23 - 31 mmol/L Final   04/24/2023 25 22 - 33 mmol/L Final     Blood Urea Nitrogen   Date Value Ref Range Status   07/02/2023 12.1 8.4 - 25.7 mg/dL Final   04/24/2023 14 7 - 18 mg/dL Final     Creatinine   Date Value Ref Range Status   07/02/2023 0.95 0.73 - 1.18 mg/dL Final   04/24/2023 0.96 0.57 - 1.25 mg/dL Final     Calcium   Date Value Ref Range Status   04/24/2023 8.5 (L) 8.8 - 10.6 mg/dL Final     Calcium Level Total   Date Value Ref Range Status   07/02/2023 9.0 8.8 - 10.0 mg/dL Final     Albumin Level   Date Value Ref Range Status   07/02/2023 2.6 (L) 3.4 - 4.8 g/dL Final     Bilirubin Total   Date Value Ref Range Status   07/02/2023 0.9 <=1.5 mg/dL Final     Alkaline Phosphatase   Date Value Ref Range Status   07/02/2023 76 40 - 150 unit/L Final     Aspartate Aminotransferase   Date Value Ref Range Status   07/02/2023 11 5 - 34 unit/L Final "     Alanine Aminotransferase   Date Value Ref Range Status   07/02/2023 20 0 - 55 unit/L Final     Anion Gap   Date Value Ref Range Status   04/24/2023 6 (L) 8 - 16 mmol/L Final     eGFR   Date Value Ref Range Status   07/02/2023 >60 mls/min/1.73/m2 Final     IgM Level   Date Value Ref Range Status   05/09/2023 37.0 22.0 - 240.0 mg/dL Final           Encounter Diagnoses   Name Primary?    Myelopathy concurrent with and due to spinal stenosis of cervical region Yes    Multiple myeloma, remission status unspecified     Coronary artery disease, unspecified vessel or lesion type, unspecified whether angina present, unspecified whether native or transplanted heart            1)Multiple myeloma  -IgG kappa MM ; diagnosed sept 2022 after referral to heme onc  for anemia; fish/CG normal cw with standard risk disease  -meets treatment criteria based on anemia and 70% clonal plasma cells in marrow; no hyperCa, renal dysfunction, or bone disease on WB PET  -has initiated Vrd therapy and completed 1 cycle with excellent biochemical response and good tolerance; at our jan 2023 appt I recommended addition of vita to vRD which was done  -he has completed 5 cycles of Vita-VRd therapy achieving VGRP to therapy; pt adamantly declined SCT on multiple occasions due to logistical and safety concerns  - required admission for NSTEMI so recommended stopping revlimid and transitioning  to q 4 weeks vita/q 2 week velcade maintenance which he was receiving until June 2023  -all myeloma had to be held since June 2023 due to urgent admit  and surgery for severe spinal cervical stenosis (unrelated to myeloma) which has been surgically corrected and pt now home from rehab doing outpt PT  -he has no overt clinical signs of progression; last biochemical studies for his myeloma may 2023 cw continued VGPR  -recommend he have his myeloma labs rechecked in next 2 weeks to ensure no progression  -recommend binh team discuss with neurosurgery when  safe to resume myeloma therapy (gabriella/velcade as above) and resume asap  -supportive meds:  acyclovir while on gabriella/velcade, VTE ppx as below    2)CV/CAD  -will maintain all current CV medications  -per cardiology      3) PE  -recommend stays on eliquis indefinitely while on imid or with active MM  -he is on asa, plavix, (per cardiologist for CAD); I recommended he call his cardiologist asap to notify him of eliquis and possible stop plavix and or aspirin    Follow Up:  -with Dr. Longo as above  -virtual MD appt with me in  4  months      Juan Brito MD  Hematology/Oncology/Bone Marrow Transplant

## 2023-08-14 NOTE — TELEPHONE ENCOUNTER
Patient's wife, Lexis, called this morning requesting a f/u for patient being dc'd from rehab. He had sx with Stefan and went to [facility] she stated and is now being dc'd. Please call her back at [phone] to schedule.

## 2023-08-14 NOTE — TELEPHONE ENCOUNTER
"----- Message from Chandni Wood sent at 8/14/2023  9:04 AM CDT -----  Regarding: Pt advice  Contact: Pt     Pt requesting call back in regards to trouble he's having with getting into protal for virtual visit.   Please call and adv @       Confirmed contact below:   Contact Name: Valentino Manning  Phone Number: [phone]               Additional Notes:  "Thank you for all that you do for our patients"                                           "

## 2023-08-15 NOTE — TELEPHONE ENCOUNTER
Patient is scheduled to see Dr. Aviles on August 24th @ 4:15 PM. He is also scheduled for her xray's on August 22nd @ 2:15 pm. I confirmed everything with the patients wife.

## 2023-08-16 ENCOUNTER — TELEPHONE (OUTPATIENT)
Dept: HEMATOLOGY/ONCOLOGY | Facility: CLINIC | Age: 73
End: 2023-08-16
Payer: MEDICARE

## 2023-08-16 DIAGNOSIS — C90.00 MULTIPLE MYELOMA NOT HAVING ACHIEVED REMISSION: Primary | ICD-10-CM

## 2023-08-16 NOTE — TELEPHONE ENCOUNTER
MM labs have been entered. I'll send message to scheduling so his f/u here can be scheduled. Is it okay to have him scheduled 3-4 weeks?    He had an appt scheduled  with Dr. Santana on 8/31/23, but canceled.    He has f/u with Dr. Aviles on 8/24/23.

## 2023-08-16 NOTE — TELEPHONE ENCOUNTER
Patient was just seen by Dr Andujar and he  -recommend he have his myeloma labs rechecked in next 2 weeks to ensure no progression  -recommend binh team discuss with neurosurgery when safe to resume myeloma therapy (gabriella/velcade as above) and resume asap  -supportive meds:  acyclovir while on gabriella/velcade, VTE ppx as below    Lats order the MM labs including 24 hrs urine  Please find out when he will be seen by Neuro so he can be cleared to restart Tx.     Thanks

## 2023-08-22 ENCOUNTER — HOSPITAL ENCOUNTER (OUTPATIENT)
Dept: RADIOLOGY | Facility: HOSPITAL | Age: 73
Discharge: HOME | End: 2023-08-22
Attending: NEUROLOGICAL SURGERY
Payer: MEDICARE

## 2023-08-22 ENCOUNTER — TELEPHONE (OUTPATIENT)
Dept: NEUROSURGERY | Facility: CLINIC | Age: 73
End: 2023-08-22
Payer: MEDICARE

## 2023-08-22 DIAGNOSIS — G99.2 MYELOPATHY CONCURRENT WITH AND DUE TO SPINAL STENOSIS OF CERVICAL REGION: ICD-10-CM

## 2023-08-22 DIAGNOSIS — M48.02 MYELOPATHY CONCURRENT WITH AND DUE TO SPINAL STENOSIS OF CERVICAL REGION: ICD-10-CM

## 2023-08-22 PROCEDURE — 72040 X-RAY EXAM NECK SPINE 2-3 VW: CPT | Mod: TC

## 2023-08-22 NOTE — TELEPHONE ENCOUNTER
Received a phone call from Mickey at [facility] to let us know that pt is there to do XRs. Dr. Aviles said he needed to do those with c-collar on, but pt did not comply. She just wanted to let us know that they did not disregard the directions, he did.

## 2023-08-24 ENCOUNTER — OFFICE VISIT (OUTPATIENT)
Dept: NEUROSURGERY | Facility: CLINIC | Age: 73
End: 2023-08-24
Payer: MEDICARE

## 2023-08-24 VITALS
BODY MASS INDEX: 25.73 KG/M2 | DIASTOLIC BLOOD PRESSURE: 44 MMHG | RESPIRATION RATE: 16 BRPM | WEIGHT: 190 LBS | HEART RATE: 106 BPM | HEIGHT: 72 IN | SYSTOLIC BLOOD PRESSURE: 74 MMHG

## 2023-08-24 DIAGNOSIS — G99.2 MYELOPATHY CONCURRENT WITH AND DUE TO SPINAL STENOSIS OF CERVICAL REGION: Primary | ICD-10-CM

## 2023-08-24 DIAGNOSIS — M48.02 MYELOPATHY CONCURRENT WITH AND DUE TO SPINAL STENOSIS OF CERVICAL REGION: Primary | ICD-10-CM

## 2023-08-24 PROCEDURE — 99024 POSTOP FOLLOW-UP VISIT: CPT | Mod: POP,,, | Performed by: NEUROLOGICAL SURGERY

## 2023-08-24 PROCEDURE — 99024 PR POST-OP FOLLOW-UP VISIT: ICD-10-PCS | Mod: POP,,, | Performed by: NEUROLOGICAL SURGERY

## 2023-08-24 NOTE — PATIENT INSTRUCTIONS
Mr. Manning is a 73 yo male who has a past history significant for hypertension, coronary artery disease, PE, multiple myeloma on chemotherapy, chronic anemia, and GERD.  He is taking Plavix and Eliquis.  The patient presented to the ER on 06/11/2023 with progressive numbness in all his extremities, urinary retention, and progressive difficulty walking 3 weeks prior to admission.  He had significant weakness in his bilateral upper extremities with myelopathy on exam.  Mr. Manning is 2 months postoperative s/p a C3-4 and a C5-6 laminectomy and posterior fusion on 06/26/2023.  He presents for a follow up evaluation in the neurosurgery clinic with significant improvement in the strength of his bilateral lower extremities and is now ambulatory with a walker.  Mr. Manning has chronic weakness in his bilateral deltoids secondary to shoulder issues and 4/5 strength in his left biceps and triceps otherwise has 5/5 strength.  The patient has chronic numbness in his bilateral hands.    I reviewed pertinent imaging studies with the patient and his wife.  Cervical spine x-rays completed on 08/22/2023 were reviewed.  There is normal alignment with straightening of the cervical spine.  Postoperative changes s/p C3 through C6 posterior fusion with hardware in place.         PLAN:    Encounter Diagnosis   Name Primary?    Myelopathy concurrent with and due to spinal stenosis of cervical region Yes     Orders Placed This Encounter   Procedures    X-Ray Cervical Spine 5 View W Flex Extxt        1.  Mr. Manning and his wife were reassured that he is making a remarkable neurological recovery after being nearly paralyzed in all his extremities due to severe stenosis in his cervical spine.      2.  He is cleared to discontinue wearing his Aspen C-collar.  His restrictions include no lifting greater than 15 lbs for at least 3 months postoperatively, which corresponds to an end target date of 09/26/2023.     3.  The patient is to continue with his  scheduled outpatient physical therapy and occupational therapy sessions.      4.  Mr. Manning is cleared from a neurosurgery standpoint to complete a cardiac catheterization or other cardiac testing procedures as ordered by Dr. Whitehead.    5.  He is scheduled to follow up with orthopedic surgeon Dr. Dan at [facility] to evaluate his bilateral shoulders on 09/19/2023.     6.  Arrangements are being made for Mr. Manning to follow up in my neurosurgery clinic in 4 weeks.  He specifically requests being followed up in my clinic.  Two days prior to this visit, he needs to complete updated cervical spine x-rays with AP, lateral, and flexion-extension views.       This note will be sent to the patient's referring provider No ref. provider found and primary care provider Kaiser Metcalf MD.

## 2023-08-24 NOTE — PROGRESS NOTES
[facility]   Neurosurgery      Valentino Manning  MRN: [id number], CSN: [id number]      : 1950   Age: 72 y.o. male  Payor: MEDICARE / Plan: MEDICARE PART A & B / Product Type: Government /       Ref:  No referring provider defined for this encounter.    PCP: Kaiser Metcalf MD    Visit Date: 2023     Patient Active Problem List   Diagnosis    Anemia    Elevated serum globulin level    Kidney disease    Multiple myeloma    Hypertension    SOB (shortness of breath)    Patient on antineoplastic chemotherapy regimen    Peripheral neuropathy    Upper extremity weakness    Myelopathy concurrent with and due to spinal stenosis of cervical region       SUBJECTIVE:      CC:   Chief Complaint   Patient presents with    postop f/u s/p cervical laminectomy and fusion       HPI:   Mr. Manning is a 71 yo male who has a past history significant for hypertension, coronary artery disease, PE, multiple myeloma on chemotherapy, chronic anemia, and GERD.  He is taking Plavix and Eliquis.  The patient presented to the ER on 2023 with progressive numbness in all his extremities, urinary retention, and progressive difficulty walking 3 weeks prior to admission.  He had significant weakness in his bilateral upper extremities with myelopathy on exam.  Mr. Manning is 2 months postoperative s/p a C3-4 and a C5-6 laminectomy and posterior fusion on 2023.  He presents for a follow up evaluation in the neurosurgery clinic with significant improvement in the strength of his bilateral lower extremities and is now ambulatory with a walker.    Mr. Manning was recently discharged from the inpatient rehab facility at [facility] in [place].  The plan of care was for him to continue wearing a hard C-collar at all times and complete updated cervical spine x-rays, which were done recently.    The patient visits the neurosurgery clinic in a wheelchair.  He is accompanied by his wife.  Mr. Manning has been living at home and  has been participating in outpatient physical therapy as well as occupational therapy.  He has been compliant in wearing an Aspen C-collar at all times.  The patient has neck pain primarily when standing, which makes his head feel heavy and his neck pain radiate into his bilateral shoulders.  This pain has significantly improved after surgery.  Mr. Manning has regained significant strength in all his extremities after surgery.  He continues to have numbness in his bilateral hands with improved sensation in his feet.  The patient has been catheterizing his bladder on an as-needed basis.  He has intermittent bowel incontinence.  Mr. Manning has been ambulating with a walker on an as-needed basis and mobilizes with a wheelchair for longer distances.      He has been taking hydrocodone on an as-needed basis as well as Neurontin 800 mg twice a day.    The patient states that he is a candidate for a cardiac catheterization on 09/13/2023 per his cardiologist Dr. Whitehead and requests neurosurgical clearance to have his hard C-collar removed for this procedure.      Patient Active Problem List    Diagnosis Date Noted    Myelopathy concurrent with and due to spinal stenosis of cervical region 07/03/2023    Upper extremity weakness 06/13/2023    Peripheral neuropathy 04/20/2023    Patient on antineoplastic chemotherapy regimen 03/30/2023    SOB (shortness of breath) 02/18/2023    Hypertension     Multiple myeloma 08/29/2022    Anemia 08/10/2022    Elevated serum globulin level 08/10/2022    Kidney disease 08/10/2022     Past Medical History:   Diagnosis Date    Anemia     GERD (gastroesophageal reflux disease)     Heart problem     Hyperlipidemia     Hypertension     Multiple myeloma     NSTEMI (non-ST elevated myocardial infarction)      Past Surgical History:   Procedure Laterality Date    BONE MARROW ASPIRATION N/A 9/1/2022    Procedure: ASPIRATION, BONE MARROW;  Surgeon: Robbie Gardner MD;  Location: [facility];  Service:  General;  Laterality: N/A;    CARDIAC SURGERY  2021    ENDOSCOPIC RELEASE OF BOTH CARPAL TUNNELS      FUSION OF POSTERIOR COLUMN OF CERVICAL SPINE USING COMPUTER AIDED NAVIGATION N/A 6/26/2023    Procedure: FUSION, SPINE, POSTERIOR SPINAL COLUMN, CERVICAL, USING COMPUTER-ASSISTED NAVIGATION;  Surgeon: Montserrat Aviles MD;  Location: [facility];  Service: Neurosurgery;  Laterality: N/A;  C3-4, C4-5, C5-6 laminectomies and fusion, o-arm, stealth  TIVA setup  NTI //  XX       Current Outpatient Medications:     apixaban (ELIQUIS) 5 mg Tab, Take 1 tablet (5 mg total) by mouth 2 (two) times daily., Disp: 60 tablet, Rfl: 6    atorvastatin (LIPITOR) 40 MG tablet, TAKE 1 TABLET ORALLY ONCE DAILY ON MON/TUE/THUR/FRI AND 1/2 ON WED. NONE ON SAT/SUN, Disp: , Rfl:     clopidogreL (PLAVIX) 75 mg tablet, Take 75 mg by mouth once daily., Disp: , Rfl:     DULoxetine (CYMBALTA) 30 MG capsule, TAKE 1 CAPSULE BY MOUTH EVERY DAY, Disp: 30 capsule, Rfl: 1    dutasteride (AVODART) 0.5 mg capsule, Take 0.5 mg by mouth once daily., Disp: , Rfl:     FERRETTS 325 mg (106 mg iron) Tab, Take 1 tablet by mouth 2 (two) times daily., Disp: , Rfl:     gabapentin (NEURONTIN) 400 MG capsule, Take 2 capsules (800 mg total) by mouth 3 (three) times daily. for 7 days (Patient taking differently: Take 800 mg by mouth 2 (two) times daily.), Disp: 42 capsule, Rfl: 0    LINZESS 145 mcg Cap capsule, Take 145 mcg by mouth., Disp: , Rfl:     metoprolol tartrate (LOPRESSOR) 25 MG tablet, Take 1 tablet (25 mg total) by mouth 2 (two) times daily., Disp: 60 tablet, Rfl: 11    tamsulosin (FLOMAX) 0.4 mg Cap, Take 2 capsules (0.8 mg total) by mouth every evening., Disp: 60 capsule, Rfl: 11    zolpidem (AMBIEN) 10 mg Tab, Take 10 mg by mouth nightly as needed., Disp: , Rfl:     acyclovir (ZOVIRAX) 400 MG tablet, TAKE 1 TABLET BY MOUTH TWICE A DAY, Disp: 180 tablet, Rfl: 1    Review of patient's allergies indicates:   Allergen Reactions    Penicillins        Social  History     Tobacco Use    Smoking status: Former     Types: Cigarettes    Smokeless tobacco: Former   Substance Use Topics    Alcohol use: Not Currently     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   HENT:  Negative for congestion and sore throat.    Eyes:  Negative for blurred vision and double vision.   Respiratory:  Negative for cough and shortness of breath.    Cardiovascular:  Negative for chest pain and palpitations.   Gastrointestinal:  Negative for constipation, diarrhea, nausea and vomiting.   Musculoskeletal:  Positive for neck pain, Negative for back pain  Neurological:  Positive for sensory change and focal weakness, Negative for headaches.   Endo/Heme/Allergies:  Does not bruise/bleed easily.   Psychiatric/Behavioral:  Negative for depression and anxiety.      OBJECTIVE:     EXAMINATION:  BP (!) 74/44 (BP Location: Left arm, Patient Position: Sitting)   Pulse 106   Resp 16   Ht 6' (1.829 m)   Wt 86.2 kg (190 lb)   BMI 25.77 kg/m²   Body Habitus: Mildly Obese    Physical Exam:  Constitutional: The patient is well-developed and cooperative, sitting comfortably in a wheelchair    Mental Status:   Oriented to person, place, and time  Normal speech    Motor:  Muscle bulk: normal in all extremities  Tone: normal in all extremities    Upper extremities:  Deltoid: right 4/5; left 4/5, with inability to raise arms > 20 degrees due to chronic shoulder issues  Biceps: right 5/5; left 4-/5  Triceps: right 5/5; left 4/5  Wrist extensors: right 5/5; left 5/5  Wrist flexors: right 5/5; left 5/5  : right 5/5; left 5/5  Interosseous muscles: right 5/5; left 5/5    Lower extremities:  Hip flexors: right 5/5; left 5/5  Knee extensors: right 5/5; left 5/5  Knee flexors: right  5/5; left 5/5  Foot dorsiflexors: right 5/5; left 5/5  Foot plantar flexors: right 5/5; left 5/5    Sensation:  Normal to light touch x 4 extremities, except for decreased sensation to light touch in b hands    Reflexes:  Swanns sign: right positive; left negative  Babinski: right upgoing; left upgoing  Clonus: right 2 beats; left 2 beats    Musculoskeletal:    Gait: not evaluated    Cervical: No pain with palpation along midline, moderate pain in bilateral trapezius muscles with palpation  Upper back: No pain with palpation    Wound- posterior neck scar is well healed with no erythema, fluctuance, or drainage      DIAGNOSTICS REVIEW OF IMAGING, LAB & OTHER STUDIES:  I have personally reviewed and evaluated the following reports as well as radiographic studies:    Cervical spine x-rays, 08/22/2023- there is normal alignment with straightening of the cervical spine.  Postoperative changes s/p C3 through C6 posterior fusion with hardware in place.       ASSESSMENT:  Mr. Manning is a 71 yo male who has a past history significant for hypertension, coronary artery disease, PE, multiple myeloma on chemotherapy, chronic anemia, and GERD.  He is taking Plavix and Eliquis.  The patient presented to the ER on 06/11/2023 with progressive numbness in all his extremities, urinary retention, and progressive difficulty walking 3 weeks prior to admission.  He had significant weakness in his bilateral upper extremities with myelopathy on exam.  Mr. Manning is 2 months postoperative s/p a C3-4 and a C5-6 laminectomy and posterior fusion on 06/26/2023.  He presents for a follow up evaluation in the neurosurgery clinic with significant improvement in the strength of his bilateral lower extremities and is now ambulatory with a walker.  Mr. Manning has chronic weakness in his bilateral deltoids secondary to shoulder issues and 4/5 strength in his left biceps and triceps otherwise has 5/5 strength.  The patient has chronic numbness in his  bilateral hands.    I reviewed pertinent imaging studies with the patient and his wife.  Cervical spine x-rays completed on 08/22/2023 were reviewed.  There is normal alignment with straightening of the cervical spine.  Postoperative changes s/p C3 through C6 posterior fusion with hardware in place.         PLAN:    Encounter Diagnosis   Name Primary?    Myelopathy concurrent with and due to spinal stenosis of cervical region Yes     Orders Placed This Encounter   Procedures    X-Ray Cervical Spine 5 View W Flex Extxt        1.  Mr. Manning and his wife were reassured that he is making a remarkable neurological recovery after being nearly paralyzed in all his extremities due to severe stenosis in his cervical spine.      2.  He is cleared to discontinue wearing his Aspen C-collar.  His restrictions include no lifting greater than 15 lbs for at least 3 months postoperatively, which corresponds to an end target date of 09/26/2023.     3.  The patient is to continue with his scheduled outpatient physical therapy and occupational therapy sessions.      4.  Mr. Manning is cleared from a neurosurgery standpoint to complete a cardiac catheterization or other cardiac testing procedures as ordered by Dr. Whitehead.    5.  He is scheduled to follow up with orthopedic surgeon Dr. Dan at [facility] to evaluate his bilateral shoulders on 09/19/2023.     6.  Arrangements are being made for Mr. Manning to follow up in my neurosurgery clinic in 4 weeks.  He specifically requests being followed up in my clinic.  Two days prior to this visit, he needs to complete updated cervical spine x-rays with AP, lateral, and flexion-extension views.       This note will be sent to the patient's referring provider No ref. provider found and primary care provider Kaiser Metcalf MD.           Montserrat Aviles MD  Neurosurgeon

## 2023-08-30 ENCOUNTER — LAB VISIT (OUTPATIENT)
Dept: LAB | Facility: HOSPITAL | Age: 73
End: 2023-08-30
Attending: INTERNAL MEDICINE
Payer: MEDICARE

## 2023-08-30 DIAGNOSIS — C90.00 MULTIPLE MYELOMA NOT HAVING ACHIEVED REMISSION: ICD-10-CM

## 2023-08-30 LAB
ALBUMIN SERPL-MCNC: 3.8 G/DL (ref 3.4–4.8)
ALBUMIN/GLOB SERPL: 1.3 RATIO (ref 1.1–2)
ALP SERPL-CCNC: 122 UNIT/L (ref 40–150)
ALT SERPL-CCNC: 29 UNIT/L (ref 0–55)
AST SERPL-CCNC: 13 UNIT/L (ref 5–34)
BASOPHILS # BLD AUTO: 0.01 X10(3)/MCL
BASOPHILS NFR BLD AUTO: 0.2 %
BILIRUB SERPL-MCNC: 0.8 MG/DL
BUN SERPL-MCNC: 19.6 MG/DL (ref 8.4–25.7)
CALCIUM SERPL-MCNC: 9.9 MG/DL (ref 8.8–10)
CHLORIDE SERPL-SCNC: 110 MMOL/L (ref 98–107)
CO2 SERPL-SCNC: 23 MMOL/L (ref 23–31)
CREAT SERPL-MCNC: 0.98 MG/DL (ref 0.73–1.18)
EOSINOPHIL # BLD AUTO: 0.22 X10(3)/MCL (ref 0–0.9)
EOSINOPHIL NFR BLD AUTO: 4 %
ERYTHROCYTE [DISTWIDTH] IN BLOOD BY AUTOMATED COUNT: 14.2 % (ref 11.5–17)
GFR SERPLBLD CREATININE-BSD FMLA CKD-EPI: >60 MLS/MIN/1.73/M2
GLOBULIN SER-MCNC: 2.9 GM/DL (ref 2.4–3.5)
GLUCOSE SERPL-MCNC: 109 MG/DL (ref 82–115)
HCT VFR BLD AUTO: 36.5 % (ref 42–52)
HGB BLD-MCNC: 11.4 G/DL (ref 14–18)
IGA SERPL-MCNC: 26 MG/DL (ref 101–645)
IGG SERPL-MCNC: 1307 MG/DL (ref 540–1822)
IGM SERPL-MCNC: 36 MG/DL (ref 22–240)
IMM GRANULOCYTES # BLD AUTO: 0.01 X10(3)/MCL (ref 0–0.04)
IMM GRANULOCYTES NFR BLD AUTO: 0.2 %
LYMPHOCYTES # BLD AUTO: 1.97 X10(3)/MCL (ref 0.6–4.6)
LYMPHOCYTES NFR BLD AUTO: 35.8 %
MCH RBC QN AUTO: 29.6 PG (ref 27–31)
MCHC RBC AUTO-ENTMCNC: 31.2 G/DL (ref 33–36)
MCV RBC AUTO: 94.8 FL (ref 80–94)
MONOCYTES # BLD AUTO: 0.41 X10(3)/MCL (ref 0.1–1.3)
MONOCYTES NFR BLD AUTO: 7.4 %
NEUTROPHILS # BLD AUTO: 2.89 X10(3)/MCL (ref 2.1–9.2)
NEUTROPHILS NFR BLD AUTO: 52.4 %
PLATELET # BLD AUTO: 242 X10(3)/MCL (ref 130–400)
PMV BLD AUTO: 7.9 FL (ref 7.4–10.4)
POTASSIUM SERPL-SCNC: 4.3 MMOL/L (ref 3.5–5.1)
PROT SERPL-MCNC: 6.7 GM/DL (ref 5.8–7.6)
RBC # BLD AUTO: 3.85 X10(6)/MCL (ref 4.7–6.1)
SODIUM SERPL-SCNC: 142 MMOL/L (ref 136–145)
WBC # SPEC AUTO: 5.51 X10(3)/MCL (ref 4.5–11.5)

## 2023-08-30 PROCEDURE — 85025 COMPLETE CBC W/AUTO DIFF WBC: CPT

## 2023-08-30 PROCEDURE — 86334 IMMUNOFIX E-PHORESIS SERUM: CPT

## 2023-08-30 PROCEDURE — 83521 IG LIGHT CHAINS FREE EACH: CPT

## 2023-08-30 PROCEDURE — 80053 COMPREHEN METABOLIC PANEL: CPT

## 2023-08-30 PROCEDURE — 82784 ASSAY IGA/IGD/IGG/IGM EACH: CPT

## 2023-08-30 PROCEDURE — 36415 COLL VENOUS BLD VENIPUNCTURE: CPT

## 2023-08-30 PROCEDURE — 84165 PROTEIN E-PHORESIS SERUM: CPT

## 2023-08-31 LAB
ALBUMIN % SPEP (OHS): 50.71
ALBUMIN SERPL-MCNC: 3.4 G/DL (ref 3.4–4.8)
ALBUMIN/GLOB SERPL: 1.1 RATIO (ref 1.1–2)
ALPHA 1 GLOB (OHS): 0.25 GM/DL
ALPHA 1 GLOB% (OHS): 3.78
ALPHA 2 GLOB % (OHS): 15.02
ALPHA 2 GLOB (OHS): 0.99 GM/DL
BETA GLOB (OHS): 0.92 GM/DL
BETA GLOB% (OHS): 13.88
GAMMA GLOBULIN % (OHS): 16.62
GAMMA GLOBULIN (OHS): 1.1 GM/DL
GLOBULIN SER-MCNC: 3.2 GM/DL (ref 2.4–3.5)
KAPPA LC FREE SER-MCNC: 2.72 MG/DL (ref 0.33–1.94)
KAPPA LC FREE/LAMBDA FREE SER: 5.13 {RATIO} (ref 0.26–1.65)
LAMBDA LC FREE SERPL-MCNC: 0.53 MG/DL (ref 0.57–2.63)
M SPIKE % (OHS): 7.33
M SPIKE (OHS): 0.48 GM/DL
PATH REV: NORMAL
PROT SERPL-MCNC: 6.6 GM/DL (ref 5.8–7.6)

## 2023-09-07 ENCOUNTER — OFFICE VISIT (OUTPATIENT)
Dept: HEMATOLOGY/ONCOLOGY | Facility: CLINIC | Age: 73
End: 2023-09-07
Payer: MEDICARE

## 2023-09-07 VITALS
SYSTOLIC BLOOD PRESSURE: 103 MMHG | BODY MASS INDEX: 24.58 KG/M2 | OXYGEN SATURATION: 98 % | WEIGHT: 181.5 LBS | DIASTOLIC BLOOD PRESSURE: 75 MMHG | TEMPERATURE: 98 F | HEIGHT: 72 IN | HEART RATE: 98 BPM | RESPIRATION RATE: 20 BRPM

## 2023-09-07 DIAGNOSIS — C90.00 MULTIPLE MYELOMA NOT HAVING ACHIEVED REMISSION: Primary | ICD-10-CM

## 2023-09-07 DIAGNOSIS — G99.2 MYELOPATHY CONCURRENT WITH AND DUE TO SPINAL STENOSIS OF CERVICAL REGION: ICD-10-CM

## 2023-09-07 DIAGNOSIS — G62.89 OTHER POLYNEUROPATHY: ICD-10-CM

## 2023-09-07 DIAGNOSIS — M48.02 MYELOPATHY CONCURRENT WITH AND DUE TO SPINAL STENOSIS OF CERVICAL REGION: ICD-10-CM

## 2023-09-07 PROCEDURE — 99213 OFFICE O/P EST LOW 20 MIN: CPT | Mod: PBBFAC | Performed by: INTERNAL MEDICINE

## 2023-09-07 PROCEDURE — 99215 OFFICE O/P EST HI 40 MIN: CPT | Mod: S$PBB,,, | Performed by: INTERNAL MEDICINE

## 2023-09-07 PROCEDURE — 99999 PR PBB SHADOW E&M-EST. PATIENT-LVL III: CPT | Mod: PBBFAC,,, | Performed by: INTERNAL MEDICINE

## 2023-09-07 PROCEDURE — 99999 PR PBB SHADOW E&M-EST. PATIENT-LVL III: ICD-10-PCS | Mod: PBBFAC,,, | Performed by: INTERNAL MEDICINE

## 2023-09-07 PROCEDURE — 99215 PR OFFICE/OUTPT VISIT, EST, LEVL V, 40-54 MIN: ICD-10-PCS | Mod: S$PBB,,, | Performed by: INTERNAL MEDICINE

## 2023-09-07 NOTE — PROGRESS NOTES
HEMATOLOGY/ONCOLOGY OFFICE CLINIC VISIT    Visit Information:    Initial Evaluation: 8/10/2022  Referring Provider:   Other providers:  Code status: Not addressed    Diagnosis:  Multiple Myeloma--IgG, Kappa  Macrocytic anemia    Present treatment:   VRD-Started on 12/9/22  Daratumumab added on 1/27/23    Treatment/Oncology history:  VRD-Started on 12/9/22  Daratumumab added on 1/27/23      Plan of care:  systemic treatment    Imaging:   US abdomen 8/24/2022: Spleen: 10.8 cm. Mild hepatomegaly with suspected mild hepatic steatosis. Cholelithiasis.  PET CT 9/28/2022: 1. Right scapular small focal mild hypermetabolism without lytic or sclerotic abnormality is not convinced to be related to multiple myeloma. 2. No definite skeletal lytic abnormality with hypermetabolism to reflect multiple myeloma on this exam. 3. Bilateral small pleural effusions.   4. Gallstones. 5.  Noninflamed diverticulosis coli. 6.  Prostatomegaly.    Pathology:  BONE MARROW BIOPSY 9/1/2022: PLASMA CELL MYELOMA, KAPPA RESTRICTED. NORMOCELLULAR MARROW (30%) WITH 70% INVOLVEMENT BY PLASMA CELL MYELOMA. BACKGROUND TRILINEAGE HEMATOPOIESIS IS DECREASED.      CLINICAL HISTORY:       Patient: Valentino Manning is a 72 y.o. male with multiple medical problems kindly referred for persistent anemia.  Reviewing electronic medical record patient with anemia since 02/10/2016.  From 2016 to January of 2017 anemia was mild.  Anemia slowly worsening back in 2018 hemoglobin was 8.0.  At that same time kidney function was worsening as well. Labs with HGB of 9.4, .9, platelets 231 K, WBC 5.0.  Chemistries with globulin of 5.3.  BUN/creatinine 27.7/1.41.    Further workup revealed the diagnosis of multiple myeloma.  Patient was started on treatment with Velcade, Revlimid and dexamethasone on 12/09/2022 and daratumumab was added on 01/27/2023 with significant improvement of his disease.  He was seen in [place] for possible bone marrow transplant but he was  "not interested in bone marrow transplant at the time.  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  Gabapentin was not helping.     Treatment was delayed 1st due to COVID-19 in February of 2023, he has few treatments in between but it was delayed again due to NSTEMI . Revlimid was stopped due to his heart disease and plan was to continue daratumumab and Velcade lower dose for neuropathy.      Patient continued to decline with worsening of his weakness.  Home health called to let us know that patient was weak and unable to get out of bed. I called the patient Thursday 6/8/2023: "I personally spoke with the patient and  instructed to go to the ER as his weakness and neuropathy is worsening and he reports that he went to the ER in [place] because he could not urinate and a gongora cath was placed and send home.   He reports that is very difficult for him to get out of bed and he can not seeing himself going to his room to the car. I told him he will need to call an ambulance to transport him to the hospital and to come to [facility]. Patient with debilitating neuropathy of unclear etiology--not typical for MM and/or treatment for MM. I told him that may need MRI's of spine and/or brain and neurologist evaluation. Patient verbalized understanding and he will planned to go to the ER tomorrow not today."      Patient eventually went to the ER.   MRI T spine 6/12/2023:  1. No acute abnormality of the thoracic spine. 2. Mild multilevel canal and neural foraminal narrowing. 3. No cord signal abnormality.  MRI L spine: 1. Severe degenerative spinal canal stenosis at L3-L4 and L4-L5, moderate at L2-L3, mild at L5-S1. 2. Multilevel neural foraminal stenoses as described.  MRI C spine: 1. Severe degenerative spinal canal stenosis at L3-L4 and L4-L5, moderate at L2-L3, mild at L5-S1. 2. Multilevel neural foraminal stenoses as described.  MRIs of the spine.  No evidence of plasmacytoma, mass or cord " "compression due to Malignancy.     Patient with cervical myelopathy and stenosis.  He was seen by Neurosurgery and on 6/26/2023 underwent Decompressive laminectomies at C3-4, C4-5, and C5-6. He was then transfer to [facility] in [place] for Rehab.     Interval History:    Last recommendations form Dr Brito Velcade be dosed now every 2 weeks at decreased dose of 1mg/m2 given PN; once completed the 6 cycles ; would transition at least 1 year velcade monotherapy  maintenance q 2 weeks at 1mg/m2. He recommended no further revlimid in light of known CAD and NSTEMI and "transition therapy to daratumumab (Complete 8 total doses of EOW dosing) then transition gabriella subq to q 4 weeks dosing and velcade 1mg/m2 q 2 weeks maintenance for minimum of 2 years. Close attention to PN if worsens recommend decrease velcade to 0.7 mg/m2. -ppx acyc, antiplt agents per cardiologist at [facility]"     Patient presents today for follow up of his MM and discussion of resumption of treatment,  he is with his wife. He is doing much better. He is still on PT. He is able to walk with a rollator. He is able to make a fists with his hands. He was able to climb few stairs and walk with no assistance for few steps.  He reports that in terms of neuropathy is more or less the same but now he is able to do more.  Physical therapy has helped him tremendously and he continues to slowly improve.  He is urinating better but still needs to cath himself.   He is very happy with these results.  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    I review with him his multiple myeloma workup.  In spike increased some but otherwise everything else is relatively stable.  Will do whole-body PET-CT to evaluate for bone lesions, etc and bone marrow bx prior to initiation of treatment.    Past Medical History:   Diagnosis Date    Anemia     GERD " (gastroesophageal reflux disease)     Heart problem     Hyperlipidemia     Hypertension     Multiple myeloma     NSTEMI (non-ST elevated myocardial infarction)       Past Surgical History:   Procedure Laterality Date    BONE MARROW ASPIRATION N/A 9/1/2022    Procedure: ASPIRATION, BONE MARROW;  Surgeon: Robbie Gardner MD;  Location: [facility];  Service: General;  Laterality: N/A;    CARDIAC SURGERY  2021    ENDOSCOPIC RELEASE OF BOTH CARPAL TUNNELS      FUSION OF POSTERIOR COLUMN OF CERVICAL SPINE USING COMPUTER AIDED NAVIGATION N/A 6/26/2023    Procedure: FUSION, SPINE, POSTERIOR SPINAL COLUMN, CERVICAL, USING COMPUTER-ASSISTED NAVIGATION;  Surgeon: Montserrat Aviles MD;  Location: [facility];  Service: Neurosurgery;  Laterality: N/A;  C3-4, C4-5, C5-6 laminectomies and fusion, o-arm, stealth  TIVA setup  NTI //  XX     Family History   Problem Relation Age of Onset    Hypertension Mother     Diabetes Mother     Anemia Mother     Heart disease Mother     Heart disease Father      Social Connections: Not on file       Review of patient's allergies indicates:   Allergen Reactions    Penicillins       Current Outpatient Medications on File Prior to Visit   Medication Sig Dispense Refill    acyclovir (ZOVIRAX) 400 MG tablet TAKE 1 TABLET BY MOUTH TWICE A  tablet 1    apixaban (ELIQUIS) 5 mg Tab Take 1 tablet (5 mg total) by mouth 2 (two) times daily. 60 tablet 6    atorvastatin (LIPITOR) 40 MG tablet TAKE 1 TABLET ORALLY ONCE DAILY ON MON/TUE/THUR/FRI AND 1/2 ON WED. NONE ON SAT/SUN      clopidogreL (PLAVIX) 75 mg tablet Take 75 mg by mouth once daily.      DULoxetine (CYMBALTA) 30 MG capsule TAKE 1 CAPSULE BY MOUTH EVERY DAY 30 capsule 1    dutasteride (AVODART) 0.5 mg capsule Take 0.5 mg by mouth once daily.      FERRETTS 325 mg (106 mg iron) Tab Take 1 tablet by mouth 2 (two) times daily.      gabapentin (NEURONTIN) 400 MG capsule Take 2 capsules (800 mg total) by mouth 3 (three) times daily. for 7 days (Patient  taking differently: Take 800 mg by mouth 2 (two) times daily.) 42 capsule 0    LINZESS 145 mcg Cap capsule Take 145 mcg by mouth.      metoprolol tartrate (LOPRESSOR) 25 MG tablet Take 1 tablet (25 mg total) by mouth 2 (two) times daily. 60 tablet 11    tamsulosin (FLOMAX) 0.4 mg Cap Take 2 capsules (0.8 mg total) by mouth every evening. 60 capsule 11    zolpidem (AMBIEN) 10 mg Tab Take 10 mg by mouth nightly as needed.       No current facility-administered medications on file prior to visit.      Review of Systems   Constitutional:  Positive for fatigue. Negative for activity change, appetite change, chills, diaphoresis, fever and unexpected weight change.   HENT:  Negative for nasal congestion, mouth sores, nosebleeds, postnasal drip, sinus pressure/congestion, sore throat and trouble swallowing.    Eyes:  Negative for visual disturbance.   Respiratory:  Positive for shortness of breath (on excertion). Negative for cough and wheezing.    Cardiovascular:  Negative for chest pain and palpitations.   Gastrointestinal:  Negative for abdominal distention, abdominal pain, blood in stool, change in bowel habit, constipation, diarrhea, nausea, vomiting and change in bowel habit.   Endocrine: Negative.    Genitourinary:  Negative for bladder incontinence, decreased urine volume, difficulty urinating, dysuria, frequency, hematuria, scrotal swelling, testicular pain and urgency.   Musculoskeletal:  Negative for arthralgias, back pain, gait problem, joint swelling, leg pain, myalgias and neck pain.   Integumentary:  Negative for rash.   Neurological:  Positive for tremors and weakness. Negative for dizziness, seizures, syncope, speech difficulty, light-headedness, numbness, headaches and memory loss.        Neuropathy   Hematological:  Does not bruise/bleed easily.   Psychiatric/Behavioral:  Negative for agitation, confusion, hallucinations, sleep disturbance and suicidal ideas. The patient is not nervous/anxious.                Vitals:    09/07/23 0928   BP: 103/75   BP Location: Right arm   Patient Position: Sitting   BP Method: Medium (Automatic)   Pulse: 98   Resp: 20   Temp: 98.1 °F (36.7 °C)   TempSrc: Oral   SpO2: 98%   Weight: 82.3 kg (181 lb 8 oz)   Height: 6' (1.829 m)       Wt Readings from Last 6 Encounters:   09/07/23 82.3 kg (181 lb 8 oz)   08/24/23 86.2 kg (190 lb)   06/14/23 88.5 kg (195 lb)   05/19/23 90.4 kg (199 lb 3.2 oz)   05/12/23 89.7 kg (197 lb 12.8 oz)   04/21/23 90.7 kg (199 lb 15.3 oz)     Body mass index is 24.62 kg/m².  Body surface area is 2.04 meters squared.       Physical Exam  HENT:      Head: Normocephalic and atraumatic.   Eyes:      Extraocular Movements: Extraocular movements intact.   Pulmonary:      Effort: Pulmonary effort is normal.      Breath sounds: Normal breath sounds. No wheezing.   Abdominal:      General: Bowel sounds are normal.      Palpations: Abdomen is soft.   Musculoskeletal:      Cervical back: Neck supple.   Neurological:      Mental Status: He is alert and oriented to person, place, and time.      Cranial Nerves: Cranial nerves 2-12 are intact.      Sensory: No sensory deficit.      Motor: Weakness present.      Gait: Gait abnormal.   Psychiatric:         Mood and Affect: Mood normal.         Behavior: Behavior normal. Behavior is cooperative.         Judgment: Judgment normal.       ECOG SCORE    2 - Capable of all selfcare but unable to carry out any work activities, active > 50% of hours         Laboratory:  CBC with Differential:  Lab Results   Component Value Date    WBC 5.51 08/30/2023    RBC 3.85 (L) 08/30/2023    HGB 11.4 (L) 08/30/2023    HCT 36.5 (L) 08/30/2023    MCV 94.8 (H) 08/30/2023    MCH 29.6 08/30/2023    MCHC 31.2 (L) 08/30/2023    RDW 14.2 08/30/2023     08/30/2023    MPV 7.9 08/30/2023      Component Ref Range & Units 8 d ago  (8/30/23) 3 mo ago  (5/12/23) 4 mo ago  (5/9/23) 4 mo ago  (4/14/23) 5 mo ago  (3/29/23) 7 mo ago  (1/27/23) 8 mo  ago  (12/29/22)   Rulo Free Light Chain 0.3300 - 1.94 mg/dL 2.72 High   2.36 High   2.13 High   2.86 High   4.70 High   6.07 High   14.7 High     Lambda Free Light Chain 0.5700 - 2.63 mg/dL 0.5300 Low   0.4000 Low   0.5400 Low   0.7500  1.42  1.14  0.9200    Kappa/Lambda FLC Ratio 0.2600 - 1.65 5.13 High   5.90 High  CM  3.94 High  CM  3.81 High  CM  3.31 High  CM  5.32 High  CM  16.0 High  CM         Latest Reference Range & Units 03/29/23 10:47 04/14/23 11:18 05/09/23 10:39 08/30/23 10:49   IgG 540.00 - 1,822.00 mg/dL 975.00 857.00 700.00 1,307.00   IgM 22.0 - 240.0 mg/dL 28.0 42.0 37.0 36.0   IgA 101.0 - 645.0 mg/dL 62.0 (L) 55.0 (L) 36.0 (L) 26.0 (L)         SPEP: Serum protein electrophoresis shows a distinct monoclonal peak (0.48 g/dL) in the gamma zone, consistent with a monoclonal gammopathy.   BETTY: A prominent band is present in the IgG soto with a corresponding prominent band in the kappa soto.     CMP:  Sodium   Date Value Ref Range Status   04/24/2023 139 136 - 145 mmol/L Final     Sodium Level   Date Value Ref Range Status   08/30/2023 142 136 - 145 mmol/L Final     Potassium   Date Value Ref Range Status   04/24/2023 3.7 3.5 - 5.1 mmol/L Final     Potassium Level   Date Value Ref Range Status   08/30/2023 4.3 3.5 - 5.1 mmol/L Final     Chloride   Date Value Ref Range Status   04/24/2023 108 100 - 109 mmol/L Final     Carbon Dioxide   Date Value Ref Range Status   08/30/2023 23 23 - 31 mmol/L Final   04/24/2023 25 22 - 33 mmol/L Final     Blood Urea Nitrogen   Date Value Ref Range Status   08/30/2023 19.6 8.4 - 25.7 mg/dL Final   04/24/2023 14 7 - 18 mg/dL Final     Creatinine   Date Value Ref Range Status   08/30/2023 0.98 0.73 - 1.18 mg/dL Final   04/24/2023 0.96 0.57 - 1.25 mg/dL Final     Calcium   Date Value Ref Range Status   04/24/2023 8.5 (L) 8.8 - 10.6 mg/dL Final     Calcium Level Total   Date Value Ref Range Status   08/30/2023 9.9 8.8 - 10.0 mg/dL Final     Albumin Level   Date Value Ref  Range Status   08/30/2023 3.8 3.4 - 4.8 g/dL Final   08/30/2023 3.4 3.4 - 4.8 g/dL Final     Bilirubin Total   Date Value Ref Range Status   08/30/2023 0.8 <=1.5 mg/dL Final     Alkaline Phosphatase   Date Value Ref Range Status   08/30/2023 122 40 - 150 unit/L Final     Aspartate Aminotransferase   Date Value Ref Range Status   08/30/2023 13 5 - 34 unit/L Final     Alanine Aminotransferase   Date Value Ref Range Status   08/30/2023 29 0 - 55 unit/L Final     Anion Gap   Date Value Ref Range Status   04/24/2023 6 (L) 8 - 16 mmol/L Final     eGFR    Date Value Ref Range Status   04/24/2023 85 mL/min/1.73mSq Final     Comment:     In accordance with NKF-ASN Task Force recommendation, calculation based on the Chronic Kidney Disease Epidemiology Collaboration (CKD-EPI) equation without adjustment for race. eGFR adjusted for gender and age and calculated in ml/min/1.73mSquared. eGFR cannot be calculated if patient is under 18 years of age.     Reference Range:   >= 60 ml/min/1.73mSquared.     Estimated GFR-Non    Date Value Ref Range Status   06/22/2022 53 mls/min/1.73/m2 Final       Latest Reference Range & Units 08/10/22 13:49   MACEY Negative  Negative   ds DNA Ab Negative  Negative   Anti-SSA Interpretation Negative  Negative   Anti-SSB Interpretation Negative  Negative      Latest Reference Range & Units 11/10/21 10:27 08/10/22 13:49   Centromere Protein Antibody Negative   Negative   HARRIS-1 Antibody Negative   Negative   Scleroderma (Scl-70S) Antibody Negative   Negative   Rheumatoid Factor <<=30 IU/mL <13    RNP70 Antibody Negative   Negative   Hand DP IgG   Negative   U1RNP Antibody Negative   Negative          Assessment:       1. Multiple myeloma not having achieved remission    2. Myelopathy concurrent with and due to spinal stenosis of cervical region    3. Other polyneuropathy      1) Multiple myeloma  2) Cervical degenerative compressive myelopathy, with myelopathic features  3)  Lumbar spinal stenosis  4) Peripheral PolyNeuropathy  5) Weakness--better  6) Urinary retention--better  7) History of NSTEMI coronary artery disease  8) PE-on eliquis           Plan:      Patient was seen at [facility] in [place]. He started treatment here with Velcade/Revlimid/Dexamethamasone. He said that he is a candidate for transplant and they will eval bone marrow again after 6 cycles. He had f/u via telemedicine with Dr. Brito on 2/27/23- recommended to repeat BMB x after 6 cycles.  Darzalex (weekly x 8 dose, then q 2 week x 8 doses, then q 4 weeks per kodak study) was added on 1/27/23. Cycle 4, Day 1 of Velcade/Revlimid/Dexamethamasone/Darzalex was given on 2/10/23. He was diagnosed with Covid and PEs on 2/14/23.     Patient will like to restart treatment in November if possible.   Will restage:  Bone marrow biopsy prior to initiation   WB PET/CT  RTC 2 months to discuss initiation of treatment  MM Labs in 4 weeks and prior to visit.  Cont PT    The patient was seen, interviewed and examined. Pertinent lab and radiology studies were reviewed.   The patient was given ample opportunity to ask questions, and to the best of my abilities, all questions answered to satisfaction; patient demonstrated understanding of what we discussed and agreeable to the plan. Pt instructed to call should develop concerning signs/symptoms or have further questions.         ELSA PAREKH MD

## 2023-09-11 NOTE — PRE-PROCEDURE INSTRUCTIONS
[facility]: Outpatient Surgery  Preprocedure Check-In Instructions    Your arrival time for your surgery or procedure is 8:30am.  We ask patients to arrive about 2 hours before surgery to allow for enough time to review your health history & medications, start your IV, complete any outstanding labwork or tests, and meet your Anesthesiologist.  You will arrive at [facility], [address] [place] [address].  Enter through the [address] entrance next to the Emergency Room, and come to the 6th floor to the Outpatient Surgery Department.    Visitory Policy:  You are allowed 2 adult visitors to be with you in the hospital.  All hospital visitors should be in good current health.  No small children.    What to Bring:  Please have your ID, insurance cards, and all home medication bottles with you at check in.  Bring your CPAP machine if one is used at home.    Fasting:  Nothing to eat or drink after midnight the night before your procedure. This includes no ice, gum, hard candies, and/or tobacco products.  Follow your doctor's instructions for taking any medications on the morning of your procedure.  If no instructions for taking medications were given, do not take any medications but bring your medications in their bottles to your procedure check in.    Follow your doctor's preoperative instructions regarding skin prep, bowel prep, bathing, or showering prior to your procedure.  If any special soaps were provided to you, please use according to your doctor's instructions. If no instructions were given from your doctor, take a good bath or shower with antibacterial soap the night before and the morning of your procedure.  On the morning of procedure, wear loose, comfortable clothing.  No lotions, makeup, perfumes, colognes, deodorant, or jewelry to your procedure.  Removable items (glasses, contact lenses, dentures, retainers, hearing aids) need to be removed for your procedure.  Bring  your storage containers for these items if you wear them.    Artificial nails, body jewelry, eyelash extensions, and/or hair extensions with metal clips are not allowed during your surgery.  If you currently wear any of these items, please arrange for them to be removed prior to your arrival to the hospital.    Outpatient or Same Day Surgeries:  Any patients receiving sedation/anesthesia are advised not to drive for 24 hours after their procedure.  We do not allow patients to drive themselves home after discharge.  If you are going home after your procedure, please have someone available to drive you home from the hospital.      You may call the Outpatient Surgery Department at [phone] with any questions or concerns.  We are looking forward to meeting you and taking great care of you for your procedure.  Thank you for choosing [facility] for your surgical needs.        Status: complete  Spoke with: patient  Call Time: 1300

## 2023-09-12 ENCOUNTER — HOSPITAL ENCOUNTER (OUTPATIENT)
Facility: HOSPITAL | Age: 73
Discharge: HOME | End: 2023-09-12
Attending: PATHOLOGY | Admitting: PATHOLOGY
Payer: MEDICARE

## 2023-09-12 DIAGNOSIS — G62.89 OTHER POLYNEUROPATHY: ICD-10-CM

## 2023-09-12 DIAGNOSIS — M48.02 MYELOPATHY CONCURRENT WITH AND DUE TO SPINAL STENOSIS OF CERVICAL REGION: ICD-10-CM

## 2023-09-12 DIAGNOSIS — G99.2 MYELOPATHY CONCURRENT WITH AND DUE TO SPINAL STENOSIS OF CERVICAL REGION: ICD-10-CM

## 2023-09-12 DIAGNOSIS — C90.00 MULTIPLE MYELOMA NOT HAVING ACHIEVED REMISSION: ICD-10-CM

## 2023-09-12 LAB
BASOPHILS # BLD AUTO: 0.03 X10(3)/MCL
BASOPHILS NFR BLD AUTO: 0.6 %
EOSINOPHIL # BLD AUTO: 0.25 X10(3)/MCL (ref 0–0.9)
EOSINOPHIL NFR BLD AUTO: 4.7 %
ERYTHROCYTE [DISTWIDTH] IN BLOOD BY AUTOMATED COUNT: 15.1 % (ref 11.5–17)
HCT VFR BLD AUTO: 32.7 % (ref 42–52)
HGB BLD-MCNC: 10.7 G/DL (ref 14–18)
IMM GRANULOCYTES # BLD AUTO: 0.01 X10(3)/MCL (ref 0–0.04)
IMM GRANULOCYTES NFR BLD AUTO: 0.2 %
LYMPHOCYTES # BLD AUTO: 1.39 X10(3)/MCL (ref 0.6–4.6)
LYMPHOCYTES NFR BLD AUTO: 26 %
MCH RBC QN AUTO: 30.5 PG (ref 27–31)
MCHC RBC AUTO-ENTMCNC: 32.7 G/DL (ref 33–36)
MCV RBC AUTO: 93.2 FL (ref 80–94)
MONOCYTES # BLD AUTO: 0.46 X10(3)/MCL (ref 0.1–1.3)
MONOCYTES NFR BLD AUTO: 8.6 %
NEUTROPHILS # BLD AUTO: 3.21 X10(3)/MCL (ref 2.1–9.2)
NEUTROPHILS NFR BLD AUTO: 59.9 %
NRBC BLD AUTO-RTO: 0 %
PLATELET # BLD AUTO: 239 X10(3)/MCL (ref 130–400)
PMV BLD AUTO: 8.7 FL (ref 7.4–10.4)
RBC # BLD AUTO: 3.51 X10(6)/MCL (ref 4.7–6.1)
WBC # SPEC AUTO: 5.35 X10(3)/MCL (ref 4.5–11.5)

## 2023-09-12 PROCEDURE — 88184 FLOWCYTOMETRY/ TC 1 MARKER: CPT

## 2023-09-12 PROCEDURE — 38222 DX BONE MARROW BX & ASPIR: CPT | Performed by: PATHOLOGY

## 2023-09-12 PROCEDURE — 88313 SPECIAL STAINS GROUP 2: CPT

## 2023-09-12 PROCEDURE — 88305 TISSUE EXAM BY PATHOLOGIST: CPT | Performed by: INTERNAL MEDICINE

## 2023-09-12 PROCEDURE — 85025 COMPLETE CBC W/AUTO DIFF WBC: CPT | Performed by: PATHOLOGY

## 2023-09-12 PROCEDURE — 88364 INSITU HYBRIDIZATION (FISH): CPT

## 2023-09-12 PROCEDURE — 88365 INSITU HYBRIDIZATION (FISH): CPT

## 2023-09-12 PROCEDURE — 63600175 PHARM REV CODE 636 W HCPCS: Performed by: PATHOLOGY

## 2023-09-12 PROCEDURE — 25000003 PHARM REV CODE 250: Performed by: PATHOLOGY

## 2023-09-12 PROCEDURE — 88185 FLOWCYTOMETRY/TC ADD-ON: CPT

## 2023-09-12 PROCEDURE — 88311 DECALCIFY TISSUE: CPT

## 2023-09-12 PROCEDURE — 88342 IMHCHEM/IMCYTCHM 1ST ANTB: CPT

## 2023-09-12 RX ORDER — SODIUM CHLORIDE 9 MG/ML
INJECTION, SOLUTION INTRAVENOUS CONTINUOUS
Status: DISCONTINUED | OUTPATIENT
Start: 2023-09-12 | End: 2023-09-12 | Stop reason: HOSPADM

## 2023-09-12 RX ORDER — FLUMAZENIL 0.1 MG/ML
0.2 INJECTION INTRAVENOUS
Status: DISCONTINUED | OUTPATIENT
Start: 2023-09-12 | End: 2023-09-12 | Stop reason: HOSPADM

## 2023-09-12 RX ORDER — MIDAZOLAM HYDROCHLORIDE 1 MG/ML
1 INJECTION INTRAMUSCULAR; INTRAVENOUS
Status: DISCONTINUED | OUTPATIENT
Start: 2023-09-12 | End: 2023-09-12 | Stop reason: HOSPADM

## 2023-09-12 RX ADMIN — MIDAZOLAM 3 MG: 1 INJECTION INTRAMUSCULAR; INTRAVENOUS at 11:09

## 2023-09-12 RX ADMIN — SODIUM CHLORIDE: 9 INJECTION, SOLUTION INTRAVENOUS at 10:09

## 2023-09-12 RX ADMIN — MIDAZOLAM 2 MG: 1 INJECTION INTRAMUSCULAR; INTRAVENOUS at 11:09

## 2023-09-12 NOTE — PROCEDURES
Valentino Manning is a 72 y.o. male patient.    Temp: 97.5 °F (36.4 °C) (09/12/23 0905)  Pulse: 91 (09/12/23 1152)  Resp: 15 (09/12/23 1114)  BP: (!) 147/87 (09/12/23 1149)  SpO2: 95 % (09/12/23 1152)  Weight: 80.1 kg (176 lb 9.4 oz) (09/12/23 0947)  Height: 6' (182.9 cm) (09/12/23 0947)  Mallampati Scale: Class II  ASA Classification: Class 2    Bone marrow    Date/Time: 9/12/2023 4:21 PM    Performed by: Russ Wilson MD  Authorized by: Russ Wilson MD    Consent Done?: Yes (Verbal)   Immediately prior to procedure a time out was called to verify the correct patient, procedure, equipment, support staff and site/side marked as required.   Patient was prepped and draped in the usual sterile fashion.      Assistants?: Yes    List of assistants: Sultana GORDON was present for the entire procedure.    Position: left lateral  Anesthesia: local infiltration  Local anesthetic: lidocaine 1% without epinephrine  Aspiration?: Yes   Biopsy?: Yes    Specimen source: posterior iliac crest  Number of Specimens: 2  Patient tolerated the procedure well with no immediate complications.  Post-operative instructions were provided for the patient.  Patient was discharged and will follow up if any complications occur.       9/12/2023

## 2023-09-12 NOTE — PROCEDURES
The patient has been examined and the H&P has been reviewed:  I concur with the findings and no changes have occurred since H&P was written.      Anesthesia/Surgery risks, benefits and alternative options discussed and understood by patient/family.    Valentino Manning is a 72 y.o. male patient.    Temp: 97.5 °F (36.4 °C) (09/12/23 0905)  Pulse: 65 (09/12/23 0905)  Resp: 16 (09/12/23 0905)  BP: 130/87 (09/12/23 0905)  SpO2: 99 % (09/12/23 0905)  Mallampati Scale: Class II  ASA Classification: Class 2    Procedures    9/12/2023

## 2023-09-12 NOTE — DISCHARGE INSTRUCTIONS
-No driving for 24 hours and while taking narcotic pain medication.    -May remove band-aid tomorrow and shower.    - Report to ER with any bleeding, heavy bruising at site, or SEVERE/SUDDEN unrelieved abdominal pain.    - Biopsy results will be sent to your oncologist/referring physician.    - Call with any questions or concerns.    -May take over the counter meds or use ice packs for pain/discomfort.

## 2023-09-12 NOTE — DISCHARGE SUMMARY
[facility] - Periop Services  Discharge Note  Short Stay    Procedure(s) (LRB):  Biopsy-bone marrow (Right)      OUTCOME: Patient tolerated treatment/procedure well without complication and is now ready for discharge.    DISPOSITION: Home or Self Care    FINAL DIAGNOSIS:  pending    FOLLOWUP: In clinic    DISCHARGE INSTRUCTIONS:  No discharge procedures on file.      Clinical Reference Documents Added to Patient Instructions         Document    BONE MARROW ASPIRATION OR BIOPSY (ENGLISH)            TIME SPENT ON DISCHARGE: 10 minutes

## 2023-09-13 VITALS
DIASTOLIC BLOOD PRESSURE: 87 MMHG | RESPIRATION RATE: 15 BRPM | BODY MASS INDEX: 23.92 KG/M2 | OXYGEN SATURATION: 95 % | HEART RATE: 91 BPM | TEMPERATURE: 98 F | HEIGHT: 72 IN | SYSTOLIC BLOOD PRESSURE: 147 MMHG | WEIGHT: 176.56 LBS

## 2023-09-18 ENCOUNTER — HOSPITAL ENCOUNTER (OUTPATIENT)
Dept: RADIOLOGY | Facility: HOSPITAL | Age: 73
Discharge: HOME | End: 2023-09-18
Attending: NEUROLOGICAL SURGERY
Payer: MEDICARE

## 2023-09-18 DIAGNOSIS — G99.2 MYELOPATHY CONCURRENT WITH AND DUE TO SPINAL STENOSIS OF CERVICAL REGION: ICD-10-CM

## 2023-09-18 DIAGNOSIS — M48.02 MYELOPATHY CONCURRENT WITH AND DUE TO SPINAL STENOSIS OF CERVICAL REGION: ICD-10-CM

## 2023-09-18 PROCEDURE — 72052 X-RAY EXAM NECK SPINE 6/>VWS: CPT | Mod: TC

## 2023-09-19 LAB — PSYCHE PATHOLOGY RESULT: NORMAL

## 2023-09-21 ENCOUNTER — OFFICE VISIT (OUTPATIENT)
Dept: NEUROSURGERY | Facility: CLINIC | Age: 73
End: 2023-09-21
Payer: MEDICARE

## 2023-09-21 VITALS
WEIGHT: 180 LBS | RESPIRATION RATE: 16 BRPM | SYSTOLIC BLOOD PRESSURE: 120 MMHG | BODY MASS INDEX: 23.86 KG/M2 | DIASTOLIC BLOOD PRESSURE: 76 MMHG | HEART RATE: 71 BPM | HEIGHT: 73 IN

## 2023-09-21 DIAGNOSIS — G99.2 MYELOPATHY CONCURRENT WITH AND DUE TO SPINAL STENOSIS OF CERVICAL REGION: Primary | ICD-10-CM

## 2023-09-21 DIAGNOSIS — M48.02 MYELOPATHY CONCURRENT WITH AND DUE TO SPINAL STENOSIS OF CERVICAL REGION: Primary | ICD-10-CM

## 2023-09-21 PROCEDURE — 99024 POSTOP FOLLOW-UP VISIT: CPT | Mod: POP,,, | Performed by: NEUROLOGICAL SURGERY

## 2023-09-21 PROCEDURE — 99024 PR POST-OP FOLLOW-UP VISIT: ICD-10-PCS | Mod: POP,,, | Performed by: NEUROLOGICAL SURGERY

## 2023-09-21 RX ORDER — TESTOSTERONE CYPIONATE 200 MG/ML
INJECTION, SOLUTION INTRAMUSCULAR
COMMUNITY
Start: 2023-09-12

## 2023-09-21 RX ORDER — LISINOPRIL 20 MG/1
20 TABLET ORAL
COMMUNITY
Start: 2023-09-06

## 2023-09-21 RX ORDER — HYDROCODONE BITARTRATE AND ACETAMINOPHEN 10; 325 MG/1; MG/1
10 TABLET ORAL DAILY
COMMUNITY
Start: 2023-09-20

## 2023-09-21 NOTE — PROGRESS NOTES
[facility]   Neurosurgery      Valentino Manning  MRN: [id number], CSN: [id number]      : 1950   Age: 72 y.o. male  Payor: MEDICARE / Plan: MEDICARE PART A & B / Product Type: Government /       Ref:  No referring provider defined for this encounter.    PCP: Kaiser Metcalf MD    Visit Date: 2023     Patient Active Problem List   Diagnosis    Anemia    Elevated serum globulin level    Kidney disease    Multiple myeloma    Hypertension    SOB (shortness of breath)    Patient on antineoplastic chemotherapy regimen    Peripheral neuropathy    Upper extremity weakness    Myelopathy concurrent with and due to spinal stenosis of cervical region       SUBJECTIVE:      CC:   Chief Complaint   Patient presents with    Mild neck and b shoulder pain, b hand tingling       HPI:   Mr. Manning is a 73 yo male who has a past history significant for hypertension, coronary artery disease, PE, multiple myeloma on chemotherapy, chronic anemia, and GERD.  He is taking Plavix and Eliquis.  The patient presented to the ER on 2023 with progressive numbness in all his extremities, urinary retention, and progressive difficulty walking 3 weeks prior to admission.  He had significant weakness in his bilateral upper extremities with myelopathy on exam.  Mr. Manning is nearly 3 months postoperative s/p a C3-4 to C5-6 laminectomy and posterior fusion on 2023.  He presents for a follow up evaluation in the neurosurgery clinic with significant improvement in the strength of all his extremities and is now ambulatory with a walker.     The patient's last date of visit in the neurosurgery clinic was 2023.  The plan of care was for him to discontinue wearing his Aspen C-collar.  He was to continue with his scheduled outpatient physical therapy and occupational therapy sessions.  Mr. Manning was cleared from a neurosurgery standpoint to complete a cardiac catheterization or other cardiac testing procedures as  ordered by Dr. Whitehead.  The patient was scheduled to follow up with orthopedic surgeon Dr. Dan at [facility] to evaluate his bilateral shoulders.  Finally, he was to follow up in the neurosurgery clinic with updated cervical spine x-rays, which were completed recently.    Mr. Manning visits the neurosurgery clinic in a wheelchair.  He is accompanied by his wife.  The patient continues to participate in outpatient physical therapy and occupational therapy.  He was recommended by Dr. Whitehead to complete his rehab program before undergoing a cardiac catheterization procedure.  Mr. Manning has an appointment with Dr. Dan tomorrow in the [facility] orthopedics clinic.     The patient has mild neck pain radiating to his bilateral trapezius muscles.  He continues to have numbness in his bilateral hands.  Overall, Mr. Manning has improved postoperatively, particularly in the strength in all his extremities, although there is still generalized weakness.  He has been using a wheelchair, walker, and golf cart to mobilize.  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  Mr. Manning has been taking Norco on an as-needed basis and is weaning Robaxin.  He is continuing to take Neurontin 800 mg twice a day.      Mr. Manning continues to follow oncologist Dr. Harris for his multiple myeloma.       Patient Active Problem List    Diagnosis Date Noted    Myelopathy concurrent with and due to spinal stenosis of cervical region 07/03/2023    Upper extremity weakness 06/13/2023    Peripheral neuropathy 04/20/2023    Patient on antineoplastic chemotherapy regimen 03/30/2023    SOB (shortness of breath) 02/18/2023    Hypertension     Multiple myeloma 08/29/2022    Anemia 08/10/2022    Elevated serum globulin level 08/10/2022    Kidney disease 08/10/2022     Past Medical History:   Diagnosis Date    Anemia     GERD (gastroesophageal reflux disease)     Heart problem     Hyperlipidemia      Hypertension     Multiple myeloma     NSTEMI (non-ST elevated myocardial infarction)      Past Surgical History:   Procedure Laterality Date    BONE MARROW ASPIRATION N/A 9/1/2022    Procedure: ASPIRATION, BONE MARROW;  Surgeon: Robbie Gardner MD;  Location: [facility] OR;  Service: General;  Laterality: N/A;    BONE MARROW BIOPSY Right 9/12/2023    Procedure: Biopsy-bone marrow;  Surgeon: Russ Wilson MD;  Location: [facility] OR;  Service: General;  Laterality: Right;    CARDIAC SURGERY  2021    ENDOSCOPIC RELEASE OF BOTH CARPAL TUNNELS      FUSION OF POSTERIOR COLUMN OF CERVICAL SPINE USING COMPUTER AIDED NAVIGATION N/A 6/26/2023    Procedure: FUSION, SPINE, POSTERIOR SPINAL COLUMN, CERVICAL, USING COMPUTER-ASSISTED NAVIGATION;  Surgeon: Montserrat Aviles MD;  Location: [facility] OR;  Service: Neurosurgery;  Laterality: N/A;  C3-4, C4-5, C5-6 laminectomies and fusion, o-arm, stealth  TIVA setup  NTI //  XX       Current Outpatient Medications:     acyclovir (ZOVIRAX) 400 MG tablet, TAKE 1 TABLET BY MOUTH TWICE A DAY, Disp: 180 tablet, Rfl: 1    apixaban (ELIQUIS) 5 mg Tab, Take 1 tablet (5 mg total) by mouth 2 (two) times daily., Disp: 60 tablet, Rfl: 6    atorvastatin (LIPITOR) 40 MG tablet, TAKE 1 TABLET ORALLY ONCE DAILY ON MON/TUE/THUR/FRI AND 1/2 ON WED. NONE ON SAT/SUN, Disp: , Rfl:     clopidogreL (PLAVIX) 75 mg tablet, Take 75 mg by mouth once daily., Disp: , Rfl:     DULoxetine (CYMBALTA) 30 MG capsule, TAKE 1 CAPSULE BY MOUTH EVERY DAY, Disp: 30 capsule, Rfl: 1    dutasteride (AVODART) 0.5 mg capsule, Take 0.5 mg by mouth once daily., Disp: , Rfl:     FERRETTS 325 mg (106 mg iron) Tab, Take 1 tablet by mouth 2 (two) times daily., Disp: , Rfl:     gabapentin (NEURONTIN) 400 MG capsule, Take 2 capsules (800 mg total) by mouth 3 (three) times daily. for 7 days (Patient taking differently: Take 800 mg by mouth 2 (two) times daily.), Disp: 42 capsule, Rfl: 0    HYDROcodone-acetaminophen (NORCO)  mg per tablet, Take  "10 tablets by mouth once daily., Disp: , Rfl:     lisinopriL (PRINIVIL,ZESTRIL) 20 MG tablet, Take 20 mg by mouth., Disp: , Rfl:     metoprolol tartrate (LOPRESSOR) 25 MG tablet, Take 1 tablet (25 mg total) by mouth 2 (two) times daily., Disp: 60 tablet, Rfl: 11    tamsulosin (FLOMAX) 0.4 mg Cap, Take 2 capsules (0.8 mg total) by mouth every evening., Disp: 60 capsule, Rfl: 11    testosterone cypionate (DEPOTESTOTERONE CYPIONATE) 200 mg/mL injection, INJECT 1 MLS (200 MG) INTRAMUSCULARLY ONCE WEEKLY., Disp: , Rfl:     zolpidem (AMBIEN) 10 mg Tab, Take 10 mg by mouth nightly as needed., Disp: , Rfl:     Review of patient's allergies indicates:   Allergen Reactions    Penicillins        Social History     Tobacco Use    Smoking status: Former     Types: Cigarettes    Smokeless tobacco: Former   Substance Use Topics    Alcohol use: Not Currently     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   HENT:  Negative for congestion and sore throat.    Eyes:  Negative for blurred vision and double vision.   Respiratory:  Negative for cough and shortness of breath.    Cardiovascular:  Negative for chest pain and palpitations.   Gastrointestinal:  Negative for constipation, diarrhea, nausea and vomiting.   Musculoskeletal:  Positive for neck pain, Negative for back pain  Neurological:  Positive for sensory change and focal weakness, Negative for headaches.   Endo/Heme/Allergies:  Does not bruise/bleed easily.   Psychiatric/Behavioral:  Negative for depression and anxiety.      OBJECTIVE:     EXAMINATION:  /76 (BP Location: Left arm, Patient Position: Sitting)   Pulse 71   Resp 16   Ht 6' 1" (1.854 m)   Wt 81.6 kg (180 lb)   BMI 23.75 kg/m²   Body Habitus: Normal    Physical " Exam:  Constitutional: The patient is well-developed and cooperative, sitting comfortably in a wheelchair     Mental Status:   Oriented to person, place, and time  Normal speech     Motor:  Muscle bulk: normal in all extremities  Tone: normal in all extremities     Upper extremities:  Deltoid: right 4/5; left 3/5, with inability to raise arms > 30 degrees due to chronic shoulder issues  Biceps: right 5/5; left 5/5  Triceps: right 5/5; left 5/5  Wrist extensors: right 5/5; left 5/5  Wrist flexors: right 5/5; left 5/5  : right 5/5; left 5/5  Interosseous muscles: right 5/5; left 5/5     Lower extremities:  Hip flexors: right 5/5; left 5/5  Knee extensors: right 5/5; left 5/5  Knee flexors: right 5/5; left 5/5  Foot dorsiflexors: right 5/5; left 5/5  Foot plantar flexors: right 5/5; left 5/5     Sensation:  Decreased sensation to light touch in b hands, b feet, and L lateral lower leg     Reflexes:  Swanns sign: right positive; left positive  Babinski: right upgoing; left upgoing  Clonus: right negative; left negative     Musculoskeletal:     Gait: not evaluated     Cervical: No pain with palpation along midline, well healed midline scar  Upper back: No pain with palpation         DIAGNOSTICS REVIEW OF IMAGING, LAB & OTHER STUDIES:  I have personally reviewed and evaluated the following reports as well as radiographic studies:     Cervical spine x-rays, 09/18/2023- there is normal, straightened alignment with straightening of the cervical spine.  Postoperative changes s/p C3 through C6 posterior fusion with hardware in place.  There is no significant motion on flexion-extension views.       ASSESSMENT:  Mr. Manning is a 73 yo male who has a past history significant for hypertension, coronary artery disease, PE, multiple myeloma on chemotherapy, chronic anemia, and GERD.  He is taking Plavix and Eliquis.  The patient presented to the ER on 06/11/2023 with progressive numbness in all his extremities, urinary  retention, and progressive difficulty walking 3 weeks prior to admission.  He had significant weakness in his bilateral upper extremities with myelopathy on exam.  Mr. Manning is nearly 3 months postoperative s/p a C3-4 to C5-6 laminectomy and posterior fusion on 06/26/2023.  He presents for a follow up evaluation in the neurosurgery clinic with significant improvement in the strength of all his extremities and is now ambulatory with a walker.  Mr. Manning has chronic weakness in his bilateral deltoids secondary to shoulder issues, but otherwise has 5/5 strength. The patient has chronic numbness in his bilateral hands, bilateral feet, and left lower leg.    I reviewed pertinent imaging studies with the patient and his wife.  Cervical spine x-rays demonstrate normal, straightened alignment with straightening of the cervical spine.  Postoperative changes s/p C3 through C6 posterior fusion with hardware in place.  There is no significant motion on flexion-extension views.        PLAN:    Encounter Diagnosis   Name Primary?    Myelopathy concurrent with and due to spinal stenosis of cervical region Yes     No orders of the defined types were placed in this encounter.       1.  Mr. Manning and his wife were reassured that he is making a remarkable neurological recovery after being nearly paralyzed in all his extremities due to severe stenosis in his cervical spine.  He is encouraged to stay motivated with daily exercises.     2.  He is now cleared by neurosurgery for full activity without any restrictions.    3.  For the numbness in his bilateral hands, his Neurontin/ Gabepentin is being increased to 800 mg in the morning and 1600 mg in the evening.  He is currently taking 800 mg twice a day.  Mr. Manning currently has this medication in 400 mg tablets.  Any additional refills or increases to his Neurontin need to be directed by his primary care physician, as this is now considered a maintenance medication.    4.  The patient is to  continue with his scheduled outpatient physical therapy and occupational therapy sessions.  He is encouraged to increase intensity of his PT and OT programs.      5.  Mr. Manning is scheduled to follow up with orthopedic surgeon Dr. Dan at [facility] to evaluate his bilateral shoulders tomorrow on Friday, 09/22/2023.     6.  The patient is encouraged to follow up in the neurosurgery clinic on an as-needed basis for any concerning changes in condition or symptoms.        This note will be sent to the patient's referring provider No ref. provider found and primary care provider Kaiser Metcalf MD.           Montserrat Aviles MD  Neurosurgeon

## 2023-09-21 NOTE — PATIENT INSTRUCTIONS
Mr. Manning is a 71 yo male who has a past history significant for hypertension, coronary artery disease, PE, multiple myeloma on chemotherapy, chronic anemia, and GERD.  He is taking Plavix and Eliquis.  The patient presented to the ER on 06/11/2023 with progressive numbness in all his extremities, urinary retention, and progressive difficulty walking 3 weeks prior to admission.  He had significant weakness in his bilateral upper extremities with myelopathy on exam.  Mr. Manning is nearly 3 months postoperative s/p a C3-4 to C5-6 laminectomy and posterior fusion on 06/26/2023.  He presents for a follow up evaluation in the neurosurgery clinic with significant improvement in the strength of all his extremities and is now ambulatory with a walker.  Mr. Manning has chronic weakness in his bilateral deltoids secondary to shoulder issues, but otherwise has 5/5 strength. The patient has chronic numbness in his bilateral hands, bilateral feet, and left lower leg.    I reviewed pertinent imaging studies with the patient and his wife.  Cervical spine x-rays demonstrate normal, straightened alignment with straightening of the cervical spine.  Postoperative changes s/p C3 through C6 posterior fusion with hardware in place.  There is no significant motion on flexion-extension views.        PLAN:    Encounter Diagnosis   Name Primary?    Myelopathy concurrent with and due to spinal stenosis of cervical region Yes     No orders of the defined types were placed in this encounter.       1.  Mr. Manning and his wife were reassured that he is making a remarkable neurological recovery after being nearly paralyzed in all his extremities due to severe stenosis in his cervical spine.  He is encouraged to stay motivated with daily exercises.     2.  He is now cleared by neurosurgery for full activity without any restrictions.    3.  For the numbness in his bilateral hands, his Neurontin/ Gabepentin is being increased to 800 mg in the morning and  1600 mg in the evening.  He is currently taking 800 mg twice a day.  Mr. Manning currently has this medication in 400 mg tablets.  Any additional refills or increases to his Neurontin need to be directed by his primary care physician, as this is now considered a maintenance medication.    4.  The patient is to continue with his scheduled outpatient physical therapy and occupational therapy sessions.  He is encouraged to increase intensity of his PT and OT programs.      5.  Mr. Manning is scheduled to follow up with orthopedic surgeon Dr. Dan at [facility] to evaluate his bilateral shoulders tomorrow on Friday, 09/22/2023.     6.  The patient is encouraged to follow up in the neurosurgery clinic on an as-needed basis for any concerning changes in condition or symptoms.        This note will be sent to the patient's referring provider No ref. provider found and primary care provider Kaiser Metcalf MD.

## 2023-10-11 ENCOUNTER — LAB VISIT (OUTPATIENT)
Dept: LAB | Facility: HOSPITAL | Age: 73
End: 2023-10-11
Attending: INTERNAL MEDICINE
Payer: MEDICARE

## 2023-10-11 DIAGNOSIS — G62.89 OTHER POLYNEUROPATHY: ICD-10-CM

## 2023-10-11 DIAGNOSIS — C90.00 MULTIPLE MYELOMA NOT HAVING ACHIEVED REMISSION: ICD-10-CM

## 2023-10-11 DIAGNOSIS — M48.02 MYELOPATHY CONCURRENT WITH AND DUE TO SPINAL STENOSIS OF CERVICAL REGION: ICD-10-CM

## 2023-10-11 DIAGNOSIS — G99.2 MYELOPATHY CONCURRENT WITH AND DUE TO SPINAL STENOSIS OF CERVICAL REGION: ICD-10-CM

## 2023-10-11 LAB
ALBUMIN SERPL-MCNC: 3.9 G/DL (ref 3.4–4.8)
ALBUMIN/GLOB SERPL: 1.3 RATIO (ref 1.1–2)
ALP SERPL-CCNC: 91 UNIT/L (ref 40–150)
ALT SERPL-CCNC: 21 UNIT/L (ref 0–55)
AST SERPL-CCNC: 15 UNIT/L (ref 5–34)
BASOPHILS # BLD AUTO: 0.01 X10(3)/MCL
BASOPHILS NFR BLD AUTO: 0.1 %
BILIRUB SERPL-MCNC: 0.9 MG/DL
BUN SERPL-MCNC: 11 MG/DL (ref 8.4–25.7)
CALCIUM SERPL-MCNC: 10.1 MG/DL (ref 8.8–10)
CHLORIDE SERPL-SCNC: 108 MMOL/L (ref 98–107)
CO2 SERPL-SCNC: 26 MMOL/L (ref 23–31)
CREAT SERPL-MCNC: 0.98 MG/DL (ref 0.73–1.18)
EOSINOPHIL # BLD AUTO: 0.14 X10(3)/MCL (ref 0–0.9)
EOSINOPHIL NFR BLD AUTO: 1.9 %
ERYTHROCYTE [DISTWIDTH] IN BLOOD BY AUTOMATED COUNT: 15.8 % (ref 11.5–17)
GFR SERPLBLD CREATININE-BSD FMLA CKD-EPI: >60 MLS/MIN/1.73/M2
GLOBULIN SER-MCNC: 2.9 GM/DL (ref 2.4–3.5)
GLUCOSE SERPL-MCNC: 107 MG/DL (ref 82–115)
HCT VFR BLD AUTO: 39.9 % (ref 42–52)
HGB BLD-MCNC: 12.2 G/DL (ref 14–18)
IGA SERPL-MCNC: 26 MG/DL (ref 101–645)
IGG SERPL-MCNC: 1409 MG/DL (ref 540–1822)
IGM SERPL-MCNC: 38 MG/DL (ref 22–240)
IMM GRANULOCYTES # BLD AUTO: 0.01 X10(3)/MCL (ref 0–0.04)
IMM GRANULOCYTES NFR BLD AUTO: 0.1 %
LYMPHOCYTES # BLD AUTO: 1.44 X10(3)/MCL (ref 0.6–4.6)
LYMPHOCYTES NFR BLD AUTO: 19 %
MCH RBC QN AUTO: 30.5 PG (ref 27–31)
MCHC RBC AUTO-ENTMCNC: 30.6 G/DL (ref 33–36)
MCV RBC AUTO: 99.8 FL (ref 80–94)
MONOCYTES # BLD AUTO: 0.6 X10(3)/MCL (ref 0.1–1.3)
MONOCYTES NFR BLD AUTO: 7.9 %
NEUTROPHILS # BLD AUTO: 5.36 X10(3)/MCL (ref 2.1–9.2)
NEUTROPHILS NFR BLD AUTO: 71 %
PLATELET # BLD AUTO: 269 X10(3)/MCL (ref 130–400)
PMV BLD AUTO: 8.2 FL (ref 7.4–10.4)
POTASSIUM SERPL-SCNC: 4.3 MMOL/L (ref 3.5–5.1)
PROT SERPL-MCNC: 6.8 GM/DL (ref 5.8–7.6)
RBC # BLD AUTO: 4 X10(6)/MCL (ref 4.7–6.1)
SODIUM SERPL-SCNC: 143 MMOL/L (ref 136–145)
WBC # SPEC AUTO: 7.56 X10(3)/MCL (ref 4.5–11.5)

## 2023-10-11 PROCEDURE — 86334 IMMUNOFIX E-PHORESIS SERUM: CPT

## 2023-10-11 PROCEDURE — 80053 COMPREHEN METABOLIC PANEL: CPT

## 2023-10-11 PROCEDURE — 36415 COLL VENOUS BLD VENIPUNCTURE: CPT

## 2023-10-11 PROCEDURE — 82784 ASSAY IGA/IGD/IGG/IGM EACH: CPT

## 2023-10-11 PROCEDURE — 85025 COMPLETE CBC W/AUTO DIFF WBC: CPT

## 2023-10-11 PROCEDURE — 83521 IG LIGHT CHAINS FREE EACH: CPT | Mod: 59

## 2023-10-11 PROCEDURE — 84165 PROTEIN E-PHORESIS SERUM: CPT

## 2023-10-12 LAB
ALBUMIN % SPEP (OHS): 48.41
ALBUMIN SERPL-MCNC: 3.2 G/DL (ref 3.4–4.8)
ALBUMIN/GLOB SERPL: 0.9 RATIO (ref 1.1–2)
ALPHA 1 GLOB (OHS): 0.27 GM/DL
ALPHA 1 GLOB% (OHS): 4.07
ALPHA 2 GLOB % (OHS): 15.02
ALPHA 2 GLOB (OHS): 1.01 GM/DL
BETA GLOB (OHS): 0.92 GM/DL
BETA GLOB% (OHS): 13.77
GAMMA GLOBULIN % (OHS): 18.73
GAMMA GLOBULIN (OHS): 1.25 GM/DL
GLOBULIN SER-MCNC: 3.5 GM/DL (ref 2.4–3.5)
KAPPA LC FREE SER-MCNC: 2.78 MG/DL (ref 0.33–1.94)
KAPPA LC FREE/LAMBDA FREE SER: 7.72 {RATIO} (ref 0.26–1.65)
LAMBDA LC FREE SERPL-MCNC: 0.36 MG/DL (ref 0.57–2.63)
M SPIKE % (OHS): 9.09
M SPIKE (OHS): 0.61 GM/DL
PATH REV: NORMAL
PROT SERPL-MCNC: 6.7 GM/DL (ref 5.8–7.6)

## 2023-11-06 ENCOUNTER — LAB VISIT (OUTPATIENT)
Dept: LAB | Facility: HOSPITAL | Age: 73
End: 2023-11-06
Attending: INTERNAL MEDICINE
Payer: MEDICARE

## 2023-11-06 DIAGNOSIS — C90.00 MULTIPLE MYELOMA NOT HAVING ACHIEVED REMISSION: ICD-10-CM

## 2023-11-06 PROCEDURE — 84166 PROTEIN E-PHORESIS/URINE/CSF: CPT

## 2023-11-09 LAB
ALBUMIN 24H UR ELPH-MRATE: 40.3 MG/24 H
ALBUMIN/GLOB 24H UR ELPH: 0.39 {RATIO}
ALPHA1 GLOB 24H UR ELPH-MRATE: 7.2 MG/24 H
ALPHA2 GLOB 24H UR ELPH-MRATE: 17.3 MG/24 H
B-GLOBULIN 24H UR ELPH-MRATE: 21.6 MG/24 H
COLLECT DURATION TIME UR: 24 H
GAMMA GLOB 24H UR ELPH-MRATE: 57.6 MG/24 H
M PROTEIN 24H UR ELPH-MRATE: 44 MG/24 H
PROT 24H UR-MRATE: 144 MG/24 H
PROT PATTERN 24H UR ELPH-IMP: ABNORMAL
SPECIMEN VOL 24H UR: 1200 ML

## 2023-11-10 ENCOUNTER — HOSPITAL ENCOUNTER (OUTPATIENT)
Dept: RADIOLOGY | Facility: HOSPITAL | Age: 73
Discharge: HOME | End: 2023-11-10
Attending: INTERNAL MEDICINE
Payer: MEDICARE

## 2023-11-10 DIAGNOSIS — G99.2 MYELOPATHY CONCURRENT WITH AND DUE TO SPINAL STENOSIS OF CERVICAL REGION: ICD-10-CM

## 2023-11-10 DIAGNOSIS — C90.00 MULTIPLE MYELOMA NOT HAVING ACHIEVED REMISSION: ICD-10-CM

## 2023-11-10 DIAGNOSIS — G62.89 OTHER POLYNEUROPATHY: ICD-10-CM

## 2023-11-10 DIAGNOSIS — M48.02 MYELOPATHY CONCURRENT WITH AND DUE TO SPINAL STENOSIS OF CERVICAL REGION: ICD-10-CM

## 2023-11-10 PROCEDURE — 78816 PET IMAGE W/CT FULL BODY: CPT | Mod: TC

## 2023-11-16 ENCOUNTER — OFFICE VISIT (OUTPATIENT)
Dept: HEMATOLOGY/ONCOLOGY | Facility: CLINIC | Age: 73
End: 2023-11-16
Payer: MEDICARE

## 2023-11-16 VITALS
TEMPERATURE: 98 F | RESPIRATION RATE: 18 BRPM | DIASTOLIC BLOOD PRESSURE: 72 MMHG | SYSTOLIC BLOOD PRESSURE: 121 MMHG | WEIGHT: 192.81 LBS | BODY MASS INDEX: 25.55 KG/M2 | HEIGHT: 73 IN | OXYGEN SATURATION: 97 % | HEART RATE: 60 BPM

## 2023-11-16 DIAGNOSIS — C90.00 MULTIPLE MYELOMA, REMISSION STATUS UNSPECIFIED: Primary | ICD-10-CM

## 2023-11-16 PROCEDURE — 99214 OFFICE O/P EST MOD 30 MIN: CPT | Mod: S$PBB,,, | Performed by: INTERNAL MEDICINE

## 2023-11-16 PROCEDURE — 99214 PR OFFICE/OUTPT VISIT, EST, LEVL IV, 30-39 MIN: ICD-10-PCS | Mod: S$PBB,,, | Performed by: INTERNAL MEDICINE

## 2023-11-16 PROCEDURE — 99999 PR PBB SHADOW E&M-EST. PATIENT-LVL IV: CPT | Mod: PBBFAC,,, | Performed by: INTERNAL MEDICINE

## 2023-11-16 PROCEDURE — 99999 PR PBB SHADOW E&M-EST. PATIENT-LVL IV: ICD-10-PCS | Mod: PBBFAC,,, | Performed by: INTERNAL MEDICINE

## 2023-11-16 PROCEDURE — 99214 OFFICE O/P EST MOD 30 MIN: CPT | Mod: PBBFAC | Performed by: INTERNAL MEDICINE

## 2023-11-16 NOTE — PROGRESS NOTES
HEMATOLOGY/ONCOLOGY OFFICE CLINIC VISIT    Visit Information:    Initial Evaluation: 8/10/2022  Referring Provider:   Other providers: Dr Brito  Code status: Not addressed    Diagnosis:  Multiple Myeloma--IgG, Kappa  Macrocytic anemia    Present treatment:   VRD-Started on 12/9/22  Daratumumab added on 1/27/23    Treatment/Oncology history:  VRD-Started on 12/9/22  Daratumumab added on 1/27/23      Plan of care:  systemic treatment    Imaging:   US abdomen 8/24/2022: Spleen: 10.8 cm. Mild hepatomegaly with suspected mild hepatic steatosis. Cholelithiasis.  PET CT 9/28/2022: 1. Right scapular small focal mild hypermetabolism without lytic or sclerotic abnormality is not convinced to be related to multiple myeloma. 2. No definite skeletal lytic abnormality with hypermetabolism to reflect multiple myeloma on this exam. 3. Bilateral small pleural effusions.   4. Gallstones. 5.  Noninflamed diverticulosis coli. 6.  Prostatomegaly.  WB PET CT 11/10/2023:  No FDG avid malignancy is present on today's exam.      Pathology:  BONE MARROW BIOPSY 9/1/2022: PLASMA CELL MYELOMA, KAPPA RESTRICTED. NORMOCELLULAR MARROW (30%) WITH 70% INVOLVEMENT BY PLASMA CELL MYELOMA. BACKGROUND TRILINEAGE HEMATOPOIESIS IS DECREASED.  Bone Marrow Biopsy 9/12/2023: 1. BONE MARROW, RIGHT POSTERIOR ILIAC CREST, ASPIRATE, CLOT, DECALCIFIED CORE   BIOPSY:    PERSISTENT/RESIDUAL PLASMA CELL NEOPLASM, KAPPA LIGHT CHAIN RESTRICTED.    NORMOCELLULAR MARROW (30%) WITH 5% INVOLVEMENT BY KAPPA RESTRICTED PLASMA CELL   NEOPLASM.    SEQUENTIAL TRILINEAGE HEMATOPOIESIS PRESENT.    INCREASED IRON STORES.    PERIPHERAL BLOOD :    NORMOCYTIC NORMOCHROMIC ANEMIA.    2. SEE DIAGNOSIS #1    CODE 8    Electronically Signed by:   Markell Farmer M.D. , Pathologist   (Case signed 09/19/2023 at 01:50pm)    Comment   A) The patient's history of IgG kappa multiple myeloma treated with VRD and   daratumumab is noted from the electronic medical record.    B) The bone  "marrow is normocellular for age demonstrates a small residual   population of kappa restricted plasma cells (5%).  Background hematopoiesis is   sequential and trilineage.    C) Additional material submitted to [facility] for MRD myeloma flow   demonstrated monotypic kappa plasma cells (MRD=0.1781% or 1781 cells/million).     CLINICAL HISTORY:       Patient: Valentino Manning is a 73 y.o. male with multiple medical problems kindly referred for persistent anemia.  Reviewing electronic medical record patient with anemia since 02/10/2016.  From 2016 to January of 2017 anemia was mild.  Anemia slowly worsening back in 2018 hemoglobin was 8.0.  At that same time kidney function was worsening as well. Labs with HGB of 9.4, .9, platelets 231 K, WBC 5.0.  Chemistries with globulin of 5.3.  BUN/creatinine 27.7/1.41.    Further workup revealed the diagnosis of multiple myeloma.  Patient was started on treatment with Velcade, Revlimid and dexamethasone on 12/09/2022 and daratumumab was added on 01/27/2023 with significant improvement of his disease.  He was seen in [place] for possible bone marrow transplant but he was not interested in bone marrow transplant at the time.  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  Gabapentin was not helping.     Treatment was delayed 1st due to COVID-19 in February of 2023, he has few treatments in between but it was delayed again due to NSTEMI . Revlimid was stopped due to his heart disease and plan was to continue daratumumab and Velcade lower dose for neuropathy.      Patient continued to decline with worsening of his weakness.  Home health called to let us know that patient was weak and unable to get out of bed. I called the patient Thursday 6/8/2023: "I personally spoke with the patient and  instructed to go to the ER as his weakness and neuropathy is worsening and he reports that he went to the ER in [place] because he could not urinate and a gongora " "cath was placed and send home.   He reports that is very difficult for him to get out of bed and he can not seeing himself going to his room to the car. I told him he will need to call an ambulance to transport him to the hospital and to come to [facility]. Patient with debilitating neuropathy of unclear etiology--not typical for MM and/or treatment for MM. I told him that may need MRI's of spine and/or brain and neurologist evaluation. Patient verbalized understanding and he will planned to go to the ER tomorrow not today."      Patient eventually went to the ER.   MRI T spine 6/12/2023:  1. No acute abnormality of the thoracic spine. 2. Mild multilevel canal and neural foraminal narrowing. 3. No cord signal abnormality.  MRI L spine: 1. Severe degenerative spinal canal stenosis at L3-L4 and L4-L5, moderate at L2-L3, mild at L5-S1. 2. Multilevel neural foraminal stenoses as described.  MRI C spine: 1. Severe degenerative spinal canal stenosis at L3-L4 and L4-L5, moderate at L2-L3, mild at L5-S1. 2. Multilevel neural foraminal stenoses as described.  MRIs of the spine.  No evidence of plasmacytoma, mass or cord compression due to Malignancy.     Patient with cervical myelopathy and stenosis.  He was seen by Neurosurgery and on 6/26/2023 underwent Decompressive laminectomies at C3-4, C4-5, and C5-6. He was then transfer to [facility] in [place] for Rehab.     Interval History:    Last recommendations form Dr Brito Velcade be dosed now every 2 weeks at decreased dose of 1mg/m2 given PN; once completed the 6 cycles ; would transition at least 1 year velcade monotherapy  maintenance q 2 weeks at 1mg/m2. He recommended no further revlimid in light of known CAD and NSTEMI and "transition therapy to daratumumab (Complete 8 total doses of EOW dosing) then transition gabriella subq to q 4 weeks dosing and velcade 1mg/m2 q 2 weeks maintenance for minimum of 2 years. Close attention to PN if worsens recommend decrease " "velcade to 0.7 mg/m2. -ppx acyc, antiplt agents per cardiologist at [facility]"     Patient presents today for follow up of his MM to discuss PET CT, bone marrow biopsy, lab results and resumption of treatment, accompanied by his wife. He says he's doing much better. He is not in a wheelchair today, using a cane to aid in ambulation. He also has a rollator if needed. He reports he started driving 2 weeks ago. He continues with PT. He continues with neuropathy. He will be having right shoulder surgery soon, he is followed by Dr. Dan. He is requesting to delay treatment if possible until after the holidays.   I would also like to discuss with Dr. Brito for his recommendations on resuming treatment.        Past Medical History:   Diagnosis Date    Anemia     GERD (gastroesophageal reflux disease)     Heart problem     Hyperlipidemia     Hypertension     Multiple myeloma     NSTEMI (non-ST elevated myocardial infarction)       Past Surgical History:   Procedure Laterality Date    BONE MARROW ASPIRATION N/A 9/1/2022    Procedure: ASPIRATION, BONE MARROW;  Surgeon: Robbie Gardner MD;  Location: [facility];  Service: General;  Laterality: N/A;    BONE MARROW BIOPSY Right 9/12/2023    Procedure: Biopsy-bone marrow;  Surgeon: Russ Wilson MD;  Location: [facility] OR;  Service: General;  Laterality: Right;    CARDIAC SURGERY  2021    ENDOSCOPIC RELEASE OF BOTH CARPAL TUNNELS      FUSION OF POSTERIOR COLUMN OF CERVICAL SPINE USING COMPUTER AIDED NAVIGATION N/A 6/26/2023    Procedure: FUSION, SPINE, POSTERIOR SPINAL COLUMN, CERVICAL, USING COMPUTER-ASSISTED NAVIGATION;  Surgeon: Montserrat Aviles MD;  Location: [facility];  Service: Neurosurgery;  Laterality: N/A;  C3-4, C4-5, C5-6 laminectomies and fusion, o-arm, stealth  TIVA setup  NTI //  XX     Family History   Problem Relation Age of Onset    Hypertension Mother     Diabetes Mother     Anemia Mother     Heart disease Mother     Heart disease Father      Social Connections: Not " on file       Review of patient's allergies indicates:   Allergen Reactions    Penicillins       Current Outpatient Medications on File Prior to Visit   Medication Sig Dispense Refill    acyclovir (ZOVIRAX) 400 MG tablet TAKE 1 TABLET BY MOUTH TWICE A  tablet 1    apixaban (ELIQUIS) 5 mg Tab Take 1 tablet (5 mg total) by mouth 2 (two) times daily. 60 tablet 6    atorvastatin (LIPITOR) 40 MG tablet TAKE 1 TABLET ORALLY ONCE DAILY ON MON/TUE/THUR/FRI AND 1/2 ON WED. NONE ON SAT/SUN      clopidogreL (PLAVIX) 75 mg tablet Take 75 mg by mouth once daily.      DULoxetine (CYMBALTA) 30 MG capsule TAKE 1 CAPSULE BY MOUTH EVERY DAY 30 capsule 1    dutasteride (AVODART) 0.5 mg capsule Take 0.5 mg by mouth once daily.      FERRETTS 325 mg (106 mg iron) Tab Take 1 tablet by mouth 2 (two) times daily.      HYDROcodone-acetaminophen (NORCO)  mg per tablet Take 10 tablets by mouth once daily.      lisinopriL (PRINIVIL,ZESTRIL) 20 MG tablet Take 20 mg by mouth.      metoprolol tartrate (LOPRESSOR) 25 MG tablet Take 1 tablet (25 mg total) by mouth 2 (two) times daily. 60 tablet 11    tamsulosin (FLOMAX) 0.4 mg Cap Take 2 capsules (0.8 mg total) by mouth every evening. 60 capsule 11    testosterone cypionate (DEPOTESTOTERONE CYPIONATE) 200 mg/mL injection INJECT 1 MLS (200 MG) INTRAMUSCULARLY ONCE WEEKLY.      zolpidem (AMBIEN) 10 mg Tab Take 10 mg by mouth nightly as needed.      gabapentin (NEURONTIN) 400 MG capsule Take 2 capsules (800 mg total) by mouth 3 (three) times daily. for 7 days (Patient taking differently: Take 800 mg by mouth 2 (two) times daily.) 42 capsule 0     No current facility-administered medications on file prior to visit.      Review of Systems   Constitutional:  Positive for fatigue. Negative for activity change, appetite change, chills, diaphoresis, fever and unexpected weight change.   HENT:  Negative for nasal congestion, mouth sores, nosebleeds, postnasal drip, sinus pressure/congestion, sore  "throat and trouble swallowing.    Eyes:  Negative for visual disturbance.   Respiratory:  Positive for shortness of breath (on excertion). Negative for cough and wheezing.    Cardiovascular:  Negative for chest pain and palpitations.   Gastrointestinal:  Negative for abdominal distention, abdominal pain, blood in stool, change in bowel habit, constipation, diarrhea, nausea and vomiting.   Endocrine: Negative.    Genitourinary:  Negative for bladder incontinence, decreased urine volume, difficulty urinating, dysuria, frequency, hematuria, scrotal swelling, testicular pain and urgency.   Musculoskeletal:  Negative for arthralgias, back pain, gait problem, joint swelling, leg pain, myalgias and neck pain.   Integumentary:  Negative for rash.   Neurological:  Positive for tremors and weakness. Negative for dizziness, seizures, syncope, speech difficulty, light-headedness, numbness, headaches and memory loss.        Neuropathy   Hematological:  Does not bruise/bleed easily.   Psychiatric/Behavioral:  Negative for agitation, confusion, hallucinations, sleep disturbance and suicidal ideas. The patient is not nervous/anxious.               Vitals:    11/16/23 0920   BP: 121/72   BP Location: Left arm   Patient Position: Sitting   Pulse: 60   Resp: 18   Temp: 97.6 °F (36.4 °C)   TempSrc: Oral   SpO2: 97%   Weight: 87.5 kg (192 lb 12.8 oz)   Height: 6' 1" (1.854 m)         Wt Readings from Last 6 Encounters:   11/16/23 87.5 kg (192 lb 12.8 oz)   09/21/23 81.6 kg (180 lb)   09/12/23 80.1 kg (176 lb 9.4 oz)   09/07/23 82.3 kg (181 lb 8 oz)   08/24/23 86.2 kg (190 lb)   06/14/23 88.5 kg (195 lb)     Body mass index is 25.44 kg/m².  Body surface area is 2.12 meters squared.       Physical Exam  HENT:      Head: Normocephalic and atraumatic.   Eyes:      Extraocular Movements: Extraocular movements intact.   Pulmonary:      Effort: Pulmonary effort is normal.      Breath sounds: Normal breath sounds. No wheezing.   Abdominal:      " General: Bowel sounds are normal.      Palpations: Abdomen is soft.   Musculoskeletal:      Cervical back: Neck supple.   Neurological:      Mental Status: He is alert and oriented to person, place, and time.      Cranial Nerves: Cranial nerves 2-12 are intact.      Sensory: No sensory deficit.      Motor: Weakness present.      Gait: Gait abnormal.   Psychiatric:         Mood and Affect: Mood normal.         Behavior: Behavior normal. Behavior is cooperative.         Judgment: Judgment normal.       ECOG SCORE    1 - Restricted in strenuous activity-ambulatory and able to carry out work of a light nature     ECOG 1/2    Laboratory:  CBC with Differential:  Lab Results   Component Value Date    WBC 9.25 11/10/2023    RBC 4.25 (L) 11/10/2023    HGB 13.1 (L) 11/10/2023    HCT 42.7 11/10/2023    .5 (H) 11/10/2023    MCH 30.8 11/10/2023    MCHC 30.7 (L) 11/10/2023    RDW 14.8 11/10/2023     11/10/2023    MPV 8.5 11/10/2023        CMP:  Sodium   Date Value Ref Range Status   04/24/2023 139 136 - 145 mmol/L Final     Sodium Level   Date Value Ref Range Status   11/10/2023 141 136 - 145 mmol/L Final     Potassium   Date Value Ref Range Status   04/24/2023 3.7 3.5 - 5.1 mmol/L Final     Potassium Level   Date Value Ref Range Status   11/10/2023 4.1 3.5 - 5.1 mmol/L Final     Chloride   Date Value Ref Range Status   04/24/2023 108 100 - 109 mmol/L Final     Carbon Dioxide   Date Value Ref Range Status   11/10/2023 28 23 - 31 mmol/L Final   04/24/2023 25 22 - 33 mmol/L Final     Blood Urea Nitrogen   Date Value Ref Range Status   11/10/2023 12.3 8.4 - 25.7 mg/dL Final   04/24/2023 14 7 - 18 mg/dL Final     Creatinine   Date Value Ref Range Status   11/10/2023 1.00 0.73 - 1.18 mg/dL Final   04/24/2023 0.96 0.57 - 1.25 mg/dL Final     Calcium   Date Value Ref Range Status   04/24/2023 8.5 (L) 8.8 - 10.6 mg/dL Final     Calcium Level Total   Date Value Ref Range Status   11/10/2023 9.5 8.8 - 10.0 mg/dL Final      Albumin Level   Date Value Ref Range Status   11/10/2023 3.7 3.4 - 4.8 g/dL Final   11/10/2023 3.2 (L) 3.4 - 4.8 g/dL Final     Bilirubin Total   Date Value Ref Range Status   11/10/2023 0.7 <=1.5 mg/dL Final     Alkaline Phosphatase   Date Value Ref Range Status   11/10/2023 76 40 - 150 unit/L Final     Aspartate Aminotransferase   Date Value Ref Range Status   11/10/2023 16 5 - 34 unit/L Final     Alanine Aminotransferase   Date Value Ref Range Status   11/10/2023 26 0 - 55 unit/L Final     Anion Gap   Date Value Ref Range Status   04/24/2023 6 (L) 8 - 16 mmol/L Final     eGFR    Date Value Ref Range Status   04/24/2023 85 mL/min/1.73mSq Final     Comment:     In accordance with NKF-ASN Task Force recommendation, calculation based on the Chronic Kidney Disease Epidemiology Collaboration (CKD-EPI) equation without adjustment for race. eGFR adjusted for gender and age and calculated in ml/min/1.73mSquared. eGFR cannot be calculated if patient is under 18 years of age.     Reference Range:   >= 60 ml/min/1.73mSquared.     Estimated GFR-Non    Date Value Ref Range Status   06/22/2022 53 mls/min/1.73/m2 Final       Assessment:       1) Multiple myeloma  2) Cervical degenerative compressive myelopathy, with myelopathic features  3) Lumbar spinal stenosis  4) Peripheral PolyNeuropathy  5) Weakness--better  6) Urinary retention--better  7) History of NSTEMI coronary artery disease  8) PE-on eliquis         Plan:      Patient was seen at [facility] in [place]. He started treatment here with Velcade/Revlimid/Dexamethamasone. He said that he is a candidate for transplant and they will eval bone marrow again after 6 cycles. He had f/u via telemedicine with Dr. Brito on 2/27/23- recommended to repeat BMB x after 6 cycles.  Darzalex (weekly x 8 dose, then q 2 week x 8 doses, then q 4 weeks per kodak study) was added on 1/27/23. Cycle 4, Day 1 of  Velcade/Revlimid/Dexamethamasone/Darzalex was given on 2/10/23. He was diagnosed with Covid and PEs on 2/14/23.     Patient will like to restart treatment in January if possible. Will discuss case with Dr. Brito for treatment recommendations.  Keep scheduled appointment with Dr. Brito tomorrow via telemedicine  Cont PT  Okay to proceed with right shoulder surgery - has appointment with Dr. Dan 12/19/23  RTC in 6 weeks with MD to discuss resumption of treatment/definitive treatment options  MM Labs + urine 1 week prior     The patient was seen, interviewed and examined. Pertinent lab and radiology studies were reviewed.   The patient was given ample opportunity to ask questions, and to the best of my abilities, all questions answered to satisfaction; patient demonstrated understanding of what we discussed and agreeable to the plan. Pt instructed to call should develop concerning signs/symptoms or have further questions.     Angélica Harris MD  Hematology/Oncology      Professional Services   I, Maddie Rangel LPN, acted solely as a scribe for and in the presence of Dr. Angélica Harris, who performed these services.

## 2023-11-17 ENCOUNTER — OFFICE VISIT (OUTPATIENT)
Dept: HEMATOLOGY/ONCOLOGY | Facility: CLINIC | Age: 73
End: 2023-11-17
Payer: MEDICARE

## 2023-11-17 DIAGNOSIS — C90.00 MULTIPLE MYELOMA, REMISSION STATUS UNSPECIFIED: Primary | ICD-10-CM

## 2023-11-17 PROCEDURE — 99442 PR PHYSICIAN TELEPHONE EVALUATION 11-20 MIN: ICD-10-PCS | Mod: 95,,, | Performed by: INTERNAL MEDICINE

## 2023-11-17 PROCEDURE — 99442 PR PHYSICIAN TELEPHONE EVALUATION 11-20 MIN: CPT | Mod: 95,,, | Performed by: INTERNAL MEDICINE

## 2023-11-17 NOTE — PROGRESS NOTES
The patient location is: home  The chief complaint leading to consultation is: MM/SCT eval     Visit type: audio only    Face to Face time with patient: 15  25 minutes of total time spent on the encounter, which includes face to face time and non-face to face time preparing to see the patient (eg, review of tests), Obtaining and/or reviewing separately obtained history, Documenting clinical information in the electronic or other health record, Independently interpreting results (not separately reported) and communicating results to the patient/family/caregiver, or Care coordination (not separately reported).         Each patient to whom he or she provides medical services by telemedicine is:  (1) informed of the relationship between the physician and patient and the respective role of any other health care provider with respect to management of the patient; and (2) notified that he or she may decline to receive medical services by telemedicine and may withdraw from such care at any time.    Notes:     SECTION OF HEMATOLOGY AND BONE MARROW TRANSPLANT  Return  Patient Visit   11/17/2023  Referred by:  Dr. Harris  Referred for: MM/SCT eval     CHIEF COMPLAINT: No chief complaint on file.      HISTORY OF PRESENT ILLNESS:   73 y.o.male; pmh as below; non oncologic history notable for CAD sp multiple stents followed regularly by cardiology locally; asymptomatic; oncologic history notable for MM diagnosed referral for anemia evaluation;  was initially evaluated by Dr. Harris at [facility]; he met diagnostic criteria for active myeloma; 3.4 grams of IgG kappa MM at diagnosis;  marrow at diagnosis with 70% clonal plasma cells; fish/CG normal cw with standard risk disease; whole body pet and 24 hr urine studies unremarkable. Other than fatigue was largely asymptomatic.  Runs haunted house/trail during halloween. No family history of malignancy.   Non smoker. Non drinker.  and wife present during our interview today.   Regular PCP fu prior to diagnosis.  Active with ADL's.  PS1.  KPFS  80.    Of note sought 2nd opinion at [facility] who recommended induction with VRd and consideration for SCT pending adequate response and pre transplant evaluation.      He initiated VRd therapy locally for a cycle then gabriella was addded for gabriella VRD .  Completed  6 of therapy with at least VGRP ; last therapy 6/2/23.    Has been off treatmetn due to extensive unrelated c spine surgery followed by prolonged stay at rehab at [facility] in [place].    Also has advanced DJD of shoulders and desires surgery on this.           PAST MEDICAL HISTORY:   Past Medical History:   Diagnosis Date    Anemia     GERD (gastroesophageal reflux disease)     Heart problem     Hyperlipidemia     Hypertension     Multiple myeloma     NSTEMI (non-ST elevated myocardial infarction)        PAST SURGICAL HISTORY:   Past Surgical History:   Procedure Laterality Date    BONE MARROW ASPIRATION N/A 9/1/2022    Procedure: ASPIRATION, BONE MARROW;  Surgeon: Robbie Gardner MD;  Location: [facility];  Service: General;  Laterality: N/A;    BONE MARROW BIOPSY Right 9/12/2023    Procedure: Biopsy-bone marrow;  Surgeon: Russ Wilson MD;  Location: [facility];  Service: General;  Laterality: Right;    CARDIAC SURGERY  2021    ENDOSCOPIC RELEASE OF BOTH CARPAL TUNNELS      FUSION OF POSTERIOR COLUMN OF CERVICAL SPINE USING COMPUTER AIDED NAVIGATION N/A 6/26/2023    Procedure: FUSION, SPINE, POSTERIOR SPINAL COLUMN, CERVICAL, USING COMPUTER-ASSISTED NAVIGATION;  Surgeon: Montserrat Aviles MD;  Location: [facility];  Service: Neurosurgery;  Laterality: N/A;  C3-4, C4-5, C5-6 laminectomies and fusion, o-arm, stealth  TIVA setup  NTI //  XX       PAST SOCIAL HISTORY:   reports that he has quit smoking. His smoking use included cigarettes. He has quit using smokeless tobacco. He reports that he does not currently use alcohol. He reports that he does not use drugs.    FAMILY HISTORY:  Family History    Problem Relation Age of Onset    Hypertension Mother     Diabetes Mother     Anemia Mother     Heart disease Mother     Heart disease Father        CURRENT MEDICATIONS:   Current Outpatient Medications   Medication Sig    acyclovir (ZOVIRAX) 400 MG tablet TAKE 1 TABLET BY MOUTH TWICE A DAY    apixaban (ELIQUIS) 5 mg Tab Take 1 tablet (5 mg total) by mouth 2 (two) times daily.    atorvastatin (LIPITOR) 40 MG tablet TAKE 1 TABLET ORALLY ONCE DAILY ON MON/TUE/THUR/FRI AND 1/2 ON WED. NONE ON SAT/SUN    clopidogreL (PLAVIX) 75 mg tablet Take 75 mg by mouth once daily.    DULoxetine (CYMBALTA) 30 MG capsule TAKE 1 CAPSULE BY MOUTH EVERY DAY    dutasteride (AVODART) 0.5 mg capsule Take 0.5 mg by mouth once daily.    FERRETTS 325 mg (106 mg iron) Tab Take 1 tablet by mouth 2 (two) times daily.    gabapentin (NEURONTIN) 400 MG capsule Take 2 capsules (800 mg total) by mouth 3 (three) times daily. for 7 days (Patient taking differently: Take 800 mg by mouth 2 (two) times daily.)    HYDROcodone-acetaminophen (NORCO)  mg per tablet Take 10 tablets by mouth once daily.    lisinopriL (PRINIVIL,ZESTRIL) 20 MG tablet Take 20 mg by mouth.    metoprolol tartrate (LOPRESSOR) 25 MG tablet Take 1 tablet (25 mg total) by mouth 2 (two) times daily.    tamsulosin (FLOMAX) 0.4 mg Cap Take 2 capsules (0.8 mg total) by mouth every evening.    testosterone cypionate (DEPOTESTOTERONE CYPIONATE) 200 mg/mL injection INJECT 1 MLS (200 MG) INTRAMUSCULARLY ONCE WEEKLY.    zolpidem (AMBIEN) 10 mg Tab Take 10 mg by mouth nightly as needed.     No current facility-administered medications for this visit.     ALLERGIES:   Review of patient's allergies indicates:   Allergen Reactions    Penicillins              REVIEW OF SYSTEMS:   See HPI     PHYSICAL EXAM:   physical exam deferred due to telemed   Appears well and below stated age on camera     ECOG Performance Status: (foot note - ECOG PS provided by Eastern Cooperative Oncology Group) 1 -  "Symptomatic but completely ambulatory    Karnofsky Performance Score:  80%- Normal Activity with Effort: Some Symptoms of Disease  DATA:   Lab Results   Component Value Date    WBC 9.25 11/10/2023    HGB 13.1 (L) 11/10/2023    HCT 42.7 11/10/2023    .5 (H) 11/10/2023     11/10/2023       No results found for: "GRAN"  CMP  Sodium   Date Value Ref Range Status   04/24/2023 139 136 - 145 mmol/L Final     Sodium Level   Date Value Ref Range Status   11/10/2023 141 136 - 145 mmol/L Final     Potassium   Date Value Ref Range Status   04/24/2023 3.7 3.5 - 5.1 mmol/L Final     Potassium Level   Date Value Ref Range Status   11/10/2023 4.1 3.5 - 5.1 mmol/L Final     Chloride   Date Value Ref Range Status   04/24/2023 108 100 - 109 mmol/L Final     Carbon Dioxide   Date Value Ref Range Status   11/10/2023 28 23 - 31 mmol/L Final   04/24/2023 25 22 - 33 mmol/L Final     Blood Urea Nitrogen   Date Value Ref Range Status   11/10/2023 12.3 8.4 - 25.7 mg/dL Final   04/24/2023 14 7 - 18 mg/dL Final     Creatinine   Date Value Ref Range Status   11/10/2023 1.00 0.73 - 1.18 mg/dL Final   04/24/2023 0.96 0.57 - 1.25 mg/dL Final     Calcium   Date Value Ref Range Status   04/24/2023 8.5 (L) 8.8 - 10.6 mg/dL Final     Calcium Level Total   Date Value Ref Range Status   11/10/2023 9.5 8.8 - 10.0 mg/dL Final     Albumin Level   Date Value Ref Range Status   11/10/2023 3.7 3.4 - 4.8 g/dL Final   11/10/2023 3.2 (L) 3.4 - 4.8 g/dL Final     Bilirubin Total   Date Value Ref Range Status   11/10/2023 0.7 <=1.5 mg/dL Final     Alkaline Phosphatase   Date Value Ref Range Status   11/10/2023 76 40 - 150 unit/L Final     Aspartate Aminotransferase   Date Value Ref Range Status   11/10/2023 16 5 - 34 unit/L Final     Alanine Aminotransferase   Date Value Ref Range Status   11/10/2023 26 0 - 55 unit/L Final     Anion Gap   Date Value Ref Range Status   04/24/2023 6 (L) 8 - 16 mmol/L Final     eGFR   Date Value Ref Range Status "   11/10/2023 >60 mls/min/1.73/m2 Final     IgM Level   Date Value Ref Range Status   11/10/2023 42.0 22.0 - 240.0 mg/dL Final           Encounter Diagnosis   Name Primary?    Multiple myeloma, remission status unspecified Yes           1)Multiple myeloma  -IgG kappa MM ; diagnosed sept 2022 after referral to heme onc  for anemia; fish/CG normal cw with standard risk disease  -meets treatment criteria based on anemia and 70% clonal plasma cells in marrow; no hyperCa, renal dysfunction, or bone disease on WB PET  -has initiated Vrd therapy and completed 1 cycle with excellent biochemical response and good tolerance; at our jan 2023 appt I recommended addition of vita to vRD which was done  -he has completed 5 cycles of Vita-VRd therapy achieving VGRP to therapy; pt adamantly declined SCT on multiple occasions due to logistical and safety concerns  - required admission for NSTEMI so recommended stopping revlimid and transitioned  to q 4 weeks vita/q 2 week velcade maintenance which he was receiving until June 2023  -all myeloma had to be held since June 2023 due to urgent admit  and surgery for severe spinal cervical stenosis (unrelated to myeloma) which has been surgically corrected and pt now home from rehab doing outpt PT  -he has no overt clinical signs of progression off therapy ; last biochemical studies for his myeloma  11/10/23    cw continued VGPR  -he had a normal PET scan 11/10/23   -given pt wishes for treatment free period and sustained excellent disease control, I think it is reasonable to monitor pt closely off of all therapy and check cbc, cmp, serum free light chains, quantitative immunoglobulins, serum electropheresis, serum immunofixation monthly and will recommend retreatment at progression ; I will continue to check in with pt q 3 months virtually   -since no cytopenias and off immunosuppressive therapy now would be good time to proceed with any desired shoulder surgery     2)CV/CAD  -will maintain  all current CV medications  -per cardiology      3) PE  -recommend stays on eliquis indefinitely while on imid or with active MM  -he is on asa, plavix, (per cardiologist for CAD); I recommended he call his cardiologist asap to notify him of eliquis and possible stop plavix and or aspirin    Follow Up:  -with Dr. Longo as above  -virtual MD appt with me in  3-4  months      Juan Brito MD  Hematology/Oncology/Bone Marrow Transplant

## 2023-12-12 ENCOUNTER — LAB VISIT (OUTPATIENT)
Dept: LAB | Facility: HOSPITAL | Age: 73
End: 2023-12-12
Attending: INTERNAL MEDICINE
Payer: MEDICARE

## 2023-12-12 DIAGNOSIS — N40.0 BENIGN PROSTATIC HYPERPLASIA, UNSPECIFIED WHETHER LOWER URINARY TRACT SYMPTOMS PRESENT: ICD-10-CM

## 2023-12-12 DIAGNOSIS — C90.00 MULTIPLE MYELOMA NOT HAVING ACHIEVED REMISSION: ICD-10-CM

## 2023-12-12 DIAGNOSIS — R53.83 FATIGUE, UNSPECIFIED TYPE: ICD-10-CM

## 2023-12-12 DIAGNOSIS — N81.0 FEMALE URETHROCELE: ICD-10-CM

## 2023-12-12 DIAGNOSIS — R94.8 NONSPECIFIC ABNORMAL RESULTS OF BASAL METABOLISM FUNCTION STUDY: Primary | ICD-10-CM

## 2023-12-12 DIAGNOSIS — E55.9 AVITAMINOSIS D: ICD-10-CM

## 2023-12-12 DIAGNOSIS — E11.9 DIABETES MELLITUS WITHOUT COMPLICATION: ICD-10-CM

## 2023-12-12 DIAGNOSIS — Z79.899 PATIENT ON ANTINEOPLASTIC CHEMOTHERAPY REGIMEN: ICD-10-CM

## 2023-12-12 DIAGNOSIS — Z00.00 ROUTINE GENERAL MEDICAL EXAMINATION AT A HEALTH CARE FACILITY: ICD-10-CM

## 2023-12-12 DIAGNOSIS — Z12.5 SPECIAL SCREENING FOR MALIGNANT NEOPLASM OF PROSTATE: ICD-10-CM

## 2023-12-12 LAB
ALBUMIN SERPL-MCNC: 3.5 G/DL (ref 3.4–4.8)
ALBUMIN/GLOB SERPL: 1.2 RATIO (ref 1.1–2)
ALP SERPL-CCNC: 63 UNIT/L (ref 40–150)
ALT SERPL-CCNC: 25 UNIT/L (ref 0–55)
AST SERPL-CCNC: 18 UNIT/L (ref 5–34)
BASOPHILS # BLD AUTO: 0.02 X10(3)/MCL
BASOPHILS NFR BLD AUTO: 0.2 %
BILIRUB SERPL-MCNC: 0.9 MG/DL
BUN SERPL-MCNC: 11.2 MG/DL (ref 8.4–25.7)
CALCIUM SERPL-MCNC: 9.2 MG/DL (ref 8.8–10)
CHLORIDE SERPL-SCNC: 109 MMOL/L (ref 98–107)
CHOLEST SERPL-MCNC: 133 MG/DL
CHOLEST/HDLC SERPL: 4 {RATIO} (ref 0–5)
CO2 SERPL-SCNC: 25 MMOL/L (ref 23–31)
CREAT SERPL-MCNC: 0.98 MG/DL (ref 0.73–1.18)
DEPRECATED CALCIDIOL+CALCIFEROL SERPL-MC: 15.7 NG/ML (ref 30–80)
EOSINOPHIL # BLD AUTO: 0.03 X10(3)/MCL (ref 0–0.9)
EOSINOPHIL NFR BLD AUTO: 0.3 %
ERYTHROCYTE [DISTWIDTH] IN BLOOD BY AUTOMATED COUNT: 14.6 % (ref 11.5–17)
ERYTHROCYTE [SEDIMENTATION RATE] IN BLOOD: 32 MM/HR (ref 0–15)
EST. AVERAGE GLUCOSE BLD GHB EST-MCNC: 96.8 MG/DL
GFR SERPLBLD CREATININE-BSD FMLA CKD-EPI: >60 MLS/MIN/1.73/M2
GLOBULIN SER-MCNC: 3 GM/DL (ref 2.4–3.5)
GLUCOSE SERPL-MCNC: 102 MG/DL (ref 82–115)
HBA1C MFR BLD: 5 %
HCT VFR BLD AUTO: 43.5 % (ref 42–52)
HDLC SERPL-MCNC: 32 MG/DL (ref 35–60)
HGB BLD-MCNC: 14.1 G/DL (ref 14–18)
IMM GRANULOCYTES # BLD AUTO: 0.04 X10(3)/MCL (ref 0–0.04)
IMM GRANULOCYTES NFR BLD AUTO: 0.4 %
LDLC SERPL CALC-MCNC: 76 MG/DL (ref 50–140)
LYMPHOCYTES # BLD AUTO: 1.75 X10(3)/MCL (ref 0.6–4.6)
LYMPHOCYTES NFR BLD AUTO: 19.3 %
MCH RBC QN AUTO: 31.8 PG (ref 27–31)
MCHC RBC AUTO-ENTMCNC: 32.4 G/DL (ref 33–36)
MCV RBC AUTO: 98 FL (ref 80–94)
MONOCYTES # BLD AUTO: 0.72 X10(3)/MCL (ref 0.1–1.3)
MONOCYTES NFR BLD AUTO: 7.9 %
NEUTROPHILS # BLD AUTO: 6.52 X10(3)/MCL (ref 2.1–9.2)
NEUTROPHILS NFR BLD AUTO: 71.9 %
NRBC BLD AUTO-RTO: 0 %
PLATELET # BLD AUTO: 238 X10(3)/MCL (ref 130–400)
PMV BLD AUTO: 8.7 FL (ref 7.4–10.4)
POTASSIUM SERPL-SCNC: 4.3 MMOL/L (ref 3.5–5.1)
PROT SERPL-MCNC: 6.5 GM/DL (ref 5.8–7.6)
PSA SERPL-MCNC: 13.84 NG/ML
RBC # BLD AUTO: 4.44 X10(6)/MCL (ref 4.7–6.1)
SODIUM SERPL-SCNC: 139 MMOL/L (ref 136–145)
T4 FREE SERPL-MCNC: 0.8 NG/DL (ref 0.7–1.48)
TRIGL SERPL-MCNC: 125 MG/DL (ref 34–140)
TSH SERPL-ACNC: 0.72 UIU/ML (ref 0.35–4.94)
URATE SERPL-MCNC: 5.1 MG/DL (ref 3.5–7.2)
VLDLC SERPL CALC-MCNC: 25 MG/DL
WBC # SPEC AUTO: 9.08 X10(3)/MCL (ref 4.5–11.5)

## 2023-12-12 PROCEDURE — 80053 COMPREHEN METABOLIC PANEL: CPT

## 2023-12-12 PROCEDURE — 84550 ASSAY OF BLOOD/URIC ACID: CPT

## 2023-12-12 PROCEDURE — 82306 VITAMIN D 25 HYDROXY: CPT

## 2023-12-12 PROCEDURE — 80061 LIPID PANEL: CPT

## 2023-12-12 PROCEDURE — 86039 ANTINUCLEAR ANTIBODIES (ANA): CPT

## 2023-12-12 PROCEDURE — 83036 HEMOGLOBIN GLYCOSYLATED A1C: CPT

## 2023-12-12 PROCEDURE — 84443 ASSAY THYROID STIM HORMONE: CPT

## 2023-12-12 PROCEDURE — 86431 RHEUMATOID FACTOR QUANT: CPT

## 2023-12-12 PROCEDURE — 36415 COLL VENOUS BLD VENIPUNCTURE: CPT

## 2023-12-12 PROCEDURE — 84153 ASSAY OF PSA TOTAL: CPT

## 2023-12-12 PROCEDURE — 85652 RBC SED RATE AUTOMATED: CPT

## 2023-12-12 PROCEDURE — 86200 CCP ANTIBODY: CPT

## 2023-12-12 PROCEDURE — 85025 COMPLETE CBC W/AUTO DIFF WBC: CPT

## 2023-12-12 PROCEDURE — 86431 RHEUMATOID FACTOR QUANT: CPT | Mod: 91

## 2023-12-12 PROCEDURE — 84439 ASSAY OF FREE THYROXINE: CPT

## 2023-12-14 LAB — ANA SER QL HEP2 SUBST: NORMAL

## 2023-12-15 ENCOUNTER — LAB VISIT (OUTPATIENT)
Dept: LAB | Facility: HOSPITAL | Age: 73
End: 2023-12-15
Attending: INTERNAL MEDICINE
Payer: MEDICARE

## 2023-12-15 DIAGNOSIS — C90.00 MULTIPLE MYELOMA, REMISSION STATUS UNSPECIFIED: ICD-10-CM

## 2023-12-15 LAB
ALBUMIN SERPL-MCNC: 3.6 G/DL (ref 3.4–4.8)
ALBUMIN/GLOB SERPL: 1.2 RATIO (ref 1.1–2)
ALP SERPL-CCNC: 67 UNIT/L (ref 40–150)
ALT SERPL-CCNC: 33 UNIT/L (ref 0–55)
AST SERPL-CCNC: 18 UNIT/L (ref 5–34)
BASOPHILS # BLD AUTO: 0.02 X10(3)/MCL
BASOPHILS NFR BLD AUTO: 0.3 %
BILIRUB SERPL-MCNC: 0.7 MG/DL
BUN SERPL-MCNC: 12.7 MG/DL (ref 8.4–25.7)
CALCIUM SERPL-MCNC: 9.1 MG/DL (ref 8.8–10)
CHLORIDE SERPL-SCNC: 107 MMOL/L (ref 98–107)
CO2 SERPL-SCNC: 26 MMOL/L (ref 23–31)
CREAT SERPL-MCNC: 1.01 MG/DL (ref 0.73–1.18)
CYCLIC CITRULLINATED PEPTIDE (CCP) (OHS): 0.5 U/ML
EOSINOPHIL # BLD AUTO: 0.12 X10(3)/MCL (ref 0–0.9)
EOSINOPHIL NFR BLD AUTO: 1.7 %
ERYTHROCYTE [DISTWIDTH] IN BLOOD BY AUTOMATED COUNT: 14.5 % (ref 11.5–17)
GFR SERPLBLD CREATININE-BSD FMLA CKD-EPI: >60 MLS/MIN/1.73/M2
GLOBULIN SER-MCNC: 2.9 GM/DL (ref 2.4–3.5)
GLUCOSE SERPL-MCNC: 111 MG/DL (ref 82–115)
HCT VFR BLD AUTO: 42.2 % (ref 42–52)
HGB BLD-MCNC: 13.7 G/DL (ref 14–18)
IGA SERPL-MCNC: 37 MG/DL (ref 101–645)
IGG SERPL-MCNC: 1560 MG/DL (ref 540–1822)
IGM SERPL-MCNC: 107 MG/DL (ref 22–240)
IMM GRANULOCYTES # BLD AUTO: 0.02 X10(3)/MCL (ref 0–0.04)
IMM GRANULOCYTES NFR BLD AUTO: 0.3 %
LYMPHOCYTES # BLD AUTO: 1.95 X10(3)/MCL (ref 0.6–4.6)
LYMPHOCYTES NFR BLD AUTO: 27.8 %
MCH RBC QN AUTO: 31.9 PG (ref 27–31)
MCHC RBC AUTO-ENTMCNC: 32.5 G/DL (ref 33–36)
MCV RBC AUTO: 98.4 FL (ref 80–94)
MONOCYTES # BLD AUTO: 0.5 X10(3)/MCL (ref 0.1–1.3)
MONOCYTES NFR BLD AUTO: 7.1 %
NEUTROPHILS # BLD AUTO: 4.4 X10(3)/MCL (ref 2.1–9.2)
NEUTROPHILS NFR BLD AUTO: 62.8 %
PLATELET # BLD AUTO: 254 X10(3)/MCL (ref 130–400)
PMV BLD AUTO: 8.8 FL (ref 7.4–10.4)
POTASSIUM SERPL-SCNC: 4 MMOL/L (ref 3.5–5.1)
PROT SERPL-MCNC: 6.5 GM/DL (ref 5.8–7.6)
RBC # BLD AUTO: 4.29 X10(6)/MCL (ref 4.7–6.1)
RHEUMATOID FACTOR IGA QUANTITATIVE (OLG): <0.6 IU/ML
RHEUMATOID FACTOR IGA QUANTITATIVE (OLG): <0.6 IU/ML
RHEUMATOID FACTOR IGM QUANTITATIVE (OLG): 1.8 IU/ML
SODIUM SERPL-SCNC: 140 MMOL/L (ref 136–145)
WBC # SPEC AUTO: 7.01 X10(3)/MCL (ref 4.5–11.5)

## 2023-12-15 PROCEDURE — 84165 PROTEIN E-PHORESIS SERUM: CPT

## 2023-12-15 PROCEDURE — 83521 IG LIGHT CHAINS FREE EACH: CPT | Mod: 59

## 2023-12-15 PROCEDURE — 86334 IMMUNOFIX E-PHORESIS SERUM: CPT

## 2023-12-15 PROCEDURE — 80053 COMPREHEN METABOLIC PANEL: CPT

## 2023-12-15 PROCEDURE — 82784 ASSAY IGA/IGD/IGG/IGM EACH: CPT

## 2023-12-15 PROCEDURE — 36415 COLL VENOUS BLD VENIPUNCTURE: CPT

## 2023-12-15 PROCEDURE — 85025 COMPLETE CBC W/AUTO DIFF WBC: CPT

## 2023-12-18 LAB
ALBUMIN % SPEP (OHS): 49.77
ALBUMIN SERPL-MCNC: 3.3 G/DL (ref 3.4–4.8)
ALBUMIN/GLOB SERPL: 1 RATIO (ref 1.1–2)
ALPHA 1 GLOB (OHS): 0.24 GM/DL
ALPHA 1 GLOB% (OHS): 3.55
ALPHA 2 GLOB % (OHS): 12.01
ALPHA 2 GLOB (OHS): 0.8 GM/DL
BETA GLOB (OHS): 0.88 GM/DL
BETA GLOB% (OHS): 13.13
GAMMA GLOBULIN % (OHS): 21.55
GAMMA GLOBULIN (OHS): 1.44 GM/DL
GLOBULIN SER-MCNC: 3.4 GM/DL (ref 2.4–3.5)
KAPPA LC FREE SER-MCNC: 3.57 MG/DL (ref 0.33–1.94)
KAPPA LC FREE/LAMBDA FREE SER: 6.26 {RATIO} (ref 0.26–1.65)
LAMBDA LC FREE SERPL-MCNC: 0.57 MG/DL (ref 0.57–2.63)
M SPIKE % (OHS): 10.91
M SPIKE (OHS): 0.73 GM/DL
PATH REV: NORMAL
PROT SERPL-MCNC: 6.7 GM/DL (ref 5.8–7.6)

## 2023-12-28 ENCOUNTER — OFFICE VISIT (OUTPATIENT)
Dept: HEMATOLOGY/ONCOLOGY | Facility: CLINIC | Age: 73
End: 2023-12-28
Payer: MEDICARE

## 2023-12-28 VITALS
BODY MASS INDEX: 27.39 KG/M2 | WEIGHT: 206.63 LBS | OXYGEN SATURATION: 100 % | DIASTOLIC BLOOD PRESSURE: 76 MMHG | TEMPERATURE: 98 F | HEART RATE: 64 BPM | HEIGHT: 73 IN | SYSTOLIC BLOOD PRESSURE: 125 MMHG | RESPIRATION RATE: 20 BRPM

## 2023-12-28 DIAGNOSIS — G62.9 NEUROPATHY: ICD-10-CM

## 2023-12-28 DIAGNOSIS — C90.00 MULTIPLE MYELOMA NOT HAVING ACHIEVED REMISSION: Primary | ICD-10-CM

## 2023-12-28 PROCEDURE — 99214 PR OFFICE/OUTPT VISIT, EST, LEVL IV, 30-39 MIN: ICD-10-PCS | Mod: S$PBB,,, | Performed by: INTERNAL MEDICINE

## 2023-12-28 PROCEDURE — 99999 PR PBB SHADOW E&M-EST. PATIENT-LVL IV: CPT | Mod: PBBFAC,,, | Performed by: INTERNAL MEDICINE

## 2023-12-28 PROCEDURE — 99999 PR PBB SHADOW E&M-EST. PATIENT-LVL IV: ICD-10-PCS | Mod: PBBFAC,,, | Performed by: INTERNAL MEDICINE

## 2023-12-28 PROCEDURE — 99214 OFFICE O/P EST MOD 30 MIN: CPT | Mod: S$PBB,,, | Performed by: INTERNAL MEDICINE

## 2023-12-28 PROCEDURE — 99214 OFFICE O/P EST MOD 30 MIN: CPT | Mod: PBBFAC | Performed by: INTERNAL MEDICINE

## 2023-12-28 RX ORDER — DULOXETIN HYDROCHLORIDE 30 MG/1
30 CAPSULE, DELAYED RELEASE ORAL DAILY
Qty: 30 CAPSULE | Refills: 3 | Status: SHIPPED | OUTPATIENT
Start: 2023-12-28

## 2023-12-28 NOTE — PROGRESS NOTES
HEMATOLOGY/ONCOLOGY OFFICE CLINIC VISIT    Visit Information:    Initial Evaluation: 8/10/2022  Referring Provider:   Other providers: Dr Brito  Code status: Not addressed    Diagnosis:  Multiple Myeloma--IgG, Kappa  Macrocytic anemia    Present treatment:   VRD-Started on 12/9/22 --on hold since 6/2023  Daratumumab added on 1/27/23 ----on hold since 6/2023    Treatment/Oncology history:  VRD-Started on 12/9/22  Daratumumab added on 1/27/23      Plan of care:  systemic treatment    Imaging:   US abdomen 8/24/2022: Spleen: 10.8 cm. Mild hepatomegaly with suspected mild hepatic steatosis. Cholelithiasis.  PET CT 9/28/2022: 1. Right scapular small focal mild hypermetabolism without lytic or sclerotic abnormality is not convinced to be related to multiple myeloma. 2. No definite skeletal lytic abnormality with hypermetabolism to reflect multiple myeloma on this exam. 3. Bilateral small pleural effusions.   4. Gallstones. 5.  Noninflamed diverticulosis coli. 6.  Prostatomegaly.  WB PET CT 11/10/2023:  No FDG avid malignancy is present on today's exam.      Pathology:  BONE MARROW BIOPSY 9/1/2022: PLASMA CELL MYELOMA, KAPPA RESTRICTED. NORMOCELLULAR MARROW (30%) WITH 70% INVOLVEMENT BY PLASMA CELL MYELOMA. BACKGROUND TRILINEAGE HEMATOPOIESIS IS DECREASED.  Bone Marrow Biopsy 9/12/2023: 1. BONE MARROW, RIGHT POSTERIOR ILIAC CREST, ASPIRATE, CLOT, DECALCIFIED CORE   BIOPSY:    PERSISTENT/RESIDUAL PLASMA CELL NEOPLASM, KAPPA LIGHT CHAIN RESTRICTED.    NORMOCELLULAR MARROW (30%) WITH 5% INVOLVEMENT BY KAPPA RESTRICTED PLASMA CELL   NEOPLASM.    SEQUENTIAL TRILINEAGE HEMATOPOIESIS PRESENT.    INCREASED IRON STORES.    PERIPHERAL BLOOD :    NORMOCYTIC NORMOCHROMIC ANEMIA.    2. SEE DIAGNOSIS #1    CODE 8    Electronically Signed by:   Markell Farmer M.D. , Pathologist   (Case signed 09/19/2023 at 01:50pm)    Comment   A) The patient's history of IgG kappa multiple myeloma treated with VRD and   daratumumab is noted from  "the electronic medical record.    B) The bone marrow is normocellular for age demonstrates a small residual   population of kappa restricted plasma cells (5%).  Background hematopoiesis is   sequential and trilineage.    C) Additional material submitted to [facility] for MRD myeloma flow   demonstrated monotypic kappa plasma cells (MRD=0.1781% or 1781 cells/million).     CLINICAL HISTORY:       Patient: Valentino Manning is a 73 y.o. male with multiple medical problems kindly referred for persistent anemia.  Reviewing electronic medical record patient with anemia since 02/10/2016.  From 2016 to January of 2017 anemia was mild.  Anemia slowly worsening back in 2018 hemoglobin was 8.0.  At that same time kidney function was worsening as well. Labs with HGB of 9.4, .9, platelets 231 K, WBC 5.0.  Chemistries with globulin of 5.3.  BUN/creatinine 27.7/1.41.    Further workup revealed the diagnosis of multiple myeloma.  Patient was started on treatment with Velcade, Revlimid and dexamethasone on 12/09/2022 and daratumumab was added on 01/27/2023 with significant improvement of his disease.  He was seen in [place] for possible bone marrow transplant but he was not interested in bone marrow transplant at the time.  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  Gabapentin was not helping.     Treatment was delayed 1st due to COVID-19 in February of 2023, he has few treatments in between but it was delayed again due to NSTEMI . Revlimid was stopped due to his heart disease and plan was to continue daratumumab and Velcade lower dose for neuropathy.      Patient continued to decline with worsening of his weakness.  Home health called to let us know that patient was weak and unable to get out of bed. I called the patient Thursday 6/8/2023: "I personally spoke with the patient and  instructed to go to the ER as his weakness and neuropathy is worsening and he reports that he went to the ER in " [place] because he could not urinate and a gongora cath was placed and send home.   He reports that is very difficult for him to get out of bed and he can not seeing himself going to his room to the car. I told him he will need to call an ambulance to transport him to the hospital and to come to [facility]. Patient with debilitating neuropathy of unclear etiology--not typical for MM and/or treatment for MM. I told him that may need MRI's of spine and/or brain and neurologist evaluation. Patient verbalized understanding and he will planned to go to the ER tomorrow not today."      Patient eventually went to the ER.   MRI T spine 6/12/2023:  1. No acute abnormality of the thoracic spine. 2. Mild multilevel canal and neural foraminal narrowing. 3. No cord signal abnormality.  MRI L spine: 1. Severe degenerative spinal canal stenosis at L3-L4 and L4-L5, moderate at L2-L3, mild at L5-S1. 2. Multilevel neural foraminal stenoses as described.  MRI C spine: 1. Severe degenerative spinal canal stenosis at L3-L4 and L4-L5, moderate at L2-L3, mild at L5-S1. 2. Multilevel neural foraminal stenoses as described.  MRIs of the spine.  No evidence of plasmacytoma, mass or cord compression due to Malignancy.     Patient with cervical myelopathy and stenosis.  He was seen by Neurosurgery and on 6/26/2023 underwent Decompressive laminectomies at C3-4, C4-5, and C5-6. He was then transfer to [facility] in [place] for Rehab.     Interval History:    Recommendations form Dr Brito prior to his surgery and holding Tx: Velcade be dosed now every 2 weeks at decreased dose of 1mg/m2 given PN; once completed the 6 cycles ; would transition at least 1 year velcade monotherapy  maintenance q 2 weeks at 1mg/m2. He recommended no further revlimid in light of known CAD and NSTEMI and "transition therapy to daratumumab (Complete 8 total doses of EOW dosing) then transition gabriella subq to q 4 weeks dosing and velcade 1mg/m2 q 2 weeks maintenance " "for minimum of 2 years. Close attention to PN if worsens recommend decrease velcade to 0.7 mg/m2. -ppx acyc, antiplt agents per cardiologist at [facility]"   On 11/17/2023, Dr Brito: "-all myeloma had to be held since June 2023 due to urgent admit  and surgery for severe spinal cervical stenosis (unrelated to myeloma) which has been surgically corrected and pt now home from rehab doing outpt PT  -he has no overt clinical signs of progression off therapy ; last biochemical studies for his myeloma  11/10/23 cw continued VGPR  -he had a normal PET scan 11/10/23   -given pt wishes for treatment free period and sustained excellent disease control, I think it is reasonable to monitor pt closely off of all therapy and check cbc, cmp, serum free light chains, quantitative immunoglobulins, serum electropheresis, serum immunofixation monthly and will recommend retreatment at progression ; I will continue to check in with pt q 3 months virtually   -since no cytopenias and off immunosuppressive therapy now would be good time to proceed with any desired shoulder surgery"    12/28/2023: Patient presents today for follow up of his MM to discuss resumption of treatment, accompanied by his wife. He says he's doing much better. He is not in a wheelchair today, using a cane to aid in ambulation. He also has a rollator if needed. He reports he started driving 2 weeks ago. He continues with outpatient  PT three days per week.  He continues with neuropathy, no worsening, making it difficult to walk. Has tried Cymbalta(short-term) and Gabapentin in the past, but they did not improve neuropathy symptoms.  Overall, he is doing fair. He is scheduled for  right shoulder surgery Feb. 2024 with Dr. Dan. He is requesting to delay treatment if possible until after the holidays.   He continues to follow Dr. Brito. He wife reports he is depressed. Started taking an old prescription of Buspar (prescribed by PCP).        Past Medical History: "   Diagnosis Date    Anemia     GERD (gastroesophageal reflux disease)     Heart problem     Hyperlipidemia     Hypertension     Multiple myeloma     NSTEMI (non-ST elevated myocardial infarction)       Past Surgical History:   Procedure Laterality Date    BONE MARROW ASPIRATION N/A 9/1/2022    Procedure: ASPIRATION, BONE MARROW;  Surgeon: Robbie Gardner MD;  Location: [facility];  Service: General;  Laterality: N/A;    BONE MARROW BIOPSY Right 9/12/2023    Procedure: Biopsy-bone marrow;  Surgeon: Russ Wilson MD;  Location: [facility];  Service: General;  Laterality: Right;    CARDIAC SURGERY  2021    ENDOSCOPIC RELEASE OF BOTH CARPAL TUNNELS      FUSION OF POSTERIOR COLUMN OF CERVICAL SPINE USING COMPUTER AIDED NAVIGATION N/A 6/26/2023    Procedure: FUSION, SPINE, POSTERIOR SPINAL COLUMN, CERVICAL, USING COMPUTER-ASSISTED NAVIGATION;  Surgeon: Montserrat Aviles MD;  Location: [facility];  Service: Neurosurgery;  Laterality: N/A;  C3-4, C4-5, C5-6 laminectomies and fusion, o-arm, stealth  TIVA setup  NTI //  XX     Family History   Problem Relation Age of Onset    Hypertension Mother     Diabetes Mother     Anemia Mother     Heart disease Mother     Heart disease Father      Social Connections: Not on file       Review of patient's allergies indicates:   Allergen Reactions    Penicillins       Current Outpatient Medications on File Prior to Visit   Medication Sig Dispense Refill    acyclovir (ZOVIRAX) 400 MG tablet TAKE 1 TABLET BY MOUTH TWICE A  tablet 1    apixaban (ELIQUIS) 5 mg Tab Take 1 tablet (5 mg total) by mouth 2 (two) times daily. 60 tablet 6    atorvastatin (LIPITOR) 40 MG tablet TAKE 1 TABLET ORALLY ONCE DAILY ON MON/TUE/THUR/FRI AND 1/2 ON WED. NONE ON SAT/SUN      clopidogreL (PLAVIX) 75 mg tablet Take 75 mg by mouth once daily.      dutasteride (AVODART) 0.5 mg capsule Take 0.5 mg by mouth once daily.      FERRETTS 325 mg (106 mg iron) Tab Take 1 tablet by mouth 2 (two) times daily.       HYDROcodone-acetaminophen (NORCO)  mg per tablet Take 10 tablets by mouth once daily.      lisinopriL (PRINIVIL,ZESTRIL) 20 MG tablet Take 20 mg by mouth.      metoprolol tartrate (LOPRESSOR) 25 MG tablet Take 1 tablet (25 mg total) by mouth 2 (two) times daily. 60 tablet 11    tamsulosin (FLOMAX) 0.4 mg Cap Take 2 capsules (0.8 mg total) by mouth every evening. 60 capsule 11    testosterone cypionate (DEPOTESTOTERONE CYPIONATE) 200 mg/mL injection INJECT 1 MLS (200 MG) INTRAMUSCULARLY ONCE WEEKLY.      zolpidem (AMBIEN) 10 mg Tab Take 10 mg by mouth nightly as needed.      [DISCONTINUED] DULoxetine (CYMBALTA) 30 MG capsule TAKE 1 CAPSULE BY MOUTH EVERY DAY 30 capsule 1    gabapentin (NEURONTIN) 400 MG capsule Take 2 capsules (800 mg total) by mouth 3 (three) times daily. for 7 days (Patient taking differently: Take 800 mg by mouth 2 (two) times daily.) 42 capsule 0     No current facility-administered medications on file prior to visit.      Review of Systems   Constitutional:  Positive for fatigue. Negative for activity change, appetite change, chills, diaphoresis, fever and unexpected weight change.   HENT:  Negative for nasal congestion, mouth sores, nosebleeds, postnasal drip, sinus pressure/congestion, sore throat and trouble swallowing.    Eyes:  Negative for visual disturbance.   Respiratory:  Positive for shortness of breath (on excertion). Negative for cough and wheezing.    Cardiovascular:  Negative for chest pain and palpitations.   Gastrointestinal:  Negative for abdominal distention, abdominal pain, blood in stool, change in bowel habit, constipation, diarrhea, nausea and vomiting.   Endocrine: Negative.    Genitourinary:  Negative for bladder incontinence, decreased urine volume, difficulty urinating, dysuria, frequency, hematuria, scrotal swelling, testicular pain and urgency.   Musculoskeletal:  Negative for arthralgias, back pain, gait problem, joint swelling, leg pain, myalgias and neck  "pain.   Integumentary:  Negative for rash.   Neurological:  Positive for tremors and weakness. Negative for dizziness, seizures, syncope, speech difficulty, light-headedness, numbness, headaches and memory loss.        Neuropathy   Hematological:  Does not bruise/bleed easily.   Psychiatric/Behavioral:  Negative for agitation, confusion, hallucinations, sleep disturbance and suicidal ideas. The patient is not nervous/anxious.               Vitals:    12/28/23 0858   BP: 125/76   BP Location: Left arm   Patient Position: Sitting   Pulse: 64   Resp: 20   Temp: 97.6 °F (36.4 °C)   TempSrc: Oral   SpO2: 100%   Weight: 93.7 kg (206 lb 9.6 oz)   Height: 6' 1" (1.854 m)     Wt Readings from Last 6 Encounters:   12/28/23 93.7 kg (206 lb 9.6 oz)   11/16/23 87.5 kg (192 lb 12.8 oz)   09/21/23 81.6 kg (180 lb)   09/12/23 80.1 kg (176 lb 9.4 oz)   09/07/23 82.3 kg (181 lb 8 oz)   08/24/23 86.2 kg (190 lb)     Body mass index is 27.26 kg/m².  Body surface area is 2.2 meters squared.       Physical Exam  HENT:      Head: Normocephalic and atraumatic.   Eyes:      Extraocular Movements: Extraocular movements intact.   Pulmonary:      Effort: Pulmonary effort is normal.      Breath sounds: Normal breath sounds. No wheezing.   Abdominal:      General: Bowel sounds are normal.      Palpations: Abdomen is soft.   Musculoskeletal:      Cervical back: Neck supple.   Neurological:      Mental Status: He is alert and oriented to person, place, and time.      Cranial Nerves: Cranial nerves 2-12 are intact.      Sensory: No sensory deficit.      Motor: Weakness present.      Gait: Gait abnormal.   Psychiatric:         Mood and Affect: Mood normal.         Behavior: Behavior normal. Behavior is cooperative.         Judgment: Judgment normal.       ECOG SCORE    1 - Restricted in strenuous activity-ambulatory and able to carry out work of a light nature     ECOG 1/2    Laboratory:  CBC with Differential:  Lab Results   Component Value Date    " WBC 7.01 12/15/2023    RBC 4.29 (L) 12/15/2023    HGB 13.7 (L) 12/15/2023    HCT 42.2 12/15/2023    MCV 98.4 (H) 12/15/2023    MCH 31.9 (H) 12/15/2023    MCHC 32.5 (L) 12/15/2023    RDW 14.5 12/15/2023     12/15/2023    MPV 8.8 12/15/2023        CMP:  Sodium   Date Value Ref Range Status   04/24/2023 139 136 - 145 mmol/L Final     Sodium Level   Date Value Ref Range Status   12/15/2023 140 136 - 145 mmol/L Final     Potassium   Date Value Ref Range Status   04/24/2023 3.7 3.5 - 5.1 mmol/L Final     Potassium Level   Date Value Ref Range Status   12/15/2023 4.0 3.5 - 5.1 mmol/L Final     Chloride   Date Value Ref Range Status   04/24/2023 108 100 - 109 mmol/L Final     Carbon Dioxide   Date Value Ref Range Status   12/15/2023 26 23 - 31 mmol/L Final   04/24/2023 25 22 - 33 mmol/L Final     Blood Urea Nitrogen   Date Value Ref Range Status   12/15/2023 12.7 8.4 - 25.7 mg/dL Final   04/24/2023 14 7 - 18 mg/dL Final     Creatinine   Date Value Ref Range Status   12/15/2023 1.01 0.73 - 1.18 mg/dL Final   04/24/2023 0.96 0.57 - 1.25 mg/dL Final     Calcium   Date Value Ref Range Status   04/24/2023 8.5 (L) 8.8 - 10.6 mg/dL Final     Calcium Level Total   Date Value Ref Range Status   12/15/2023 9.1 8.8 - 10.0 mg/dL Final     Albumin Level   Date Value Ref Range Status   12/15/2023 3.6 3.4 - 4.8 g/dL Final   12/15/2023 3.3 (L) 3.4 - 4.8 g/dL Final     Bilirubin Total   Date Value Ref Range Status   12/15/2023 0.7 <=1.5 mg/dL Final     Alkaline Phosphatase   Date Value Ref Range Status   12/15/2023 67 40 - 150 unit/L Final     Aspartate Aminotransferase   Date Value Ref Range Status   12/15/2023 18 5 - 34 unit/L Final     Alanine Aminotransferase   Date Value Ref Range Status   12/15/2023 33 0 - 55 unit/L Final     Anion Gap   Date Value Ref Range Status   04/24/2023 6 (L) 8 - 16 mmol/L Final     eGFR    Date Value Ref Range Status   04/24/2023 85 mL/min/1.73mSq Final     Comment:     In accordance  with NKF-ASN Task Force recommendation, calculation based on the Chronic Kidney Disease Epidemiology Collaboration (CKD-EPI) equation without adjustment for race. eGFR adjusted for gender and age and calculated in ml/min/1.73mSquared. eGFR cannot be calculated if patient is under 18 years of age.     Reference Range:   >= 60 ml/min/1.73mSquared.     Estimated GFR-Non    Date Value Ref Range Status   06/22/2022 53 mls/min/1.73/m2 Final       Assessment:       1) Multiple myeloma  2) Cervical degenerative compressive myelopathy, with myelopathic features  3) Lumbar spinal stenosis  4) Peripheral PolyNeuropathy  5) Weakness--better  6) Urinary retention--better  7) History of NSTEMI coronary artery disease  8) PE-on eliquis         Plan:      Patient was seen at [facility] in [place]. He started treatment here with Velcade/Revlimid/Dexamethamasone. He said that he is a candidate for transplant and they will eval bone marrow again after 6 cycles. He had f/u via telemedicine with Dr. Brito on 2/27/23- recommended to repeat BMB x after 6 cycles.  Darzalex (weekly x 8 dose, then q 2 week x 8 doses, then q 4 weeks per kodak study) was added on 1/27/23. Cycle 4, Day 1 of Velcade/Revlimid/Dexamethamasone/Darzalex was given on 2/10/23. He was diagnosed with Covid and PEs on 2/14/23.     Patient would like to restart treatment after shoulder surgery if possible  Continue follow ups with Dr. Brito, next on 2/19/24 virtually  Continue outpatient PT  Recommend trying Cymbalta 30 mg daily again to help with neuropathy symptoms and depression, e-scribed   Okay to proceed with right shoulder surgery with Dr. Dan Feb. 2024  RTC in 6 weeks with MD to discuss resumption of treatment/definitive treatment options  MM Labs + urine 1 week prior     The patient was seen, interviewed and examined. Pertinent lab and radiology studies were reviewed.   The patient was given ample opportunity to ask questions, and  to the best of my abilities, all questions answered to satisfaction; patient demonstrated understanding of what we discussed and agreeable to the plan. Pt instructed to call should develop concerning signs/symptoms or have further questions.     Angélica Harris MD  Hematology/Oncology      Professional Services   I, Maddie Rangel LPN, acted solely as a scribe for and in the presence of Dr. Angélica Harris, who performed these services.

## 2024-02-15 ENCOUNTER — LAB VISIT (OUTPATIENT)
Dept: LAB | Facility: HOSPITAL | Age: 74
End: 2024-02-15
Attending: INTERNAL MEDICINE
Payer: MEDICARE

## 2024-02-15 DIAGNOSIS — C90.00 MULTIPLE MYELOMA NOT HAVING ACHIEVED REMISSION: ICD-10-CM

## 2024-02-15 DIAGNOSIS — G62.9 NEUROPATHY: ICD-10-CM

## 2024-02-15 LAB
ALBUMIN SERPL-MCNC: 3.9 G/DL (ref 3.4–4.8)
ALBUMIN/GLOB SERPL: 1.2 RATIO (ref 1.1–2)
ALP SERPL-CCNC: 72 UNIT/L (ref 40–150)
ALT SERPL-CCNC: 35 UNIT/L (ref 0–55)
AST SERPL-CCNC: 20 UNIT/L (ref 5–34)
BASOPHILS # BLD AUTO: 0.02 X10(3)/MCL
BASOPHILS NFR BLD AUTO: 0.3 %
BILIRUB SERPL-MCNC: 1.2 MG/DL
BUN SERPL-MCNC: 15.4 MG/DL (ref 8.4–25.7)
CALCIUM SERPL-MCNC: 9.5 MG/DL (ref 8.8–10)
CHLORIDE SERPL-SCNC: 107 MMOL/L (ref 98–107)
CO2 SERPL-SCNC: 24 MMOL/L (ref 23–31)
CREAT SERPL-MCNC: 1.08 MG/DL (ref 0.73–1.18)
EOSINOPHIL # BLD AUTO: 0.09 X10(3)/MCL (ref 0–0.9)
EOSINOPHIL NFR BLD AUTO: 1.4 %
ERYTHROCYTE [DISTWIDTH] IN BLOOD BY AUTOMATED COUNT: 13.8 % (ref 11.5–17)
GFR SERPLBLD CREATININE-BSD FMLA CKD-EPI: >60 MLS/MIN/1.73/M2
GLOBULIN SER-MCNC: 3.2 GM/DL (ref 2.4–3.5)
GLUCOSE SERPL-MCNC: 87 MG/DL (ref 82–115)
HCT VFR BLD AUTO: 44.5 % (ref 42–52)
HGB BLD-MCNC: 14.8 G/DL (ref 14–18)
IGA SERPL-MCNC: 33 MG/DL (ref 101–645)
IGG SERPL-MCNC: 1790 MG/DL (ref 540–1822)
IGM SERPL-MCNC: 97 MG/DL (ref 22–240)
IMM GRANULOCYTES # BLD AUTO: 0.01 X10(3)/MCL (ref 0–0.04)
IMM GRANULOCYTES NFR BLD AUTO: 0.2 %
LYMPHOCYTES # BLD AUTO: 1.67 X10(3)/MCL (ref 0.6–4.6)
LYMPHOCYTES NFR BLD AUTO: 25.8 %
MCH RBC QN AUTO: 32.5 PG (ref 27–31)
MCHC RBC AUTO-ENTMCNC: 33.3 G/DL (ref 33–36)
MCV RBC AUTO: 97.6 FL (ref 80–94)
MONOCYTES # BLD AUTO: 0.52 X10(3)/MCL (ref 0.1–1.3)
MONOCYTES NFR BLD AUTO: 8 %
NEUTROPHILS # BLD AUTO: 4.17 X10(3)/MCL (ref 2.1–9.2)
NEUTROPHILS NFR BLD AUTO: 64.3 %
PLATELET # BLD AUTO: 230 X10(3)/MCL (ref 130–400)
PMV BLD AUTO: 8.2 FL (ref 7.4–10.4)
POTASSIUM SERPL-SCNC: 4.2 MMOL/L (ref 3.5–5.1)
PROT SERPL-MCNC: 7.1 GM/DL (ref 5.8–7.6)
RBC # BLD AUTO: 4.56 X10(6)/MCL (ref 4.7–6.1)
SODIUM SERPL-SCNC: 140 MMOL/L (ref 136–145)
WBC # SPEC AUTO: 6.48 X10(3)/MCL (ref 4.5–11.5)

## 2024-02-15 PROCEDURE — 83521 IG LIGHT CHAINS FREE EACH: CPT

## 2024-02-15 PROCEDURE — 80053 COMPREHEN METABOLIC PANEL: CPT

## 2024-02-15 PROCEDURE — 82784 ASSAY IGA/IGD/IGG/IGM EACH: CPT

## 2024-02-15 PROCEDURE — 85025 COMPLETE CBC W/AUTO DIFF WBC: CPT

## 2024-02-15 PROCEDURE — 36415 COLL VENOUS BLD VENIPUNCTURE: CPT

## 2024-02-15 PROCEDURE — 84165 PROTEIN E-PHORESIS SERUM: CPT

## 2024-02-15 PROCEDURE — 84166 PROTEIN E-PHORESIS/URINE/CSF: CPT

## 2024-02-16 LAB
ALBUMIN % SPEP (OHS): 49.09
ALBUMIN SERPL-MCNC: 3.5 G/DL (ref 3.4–4.8)
ALBUMIN/GLOB SERPL: 1 RATIO (ref 1.1–2)
ALPHA 1 GLOB (OHS): 0.23 GM/DL
ALPHA 1 GLOB% (OHS): 3.22
ALPHA 2 GLOB % (OHS): 11.44
ALPHA 2 GLOB (OHS): 0.81 GM/DL
BETA GLOB (OHS): 0.86 GM/DL
BETA GLOB% (OHS): 12.06
GAMMA GLOBULIN % (OHS): 24.18
GAMMA GLOBULIN (OHS): 1.72 GM/DL
GLOBULIN SER-MCNC: 3.6 GM/DL (ref 2.4–3.5)
KAPPA LC FREE SER-MCNC: 3.7 MG/DL (ref 0.33–1.94)
KAPPA LC FREE/LAMBDA FREE SER: 9.25 {RATIO} (ref 0.26–1.65)
LAMBDA LC FREE SERPL-MCNC: 0.4 MG/DL (ref 0.57–2.63)
M SPIKE % (OHS): 11.98
M SPIKE (OHS): 0.85 GM/DL
PATH REV: NORMAL
PROT SERPL-MCNC: 7.1 GM/DL (ref 5.8–7.6)

## 2024-02-19 LAB
ALBUMIN 24H UR ELPH-MRATE: 37.4 MG/24 H
ALBUMIN/GLOB 24H UR ELPH: 0.22 {RATIO}
ALPHA1 GLOB 24H UR ELPH-MRATE: 12.5 MG/24 H
ALPHA2 GLOB 24H UR ELPH-MRATE: 27 MG/24 H
B-GLOBULIN 24H UR ELPH-MRATE: 41.6 MG/24 H
COLLECT DURATION TIME UR: 24 H
GAMMA GLOB 24H UR ELPH-MRATE: 87.4 MG/24 H
M PROTEIN 24H UR ELPH-MRATE: 64 MG/24 H
PROT 24H UR-MRATE: 208 MG/24 H
PROT PATTERN 24H UR ELPH-IMP: ABNORMAL
SPECIMEN VOL 24H UR: 1300 ML

## 2024-03-01 ENCOUNTER — OFFICE VISIT (OUTPATIENT)
Dept: HEMATOLOGY/ONCOLOGY | Facility: CLINIC | Age: 74
End: 2024-03-01
Payer: MEDICARE

## 2024-03-01 DIAGNOSIS — C90.00 MULTIPLE MYELOMA, REMISSION STATUS UNSPECIFIED: Primary | ICD-10-CM

## 2024-03-01 PROCEDURE — 99215 OFFICE O/P EST HI 40 MIN: CPT | Mod: 95,,, | Performed by: INTERNAL MEDICINE

## 2024-03-01 NOTE — PROGRESS NOTES
The patient location is: home  The chief complaint leading to consultation is: MM/SCT eval     Visit type: audio only    Face to Face time with patient: 15  25 minutes of total time spent on the encounter, which includes face to face time and non-face to face time preparing to see the patient (eg, review of tests), Obtaining and/or reviewing separately obtained history, Documenting clinical information in the electronic or other health record, Independently interpreting results (not separately reported) and communicating results to the patient/family/caregiver, or Care coordination (not separately reported).         Each patient to whom he or she provides medical services by telemedicine is:  (1) informed of the relationship between the physician and patient and the respective role of any other health care provider with respect to management of the patient; and (2) notified that he or she may decline to receive medical services by telemedicine and may withdraw from such care at any time.    Notes:     SECTION OF HEMATOLOGY AND BONE MARROW TRANSPLANT  Return  Patient Visit   03/03/2024  Referred by:  Dr. Harris  Referred for: MM/SCT eval     CHIEF COMPLAINT: No chief complaint on file.      HISTORY OF PRESENT ILLNESS:   73 y.o.male; pmh as below; non oncologic history notable for CAD sp multiple stents followed regularly by cardiology locally; asymptomatic; oncologic history notable for MM diagnosed referral for anemia evaluation;  was initially evaluated by Dr. Harris at [facility]; he met diagnostic criteria for active myeloma; 3.4 grams of IgG kappa MM at diagnosis;  marrow at diagnosis with 70% clonal plasma cells; fish/CG normal cw with standard risk disease; whole body pet and 24 hr urine studies unremarkable. Other than fatigue was largely asymptomatic.  Runs haunted house/trail during halloween. No family history of malignancy.   Non smoker. Non drinker.  and wife present during our interview today.   Regular PCP fu prior to diagnosis.  Active with ADL's.  PS1.  KPFS  80.    Of note sought 2nd opinion at [facility] who recommended induction with VRd and consideration for SCT pending adequate response and pre transplant evaluation.      He initiated VRd therapy locally for a cycle then gabriella was addded for gabriella VRD .  Completed  6 of therapy with at least VGRP ; last therapy 6/2/23.    Has been off treatment  due to extensive unrelated c spine surgery followed by prolonged stay at rehab at [facility] in [place].    Also has advanced DJD of shoulders and desires surgery on this.      Since our last appt doing well; feels well and back to some sense of normalcy.   No overt symptoms to suggest progression.        PAST MEDICAL HISTORY:   Past Medical History:   Diagnosis Date    Anemia     GERD (gastroesophageal reflux disease)     Heart problem     Hyperlipidemia     Hypertension     Multiple myeloma     NSTEMI (non-ST elevated myocardial infarction)        PAST SURGICAL HISTORY:   Past Surgical History:   Procedure Laterality Date    BONE MARROW ASPIRATION N/A 9/1/2022    Procedure: ASPIRATION, BONE MARROW;  Surgeon: Robbie Gardner MD;  Location: [facility];  Service: General;  Laterality: N/A;    BONE MARROW BIOPSY Right 9/12/2023    Procedure: Biopsy-bone marrow;  Surgeon: Russ Wilson MD;  Location: [facility] OR;  Service: General;  Laterality: Right;    CARDIAC SURGERY  2021    ENDOSCOPIC RELEASE OF BOTH CARPAL TUNNELS      FUSION OF POSTERIOR COLUMN OF CERVICAL SPINE USING COMPUTER AIDED NAVIGATION N/A 6/26/2023    Procedure: FUSION, SPINE, POSTERIOR SPINAL COLUMN, CERVICAL, USING COMPUTER-ASSISTED NAVIGATION;  Surgeon: Montserrat Aviles MD;  Location: [facility];  Service: Neurosurgery;  Laterality: N/A;  C3-4, C4-5, C5-6 laminectomies and fusion, o-arm, stealth  TIVA setup  NTI //  XX       PAST SOCIAL HISTORY:   reports that he has quit smoking. His smoking use included cigarettes. He has quit using smokeless tobacco. He  reports that he does not currently use alcohol. He reports that he does not use drugs.    FAMILY HISTORY:  Family History   Problem Relation Age of Onset    Hypertension Mother     Diabetes Mother     Anemia Mother     Heart disease Mother     Heart disease Father        CURRENT MEDICATIONS:   Current Outpatient Medications   Medication Sig    acyclovir (ZOVIRAX) 400 MG tablet TAKE 1 TABLET BY MOUTH TWICE A DAY    apixaban (ELIQUIS) 5 mg Tab Take 1 tablet (5 mg total) by mouth 2 (two) times daily.    atorvastatin (LIPITOR) 40 MG tablet TAKE 1 TABLET ORALLY ONCE DAILY ON MON/TUE/THUR/FRI AND 1/2 ON WED. NONE ON SAT/SUN    clopidogreL (PLAVIX) 75 mg tablet Take 75 mg by mouth once daily.    DULoxetine (CYMBALTA) 30 MG capsule Take 1 capsule (30 mg total) by mouth once daily.    dutasteride (AVODART) 0.5 mg capsule Take 0.5 mg by mouth once daily.    FERRETTS 325 mg (106 mg iron) Tab Take 1 tablet by mouth 2 (two) times daily.    gabapentin (NEURONTIN) 400 MG capsule Take 2 capsules (800 mg total) by mouth 3 (three) times daily. for 7 days (Patient taking differently: Take 800 mg by mouth 2 (two) times daily.)    HYDROcodone-acetaminophen (NORCO)  mg per tablet Take 10 tablets by mouth once daily.    lisinopriL (PRINIVIL,ZESTRIL) 20 MG tablet Take 20 mg by mouth.    metoprolol tartrate (LOPRESSOR) 25 MG tablet Take 1 tablet (25 mg total) by mouth 2 (two) times daily.    tamsulosin (FLOMAX) 0.4 mg Cap Take 2 capsules (0.8 mg total) by mouth every evening.    testosterone cypionate (DEPOTESTOTERONE CYPIONATE) 200 mg/mL injection INJECT 1 MLS (200 MG) INTRAMUSCULARLY ONCE WEEKLY.    zolpidem (AMBIEN) 10 mg Tab Take 10 mg by mouth nightly as needed.     No current facility-administered medications for this visit.     ALLERGIES:   Review of patient's allergies indicates:   Allergen Reactions    Penicillins              REVIEW OF SYSTEMS:   See HPI     PHYSICAL EXAM:   physical exam deferred due to telemed   Appears  "well and below stated age on camera     ECOG Performance Status: (foot note - ECOG PS provided by Eastern Cooperative Oncology Group) 1 - Symptomatic but completely ambulatory    Karnofsky Performance Score:  80%- Normal Activity with Effort: Some Symptoms of Disease  DATA:   Lab Results   Component Value Date    WBC 6.48 02/15/2024    HGB 14.8 02/15/2024    HCT 44.5 02/15/2024    MCV 97.6 (H) 02/15/2024     02/15/2024       No results found for: "GRAN"  CMP  Sodium   Date Value Ref Range Status   02/09/2024 137 136 - 145 mmol/L Final     Sodium Level   Date Value Ref Range Status   02/15/2024 140 136 - 145 mmol/L Final     Potassium   Date Value Ref Range Status   02/09/2024 3.9 3.5 - 5.1 mmol/L Final     Potassium Level   Date Value Ref Range Status   02/15/2024 4.2 3.5 - 5.1 mmol/L Final     Chloride   Date Value Ref Range Status   02/09/2024 105 100 - 109 mmol/L Final     Carbon Dioxide   Date Value Ref Range Status   02/15/2024 24 23 - 31 mmol/L Final   02/09/2024 23 22 - 33 mmol/L Final     Blood Urea Nitrogen   Date Value Ref Range Status   02/15/2024 15.4 8.4 - 25.7 mg/dL Final   02/09/2024 15 5 - 25 mg/dL Final     Creatinine   Date Value Ref Range Status   02/15/2024 1.08 0.73 - 1.18 mg/dL Final   02/09/2024 0.98 0.57 - 1.25 mg/dL Final     Calcium   Date Value Ref Range Status   02/09/2024 9.2 8.8 - 10.6 mg/dL Final     Calcium Level Total   Date Value Ref Range Status   02/15/2024 9.5 8.8 - 10.0 mg/dL Final     Albumin Level   Date Value Ref Range Status   02/15/2024 3.9 3.4 - 4.8 g/dL Final   02/15/2024 3.5 3.4 - 4.8 g/dL Final     Bilirubin Total   Date Value Ref Range Status   02/15/2024 1.2 <=1.5 mg/dL Final     Alkaline Phosphatase   Date Value Ref Range Status   02/15/2024 72 40 - 150 unit/L Final     Aspartate Aminotransferase   Date Value Ref Range Status   02/15/2024 20 5 - 34 unit/L Final     Alanine Aminotransferase   Date Value Ref Range Status   02/15/2024 35 0 - 55 unit/L Final "     Anion Gap   Date Value Ref Range Status   02/09/2024 9 8 - 16 mmol/L Final     eGFR   Date Value Ref Range Status   02/15/2024 >60 mls/min/1.73/m2 Final     IgM Level   Date Value Ref Range Status   02/15/2024 97.0 22.0 - 240.0 mg/dL Final           Encounter Diagnosis   Name Primary?    Multiple myeloma, remission status unspecified Yes             1)Multiple myeloma  -IgG kappa MM ; diagnosed sept 2022 after referral to heme onc  for anemia; fish/CG normal cw with standard risk disease  -meets treatment criteria based on anemia and 70% clonal plasma cells in marrow; no hyperCa, renal dysfunction, or bone disease on WB PET  -has initiated Vrd therapy and completed 1 cycle with excellent biochemical response and good tolerance; at our jan 2023 appt I recommended addition of vita to vRD which was done  -he has completed 5 cycles of Vita-VRd therapy achieving VGRP to therapy; pt adamantly declined SCT on multiple occasions due to logistical and safety concerns  - required admission for NSTEMI so recommended stopping revlimid and transitioned  to q 4 weeks vita/q 2 week velcade maintenance which he was receiving until June 2023  -all myeloma had to be held since June 2023 due to urgent admit  and surgery for severe spinal cervical stenosis (unrelated to myeloma) which has been surgically corrected and pt now home from rehab doing outpt PT  -he has no overt clinical signs of progression off therapy today   - last biochemical studies for his myeloma   2/15/24  cw continued VGPR but do show mild uptick in m protein, sflc, Ig  -he had a normal PET scan 11/10/23  -though he has had some mild serial biochemical progression of last few readings I think reasonable to continue to monitor closely off therapy given pt wishes/comorbidities  -would check cbc, cmp, serum free light chains, quantitative immunoglobulins, serum electropheresis, serum immunofixation q 6 weeks and obtain WB PET scan q 6 months (next due may 2023)  locally in [place]  -recommend initiate treatment if m protein >1.5grams or any crab develop  -will continue to check in with pt q 3  months per his request    -explained to pt I suspect we would need to start therapy within next 24 months and he expressed understanding  -educated on symptoms for which to seek attn in our clinic earlier       2)CV/CAD  -will maintain all current CV medications  -per cardiology      3) PE  -recommend stays on eliquis indefinitely while on imid or with active MM  -he is on asa, plavix, (per cardiologist for CAD); I recommended he call his cardiologist asap to notify him of eliquis and possible stop plavix and or aspirin    Follow Up:  -with Dr. Longo as above  -virtual MD appt with me in  3  months      Juan Brito MD  Hematology/Oncology/Bone Marrow Transplant

## 2024-03-04 ENCOUNTER — OFFICE VISIT (OUTPATIENT)
Dept: HEMATOLOGY/ONCOLOGY | Facility: CLINIC | Age: 74
End: 2024-03-04
Payer: MEDICARE

## 2024-03-04 VITALS
SYSTOLIC BLOOD PRESSURE: 151 MMHG | OXYGEN SATURATION: 98 % | DIASTOLIC BLOOD PRESSURE: 75 MMHG | WEIGHT: 198.13 LBS | HEART RATE: 62 BPM | RESPIRATION RATE: 16 BRPM | TEMPERATURE: 98 F | BODY MASS INDEX: 26.26 KG/M2 | HEIGHT: 73 IN

## 2024-03-04 DIAGNOSIS — C90.00 MULTIPLE MYELOMA NOT HAVING ACHIEVED REMISSION: ICD-10-CM

## 2024-03-04 DIAGNOSIS — C90.00 MULTIPLE MYELOMA, REMISSION STATUS UNSPECIFIED: Primary | ICD-10-CM

## 2024-03-04 PROCEDURE — 99999 PR PBB SHADOW E&M-EST. PATIENT-LVL IV: CPT | Mod: PBBFAC,,, | Performed by: INTERNAL MEDICINE

## 2024-03-04 PROCEDURE — 99214 OFFICE O/P EST MOD 30 MIN: CPT | Mod: PBBFAC | Performed by: INTERNAL MEDICINE

## 2024-03-04 PROCEDURE — 99215 OFFICE O/P EST HI 40 MIN: CPT | Mod: S$PBB,,, | Performed by: INTERNAL MEDICINE

## 2024-03-04 NOTE — PROGRESS NOTES
HEMATOLOGY/ONCOLOGY OFFICE CLINIC VISIT    Visit Information:    Initial Evaluation: 8/10/2022  Referring Provider:   Other providers: Dr Brito  Code status: Not addressed    Diagnosis:  Multiple Myeloma--IgG, Kappa  Macrocytic anemia    Present treatment:   VRD-Started on 12/9/22 --on hold since 6/2023  Daratumumab added on 1/27/23 ----on hold since 6/2/2023    Treatment/Oncology history:  VRD-Started on 12/9/22  Daratumumab added on 1/27/23      Goal of care:  palliative    Imaging:   US abdomen 8/24/2022: Spleen: 10.8 cm. Mild hepatomegaly with suspected mild hepatic steatosis. Cholelithiasis.  PET CT 9/28/2022: 1. Right scapular small focal mild hypermetabolism without lytic or sclerotic abnormality is not convinced to be related to multiple myeloma. 2. No definite skeletal lytic abnormality with hypermetabolism to reflect multiple myeloma on this exam. 3. Bilateral small pleural effusions.   4. Gallstones. 5.  Noninflamed diverticulosis coli. 6.  Prostatomegaly.  WB PET CT 11/10/2023:  No FDG avid malignancy is present on today's exam.      Pathology:  BONE MARROW BIOPSY 9/1/2022: PLASMA CELL MYELOMA, KAPPA RESTRICTED. NORMOCELLULAR MARROW (30%) WITH 70% INVOLVEMENT BY PLASMA CELL MYELOMA. BACKGROUND TRILINEAGE HEMATOPOIESIS IS DECREASED.  Bone Marrow Biopsy 9/12/2023: 1. BONE MARROW, RIGHT POSTERIOR ILIAC CREST, ASPIRATE, CLOT, DECALCIFIED CORE   BIOPSY:    PERSISTENT/RESIDUAL PLASMA CELL NEOPLASM, KAPPA LIGHT CHAIN RESTRICTED.    NORMOCELLULAR MARROW (30%) WITH 5% INVOLVEMENT BY KAPPA RESTRICTED PLASMA CELL   NEOPLASM.    SEQUENTIAL TRILINEAGE HEMATOPOIESIS PRESENT.    INCREASED IRON STORES.    PERIPHERAL BLOOD :    NORMOCYTIC NORMOCHROMIC ANEMIA.    2. SEE DIAGNOSIS #1    CODE 8    Electronically Signed by:   Markell Farmer M.D. , Pathologist   (Case signed 09/19/2023 at 01:50pm)    Comment   A) The patient's history of IgG kappa multiple myeloma treated with VRD and   daratumumab is noted from the  "electronic medical record.    B) The bone marrow is normocellular for age demonstrates a small residual   population of kappa restricted plasma cells (5%).  Background hematopoiesis is   sequential and trilineage.    C) Additional material submitted to [facility] for MRD myeloma flow   demonstrated monotypic kappa plasma cells (MRD=0.1781% or 1781 cells/million).     CLINICAL HISTORY:       Patient: Valentino Manning is a 73 y.o. male with multiple medical problems kindly referred for persistent anemia.  Reviewing electronic medical record patient with anemia since 02/10/2016.  From 2016 to January of 2017 anemia was mild.  Anemia slowly worsening back in 2018 hemoglobin was 8.0.  At that same time kidney function was worsening as well. Labs with HGB of 9.4, .9, platelets 231 K, WBC 5.0.  Chemistries with globulin of 5.3.  BUN/creatinine 27.7/1.41.    Further workup revealed the diagnosis of multiple myeloma.  Patient was started on treatment with Velcade, Revlimid and dexamethasone on 12/09/2022 and daratumumab was added on 01/27/2023 with significant improvement of his disease.  He was seen in [place] for possible bone marrow transplant but he was not interested in bone marrow transplant at the time.  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  Gabapentin was not helping.     Treatment was delayed 1st due to COVID-19 in February of 2023, he has few treatments in between but it was delayed again due to NSTEMI . Revlimid was stopped due to his heart disease and plan was to continue daratumumab and Velcade lower dose for neuropathy.      Patient continued to decline with worsening of his weakness.  Home health called to let us know that patient was weak and unable to get out of bed. I called the patient Thursday 6/8/2023: "I personally spoke with the patient and  instructed to go to the ER as his weakness and neuropathy is worsening and he reports that he went to the ER in " "[place] because he could not urinate and a gongora cath was placed and send home.   He reports that is very difficult for him to get out of bed and he can not seeing himself going to his room to the car. I told him he will need to call an ambulance to transport him to the hospital and to come to [facility]. Patient with debilitating neuropathy of unclear etiology--not typical for MM and/or treatment for MM. I told him that may need MRI's of spine and/or brain and neurologist evaluation. Patient verbalized understanding and he will planned to go to the ER tomorrow not today."      Patient eventually went to the ER.   MRI T spine 6/12/2023:  1. No acute abnormality of the thoracic spine. 2. Mild multilevel canal and neural foraminal narrowing. 3. No cord signal abnormality.  MRI L spine: 1. Severe degenerative spinal canal stenosis at L3-L4 and L4-L5, moderate at L2-L3, mild at L5-S1. 2. Multilevel neural foraminal stenoses as described.  MRI C spine: 1. Severe degenerative spinal canal stenosis at L3-L4 and L4-L5, moderate at L2-L3, mild at L5-S1. 2. Multilevel neural foraminal stenoses as described.  MRIs of the spine.  No evidence of plasmacytoma, mass or cord compression due to Malignancy.     Patient with cervical myelopathy and stenosis.  He was seen by Neurosurgery and on 6/26/2023 underwent Decompressive laminectomies at C3-4, C4-5, and C5-6. He was then transfer to [facility] in [place] for Rehab.     Interval History:  Patient has been off treatment  since 6/2/2023 due to extensive unrelated c spine surgery followed by prolonged stay at rehab at [facility] in [place].     Patient presents today for follow up of his MM to discuss resumption of treatment. He underwent right shoulder surgery on 2/21/24.   He says he's doing much better. Using a cane to aid in ambulation. He also has a rollator if needed.  He will resume outpatient  PT this week.  He continues with neuropathy, no worsening, making it difficult " to walk. He is taking Cymbalta and finds it's helping with neuropathy symptoms.  Overall, he is doing fair.   Discussed labs and markers trending up slowly. No anemia, normal kidney function and calcium.  He continues to follow Dr. Brito, last visit visit on 3/1/24 that recommend to initiate treatment if m protein >1.5grams or any crab develop       Past Medical History:   Diagnosis Date    Anemia     GERD (gastroesophageal reflux disease)     Heart problem     Hyperlipidemia     Hypertension     Multiple myeloma     NSTEMI (non-ST elevated myocardial infarction)       Past Surgical History:   Procedure Laterality Date    BONE MARROW ASPIRATION N/A 9/1/2022    Procedure: ASPIRATION, BONE MARROW;  Surgeon: Robbie Gardner MD;  Location: [facility];  Service: General;  Laterality: N/A;    BONE MARROW BIOPSY Right 9/12/2023    Procedure: Biopsy-bone marrow;  Surgeon: Russ Wilson MD;  Location: [facility] OR;  Service: General;  Laterality: Right;    CARDIAC SURGERY  2021    ENDOSCOPIC RELEASE OF BOTH CARPAL TUNNELS      FUSION OF POSTERIOR COLUMN OF CERVICAL SPINE USING COMPUTER AIDED NAVIGATION N/A 6/26/2023    Procedure: FUSION, SPINE, POSTERIOR SPINAL COLUMN, CERVICAL, USING COMPUTER-ASSISTED NAVIGATION;  Surgeon: Montserrat Aviles MD;  Location: [facility];  Service: Neurosurgery;  Laterality: N/A;  C3-4, C4-5, C5-6 laminectomies and fusion, o-arm, stealth  TIVA setup  NTI //  XX     Family History   Problem Relation Age of Onset    Hypertension Mother     Diabetes Mother     Anemia Mother     Heart disease Mother     Heart disease Father      Social Connections: Unknown (3/1/2024)    Social Connection and Isolation Panel [NHANES]     Frequency of Communication with Friends and Family: More than three times a week     Frequency of Social Gatherings with Friends and Family: Three times a week     Attends Scientologist Services: Not on file     Active Member of Clubs or Organizations: No     Attends Club or Organization  Meetings: Not on file     Marital Status:        Review of patient's allergies indicates:   Allergen Reactions    Penicillins       Current Outpatient Medications on File Prior to Visit   Medication Sig Dispense Refill    acyclovir (ZOVIRAX) 400 MG tablet TAKE 1 TABLET BY MOUTH TWICE A  tablet 1    apixaban (ELIQUIS) 5 mg Tab Take 1 tablet (5 mg total) by mouth 2 (two) times daily. 60 tablet 6    atorvastatin (LIPITOR) 40 MG tablet TAKE 1 TABLET ORALLY ONCE DAILY ON MON/TUE/THUR/FRI AND 1/2 ON WED. NONE ON SAT/SUN      clopidogreL (PLAVIX) 75 mg tablet Take 75 mg by mouth once daily.      DULoxetine (CYMBALTA) 30 MG capsule Take 1 capsule (30 mg total) by mouth once daily. 30 capsule 3    dutasteride (AVODART) 0.5 mg capsule Take 0.5 mg by mouth once daily.      FERRETTS 325 mg (106 mg iron) Tab Take 1 tablet by mouth 2 (two) times daily.      gabapentin (NEURONTIN) 400 MG capsule Take 2 capsules (800 mg total) by mouth 3 (three) times daily. for 7 days (Patient taking differently: Take 800 mg by mouth 2 (two) times daily.) 42 capsule 0    HYDROcodone-acetaminophen (NORCO)  mg per tablet Take 10 tablets by mouth once daily.      lisinopriL (PRINIVIL,ZESTRIL) 20 MG tablet Take 20 mg by mouth.      metoprolol tartrate (LOPRESSOR) 25 MG tablet Take 1 tablet (25 mg total) by mouth 2 (two) times daily. 60 tablet 11    tamsulosin (FLOMAX) 0.4 mg Cap Take 2 capsules (0.8 mg total) by mouth every evening. 60 capsule 11    testosterone cypionate (DEPOTESTOTERONE CYPIONATE) 200 mg/mL injection INJECT 1 MLS (200 MG) INTRAMUSCULARLY ONCE WEEKLY.      zolpidem (AMBIEN) 10 mg Tab Take 10 mg by mouth nightly as needed.       No current facility-administered medications on file prior to visit.      Review of Systems   Constitutional:  Positive for fatigue. Negative for activity change, appetite change, chills, diaphoresis, fever and unexpected weight change.   HENT:  Negative for nasal congestion, mouth sores,  nosebleeds, postnasal drip, sinus pressure/congestion, sore throat and trouble swallowing.    Eyes:  Negative for visual disturbance.   Respiratory:  Positive for shortness of breath (on excertion). Negative for cough and wheezing.    Cardiovascular:  Negative for chest pain and palpitations.   Gastrointestinal:  Negative for abdominal distention, abdominal pain, blood in stool, change in bowel habit, constipation, diarrhea, nausea and vomiting.   Endocrine: Negative.    Genitourinary:  Negative for bladder incontinence, decreased urine volume, difficulty urinating, dysuria, frequency, hematuria, scrotal swelling, testicular pain and urgency.   Musculoskeletal:  Negative for arthralgias, back pain, gait problem, joint swelling, leg pain, myalgias and neck pain.   Integumentary:  Negative for rash.   Neurological:  Positive for tremors and weakness. Negative for dizziness, seizures, syncope, speech difficulty, light-headedness, numbness, headaches and memory loss.        Neuropathy   Hematological:  Negative for adenopathy. Does not bruise/bleed easily.   Psychiatric/Behavioral:  Negative for agitation, confusion, hallucinations, sleep disturbance and suicidal ideas. The patient is not nervous/anxious.               There were no vitals filed for this visit.    Wt Readings from Last 6 Encounters:   12/28/23 93.7 kg (206 lb 9.6 oz)   11/16/23 87.5 kg (192 lb 12.8 oz)   09/21/23 81.6 kg (180 lb)   09/12/23 80.1 kg (176 lb 9.4 oz)   09/07/23 82.3 kg (181 lb 8 oz)   08/24/23 86.2 kg (190 lb)     There is no height or weight on file to calculate BMI.  There is no height or weight on file to calculate BSA.       Physical Exam  HENT:      Head: Normocephalic and atraumatic.   Eyes:      Extraocular Movements: Extraocular movements intact.   Pulmonary:      Effort: Pulmonary effort is normal.      Breath sounds: Normal breath sounds. No wheezing.   Abdominal:      General: Bowel sounds are normal.      Palpations: Abdomen is  soft.   Musculoskeletal:      Cervical back: Neck supple.   Neurological:      Mental Status: He is alert and oriented to person, place, and time.      Cranial Nerves: Cranial nerves 2-12 are intact.      Sensory: No sensory deficit.      Motor: Weakness present.      Gait: Gait abnormal.   Psychiatric:         Mood and Affect: Mood normal.         Behavior: Behavior normal. Behavior is cooperative.         Judgment: Judgment normal.       ECOG SCORE         ECOG 1/2    Laboratory:  CBC with Differential:  Lab Results   Component Value Date    WBC 6.48 02/15/2024    RBC 4.56 (L) 02/15/2024    HGB 14.8 02/15/2024    HCT 44.5 02/15/2024    MCV 97.6 (H) 02/15/2024    MCH 32.5 (H) 02/15/2024    MCHC 33.3 02/15/2024    RDW 13.8 02/15/2024     02/15/2024    MPV 8.2 02/15/2024        CMP:  Sodium   Date Value Ref Range Status   02/09/2024 137 136 - 145 mmol/L Final     Sodium Level   Date Value Ref Range Status   02/15/2024 140 136 - 145 mmol/L Final     Potassium   Date Value Ref Range Status   02/09/2024 3.9 3.5 - 5.1 mmol/L Final     Potassium Level   Date Value Ref Range Status   02/15/2024 4.2 3.5 - 5.1 mmol/L Final     Chloride   Date Value Ref Range Status   02/09/2024 105 100 - 109 mmol/L Final     Carbon Dioxide   Date Value Ref Range Status   02/15/2024 24 23 - 31 mmol/L Final   02/09/2024 23 22 - 33 mmol/L Final     Blood Urea Nitrogen   Date Value Ref Range Status   02/15/2024 15.4 8.4 - 25.7 mg/dL Final   02/09/2024 15 5 - 25 mg/dL Final     Creatinine   Date Value Ref Range Status   02/15/2024 1.08 0.73 - 1.18 mg/dL Final   02/09/2024 0.98 0.57 - 1.25 mg/dL Final     Calcium   Date Value Ref Range Status   02/09/2024 9.2 8.8 - 10.6 mg/dL Final     Calcium Level Total   Date Value Ref Range Status   02/15/2024 9.5 8.8 - 10.0 mg/dL Final     Albumin Level   Date Value Ref Range Status   02/15/2024 3.9 3.4 - 4.8 g/dL Final   02/15/2024 3.5 3.4 - 4.8 g/dL Final     Bilirubin Total   Date Value Ref Range  Status   02/15/2024 1.2 <=1.5 mg/dL Final     Alkaline Phosphatase   Date Value Ref Range Status   02/15/2024 72 40 - 150 unit/L Final     Aspartate Aminotransferase   Date Value Ref Range Status   02/15/2024 20 5 - 34 unit/L Final     Alanine Aminotransferase   Date Value Ref Range Status   02/15/2024 35 0 - 55 unit/L Final     Anion Gap   Date Value Ref Range Status   02/09/2024 9 8 - 16 mmol/L Final     eGFR    Date Value Ref Range Status   02/09/2024 82 mL/min/1.73mSq Final     Comment:     In accordance with NKF-ASN Task Force recommendation, calculation based on the Chronic Kidney Disease Epidemiology Collaboration (CKD-EPI) equation without adjustment for race. eGFR adjusted for gender and age and calculated in ml/min/1.73mSquared. eGFR cannot be calculated if patient is under 18 years of age.     Reference Range:   >= 60 ml/min/1.73mSquared.     Estimated GFR-Non    Date Value Ref Range Status   06/22/2022 53 mls/min/1.73/m2 Final       Assessment:       1) Multiple myeloma  --Markers trending up slowly but no symptoms  --will continue to monitor closely  --Plan to start for M spike of > 1.5 or CRAB Sx (hypercalcemia, renal failure, anemia, and bone disease)  2) Cervical degenerative compressive myelopathy, with myelopathic features  3) Lumbar spinal stenosis  4) Peripheral PolyNeuropathy  5) Weakness--better  6) Urinary retention--better  7) History of NSTEMI coronary artery disease  8) PE-on eliquis         Plan:      Patient was seen at [facility] in [place]. He started treatment here with Velcade/Revlimid/Dexamethamasone. He said that he is a candidate for transplant and they will eval bone marrow again after 6 cycles. He had f/u via telemedicine with Dr. Brito on 2/27/23- recommended to repeat BMB x after 6 cycles.  Darzalex (weekly x 8 dose, then q 2 week x 8 doses, then q 4 weeks per kodak study) was added on 1/27/23. Cycle 4, Day 1 of  Velcade/Revlimid/Dexamethamasone/Darzalex was given on 2/10/23. He was diagnosed with Covid and PEs on 2/14/23.     Patient would like to restart treatment after shoulder surgery if possible, will re-evaluate in 8 weeks after he starts PT  Continue follow ups with Dr. Brito  Continue outpatient PT  Continue Cymbalta  MM labs q 4-6 weeks - no urine  RTC in 8 weeks with MD to discuss resumption of treatment/definitive treatment options  MM Labs + urine 1 week prior   WB PET scan q 6 months (next due may 2024) -ordered    The patient was seen, interviewed and examined. Pertinent lab and radiology studies were reviewed.   The patient was given ample opportunity to ask questions, and to the best of my abilities, all questions answered to satisfaction; patient demonstrated understanding of what we discussed and agreeable to the plan. Pt instructed to call should develop concerning signs/symptoms or have further questions.     Angélica Harris MD  Hematology/Oncology      Professional Services   I, Maddie Rangel LPN, acted solely as a scribe for and in the presence of Dr. Angélica Harris, who performed these services.

## 2024-04-05 ENCOUNTER — LAB VISIT (OUTPATIENT)
Dept: LAB | Facility: HOSPITAL | Age: 74
End: 2024-04-05
Attending: INTERNAL MEDICINE
Payer: MEDICARE

## 2024-04-05 DIAGNOSIS — C90.00 MULTIPLE MYELOMA NOT HAVING ACHIEVED REMISSION: Primary | ICD-10-CM

## 2024-04-05 DIAGNOSIS — C90.00 MULTIPLE MYELOMA NOT HAVING ACHIEVED REMISSION: ICD-10-CM

## 2024-04-05 LAB
ALBUMIN SERPL-MCNC: 3.8 G/DL (ref 3.4–4.8)
ALBUMIN/GLOB SERPL: 1.2 RATIO (ref 1.1–2)
ALP SERPL-CCNC: 79 UNIT/L (ref 40–150)
ALT SERPL-CCNC: 31 UNIT/L (ref 0–55)
AST SERPL-CCNC: 19 UNIT/L (ref 5–34)
BASOPHILS # BLD AUTO: 0.01 X10(3)/MCL
BASOPHILS NFR BLD AUTO: 0.2 %
BILIRUB SERPL-MCNC: 1.2 MG/DL
BUN SERPL-MCNC: 12.9 MG/DL (ref 8.4–25.7)
CALCIUM SERPL-MCNC: 9.2 MG/DL (ref 8.8–10)
CHLORIDE SERPL-SCNC: 107 MMOL/L (ref 98–107)
CO2 SERPL-SCNC: 24 MMOL/L (ref 23–31)
CREAT SERPL-MCNC: 1.12 MG/DL (ref 0.73–1.18)
EOSINOPHIL # BLD AUTO: 0.13 X10(3)/MCL (ref 0–0.9)
EOSINOPHIL NFR BLD AUTO: 2.5 %
ERYTHROCYTE [DISTWIDTH] IN BLOOD BY AUTOMATED COUNT: 14 % (ref 11.5–17)
GFR SERPLBLD CREATININE-BSD FMLA CKD-EPI: >60 MLS/MIN/1.73/M2
GLOBULIN SER-MCNC: 3.3 GM/DL (ref 2.4–3.5)
GLUCOSE SERPL-MCNC: 94 MG/DL (ref 82–115)
HCT VFR BLD AUTO: 41.4 % (ref 42–52)
HGB BLD-MCNC: 13.8 G/DL (ref 14–18)
IGA SERPL-MCNC: 39 MG/DL (ref 101–645)
IGG SERPL-MCNC: 1908 MG/DL (ref 540–1822)
IGM SERPL-MCNC: 91 MG/DL (ref 22–240)
IMM GRANULOCYTES # BLD AUTO: 0.01 X10(3)/MCL (ref 0–0.04)
IMM GRANULOCYTES NFR BLD AUTO: 0.2 %
LYMPHOCYTES # BLD AUTO: 1.36 X10(3)/MCL (ref 0.6–4.6)
LYMPHOCYTES NFR BLD AUTO: 26 %
MCH RBC QN AUTO: 32.5 PG (ref 27–31)
MCHC RBC AUTO-ENTMCNC: 33.3 G/DL (ref 33–36)
MCV RBC AUTO: 97.6 FL (ref 80–94)
MONOCYTES # BLD AUTO: 0.4 X10(3)/MCL (ref 0.1–1.3)
MONOCYTES NFR BLD AUTO: 7.6 %
NEUTROPHILS # BLD AUTO: 3.33 X10(3)/MCL (ref 2.1–9.2)
NEUTROPHILS NFR BLD AUTO: 63.5 %
PLATELET # BLD AUTO: 211 X10(3)/MCL (ref 130–400)
PMV BLD AUTO: 8.7 FL (ref 7.4–10.4)
POTASSIUM SERPL-SCNC: 3.9 MMOL/L (ref 3.5–5.1)
PROT SERPL-MCNC: 7.1 GM/DL (ref 5.8–7.6)
RBC # BLD AUTO: 4.24 X10(6)/MCL (ref 4.7–6.1)
SODIUM SERPL-SCNC: 140 MMOL/L (ref 136–145)
WBC # SPEC AUTO: 5.24 X10(3)/MCL (ref 4.5–11.5)

## 2024-04-05 PROCEDURE — 85025 COMPLETE CBC W/AUTO DIFF WBC: CPT

## 2024-04-05 PROCEDURE — 83521 IG LIGHT CHAINS FREE EACH: CPT

## 2024-04-05 PROCEDURE — 82784 ASSAY IGA/IGD/IGG/IGM EACH: CPT

## 2024-04-05 PROCEDURE — 80053 COMPREHEN METABOLIC PANEL: CPT

## 2024-04-05 PROCEDURE — 84165 PROTEIN E-PHORESIS SERUM: CPT

## 2024-04-05 PROCEDURE — 36415 COLL VENOUS BLD VENIPUNCTURE: CPT

## 2024-04-08 LAB
ALBUMIN % SPEP (OHS): 51.05
ALBUMIN SERPL-MCNC: 3.6 G/DL (ref 3.4–4.8)
ALBUMIN/GLOB SERPL: 1.1 RATIO (ref 1.1–2)
ALPHA 1 GLOB (OHS): 0.2 GM/DL
ALPHA 1 GLOB% (OHS): 2.85
ALPHA 2 GLOB % (OHS): 10.69
ALPHA 2 GLOB (OHS): 0.75 GM/DL
BETA GLOB (OHS): 0.85 GM/DL
BETA GLOB% (OHS): 12.14
GAMMA GLOBULIN % (OHS): 23.27
GAMMA GLOBULIN (OHS): 1.63 GM/DL
GLOBULIN SER-MCNC: 3.4 GM/DL (ref 2.4–3.5)
KAPPA LC FREE SER-MCNC: 4.65 MG/DL (ref 0.33–1.94)
KAPPA LC FREE/LAMBDA FREE SER: 11.9 {RATIO} (ref 0.26–1.65)
LAMBDA LC FREE SERPL-MCNC: 0.39 MG/DL (ref 0.57–2.63)
M SPIKE % (OHS): 13.91
M SPIKE (OHS): 0.97 GM/DL
PATH REV: NORMAL
PROT SERPL-MCNC: 7 GM/DL (ref 5.8–7.6)

## 2024-04-25 DIAGNOSIS — G62.9 NEUROPATHY: ICD-10-CM

## 2024-04-25 RX ORDER — DULOXETIN HYDROCHLORIDE 30 MG/1
30 CAPSULE, DELAYED RELEASE ORAL
Qty: 30 CAPSULE | Refills: 3 | Status: SHIPPED | OUTPATIENT
Start: 2024-04-25

## 2024-05-03 DIAGNOSIS — R53.83 EXTREME EXHAUSTION: ICD-10-CM

## 2024-05-03 DIAGNOSIS — D64.9 ANEMIA, UNSPECIFIED: Primary | ICD-10-CM

## 2024-05-06 ENCOUNTER — PROCEDURE VISIT (OUTPATIENT)
Dept: RESPIRATORY THERAPY | Facility: HOSPITAL | Age: 74
End: 2024-05-06
Attending: INTERNAL MEDICINE
Payer: MEDICARE

## 2024-05-06 DIAGNOSIS — R53.83 EXTREME EXHAUSTION: ICD-10-CM

## 2024-05-06 DIAGNOSIS — D64.9 ANEMIA, UNSPECIFIED: ICD-10-CM

## 2024-05-06 LAB
ALLENS TEST BLOOD GAS (OHS): YES
BASE EXCESS BLD CALC-SCNC: 1.8 MMOL/L (ref -2–2)
BLOOD GAS SAMPLE TYPE (OHS): ABNORMAL
CA-I BLD-SCNC: 1.22 MMOL/L (ref 1.12–1.23)
CO2 BLDA-SCNC: 26.8 MMOL/L
COHGB MFR BLDA: 1.7 % (ref 0.5–1.5)
DRAWN BY BLOOD GAS (OHS): ABNORMAL
HCO3 BLDA-SCNC: 25.7 MMOL/L (ref 22–26)
METHGB MFR BLDA: 1 % (ref 0.4–1.5)
O2 HB BLOOD GAS (OHS): 96.3 % (ref 94–97)
OXYHGB MFR BLDA: 13.4 G/DL (ref 12–16)
PCO2 BLDA: 37 MMHG (ref 35–45)
PH BLDA: 7.45 [PH] (ref 7.35–7.45)
PO2 BLDA: 89 MMHG (ref 80–100)
POTASSIUM BLOOD GAS (OHS): 3.7 MMOL/L (ref 3.5–5)
SAMPLE SITE BLOOD GAS (OHS): ABNORMAL
SAO2 % BLDA: 97.2 %
SODIUM BLOOD GAS (OHS): 136 MMOL/L (ref 137–145)

## 2024-05-06 PROCEDURE — 82803 BLOOD GASES ANY COMBINATION: CPT

## 2024-05-06 PROCEDURE — 99900035 HC TECH TIME PER 15 MIN (STAT)

## 2024-05-06 PROCEDURE — 36600 WITHDRAWAL OF ARTERIAL BLOOD: CPT

## 2024-05-10 ENCOUNTER — LAB VISIT (OUTPATIENT)
Dept: LAB | Facility: HOSPITAL | Age: 74
End: 2024-05-10
Attending: INTERNAL MEDICINE
Payer: MEDICARE

## 2024-05-10 DIAGNOSIS — C90.00 MULTIPLE MYELOMA NOT HAVING ACHIEVED REMISSION: ICD-10-CM

## 2024-05-10 LAB
ALBUMIN SERPL-MCNC: 3.7 G/DL (ref 3.4–4.8)
ALBUMIN/GLOB SERPL: 1.1 RATIO (ref 1.1–2)
ALP SERPL-CCNC: 82 UNIT/L (ref 40–150)
ALT SERPL-CCNC: 30 UNIT/L (ref 0–55)
AST SERPL-CCNC: 19 UNIT/L (ref 5–34)
BASOPHILS # BLD AUTO: 0.02 X10(3)/MCL
BASOPHILS NFR BLD AUTO: 0.4 %
BILIRUB SERPL-MCNC: 1.1 MG/DL
BUN SERPL-MCNC: 10.9 MG/DL (ref 8.4–25.7)
CALCIUM SERPL-MCNC: 9.1 MG/DL (ref 8.8–10)
CHLORIDE SERPL-SCNC: 107 MMOL/L (ref 98–107)
CO2 SERPL-SCNC: 26 MMOL/L (ref 23–31)
CREAT SERPL-MCNC: 1.1 MG/DL (ref 0.73–1.18)
EOSINOPHIL # BLD AUTO: 0.14 X10(3)/MCL (ref 0–0.9)
EOSINOPHIL NFR BLD AUTO: 2.8 %
ERYTHROCYTE [DISTWIDTH] IN BLOOD BY AUTOMATED COUNT: 13.4 % (ref 11.5–17)
GFR SERPLBLD CREATININE-BSD FMLA CKD-EPI: >60 ML/MIN/1.73/M2
GLOBULIN SER-MCNC: 3.5 GM/DL (ref 2.4–3.5)
GLUCOSE SERPL-MCNC: 119 MG/DL (ref 82–115)
HCT VFR BLD AUTO: 41.1 % (ref 42–52)
HGB BLD-MCNC: 14.2 G/DL (ref 14–18)
IGA SERPL-MCNC: 44 MG/DL (ref 101–645)
IGG SERPL-MCNC: 2100 MG/DL (ref 540–1822)
IGM SERPL-MCNC: 95 MG/DL (ref 22–240)
IMM GRANULOCYTES # BLD AUTO: 0.01 X10(3)/MCL (ref 0–0.04)
IMM GRANULOCYTES NFR BLD AUTO: 0.2 %
LYMPHOCYTES # BLD AUTO: 1.39 X10(3)/MCL (ref 0.6–4.6)
LYMPHOCYTES NFR BLD AUTO: 28.1 %
MCH RBC QN AUTO: 33 PG (ref 27–31)
MCHC RBC AUTO-ENTMCNC: 34.5 G/DL (ref 33–36)
MCV RBC AUTO: 95.6 FL (ref 80–94)
MONOCYTES # BLD AUTO: 0.39 X10(3)/MCL (ref 0.1–1.3)
MONOCYTES NFR BLD AUTO: 7.9 %
NEUTROPHILS # BLD AUTO: 2.99 X10(3)/MCL (ref 2.1–9.2)
NEUTROPHILS NFR BLD AUTO: 60.6 %
PLATELET # BLD AUTO: 197 X10(3)/MCL (ref 130–400)
PMV BLD AUTO: 8.4 FL (ref 7.4–10.4)
POTASSIUM SERPL-SCNC: 3.7 MMOL/L (ref 3.5–5.1)
PROT SERPL-MCNC: 7.2 GM/DL (ref 5.8–7.6)
RBC # BLD AUTO: 4.3 X10(6)/MCL (ref 4.7–6.1)
SODIUM SERPL-SCNC: 140 MMOL/L (ref 136–145)
WBC # SPEC AUTO: 4.94 X10(3)/MCL (ref 4.5–11.5)

## 2024-05-10 PROCEDURE — 84165 PROTEIN E-PHORESIS SERUM: CPT

## 2024-05-10 PROCEDURE — 85025 COMPLETE CBC W/AUTO DIFF WBC: CPT

## 2024-05-10 PROCEDURE — 80053 COMPREHEN METABOLIC PANEL: CPT

## 2024-05-10 PROCEDURE — 36415 COLL VENOUS BLD VENIPUNCTURE: CPT

## 2024-05-10 PROCEDURE — 83521 IG LIGHT CHAINS FREE EACH: CPT

## 2024-05-10 PROCEDURE — 82784 ASSAY IGA/IGD/IGG/IGM EACH: CPT

## 2024-05-13 ENCOUNTER — APPOINTMENT (OUTPATIENT)
Dept: LAB | Facility: HOSPITAL | Age: 74
End: 2024-05-13
Attending: INTERNAL MEDICINE
Payer: MEDICARE

## 2024-05-13 LAB
ALBUMIN % SPEP (OHS): 50.92
ALBUMIN SERPL-MCNC: 3.6 G/DL (ref 3.4–4.8)
ALBUMIN/GLOB SERPL: 1 RATIO (ref 1.1–2)
ALPHA 1 GLOB (OHS): 0.18 GM/DL
ALPHA 1 GLOB% (OHS): 2.58
ALPHA 2 GLOB % (OHS): 9.29
ALPHA 2 GLOB (OHS): 0.66 GM/DL
BETA GLOB (OHS): 0.93 GM/DL
BETA GLOB% (OHS): 13.07
GAMMA GLOBULIN % (OHS): 24.14
GAMMA GLOBULIN (OHS): 1.71 GM/DL
GLOBULIN SER-MCNC: 3.5 GM/DL (ref 2.4–3.5)
M SPIKE % (OHS): 12.52
M SPIKE (OHS): 0.89 GM/DL
PATH REV: NORMAL
PROT SERPL-MCNC: 7.1 GM/DL (ref 5.8–7.6)

## 2024-05-14 DIAGNOSIS — C90.00 MULTIPLE MYELOMA NOT HAVING ACHIEVED REMISSION: Primary | ICD-10-CM

## 2024-05-14 LAB
KAPPA LC FREE SER-MCNC: 5.22 MG/DL (ref 0.33–1.94)
KAPPA LC FREE/LAMBDA FREE SER: 13.1 {RATIO} (ref 0.26–1.65)
LAMBDA LC FREE SERPL-MCNC: 0.4 MG/DL (ref 0.57–2.63)

## 2024-05-15 ENCOUNTER — HOSPITAL ENCOUNTER (OUTPATIENT)
Dept: RADIOLOGY | Facility: HOSPITAL | Age: 74
Discharge: HOME | End: 2024-05-15
Attending: INTERNAL MEDICINE
Payer: MEDICARE

## 2024-05-15 DIAGNOSIS — C90.00 MULTIPLE MYELOMA NOT HAVING ACHIEVED REMISSION: ICD-10-CM

## 2024-05-15 PROCEDURE — A9552 F18 FDG: HCPCS | Performed by: INTERNAL MEDICINE

## 2024-05-15 PROCEDURE — 78816 PET IMAGE W/CT FULL BODY: CPT | Mod: TC

## 2024-05-15 RX ORDER — FLUDEOXYGLUCOSE F18 500 MCI/ML
10 INJECTION INTRAVENOUS
Status: COMPLETED | OUTPATIENT
Start: 2024-05-15 | End: 2024-05-15

## 2024-05-15 RX ADMIN — FLUDEOXYGLUCOSE F-18 10.5 MILLICURIE: 500 INJECTION INTRAVENOUS at 11:05

## 2024-05-17 LAB
ALBUMIN 24H UR ELPH-MRATE: 92 MG/24 H
ALBUMIN/GLOB 24H UR ELPH: 0.28 {RATIO}
ALPHA1 GLOB 24H UR ELPH-MRATE: 12.5 MG/24 H
ALPHA2 GLOB 24H UR ELPH-MRATE: 46 MG/24 H
B-GLOBULIN 24H UR ELPH-MRATE: 62.7 MG/24 H
COLLECT DURATION TIME UR: 24 H
GAMMA GLOB 24H UR ELPH-MRATE: 204.8 MG/24 H
M PROTEIN 24H UR ELPH-MRATE: 132 MG/24 H
PROT 24H UR-MRATE: 418 MG/24 H
PROT PATTERN 24H UR ELPH-IMP: ABNORMAL
SPECIMEN VOL 24H UR: 1100 ML

## 2024-05-23 ENCOUNTER — OFFICE VISIT (OUTPATIENT)
Dept: HEMATOLOGY/ONCOLOGY | Facility: CLINIC | Age: 74
End: 2024-05-23
Payer: MEDICARE

## 2024-05-23 VITALS
BODY MASS INDEX: 26.44 KG/M2 | DIASTOLIC BLOOD PRESSURE: 80 MMHG | TEMPERATURE: 98 F | OXYGEN SATURATION: 94 % | SYSTOLIC BLOOD PRESSURE: 143 MMHG | HEIGHT: 73 IN | RESPIRATION RATE: 18 BRPM | HEART RATE: 64 BPM | WEIGHT: 199.5 LBS

## 2024-05-23 DIAGNOSIS — C90.00 MULTIPLE MYELOMA NOT HAVING ACHIEVED REMISSION: Primary | ICD-10-CM

## 2024-05-23 DIAGNOSIS — G62.9 PERIPHERAL POLYNEUROPATHY: ICD-10-CM

## 2024-05-23 DIAGNOSIS — Z76.82 STEM CELL TRANSPLANT CANDIDATE: ICD-10-CM

## 2024-05-23 PROCEDURE — 99214 OFFICE O/P EST MOD 30 MIN: CPT | Mod: S$PBB,,, | Performed by: INTERNAL MEDICINE

## 2024-05-23 PROCEDURE — 99999 PR PBB SHADOW E&M-EST. PATIENT-LVL IV: CPT | Mod: PBBFAC,,, | Performed by: INTERNAL MEDICINE

## 2024-05-23 PROCEDURE — 99214 OFFICE O/P EST MOD 30 MIN: CPT | Mod: PBBFAC | Performed by: INTERNAL MEDICINE

## 2024-05-23 NOTE — PROGRESS NOTES
HEMATOLOGY/ONCOLOGY OFFICE CLINIC VISIT    Visit Information:    Initial Evaluation: 8/10/2022  Referring Provider:   Other providers: Dr Brito  Code status: Not addressed    Diagnosis:  Multiple Myeloma--IgG, Kappa  Macrocytic anemia    Present treatment:   VRD-Started on 12/9/22 --on hold since 6/2023  Daratumumab added on 1/27/23 ----on hold since 6/2/2023    Treatment/Oncology history:  VRD-Started on 12/9/22  Daratumumab added on 1/27/23      Goal of care:  palliative    Imaging:   US abdomen 8/24/2022: Spleen: 10.8 cm. Mild hepatomegaly with suspected mild hepatic steatosis. Cholelithiasis.  PET CT 9/28/2022: 1. Right scapular small focal mild hypermetabolism without lytic or sclerotic abnormality is not convinced to be related to multiple myeloma. 2. No definite skeletal lytic abnormality with hypermetabolism to reflect multiple myeloma on this exam. 3. Bilateral small pleural effusions.   4. Gallstones. 5.  Noninflamed diverticulosis coli. 6.  Prostatomegaly.  WB PET CT 11/10/2023:  No FDG avid malignancy is present on today's exam.  WB PET CT 5/15/2024:  No FDG avid malignancy is present on today's exam.        Pathology:  BONE MARROW BIOPSY 9/1/2022: PLASMA CELL MYELOMA, KAPPA RESTRICTED. NORMOCELLULAR MARROW (30%) WITH 70% INVOLVEMENT BY PLASMA CELL MYELOMA. BACKGROUND TRILINEAGE HEMATOPOIESIS IS DECREASED.  Bone Marrow Biopsy 9/12/2023: 1. BONE MARROW, RIGHT POSTERIOR ILIAC CREST, ASPIRATE, CLOT, DECALCIFIED CORE   BIOPSY:    PERSISTENT/RESIDUAL PLASMA CELL NEOPLASM, KAPPA LIGHT CHAIN RESTRICTED.    NORMOCELLULAR MARROW (30%) WITH 5% INVOLVEMENT BY KAPPA RESTRICTED PLASMA CELL   NEOPLASM.    SEQUENTIAL TRILINEAGE HEMATOPOIESIS PRESENT.    INCREASED IRON STORES.    PERIPHERAL BLOOD :    NORMOCYTIC NORMOCHROMIC ANEMIA.    2. SEE DIAGNOSIS #1    CODE 8    Electronically Signed by:   Markell Farmer M.D. , Pathologist   (Case signed 09/19/2023 at 01:50pm)    Comment   A) The patient's history of IgG  "kappa multiple myeloma treated with VRD and   daratumumab is noted from the electronic medical record.    B) The bone marrow is normocellular for age demonstrates a small residual   population of kappa restricted plasma cells (5%).  Background hematopoiesis is   sequential and trilineage.    C) Additional material submitted to [facility] for MRD myeloma flow   demonstrated monotypic kappa plasma cells (MRD=0.1781% or 1781 cells/million).     CLINICAL HISTORY:       Patient: Valentino Manning is a 73 y.o. male with multiple medical problems kindly referred for persistent anemia.  Reviewing electronic medical record patient with anemia since 02/10/2016.  From 2016 to January of 2017 anemia was mild.  Anemia slowly worsening back in 2018 hemoglobin was 8.0.  At that same time kidney function was worsening as well. Labs with HGB of 9.4, .9, platelets 231 K, WBC 5.0.  Chemistries with globulin of 5.3.  BUN/creatinine 27.7/1.41.    Further workup revealed the diagnosis of multiple myeloma.  Patient was started on treatment with Velcade, Revlimid and dexamethasone on 12/09/2022 and daratumumab was added on 01/27/2023 with significant improvement of his disease.  He was seen in [place] for possible bone marrow transplant but he was not interested in bone marrow transplant at the time.  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  Gabapentin was not helping.     Treatment was delayed 1st due to COVID-19 in February of 2023, he has few treatments in between but it was delayed again due to NSTEMI . Revlimid was stopped due to his heart disease and plan was to continue daratumumab and Velcade lower dose for neuropathy.      Patient continued to decline with worsening of his weakness.  Home health called to let us know that patient was weak and unable to get out of bed. I called the patient Thursday 6/8/2023: "I personally spoke with the patient and  instructed to go to the ER as his " "weakness and neuropathy is worsening and he reports that he went to the ER in [place] because he could not urinate and a gongora cath was placed and send home.   He reports that is very difficult for him to get out of bed and he can not seeing himself going to his room to the car. I told him he will need to call an ambulance to transport him to the hospital and to come to [facility]. Patient with debilitating neuropathy of unclear etiology--not typical for MM and/or treatment for MM. I told him that may need MRI's of spine and/or brain and neurologist evaluation. Patient verbalized understanding and he will planned to go to the ER tomorrow not today."      Patient eventually went to the ER.   MRI T spine 6/12/2023:  1. No acute abnormality of the thoracic spine. 2. Mild multilevel canal and neural foraminal narrowing. 3. No cord signal abnormality.  MRI L spine: 1. Severe degenerative spinal canal stenosis at L3-L4 and L4-L5, moderate at L2-L3, mild at L5-S1. 2. Multilevel neural foraminal stenoses as described.  MRI C spine: 1. Severe degenerative spinal canal stenosis at L3-L4 and L4-L5, moderate at L2-L3, mild at L5-S1. 2. Multilevel neural foraminal stenoses as described.  MRIs of the spine.  No evidence of plasmacytoma, mass or cord compression due to Malignancy.     Patient with cervical myelopathy and stenosis.  He was seen by Neurosurgery and on 6/26/2023 underwent Decompressive laminectomies at C3-4, C4-5, and C5-6. He was then transfer to [facility] in [place] for Rehab.     Interval History:  Patient has been off treatment  since 6/2/2023 due to extensive unrelated c spine surgery followed by prolonged stay at rehab at [facility].     Patient presents today for follow up of his MM to discuss lab and PET CT results, accompanied by his wife.  He underwent right shoulder surgery on 2/21/24. He says he has total mobility of his right arm now. He continues with neuropathy, no worsening, makes it " difficult to walk, but he does not need to use a cane or walker as of now.  He is taking Cymbalta and finds it's helping with neuropathy symptoms.  Overall, he is doing fair. He continues with PT and he has returned to work.   Discussed labs and markers trending up slowly. No anemia, normal kidney function and calcium.  He continues to follow Dr. Brito, last visit visit on 3/1/24 that recommend to initiate treatment if m protein >1.5grams or any CRAP (Hypercalcemia, renal insufficiency, anemia, bone lesions) symptoms develop      Past Medical History:   Diagnosis Date    Anemia     GERD (gastroesophageal reflux disease)     Heart problem     Hyperlipidemia     Hypertension     Multiple myeloma     NSTEMI (non-ST elevated myocardial infarction)       Past Surgical History:   Procedure Laterality Date    BONE MARROW ASPIRATION N/A 09/01/2022    Procedure: ASPIRATION, BONE MARROW;  Surgeon: Robbie Gardner MD;  Location: [facility];  Service: General;  Laterality: N/A;    BONE MARROW BIOPSY Right 09/12/2023    Procedure: Biopsy-bone marrow;  Surgeon: Russ Wilson MD;  Location: [facility];  Service: General;  Laterality: Right;    CARDIAC SURGERY  2021    ENDOSCOPIC RELEASE OF BOTH CARPAL TUNNELS      FUSION OF POSTERIOR COLUMN OF CERVICAL SPINE USING COMPUTER AIDED NAVIGATION N/A 06/26/2023    Procedure: FUSION, SPINE, POSTERIOR SPINAL COLUMN, CERVICAL, USING COMPUTER-ASSISTED NAVIGATION;  Surgeon: Montserrat Aviles MD;  Location: [facility];  Service: Neurosurgery;  Laterality: N/A;  C3-4, C4-5, C5-6 laminectomies and fusion, o-arm, stealth  TIVA setup  NTI //  XX    SHOULDER SURGERY Right 02/21/2024     Family History   Problem Relation Name Age of Onset    Hypertension Mother      Diabetes Mother      Anemia Mother      Heart disease Mother      Heart disease Father              Review of patient's allergies indicates:   Allergen Reactions    Penicillins       Current Outpatient Medications on File Prior to Visit    Medication Sig Dispense Refill    acyclovir (ZOVIRAX) 400 MG tablet TAKE 1 TABLET BY MOUTH TWICE A  tablet 1    atorvastatin (LIPITOR) 40 MG tablet TAKE 1 TABLET ORALLY ONCE DAILY ON MON/TUE/THUR/FRI AND 1/2 ON WED. NONE ON SAT/SUN      clopidogreL (PLAVIX) 75 mg tablet Take 75 mg by mouth once daily.      DULoxetine (CYMBALTA) 30 MG capsule TAKE ONE CAPSULE BY MOUTH ONCE DAILY 30 capsule 3    dutasteride (AVODART) 0.5 mg capsule Take 0.5 mg by mouth once daily.      FERRETTS 325 mg (106 mg iron) Tab Take 1 tablet by mouth 2 (two) times daily.      gabapentin (NEURONTIN) 400 MG capsule Take 2 capsules (800 mg total) by mouth 3 (three) times daily. for 7 days (Patient taking differently: Take 800 mg by mouth 2 (two) times daily.) 42 capsule 0    HYDROcodone-acetaminophen (NORCO)  mg per tablet Take 10 tablets by mouth once daily.      lisinopriL (PRINIVIL,ZESTRIL) 20 MG tablet Take 20 mg by mouth.      metoprolol tartrate (LOPRESSOR) 25 MG tablet Take 1 tablet (25 mg total) by mouth 2 (two) times daily. 60 tablet 11    tamsulosin (FLOMAX) 0.4 mg Cap Take 2 capsules (0.8 mg total) by mouth every evening. 60 capsule 11    testosterone cypionate (DEPOTESTOTERONE CYPIONATE) 200 mg/mL injection INJECT 1 MLS (200 MG) INTRAMUSCULARLY ONCE WEEKLY.      zolpidem (AMBIEN) 10 mg Tab Take 10 mg by mouth nightly as needed.      [DISCONTINUED] apixaban (ELIQUIS) 5 mg Tab Take 1 tablet (5 mg total) by mouth 2 (two) times daily. 60 tablet 6     No current facility-administered medications on file prior to visit.      Review of Systems   Constitutional:  Positive for fatigue. Negative for activity change, appetite change, chills, diaphoresis, fever and unexpected weight change.   HENT:  Negative for nasal congestion, mouth sores, nosebleeds, postnasal drip, sinus pressure/congestion, sore throat and trouble swallowing.    Eyes:  Negative for visual disturbance.   Respiratory:  Positive for shortness of breath (on  "excertion). Negative for cough and wheezing.    Cardiovascular:  Negative for chest pain and palpitations.   Gastrointestinal:  Negative for abdominal distention, abdominal pain, blood in stool, change in bowel habit, constipation, diarrhea, nausea and vomiting.   Endocrine: Negative.    Genitourinary:  Negative for bladder incontinence, decreased urine volume, difficulty urinating, dysuria, frequency, hematuria, scrotal swelling, testicular pain and urgency.   Musculoskeletal:  Negative for arthralgias, back pain, gait problem, joint swelling, leg pain, myalgias and neck pain.   Integumentary:  Negative for rash.   Neurological:  Positive for tremors and weakness. Negative for dizziness, seizures, syncope, speech difficulty, light-headedness, numbness, headaches and memory loss.        Neuropathy   Hematological:  Negative for adenopathy. Does not bruise/bleed easily.   Psychiatric/Behavioral:  Negative for agitation, confusion, hallucinations, sleep disturbance and suicidal ideas. The patient is not nervous/anxious.               Vitals:    05/23/24 1302   BP: (!) 143/80   BP Location: Left arm   Patient Position: Sitting   Pulse: 64   Resp: 18   Temp: 98.1 °F (36.7 °C)   TempSrc: Oral   SpO2: (!) 94%   Weight: 90.5 kg (199 lb 8 oz)   Height: 6' 1" (1.854 m)       Wt Readings from Last 6 Encounters:   05/23/24 90.5 kg (199 lb 8 oz)   03/04/24 89.9 kg (198 lb 1.6 oz)   12/28/23 93.7 kg (206 lb 9.6 oz)   11/16/23 87.5 kg (192 lb 12.8 oz)   09/21/23 81.6 kg (180 lb)   09/12/23 80.1 kg (176 lb 9.4 oz)     Body mass index is 26.32 kg/m².  Body surface area is 2.16 meters squared.       Physical Exam  HENT:      Head: Normocephalic and atraumatic.   Eyes:      Extraocular Movements: Extraocular movements intact.   Pulmonary:      Effort: Pulmonary effort is normal.      Breath sounds: Normal breath sounds. No wheezing.   Abdominal:      General: Bowel sounds are normal.      Palpations: Abdomen is soft. "   Musculoskeletal:      Cervical back: Neck supple.   Neurological:      Mental Status: He is alert and oriented to person, place, and time.      Cranial Nerves: Cranial nerves 2-12 are intact.      Sensory: No sensory deficit.      Motor: Weakness present.      Gait: Gait abnormal.   Psychiatric:         Mood and Affect: Mood normal.         Behavior: Behavior normal. Behavior is cooperative.         Judgment: Judgment normal.       ECOG SCORE    1 - Restricted in strenuous activity-ambulatory and able to carry out work of a light nature         Laboratory:  CBC with Differential:  Lab Results   Component Value Date    WBC 4.94 05/10/2024    RBC 4.30 (L) 05/10/2024    HGB 14.2 05/10/2024    HCT 41.1 (L) 05/10/2024    MCV 95.6 (H) 05/10/2024    MCH 33.0 (H) 05/10/2024    MCHC 34.5 05/10/2024    RDW 13.4 05/10/2024     05/10/2024    MPV 8.4 05/10/2024        CMP:  Sodium   Date Value Ref Range Status   05/10/2024 140 136 - 145 mmol/L Final   02/09/2024 137 136 - 145 mmol/L Final     Potassium   Date Value Ref Range Status   05/10/2024 3.7 3.5 - 5.1 mmol/L Final   02/09/2024 3.9 3.5 - 5.1 mmol/L Final     Chloride   Date Value Ref Range Status   02/09/2024 105 100 - 109 mmol/L Final     CO2   Date Value Ref Range Status   05/10/2024 26 23 - 31 mmol/L Final     Carbon Dioxide   Date Value Ref Range Status   02/09/2024 23 22 - 33 mmol/L Final     Blood Urea Nitrogen   Date Value Ref Range Status   05/10/2024 10.9 8.4 - 25.7 mg/dL Final   02/09/2024 15 5 - 25 mg/dL Final     Creatinine   Date Value Ref Range Status   05/10/2024 1.10 0.73 - 1.18 mg/dL Final   02/09/2024 0.98 0.57 - 1.25 mg/dL Final     Calcium   Date Value Ref Range Status   05/10/2024 9.1 8.8 - 10.0 mg/dL Final   02/09/2024 9.2 8.8 - 10.6 mg/dL Final     Albumin   Date Value Ref Range Status   05/10/2024 3.7 3.4 - 4.8 g/dL Final   05/10/2024 3.6 3.4 - 4.8 g/dL Final     Bilirubin Total   Date Value Ref Range Status   05/10/2024 1.1 <=1.5 mg/dL  Final     ALP   Date Value Ref Range Status   05/10/2024 82 40 - 150 unit/L Final     AST   Date Value Ref Range Status   05/10/2024 19 5 - 34 unit/L Final     ALT   Date Value Ref Range Status   05/10/2024 30 0 - 55 unit/L Final     Anion Gap   Date Value Ref Range Status   02/09/2024 9 8 - 16 mmol/L Final     eGFR    Date Value Ref Range Status   02/09/2024 82 mL/min/1.73mSq Final     Comment:     In accordance with NKF-ASN Task Force recommendation, calculation based on the Chronic Kidney Disease Epidemiology Collaboration (CKD-EPI) equation without adjustment for race. eGFR adjusted for gender and age and calculated in ml/min/1.73mSquared. eGFR cannot be calculated if patient is under 18 years of age.     Reference Range:   >= 60 ml/min/1.73mSquared.     Estimated GFR-Non    Date Value Ref Range Status   06/22/2022 53 mls/min/1.73/m2 Final       Assessment:       1) Multiple myeloma  --Patient was seen at [facility] in [place]. He started treatment here with Velcade/Revlimid/Dexamethamasone. He said that he is a candidate for transplant and they will eval bone marrow again after 6 cycles. He had f/u via telemedicine with Dr. Brito on 2/27/23- recommended to repeat BMB x after 6 cycles.  Darzalex (weekly x 8 dose, then q 2 week x 8 doses, then q 4 weeks per kodak study) was added on 1/27/23. Cycle 4, Day 1 of Velcade/Revlimid/Dexamethamasone/Darzalex was given on 2/10/23. He was diagnosed with Covid and PEs on 2/14/23.   --Patient has been off treatment  since 6/2/2023 due to extensive unrelated c spine surgery followed by prolonged stay at rehab at [facility].   --Markers trending up slowly but no symptoms  --will continue to monitor closely  --Plan to start for M spike of > 1.5 or CRAB Sx (hypercalcemia, renal failure, anemia, and bone disease)  2) Cervical degenerative compressive myelopathy, with myelopathic features  3) Lumbar spinal stenosis  4) Peripheral  PolyNeuropathy  5) Weakness--better  6) History of NSTEMI/coronary artery disease-- on Plavix  7) H/o PE         Plan:       Marker slowly increasing.  He has not anemic, kidney function and calcium levels normal.  Neuropathy better.  We will continue to monitor closely for now.  We will repeat bone marrow biopsy prior to re-initiation of treatment.    Will continue to monitor closely for now  Continue follow ups with Dr. Brito, next visit 7/2024  Continue outpatient PT  Continue Cymbalta  MM labs q 4 weeks - no urine  RTC in 3 months with NP  MM Labs + urine 1 week prior   WB PET scan q 6 months - 11/2024    The patient was seen, interviewed and examined. Pertinent lab and radiology studies were reviewed.   The patient was given ample opportunity to ask questions, and to the best of my abilities, all questions answered to satisfaction; patient demonstrated understanding of what we discussed and agreeable to the plan. Pt instructed to call should develop concerning signs/symptoms or have further questions.     Angélica Harris MD  Hematology/Oncology      Professional Services   I, Maddie Rangel LPN, acted solely as a scribe for and in the presence of Dr. Angélica Harris, who performed these services.

## 2024-06-24 ENCOUNTER — LAB VISIT (OUTPATIENT)
Dept: LAB | Facility: HOSPITAL | Age: 74
End: 2024-06-24
Attending: NURSE PRACTITIONER
Payer: MEDICARE

## 2024-06-24 DIAGNOSIS — Z76.82 STEM CELL TRANSPLANT CANDIDATE: ICD-10-CM

## 2024-06-24 DIAGNOSIS — G62.9 PERIPHERAL POLYNEUROPATHY: ICD-10-CM

## 2024-06-24 DIAGNOSIS — C90.00 MULTIPLE MYELOMA NOT HAVING ACHIEVED REMISSION: ICD-10-CM

## 2024-06-24 LAB
ALBUMIN SERPL-MCNC: 3.6 G/DL (ref 3.4–4.8)
ALBUMIN/GLOB SERPL: 1.1 RATIO (ref 1.1–2)
ALP SERPL-CCNC: 69 UNIT/L (ref 40–150)
ALT SERPL-CCNC: 30 UNIT/L (ref 0–55)
ANION GAP SERPL CALC-SCNC: 8 MEQ/L
AST SERPL-CCNC: 22 UNIT/L (ref 5–34)
BASOPHILS # BLD AUTO: 0.02 X10(3)/MCL
BASOPHILS NFR BLD AUTO: 0.4 %
BILIRUB SERPL-MCNC: 1.2 MG/DL
BUN SERPL-MCNC: 13.5 MG/DL (ref 8.4–25.7)
CALCIUM SERPL-MCNC: 9.2 MG/DL (ref 8.8–10)
CHLORIDE SERPL-SCNC: 108 MMOL/L (ref 98–107)
CO2 SERPL-SCNC: 23 MMOL/L (ref 23–31)
CREAT SERPL-MCNC: 1.16 MG/DL (ref 0.73–1.18)
CREAT/UREA NIT SERPL: 12
EOSINOPHIL # BLD AUTO: 0.14 X10(3)/MCL (ref 0–0.9)
EOSINOPHIL NFR BLD AUTO: 2.6 %
ERYTHROCYTE [DISTWIDTH] IN BLOOD BY AUTOMATED COUNT: 13.4 % (ref 11.5–17)
GFR SERPLBLD CREATININE-BSD FMLA CKD-EPI: >60 ML/MIN/1.73/M2
GLOBULIN SER-MCNC: 3.3 GM/DL (ref 2.4–3.5)
GLUCOSE SERPL-MCNC: 98 MG/DL (ref 82–115)
HCT VFR BLD AUTO: 41.7 % (ref 42–52)
HGB BLD-MCNC: 14.2 G/DL (ref 14–18)
IGA SERPL-MCNC: 32 MG/DL (ref 101–645)
IGG SERPL-MCNC: 2115 MG/DL (ref 540–1822)
IGM SERPL-MCNC: 89 MG/DL (ref 22–240)
IMM GRANULOCYTES # BLD AUTO: 0.01 X10(3)/MCL (ref 0–0.04)
IMM GRANULOCYTES NFR BLD AUTO: 0.2 %
LYMPHOCYTES # BLD AUTO: 1.22 X10(3)/MCL (ref 0.6–4.6)
LYMPHOCYTES NFR BLD AUTO: 22.9 %
MCH RBC QN AUTO: 33.1 PG (ref 27–31)
MCHC RBC AUTO-ENTMCNC: 34.1 G/DL (ref 33–36)
MCV RBC AUTO: 97.2 FL (ref 80–94)
MONOCYTES # BLD AUTO: 0.38 X10(3)/MCL (ref 0.1–1.3)
MONOCYTES NFR BLD AUTO: 7.1 %
NEUTROPHILS # BLD AUTO: 3.55 X10(3)/MCL (ref 2.1–9.2)
NEUTROPHILS NFR BLD AUTO: 66.8 %
PLATELET # BLD AUTO: 205 X10(3)/MCL (ref 130–400)
PMV BLD AUTO: 8.4 FL (ref 7.4–10.4)
POTASSIUM SERPL-SCNC: 3.9 MMOL/L (ref 3.5–5.1)
PROT SERPL-MCNC: 6.9 GM/DL (ref 5.8–7.6)
RBC # BLD AUTO: 4.29 X10(6)/MCL (ref 4.7–6.1)
SODIUM SERPL-SCNC: 139 MMOL/L (ref 136–145)
WBC # BLD AUTO: 5.32 X10(3)/MCL (ref 4.5–11.5)

## 2024-06-24 PROCEDURE — 84165 PROTEIN E-PHORESIS SERUM: CPT

## 2024-06-24 PROCEDURE — 82784 ASSAY IGA/IGD/IGG/IGM EACH: CPT

## 2024-06-24 PROCEDURE — 85025 COMPLETE CBC W/AUTO DIFF WBC: CPT

## 2024-06-24 PROCEDURE — 83521 IG LIGHT CHAINS FREE EACH: CPT

## 2024-06-24 PROCEDURE — 80053 COMPREHEN METABOLIC PANEL: CPT

## 2024-06-24 PROCEDURE — 36415 COLL VENOUS BLD VENIPUNCTURE: CPT

## 2024-06-25 LAB
ALBUMIN % SPEP (OHS): 51.3 (ref 48.1–59.5)
ALBUMIN SERPL-MCNC: 3.5 G/DL (ref 3.4–4.8)
ALBUMIN/GLOB SERPL: 1 RATIO (ref 1.1–2)
ALPHA 1 GLOB (OHS): 0.18 GM/DL (ref 0–0.4)
ALPHA 1 GLOB% (OHS): 2.6 (ref 2.3–4.9)
ALPHA 2 GLOB % (OHS): 9.2 (ref 6.9–13)
ALPHA 2 GLOB (OHS): 0.64 GM/DL (ref 0.4–1)
BETA GLOB (OHS): 0.84 GM/DL (ref 0.7–1.3)
BETA GLOB% (OHS): 12.24 (ref 13.8–19.7)
GAMMA GLOBULIN % (OHS): 24.65 (ref 10.1–21.9)
GAMMA GLOBULIN (OHS): 1.7 GM/DL (ref 0.4–1.8)
GLOBULIN SER-MCNC: 3.4 GM/DL (ref 2.4–3.5)
KAPPA LC FREE SER NEPH-MCNC: 5.71 MG/DL (ref 0.33–1.94)
KAPPA LC FREE/LAMBDA FREE SER NEPH: 15.9 {RATIO} (ref 0.26–1.65)
LAMBDA LC FREE SER NEPH-MCNC: 0.36 MG/DL (ref 0.57–2.63)
M SPIKE % (OHS): 12.45
M SPIKE (OHS): 0.86 GM/DL
PATH REV: NORMAL
PROT SERPL-MCNC: 6.9 GM/DL (ref 5.8–7.6)

## 2024-07-10 ENCOUNTER — TELEPHONE (OUTPATIENT)
Dept: HEMATOLOGY/ONCOLOGY | Facility: CLINIC | Age: 74
End: 2024-07-10
Payer: MEDICARE

## 2024-07-10 NOTE — TELEPHONE ENCOUNTER
Called pt to inform him that NP Bre will be out of clinic today due to filling in for endo bmbx. Offered to r/s virtual appt to tomorrow but pt states he has something going on at 11:00 and cannot make do that time. Pt was available for Tuesday at 10:00 am. Appt was moved and pt will reach out if this day happens to not work for him.

## 2024-07-16 ENCOUNTER — OFFICE VISIT (OUTPATIENT)
Dept: HEMATOLOGY/ONCOLOGY | Facility: CLINIC | Age: 74
End: 2024-07-16
Payer: MEDICARE

## 2024-07-16 DIAGNOSIS — C90.00 MULTIPLE MYELOMA, REMISSION STATUS UNSPECIFIED: Primary | ICD-10-CM

## 2024-07-16 DIAGNOSIS — Z79.01 CURRENT USE OF LONG TERM ANTICOAGULATION: ICD-10-CM

## 2024-07-16 PROCEDURE — 99214 OFFICE O/P EST MOD 30 MIN: CPT | Mod: 95,,,

## 2024-07-16 NOTE — PROGRESS NOTES
Section of Hematology and Stem Cell Transplantation  Follow-Up Note     07/16/2024    Primary Oncologic Diagnosis: Multiple myeloma, remission status unspecified [C90.00]    The patient location is: At Home  The chief complaint leading to consultation is: Follow-Up of Multiple Myeloma    Visit type: audiovisual    Face to Face time with patient: 10 minutes  32 minutes of total time spent on the encounter, which includes face to face time and non-face to face time preparing to see the patient (eg, review of tests), Obtaining and/or reviewing separately obtained history, Documenting clinical information in the electronic or other health record, Independently interpreting results (not separately reported) and communicating results to the patient/family/caregiver, or Care coordination (not separately reported).     Each patient to whom he or she provides medical services by telemedicine is:  (1) informed of the relationship between the physician and patient and the respective role of any other health care provider with respect to management of the patient; and (2) notified that he or she may decline to receive medical services by telemedicine and may withdraw from such care at any time.    History of Present Ilness:   Valentino Manning (Valentino) is a pleasant 73 y.o.male from [place], [place], here for follow-up of his multiple myeloma. He is followed locally with Dr. Harris. Past medical history includes CAD s/p multiple stents, followed by cardiology in [place]; advanced DJD of shoulders (wanting surgery). He runs a haunted house/trail during Halloween. No family history of malignancy. Non-smoker and drinker. Of note, he sought a 2nd-opinion at [facility], who recommended induction with VRd and consideration for ASCT, pending adequate response and pre-transplant evaluation.    Oncologic History:   Diagnosed with MM by Dr. Harris at [facility] (August 2022), met diagnostic criteria for active myeloma (3.4 grams of IgG Kappa MM at  diagnosis)  Bone marrow biopsy, 9/2022, w/ 70% clonal plasma cells; FISH/CG normal with standard risk disease  Whole body PET and 24-hour urine studies unremarkable. Asymptomatic outside of fatigue.  12/9/2022: Initiated VRd in [place], then daratumumab was added (Jan 2023) for gabriella-VRd  Treatment delays in early 2023 for COVID and NSTEMI  Completed 6 cycles with at least VGPR; last therapy 6/2/2023.  Off treatment due to extensive, unrelated c-spine surgery w/ prolonged stay at [facility] rehab in [place]  Recent PETCT on 5/15/2024, negative for malignancy    Interval History 07/16/2024:  Patient reports he is doing well. He had a little collision with the bathroom floor last Wednesday and has a vertical fracture in his right fibula; he doesn't have a recollection of this and attributes this with changing up his routine on his medications that help him sleep. He was evaluated near his home with an x-ray. They told him to stay off of it for a couple weeks while it heals; however, he does not have a cast. He is using a rollator to keep the pressure off when ambulating. He is still doing PT and seeing a chiropractor. His strength was improving prior to this; he was working and cutting grass with his riding  prior to this. He still has peripheral neuropathy, which is unchanged and maybe slightly improved. His wife has been happy with his improvement since being bed-bound last year. He does not recall his conversation with Dr. Brito regarding the Eliquis. He is still taking Plavix alone. He cut himself a couple of months ago, and he said it wouldn't stop bleeding. He made a decision to wean himself off Eliquis. He has an appointment with cardiology on August 8. He believes the cardiologist last told him he could back off the ASA. Denies fevers, chills, drenching night sweats, new/worsening pain, N/V/D, rash, chest pain, shortness of breath.    Past Medical History, Social History, and Past Family History  are unchanged since last evaluation except for HPI.    CURRENT MEDICATIONS:   Current Outpatient Medications   Medication Sig    acyclovir (ZOVIRAX) 400 MG tablet TAKE 1 TABLET BY MOUTH TWICE A DAY    atorvastatin (LIPITOR) 40 MG tablet TAKE 1 TABLET ORALLY ONCE DAILY ON MON/TUE/THUR/FRI AND 1/2 ON WED. NONE ON SAT/SUN    clopidogreL (PLAVIX) 75 mg tablet Take 75 mg by mouth once daily.    DULoxetine (CYMBALTA) 30 MG capsule TAKE ONE CAPSULE BY MOUTH ONCE DAILY    dutasteride (AVODART) 0.5 mg capsule Take 0.5 mg by mouth once daily.    FERRETTS 325 mg (106 mg iron) Tab Take 1 tablet by mouth 2 (two) times daily.    gabapentin (NEURONTIN) 400 MG capsule Take 2 capsules (800 mg total) by mouth 3 (three) times daily. for 7 days (Patient taking differently: Take 800 mg by mouth 2 (two) times daily.)    HYDROcodone-acetaminophen (NORCO)  mg per tablet Take 10 tablets by mouth once daily.    lisinopriL (PRINIVIL,ZESTRIL) 20 MG tablet Take 20 mg by mouth.    metoprolol tartrate (LOPRESSOR) 25 MG tablet Take 1 tablet (25 mg total) by mouth 2 (two) times daily.    tamsulosin (FLOMAX) 0.4 mg Cap Take 2 capsules (0.8 mg total) by mouth every evening.    testosterone cypionate (DEPOTESTOTERONE CYPIONATE) 200 mg/mL injection INJECT 1 MLS (200 MG) INTRAMUSCULARLY ONCE WEEKLY.    zolpidem (AMBIEN) 10 mg Tab Take 10 mg by mouth nightly as needed.     No current facility-administered medications for this visit.     ALLERGIES:   Review of patient's allergies indicates:   Allergen Reactions    Penicillins        Review of Systems:     Review of Systems   Constitutional:  Negative for chills, fever, malaise/fatigue and weight loss.   HENT:  Negative for congestion, nosebleeds, sinus pain and sore throat.    Eyes:  Negative for blurred vision, double vision, photophobia and pain.   Respiratory:  Negative for cough and shortness of breath.    Cardiovascular:  Negative for chest pain and palpitations.   Gastrointestinal:  Negative  for constipation, diarrhea, heartburn, nausea and vomiting.   Genitourinary:  Negative for dysuria and hematuria.   Musculoskeletal:  Positive for falls.   Skin:  Negative for rash.   Neurological:  Positive for sensory change. Negative for dizziness and headaches.   Endo/Heme/Allergies:  Negative for environmental allergies.   Psychiatric/Behavioral:  The patient does not have insomnia.      Physical Exam:     Physical exam and vital signs deferred due to telemedicine visit.    ECOG Performance Status: (foot note - ECOG PS provided by Eastern Cooperative Oncology Group) 2 - Symptomatic, <50% confined to bed    Karnofsky Performance Score:  90%- Able to Carry on Normal Activity: Minor Symptoms of Disease    Labs:   Lab Results   Component Value Date    WBC 5.32 06/24/2024    HGB 14.2 06/24/2024    HCT 41.7 (L) 06/24/2024    MCV 97.2 (H) 06/24/2024     06/24/2024     Lab Results   Component Value Date     06/24/2024    K 3.9 06/24/2024     (H) 06/24/2024    CO2 23 06/24/2024    BUN 13.5 06/24/2024    CREATININE 1.16 06/24/2024    ALBUMIN 3.6 06/24/2024    ALBUMIN 3.5 06/24/2024    BILITOT 1.2 06/24/2024    ALKPHOS 69 06/24/2024    AST 22 06/24/2024    ALT 30 06/24/2024     Imaging:   PET-CT 5/15/2024  Impression:  No FDG avid malignancy is present on today's exam.    Assessment and Plan:     Problem List Items Addressed This Visit       Multiple myeloma  - Met treatment criteria based on anemia and 70% clonal plasma cells in marrow; no hyperCa, renal dysfunction, or bone disease on WB PET  - See additional treatment history above in oncologic history  - Completed 5 cycles of Vita-VRd therapy achieving VGRP to therapy; pt adamantly declined SCT on multiple occasions due to logistical and safety concerns  - Required admission for NSTEMI so recommended stopping revlimid and transitioned to q 4 weeks vita/q 2 week velcade maintenance which he was receiving until June 2023  - ALL myeloma held since June  2023, due to urgent admit and surgery for severe spinal cervical stenosis (unrelated to myeloma) which has been surgically corrected and pt now home from rehab doing outpt PT  - Normal WB PET 5/15/2024  - Recent biochemical studies 6/26/24 cw continued VGPR; mild uptick in m-protein, sflc, IgG; next labs due 7/18/24  - No overt clinical signs of progression off therapy today   - M-spike up to 0.86 6/26/24, recommend to initiate treatment if M-protein >1.5 grams or CRAB develop  - Mild serial biochemical progression of last few readings I think reasonable to continue to monitor closely off therapy given pt wishes/comorbidities  - Check cbc, cmp, serum free light chains, quantitative immunoglobulins, serum electropheresis, serum immunofixation q 6 weeks and obtain WB PET scan q 6 months (next due Nov 2024) locally in [place]  - Continue to check in with pt q 3 months per his request   - Patient understands therapy may be required within next 24 months and he expressed understanding  - Educated on symptoms for which to seek attn in our clinic earlier       Current use of long term anticoagulation        No longer on lIMiD medications    Patient more ambulatory now, prior to recent leg fracture unrelated to MM    Continue Plavix at direction of cardiology for CAD        BMT Chart Routing      Follow up with physician 3 months. Virtual with [facility]   Follow up with [facility]IS[facility]    Provider visit type    Infusion scheduling note    Injection scheduling note    Labs   Scheduling:  Preferred lab:  Lab interval:  Labs, as scheduled by Dr. Harris   Imaging    Pharmacy appointment    Other referrals            Orders Placed:           Total time of this visit was 32 minutes, including time spent face to face with patient and/or via video/audio, and also in preparing for today's visit for MDM and documentation. (Medical Decision Making, including consideration of possible diagnoses, management options, complex medical record  review, review of diagnostic tests and information, consideration and discussion of significant complications based on comorbidities, and discussion with providers involved with the care of the patient). Greater than 50% was spent face to face with the patient counseling and coordinating care.    Thank you for allowing me to partake in the care of this patient. If there are any questions, please do not hesitate to reach out.    Bre Nuñez, FNP-C  Hematologic Malignancies, Stem Cell Transplantation, and Cellular Therapy  [facility]

## 2024-07-19 ENCOUNTER — LAB VISIT (OUTPATIENT)
Dept: LAB | Facility: HOSPITAL | Age: 74
End: 2024-07-19
Attending: INTERNAL MEDICINE
Payer: MEDICARE

## 2024-07-19 DIAGNOSIS — C90.00 MULTIPLE MYELOMA NOT HAVING ACHIEVED REMISSION: ICD-10-CM

## 2024-07-19 DIAGNOSIS — G62.9 PERIPHERAL POLYNEUROPATHY: ICD-10-CM

## 2024-07-19 DIAGNOSIS — Z76.82 STEM CELL TRANSPLANT CANDIDATE: ICD-10-CM

## 2024-07-19 LAB
ALBUMIN SERPL-MCNC: 3.5 G/DL (ref 3.4–4.8)
ALBUMIN/GLOB SERPL: 1 RATIO (ref 1.1–2)
ALP SERPL-CCNC: 73 UNIT/L (ref 40–150)
ALT SERPL-CCNC: 26 UNIT/L (ref 0–55)
ANION GAP SERPL CALC-SCNC: 7 MEQ/L
AST SERPL-CCNC: 19 UNIT/L (ref 5–34)
BASOPHILS # BLD AUTO: 0.01 X10(3)/MCL
BASOPHILS NFR BLD AUTO: 0.1 %
BILIRUB SERPL-MCNC: 1.3 MG/DL
BUN SERPL-MCNC: 13.8 MG/DL (ref 8.4–25.7)
CALCIUM SERPL-MCNC: 9.1 MG/DL (ref 8.8–10)
CHLORIDE SERPL-SCNC: 102 MMOL/L (ref 98–107)
CO2 SERPL-SCNC: 25 MMOL/L (ref 23–31)
CREAT SERPL-MCNC: 1.17 MG/DL (ref 0.73–1.18)
CREAT/UREA NIT SERPL: 12
EOSINOPHIL # BLD AUTO: 0.12 X10(3)/MCL (ref 0–0.9)
EOSINOPHIL NFR BLD AUTO: 1.7 %
ERYTHROCYTE [DISTWIDTH] IN BLOOD BY AUTOMATED COUNT: 13.4 % (ref 11.5–17)
GFR SERPLBLD CREATININE-BSD FMLA CKD-EPI: >60 ML/MIN/1.73/M2
GLOBULIN SER-MCNC: 3.4 GM/DL (ref 2.4–3.5)
GLUCOSE SERPL-MCNC: 108 MG/DL (ref 82–115)
HCT VFR BLD AUTO: 38.6 % (ref 42–52)
HGB BLD-MCNC: 13 G/DL (ref 14–18)
IGA SERPL-MCNC: 24 MG/DL (ref 101–645)
IGG SERPL-MCNC: 2015 MG/DL (ref 540–1822)
IGM SERPL-MCNC: 80 MG/DL (ref 22–240)
IMM GRANULOCYTES # BLD AUTO: 0.01 X10(3)/MCL (ref 0–0.04)
IMM GRANULOCYTES NFR BLD AUTO: 0.1 %
LYMPHOCYTES # BLD AUTO: 1.09 X10(3)/MCL (ref 0.6–4.6)
LYMPHOCYTES NFR BLD AUTO: 15.9 %
MCH RBC QN AUTO: 33.1 PG (ref 27–31)
MCHC RBC AUTO-ENTMCNC: 33.7 G/DL (ref 33–36)
MCV RBC AUTO: 98.2 FL (ref 80–94)
MONOCYTES # BLD AUTO: 0.42 X10(3)/MCL (ref 0.1–1.3)
MONOCYTES NFR BLD AUTO: 6.1 %
NEUTROPHILS # BLD AUTO: 5.22 X10(3)/MCL (ref 2.1–9.2)
NEUTROPHILS NFR BLD AUTO: 76.1 %
PLATELET # BLD AUTO: 214 X10(3)/MCL (ref 130–400)
PMV BLD AUTO: 8.6 FL (ref 7.4–10.4)
POTASSIUM SERPL-SCNC: 4.1 MMOL/L (ref 3.5–5.1)
PROT SERPL-MCNC: 6.9 GM/DL (ref 5.8–7.6)
RBC # BLD AUTO: 3.93 X10(6)/MCL (ref 4.7–6.1)
SODIUM SERPL-SCNC: 134 MMOL/L (ref 136–145)
WBC # BLD AUTO: 6.87 X10(3)/MCL (ref 4.5–11.5)

## 2024-07-19 PROCEDURE — 36415 COLL VENOUS BLD VENIPUNCTURE: CPT

## 2024-07-19 PROCEDURE — 83521 IG LIGHT CHAINS FREE EACH: CPT

## 2024-07-19 PROCEDURE — 82784 ASSAY IGA/IGD/IGG/IGM EACH: CPT

## 2024-07-19 PROCEDURE — 86334 IMMUNOFIX E-PHORESIS SERUM: CPT

## 2024-07-19 PROCEDURE — 84165 PROTEIN E-PHORESIS SERUM: CPT

## 2024-07-19 PROCEDURE — 85025 COMPLETE CBC W/AUTO DIFF WBC: CPT

## 2024-07-19 PROCEDURE — 80053 COMPREHEN METABOLIC PANEL: CPT

## 2024-07-22 LAB
ALBUMIN % SPEP (OHS): 46.13 (ref 48.1–59.5)
ALBUMIN SERPL-MCNC: 3.1 G/DL (ref 3.4–4.8)
ALBUMIN/GLOB SERPL: 0.8 RATIO (ref 1.1–2)
ALPHA 1 GLOB (OHS): 0.27 GM/DL (ref 0–0.4)
ALPHA 1 GLOB% (OHS): 3.99 (ref 2.3–4.9)
ALPHA 2 GLOB % (OHS): 11.65 (ref 6.9–13)
ALPHA 2 GLOB (OHS): 0.79 GM/DL (ref 0.4–1)
BETA GLOB (OHS): 0.87 GM/DL (ref 0.7–1.3)
BETA GLOB% (OHS): 12.74 (ref 13.8–19.7)
GAMMA GLOBULIN % (OHS): 25.49 (ref 10.1–21.9)
GAMMA GLOBULIN (OHS): 1.73 GM/DL (ref 0.4–1.8)
GLOBULIN SER-MCNC: 3.7 GM/DL (ref 2.4–3.5)
KAPPA LC FREE SER NEPH-MCNC: 6.75 MG/DL (ref 0.33–1.94)
KAPPA LC FREE/LAMBDA FREE SER NEPH: 16.1 {RATIO} (ref 0.26–1.65)
LAMBDA LC FREE SER NEPH-MCNC: 0.42 MG/DL (ref 0.57–2.63)
M SPIKE % (OHS): 13.87
M SPIKE (OHS): 0.94 GM/DL
PATH REV: NORMAL
PROT SERPL-MCNC: 6.8 GM/DL (ref 5.8–7.6)

## 2024-08-16 ENCOUNTER — LAB VISIT (OUTPATIENT)
Dept: LAB | Facility: HOSPITAL | Age: 74
End: 2024-08-16
Attending: INTERNAL MEDICINE
Payer: MEDICARE

## 2024-08-16 DIAGNOSIS — C90.00 MULTIPLE MYELOMA, REMISSION STATUS UNSPECIFIED: Primary | ICD-10-CM

## 2024-08-16 DIAGNOSIS — Z76.82 STEM CELL TRANSPLANT CANDIDATE: ICD-10-CM

## 2024-08-16 DIAGNOSIS — G62.9 PERIPHERAL POLYNEUROPATHY: ICD-10-CM

## 2024-08-16 DIAGNOSIS — C90.00 MULTIPLE MYELOMA NOT HAVING ACHIEVED REMISSION: ICD-10-CM

## 2024-08-16 LAB
ALBUMIN SERPL-MCNC: 3.6 G/DL (ref 3.4–4.8)
ALBUMIN/GLOB SERPL: 1 RATIO (ref 1.1–2)
ALP SERPL-CCNC: 89 UNIT/L (ref 40–150)
ALT SERPL-CCNC: 28 UNIT/L (ref 0–55)
ANION GAP SERPL CALC-SCNC: 6 MEQ/L
AST SERPL-CCNC: 17 UNIT/L (ref 5–34)
BASOPHILS # BLD AUTO: 0.02 X10(3)/MCL
BASOPHILS NFR BLD AUTO: 0.4 %
BILIRUB SERPL-MCNC: 1 MG/DL
BUN SERPL-MCNC: 12.7 MG/DL (ref 8.4–25.7)
CALCIUM SERPL-MCNC: 9.3 MG/DL (ref 8.8–10)
CHLORIDE SERPL-SCNC: 109 MMOL/L (ref 98–107)
CO2 SERPL-SCNC: 26 MMOL/L (ref 23–31)
CREAT SERPL-MCNC: 1.11 MG/DL (ref 0.73–1.18)
CREAT/UREA NIT SERPL: 11
EOSINOPHIL # BLD AUTO: 0.23 X10(3)/MCL (ref 0–0.9)
EOSINOPHIL NFR BLD AUTO: 4.3 %
ERYTHROCYTE [DISTWIDTH] IN BLOOD BY AUTOMATED COUNT: 13.3 % (ref 11.5–17)
GFR SERPLBLD CREATININE-BSD FMLA CKD-EPI: >60 ML/MIN/1.73/M2
GLOBULIN SER-MCNC: 3.5 GM/DL (ref 2.4–3.5)
GLUCOSE SERPL-MCNC: 107 MG/DL (ref 82–115)
HCT VFR BLD AUTO: 40.8 % (ref 42–52)
HGB BLD-MCNC: 14 G/DL (ref 14–18)
IGA SERPL-MCNC: 31 MG/DL (ref 101–645)
IGG SERPL-MCNC: 2299 MG/DL (ref 540–1822)
IGM SERPL-MCNC: 90 MG/DL (ref 22–240)
IMM GRANULOCYTES # BLD AUTO: 0.01 X10(3)/MCL (ref 0–0.04)
IMM GRANULOCYTES NFR BLD AUTO: 0.2 %
LYMPHOCYTES # BLD AUTO: 1.61 X10(3)/MCL (ref 0.6–4.6)
LYMPHOCYTES NFR BLD AUTO: 30 %
MCH RBC QN AUTO: 33.1 PG (ref 27–31)
MCHC RBC AUTO-ENTMCNC: 34.3 G/DL (ref 33–36)
MCV RBC AUTO: 96.5 FL (ref 80–94)
MONOCYTES # BLD AUTO: 0.43 X10(3)/MCL (ref 0.1–1.3)
MONOCYTES NFR BLD AUTO: 8 %
NEUTROPHILS # BLD AUTO: 3.07 X10(3)/MCL (ref 2.1–9.2)
NEUTROPHILS NFR BLD AUTO: 57.1 %
PLATELET # BLD AUTO: 185 X10(3)/MCL (ref 130–400)
PMV BLD AUTO: 8.6 FL (ref 7.4–10.4)
POTASSIUM SERPL-SCNC: 3.8 MMOL/L (ref 3.5–5.1)
PROT SERPL-MCNC: 7.1 GM/DL (ref 5.8–7.6)
RBC # BLD AUTO: 4.23 X10(6)/MCL (ref 4.7–6.1)
SODIUM SERPL-SCNC: 141 MMOL/L (ref 136–145)
WBC # BLD AUTO: 5.37 X10(3)/MCL (ref 4.5–11.5)

## 2024-08-16 PROCEDURE — 86334 IMMUNOFIX E-PHORESIS SERUM: CPT

## 2024-08-16 PROCEDURE — 80053 COMPREHEN METABOLIC PANEL: CPT

## 2024-08-16 PROCEDURE — 84165 PROTEIN E-PHORESIS SERUM: CPT

## 2024-08-16 PROCEDURE — 83521 IG LIGHT CHAINS FREE EACH: CPT

## 2024-08-16 PROCEDURE — 82784 ASSAY IGA/IGD/IGG/IGM EACH: CPT

## 2024-08-16 PROCEDURE — 36415 COLL VENOUS BLD VENIPUNCTURE: CPT

## 2024-08-16 PROCEDURE — 85025 COMPLETE CBC W/AUTO DIFF WBC: CPT

## 2024-08-19 LAB
ALBUMIN % SPEP (OHS): 47.47 (ref 48.1–59.5)
ALBUMIN SERPL-MCNC: 3.4 G/DL (ref 3.4–4.8)
ALBUMIN/GLOB SERPL: 0.9 RATIO (ref 1.1–2)
ALPHA 1 GLOB (OHS): 0.19 GM/DL (ref 0–0.4)
ALPHA 1 GLOB% (OHS): 2.68 (ref 2.3–4.9)
ALPHA 2 GLOB % (OHS): 8.78 (ref 6.9–13)
ALPHA 2 GLOB (OHS): 0.62 GM/DL (ref 0.4–1)
BETA GLOB (OHS): 0.91 GM/DL (ref 0.7–1.3)
BETA GLOB% (OHS): 12.83 (ref 13.8–19.7)
GAMMA GLOBULIN % (OHS): 28.23 (ref 10.1–21.9)
GAMMA GLOBULIN (OHS): 2 GM/DL (ref 0.4–1.8)
GLOBULIN SER-MCNC: 3.7 GM/DL (ref 2.4–3.5)
KAPPA LC FREE SER NEPH-MCNC: 6.32 MG/DL (ref 0.33–1.94)
KAPPA LC FREE/LAMBDA FREE SER NEPH: 17.1 {RATIO} (ref 0.26–1.65)
LAMBDA LC FREE SER NEPH-MCNC: 0.37 MG/DL (ref 0.57–2.63)
M SPIKE % (OHS): 14.58
M SPIKE (OHS): 1.04 GM/DL
PATH REV: NORMAL
PROT SERPL-MCNC: 7.1 GM/DL (ref 5.8–7.6)

## 2024-08-28 ENCOUNTER — OFFICE VISIT (OUTPATIENT)
Dept: HEMATOLOGY/ONCOLOGY | Facility: CLINIC | Age: 74
End: 2024-08-28
Payer: MEDICARE

## 2024-08-28 DIAGNOSIS — C90.00 MULTIPLE MYELOMA NOT HAVING ACHIEVED REMISSION: Primary | ICD-10-CM

## 2024-08-28 DIAGNOSIS — G62.9 NEUROPATHY: ICD-10-CM

## 2024-08-28 NOTE — PROGRESS NOTES
HEMATOLOGY/ONCOLOGY OFFICE CLINIC VISIT    Visit Information:    Initial Evaluation: 8/10/2022  Referring Provider:   Other providers: Dr Brito  Code status: Not addressed    Diagnosis:  Multiple Myeloma--IgG, Kappa  Macrocytic anemia    Present treatment:   VRD-Started on 12/9/22 --on hold since 6/2023  Daratumumab added on 1/27/23 ----on hold since 6/2/2023    Treatment/Oncology history:  VRD-Started on 12/9/22  Daratumumab added on 1/27/23      Goal of care:  palliative    Imaging:   US abdomen 8/24/2022: Spleen: 10.8 cm. Mild hepatomegaly with suspected mild hepatic steatosis. Cholelithiasis.  PET CT 9/28/2022: 1. Right scapular small focal mild hypermetabolism without lytic or sclerotic abnormality is not convinced to be related to multiple myeloma. 2. No definite skeletal lytic abnormality with hypermetabolism to reflect multiple myeloma on this exam. 3. Bilateral small pleural effusions.   4. Gallstones. 5.  Noninflamed diverticulosis coli. 6.  Prostatomegaly.  WB PET CT 11/10/2023:  No FDG avid malignancy is present on today's exam.  WB PET CT 5/15/2024:  No FDG avid malignancy is present on today's exam.        Pathology:  BONE MARROW BIOPSY 9/1/2022: PLASMA CELL MYELOMA, KAPPA RESTRICTED. NORMOCELLULAR MARROW (30%) WITH 70% INVOLVEMENT BY PLASMA CELL MYELOMA. BACKGROUND TRILINEAGE HEMATOPOIESIS IS DECREASED.  Bone Marrow Biopsy 9/12/2023: 1. BONE MARROW, RIGHT POSTERIOR ILIAC CREST, ASPIRATE, CLOT, DECALCIFIED CORE   BIOPSY:    PERSISTENT/RESIDUAL PLASMA CELL NEOPLASM, KAPPA LIGHT CHAIN RESTRICTED.    NORMOCELLULAR MARROW (30%) WITH 5% INVOLVEMENT BY KAPPA RESTRICTED PLASMA CELL   NEOPLASM.    SEQUENTIAL TRILINEAGE HEMATOPOIESIS PRESENT.    INCREASED IRON STORES.    PERIPHERAL BLOOD :    NORMOCYTIC NORMOCHROMIC ANEMIA.    2. SEE DIAGNOSIS #1    CODE 8    Electronically Signed by:   Markell Farmer M.D. , Pathologist   (Case signed 09/19/2023 at 01:50pm)    Comment   A) The patient's history of IgG  "kappa multiple myeloma treated with VRD and   daratumumab is noted from the electronic medical record.    B) The bone marrow is normocellular for age demonstrates a small residual   population of kappa restricted plasma cells (5%).  Background hematopoiesis is   sequential and trilineage.    C) Additional material submitted to [facility] for MRD myeloma flow   demonstrated monotypic kappa plasma cells (MRD=0.1781% or 1781 cells/million).     CLINICAL HISTORY:       Patient: Valentino Manning is a 73 y.o. male with multiple medical problems kindly referred for persistent anemia.  Reviewing electronic medical record patient with anemia since 02/10/2016.  From 2016 to January of 2017 anemia was mild.  Anemia slowly worsening back in 2018 hemoglobin was 8.0.  At that same time kidney function was worsening as well. Labs with HGB of 9.4, .9, platelets 231 K, WBC 5.0.  Chemistries with globulin of 5.3.  BUN/creatinine 27.7/1.41.    Further workup revealed the diagnosis of multiple myeloma.  Patient was started on treatment with Velcade, Revlimid and dexamethasone on 12/09/2022 and daratumumab was added on 01/27/2023 with significant improvement of his disease.  He was seen in [place] for possible bone marrow transplant but he was not interested in bone marrow transplant at the time.  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  Gabapentin was not helping.     Treatment was delayed 1st due to COVID-19 in February of 2023, he has few treatments in between but it was delayed again due to NSTEMI . Revlimid was stopped due to his heart disease and plan was to continue daratumumab and Velcade lower dose for neuropathy.      Patient continued to decline with worsening of his weakness.  Home health called to let us know that patient was weak and unable to get out of bed. I called the patient Thursday 6/8/2023: "I personally spoke with the patient and  instructed to go to the ER as his " "weakness and neuropathy is worsening and he reports that he went to the ER in [place] because he could not urinate and a gongora cath was placed and send home.   He reports that is very difficult for him to get out of bed and he can not seeing himself going to his room to the car. I told him he will need to call an ambulance to transport him to the hospital and to come to [facility]. Patient with debilitating neuropathy of unclear etiology--not typical for MM and/or treatment for MM. I told him that may need MRI's of spine and/or brain and neurologist evaluation. Patient verbalized understanding and he will planned to go to the ER tomorrow not today."      Patient eventually went to the ER.   MRI T spine 6/12/2023:  1. No acute abnormality of the thoracic spine. 2. Mild multilevel canal and neural foraminal narrowing. 3. No cord signal abnormality.  MRI L spine: 1. Severe degenerative spinal canal stenosis at L3-L4 and L4-L5, moderate at L2-L3, mild at L5-S1. 2. Multilevel neural foraminal stenoses as described.  MRI C spine: 1. Severe degenerative spinal canal stenosis at L3-L4 and L4-L5, moderate at L2-L3, mild at L5-S1. 2. Multilevel neural foraminal stenoses as described.  MRIs of the spine.  No evidence of plasmacytoma, mass or cord compression due to Malignancy.     Patient with cervical myelopathy and stenosis.  He was seen by Neurosurgery and on 6/26/2023 underwent Decompressive laminectomies at C3-4, C4-5, and C5-6. He was then transfer to [facility] in [place] for Rehab.     Interval History:  Patient has been off treatment  since 6/2/2023 due to extensive unrelated c spine surgery followed by prolonged stay at rehab at [facility].     Patient presents today for follow up of his MM. He recently fell and sustained a vertical fracture to his right fibula . He will be undergoing surgery tomorrow at [facility] ( 8/29/24). Overall, he is doing fair. Discussed labs and markers trending up slowly. No " anemia, normal kidney function and calcium. He continues to follow Dr. Brito, last visit visit in 7/2024 that recommend to initiate treatment if m protein >1.5grams or any CRAP (Hypercalcemia, renal insufficiency, anemia, bone lesions) symptoms develop         Past Medical History:   Diagnosis Date    Anemia     GERD (gastroesophageal reflux disease)     Heart problem     Hyperlipidemia     Hypertension     Multiple myeloma     NSTEMI (non-ST elevated myocardial infarction)       Past Surgical History:   Procedure Laterality Date    BONE MARROW ASPIRATION N/A 09/01/2022    Procedure: ASPIRATION, BONE MARROW;  Surgeon: Robbie Gardner MD;  Location: [facility];  Service: General;  Laterality: N/A;    BONE MARROW BIOPSY Right 09/12/2023    Procedure: Biopsy-bone marrow;  Surgeon: Russ Wilson MD;  Location: [facility] OR;  Service: General;  Laterality: Right;    CARDIAC SURGERY  2021    ENDOSCOPIC RELEASE OF BOTH CARPAL TUNNELS      FUSION OF POSTERIOR COLUMN OF CERVICAL SPINE USING COMPUTER AIDED NAVIGATION N/A 06/26/2023    Procedure: FUSION, SPINE, POSTERIOR SPINAL COLUMN, CERVICAL, USING COMPUTER-ASSISTED NAVIGATION;  Surgeon: Montserrat Aviles MD;  Location: [facility];  Service: Neurosurgery;  Laterality: N/A;  C3-4, C4-5, C5-6 laminectomies and fusion, o-arm, stealth  TIVA setup  NTI //  XX    SHOULDER SURGERY Right 02/21/2024     Family History   Problem Relation Name Age of Onset    Hypertension Mother      Diabetes Mother      Anemia Mother      Heart disease Mother      Heart disease Father              Review of patient's allergies indicates:   Allergen Reactions    Penicillins       Current Outpatient Medications on File Prior to Visit   Medication Sig Dispense Refill    acyclovir (ZOVIRAX) 400 MG tablet TAKE 1 TABLET BY MOUTH TWICE A  tablet 1    atorvastatin (LIPITOR) 40 MG tablet TAKE 1 TABLET ORALLY ONCE DAILY ON MON/TUE/THUR/FRI AND 1/2 ON WED. NONE ON SAT/SUN      clopidogreL  (PLAVIX) 75 mg tablet Take 75 mg by mouth once daily.      DULoxetine (CYMBALTA) 30 MG capsule TAKE ONE CAPSULE BY MOUTH ONCE DAILY 30 capsule 3    dutasteride (AVODART) 0.5 mg capsule Take 0.5 mg by mouth once daily.      FERRETTS 325 mg (106 mg iron) Tab Take 1 tablet by mouth 2 (two) times daily.      gabapentin (NEURONTIN) 400 MG capsule Take 2 capsules (800 mg total) by mouth 3 (three) times daily. for 7 days (Patient taking differently: Take 800 mg by mouth 2 (two) times daily.) 42 capsule 0    HYDROcodone-acetaminophen (NORCO)  mg per tablet Take 10 tablets by mouth once daily.      lisinopriL (PRINIVIL,ZESTRIL) 20 MG tablet Take 20 mg by mouth.      metoprolol tartrate (LOPRESSOR) 25 MG tablet Take 1 tablet (25 mg total) by mouth 2 (two) times daily. 60 tablet 11    tamsulosin (FLOMAX) 0.4 mg Cap Take 2 capsules (0.8 mg total) by mouth every evening. 60 capsule 11    testosterone cypionate (DEPOTESTOTERONE CYPIONATE) 200 mg/mL injection INJECT 1 MLS (200 MG) INTRAMUSCULARLY ONCE WEEKLY.      zolpidem (AMBIEN) 10 mg Tab Take 10 mg by mouth nightly as needed.       No current facility-administered medications on file prior to visit.      Review of Systems   Constitutional:  Negative for appetite change and unexpected weight change.   HENT:  Negative for mouth sores.    Eyes:  Negative for visual disturbance.   Respiratory:  Negative for cough and shortness of breath.    Cardiovascular:  Negative for chest pain.   Gastrointestinal:  Negative for abdominal pain and diarrhea.   Genitourinary:  Negative for frequency.   Musculoskeletal:  Negative for back pain.   Integumentary:  Negative for rash.   Neurological:  Negative for headaches.   Hematological:  Negative for adenopathy.   Psychiatric/Behavioral:  The patient is not nervous/anxious.               There were no vitals filed for this visit.      Wt Readings from Last 6 Encounters:   05/23/24 90.5 kg (199 lb 8 oz)   03/04/24 89.9 kg (198 lb  1.6 oz)   12/28/23 93.7 kg (206 lb 9.6 oz)   11/16/23 87.5 kg (192 lb 12.8 oz)   09/21/23 81.6 kg (180 lb)   09/12/23 80.1 kg (176 lb 9.4 oz)     There is no height or weight on file to calculate BMI.  There is no height or weight on file to calculate BSA.       Physical Exam  HENT:      Head: Normocephalic and atraumatic.   Eyes:      Extraocular Movements: Extraocular movements intact.   Pulmonary:      Effort: Pulmonary effort is normal.      Breath sounds: Normal breath sounds. No wheezing.   Abdominal:      General: Bowel sounds are normal.      Palpations: Abdomen is soft.   Musculoskeletal:      Cervical back: Neck supple.   Neurological:      Mental Status: He is alert and oriented to person, place, and time.      Cranial Nerves: Cranial nerves 2-12 are intact.      Sensory: No sensory deficit.      Motor: No weakness.      Gait: Gait abnormal.   Psychiatric:         Mood and Affect: Mood normal.         Behavior: Behavior normal. Behavior is cooperative.         Judgment: Judgment normal.     ECOG SCORE             Laboratory:  CBC with Differential:  Lab Results   Component Value Date    WBC 5.37 08/16/2024    RBC 4.23 (L) 08/16/2024    HGB 14.0 08/16/2024    HCT 40.8 (L) 08/16/2024    MCV 96.5 (H) 08/16/2024    MCH 33.1 (H) 08/16/2024    MCHC 34.3 08/16/2024    RDW 13.3 08/16/2024     08/16/2024    MPV 8.6 08/16/2024        CMP:  Sodium   Date Value Ref Range Status   08/16/2024 141 136 - 145 mmol/L Final   02/09/2024 137 136 - 145 mmol/L Final     Potassium   Date Value Ref Range Status   08/16/2024 3.8 3.5 - 5.1 mmol/L Final   02/09/2024 3.9 3.5 - 5.1 mmol/L Final     Chloride   Date Value Ref Range Status   08/16/2024 109 (H) 98 - 107 mmol/L Final   02/09/2024 105 100 - 109 mmol/L Final     CO2   Date Value Ref Range Status   08/16/2024 26 23 - 31 mmol/L Final     Carbon Dioxide   Date Value Ref Range Status   02/09/2024 23 22 - 33 mmol/L Final     Blood Urea Nitrogen   Date Value Ref Range  Status   08/16/2024 12.7 8.4 - 25.7 mg/dL Final   02/09/2024 15 5 - 25 mg/dL Final     Creatinine   Date Value Ref Range Status   08/16/2024 1.11 0.73 - 1.18 mg/dL Final   02/09/2024 0.98 0.57 - 1.25 mg/dL Final     Calcium   Date Value Ref Range Status   08/16/2024 9.3 8.8 - 10.0 mg/dL Final   02/09/2024 9.2 8.8 - 10.6 mg/dL Final     Albumin   Date Value Ref Range Status   08/16/2024 3.6 3.4 - 4.8 g/dL Final   08/16/2024 3.4 3.4 - 4.8 g/dL Final     Bilirubin Total   Date Value Ref Range Status   08/16/2024 1.0 <=1.5 mg/dL Final     ALP   Date Value Ref Range Status   08/16/2024 89 40 - 150 unit/L Final     AST   Date Value Ref Range Status   08/16/2024 17 5 - 34 unit/L Final     ALT   Date Value Ref Range Status   08/16/2024 28 0 - 55 unit/L Final     Anion Gap   Date Value Ref Range Status   02/09/2024 9 8 - 16 mmol/L Final     eGFR    Date Value Ref Range Status   02/09/2024 82 mL/min/1.73mSq Final     Comment:     In accordance with NKF-ASN Task Force recommendation, calculation based on the Chronic Kidney Disease Epidemiology Collaboration (CKD-EPI) equation without adjustment for race. eGFR adjusted for gender and age and calculated in ml/min/1.73mSquared. eGFR cannot be calculated if patient is under 18 years of age.     Reference Range:   >= 60 ml/min/1.73mSquared.     Estimated GFR-Non    Date Value Ref Range Status   06/22/2022 53 mls/min/1.73/m2 Final       Assessment:       1) Multiple myeloma  --Patient was seen at [facility] in [place]. He started treatment here with Velcade/Revlimid/Dexamethamasone. He said that he is a candidate for transplant and they will eval bone marrow again after 6 cycles. He had f/u via telemedicine with Dr. Brito on 2/27/23- recommended to repeat BMB x after 6 cycles.  Darzalex (weekly x 8 dose, then q 2 week x 8 doses, then q 4 weeks per kodak study) was added on 1/27/23. Cycle 4, Day 1 of Velcade/Revlimid/Dexamethamasone/Darzalex was  given on 2/10/23. He was diagnosed with Covid and PEs on 2/14/23.   --Patient has been off treatment  since 6/2/2023 due to extensive unrelated c spine surgery followed by prolonged stay at rehab at [facility].   --Markers trending up slowly but no symptoms  --will continue to monitor closely  --Plan to start for M spike of > 1.5 or CRAB Sx (hypercalcemia, renal failure, anemia, and bone disease)  2) Cervical degenerative compressive myelopathy, with myelopathic features  3) Lumbar spinal stenosis  4) Peripheral PolyNeuropathy  5) Weakness--better  6) History of NSTEMI/coronary artery disease-- on Plavix  7) H/o PE         Plan:      Marker slowly increasing.  He has not anemic, kidney function and calcium levels normal.  Neuropathy better.  We will continue to monitor closely for now.  We will repeat bone marrow biopsy prior to re-initiation of treatment.    Will continue to monitor closely for now  Continue follow ups with Dr. Brito, next visit 10/2024  Continue Cymbalta  MM labs q 4 weeks. Will have HH or [facility] drawn his labs next month bc he will not be able to come here- he is having ankle surgery tomorrow.   RTC in 3 months with MD for results  MM Labs + urine 1 week prior   WB PET scan q 6 months - 11/2024    The patient was seen, interviewed and examined. Pertinent lab and radiology studies were reviewed.   The patient was given ample opportunity to ask questions, and to the best of my abilities, all questions answered to satisfaction; patient demonstrated understanding of what we discussed and agreeable to the plan. Pt instructed to call should develop concerning signs/symptoms or have further questions.     HERMINIO Alanis

## 2024-10-05 PROCEDURE — 84165 PROTEIN E-PHORESIS SERUM: CPT | Performed by: NURSE PRACTITIONER

## 2024-10-05 PROCEDURE — 83521 IG LIGHT CHAINS FREE EACH: CPT | Performed by: NURSE PRACTITIONER

## 2024-10-05 PROCEDURE — 80053 COMPREHEN METABOLIC PANEL: CPT | Performed by: NURSE PRACTITIONER

## 2024-10-05 PROCEDURE — 82784 ASSAY IGA/IGD/IGG/IGM EACH: CPT | Performed by: NURSE PRACTITIONER

## 2024-10-05 PROCEDURE — 85025 COMPLETE CBC W/AUTO DIFF WBC: CPT | Performed by: NURSE PRACTITIONER

## 2024-10-21 ENCOUNTER — TELEPHONE (OUTPATIENT)
Dept: HEMATOLOGY/ONCOLOGY | Facility: CLINIC | Age: 74
End: 2024-10-21
Payer: MEDICARE

## 2024-10-21 NOTE — TELEPHONE ENCOUNTER
Spoke with patient regarding his appointment. Pt stated that he was forgotten. Pt was advised that he was not forgotten and that  would be calling him regarding his appointment. Pt verbalized agreement and understanding.

## 2024-10-21 NOTE — TELEPHONE ENCOUNTER
----- Message from Annel sent at 10/21/2024 10:04 AM CDT -----  Regarding: Virtual Appt (today)  Type: Appt Today    Who Called:GUIDO PANCHAL    Would the patient rather a call back or a response via [facility]? Call back    Best Call Back Number:  [phone]    Additional Information:Patient called to check in with office about 9:40 virtual appt today.

## 2024-10-22 ENCOUNTER — TELEPHONE (OUTPATIENT)
Dept: HEMATOLOGY/ONCOLOGY | Facility: CLINIC | Age: 74
End: 2024-10-22
Payer: MEDICARE

## 2024-10-22 NOTE — TELEPHONE ENCOUNTER
----- Message from Zachary sent at 10/22/2024  2:18 PM CDT -----  Regarding: Advice  Contact: [phone]  Patient is calling to speak with someone in office he think he may have missed the doctor call back yesterday. Please contact pt

## 2024-10-22 NOTE — TELEPHONE ENCOUNTER
Spoke with patient and advised that  was on the [facility] today. Pt was advised that I would send  a message and request that he reach out to the patient. Pt verbalized agreement and understanding.

## 2024-11-25 ENCOUNTER — HOSPITAL ENCOUNTER (OUTPATIENT)
Dept: RADIOLOGY | Facility: HOSPITAL | Age: 74
Discharge: HOME | End: 2024-11-25
Attending: NURSE PRACTITIONER
Payer: MEDICARE

## 2024-11-25 DIAGNOSIS — C90.00 MULTIPLE MYELOMA NOT HAVING ACHIEVED REMISSION: ICD-10-CM

## 2024-11-25 PROCEDURE — A9552 F18 FDG: HCPCS | Performed by: NURSE PRACTITIONER

## 2024-11-25 PROCEDURE — 78816 PET IMAGE W/CT FULL BODY: CPT | Mod: TC

## 2024-11-25 RX ORDER — FLUDEOXYGLUCOSE F18 500 MCI/ML
10 INJECTION INTRAVENOUS
Status: COMPLETED | OUTPATIENT
Start: 2024-11-25 | End: 2024-11-25

## 2024-11-25 RX ADMIN — FLUDEOXYGLUCOSE F-18 11 MILLICURIE: 500 INJECTION INTRAVENOUS at 09:11

## 2024-12-09 ENCOUNTER — OFFICE VISIT (OUTPATIENT)
Dept: HEMATOLOGY/ONCOLOGY | Facility: CLINIC | Age: 74
End: 2024-12-09
Payer: MEDICARE

## 2024-12-09 VITALS
SYSTOLIC BLOOD PRESSURE: 127 MMHG | WEIGHT: 198.88 LBS | HEIGHT: 73 IN | TEMPERATURE: 98 F | DIASTOLIC BLOOD PRESSURE: 77 MMHG | HEART RATE: 70 BPM | OXYGEN SATURATION: 96 % | BODY MASS INDEX: 26.36 KG/M2

## 2024-12-09 DIAGNOSIS — C90.00 MULTIPLE MYELOMA NOT HAVING ACHIEVED REMISSION: Primary | ICD-10-CM

## 2024-12-09 DIAGNOSIS — G62.9 NEUROPATHY: ICD-10-CM

## 2024-12-09 PROCEDURE — 99215 OFFICE O/P EST HI 40 MIN: CPT | Mod: S$PBB,,, | Performed by: INTERNAL MEDICINE

## 2024-12-09 PROCEDURE — 99214 OFFICE O/P EST MOD 30 MIN: CPT | Mod: PBBFAC | Performed by: INTERNAL MEDICINE

## 2024-12-09 PROCEDURE — 99999 PR PBB SHADOW E&M-EST. PATIENT-LVL IV: CPT | Mod: PBBFAC,,, | Performed by: INTERNAL MEDICINE

## 2024-12-09 RX ORDER — DOXAZOSIN 8 MG/1
8 TABLET ORAL
COMMUNITY
Start: 2024-10-22

## 2024-12-09 NOTE — PROGRESS NOTES
HEMATOLOGY/ONCOLOGY OFFICE CLINIC VISIT    Visit Information:    Initial Evaluation: 8/10/2022  Referring Provider:   Other providers: Dr Brito  Code status: Not addressed    Diagnosis:  Multiple Myeloma--IgG, Kappa  Macrocytic anemia    Present treatment:   VRD-Started on 12/9/22 --on hold since 6/2023  Daratumumab added on 1/27/23 ----on hold since 6/2/2023    Treatment/Oncology history:  VRD-Started on 12/9/22  Daratumumab added on 1/27/23      Goal of care:  palliative    Imaging:   US abdomen 8/24/2022: Spleen: 10.8 cm. Mild hepatomegaly with suspected mild hepatic steatosis. Cholelithiasis.  PET CT 9/28/2022: 1. Right scapular small focal mild hypermetabolism without lytic or sclerotic abnormality is not convinced to be related to multiple myeloma. 2. No definite skeletal lytic abnormality with hypermetabolism to reflect multiple myeloma on this exam. 3. Bilateral small pleural effusions.   4. Gallstones. 5.  Noninflamed diverticulosis coli. 6.  Prostatomegaly.  WB PET CT 11/10/2023:  No FDG avid malignancy is present on today's exam.  WB PET CT 5/15/2024:  No FDG avid malignancy is present on today's exam.        Pathology:  BONE MARROW BIOPSY 9/1/2022: PLASMA CELL MYELOMA, KAPPA RESTRICTED. NORMOCELLULAR MARROW (30%) WITH 70% INVOLVEMENT BY PLASMA CELL MYELOMA. BACKGROUND TRILINEAGE HEMATOPOIESIS IS DECREASED.  Bone Marrow Biopsy 9/12/2023: 1. BONE MARROW, RIGHT POSTERIOR ILIAC CREST, ASPIRATE, CLOT, DECALCIFIED CORE   BIOPSY:    PERSISTENT/RESIDUAL PLASMA CELL NEOPLASM, KAPPA LIGHT CHAIN RESTRICTED.    NORMOCELLULAR MARROW (30%) WITH 5% INVOLVEMENT BY KAPPA RESTRICTED PLASMA CELL   NEOPLASM.    SEQUENTIAL TRILINEAGE HEMATOPOIESIS PRESENT.    INCREASED IRON STORES.    PERIPHERAL BLOOD :    NORMOCYTIC NORMOCHROMIC ANEMIA.    2. SEE DIAGNOSIS #1    CODE 8    Electronically Signed by:   Markell Farmer M.D. , Pathologist   (Case signed 09/19/2023 at 01:50pm)    Comment   A) The patient's history of IgG  "kappa multiple myeloma treated with VRD and   daratumumab is noted from the electronic medical record.    B) The bone marrow is normocellular for age demonstrates a small residual   population of kappa restricted plasma cells (5%).  Background hematopoiesis is   sequential and trilineage.    C) Additional material submitted to [facility] for MRD myeloma flow   demonstrated monotypic kappa plasma cells (MRD=0.1781% or 1781 cells/million).     CLINICAL HISTORY:       Patient: Valentino Manning is a 74 y.o. male with multiple medical problems kindly referred for persistent anemia.  Reviewing electronic medical record patient with anemia since 02/10/2016.  From 2016 to January of 2017 anemia was mild.  Anemia slowly worsening back in 2018 hemoglobin was 8.0.  At that same time kidney function was worsening as well. Labs with HGB of 9.4, .9, platelets 231 K, WBC 5.0.  Chemistries with globulin of 5.3.  BUN/creatinine 27.7/1.41.    Further workup revealed the diagnosis of multiple myeloma.  Patient was started on treatment with Velcade, Revlimid and dexamethasone on 12/09/2022 and daratumumab was added on 01/27/2023 with significant improvement of his disease.  He was seen in [place] for possible bone marrow transplant but he was not interested in bone marrow transplant at the time.  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  Gabapentin was not helping.     Treatment was delayed 1st due to COVID-19 in February of 2023, he has few treatments in between but it was delayed again due to NSTEMI . Revlimid was stopped due to his heart disease and plan was to continue daratumumab and Velcade lower dose for neuropathy.      Patient continued to decline with worsening of his weakness.  Home health called to let us know that patient was weak and unable to get out of bed. I called the patient Thursday 6/8/2023: "I personally spoke with the patient and  instructed to go to the ER as his " "weakness and neuropathy is worsening and he reports that he went to the ER in [place] because he could not urinate and a gongora cath was placed and send home.   He reports that is very difficult for him to get out of bed and he can not seeing himself going to his room to the car. I told him he will need to call an ambulance to transport him to the hospital and to come to [facility]. Patient with debilitating neuropathy of unclear etiology--not typical for MM and/or treatment for MM. I told him that may need MRI's of spine and/or brain and neurologist evaluation. Patient verbalized understanding and he will planned to go to the ER tomorrow not today."      Patient eventually went to the ER.   MRI T spine 6/12/2023:  1. No acute abnormality of the thoracic spine. 2. Mild multilevel canal and neural foraminal narrowing. 3. No cord signal abnormality.  MRI L spine: 1. Severe degenerative spinal canal stenosis at L3-L4 and L4-L5, moderate at L2-L3, mild at L5-S1. 2. Multilevel neural foraminal stenoses as described.  MRI C spine: 1. Severe degenerative spinal canal stenosis at L3-L4 and L4-L5, moderate at L2-L3, mild at L5-S1. 2. Multilevel neural foraminal stenoses as described.  MRIs of the spine.  No evidence of plasmacytoma, mass or cord compression due to Malignancy.     Patient with cervical myelopathy and stenosis.  He was seen by Neurosurgery and on 6/26/2023 underwent Decompressive laminectomies at C3-4, C4-5, and C5-6. He was then transfer to [facility] in [place] for Rehab.     Interval History:  Patient has been off treatment  since 6/2/2023 due to extensive unrelated c spine surgery followed by prolonged stay at rehab at [facility] in [place].     Patient presents today for follow up of his MM. He recently fell and sustained a vertical fracture to his right fibula .  He was on a cast but now he is wearing a boot.  Overall, he is doing fair. Discussed labs and markers trending up slowly. No anemia, normal " kidney function and calcium.  He prefers to continue to monitor.         Past Medical History:   Diagnosis Date    Anemia     GERD (gastroesophageal reflux disease)     Heart problem     Hyperlipidemia     Hypertension     Multiple myeloma     NSTEMI (non-ST elevated myocardial infarction)       Past Surgical History:   Procedure Laterality Date    BONE MARROW ASPIRATION N/A 09/01/2022    Procedure: ASPIRATION, BONE MARROW;  Surgeon: Robbie Gardner MD;  Location: [facility];  Service: General;  Laterality: N/A;    BONE MARROW BIOPSY Right 09/12/2023    Procedure: Biopsy-bone marrow;  Surgeon: Russ Wilson MD;  Location: [facility] OR;  Service: General;  Laterality: Right;    CARDIAC SURGERY  2021    ENDOSCOPIC RELEASE OF BOTH CARPAL TUNNELS      FUSION OF POSTERIOR COLUMN OF CERVICAL SPINE USING COMPUTER AIDED NAVIGATION N/A 06/26/2023    Procedure: FUSION, SPINE, POSTERIOR SPINAL COLUMN, CERVICAL, USING COMPUTER-ASSISTED NAVIGATION;  Surgeon: Montserrat Aviles MD;  Location: [facility];  Service: Neurosurgery;  Laterality: N/A;  C3-4, C4-5, C5-6 laminectomies and fusion, o-arm, stealth  TIVA setup  NTI //  XX    SHOULDER SURGERY Right 02/21/2024     Family History   Problem Relation Name Age of Onset    Hypertension Mother      Diabetes Mother      Anemia Mother      Heart disease Mother      Heart disease Father              Review of patient's allergies indicates:   Allergen Reactions    Penicillins       Current Outpatient Medications on File Prior to Visit   Medication Sig Dispense Refill    atorvastatin (LIPITOR) 40 MG tablet TAKE 1 TABLET ORALLY ONCE DAILY ON MON/TUE/THUR/FRI AND 1/2 ON WED. NONE ON SAT/SUN      clopidogreL (PLAVIX) 75 mg tablet Take 75 mg by mouth once daily.      doxazosin (CARDURA) 8 MG Tab Take 8 mg by mouth.      DULoxetine (CYMBALTA) 30 MG capsule TAKE ONE CAPSULE BY MOUTH ONCE DAILY 30 capsule 3    dutasteride (AVODART) 0.5 mg capsule Take 0.5 mg by mouth once daily.      FERRETTS 325 mg (106  mg iron) Tab Take 1 tablet by mouth 2 (two) times daily.      lisinopriL (PRINIVIL,ZESTRIL) 20 MG tablet Take 20 mg by mouth.      metoprolol tartrate (LOPRESSOR) 25 MG tablet Take 1 tablet (25 mg total) by mouth 2 (two) times daily. 60 tablet 11    testosterone cypionate (DEPOTESTOTERONE CYPIONATE) 200 mg/mL injection INJECT 1 MLS (200 MG) INTRAMUSCULARLY ONCE WEEKLY.      zolpidem (AMBIEN) 10 mg Tab Take 10 mg by mouth nightly as needed.      tamsulosin (FLOMAX) 0.4 mg Cap Take 2 capsules (0.8 mg total) by mouth every evening. (Patient not taking: Reported on 12/9/2024) 60 capsule 11     No current facility-administered medications on file prior to visit.      Review of Systems   Constitutional:  Negative for activity change, appetite change, chills, diaphoresis, fatigue, fever and unexpected weight change.   HENT:  Negative for nasal congestion, mouth sores, nosebleeds, sinus pressure/congestion, sore throat and trouble swallowing.    Eyes:  Positive for visual disturbance.   Respiratory:  Negative for cough and shortness of breath.    Cardiovascular:  Negative for chest pain and palpitations.   Gastrointestinal:  Negative for abdominal distention, abdominal pain, blood in stool, change in bowel habit, constipation, diarrhea, nausea and vomiting.   Endocrine: Negative.    Genitourinary:  Negative for bladder incontinence, decreased urine volume, difficulty urinating, dysuria, frequency, hematuria and urgency.   Musculoskeletal:  Negative for arthralgias, back pain, gait problem, joint swelling, leg pain and myalgias.   Integumentary:  Negative for rash.   Allergic/Immunologic: Negative.    Neurological:  Negative for dizziness, tremors, syncope, weakness, light-headedness, numbness, headaches and memory loss.        Neuropathy   Hematological:  Negative for adenopathy. Does not bruise/bleed easily.   Psychiatric/Behavioral:  Negative for agitation, confusion, hallucinations, sleep disturbance and suicidal ideas.  "The patient is not nervous/anxious.               Vitals:    12/09/24 1254 12/09/24 1259   BP: (!) 144/79 127/77   BP Location: Left arm Right arm   Patient Position: Sitting Sitting   Pulse: 70    Temp: 97.7 °F (36.5 °C)    TempSrc: Oral    SpO2: 96%    Weight: 90.2 kg (198 lb 14.4 oz)    Height: 6' 1" (1.854 m)          Wt Readings from Last 6 Encounters:   12/09/24 90.2 kg (198 lb 14.4 oz)   05/23/24 90.5 kg (199 lb 8 oz)   03/04/24 89.9 kg (198 lb 1.6 oz)   12/28/23 93.7 kg (206 lb 9.6 oz)   11/16/23 87.5 kg (192 lb 12.8 oz)   09/21/23 81.6 kg (180 lb)     Body mass index is 26.24 kg/m².  Body surface area is 2.16 meters squared.       Physical Exam  HENT:      Head: Normocephalic and atraumatic.   Eyes:      Extraocular Movements: Extraocular movements intact.   Pulmonary:      Effort: Pulmonary effort is normal.      Breath sounds: Normal breath sounds. No wheezing.   Abdominal:      General: Bowel sounds are normal.      Palpations: Abdomen is soft.   Musculoskeletal:      Cervical back: Neck supple.   Neurological:      Mental Status: He is alert and oriented to person, place, and time.      Cranial Nerves: Cranial nerves 2-12 are intact.      Sensory: No sensory deficit.      Motor: No weakness.      Gait: Gait abnormal.   Psychiatric:         Mood and Affect: Mood normal.         Behavior: Behavior normal. Behavior is cooperative.         Judgment: Judgment normal.       ECOG SCORE    2 - Capable of all selfcare but unable to carry out any work activities, active > 50% of hours         Laboratory:  CBC with Differential:  Lab Results   Component Value Date    WBC 5.63 10/05/2024    RBC 4.12 (L) 10/05/2024    HGB 13.3 (L) 10/05/2024    HCT 40.3 (L) 10/05/2024    MCV 97.8 (H) 10/05/2024    MCH 32.3 (H) 10/05/2024    MCHC 33.0 10/05/2024    RDW 13.6 10/05/2024     10/05/2024    MPV 9.0 10/05/2024        CMP:  Sodium   Date Value Ref Range Status   10/05/2024 142 136 - 145 mmol/L Final   02/09/2024 137 " 136 - 145 mmol/L Final     Potassium   Date Value Ref Range Status   10/05/2024 4.0 3.5 - 5.1 mmol/L Final   02/09/2024 3.9 3.5 - 5.1 mmol/L Final     Chloride   Date Value Ref Range Status   10/05/2024 109 (H) 98 - 107 mmol/L Final   02/09/2024 105 100 - 109 mmol/L Final     CO2   Date Value Ref Range Status   10/05/2024 25 23 - 31 mmol/L Final     Carbon Dioxide   Date Value Ref Range Status   02/09/2024 23 22 - 33 mmol/L Final     Blood Urea Nitrogen   Date Value Ref Range Status   10/05/2024 18.5 8.4 - 25.7 mg/dL Final   02/09/2024 15 5 - 25 mg/dL Final     Creatinine   Date Value Ref Range Status   10/05/2024 1.24 (H) 0.73 - 1.18 mg/dL Final   02/09/2024 0.98 0.57 - 1.25 mg/dL Final     Calcium   Date Value Ref Range Status   10/05/2024 9.4 8.8 - 10.0 mg/dL Final   02/09/2024 9.2 8.8 - 10.6 mg/dL Final     Albumin   Date Value Ref Range Status   10/05/2024 3.8 3.4 - 4.8 g/dL Final   10/05/2024 3.4 3.4 - 4.8 g/dL Final     Bilirubin Total   Date Value Ref Range Status   10/05/2024 0.9 <=1.5 mg/dL Final     ALP   Date Value Ref Range Status   10/05/2024 105 40 - 150 unit/L Final     AST   Date Value Ref Range Status   10/05/2024 17 5 - 34 unit/L Final     ALT   Date Value Ref Range Status   10/05/2024 28 0 - 55 unit/L Final     Anion Gap   Date Value Ref Range Status   02/09/2024 9 8 - 16 mmol/L Final     eGFR    Date Value Ref Range Status   02/09/2024 82 mL/min/1.73mSq Final     Comment:     In accordance with NKF-ASN Task Force recommendation, calculation based on the Chronic Kidney Disease Epidemiology Collaboration (CKD-EPI) equation without adjustment for race. eGFR adjusted for gender and age and calculated in ml/min/1.73mSquared. eGFR cannot be calculated if patient is under 18 years of age.     Reference Range:   >= 60 ml/min/1.73mSquared.     Estimated GFR-Non    Date Value Ref Range Status   06/22/2022 53 mls/min/1.73/m2 Final       Assessment:       1) Multiple  myeloma  --Patient was seen at [facility] in [place]. He started treatment here with Velcade/Revlimid/Dexamethamasone. He said that he is a candidate for transplant and they will eval bone marrow again after 6 cycles. He had f/u via telemedicine with Dr. Brito on 2/27/23- recommended to repeat BMB x after 6 cycles.  Darzalex (weekly x 8 dose, then q 2 week x 8 doses, then q 4 weeks per kodak study) was added on 1/27/23. Cycle 4, Day 1 of Velcade/Revlimid/Dexamethamasone/Darzalex was given on 2/10/23. He was diagnosed with Covid and PEs on 2/14/23.   --Patient has been off treatment  since 6/2/2023 due to extensive unrelated c spine surgery followed by prolonged stay at rehab at [facility] in [place].   --Markers trending up slowly but no symptoms  --will continue to monitor closely  --Plan to start for M spike of > 1.5 or CRAB Sx (hypercalcemia, renal failure, anemia, and bone disease)  2) Cervical degenerative compressive myelopathy, with myelopathic features  3) Lumbar spinal stenosis  4) Peripheral PolyNeuropathy  5) Weakness--better  6) History of NSTEMI/coronary artery disease-- on Plavix  7) H/o PE         Plan:      We will repeat bone marrow biopsy prior to re-initiation of treatment. He prefers to continue to monitor.  Treatment plan for M spike > 1.5G or any or any crab (Calcium, Renal, Anemia, Bone)  develop.      Will continue to monitor closely for now  Continue Cymbalta  MM labs q3 months.   RTC in 3 months with MD for results of lab 1 week prior  PET CT in 6 months  Continue physical therapy      The patient was seen, interviewed and examined. Pertinent lab and radiology studies were reviewed.   The patient was given ample opportunity to ask questions, and to the best of my abilities, all questions answered to satisfaction; patient demonstrated understanding of what we discussed and agreeable to the plan. Pt instructed to call should develop concerning signs/symptoms or have further questions.          Angélica Harris MD  Hematology/Oncology  [facility]

## 2024-12-13 ENCOUNTER — LAB VISIT (OUTPATIENT)
Dept: LAB | Facility: HOSPITAL | Age: 74
End: 2024-12-13
Attending: INTERNAL MEDICINE
Payer: MEDICARE

## 2024-12-13 DIAGNOSIS — R94.8 NONSPECIFIC ABNORMAL RESULTS OF BASAL METABOLISM FUNCTION STUDY: Primary | ICD-10-CM

## 2024-12-13 DIAGNOSIS — Z12.5 SPECIAL SCREENING FOR MALIGNANT NEOPLASM OF PROSTATE: ICD-10-CM

## 2024-12-13 DIAGNOSIS — I10 ESSENTIAL HYPERTENSION, MALIGNANT: ICD-10-CM

## 2024-12-13 DIAGNOSIS — N40.0 BENIGN PROSTATIC HYPERPLASIA, UNSPECIFIED WHETHER LOWER URINARY TRACT SYMPTOMS PRESENT: ICD-10-CM

## 2024-12-13 DIAGNOSIS — Z00.00 ROUTINE GENERAL MEDICAL EXAMINATION AT A HEALTH CARE FACILITY: ICD-10-CM

## 2024-12-13 DIAGNOSIS — E78.5 HYPERLIPIDEMIA, UNSPECIFIED HYPERLIPIDEMIA TYPE: ICD-10-CM

## 2024-12-13 DIAGNOSIS — E11.9 DIABETES MELLITUS WITHOUT COMPLICATION: ICD-10-CM

## 2024-12-13 LAB
25(OH)D3+25(OH)D2 SERPL-MCNC: 16 NG/ML (ref 30–80)
ALBUMIN SERPL-MCNC: 3.7 G/DL (ref 3.4–4.8)
ALBUMIN/GLOB SERPL: 0.9 RATIO (ref 1.1–2)
ALP SERPL-CCNC: 85 UNIT/L (ref 40–150)
ALT SERPL-CCNC: 29 UNIT/L (ref 0–55)
ANION GAP SERPL CALC-SCNC: 3 MEQ/L
AST SERPL-CCNC: 18 UNIT/L (ref 5–34)
BACTERIA #/AREA URNS AUTO: ABNORMAL /HPF
BASOPHILS # BLD AUTO: 0.02 X10(3)/MCL
BASOPHILS NFR BLD AUTO: 0.3 %
BILIRUB SERPL-MCNC: 1.2 MG/DL
BILIRUB UR QL STRIP.AUTO: NEGATIVE
BUN SERPL-MCNC: 19.4 MG/DL (ref 8.4–25.7)
CALCIUM SERPL-MCNC: 9.1 MG/DL (ref 8.8–10)
CHLORIDE SERPL-SCNC: 109 MMOL/L (ref 98–107)
CHOLEST SERPL-MCNC: 140 MG/DL
CHOLEST/HDLC SERPL: 4 {RATIO} (ref 0–5)
CLARITY UR: ABNORMAL
CO2 SERPL-SCNC: 28 MMOL/L (ref 23–31)
COLOR UR AUTO: ABNORMAL
CREAT SERPL-MCNC: 1.12 MG/DL (ref 0.72–1.25)
CREAT/UREA NIT SERPL: 17
EOSINOPHIL # BLD AUTO: 0.08 X10(3)/MCL (ref 0–0.9)
EOSINOPHIL NFR BLD AUTO: 1.3 %
ERYTHROCYTE [DISTWIDTH] IN BLOOD BY AUTOMATED COUNT: 15.3 % (ref 11.5–17)
ERYTHROCYTE [SEDIMENTATION RATE] IN BLOOD: 14 MM/HR (ref 0–15)
EST. AVERAGE GLUCOSE BLD GHB EST-MCNC: 108.3 MG/DL
GFR SERPLBLD CREATININE-BSD FMLA CKD-EPI: >60 ML/MIN/1.73/M2
GLOBULIN SER-MCNC: 4.2 GM/DL (ref 2.4–3.5)
GLUCOSE SERPL-MCNC: 108 MG/DL (ref 82–115)
GLUCOSE UR QL STRIP: NORMAL
HBA1C MFR BLD: 5.4 %
HCT VFR BLD AUTO: 36.8 % (ref 42–52)
HDLC SERPL-MCNC: 34 MG/DL (ref 35–60)
HGB BLD-MCNC: 12.2 G/DL (ref 14–18)
HGB UR QL STRIP: NEGATIVE
IMM GRANULOCYTES # BLD AUTO: 0.08 X10(3)/MCL (ref 0–0.04)
IMM GRANULOCYTES NFR BLD AUTO: 1.3 %
KETONES UR QL STRIP: NEGATIVE
LDLC SERPL CALC-MCNC: 71 MG/DL (ref 50–140)
LEUKOCYTE ESTERASE UR QL STRIP: 500
LYMPHOCYTES # BLD AUTO: 1.4 X10(3)/MCL (ref 0.6–4.6)
LYMPHOCYTES NFR BLD AUTO: 23.3 %
MCH RBC QN AUTO: 32.9 PG (ref 27–31)
MCHC RBC AUTO-ENTMCNC: 33.2 G/DL (ref 33–36)
MCV RBC AUTO: 99.2 FL (ref 80–94)
MONOCYTES # BLD AUTO: 0.46 X10(3)/MCL (ref 0.1–1.3)
MONOCYTES NFR BLD AUTO: 7.7 %
MUCOUS THREADS URNS QL MICRO: ABNORMAL /LPF
NEUTROPHILS # BLD AUTO: 3.97 X10(3)/MCL (ref 2.1–9.2)
NEUTROPHILS NFR BLD AUTO: 66.1 %
NITRITE UR QL STRIP: ABNORMAL
NRBC BLD AUTO-RTO: 0 %
PH UR STRIP: 5.5 [PH]
PLATELET # BLD AUTO: 197 X10(3)/MCL (ref 130–400)
PMV BLD AUTO: 8.6 FL (ref 7.4–10.4)
POTASSIUM SERPL-SCNC: 4.6 MMOL/L (ref 3.5–5.1)
PROT SERPL-MCNC: 7.9 GM/DL (ref 5.8–7.6)
PROT UR QL STRIP: ABNORMAL
PSA SERPL-MCNC: 10.41 NG/ML
RBC # BLD AUTO: 3.71 X10(6)/MCL (ref 4.7–6.1)
RBC #/AREA URNS AUTO: ABNORMAL /HPF
RHEUMATOID FACT SERPL-ACNC: <13 IU/ML
SODIUM SERPL-SCNC: 140 MMOL/L (ref 136–145)
SP GR UR STRIP.AUTO: 1.02 (ref 1–1.03)
SQUAMOUS #/AREA URNS LPF: ABNORMAL /HPF
T4 FREE SERPL-MCNC: 0.99 NG/DL (ref 0.7–1.48)
TRIGL SERPL-MCNC: 174 MG/DL (ref 34–140)
TSH SERPL-ACNC: 1.69 UIU/ML (ref 0.35–4.94)
URATE SERPL-MCNC: 8 MG/DL (ref 3.5–7.2)
UROBILINOGEN UR STRIP-ACNC: NORMAL
VLDLC SERPL CALC-MCNC: 35 MG/DL
WBC # BLD AUTO: 6.01 X10(3)/MCL (ref 4.5–11.5)
WBC #/AREA URNS AUTO: ABNORMAL /HPF

## 2024-12-13 PROCEDURE — 85025 COMPLETE CBC W/AUTO DIFF WBC: CPT

## 2024-12-13 PROCEDURE — 86039 ANTINUCLEAR ANTIBODIES (ANA): CPT

## 2024-12-13 PROCEDURE — 81001 URINALYSIS AUTO W/SCOPE: CPT

## 2024-12-13 PROCEDURE — 36415 COLL VENOUS BLD VENIPUNCTURE: CPT

## 2024-12-13 PROCEDURE — 85652 RBC SED RATE AUTOMATED: CPT

## 2024-12-13 PROCEDURE — 84439 ASSAY OF FREE THYROXINE: CPT

## 2024-12-13 PROCEDURE — 80053 COMPREHEN METABOLIC PANEL: CPT

## 2024-12-13 PROCEDURE — 82306 VITAMIN D 25 HYDROXY: CPT

## 2024-12-13 PROCEDURE — 80061 LIPID PANEL: CPT

## 2024-12-13 PROCEDURE — 83036 HEMOGLOBIN GLYCOSYLATED A1C: CPT

## 2024-12-13 PROCEDURE — 84443 ASSAY THYROID STIM HORMONE: CPT

## 2024-12-13 PROCEDURE — 86431 RHEUMATOID FACTOR QUANT: CPT

## 2024-12-13 PROCEDURE — 84153 ASSAY OF PSA TOTAL: CPT

## 2024-12-13 PROCEDURE — 84550 ASSAY OF BLOOD/URIC ACID: CPT

## 2024-12-14 LAB — ANA SER QL HEP2 SUBST: NORMAL

## 2025-03-03 DIAGNOSIS — Z76.82 STEM CELL TRANSPLANT CANDIDATE: ICD-10-CM

## 2025-03-03 DIAGNOSIS — C90.00 MULTIPLE MYELOMA NOT HAVING ACHIEVED REMISSION: Primary | ICD-10-CM

## 2025-03-03 DIAGNOSIS — C90.02 MULTIPLE MYELOMA IN RELAPSE: Primary | ICD-10-CM

## 2025-03-03 DIAGNOSIS — Z79.899 PATIENT ON ANTINEOPLASTIC CHEMOTHERAPY REGIMEN: ICD-10-CM

## 2025-03-07 ENCOUNTER — LAB VISIT (OUTPATIENT)
Dept: LAB | Facility: HOSPITAL | Age: 75
End: 2025-03-07
Attending: INTERNAL MEDICINE
Payer: MEDICARE

## 2025-03-07 DIAGNOSIS — C90.00 MULTIPLE MYELOMA NOT HAVING ACHIEVED REMISSION: ICD-10-CM

## 2025-03-07 DIAGNOSIS — G62.9 NEUROPATHY: ICD-10-CM

## 2025-03-07 LAB
ALBUMIN SERPL-MCNC: 3.6 G/DL (ref 3.4–4.8)
ALBUMIN/GLOB SERPL: 0.8 RATIO (ref 1.1–2)
ALP SERPL-CCNC: 85 UNIT/L (ref 40–150)
ALT SERPL-CCNC: 29 UNIT/L (ref 0–55)
ANION GAP SERPL CALC-SCNC: 6 MEQ/L
AST SERPL-CCNC: 19 UNIT/L (ref 5–34)
BASOPHILS # BLD AUTO: 0.02 X10(3)/MCL
BASOPHILS NFR BLD AUTO: 0.4 %
BILIRUB SERPL-MCNC: 1.3 MG/DL
BUN SERPL-MCNC: 17.9 MG/DL (ref 8.4–25.7)
CALCIUM SERPL-MCNC: 9.3 MG/DL (ref 8.8–10)
CHLORIDE SERPL-SCNC: 109 MMOL/L (ref 98–107)
CO2 SERPL-SCNC: 22 MMOL/L (ref 23–31)
CREAT SERPL-MCNC: 1.06 MG/DL (ref 0.72–1.25)
CREAT/UREA NIT SERPL: 17
EOSINOPHIL # BLD AUTO: 0.1 X10(3)/MCL (ref 0–0.9)
EOSINOPHIL NFR BLD AUTO: 1.9 %
ERYTHROCYTE [DISTWIDTH] IN BLOOD BY AUTOMATED COUNT: 13.6 % (ref 11.5–17)
GFR SERPLBLD CREATININE-BSD FMLA CKD-EPI: >60 ML/MIN/1.73/M2
GLOBULIN SER-MCNC: 4.5 GM/DL (ref 2.4–3.5)
GLUCOSE SERPL-MCNC: 127 MG/DL (ref 82–115)
HCT VFR BLD AUTO: 37.4 % (ref 42–52)
HGB BLD-MCNC: 12.8 G/DL (ref 14–18)
IGA SERPL-MCNC: 21 MG/DL (ref 101–645)
IGG SERPL-MCNC: 2834 MG/DL (ref 540–1822)
IGM SERPL-MCNC: 66 MG/DL (ref 22–240)
IMM GRANULOCYTES # BLD AUTO: 0.01 X10(3)/MCL (ref 0–0.04)
IMM GRANULOCYTES NFR BLD AUTO: 0.2 %
LDH SERPL-CCNC: 325 U/L (ref 125–220)
LYMPHOCYTES # BLD AUTO: 1.27 X10(3)/MCL (ref 0.6–4.6)
LYMPHOCYTES NFR BLD AUTO: 24.6 %
MCH RBC QN AUTO: 34.8 PG (ref 27–31)
MCHC RBC AUTO-ENTMCNC: 34.2 G/DL (ref 33–36)
MCV RBC AUTO: 101.6 FL (ref 80–94)
MONOCYTES # BLD AUTO: 0.3 X10(3)/MCL (ref 0.1–1.3)
MONOCYTES NFR BLD AUTO: 5.8 %
NEUTROPHILS # BLD AUTO: 3.46 X10(3)/MCL (ref 2.1–9.2)
NEUTROPHILS NFR BLD AUTO: 67.1 %
PLATELET # BLD AUTO: 197 X10(3)/MCL (ref 130–400)
PMV BLD AUTO: 8.6 FL (ref 7.4–10.4)
POTASSIUM SERPL-SCNC: 3.9 MMOL/L (ref 3.5–5.1)
PROT SERPL-MCNC: 8.1 GM/DL (ref 5.8–7.6)
RBC # BLD AUTO: 3.68 X10(6)/MCL (ref 4.7–6.1)
SODIUM SERPL-SCNC: 137 MMOL/L (ref 136–145)
WBC # BLD AUTO: 5.16 X10(3)/MCL (ref 4.5–11.5)

## 2025-03-07 PROCEDURE — 80053 COMPREHEN METABOLIC PANEL: CPT

## 2025-03-07 PROCEDURE — 36415 COLL VENOUS BLD VENIPUNCTURE: CPT

## 2025-03-07 PROCEDURE — 85025 COMPLETE CBC W/AUTO DIFF WBC: CPT

## 2025-03-07 PROCEDURE — 83615 LACTATE (LD) (LDH) ENZYME: CPT

## 2025-03-07 PROCEDURE — 82784 ASSAY IGA/IGD/IGG/IGM EACH: CPT

## 2025-03-07 PROCEDURE — 84165 PROTEIN E-PHORESIS SERUM: CPT

## 2025-03-10 LAB
ALBUMIN % SPEP (OHS): 45.7 (ref 48.1–59.5)
ALBUMIN SERPL-MCNC: 3.6 G/DL (ref 3.4–4.8)
ALBUMIN/GLOB SERPL: 0.9 RATIO (ref 1.1–2)
ALPHA 1 GLOB (OHS): 0.26 GM/DL (ref 0–0.4)
ALPHA 1 GLOB% (OHS): 3.39 (ref 2.3–4.9)
ALPHA 2 GLOB % (OHS): 10.24 (ref 6.9–13)
ALPHA 2 GLOB (OHS): 0.8 GM/DL (ref 0.4–1)
BETA GLOB (OHS): 1.13 GM/DL (ref 0.7–1.3)
BETA GLOB% (OHS): 14.5 (ref 13.8–19.7)
GAMMA GLOBULIN % (OHS): 26.16 (ref 10.1–21.9)
GAMMA GLOBULIN (OHS): 2.04 GM/DL (ref 0.4–1.8)
GLOBULIN SER-MCNC: 4.2 GM/DL (ref 2.4–3.5)
M SPIKE % (OHS): 16.95
M SPIKE (OHS): 1.32 GM/DL
PATH REV: NORMAL
PROT SERPL-MCNC: 7.8 GM/DL (ref 5.8–7.6)

## 2025-03-12 ENCOUNTER — HOSPITAL ENCOUNTER (OUTPATIENT)
Dept: RADIOLOGY | Facility: HOSPITAL | Age: 75
Discharge: HOME | End: 2025-03-12
Attending: INTERNAL MEDICINE
Payer: MEDICARE

## 2025-03-12 DIAGNOSIS — Z79.899 PATIENT ON ANTINEOPLASTIC CHEMOTHERAPY REGIMEN: ICD-10-CM

## 2025-03-12 DIAGNOSIS — C90.00 MULTIPLE MYELOMA NOT HAVING ACHIEVED REMISSION: ICD-10-CM

## 2025-03-12 DIAGNOSIS — Z76.82 STEM CELL TRANSPLANT CANDIDATE: ICD-10-CM

## 2025-03-12 PROCEDURE — 78816 PET IMAGE W/CT FULL BODY: CPT | Mod: TC

## 2025-03-12 PROCEDURE — A9552 F18 FDG: HCPCS | Performed by: INTERNAL MEDICINE

## 2025-03-12 RX ORDER — FLUDEOXYGLUCOSE F18 500 MCI/ML
10 INJECTION INTRAVENOUS
Status: COMPLETED | OUTPATIENT
Start: 2025-03-12 | End: 2025-03-12

## 2025-03-12 RX ADMIN — FLUDEOXYGLUCOSE F-18 10.6 MILLICURIE: 500 INJECTION INTRAVENOUS at 09:03

## 2025-03-14 NOTE — PROGRESS NOTES
HEMATOLOGY/ONCOLOGY OFFICE CLINIC VISIT    Visit Information:    Initial Evaluation: 8/10/2022  Referring Provider:   Other providers: Dr Brito  Code status: Not addressed    Diagnosis:  Multiple Myeloma--IgG, Kappa  Macrocytic anemia    Present treatment:   VRD-Started on 12/9/22 --on hold since 6/2023  Daratumumab added on 1/27/23 ----on hold since 6/2/2023    Treatment/Oncology history:  VRD-Started on 12/9/22  Daratumumab added on 1/27/23      Goal of care:  palliative    Imaging:   US abdomen 8/24/2022: Spleen: 10.8 cm. Mild hepatomegaly with suspected mild hepatic steatosis. Cholelithiasis.  PET CT 9/28/2022: 1. Right scapular small focal mild hypermetabolism without lytic or sclerotic abnormality is not convinced to be related to multiple myeloma. 2. No definite skeletal lytic abnormality with hypermetabolism to reflect multiple myeloma on this exam. 3. Bilateral small pleural effusions.   4. Gallstones. 5.  Noninflamed diverticulosis coli. 6.  Prostatomegaly.  WB PET CT 11/10/2023:  No FDG avid malignancy is present on today's exam.  WB PET CT 5/15/2024:  No FDG avid malignancy is present on today's exam.  WB PET CT 03/11/2025: 1. Although nonenlarged, there are FDG avid abdominal lymph nodes.  Previously seen periportal lymph node is similar in size with slightly increased FDG uptake.  There is new FDG uptake and additional retroperitoneal lymph nodes compared to the prior.  2. New area of FDG uptake adjacent to or possibly within the uncinate process of the pancreas (image [id number]).  Consider multiphase CT or abdominal MRI for definitive characterization.  Primary differential consideration is a adjacent lymph node versus less likely true pancreatic mass.      Pathology:  9/1/2022:   BONE MARROW BIOPSY  PLASMA CELL MYELOMA, KAPPA RESTRICTED. NORMOCELLULAR MARROW (30%) WITH 70% INVOLVEMENT BY PLASMA CELL MYELOMA. BACKGROUND TRILINEAGE HEMATOPOIESIS IS DECREASED.    9/12/2023:  Bone Marrow Biopsy   BONE  MARROW, RIGHT POSTERIOR ILIAC CREST, ASPIRATE, CLOT, DECALCIFIED CORE BIOPSY:   PERSISTENT/RESIDUAL PLASMA CELL NEOPLASM, KAPPA LIGHT CHAIN RESTRICTED.   NORMOCELLULAR MARROW (30%) WITH 5% INVOLVEMENT BY KAPPA RESTRICTED PLASMA CELL NEOPLASM.    SEQUENTIAL TRILINEAGE HEMATOPOIESIS PRESENT.    INCREASED IRON STORES.    PERIPHERAL BLOOD :  NORMOCYTIC NORMOCHROMIC ANEMIA.       Comment   A) The patient's history of IgG kappa multiple myeloma treated with VRD and   daratumumab is noted from the electronic medical record.    B) The bone marrow is normocellular for age demonstrates a small residual   population of kappa restricted plasma cells (5%).  Background hematopoiesis is   sequential and trilineage.    C) Additional material submitted to [facility] for MRD myeloma flow   demonstrated monotypic kappa plasma cells (MRD=0.1781% or 1781 cells/million).     2/26/2024:  BONE, RIGHT HUMERAL HEAD, EXCISION:   - SEVERE OSTEOARTHRITIS.   - DAVE EBURNATION.   - EXTENSIVE UNDERLYING BONY REMODELING.   - UNDERLYING MARROW NECROSIS AND FIBROSIS.   Comment: No significant hematopoietic tissue is noted within the marrow  spaces.     CLINICAL HISTORY:       Patient: Valentino Manning is a 74 y.o. male with multiple medical problems kindly referred for persistent anemia.  Reviewing electronic medical record patient with anemia since 02/10/2016.  From 2016 to January of 2017 anemia was mild.  Anemia slowly worsening back in 2018 hemoglobin was 8.0.  At that same time kidney function was worsening as well. Labs with HGB of 9.4, .9, platelets 231 K, WBC 5.0.  Chemistries with globulin of 5.3.  BUN/creatinine 27.7/1.41.    Further workup revealed the diagnosis of multiple myeloma.  Patient was started on treatment with Velcade, Revlimid and dexamethasone on 12/09/2022 and daratumumab was added on 01/27/2023 with significant improvement of his disease.  He was seen in [place] for possible bone marrow transplant but  "he was not interested in bone marrow transplant at the time.  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  Gabapentin was not helping.     Treatment was delayed 1st due to COVID-19 in February of 2023, he has few treatments in between but it was delayed again due to NSTEMI . Revlimid was stopped due to his heart disease and plan was to continue daratumumab and Velcade lower dose for neuropathy.      Patient continued to decline with worsening of his weakness.  Home health called to let us know that patient was weak and unable to get out of bed. I called the patient Thursday 6/8/2023: "I personally spoke with the patient and  instructed to go to the ER as his weakness and neuropathy is worsening and he reports that he went to the ER in [place] because he could not urinate and a gongora cath was placed and send home.   He reports that is very difficult for him to get out of bed and he can not seeing himself going to his room to the car. I told him he will need to call an ambulance to transport him to the hospital and to come to [facility]. Patient with debilitating neuropathy of unclear etiology--not typical for MM and/or treatment for MM. I told him that may need MRI's of spine and/or brain and neurologist evaluation. Patient verbalized understanding and he will planned to go to the ER tomorrow not today."      Patient eventually went to the ER.   MRI T spine 6/12/2023:  1. No acute abnormality of the thoracic spine. 2. Mild multilevel canal and neural foraminal narrowing. 3. No cord signal abnormality.  MRI L spine: 1. Severe degenerative spinal canal stenosis at L3-L4 and L4-L5, moderate at L2-L3, mild at L5-S1. 2. Multilevel neural foraminal stenoses as described.  MRI C spine: 1. Severe degenerative spinal canal stenosis at L3-L4 and L4-L5, moderate at L2-L3, mild at L5-S1. 2. Multilevel neural foraminal stenoses as described.  MRIs of the spine.  No evidence of plasmacytoma, mass or cord " compression due to Malignancy.     Patient with cervical myelopathy and stenosis.  He was seen by Neurosurgery and on 6/26/2023 underwent Decompressive laminectomies at C3-4, C4-5, and C5-6. He was then transfer to [facility] in [place] for Rehab. Patient has been off treatment  since 6/2/2023 due to extensive unrelated c spine surgery followed by prolonged stay at rehab at [facility] in [place].     Interval History:    03/17/25: Patient presents for 3 month follow up of his MM. Labs reviewed with pt in detail, No anemia, normal kidney function and calcium. Pt states he is doing well.  Discussed imaging studies in detail with the patient that showed increasing lymphadenopathy.  He does not have any symptoms associated with his abdominal lymphadenopathy.         Past Medical History:   Diagnosis Date    Anemia     GERD (gastroesophageal reflux disease)     Heart problem     Hyperlipidemia     Hypertension     Multiple myeloma     NSTEMI (non-ST elevated myocardial infarction)       Past Surgical History:   Procedure Laterality Date    BONE MARROW ASPIRATION N/A 09/01/2022    Procedure: ASPIRATION, BONE MARROW;  Surgeon: Robbie Gardner MD;  Location: [facility];  Service: General;  Laterality: N/A;    BONE MARROW BIOPSY Right 09/12/2023    Procedure: Biopsy-bone marrow;  Surgeon: Russ Wilson MD;  Location: [facility] OR;  Service: General;  Laterality: Right;    CARDIAC SURGERY  2021    ENDOSCOPIC RELEASE OF BOTH CARPAL TUNNELS      FUSION OF POSTERIOR COLUMN OF CERVICAL SPINE USING COMPUTER AIDED NAVIGATION N/A 06/26/2023    Procedure: FUSION, SPINE, POSTERIOR SPINAL COLUMN, CERVICAL, USING COMPUTER-ASSISTED NAVIGATION;  Surgeon: Montserrat Aviles MD;  Location: [facility];  Service: Neurosurgery;  Laterality: N/A;  C3-4, C4-5, C5-6 laminectomies and fusion, o-arm, stealth  TIVA setup  NTI //  XX    SHOULDER SURGERY Right 02/21/2024     Family History   Problem Relation Name Age of Onset    Hypertension Mother      Diabetes  Mother      Anemia Mother      Heart disease Mother      Heart disease Father              Review of patient's allergies indicates:   Allergen Reactions    Penicillins       Current Outpatient Medications on File Prior to Visit   Medication Sig Dispense Refill    atorvastatin (LIPITOR) 40 MG tablet TAKE 1 TABLET ORALLY ONCE DAILY ON MON/TUE/THUR/FRI AND 1/2 ON WED. NONE ON SAT/SUN      clopidogreL (PLAVIX) 75 mg tablet Take 75 mg by mouth once daily.      doxazosin (CARDURA) 8 MG Tab Take 8 mg by mouth.      DULoxetine (CYMBALTA) 30 MG capsule TAKE ONE CAPSULE BY MOUTH ONCE DAILY 30 capsule 3    dutasteride (AVODART) 0.5 mg capsule Take 0.5 mg by mouth once daily.      FERRETTS 325 mg (106 mg iron) Tab Take 1 tablet by mouth 2 (two) times daily.      lisinopriL (PRINIVIL,ZESTRIL) 20 MG tablet Take 20 mg by mouth.      testosterone cypionate (DEPOTESTOTERONE CYPIONATE) 200 mg/mL injection INJECT 1 MLS (200 MG) INTRAMUSCULARLY ONCE WEEKLY.      zolpidem (AMBIEN) 10 mg Tab Take 10 mg by mouth nightly as needed.      metoprolol tartrate (LOPRESSOR) 25 MG tablet Take 1 tablet (25 mg total) by mouth 2 (two) times daily. 60 tablet 11     No current facility-administered medications on file prior to visit.      Review of Systems   Constitutional:  Negative for activity change, appetite change, chills, diaphoresis, fatigue, fever and unexpected weight change.   HENT:  Negative for nasal congestion, mouth sores, nosebleeds, sinus pressure/congestion, sore throat and trouble swallowing.    Eyes:  Positive for visual disturbance.   Respiratory:  Negative for cough and shortness of breath.    Cardiovascular:  Negative for chest pain and palpitations.   Gastrointestinal:  Negative for abdominal distention, abdominal pain, blood in stool, change in bowel habit, constipation, diarrhea, nausea and vomiting.   Endocrine: Negative.    Genitourinary:  Negative for bladder incontinence, decreased urine volume, difficulty urinating,  dysuria, frequency, hematuria and urgency.   Musculoskeletal:  Negative for arthralgias, back pain, gait problem, joint swelling, leg pain and myalgias.   Integumentary:  Negative for rash.   Allergic/Immunologic: Negative.    Neurological:  Negative for dizziness, tremors, syncope, weakness, light-headedness, numbness, headaches and memory loss.        Neuropathy   Hematological:  Negative for adenopathy. Does not bruise/bleed easily.   Psychiatric/Behavioral:  Negative for agitation, confusion, hallucinations, sleep disturbance and suicidal ideas. The patient is not nervous/anxious.               Vitals:    03/17/25 1514   BP: 137/75   Patient Position: Sitting   Pulse: 62   Temp: 97.9 °F (36.6 °C)   TempSrc: Oral   SpO2: 95%   Weight: 90.4 kg (199 lb 6.4 oz)           Wt Readings from Last 6 Encounters:   03/17/25 90.4 kg (199 lb 6.4 oz)   12/09/24 90.2 kg (198 lb 14.4 oz)   05/23/24 90.5 kg (199 lb 8 oz)   03/04/24 89.9 kg (198 lb 1.6 oz)   12/28/23 93.7 kg (206 lb 9.6 oz)   11/16/23 87.5 kg (192 lb 12.8 oz)     Body mass index is 26.31 kg/m².  Body surface area is 2.16 meters squared.       Physical Exam  HENT:      Head: Normocephalic and atraumatic.   Eyes:      Extraocular Movements: Extraocular movements intact.   Pulmonary:      Effort: Pulmonary effort is normal.      Breath sounds: Normal breath sounds. No wheezing.   Abdominal:      General: Bowel sounds are normal.      Palpations: Abdomen is soft.   Musculoskeletal:      Cervical back: Neck supple.   Neurological:      Mental Status: He is alert and oriented to person, place, and time.      Cranial Nerves: Cranial nerves 2-12 are intact.      Sensory: No sensory deficit.      Motor: Weakness present.      Gait: Gait abnormal.   Psychiatric:         Mood and Affect: Mood normal.         Behavior: Behavior normal. Behavior is cooperative.         Judgment: Judgment normal.       ECOG SCORE    1 - Restricted in strenuous activity-ambulatory and able to  carry out work of a light nature         Laboratory:  CBC with Differential:  Lab Results   Component Value Date    WBC 5.16 03/07/2025    RBC 3.68 (L) 03/07/2025    HGB 12.8 (L) 03/07/2025    HCT 37.4 (L) 03/07/2025    .6 (H) 03/07/2025    MCH 34.8 (H) 03/07/2025    MCHC 34.2 03/07/2025    RDW 13.6 03/07/2025     03/07/2025    MPV 8.6 03/07/2025        CMP:  Sodium   Date Value Ref Range Status   03/07/2025 137 136 - 145 mmol/L Final   02/09/2024 137 136 - 145 mmol/L Final     Potassium   Date Value Ref Range Status   03/07/2025 3.9 3.5 - 5.1 mmol/L Final   02/09/2024 3.9 3.5 - 5.1 mmol/L Final     Chloride   Date Value Ref Range Status   03/07/2025 109 (H) 98 - 107 mmol/L Final   02/09/2024 105 100 - 109 mmol/L Final     CO2   Date Value Ref Range Status   03/07/2025 22 (L) 23 - 31 mmol/L Final     Carbon Dioxide   Date Value Ref Range Status   02/09/2024 23 22 - 33 mmol/L Final     BUN   Date Value Ref Range Status   08/07/2023 11.5 9.0 - 23.0 mg/dL Final     Blood Urea Nitrogen   Date Value Ref Range Status   03/07/2025 17.9 8.4 - 25.7 mg/dL Final   02/09/2024 15 5 - 25 mg/dL Final     Creatinine   Date Value Ref Range Status   03/07/2025 1.06 0.72 - 1.25 mg/dL Final   02/09/2024 0.98 0.57 - 1.25 mg/dL Final     Calcium   Date Value Ref Range Status   03/07/2025 9.3 8.8 - 10.0 mg/dL Final   02/09/2024 9.2 8.8 - 10.6 mg/dL Final     Albumin   Date Value Ref Range Status   03/07/2025 3.6 3.4 - 4.8 g/dL Final   03/07/2025 3.6 3.4 - 4.8 g/dL Final   08/07/2023 4.0 3.2 - 4.8 g/dL Final     Total Bilirubin   Date Value Ref Range Status   08/07/2023 0.4 0.2 - 1.0 mg/dL Final     Bilirubin Total   Date Value Ref Range Status   03/07/2025 1.3 <=1.5 mg/dL Final     ALP   Date Value Ref Range Status   03/07/2025 85 40 - 150 unit/L Final     AST   Date Value Ref Range Status   03/07/2025 19 5 - 34 unit/L Final   08/07/2023 12 <34 U/L Final     ALT   Date Value Ref Range Status   03/07/2025 29 0 - 55 unit/L  Final   08/07/2023 20 10 - 49 U/L Final     Anion Gap   Date Value Ref Range Status   02/09/2024 9 8 - 16 mmol/L Final     eGFR    Date Value Ref Range Status   02/09/2024 82 mL/min/1.73mSq Final     Comment:     In accordance with NKF-ASN Task Force recommendation, calculation based on the Chronic Kidney Disease Epidemiology Collaboration (CKD-EPI) equation without adjustment for race. eGFR adjusted for gender and age and calculated in ml/min/1.73mSquared. eGFR cannot be calculated if patient is under 18 years of age.     Reference Range:   >= 60 ml/min/1.73mSquared.     Estimated GFR-Non    Date Value Ref Range Status   06/22/2022 53 mls/min/1.73/m2 Final       Assessment:       1) Multiple myeloma  --Patient was seen at [facility] in [place]. He started treatment here with Velcade/Revlimid/Dexamethamasone. He said that he is a candidate for transplant and they will eval bone marrow again after 6 cycles. He had f/u via telemedicine with Dr. Brito on 2/27/23- recommended to repeat BMB x after 6 cycles.  Darzalex (weekly x 8 dose, then q 2 week x 8 doses, then q 4 weeks per kodak study) was added on 1/27/23. Cycle 4, Day 1 of Velcade/Revlimid/Dexamethamasone/Darzalex was given on 2/10/23. He was diagnosed with Covid and PEs on 2/14/23.   --Patient has been off treatment  since 6/2/2023 due to extensive unrelated c spine surgery followed by prolonged stay at rehab at [facility].   --Markers trending up slowly but no symptoms  --will continue to monitor closely  --Plan to start for M spike of > 1.5 or CRAB Sx (hypercalcemia, renal failure, anemia, and bone disease)  2) Cervical degenerative compressive myelopathy, with myelopathic features  3) Lumbar spinal stenosis  4) Peripheral PolyNeuropathy  5) Weakness--better  6) History of NSTEMI/coronary artery disease-- on Plavix  7) H/o PE         Plan:      We will repeat bone marrow biopsy prior to re-initiation of  treatment. He prefers to continue to monitor.    Treatment plan for M spike > 1.5G or any or any crab (Calcium, Renal, Anemia, Bone)  symptoms develop.        Will monitor MM for now  CT C/A/P - to eval LAD- Ordered  May need referral to surgery for biopsy-patient will like to defer for now. Will address again next week  RTC in 1 week for results MD    The patient was seen, interviewed and examined. Pertinent lab and radiology studies were reviewed.   The patient was given ample opportunity to ask questions, and to the best of my abilities, all questions answered to satisfaction; patient demonstrated understanding of what we discussed and agreeable to the plan. Pt instructed to call should develop concerning signs/symptoms or have further questions.   Visit today included increased complexity associated with the care of the episodic problem multiple myeloma, addressing and managing the longitudinal care of the patient's multiple myeloma.         Angélica Harris MD  Hematology/Oncology  [facility]        Professional Services   I, Elo Weathers LPN, acted solely as a scribe for and in the presence of Dr. Angélica Harris, who performed these services.

## 2025-03-17 ENCOUNTER — OFFICE VISIT (OUTPATIENT)
Dept: HEMATOLOGY/ONCOLOGY | Facility: CLINIC | Age: 75
End: 2025-03-17
Payer: MEDICARE

## 2025-03-17 VITALS
SYSTOLIC BLOOD PRESSURE: 137 MMHG | TEMPERATURE: 98 F | OXYGEN SATURATION: 95 % | DIASTOLIC BLOOD PRESSURE: 75 MMHG | WEIGHT: 199.38 LBS | HEART RATE: 62 BPM | BODY MASS INDEX: 26.31 KG/M2

## 2025-03-17 DIAGNOSIS — R59.1 LYMPHADENOPATHY: ICD-10-CM

## 2025-03-17 DIAGNOSIS — G62.89 OTHER POLYNEUROPATHY: ICD-10-CM

## 2025-03-17 DIAGNOSIS — C90.00 MULTIPLE MYELOMA NOT HAVING ACHIEVED REMISSION: Primary | ICD-10-CM

## 2025-03-17 PROCEDURE — G2211 COMPLEX E/M VISIT ADD ON: HCPCS | Mod: S$PBB,,, | Performed by: INTERNAL MEDICINE

## 2025-03-17 PROCEDURE — 99999 PR PBB SHADOW E&M-EST. PATIENT-LVL IV: CPT | Mod: PBBFAC,,, | Performed by: INTERNAL MEDICINE

## 2025-03-17 PROCEDURE — 99215 OFFICE O/P EST HI 40 MIN: CPT | Mod: S$PBB,,, | Performed by: INTERNAL MEDICINE

## 2025-03-17 PROCEDURE — 99214 OFFICE O/P EST MOD 30 MIN: CPT | Mod: PBBFAC | Performed by: INTERNAL MEDICINE

## 2025-03-19 PROBLEM — R59.1 LYMPHADENOPATHY: Status: ACTIVE | Noted: 2025-03-19

## 2025-03-21 NOTE — PROGRESS NOTES
HEMATOLOGY/ONCOLOGY OFFICE CLINIC VISIT     This is a telemedicine note.  Patient was treated using telemedicine, real-time audio and video, according to [facility] protocols.       I, Dr. Angélica Harris, distant provided, conducted the visit from location identified below.  The patient participated in the visit at a non-[facility] location selected by the patient (or patient's representative), identified below.  I am licensed in the [facility] where the patient stated they are located.  The patient, (or patient's representative) stated that they understood and accepted privacy and security risk to the information and her location.   I have reviewed the patient name and date of birth  I have verbally reviewed the past medical history, active medication list, and allergies with the patient (parent/ legal guardian for a patient under the age of 18 years old) and verified with picture ID if applicable . I have verified that this patient is a resident in the [facility].  I have verbally obtained review of systems     Patient was located in the [facility]  I, Dr. Angélica Harris, distant provider, was located at [facility] .     Face-to-face time spent with the patient exceeds 25 minutes, over 50% of which was used for education and counseling regarding medical conditions, current medications including risk/benefits and side effects/adverse events, over-the-counter medications uses/doses, home self-care and contact precautions, red flags and indication for immediate medical attention.  The patient is receptive, expresses understanding and is agreeable to the plan.  All questions answered.     Angélica Harris MD     Visit Information:    Initial Evaluation: 8/10/2022  Referring Provider:   Other providers: Dr Brito  Code status: Not addressed    Diagnosis:  Multiple Myeloma--IgG, Kappa  Macrocytic anemia    Present treatment:   VRD-Started on 12/9/22 --on hold since 6/2023  Daratumumab added on 1/27/23  ----on hold since 6/2/2023    Treatment/Oncology history:  VRD-Started on 12/9/22  Daratumumab added on 1/27/23      Goal of care:  palliative    Imaging:   US abdomen 8/24/2022: Spleen: 10.8 cm. Mild hepatomegaly with suspected mild hepatic steatosis. Cholelithiasis.  PET CT 9/28/2022: 1. Right scapular small focal mild hypermetabolism without lytic or sclerotic abnormality is not convinced to be related to multiple myeloma. 2. No definite skeletal lytic abnormality with hypermetabolism to reflect multiple myeloma on this exam. 3. Bilateral small pleural effusions.   4. Gallstones. 5.  Noninflamed diverticulosis coli. 6.  Prostatomegaly.  WB PET CT 11/10/2023:  No FDG avid malignancy is present on today's exam.  WB PET CT 5/15/2024:  No FDG avid malignancy is present on today's exam.  WB PET CT 03/11/2025: 1. Although nonenlarged, there are FDG avid abdominal lymph nodes.  Previously seen periportal lymph node is similar in size with slightly increased FDG uptake.  There is new FDG uptake and additional retroperitoneal lymph nodes compared to the prior.  2. New area of FDG uptake adjacent to or possibly within the uncinate process of the pancreas (image [id number]).  Consider multiphase CT or abdominal MRI for definitive characterization.  Primary differential consideration is a adjacent lymph node versus less likely true pancreatic mass.  CT C/A/P 03/25/2025:  No enlarged lymph nodes by size criteria.  A 1 cm portal caval lymph node is seen corresponding with FDG avid node on PET-CT, not significantly changed from prior PET .  Small retroperitoneal lymph nodes not enlarged by size criteria but demonstrating mild FDG avidity on prior PET, defer to PET report. Impression: The bones are demineralized with mottled lucency possibly related to history of multiple myeloma.  Prostatomegaly    Pathology:  9/1/2022:   BONE MARROW BIOPSY  PLASMA CELL MYELOMA, KAPPA RESTRICTED. NORMOCELLULAR MARROW (30%) WITH 70% INVOLVEMENT BY  PLASMA CELL MYELOMA. BACKGROUND TRILINEAGE HEMATOPOIESIS IS DECREASED.    9/12/2023:  Bone Marrow Biopsy  BONE MARROW, RIGHT POSTERIOR ILIAC CREST, ASPIRATE, CLOT, DECALCIFIED CORE BIOPSY:   PERSISTENT/RESIDUAL PLASMA CELL NEOPLASM, KAPPA LIGHT CHAIN RESTRICTED.   NORMOCELLULAR MARROW (30%) WITH 5% INVOLVEMENT BY KAPPA RESTRICTED PLASMA CELL NEOPLASM.    SEQUENTIAL TRILINEAGE HEMATOPOIESIS PRESENT.    INCREASED IRON STORES.    PERIPHERAL BLOOD :  NORMOCYTIC NORMOCHROMIC ANEMIA.      Comment   A) The patient's history of IgG kappa multiple myeloma treated with VRD and daratumumab is noted from the electronic medical record.    B) The bone marrow is normocellular for age demonstrates a small residual population of kappa restricted plasma cells (5%).  Background hematopoiesis is sequential and trilineage.    C) Additional material submitted to [facility] for MRD myeloma flow demonstrated monotypic kappa plasma cells (MRD=0.1781% or 1781 cells/million).     2/26/2024:  BONE, RIGHT HUMERAL HEAD, EXCISION:   - SEVERE OSTEOARTHRITIS.   - DAVE EBURNATION.   - EXTENSIVE UNDERLYING BONY REMODELING.   - UNDERLYING MARROW NECROSIS AND FIBROSIS.   Comment: No significant hematopoietic tissue is noted within the marrow  spaces.     CLINICAL HISTORY:       Patient: Valentino Manning is a 74 y.o. male with multiple medical problems kindly referred for persistent anemia.  Reviewing electronic medical record patient with anemia since 02/10/2016.  From 2016 to January of 2017 anemia was mild.  Anemia slowly worsening back in 2018 hemoglobin was 8.0.  At that same time kidney function was worsening as well. Labs with HGB of 9.4, .9, platelets 231 K, WBC 5.0.  Chemistries with globulin of 5.3.  BUN/creatinine 27.7/1.41.    Further workup revealed the diagnosis of multiple myeloma.  Patient was started on treatment with Velcade, Revlimid and dexamethasone on 12/09/2022 and daratumumab was added on 01/27/2023 with  "significant improvement of his disease.  He was seen in [place] for possible bone marrow transplant but he was not interested in bone marrow transplant at the time.  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  Gabapentin was not helping.     Treatment was delayed 1st due to COVID-19 in February of 2023, he has few treatments in between but it was delayed again due to NSTEMI . Revlimid was stopped due to his heart disease and plan was to continue daratumumab and Velcade lower dose for neuropathy.      Patient continued to decline with worsening of his weakness.  Home health called to let us know that patient was weak and unable to get out of bed. I called the patient Thursday 6/8/2023: "I personally spoke with the patient and  instructed to go to the ER as his weakness and neuropathy is worsening and he reports that he went to the ER in [place] because he could not urinate and a gongora cath was placed and send home.   He reports that is very difficult for him to get out of bed and he can not seeing himself going to his room to the car. I told him he will need to call an ambulance to transport him to the hospital and to come to [facility]. Patient with debilitating neuropathy of unclear etiology--not typical for MM and/or treatment for MM. I told him that may need MRI's of spine and/or brain and neurologist evaluation. Patient verbalized understanding and he will planned to go to the ER tomorrow not today."      Patient eventually went to the ER.   MRI T spine 6/12/2023:  1. No acute abnormality of the thoracic spine. 2. Mild multilevel canal and neural foraminal narrowing. 3. No cord signal abnormality.  MRI L spine: 1. Severe degenerative spinal canal stenosis at L3-L4 and L4-L5, moderate at L2-L3, mild at L5-S1. 2. Multilevel neural foraminal stenoses as described.  MRI C spine: 1. Severe degenerative spinal canal stenosis at L3-L4 and L4-L5, moderate at L2-L3, mild at L5-S1. 2. " Multilevel neural foraminal stenoses as described.  MRIs of the spine.  No evidence of plasmacytoma, mass or cord compression due to Malignancy.     Patient with cervical myelopathy and stenosis.  He was seen by Neurosurgery and on 6/26/2023 underwent Decompressive laminectomies at C3-4, C4-5, and C5-6. He was then transfer to [facility] for Rehab. Patient has been off treatment  since 6/2/2023 due to extensive unrelated c spine surgery followed by prolonged stay at rehab at [facility].     Interval History:      03/26/25: Patient presents today virtually for 1 week follow up, CT results. Discussed imaging studies in detail with the patient that showed increasing lymphadenopathy.  He does not have any symptoms associated with his abdominal lymphadenopathy. CT reviewed with pt, 1. The bones are demineralized with mottled lucency possibly related to history of multiple myeloma 2. Prostatomegaly. Advised pt that we will continue to monitor lymph nodes, we will hold off on sending pt to surgeon at this time per pt request. Pt denies abdominal pain, no change in bowel habits, no unintentional weight loss.   Discussed referral to surgeon for lymph node biopsy but at this time he is not ready to undergo any other type of surgery.  He prefer to continue to monitor closely.     Past Medical History:   Diagnosis Date    Anemia     GERD (gastroesophageal reflux disease)     Heart problem     Hyperlipidemia     Hypertension     Multiple myeloma     NSTEMI (non-ST elevated myocardial infarction)       Past Surgical History:   Procedure Laterality Date    BONE MARROW ASPIRATION N/A 09/01/2022    Procedure: ASPIRATION, BONE MARROW;  Surgeon: Robbie Gardner MD;  Location: [facility];  Service: General;  Laterality: N/A;    BONE MARROW BIOPSY Right 09/12/2023    Procedure: Biopsy-bone marrow;  Surgeon: Russ Wilson MD;  Location: [facility];  Service: General;  Laterality: Right;    CARDIAC SURGERY  2021     ENDOSCOPIC RELEASE OF BOTH CARPAL TUNNELS      FUSION OF POSTERIOR COLUMN OF CERVICAL SPINE USING COMPUTER AIDED NAVIGATION N/A 06/26/2023    Procedure: FUSION, SPINE, POSTERIOR SPINAL COLUMN, CERVICAL, USING COMPUTER-ASSISTED NAVIGATION;  Surgeon: Montserrat Aviles MD;  Location: [facility];  Service: Neurosurgery;  Laterality: N/A;  C3-4, C4-5, C5-6 laminectomies and fusion, o-arm, stealth  TIVA setup  NTI //  XX    SHOULDER SURGERY Right 02/21/2024     Family History   Problem Relation Name Age of Onset    Hypertension Mother      Diabetes Mother      Anemia Mother      Heart disease Mother      Heart disease Father              Review of patient's allergies indicates:   Allergen Reactions    Penicillins       Current Outpatient Medications on File Prior to Visit   Medication Sig Dispense Refill    atorvastatin (LIPITOR) 40 MG tablet TAKE 1 TABLET ORALLY ONCE DAILY ON MON/TUE/THUR/FRI AND 1/2 ON WED. NONE ON SAT/SUN      clopidogreL (PLAVIX) 75 mg tablet Take 75 mg by mouth once daily.      doxazosin (CARDURA) 8 MG Tab Take 8 mg by mouth.      DULoxetine (CYMBALTA) 30 MG capsule TAKE ONE CAPSULE BY MOUTH ONCE DAILY 30 capsule 3    dutasteride (AVODART) 0.5 mg capsule Take 0.5 mg by mouth once daily.      FERRETTS 325 mg (106 mg iron) Tab Take 1 tablet by mouth 2 (two) times daily.      lisinopriL (PRINIVIL,ZESTRIL) 20 MG tablet Take 20 mg by mouth.      metoprolol tartrate (LOPRESSOR) 25 MG tablet Take 1 tablet (25 mg total) by mouth 2 (two) times daily. 60 tablet 11    testosterone cypionate (DEPOTESTOTERONE CYPIONATE) 200 mg/mL injection INJECT 1 MLS (200 MG) INTRAMUSCULARLY ONCE WEEKLY.      zolpidem (AMBIEN) 10 mg Tab Take 10 mg by mouth nightly as needed.       Current Facility-Administered Medications on File Prior to Visit   Medication Dose Route Frequency Provider Last Rate Last Admin    [DISCONTINUED] diatrizoate meglumineand-diatrizoate sodium (GASTROVIEW) solution 30 mL  30 mL Oral ONCE PRN Kimberly,  Angélica HIGGISN MD          Review of Systems   Constitutional:  Negative for activity change, appetite change, chills, diaphoresis, fatigue, fever and unexpected weight change.   HENT:  Negative for nasal congestion, mouth sores, nosebleeds, sinus pressure/congestion, sore throat and trouble swallowing.    Eyes: Negative.  Negative for visual disturbance.   Respiratory:  Negative for cough and shortness of breath.    Cardiovascular:  Negative for chest pain and palpitations.   Gastrointestinal:  Negative for abdominal distention, abdominal pain, blood in stool, change in bowel habit, constipation, diarrhea, nausea and vomiting.   Endocrine: Negative.    Genitourinary:  Negative for bladder incontinence, decreased urine volume, difficulty urinating, dysuria, frequency, hematuria and urgency.   Musculoskeletal:  Negative for arthralgias, back pain, gait problem, joint swelling, leg pain and myalgias.   Integumentary:  Negative for rash.   Allergic/Immunologic: Negative.    Neurological:  Positive for weakness. Negative for dizziness, tremors, syncope, light-headedness, numbness, headaches and memory loss.        Neuropathy   Hematological:  Negative for adenopathy. Does not bruise/bleed easily.   Psychiatric/Behavioral:  Negative for agitation, confusion, hallucinations, sleep disturbance and suicidal ideas. The patient is not nervous/anxious.               There were no vitals filed for this visit.          Wt Readings from Last 6 Encounters:   03/17/25 90.4 kg (199 lb 6.4 oz)   12/09/24 90.2 kg (198 lb 14.4 oz)   05/23/24 90.5 kg (199 lb 8 oz)   03/04/24 89.9 kg (198 lb 1.6 oz)   12/28/23 93.7 kg (206 lb 9.6 oz)   11/16/23 87.5 kg (192 lb 12.8 oz)     There is no height or weight on file to calculate BMI.  There is no height or weight on file to calculate BSA.    LIMITED PHYSICAL EXAM DUE TO VIRTUAL VISIT   Physical Exam  Constitutional:       Appearance: Normal appearance.   HENT:      Head: Normocephalic and  atraumatic.   Eyes:      Extraocular Movements: Extraocular movements intact.   Pulmonary:      Effort: Pulmonary effort is normal.   Musculoskeletal:      Cervical back: Neck supple.   Neurological:      Mental Status: He is alert and oriented to person, place, and time.   Psychiatric:         Mood and Affect: Mood normal.         Behavior: Behavior normal.         Judgment: Judgment normal.       ECOG SCORE    1 - Restricted in strenuous activity-ambulatory and able to carry out work of a light nature         Laboratory:  CBC with Differential:  Lab Results   Component Value Date    WBC 5.16 03/07/2025    RBC 3.68 (L) 03/07/2025    HGB 12.8 (L) 03/07/2025    HCT 37.4 (L) 03/07/2025    .6 (H) 03/07/2025    MCH 34.8 (H) 03/07/2025    MCHC 34.2 03/07/2025    RDW 13.6 03/07/2025     03/07/2025    MPV 8.6 03/07/2025        CMP:  Sodium   Date Value Ref Range Status   03/07/2025 137 136 - 145 mmol/L Final   02/09/2024 137 136 - 145 mmol/L Final     Potassium   Date Value Ref Range Status   03/07/2025 3.9 3.5 - 5.1 mmol/L Final   02/09/2024 3.9 3.5 - 5.1 mmol/L Final     Chloride   Date Value Ref Range Status   03/07/2025 109 (H) 98 - 107 mmol/L Final   02/09/2024 105 100 - 109 mmol/L Final     CO2   Date Value Ref Range Status   03/07/2025 22 (L) 23 - 31 mmol/L Final     Carbon Dioxide   Date Value Ref Range Status   02/09/2024 23 22 - 33 mmol/L Final     BUN   Date Value Ref Range Status   08/07/2023 11.5 9.0 - 23.0 mg/dL Final     Blood Urea Nitrogen   Date Value Ref Range Status   03/07/2025 17.9 8.4 - 25.7 mg/dL Final   02/09/2024 15 5 - 25 mg/dL Final     Creatinine   Date Value Ref Range Status   03/07/2025 1.06 0.72 - 1.25 mg/dL Final   02/09/2024 0.98 0.57 - 1.25 mg/dL Final     Calcium   Date Value Ref Range Status   03/07/2025 9.3 8.8 - 10.0 mg/dL Final   02/09/2024 9.2 8.8 - 10.6 mg/dL Final     Albumin   Date Value Ref Range Status   03/07/2025 3.6 3.4 - 4.8 g/dL Final   03/07/2025 3.6 3.4 -  4.8 g/dL Final   08/07/2023 4.0 3.2 - 4.8 g/dL Final     Total Bilirubin   Date Value Ref Range Status   08/07/2023 0.4 0.2 - 1.0 mg/dL Final     Bilirubin Total   Date Value Ref Range Status   03/07/2025 1.3 <=1.5 mg/dL Final     ALP   Date Value Ref Range Status   03/07/2025 85 40 - 150 unit/L Final     AST   Date Value Ref Range Status   03/07/2025 19 5 - 34 unit/L Final   08/07/2023 12 <34 U/L Final     ALT   Date Value Ref Range Status   03/07/2025 29 0 - 55 unit/L Final   08/07/2023 20 10 - 49 U/L Final     Anion Gap   Date Value Ref Range Status   02/09/2024 9 8 - 16 mmol/L Final     eGFR    Date Value Ref Range Status   02/09/2024 82 mL/min/1.73mSq Final     Comment:     In accordance with NKF-ASN Task Force recommendation, calculation based on the Chronic Kidney Disease Epidemiology Collaboration (CKD-EPI) equation without adjustment for race. eGFR adjusted for gender and age and calculated in ml/min/1.73mSquared. eGFR cannot be calculated if patient is under 18 years of age.     Reference Range:   >= 60 ml/min/1.73mSquared.     Estimated GFR-Non    Date Value Ref Range Status   06/22/2022 53 mls/min/1.73/m2 Final       Assessment:       1) Multiple myeloma  --Patient was seen at [facility] in [place]. He started treatment here with Velcade/Revlimid/Dexamethamasone. He said that he is a candidate for transplant and they will eval bone marrow again after 6 cycles. He had f/u via telemedicine with Dr. Brito on 2/27/23- recommended to repeat BMB x after 6 cycles.  Darzalex (weekly x 8 dose, then q 2 week x 8 doses, then q 4 weeks per kodak study) was added on 1/27/23. Cycle 4, Day 1 of Velcade/Revlimid/Dexamethamasone/Darzalex was given on 2/10/23. He was diagnosed with Covid and PEs on 2/14/23.   --Patient has been off treatment  since 6/2/2023 due to extensive unrelated c spine surgery followed by prolonged stay at rehab at [facility] in [place].   --Markers trending up  slowly but no symptoms  --will continue to monitor closely  --Plan to start for M spike of > 1.5 or CRAB Sx (hypercalcemia, renal failure, anemia, and bone disease)    2) Cervical degenerative compressive myelopathy, with myelopathic features  3) Lumbar spinal stenosis    4) Peripheral PolyNeuropathy--persists and about the same    5) Weakness--better    6) History of NSTEMI/coronary artery disease-- on Plavix    7) H/o PE    8) Abdominal LAD  --CT C/A/P 03/25/2025:  No enlarged lymph nodes by size criteria.  A 1 cm portal caval lymph node is seen corresponding with FDG avid node on PET-CT, not significantly changed from prior PET .  Small retroperitoneal lymph nodes not enlarged by size criteria but demonstrating mild FDG avidity on prior PET, defer to PET report. Impression: The bones are demineralized with mottled lucency possibly related to history of multiple myeloma.  Prostatomegaly.    Explained to the patient's in detail the CT scan in the that findings may be benign in nature but malignant conditions are part of the differential including related to multiple myeloma, metastatic disease, lymphoma, other.  Patient verbalized understanding but he preferred to monitor for now.  We will repeat the PET-CT in 3 rather than 6 months to evaluate stability of the findings.         Plan:      We will repeat bone marrow biopsy prior to re-initiation of treatment. He prefers to continue to monitor.    Treatment plan for M spike > 1.5G or any or any crab (Calcium, Renal, Anemia, Bone)  symptoms develop.      Will monitor MM for now  PET CT in 3 months - 6/2025  RTC in 3 months for labs/CT results MD - virtually  Labs: MM workup - 3 to 5 days prior    The patient was seen, interviewed and examined. Pertinent lab and radiology studies were reviewed.   The patient was given ample opportunity to ask questions, and to the best of my abilities, all questions answered to satisfaction; patient demonstrated understanding of what  we discussed and agreeable to the plan. Pt instructed to call should develop concerning signs/symptoms or have further questions.   Visit today included increased complexity associated with the care of the episodic problem multiple myeloma, addressing and managing the longitudinal care of the patient's multiple myeloma.         Angélica Harris MD  Hematology/Oncology  [facility]        Professional Services   I, Elo Weathers LPN, acted solely as a scribe for and in the presence of Dr. Angélica Harris, who performed these services.

## 2025-03-25 ENCOUNTER — HOSPITAL ENCOUNTER (OUTPATIENT)
Dept: RADIOLOGY | Facility: HOSPITAL | Age: 75
Discharge: HOME | End: 2025-03-25
Attending: INTERNAL MEDICINE
Payer: MEDICARE

## 2025-03-25 DIAGNOSIS — C90.00 MULTIPLE MYELOMA NOT HAVING ACHIEVED REMISSION: ICD-10-CM

## 2025-03-25 PROCEDURE — 25500020 PHARM REV CODE 255: Performed by: INTERNAL MEDICINE

## 2025-03-25 PROCEDURE — 71260 CT THORAX DX C+: CPT | Mod: TC

## 2025-03-25 RX ORDER — DIATRIZOATE MEGLUMINE AND DIATRIZOATE SODIUM 660; 100 MG/ML; MG/ML
30 SOLUTION ORAL; RECTAL
Status: DISCONTINUED | OUTPATIENT
Start: 2025-03-25 | End: 2025-03-26 | Stop reason: HOSPADM

## 2025-03-25 RX ADMIN — IOHEXOL 75 ML: 350 INJECTION, SOLUTION INTRAVENOUS at 11:03

## 2025-03-26 ENCOUNTER — OFFICE VISIT (OUTPATIENT)
Dept: HEMATOLOGY/ONCOLOGY | Facility: CLINIC | Age: 75
End: 2025-03-26
Payer: MEDICARE

## 2025-03-26 DIAGNOSIS — G62.89 OTHER POLYNEUROPATHY: ICD-10-CM

## 2025-03-26 DIAGNOSIS — C90.00 MULTIPLE MYELOMA NOT HAVING ACHIEVED REMISSION: Primary | ICD-10-CM

## 2025-03-26 DIAGNOSIS — R59.1 LYMPHADENOPATHY: ICD-10-CM

## 2025-03-26 PROCEDURE — 98007 SYNCH AUDIO-VIDEO EST HI 40: CPT | Mod: 95,,, | Performed by: INTERNAL MEDICINE

## 2025-05-27 DIAGNOSIS — C90.00 MULTIPLE MYELOMA NOT HAVING ACHIEVED REMISSION: Primary | ICD-10-CM

## 2025-05-27 DIAGNOSIS — G62.89 OTHER POLYNEUROPATHY: ICD-10-CM

## 2025-05-27 DIAGNOSIS — R59.1 LYMPHADENOPATHY: ICD-10-CM

## 2025-06-06 PROCEDURE — 0077U IG PARAPROTEIN QUAL BLD/UR: CPT | Performed by: INTERNAL MEDICINE

## 2025-06-06 PROCEDURE — 84166 PROTEIN E-PHORESIS/URINE/CSF: CPT | Mod: 59 | Performed by: INTERNAL MEDICINE

## 2025-06-10 ENCOUNTER — HOSPITAL ENCOUNTER (OUTPATIENT)
Dept: RADIOLOGY | Facility: HOSPITAL | Age: 75
Discharge: HOME | End: 2025-06-10
Attending: INTERNAL MEDICINE
Payer: MEDICARE

## 2025-06-10 DIAGNOSIS — R59.1 LYMPHADENOPATHY: ICD-10-CM

## 2025-06-10 DIAGNOSIS — G62.89 OTHER POLYNEUROPATHY: ICD-10-CM

## 2025-06-10 DIAGNOSIS — C90.00 MULTIPLE MYELOMA NOT HAVING ACHIEVED REMISSION: ICD-10-CM

## 2025-06-10 PROCEDURE — A9552 F18 FDG: HCPCS | Performed by: INTERNAL MEDICINE

## 2025-06-10 PROCEDURE — 78816 PET IMAGE W/CT FULL BODY: CPT | Mod: TC

## 2025-06-10 RX ORDER — FLUDEOXYGLUCOSE F18 500 MCI/ML
10 INJECTION INTRAVENOUS
Status: COMPLETED | OUTPATIENT
Start: 2025-06-10 | End: 2025-06-10

## 2025-06-10 RX ADMIN — FLUDEOXYGLUCOSE F-18 10.4 MILLICURIE: 500 INJECTION INTRAVENOUS at 09:06

## 2025-06-11 NOTE — PROGRESS NOTES
HEMATOLOGY/ONCOLOGY OFFICE CLINIC VISIT     This is a telemedicine note.  Patient was treated using telemedicine, real-time audio and video, according to [facility] protocols.       I, Dr. Angélica Harris, distant provided, conducted the visit from location identified below.  The patient participated in the visit at a non-[facility] location selected by the patient (or patient's representative), identified below.  I am licensed in the [facility] where the patient stated they are located.  The patient, (or patient's representative) stated that they understood and accepted privacy and security risk to the information and her location.   I have reviewed the patient name and date of birth  I have verbally reviewed the past medical history, active medication list, and allergies with the patient (parent/ legal guardian for a patient under the age of 18 years old) and verified with picture ID if applicable . I have verified that this patient is a resident in the [facility].  I have verbally obtained review of systems     Patient was located in the [facility]  I, Dr. Angélica Harris, distant provider, was located at [facility] .     Face-to-face time spent with the patient exceeds 25 minutes, over 50% of which was used for education and counseling regarding medical conditions, current medications including risk/benefits and side effects/adverse events, over-the-counter medications uses/doses, home self-care and contact precautions, red flags and indication for immediate medical attention.  The patient is receptive, expresses understanding and is agreeable to the plan.  All questions answered.     Angélica Harris MD     Visit Information:    Initial Evaluation: 8/10/2022  Referring Provider:   Other providers: Dr Brito  Code status: Not addressed    Diagnosis:  Multiple Myeloma--IgG, Kappa  Macrocytic anemia    Present treatment:   VRD-Started on 12/9/22 --on hold since 6/2023  Daratumumab added on  1/27/23 ----on hold since 6/2/2023    Treatment/Oncology history:  VRD-Started on 12/9/22  Daratumumab added on 1/27/23      Goal of care:  palliative    Imaging:   US abdomen 8/24/2022: Spleen: 10.8 cm. Mild hepatomegaly with suspected mild hepatic steatosis. Cholelithiasis.  PET CT 9/28/2022: 1. Right scapular small focal mild hypermetabolism without lytic or sclerotic abnormality is not convinced to be related to multiple myeloma. 2. No definite skeletal lytic abnormality with hypermetabolism to reflect multiple myeloma on this exam. 3. Bilateral small pleural effusions.   4. Gallstones. 5.  Noninflamed diverticulosis coli. 6.  Prostatomegaly.  WB PET CT 11/10/2023:  No FDG avid malignancy is present on today's exam.  WB PET CT 5/15/2024:  No FDG avid malignancy is present on today's exam.  WB PET CT 03/11/2025: 1. Although nonenlarged, there are FDG avid abdominal lymph nodes.  Previously seen periportal lymph node is similar in size with slightly increased FDG uptake.  There is new FDG uptake and additional retroperitoneal lymph nodes compared to the prior.  2. New area of FDG uptake adjacent to or possibly within the uncinate process of the pancreas (image [id number]).  Consider multiphase CT or abdominal MRI for definitive characterization.  Primary differential consideration is a adjacent lymph node versus less likely true pancreatic mass.  CT C/A/P 03/25/2025:  No enlarged lymph nodes by size criteria.  A 1 cm portal caval lymph node is seen corresponding with FDG avid node on PET-CT, not significantly changed from prior PET .  Small retroperitoneal lymph nodes not enlarged by size criteria but demonstrating mild FDG avidity on prior PET, defer to PET report. Impression: The bones are demineralized with mottled lucency possibly related to history of multiple myeloma.  Prostatomegaly  PET CT 06/10/2025: Periportal lymph node shows mild increase in radiotracer activity since March. A precaval lymph node also has  mildly increased uptake. Other hypermetabolic retroperitoneal lymph nodes similar since previous exam.     Pathology:  9/1/2022:   BONE MARROW BIOPSY  PLASMA CELL MYELOMA, KAPPA RESTRICTED. NORMOCELLULAR MARROW (30%) WITH 70% INVOLVEMENT BY PLASMA CELL MYELOMA. BACKGROUND TRILINEAGE HEMATOPOIESIS IS DECREASED.    9/12/2023:  Bone Marrow Biopsy  BONE MARROW, RIGHT POSTERIOR ILIAC CREST, ASPIRATE, CLOT, DECALCIFIED CORE BIOPSY:   PERSISTENT/RESIDUAL PLASMA CELL NEOPLASM, KAPPA LIGHT CHAIN RESTRICTED.   NORMOCELLULAR MARROW (30%) WITH 5% INVOLVEMENT BY KAPPA RESTRICTED PLASMA CELL NEOPLASM.    SEQUENTIAL TRILINEAGE HEMATOPOIESIS PRESENT.    INCREASED IRON STORES.    PERIPHERAL BLOOD :  NORMOCYTIC NORMOCHROMIC ANEMIA.      Comment   A) The patient's history of IgG kappa multiple myeloma treated with VRD and daratumumab is noted from the electronic medical record.    B) The bone marrow is normocellular for age demonstrates a small residual population of kappa restricted plasma cells (5%).  Background hematopoiesis is sequential and trilineage.    C) Additional material submitted to [facility] for MRD myeloma flow demonstrated monotypic kappa plasma cells (MRD=0.1781% or 1781 cells/million).     2/26/2024:  BONE, RIGHT HUMERAL HEAD, EXCISION:   - SEVERE OSTEOARTHRITIS.   - DAVE EBURNATION.   - EXTENSIVE UNDERLYING BONY REMODELING.   - UNDERLYING MARROW NECROSIS AND FIBROSIS.   Comment: No significant hematopoietic tissue is noted within the marrow  spaces.     CLINICAL HISTORY:       Patient: Valentino Manning is a 74 y.o. male with multiple medical problems kindly referred for persistent anemia.  Reviewing electronic medical record patient with anemia since 02/10/2016.  From 2016 to January of 2017 anemia was mild.  Anemia slowly worsening back in 2018 hemoglobin was 8.0.  At that same time kidney function was worsening as well. Labs with HGB of 9.4, .9, platelets 231 K, WBC 5.0.  Chemistries with  "globulin of 5.3.  BUN/creatinine 27.7/1.41.    Further workup revealed the diagnosis of multiple myeloma.  Patient was started on treatment with Velcade, Revlimid and dexamethasone on 12/09/2022 and daratumumab was added on 01/27/2023 with significant improvement of his disease.  He was seen in [place] for possible bone marrow transplant but he was not interested in bone marrow transplant at the time.  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  Gabapentin was not helping.     Treatment was delayed 1st due to COVID-19 in February of 2023, he has few treatments in between but it was delayed again due to NSTEMI . Revlimid was stopped due to his heart disease and plan was to continue daratumumab and Velcade lower dose for neuropathy.      Patient continued to decline with worsening of his weakness.  Home health called to let us know that patient was weak and unable to get out of bed. I called the patient Thursday 6/8/2023: "I personally spoke with the patient and  instructed to go to the ER as his weakness and neuropathy is worsening and he reports that he went to the ER in [place] because he could not urinate and a gongora cath was placed and send home.   He reports that is very difficult for him to get out of bed and he can not seeing himself going to his room to the car. I told him he will need to call an ambulance to transport him to the hospital and to come to [facility]. Patient with debilitating neuropathy of unclear etiology--not typical for MM and/or treatment for MM. I told him that may need MRI's of spine and/or brain and neurologist evaluation. Patient verbalized understanding and he will planned to go to the ER tomorrow not today."      Patient eventually went to the ER.   MRI T spine 6/12/2023:  1. No acute abnormality of the thoracic spine. 2. Mild multilevel canal and neural foraminal narrowing. 3. No cord signal abnormality.  MRI L spine: 1. Severe degenerative spinal canal " stenosis at L3-L4 and L4-L5, moderate at L2-L3, mild at L5-S1. 2. Multilevel neural foraminal stenoses as described.  MRI C spine: 1. Severe degenerative spinal canal stenosis at L3-L4 and L4-L5, moderate at L2-L3, mild at L5-S1. 2. Multilevel neural foraminal stenoses as described.  MRIs of the spine.  No evidence of plasmacytoma, mass or cord compression due to Malignancy.     Patient with cervical myelopathy and stenosis.  He was seen by Neurosurgery and on 6/26/2023 underwent Decompressive laminectomies at C3-4, C4-5, and C5-6. He was then transfer to [facility] for Rehab. Patient has been off treatment  since 6/2/2023 due to extensive unrelated c spine surgery followed by prolonged stay at rehab at [facility].     Interval History:      03/26/25: Patient presents today virtually for 1 week follow up, CT results. Discussed imaging studies in detail with the patient that showed increasing lymphadenopathy.  He does not have any symptoms associated with his abdominal lymphadenopathy. CT reviewed with pt, 1. The bones are demineralized with mottled lucency possibly related to history of multiple myeloma 2. Prostatomegaly. Advised pt that we will continue to monitor lymph nodes, we will hold off on sending pt to surgeon at this time per pt request. Pt denies abdominal pain, no change in bowel habits, no unintentional weight loss.   Discussed referral to surgeon for lymph node biopsy but at this time he is not ready to undergo any other type of surgery.  He prefer to continue to monitor closely.     06/16/25: Patient presents today for 3 month follow up for MM. Labs discussed with pt in detail and all questions answered. M spike 1.35, [place] Free Light Chain 13.8, Kappa/Lambda FLC Ratio 38.3. PET CT completed 6/10, Periportal lymph node shows mild increase in radiotracer activity since March. A precaval lymph node also has mildly increased uptake. Other hypermetabolic retroperitoneal lymph nodes  similar since previous exam. Patient needs to have bone marrow bx, asked to wait 4 months at this time. Pt denies abdominal pain, no change in bowel habits, no unintentional weight loss.     Past Medical History:   Diagnosis Date    Anemia     GERD (gastroesophageal reflux disease)     Heart problem     Hyperlipidemia     Hypertension     Multiple myeloma     NSTEMI (non-ST elevated myocardial infarction)       Past Surgical History:   Procedure Laterality Date    BONE MARROW ASPIRATION N/A 09/01/2022    Procedure: ASPIRATION, BONE MARROW;  Surgeon: Robbie Gardner MD;  Location: [facility] OR;  Service: General;  Laterality: N/A;    BONE MARROW BIOPSY Right 09/12/2023    Procedure: Biopsy-bone marrow;  Surgeon: Russ Wilson MD;  Location: [facility] OR;  Service: General;  Laterality: Right;    CARDIAC SURGERY  2021    ENDOSCOPIC RELEASE OF BOTH CARPAL TUNNELS      FUSION OF POSTERIOR COLUMN OF CERVICAL SPINE USING COMPUTER AIDED NAVIGATION N/A 06/26/2023    Procedure: FUSION, SPINE, POSTERIOR SPINAL COLUMN, CERVICAL, USING COMPUTER-ASSISTED NAVIGATION;  Surgeon: Montserrat Aviles MD;  Location: [facility];  Service: Neurosurgery;  Laterality: N/A;  C3-4, C4-5, C5-6 laminectomies and fusion, o-arm, stealth  TIVA setup  NTI //  XX    SHOULDER SURGERY Right 02/21/2024     Family History   Problem Relation Name Age of Onset    Hypertension Mother      Diabetes Mother      Anemia Mother      Heart disease Mother      Heart disease Father              Review of patient's allergies indicates:   Allergen Reactions    Penicillins       Current Outpatient Medications on File Prior to Visit   Medication Sig Dispense Refill    atorvastatin (LIPITOR) 40 MG tablet TAKE 1 TABLET ORALLY ONCE DAILY ON MON/TUE/THUR/FRI AND 1/2 ON WED. NONE ON SAT/SUN      clopidogreL (PLAVIX) 75 mg tablet Take 75 mg by mouth once daily.      doxazosin (CARDURA) 8 MG Tab Take 8 mg by mouth.      DULoxetine (CYMBALTA) 30 MG capsule TAKE ONE CAPSULE BY MOUTH ONCE  DAILY 30 capsule 3    dutasteride (AVODART) 0.5 mg capsule Take 0.5 mg by mouth once daily.      FERRETTS 325 mg (106 mg iron) Tab Take 1 tablet by mouth 2 (two) times daily.      lisinopriL (PRINIVIL,ZESTRIL) 20 MG tablet Take 20 mg by mouth.      metoprolol tartrate (LOPRESSOR) 25 MG tablet Take 1 tablet (25 mg total) by mouth 2 (two) times daily. 60 tablet 11    testosterone cypionate (DEPOTESTOTERONE CYPIONATE) 200 mg/mL injection INJECT 1 MLS (200 MG) INTRAMUSCULARLY ONCE WEEKLY.      zolpidem (AMBIEN) 10 mg Tab Take 10 mg by mouth nightly as needed.       No current facility-administered medications on file prior to visit.      Review of Systems   Constitutional:  Negative for activity change, appetite change, chills, diaphoresis, fatigue, fever and unexpected weight change.   HENT:  Negative for nasal congestion, mouth sores, nosebleeds, sinus pressure/congestion, sore throat and trouble swallowing.    Eyes: Negative.  Negative for visual disturbance.   Respiratory:  Negative for cough and shortness of breath.    Cardiovascular:  Negative for chest pain and palpitations.   Gastrointestinal:  Negative for abdominal distention, abdominal pain, blood in stool, change in bowel habit, constipation, diarrhea, nausea and vomiting.   Endocrine: Negative.    Genitourinary:  Negative for bladder incontinence, decreased urine volume, difficulty urinating, dysuria, frequency, hematuria and urgency.   Musculoskeletal:  Negative for arthralgias, back pain, gait problem, joint swelling, leg pain and myalgias.   Integumentary:  Negative for rash.   Allergic/Immunologic: Negative.    Neurological:  Positive for weakness. Negative for dizziness, tremors, syncope, light-headedness, numbness, headaches and memory loss.   Hematological:  Negative for adenopathy. Does not bruise/bleed easily.   Psychiatric/Behavioral:  Negative for agitation, confusion, hallucinations, sleep disturbance and suicidal ideas. The patient is not  nervous/anxious.               There were no vitals filed for this visit.          Wt Readings from Last 6 Encounters:   03/17/25 90.4 kg (199 lb 6.4 oz)   12/09/24 90.2 kg (198 lb 14.4 oz)   05/23/24 90.5 kg (199 lb 8 oz)   03/04/24 89.9 kg (198 lb 1.6 oz)   12/28/23 93.7 kg (206 lb 9.6 oz)   11/16/23 87.5 kg (192 lb 12.8 oz)     There is no height or weight on file to calculate BMI.  There is no height or weight on file to calculate BSA.    LIMITED PHYSICAL EXAM DUE TO VIRTUAL VISIT   Physical Exam  Constitutional:       Appearance: Normal appearance.   HENT:      Head: Normocephalic and atraumatic.   Eyes:      Extraocular Movements: Extraocular movements intact.   Pulmonary:      Effort: Pulmonary effort is normal.   Musculoskeletal:      Cervical back: Neck supple.   Neurological:      Mental Status: He is alert and oriented to person, place, and time.   Psychiatric:         Mood and Affect: Mood normal.         Behavior: Behavior normal.         Judgment: Judgment normal.       ECOG SCORE    1 - Restricted in strenuous activity-ambulatory and able to carry out work of a light nature         Laboratory:  CBC with Differential:  Lab Results   Component Value Date    WBC 4.96 06/10/2025    RBC 3.33 (L) 06/10/2025    HGB 11.3 (L) 06/10/2025    HCT 33.1 (L) 06/10/2025    MCV 99.4 (H) 06/10/2025    MCH 33.9 (H) 06/10/2025    MCHC 34.1 06/10/2025    RDW 13.7 06/10/2025     06/10/2025    MPV 8.7 06/10/2025        CMP:  Sodium   Date Value Ref Range Status   06/10/2025 138 136 - 145 mmol/L Final   02/09/2024 137 136 - 145 mmol/L Final     Potassium   Date Value Ref Range Status   06/10/2025 4.1 3.5 - 5.1 mmol/L Final   02/09/2024 3.9 3.5 - 5.1 mmol/L Final     Chloride   Date Value Ref Range Status   06/10/2025 108 (H) 98 - 107 mmol/L Final   02/09/2024 105 100 - 109 mmol/L Final     CO2   Date Value Ref Range Status   06/10/2025 22 (L) 23 - 31 mmol/L Final     Carbon Dioxide   Date Value Ref Range Status    02/09/2024 23 22 - 33 mmol/L Final     Glucose   Date Value Ref Range Status   06/10/2025 102 82 - 115 mg/dL Final     BUN   Date Value Ref Range Status   08/07/2023 11.5 9.0 - 23.0 mg/dL Final     Blood Urea Nitrogen   Date Value Ref Range Status   06/10/2025 23.2 8.4 - 25.7 mg/dL Final   02/09/2024 15 5 - 25 mg/dL Final     Creatinine   Date Value Ref Range Status   06/10/2025 1.28 (H) 0.72 - 1.25 mg/dL Final   02/09/2024 0.98 0.57 - 1.25 mg/dL Final     Calcium   Date Value Ref Range Status   06/10/2025 9.3 8.8 - 10.0 mg/dL Final   02/09/2024 9.2 8.8 - 10.6 mg/dL Final     Protein Total   Date Value Ref Range Status   06/10/2025 8.7 (H) 5.8 - 7.6 gm/dL Final   06/10/2025 8.3 (H) 5.8 - 7.6 gm/dL Final     Albumin   Date Value Ref Range Status   06/10/2025 3.4 3.4 - 4.8 g/dL Final   06/10/2025 3.8 3.4 - 4.8 g/dL Final   08/07/2023 4.0 3.2 - 4.8 g/dL Final     Total Bilirubin   Date Value Ref Range Status   08/07/2023 0.4 0.2 - 1.0 mg/dL Final     Bilirubin Total   Date Value Ref Range Status   06/10/2025 0.5 <=1.5 mg/dL Final     ALP   Date Value Ref Range Status   06/10/2025 48 40 - 150 unit/L Final     AST   Date Value Ref Range Status   06/10/2025 15 11 - 45 unit/L Final   08/07/2023 12 <34 U/L Final     ALT   Date Value Ref Range Status   06/10/2025 24 0 - 55 unit/L Final   08/07/2023 20 10 - 49 U/L Final     Anion Gap   Date Value Ref Range Status   02/09/2024 9 8 - 16 mmol/L Final     eGFR    Date Value Ref Range Status   02/09/2024 82 mL/min/1.73mSq Final     Comment:     In accordance with NKF-ASN Task Force recommendation, calculation based on the Chronic Kidney Disease Epidemiology Collaboration (CKD-EPI) equation without adjustment for race. eGFR adjusted for gender and age and calculated in ml/min/1.73mSquared. eGFR cannot be calculated if patient is under 18 years of age.     Reference Range:   >= 60 ml/min/1.73mSquared.     Estimated GFR-Non    Date Value Ref Range  Status   06/22/2022 53 mls/min/1.73/m2 Final       Assessment:       1) Multiple myeloma  --Patient was seen at [facility] in [place]. He started treatment here with Velcade/Revlimid/Dexamethamasone. He said that he is a candidate for transplant and they will eval bone marrow again after 6 cycles. He had f/u via telemedicine with Dr. Brito on 2/27/23- recommended to repeat BMB x after 6 cycles.  Darzalex (weekly x 8 dose, then q 2 week x 8 doses, then q 4 weeks per kodak study) was added on 1/27/23. Cycle 4, Day 1 of Velcade/Revlimid/Dexamethamasone/Darzalex was given on 2/10/23. He was diagnosed with Covid and PEs on 2/14/23.   --Patient has been off treatment  since 6/2/2023 due to extensive unrelated c spine surgery followed by prolonged stay at rehab at [facility] in [place].   --Markers trending up slowly but no symptoms  --will continue to monitor closely  --Plan to start for M spike of > 1.5 or CRAB Sx (hypercalcemia, renal failure, anemia, and bone disease)    2) Cervical degenerative compressive myelopathy, with myelopathic features  3) Lumbar spinal stenosis    4) Peripheral PolyNeuropathy--persists and about the same    5) Weakness--better    6) History of NSTEMI/coronary artery disease-- on Plavix    7) H/o PE    8) Abdominal LAD  --CT C/A/P 03/25/2025:  No enlarged lymph nodes by size criteria.  A 1 cm portal caval lymph node is seen corresponding with FDG avid node on PET-CT, not significantly changed from prior PET .  Small retroperitoneal lymph nodes not enlarged by size criteria but demonstrating mild FDG avidity on prior PET, defer to PET report. Impression: The bones are demineralized with mottled lucency possibly related to history of multiple myeloma.  Prostatomegaly.    Explained to the patient's in detail the CT scan in the that findings may be benign in nature but malignant conditions are part of the differential including related to multiple myeloma, metastatic disease, lymphoma,  other.  Patient verbalized understanding but he preferred to monitor for now.  We will repeat the PET-CT in 3 rather than 6 months to evaluate stability of the findings.         Plan:   Myeloma markers trending up.  Creatinine trending up, hemoglobin trending down.  Whole-body PET-CT with no bone lesions hypermetabolic retroperitoneal lymphadenopathy with lymph node same size but more activity.  Patient will need to start treatment soon.  He requested to do bone marrow biopsy further workup mid October. In Terms of his retroperitoneal lymphadenopathy he prefer to monitor rather than tissue diagnosis at this time.    We will repeat bone marrow biopsy prior to re-initiation of treatment. He prefers to continue to monitor.    Treatment plan for M spike > 1.5G or any or any crab (Calcium, Renal, Anemia, Bone)  symptoms develop.      BMBx in mid-October - ordered  RTC in 5 months for BMBx/lab results with MD  Labs: MM workup labs mid-October    The patient was seen, interviewed and examined. Pertinent lab and radiology studies were reviewed.   The patient was given ample opportunity to ask questions, and to the best of my abilities, all questions answered to satisfaction; patient demonstrated understanding of what we discussed and agreeable to the plan. Pt instructed to call should develop concerning signs/symptoms or have further questions.   Visit today included increased complexity associated with the care of the episodic problem multiple myeloma, addressing and managing the longitudinal care of the patient's multiple myeloma.         Angélica Harris MD  Hematology/Oncology  [facility]        Professional Services   I, Elo Weathers LPN, acted solely as a scribe for and in the presence of Dr. Angélica Harris, who performed these services.

## 2025-06-16 ENCOUNTER — OFFICE VISIT (OUTPATIENT)
Dept: HEMATOLOGY/ONCOLOGY | Facility: CLINIC | Age: 75
End: 2025-06-16
Payer: MEDICARE

## 2025-06-16 DIAGNOSIS — C90.00 MULTIPLE MYELOMA NOT HAVING ACHIEVED REMISSION: ICD-10-CM

## 2025-06-16 DIAGNOSIS — R59.1 LYMPHADENOPATHY: ICD-10-CM

## (undated) DEVICE — KIT SURGIFLO HEMOSTATIC MATRIX

## (undated) DEVICE — SPONGE GAUZE 16PLY 4X4

## (undated) DEVICE — SUT ETHILON 3-0 FS-1 30

## (undated) DEVICE — SPONGE PATTY NEURO SGL 0.5X6

## (undated) DEVICE — SOL NACL IRR 1000ML BTL

## (undated) DEVICE — BENZOIN TINCTURE 0.66ML

## (undated) DEVICE — Device

## (undated) DEVICE — GOWN X-LG STERILE BACK

## (undated) DEVICE — GLOVE PROTEXIS PI SYN SURG 6.5

## (undated) DEVICE — TAPE CLOTH SOFT MEDIPORE 4IN

## (undated) DEVICE — COVER STERILE-Z BACK TABLE XL

## (undated) DEVICE — DRAPE TIBURON ORTHOPEDIC SPLIT

## (undated) DEVICE — ELECTRODE BLADE E-Z CLEAN 4IN

## (undated) DEVICE — SHEET DRAPE TOP BAR - EMERALD

## (undated) DEVICE — BUR BONE CUT MICRO TPS 3X3.8MM

## (undated) DEVICE — DRAPE OPMI STERILE

## (undated) DEVICE — GLOVE PROTEXIS PI SYN SURG 7

## (undated) DEVICE — NDL SYR 10ML 18X1.5 LL BLUNT

## (undated) DEVICE — KIT SURGICAL TURNOVER

## (undated) DEVICE — NDL HYPO 22GX1 1/2 SYR 10ML LL

## (undated) DEVICE — SUT VICRYL PLUS 0 CT-1 18IN

## (undated) DEVICE — STAPLER SKIN PROXIMATE WIDE

## (undated) DEVICE — SPHERE NDI PASSIVE

## (undated) DEVICE — SUT VICRYL 2 0 CT 2

## (undated) DEVICE — DRAPE SURG W/TWL 17 5/8X23

## (undated) DEVICE — TRAY SKIN SCRUB WET PREMIUM

## (undated) DEVICE — DRILL BIT

## (undated) DEVICE — DISH PETRI MED 3.5IN

## (undated) DEVICE — KIT DRAIN WOUND RND SPRNG RESV

## (undated) DEVICE — DRESSING XEROFORM NONADH 1X8IN

## (undated) DEVICE — SPONGE SURGIFOAM 100 8.5X12X10

## (undated) DEVICE — DRAPE C-ARM COVER EZ 36X28IN

## (undated) DEVICE — DRAPE C-ARMOR EQUIPMENT COVER

## (undated) DEVICE — TRAY CATH FOL SIL URIMTR 16FR

## (undated) DEVICE — TUBE SUCTION MEDI-VAC STERILE

## (undated) DEVICE — GAUZE SPONGE 4X4 12PLY

## (undated) DEVICE — KIT POS JACKSON TABLE NO HDRST

## (undated) DEVICE — TOOL MR8 MATCH HEAD 14CM 3MM

## (undated) DEVICE — COVER HD BACK TABLE 6FT